# Patient Record
Sex: MALE | Race: WHITE | NOT HISPANIC OR LATINO | Employment: OTHER | URBAN - METROPOLITAN AREA
[De-identification: names, ages, dates, MRNs, and addresses within clinical notes are randomized per-mention and may not be internally consistent; named-entity substitution may affect disease eponyms.]

---

## 2017-05-01 DIAGNOSIS — Z12.11 ENCOUNTER FOR SCREENING FOR MALIGNANT NEOPLASM OF COLON: ICD-10-CM

## 2017-05-01 DIAGNOSIS — Z12.12 ENCOUNTER FOR SCREENING FOR MALIGNANT NEOPLASM OF RECTUM: ICD-10-CM

## 2017-05-23 ENCOUNTER — GENERIC CONVERSION - ENCOUNTER (OUTPATIENT)
Dept: OTHER | Facility: OTHER | Age: 72
End: 2017-05-23

## 2017-05-26 ENCOUNTER — GENERIC CONVERSION - ENCOUNTER (OUTPATIENT)
Dept: OTHER | Facility: OTHER | Age: 72
End: 2017-05-26

## 2017-05-30 ENCOUNTER — APPOINTMENT (OUTPATIENT)
Dept: LAB | Facility: CLINIC | Age: 72
End: 2017-05-30
Payer: MEDICARE

## 2017-05-30 DIAGNOSIS — Z00.00 ENCOUNTER FOR GENERAL ADULT MEDICAL EXAMINATION WITHOUT ABNORMAL FINDINGS: ICD-10-CM

## 2017-05-30 DIAGNOSIS — Z12.11 ENCOUNTER FOR SCREENING FOR MALIGNANT NEOPLASM OF COLON: ICD-10-CM

## 2017-05-30 DIAGNOSIS — Z12.12 ENCOUNTER FOR SCREENING FOR MALIGNANT NEOPLASM OF RECTUM: ICD-10-CM

## 2017-05-30 DIAGNOSIS — E11.9 TYPE 2 DIABETES MELLITUS WITHOUT COMPLICATIONS (HCC): ICD-10-CM

## 2017-05-30 LAB
EST. AVERAGE GLUCOSE BLD GHB EST-MCNC: 140 MG/DL
GLUCOSE P FAST SERPL-MCNC: 142 MG/DL (ref 65–99)
HBA1C MFR BLD: 6.5 % (ref 4.2–6.3)
HCV AB SER QL: NORMAL
HEMOCCULT STL QL IA: NEGATIVE

## 2017-05-30 PROCEDURE — 83036 HEMOGLOBIN GLYCOSYLATED A1C: CPT

## 2017-05-30 PROCEDURE — G0328 FECAL BLOOD SCRN IMMUNOASSAY: HCPCS

## 2017-05-30 PROCEDURE — 82947 ASSAY GLUCOSE BLOOD QUANT: CPT

## 2017-05-30 PROCEDURE — 36415 COLL VENOUS BLD VENIPUNCTURE: CPT

## 2017-05-30 PROCEDURE — 86803 HEPATITIS C AB TEST: CPT

## 2017-06-07 ENCOUNTER — ALLSCRIPTS OFFICE VISIT (OUTPATIENT)
Dept: OTHER | Facility: OTHER | Age: 72
End: 2017-06-07

## 2017-06-07 DIAGNOSIS — E11.9 TYPE 2 DIABETES MELLITUS WITHOUT COMPLICATIONS (HCC): ICD-10-CM

## 2017-09-26 ENCOUNTER — APPOINTMENT (OUTPATIENT)
Dept: LAB | Facility: CLINIC | Age: 72
End: 2017-09-26
Payer: MEDICARE

## 2017-09-26 DIAGNOSIS — R97.20 ELEVATED PROSTATE SPECIFIC ANTIGEN (PSA): Primary | ICD-10-CM

## 2017-09-26 LAB — PSA SERPL-MCNC: 7.1 NG/ML (ref 0–4)

## 2017-09-26 PROCEDURE — 84153 ASSAY OF PSA TOTAL: CPT

## 2017-10-09 ENCOUNTER — ALLSCRIPTS OFFICE VISIT (OUTPATIENT)
Dept: OTHER | Facility: OTHER | Age: 72
End: 2017-10-09

## 2017-10-11 NOTE — PROGRESS NOTES
Assessment  1  PSA elevation (790 93) (R97 20)    Plan  PSA elevation    · (1) PSA, DIAGNOSTIC (FOLLOW-UP); Status:Active; Requested for:01Oct2018; Perform:[facility]; VMJ:[id number];ESOVWCI; For:PSA elevation; Ordered By:Kelly Nava;   · Follow-up visit in 1 year Evaluation and Treatment  Follow-up  Status: Hold For -  Scheduling,Retrospective Authorization  Requested for: [id number]   Ordered; For: PSA elevation; Ordered By: Molly Romero Performed:  Due: 41BUK0812; Last Updated By: Molly Romero; 10/9/2017 1:16:58 PM    Discussion/Summary  Discussion Summary:   1  Elevated PSA s/p 2 negative prostate biopsiesremains stable at 7  1up in 1 year with PSA prior to visit and with ROGERIO for continued surveillance aware should PSAs remain stable, routine prostate cancer screening can be discontinued according to AUA guidelines  Adrenal adenomaon previous imaging  by Dr Dominique Gonzales  Chief Complaint  Chief Complaint Free Text Note Form: Patient presents for elevated PSA  PSA = 7 1       History of Present Illness  HPI: Lukas Armijo is a 74-year-old male patient of Dr Dominique Gonzales with a history of elevated PSA status post 2 negative prostate biopsies and adrenal adenoma presenting for 1 year follow-up  previously had 2 negative prostate biopsies while in [place] in 2011 for a PSA of 7 57 and 7 21  PSAs have remained stable over the past 2 years with his most recent PSA is 7 1 (9/26/2017), previously 7 4 last year   had a CT of his abdomen and pelvis with and without contrast in 2015  At this time revealed a slight thickening of the superior portion of the right adrenal gland  The appearance was unchanged from previous chest CT in May 2014  The area was too small to accurately measure density though the findings were almost certainly indicative of a benign processes or nodular hypertrophy of the gland or a small adenoma   She was also found to have prostatomegaly on the same CT scan in August 2015  He has not had any follow-up imaging since  is overall happy with his urination  He feels like he has a strong stream, feels empty after urination, and has nocturia 0-1 time nightly  Patient does admit to occasional hesitancy however denies any urgency, frequency, hematuria, incontinence, dysuria, suprapubic pressure, or flank pain  He is not on any medications for his prostate or bladder  He denies any issues with sexual dysfunction  Review of Systems  Complete-Male Urology:   Constitutional: No fever or chills, feels well, no tiredness, no recent weight gain or weight loss  Respiratory: No complaints of shortness of breath, no wheezing, no cough, no SOB on exertion, no orthopnea or PND  Cardiovascular: No complaints of slow heart rate, no fast heart rate, no chest pain, no palpitations, no leg claudication, no lower extremity  Gastrointestinal: No complaints of abdominal pain, no constipation, no nausea or vomiting, no diarrhea or bloody stools  Genitourinary: Empty sensation-and-stream quality good, but-no dysuria,-no hematuria,-no incontinence,-no nocturia-and-no feelings of urinary urgency-   The patient presents with complaints of urinary hesitancy (occasional)  Musculoskeletal: No complaints of arthralgia, no myalgias, no joint swelling or stiffness, no limb pain or swelling  Integumentary: No complaints of skin rash or skin lesions, no itching, no skin wound, no dry skin  Hematologic/Lymphatic: No complaints of swollen glands, no swollen glands in the neck, does not bleed easily, no easy bruising  Neurological: No compliants of headache, no confusion, no convulsions, no numbness or tingling, no dizziness or fainting, no limb weakness, no difficulty walking  ROS Reviewed:   ROS reviewed  Active Problems  1  Adrenal adenoma (227 0) (D35 00)   2  Benign essential hypertension (401 1) (I10)   3  Diabetic retinopathy (250 50,362 01) (E11 319)   4   Hypercholesteremia (272 0) (E78 00)   5  PSA elevation (790 93) (R97 20)   6  Type 2 diabetes mellitus (250 00) (E11 9)    Past Medical History  1  History of Chronic cough (786 2) (R05)   2  History of Colon cancer screening (V76 51) (Z12 11)   3  History of dysplastic nevus (V13 3) (Z86 018)   4  History of Need for pneumococcal vaccination (V03 82) (Z23)   5  History of Need for prophylactic vaccination and inoculation against influenza (V04 81)   (Z23)   6  Need for prophylactic vaccination and inoculation against influenza (V04 81) (Z23)   7  History of Pain of left shoulder region (719 41) (M25 512)  Active Problems And Past Medical History Reviewed: The active problems and past medical history were reviewed and updated today  Surgical History  Surgical History Reviewed: The surgical history was reviewed and updated today  Family History  Mother    1  Family history of CHF (congestive heart failure)  Father    2  Family history of CHF (congestive heart failure)   3  Family history of cardiac disorder (V17 49) (Z82 49)   4  Family history of MI (myocardial infarction)  Uncle    5  Family history of Diabetes  Family History Reviewed: The family history was reviewed and updated today  Social History   · Never a smoker   · History of Never smoker   · No drug use   · History of Social alcohol use (Z78 9)  Social History Reviewed: The social history was reviewed and is unchanged  Current Meds   1  AmLODIPine Besylate 10 MG Oral Tablet; TAKE 1 TABLET DAILY AS DIRECTED; Therapy: 69HBW1596 to (Evaluate:18Vgq7591)  Requested for: 93INJ8159; Last   Rx:07Jun2017 Ordered   2  CoQ10 CAPS; take 1 capsule daily; Therapy: (Recorded:07Jun2017) to Recorded   3  Fish Oil CAPS; Take 1 capsule twice daily; Therapy: (Recorded:07Jun2017) to Recorded   4  Glucosamine-Chondroitin TABS; Take 1 tablet twice daily; Therapy: (Recorded:07Jun2017) to Recorded   5   HydroCHLOROthiazide 25 MG Oral Tablet; TAKE 1 TABLET DAILY; Therapy: 01WYE7792 to (Evaluate:39Pzu8952)  Requested for: 01SUN1784; Last   Rx:07Jun2017 Ordered   6  MetFORMIN HCl - 1000 MG Oral Tablet; Take 1 tablet twice daily; Therapy: 14ZTR8785 to (Evaluate:04Dec2017)  Requested for: 81EEB5270; Last   Rx:07Jun2017 Ordered   7  Simvastatin 40 MG Oral Tablet; TAKE 1 TABLET DAILY; Therapy: 45PFC2232 to (Evaluate:04Dec2017)  Requested for: 01GIC5903; Last   Rx:07Jun2017 Ordered  Medication List Reviewed: The medication list was reviewed and updated today  Allergies  1  No Known Drug Allergies    Vitals  Vital Signs    Recorded: 87HQT6433 01:06PM   Heart Rate 64   Systolic 549   Diastolic 78   Height 5 ft 10 in   Weight 180 lb 4 oz   BMI Calculated 25 86   BSA Calculated 2     Physical Exam    Constitutional   General appearance: No acute distress, well appearing and well nourished  Pulmonary   Respiratory effort: No increased work of breathing or signs of respiratory distress  Cardiovascular   Examination of extremities for edema and/or varicosities: Normal     Musculoskeletal   Gait and station: Normal     Skin   Skin and subcutaneous tissue: Normal without rashes or lesions  Additional Exam:  no focal neurologic deficits  Mood and affect appropriate        Results/Data  AUA Symptom Score 09Oct2017 01:09PM User, Ahs     Test Name Result Flag Reference   AUA Symptom Score (for prostate disease) 1     Incomplete emptying: Not at all (0)  Frequency: Not at all (0)  Intermittency: Not at all (0)  Urgency: Not at all (0)  Weak-stream: Not at all (0)  Straining: Not at all (0)  Nocturia: Less than 1 time in 5 (1)   AUA Symptom Score (for prostate disease) - Quality of Life Due to Urinary Symptoms Delighted     AUA Symptom Score (for prostate disease) - Score Category Mild         Future Appointments    Date/Time Provider Specialty Site   10/16/2018 09:00 AM Angelina Nava Urology [facility]ack[facility]clean   12/15/2017 09:00 AM Isabell Werner MD Internal Medicine [facility]     Signatures   Electronically signed by : Yudy Walton, ; Oct  9 2017  1:33PM EST                       (Author)    Electronically signed by : JOEY Russell ; Oct 10 2017  7:01AM EST

## 2017-12-04 ENCOUNTER — APPOINTMENT (OUTPATIENT)
Dept: LAB | Facility: CLINIC | Age: 72
End: 2017-12-04
Payer: MEDICARE

## 2017-12-04 DIAGNOSIS — E13.8 DIABETES MELLITUS OF OTHER TYPE WITH COMPLICATION, UNSPECIFIED LONG TERM INSULIN USE STATUS: Primary | ICD-10-CM

## 2017-12-04 LAB
CHOLEST SERPL-MCNC: 160 MG/DL (ref 50–200)
CREAT UR-MCNC: 126 MG/DL
EST. AVERAGE GLUCOSE BLD GHB EST-MCNC: 143 MG/DL
GLUCOSE P FAST SERPL-MCNC: 146 MG/DL (ref 65–99)
HBA1C MFR BLD: 6.6 % (ref 4.2–6.3)
HDLC SERPL-MCNC: 41 MG/DL (ref 40–60)
LDLC SERPL CALC-MCNC: 101 MG/DL (ref 0–100)
MICROALBUMIN UR-MCNC: 12 MG/L (ref 0–20)
MICROALBUMIN/CREAT 24H UR: 10 MG/G CREATININE (ref 0–30)
TRIGL SERPL-MCNC: 89 MG/DL

## 2017-12-04 PROCEDURE — 36415 COLL VENOUS BLD VENIPUNCTURE: CPT

## 2017-12-04 PROCEDURE — 82043 UR ALBUMIN QUANTITATIVE: CPT

## 2017-12-04 PROCEDURE — 80061 LIPID PANEL: CPT

## 2017-12-04 PROCEDURE — 82570 ASSAY OF URINE CREATININE: CPT

## 2017-12-04 PROCEDURE — 83036 HEMOGLOBIN GLYCOSYLATED A1C: CPT

## 2017-12-04 PROCEDURE — 82947 ASSAY GLUCOSE BLOOD QUANT: CPT

## 2017-12-20 ENCOUNTER — ALLSCRIPTS OFFICE VISIT (OUTPATIENT)
Dept: OTHER | Facility: OTHER | Age: 72
End: 2017-12-20

## 2017-12-20 DIAGNOSIS — E11.9 TYPE 2 DIABETES MELLITUS WITHOUT COMPLICATIONS (HCC): ICD-10-CM

## 2018-01-13 VITALS
OXYGEN SATURATION: 98 % | WEIGHT: 175.13 LBS | HEART RATE: 71 BPM | TEMPERATURE: 98.3 F | DIASTOLIC BLOOD PRESSURE: 80 MMHG | SYSTOLIC BLOOD PRESSURE: 134 MMHG | BODY MASS INDEX: 25.13 KG/M2

## 2018-01-14 VITALS
HEIGHT: 70 IN | BODY MASS INDEX: 25.8 KG/M2 | HEART RATE: 64 BPM | DIASTOLIC BLOOD PRESSURE: 78 MMHG | SYSTOLIC BLOOD PRESSURE: 138 MMHG | WEIGHT: 180.25 LBS

## 2018-01-23 VITALS
BODY MASS INDEX: 26.65 KG/M2 | OXYGEN SATURATION: 96 % | TEMPERATURE: 97.5 F | HEIGHT: 70 IN | HEART RATE: 72 BPM | WEIGHT: 186.13 LBS | SYSTOLIC BLOOD PRESSURE: 126 MMHG | DIASTOLIC BLOOD PRESSURE: 76 MMHG

## 2018-02-22 DIAGNOSIS — I10 BENIGN HYPERTENSION: Primary | ICD-10-CM

## 2018-02-22 RX ORDER — AMLODIPINE BESYLATE 10 MG/1
1 TABLET ORAL DAILY
COMMUNITY
Start: 2014-05-08 | End: 2018-02-22 | Stop reason: SDUPTHER

## 2018-02-22 RX ORDER — HYDROCHLOROTHIAZIDE 25 MG/1
25 TABLET ORAL DAILY
Qty: 90 TABLET | Refills: 1 | Status: SHIPPED | OUTPATIENT
Start: 2018-02-22 | End: 2018-07-30 | Stop reason: SDUPTHER

## 2018-02-22 RX ORDER — AMLODIPINE BESYLATE 10 MG/1
10 TABLET ORAL DAILY
Qty: 90 TABLET | Refills: 1 | Status: SHIPPED | OUTPATIENT
Start: 2018-02-22 | End: 2018-07-30 | Stop reason: SDUPTHER

## 2018-02-22 RX ORDER — HYDROCHLOROTHIAZIDE 25 MG/1
1 TABLET ORAL DAILY
COMMUNITY
Start: 2014-05-08 | End: 2018-02-22 | Stop reason: SDUPTHER

## 2018-06-22 ENCOUNTER — HOSPITAL ENCOUNTER (EMERGENCY)
Facility: HOSPITAL | Age: 73
Discharge: HOME | End: 2018-06-22
Payer: MEDICARE

## 2018-06-22 ENCOUNTER — APPOINTMENT (EMERGENCY)
Dept: RADIOLOGY | Facility: HOSPITAL | Age: 73
End: 2018-06-22
Payer: MEDICARE

## 2018-06-22 VITALS
TEMPERATURE: 98.4 F | HEART RATE: 71 BPM | DIASTOLIC BLOOD PRESSURE: 67 MMHG | RESPIRATION RATE: 18 BRPM | OXYGEN SATURATION: 97 % | SYSTOLIC BLOOD PRESSURE: 127 MMHG | WEIGHT: 180 LBS | BODY MASS INDEX: 25.68 KG/M2

## 2018-06-22 DIAGNOSIS — S61.219A FINGER LACERATION: Primary | ICD-10-CM

## 2018-06-22 PROCEDURE — 73140 X-RAY EXAM OF FINGER(S): CPT

## 2018-06-22 PROCEDURE — 99283 EMERGENCY DEPT VISIT LOW MDM: CPT

## 2018-06-22 PROCEDURE — 90715 TDAP VACCINE 7 YRS/> IM: CPT | Performed by: PHYSICIAN ASSISTANT

## 2018-06-22 PROCEDURE — 90471 IMMUNIZATION ADMIN: CPT

## 2018-06-22 RX ORDER — AMOXICILLIN AND CLAVULANATE POTASSIUM 875; 125 MG/1; MG/1
1 TABLET, FILM COATED ORAL EVERY 12 HOURS SCHEDULED
Qty: 14 TABLET | Refills: 0 | Status: SHIPPED | OUTPATIENT
Start: 2018-06-22 | End: 2018-06-29

## 2018-06-22 RX ORDER — AMOXICILLIN AND CLAVULANATE POTASSIUM 875; 125 MG/1; MG/1
1 TABLET, FILM COATED ORAL ONCE
Status: COMPLETED | OUTPATIENT
Start: 2018-06-22 | End: 2018-06-22

## 2018-06-22 RX ADMIN — AMOXICILLIN AND CLAVULANATE POTASSIUM 1 TABLET: 875; 125 TABLET, FILM COATED ORAL at 15:32

## 2018-06-22 RX ADMIN — TETANUS TOXOID, REDUCED DIPHTHERIA TOXOID AND ACELLULAR PERTUSSIS VACCINE, ADSORBED 0.5 ML: 5; 2.5; 8; 8; 2.5 SUSPENSION INTRAMUSCULAR at 15:37

## 2018-06-22 NOTE — DISCHARGE INSTRUCTIONS
Acute Wounds   WHAT YOU NEED TO KNOW:   An acute wound is an injury that causes a break in the skin  As your wound begins to heal, it is normal to have some swelling, pain, and redness  Your body's immune system is working to keep your wound from getting infected  Your wound may develop a scab  The scab protects your wound as it heals  DISCHARGE INSTRUCTIONS:   Call 911 for the following:   · You suddenly have trouble breathing or have chest pain  Return to the emergency department if:   · Blood soaks through your bandage  · You have pus or a foul odor coming from the wound  · Your stitches come apart or your wound reopens  Contact your healthcare provider if:   · You continue to have pain even after you have taken pain medicine  · You have muscle, joint, or body aches, sweating, or a fever  · You have increased swelling, redness, or bleeding in your wound  · Your skin is itchy, swollen, or you have a rash  · You have questions or concerns about your condition or care  Medicines: You may need any of the following:  · Antibiotics  may be given to prevent or treat an infection  · Prescription pain medicine  may be given  Ask your how to take this medicine safely  · NSAIDs , such as ibuprofen, help decrease swelling, pain, and fever  NSAIDs can cause stomach bleeding or kidney problems in certain people  If you take blood thinner medicine, always ask if NSAIDs are safe for you  Always read the medicine label and follow directions  Do not give these medicines to children under 10months of age without direction from your child's healthcare provider  · Take your medicine as directed  Contact your healthcare provider if you think your medicine is not helping or if you have side effects  Tell him or her if you are allergic to any medicine  Keep a list of the medicines, vitamins, and herbs you take  Include the amounts, and when and why you take them   Bring the list or the pill bottles to follow-up visits  Carry your medicine list with you in case of an emergency  Care for your wound as directed:  Acute wounds can be in different locations and caused by different injuries  Follow your healthcare provider's instructions on caring for your type of wound  The following care items are for most wounds:  · Keep your wound covered with a clean and dry bandage  Change your bandage if it becomes wet or dirty  This will decrease the risk for infection in your wound  Follow your healthcare provider's instructions for changing your dressing  · Do not soak in a tub or swim  until your healthcare provider says it is okay  Your wound may open if you get it too wet  Dirt from the water can also get into your wound and cause an infection  · Keep pets away from your wound  Pets carry germs that can cause a wound infection  · Do not pick or scratch scabs  Let scabs fall off on their own  You may damage new skin that is forming under the scab  You may have a worse scar after the damage  · Eat healthy foods and drink liquids as directed  Healthy foods give your body the nutrients it needs to heal your wound  Liquids prevent dehydration that can decrease the blood supply to your wound  Healthy foods include fruits, vegetables, grains (breads and cereals), dairy, and protein foods  Protein foods include meat, fish, nuts, and soy products  Protein, calories, vitamin C, and zinc help wounds heal  Ask your healthcare provider for more information about the foods you should eat to improve healing  Follow up with your healthcare provider as directed:  Write down your questions so you remember to ask them during your visits  © 2017 [employer] [employer] Information is for End User's use only and may not be sold, redistributed or otherwise used for commercial purposes   All illustrations and images included in CareNotes® are the copyrighted property of A D A M , Inc  or [employer]  The above information is an  only  It is not intended as medical advice for individual conditions or treatments  Talk to your doctor, nurse or pharmacist before following any medical regimen to see if it is safe and effective for you

## 2018-06-22 NOTE — ED PROVIDER NOTES
History  Chief Complaint   Patient presents with    Finger Laceration     patient was saw cutting at noon and deep laceration, bone visible  patient used hydrogene peroxide on wound     This is a 30-year-old male patient who caught his left 5th digit on a circular saw causing a large gaping laceration on the volar surface of left 5th finger between the DI p m  PIP there is a large chunk of skin missing  There is some tendon exposed but no laceration of the tendon with full range of motion with and without resistance  X-ray interpreted by me small avulsion  Patient be seen evaluated by the plastic surgeon was bedside  Prior to Admission Medications   Prescriptions Last Dose Informant Patient Reported? Taking? amLODIPine (NORVASC) 10 mg tablet   No No   Sig: Take 1 tablet (10 mg total) by mouth daily   hydrochlorothiazide (HYDRODIURIL) 25 mg tablet   No No   Sig: Take 1 tablet (25 mg total) by mouth daily      Facility-Administered Medications: None       Past Medical History:   Diagnosis Date    Chronic cough     RESOLVED: [id number]    Diabetes mellitus ([facility] [address] )        No past surgical history on file  Family History   Problem Relation Age of Onset    Heart failure Mother     Heart failure Father     Heart disease Father     Heart attack Father     Diabetes Other      I have reviewed and agree with the history as documented  Social History   Substance Use Topics    Smoking status: Never Smoker    Smokeless tobacco: Never Used    Alcohol use Yes      Comment: SOCIAL         Review of Systems   All other systems reviewed and are negative  Physical Exam  Physical Exam   Constitutional: He appears well-developed and well-nourished  HENT:   Head: Normocephalic and atraumatic     Right Ear: External ear normal    Left Ear: External ear normal    Nose: Nose normal    Mouth/Throat: Oropharynx is clear and moist    Eyes: Conjunctivae are normal  Pupils are equal, round, and reactive to light    Neck: Normal range of motion  Neck supple  Cardiovascular: Normal rate and regular rhythm  Pulmonary/Chest: Effort normal and breath sounds normal    Abdominal: Soft  Bowel sounds are normal  There is no tenderness  Musculoskeletal: Normal range of motion  Hands:  Neurological: He is alert  Skin: Skin is warm  Psychiatric: He has a normal mood and affect  His behavior is normal    Nursing note and vitals reviewed  Vital Signs  ED Triage Vitals [06/22/18 1336]   Temperature Pulse Respirations Blood Pressure SpO2   98 4 °F (36 9 °C) 71 18 127/67 97 %      Temp Source Heart Rate Source Patient Position - Orthostatic VS BP Location FiO2 (%)   Oral Monitor Sitting Right arm --      Pain Score       2           Vitals:    06/22/18 1336   BP: 127/67   Pulse: 71   Patient Position - Orthostatic VS: Sitting       Visual Acuity      ED Medications  Medications   amoxicillin-clavulanate (AUGMENTIN) 875-125 mg per tablet 1 tablet (not administered)       Diagnostic Studies  Results Reviewed     None                 XR finger fifth digit - pinkie LEFT   ED Interpretation by Slava Rose PA-C (06/22 1434)   Small avulsion noted little finger                 Procedures  Procedures       Phone Contacts  ED Phone Contact    ED Course  ED Course as of Jun 22 1520 Fri Jun 22, 2018   1444 Spoke with dr Bailee Garcia will be here in 5 minutes  To see patient    80 Dr Bailee Garcia saw patient  Advised dress with xeraform and dress  Continue augmentin                                MDM  CritCare Time    Disposition  Final diagnoses:   Finger laceration     Time reflects when diagnosis was documented in both MDM as applicable and the Disposition within this note     Time User Action Codes Description Comment    6/22/2018  3:18 PM [facility] [address] [facility]       ED Disposition     ED Disposition Condition Comment    Discharge  Yuli Ron discharge to home/self care      Condition at discharge: Good        Follow-up Information     Follow up With Specialties Details Why Contact Info    Ramila Reyna MD Plastic Surgery, Hand Surgery Schedule an appointment as soon as possible for a visit in 2 days  [address]  [phone]            Patient's Medications   Discharge Prescriptions    AMOXICILLIN-CLAVULANATE (AUGMENTIN) 875-125 MG PER TABLET    Take 1 tablet by mouth every 12 (twelve) hours for 7 days       Start Date: 6/22/2018 End Date: 6/29/2018       Order Dose: 1 tablet       Quantity: 14 tablet    Refills: 0     No discharge procedures on file      ED Provider  Electronically Signed by           Renita Puente PA-C  06/22/18 8095

## 2018-07-23 ENCOUNTER — APPOINTMENT (OUTPATIENT)
Dept: LAB | Facility: CLINIC | Age: 73
End: 2018-07-23
Payer: MEDICARE

## 2018-07-23 DIAGNOSIS — E11.9 TYPE 2 DIABETES MELLITUS WITHOUT COMPLICATIONS (HCC): ICD-10-CM

## 2018-07-23 LAB
EST. AVERAGE GLUCOSE BLD GHB EST-MCNC: 148 MG/DL
GLUCOSE P FAST SERPL-MCNC: 144 MG/DL (ref 65–99)
HBA1C MFR BLD: 6.8 % (ref 4.2–6.3)

## 2018-07-23 PROCEDURE — 36415 COLL VENOUS BLD VENIPUNCTURE: CPT

## 2018-07-23 PROCEDURE — 82947 ASSAY GLUCOSE BLOOD QUANT: CPT

## 2018-07-23 PROCEDURE — 83036 HEMOGLOBIN GLYCOSYLATED A1C: CPT

## 2018-07-30 ENCOUNTER — OFFICE VISIT (OUTPATIENT)
Dept: INTERNAL MEDICINE CLINIC | Age: 73
End: 2018-07-30
Payer: MEDICARE

## 2018-07-30 VITALS
HEIGHT: 71 IN | OXYGEN SATURATION: 99 % | SYSTOLIC BLOOD PRESSURE: 132 MMHG | HEART RATE: 62 BPM | TEMPERATURE: 97.7 F | BODY MASS INDEX: 24.58 KG/M2 | WEIGHT: 175.6 LBS | DIASTOLIC BLOOD PRESSURE: 76 MMHG

## 2018-07-30 DIAGNOSIS — Z12.11 SCREENING FOR COLON CANCER: ICD-10-CM

## 2018-07-30 DIAGNOSIS — E78.00 HYPERCHOLESTEROLEMIA: ICD-10-CM

## 2018-07-30 DIAGNOSIS — E11.8 TYPE 2 DIABETES MELLITUS WITH COMPLICATION, WITHOUT LONG-TERM CURRENT USE OF INSULIN (HCC): ICD-10-CM

## 2018-07-30 DIAGNOSIS — E11.8 TYPE 2 DIABETES MELLITUS WITH COMPLICATION, WITHOUT LONG-TERM CURRENT USE OF INSULIN (HCC): Primary | ICD-10-CM

## 2018-07-30 DIAGNOSIS — E11.3292 MILD NONPROLIFERATIVE DIABETIC RETINOPATHY OF LEFT EYE WITHOUT MACULAR EDEMA ASSOCIATED WITH TYPE 2 DIABETES MELLITUS (HCC): ICD-10-CM

## 2018-07-30 DIAGNOSIS — I10 BENIGN HYPERTENSION: ICD-10-CM

## 2018-07-30 PROBLEM — E11.319 DIABETIC RETINOPATHY (HCC): Status: ACTIVE | Noted: 2017-05-26

## 2018-07-30 PROCEDURE — 99214 OFFICE O/P EST MOD 30 MIN: CPT | Performed by: INTERNAL MEDICINE

## 2018-07-30 RX ORDER — HYDROCHLOROTHIAZIDE 25 MG/1
25 TABLET ORAL DAILY
Qty: 90 TABLET | Refills: 1 | Status: SHIPPED | OUTPATIENT
Start: 2018-07-30 | End: 2019-01-31 | Stop reason: SDUPTHER

## 2018-07-30 RX ORDER — VITAMIN B COMPLEX
1 TABLET ORAL DAILY
COMMUNITY
End: 2019-12-17 | Stop reason: HOSPADM

## 2018-07-30 RX ORDER — MAGNESIUM CARB/ALUMINUM HYDROX 105-160MG
1 TABLET,CHEWABLE ORAL DAILY
COMMUNITY
End: 2019-12-17 | Stop reason: HOSPADM

## 2018-07-30 RX ORDER — AMLODIPINE BESYLATE 10 MG/1
10 TABLET ORAL DAILY
Qty: 90 TABLET | Refills: 1 | Status: SHIPPED | OUTPATIENT
Start: 2018-07-30 | End: 2019-01-31 | Stop reason: SDUPTHER

## 2018-07-30 RX ORDER — SIMVASTATIN 40 MG
TABLET ORAL
Qty: 90 TABLET | Refills: 1 | Status: SHIPPED | OUTPATIENT
Start: 2018-07-30 | End: 2019-01-31 | Stop reason: SDUPTHER

## 2018-07-30 RX ORDER — SIMVASTATIN 40 MG
1 TABLET ORAL DAILY
COMMUNITY
Start: 2014-05-08 | End: 2018-07-30 | Stop reason: SDUPTHER

## 2018-07-30 RX ORDER — SIMVASTATIN 40 MG
40 TABLET ORAL DAILY
Qty: 90 TABLET | Refills: 1 | Status: SHIPPED | OUTPATIENT
Start: 2018-07-30 | End: 2018-07-30 | Stop reason: SDUPTHER

## 2018-07-30 NOTE — PROGRESS NOTES
Assessment/Plan:    No problem-specific Assessment & Plan notes found for this encounter  Diagnoses and all orders for this visit:      Screening for colon cancer    Hypercholesterolemia  -     simvastatin (ZOCOR) 40 mg tablet; Take 1 tablet (40 mg total) by mouth daily  This time the lipid panel was not done but the last time and the lipid panel was done it was good he does not have any signs or symptoms of a hypercholesterolemia  Benign hypertension  -     hydrochlorothiazide (HYDRODIURIL) 25 mg tablet; Take 1 tablet (25 mg total) by mouth daily  -     amLODIPine (NORVASC) 10 mg tablet; Take 1 tablet (10 mg total) by mouth daily   hypertension is very well controlled with systolic of 755 the no signs or symptoms of hypertension  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 (HCC)   metFORMIN (GLUCOPHAGE) 1000 MG tablet; Take 1 tablet (1,000 mg total) by mouth 2 (two) times a day   diabetes control is good with the blood sugar 144 and hemoglobin A1c 6 8 he is followed up by the Ophthalmology for diabetic retinopathy and injection into the eye was given he follow them regularly  Comprehensive diabetic care is managed in our office he is on minimum medications only metformin 1000 mg 2 times a day    Other orders  -     Cancel: Ambulatory referral to Gastroenterology; Future  -     Coenzyme Q10 (COQ10) 100 MG CAPS; Take 1 capsule by mouth daily  -     Omega-3 Fatty Acids (FISH OIL) 645 MG CAPS; Take 1 capsule by mouth 2 (two) times a day  -     Glucosamine-Chondroitin 750-600 MG TABS; Take 1 tablet by mouth daily  -     metFORMIN (GLUCOPHAGE) 1000 MG tablet; Take 1 tablet by mouth 2 (two) times a day  -     simvastatin (ZOCOR) 40 mg tablet;  Take 1 tablet by mouth daily          Subjective:    patient is a complain of some cough in the morning but as the day progresses he does not have cough he does not have any shortness of breath or chest pain the   Patient ID: Mary Hussein is a 81 y o  male  HPI   he is here for the regular follow-up please see above for HPI of his medical problems which was discussed above  The following portions of the patient's history were reviewed and updated as appropriate: allergies, current medications, past family history, past medical history, past social history, past surgical history and problem list     Review of Systems   Constitutional: Negative for appetite change, fatigue and fever  HENT: Negative for congestion, ear pain, hearing loss, nosebleeds, sneezing, tinnitus and voice change  Eyes: Negative for pain, discharge and redness  Respiratory: Positive for cough  Negative for chest tightness and wheezing  Early a m  Cough then the cough gets better this is going on for a long period of time and the appropriate workup was done   Cardiovascular: Negative for chest pain, palpitations and leg swelling  Gastrointestinal: Negative for abdominal pain, blood in stool, constipation, diarrhea, nausea and vomiting  Genitourinary: Negative for difficulty urinating, dysuria, hematuria and urgency  Musculoskeletal: Negative for arthralgias, back pain, gait problem and joint swelling  Skin: Negative for rash and wound  Allergic/Immunologic: Negative for environmental allergies  Neurological: Negative for dizziness, tremors, seizures, weakness, light-headedness and numbness  Hematological: Negative for adenopathy  Does not bruise/bleed easily  Psychiatric/Behavioral: Negative for behavioral problems and confusion  The patient is not nervous/anxious            Past Medical History:   Diagnosis Date    Chronic cough     RESOLVED: [id number]    Diabetes mellitus (HCC)          Current Outpatient Prescriptions:     amLODIPine (NORVASC) 10 mg tablet, Take 1 tablet (10 mg total) by mouth daily, Disp: 90 tablet, Rfl: 1    Coenzyme Q10 (COQ10) 100 MG CAPS, Take 1 capsule by mouth daily, Disp: , Rfl:   Glucosamine-Chondroitin 750-600 MG TABS, Take 1 tablet by mouth daily, Disp: , Rfl:     hydrochlorothiazide (HYDRODIURIL) 25 mg tablet, Take 1 tablet (25 mg total) by mouth daily, Disp: 90 tablet, Rfl: 1    metFORMIN (GLUCOPHAGE) 1000 MG tablet, Take 1 tablet by mouth 2 (two) times a day, Disp: , Rfl:     Omega-3 Fatty Acids (FISH OIL) 645 MG CAPS, Take 1 capsule by mouth 2 (two) times a day, Disp: , Rfl:     simvastatin (ZOCOR) 40 mg tablet, Take 1 tablet by mouth daily, Disp: , Rfl:     Allergies   Allergen Reactions    Pollen Extract        Social History   Past Surgical History:   Procedure Laterality Date    HAND SURGERY       Family History   Problem Relation Age of Onset    Heart failure Mother     Heart failure Father     Heart disease Father     Heart attack Father     Diabetes Other        Objective:  /76 (BP Location: Left arm, Patient Position: Sitting, Cuff Size: Standard)   Pulse 62   Temp 97 7 °F (36 5 °C) (Tympanic)   Ht 5' 10 71" (1 796 m)   Wt 79 7 kg (175 lb 9 6 oz)   SpO2 99%   BMI 24 69 kg/m²        Physical Exam   Constitutional: He is oriented to person, place, and time  He appears well-developed and well-nourished  HENT:   Right Ear: External ear normal    Mouth/Throat: Oropharynx is clear and moist    Eyes: Conjunctivae and EOM are normal  Pupils are equal, round, and reactive to light  Neck: Normal range of motion  No JVD present  No thyromegaly present  Cardiovascular: Normal rate, regular rhythm, normal heart sounds and intact distal pulses  Pulmonary/Chest: Breath sounds normal    Abdominal: Soft  Bowel sounds are normal    Musculoskeletal: Normal range of motion  Lymphadenopathy:     He has no cervical adenopathy  Neurological: He is alert and oriented to person, place, and time  He has normal reflexes  Skin: Skin is dry  Psychiatric: He has a normal mood and affect   His behavior is normal  Judgment and thought content normal    Nursing note and vitals reviewed

## 2018-10-01 DIAGNOSIS — R97.20 ELEVATED PROSTATE SPECIFIC ANTIGEN (PSA): ICD-10-CM

## 2018-10-08 ENCOUNTER — APPOINTMENT (OUTPATIENT)
Dept: LAB | Facility: CLINIC | Age: 73
End: 2018-10-08
Payer: MEDICARE

## 2018-10-08 ENCOUNTER — TRANSCRIBE ORDERS (OUTPATIENT)
Dept: LAB | Facility: CLINIC | Age: 73
End: 2018-10-08

## 2018-10-08 DIAGNOSIS — R97.20 ELEVATED PROSTATE SPECIFIC ANTIGEN (PSA): ICD-10-CM

## 2018-10-08 LAB — PSA SERPL-MCNC: 10.6 NG/ML (ref 0–4)

## 2018-10-08 PROCEDURE — 36415 COLL VENOUS BLD VENIPUNCTURE: CPT

## 2018-10-08 PROCEDURE — 84153 ASSAY OF PSA TOTAL: CPT

## 2018-10-15 ENCOUNTER — OFFICE VISIT (OUTPATIENT)
Dept: INTERNAL MEDICINE CLINIC | Age: 73
End: 2018-10-15
Payer: MEDICARE

## 2018-10-15 VITALS
WEIGHT: 176.2 LBS | HEIGHT: 70 IN | BODY MASS INDEX: 25.22 KG/M2 | TEMPERATURE: 97.6 F | DIASTOLIC BLOOD PRESSURE: 70 MMHG | OXYGEN SATURATION: 98 % | HEART RATE: 71 BPM | SYSTOLIC BLOOD PRESSURE: 122 MMHG

## 2018-10-15 DIAGNOSIS — H93.13 RINGING IN EAR, BILATERAL: Primary | ICD-10-CM

## 2018-10-15 DIAGNOSIS — J30.2 SEASONAL ALLERGIES: ICD-10-CM

## 2018-10-15 PROCEDURE — 99213 OFFICE O/P EST LOW 20 MIN: CPT | Performed by: NURSE PRACTITIONER

## 2018-10-15 RX ORDER — FLUTICASONE PROPIONATE 50 MCG
1 SPRAY, SUSPENSION (ML) NASAL DAILY
Qty: 16 G | Refills: 0 | Status: SHIPPED | OUTPATIENT
Start: 2018-10-15 | End: 2019-11-21

## 2018-10-15 RX ORDER — BENZONATATE 200 MG/1
200 CAPSULE ORAL 3 TIMES DAILY PRN
Qty: 20 CAPSULE | Refills: 0 | Status: SHIPPED | OUTPATIENT
Start: 2018-10-15 | End: 2019-11-21

## 2018-10-15 NOTE — PROGRESS NOTES
Assessment/Plan:    Ringing in ear, bilateral  Feel that patient's drain the ears is multifactorial due to recent plane flight and seasonal allergies  Will treat patient for seasonal allergies, if symptoms do not improve will advise patient to follow up with her office for further testing  May need referral to ENT  Seasonal allergies    Will advise patient to start taking Zyrtec once daily until we get her 1st freeze  Also advised patient to use Flonase 2 sprays each nostril daily  Will give patient Tessalon Perles for cough  Diagnoses and all orders for this visit:    Ringing in ear, bilateral    Seasonal allergies  -     fluticasone (FLONASE) 50 mcg/act nasal spray; 1 spray into each nostril daily  -     benzonatate (TESSALON) 200 MG capsule; Take 1 capsule (200 mg total) by mouth 3 (three) times a day as needed for cough          Subjective:      Patient ID: Lynne Carbajal is a 81 y o  male  Patient presents today with complaints of sinus congestion for approximately 3 weeks, and ring in both ears  Patient states that prior to going to [facility] on September 14 he had some congestion and mild  Muffled/ringing in his ears  He states that when he flew the ring and got worse, and the congestion has not resolved  Patient does report history of seasonal allergies, patient does deny smoking  Sinus Problem   This is a new problem  The current episode started more than 1 month ago  There has been no fever  Associated symptoms include congestion and coughing (dry cough)  Pertinent negatives include no chills, diaphoresis, ear pain, headaches, neck pain, shortness of breath, sinus pressure, sneezing, sore throat or swollen glands  Past treatments include nothing  The treatment provided no relief         The following portions of the patient's history were reviewed and updated as appropriate: allergies, current medications, past family history, past medical history, past social history, past surgical history and problem list     Review of Systems   Constitutional: Negative for activity change, appetite change, chills, diaphoresis and fever  HENT: Positive for congestion and tinnitus  Negative for ear discharge, ear pain, postnasal drip, rhinorrhea, sinus pain, sinus pressure, sneezing and sore throat  Eyes: Negative for pain, discharge, itching and visual disturbance  Respiratory: Positive for cough (dry cough)  Negative for chest tightness, shortness of breath and wheezing  Cardiovascular: Negative for chest pain, palpitations and leg swelling  Gastrointestinal: Negative for abdominal pain, constipation, diarrhea, nausea and vomiting  Endocrine: Negative for polydipsia, polyphagia and polyuria  Genitourinary: Negative for difficulty urinating, dysuria and urgency  Musculoskeletal: Negative for arthralgias, back pain and neck pain  Skin: Negative for rash and wound  Neurological: Negative for dizziness, weakness, numbness and headaches           Past Medical History:   Diagnosis Date    Chronic cough     RESOLVED: [id number]    Diabetes mellitus (HCC)          Current Outpatient Prescriptions:     amLODIPine (NORVASC) 10 mg tablet, Take 1 tablet (10 mg total) by mouth daily, Disp: 90 tablet, Rfl: 1    Coenzyme Q10 (COQ10) 100 MG CAPS, Take 1 capsule by mouth daily, Disp: , Rfl:     Glucosamine-Chondroitin 750-600 MG TABS, Take 1 tablet by mouth daily, Disp: , Rfl:     hydrochlorothiazide (HYDRODIURIL) 25 mg tablet, Take 1 tablet (25 mg total) by mouth daily, Disp: 90 tablet, Rfl: 1    metFORMIN (GLUCOPHAGE) 1000 MG tablet, TAKE 1 TABLET TWICE DAILY, Disp: 180 tablet, Rfl: 1    Omega-3 Fatty Acids (FISH OIL) 645 MG CAPS, Take 1 capsule by mouth 2 (two) times a day, Disp: , Rfl:     simvastatin (ZOCOR) 40 mg tablet, TAKE 1 TABLET EVERY DAY, Disp: 90 tablet, Rfl: 1    benzonatate (TESSALON) 200 MG capsule, Take 1 capsule (200 mg total) by mouth 3 (three) times a day as needed for cough, Disp: 20 capsule, Rfl: 0    fluticasone (FLONASE) 50 mcg/act nasal spray, 1 spray into each nostril daily, Disp: 16 g, Rfl: 0    Allergies   Allergen Reactions    Pollen Extract        Social History   Past Surgical History:   Procedure Laterality Date    HAND SURGERY       Family History   Problem Relation Age of Onset    Heart failure Mother     Heart failure Father     Heart disease Father     Heart attack Father     Diabetes Other        Objective:  /70 (BP Location: Left arm, Patient Position: Sitting)   Pulse 71   Temp 97 6 °F (36 4 °C) (Tympanic)   Ht 5' 10 39" (1 788 m)   Wt 79 9 kg (176 lb 3 2 oz)   SpO2 98%   BMI 25 00 kg/m²     Recent Results (from the past 1344 hour(s))   PSA Total, Diagnostic    Collection Time: 10/08/18 10:42 AM   Result Value Ref Range    PSA, Diagnostic 10 6 (H) 0 0 - 4 0 ng/mL            Physical Exam   Constitutional: He is oriented to person, place, and time  He appears well-developed and well-nourished  No distress  HENT:   Head: Normocephalic and atraumatic  Right Ear: External ear normal  Tympanic membrane is bulging  Tympanic membrane is not erythematous  A middle ear effusion is present  Left Ear: External ear normal  Tympanic membrane is bulging  Tympanic membrane is not erythematous  A middle ear effusion is present  Nose: Nose normal    Mouth/Throat: Oropharynx is clear and moist  No oropharyngeal exudate  Eyes: Pupils are equal, round, and reactive to light  Conjunctivae and EOM are normal  Right eye exhibits no discharge  Left eye exhibits no discharge  Neck: Normal range of motion  Neck supple  No thyromegaly present  Cardiovascular: Normal rate, regular rhythm, normal heart sounds and intact distal pulses  Exam reveals no gallop and no friction rub  No murmur heard  Pulmonary/Chest: Effort normal and breath sounds normal  No stridor  No respiratory distress  He has no wheezes  He has no rales  Abdominal: Soft   Bowel sounds are normal  He exhibits no distension  There is no tenderness  Lymphadenopathy:     He has no cervical adenopathy  Neurological: He is alert and oriented to person, place, and time  Skin: Skin is warm and dry  No rash noted  He is not diaphoretic  No erythema  Psychiatric: He has a normal mood and affect   His behavior is normal  Judgment and thought content normal

## 2018-10-15 NOTE — ASSESSMENT & PLAN NOTE
Feel that patient's drain the ears is multifactorial due to recent plane flight and seasonal allergies  Will treat patient for seasonal allergies, if symptoms do not improve will advise patient to follow up with her office for further testing  May need referral to ENT

## 2018-10-15 NOTE — PROGRESS NOTES
1  PSA elevation  Basic metabolic panel    MRI prostate multiparametric wo w contrast    PSA, total and free         Assessment and plan:       1  Elevated and rising PSA s/p 2 negative prostate biopsies - managed by Dr Faye Canales  - PSA rising to 10 6, with previously baseline in the 7 range  - patient will obtain a PSA, BMP, and multiparametric prostate MRI in the near future for further characterization   - he will follow up in the office to review the results of prostate MRI and determine whether prostate biopsy is warranted  2  Adrenal adenoma  - stable on previous imaging        Shan Herndon PA-C      Chief Complaint     F/u elevated PSA    History of Present Illness     Rosetta Forrester is a 81 y o  male patient of Dr Faye Canales with a history of elevated PSA status post 2 negative prostate biopsies and adrenal adenoma presenting for  Follow up  Patient previously had 2 negative prostate biopsies while in [place] in 2011 for a PSA of 7 57 and 7 21  Patient's most recent PSA is 10 6 (10/8/18), previously  7 1 (9/26/2017), previously 7 4 last year  Patient previously had a CT of his abdomen and pelvis with and without contrast in 2015  At this time revealed a slight thickening of the superior portion of the right adrenal gland  The appearance was unchanged from previous chest CT in May 2014  The area was too small to accurately measure density though the findings were almost certainly indicative of a benign processes or nodular hypertrophy of the gland or a small adenoma  Patient is overall comfortable with his urination  He feels like he has a good stream, feels empty after urination  Has nocturia 1-2 times nightly  Denies any dysuria, gross hematuria, hesitancy, urinary incontinence, or urinary infections        Laboratory     Lab Results   Component Value Date    CREATININE 0 97 02/02/2016       Lab Results   Component Value Date    PSA 10 6 (H) 10/08/2018    PSA 7 1 (H) 09/26/2017 PSA 7 4 (H) 09/13/2016       Review of Systems     Review of Systems   Constitutional: Negative for activity change, appetite change, chills, diaphoresis, fatigue, fever and unexpected weight change  Respiratory: Negative for chest tightness and shortness of breath  Cardiovascular: Negative for chest pain, palpitations and leg swelling  Gastrointestinal: Negative for abdominal distention, abdominal pain, constipation, diarrhea, nausea and vomiting  Genitourinary: Negative for decreased urine volume, difficulty urinating, dysuria, enuresis, flank pain, frequency, genital sores, hematuria and urgency  Musculoskeletal: Negative for back pain, gait problem and myalgias  Skin: Negative for color change, pallor, rash and wound  Psychiatric/Behavioral: Negative for behavioral problems  The patient is not nervous/anxious  AUA SYMPTOM SCORE      Most Recent Value   AUA SYMPTOM SCORE   How often have you had a sensation of not emptying your bladder completely after you finished urinating? 1   How often have you had to urinate again less than two hours after you finished urinating? 1   How often have you found you stopped and started again several times when you urinate?  0   How often have you found it difficult to postpone urination? 0   How often have you had a weak urinary stream?  1   How often have you had to push or strain to begin urination? 0   How many times did you most typically get up to urinate from the time you went to bed at night until the time you got up in the morning? 2   Quality of Life: If you were to spend the rest of your life with your urinary condition just the way it is now, how would you feel about that?  1   AUA SYMPTOM SCORE  5            Allergies     Allergies   Allergen Reactions    Pollen Extract        Physical Exam     Physical Exam   Constitutional: He is oriented to person, place, and time  He appears well-developed and well-nourished  No distress     HENT:   Head: Normocephalic and atraumatic  Eyes: Conjunctivae are normal    Neck: Normal range of motion  No tracheal deviation present  Pulmonary/Chest: Effort normal    Genitourinary:   Genitourinary Comments: Good rectal tone  Prostate: 60g, right firmer than left, no discrete nodules appreciated   Musculoskeletal: Normal range of motion  He exhibits no edema or deformity  Neurological: He is alert and oriented to person, place, and time  Skin: Skin is warm and dry  No rash noted  He is not diaphoretic  No erythema  Psychiatric: He has a normal mood and affect   His behavior is normal          Vital Signs     Vitals:    10/16/18 0852   BP: 110/80   BP Location: Left arm   Patient Position: Sitting   Weight: 80 3 kg (177 lb)   Height: 5' 10 5" (1 791 m)         Current Medications       Current Outpatient Prescriptions:     amLODIPine (NORVASC) 10 mg tablet, Take 1 tablet (10 mg total) by mouth daily, Disp: 90 tablet, Rfl: 1    benzonatate (TESSALON) 200 MG capsule, Take 1 capsule (200 mg total) by mouth 3 (three) times a day as needed for cough, Disp: 20 capsule, Rfl: 0    Coenzyme Q10 (COQ10) 100 MG CAPS, Take 1 capsule by mouth daily, Disp: , Rfl:     fluticasone (FLONASE) 50 mcg/act nasal spray, 1 spray into each nostril daily, Disp: 16 g, Rfl: 0    Glucosamine-Chondroitin 750-600 MG TABS, Take 1 tablet by mouth daily, Disp: , Rfl:     hydrochlorothiazide (HYDRODIURIL) 25 mg tablet, Take 1 tablet (25 mg total) by mouth daily, Disp: 90 tablet, Rfl: 1    metFORMIN (GLUCOPHAGE) 1000 MG tablet, TAKE 1 TABLET TWICE DAILY, Disp: 180 tablet, Rfl: 1    Omega-3 Fatty Acids (FISH OIL) 645 MG CAPS, Take 1 capsule by mouth 2 (two) times a day, Disp: , Rfl:     simvastatin (ZOCOR) 40 mg tablet, TAKE 1 TABLET EVERY DAY, Disp: 90 tablet, Rfl: 1      Active Problems     Patient Active Problem List   Diagnosis    Benign essential hypertension    Type 2 diabetes mellitus (HCC)    PSA elevation    Hypercholesteremia    Diabetic retinopathy ([facility] )    Seasonal allergies    Ringing in ear, bilateral         Past Medical History     Past Medical History:   Diagnosis Date    Chronic cough     RESOLVED: [id number]    Diabetes mellitus ([facility] )          Surgical History     Past Surgical History:   Procedure Laterality Date    HAND SURGERY           Family History     Family History   Problem Relation Age of Onset    Heart failure Mother     Heart failure Father     Heart disease Father     Heart attack Father     Diabetes Other          Social History     Social History       Radiology

## 2018-10-15 NOTE — ASSESSMENT & PLAN NOTE
Will advise patient to start taking Zyrtec once daily until we get her 1st freeze  Also advised patient to use Flonase 2 sprays each nostril daily  Will give patient Tessalon Perles for cough

## 2018-10-16 ENCOUNTER — OFFICE VISIT (OUTPATIENT)
Dept: UROLOGY | Facility: CLINIC | Age: 73
End: 2018-10-16
Payer: MEDICARE

## 2018-10-16 VITALS
BODY MASS INDEX: 24.78 KG/M2 | HEIGHT: 71 IN | WEIGHT: 177 LBS | DIASTOLIC BLOOD PRESSURE: 80 MMHG | SYSTOLIC BLOOD PRESSURE: 110 MMHG

## 2018-10-16 DIAGNOSIS — R97.20 PSA ELEVATION: Primary | ICD-10-CM

## 2018-10-16 PROCEDURE — 99213 OFFICE O/P EST LOW 20 MIN: CPT | Performed by: PHYSICIAN ASSISTANT

## 2018-11-05 ENCOUNTER — APPOINTMENT (OUTPATIENT)
Dept: LAB | Facility: CLINIC | Age: 73
End: 2018-11-05
Payer: MEDICARE

## 2018-11-05 DIAGNOSIS — R97.20 PSA ELEVATION: ICD-10-CM

## 2018-11-05 LAB
ANION GAP SERPL CALCULATED.3IONS-SCNC: 6 MMOL/L (ref 4–13)
BUN SERPL-MCNC: 23 MG/DL (ref 5–25)
CALCIUM SERPL-MCNC: 9.6 MG/DL (ref 8.3–10.1)
CHLORIDE SERPL-SCNC: 101 MMOL/L (ref 100–108)
CO2 SERPL-SCNC: 30 MMOL/L (ref 21–32)
CREAT SERPL-MCNC: 0.99 MG/DL (ref 0.6–1.3)
GFR SERPL CREATININE-BSD FRML MDRD: 77 ML/MIN/1.73SQ M
GLUCOSE P FAST SERPL-MCNC: 129 MG/DL (ref 65–99)
POTASSIUM SERPL-SCNC: 3.7 MMOL/L (ref 3.5–5.3)
SODIUM SERPL-SCNC: 137 MMOL/L (ref 136–145)

## 2018-11-05 PROCEDURE — 84154 ASSAY OF PSA FREE: CPT

## 2018-11-05 PROCEDURE — 80048 BASIC METABOLIC PNL TOTAL CA: CPT

## 2018-11-05 PROCEDURE — 84153 ASSAY OF PSA TOTAL: CPT

## 2018-11-05 PROCEDURE — 36415 COLL VENOUS BLD VENIPUNCTURE: CPT

## 2018-11-06 LAB
PSA FREE MFR SERPL: 20.6 %
PSA FREE SERPL-MCNC: 2.23 NG/ML
PSA SERPL-MCNC: 10.8 NG/ML (ref 0–4)

## 2018-11-12 ENCOUNTER — CLINICAL SUPPORT (OUTPATIENT)
Dept: INTERNAL MEDICINE CLINIC | Age: 73
End: 2018-11-12
Payer: MEDICARE

## 2018-11-12 DIAGNOSIS — Z23 NEED FOR INFLUENZA VACCINATION: Primary | ICD-10-CM

## 2018-11-12 PROCEDURE — 90662 IIV NO PRSV INCREASED AG IM: CPT

## 2018-11-12 PROCEDURE — G0008 ADMIN INFLUENZA VIRUS VAC: HCPCS

## 2018-11-16 ENCOUNTER — HOSPITAL ENCOUNTER (OUTPATIENT)
Dept: MRI IMAGING | Facility: HOSPITAL | Age: 73
Discharge: HOME | End: 2018-11-16
Payer: MEDICARE

## 2018-11-16 DIAGNOSIS — R97.20 PSA ELEVATION: ICD-10-CM

## 2018-11-16 PROCEDURE — 76377 3D RENDER W/INTRP POSTPROCES: CPT

## 2018-11-16 PROCEDURE — 72197 MRI PELVIS W/O & W/DYE: CPT

## 2018-11-16 PROCEDURE — A9585 GADOBUTROL INJECTION: HCPCS | Performed by: PHYSICIAN ASSISTANT

## 2018-11-16 RX ADMIN — GADOBUTROL 8 ML: 604.72 INJECTION INTRAVENOUS at 21:13

## 2018-11-20 NOTE — PROGRESS NOTES
11/21/2018    Prince Lyon  1945  [id number]    Discussion and Plan    Prostate MRI shows marked hypertrophy with an estimated gland volume of nearly 100 cc  No areas of suspicion however detected  He therefore will be observed for now with repeat PSA in 6 months  BPH treatments briefly reviewed however he remains relatively asymptomatic  All questions answered at this time  1  PSA elevation  - PSA Total, Diagnostic; Future    Assessment      Patient Active Problem List   Diagnosis    Benign essential hypertension    Type 2 diabetes mellitus ([address] )    PSA elevation    Hypercholesteremia    Diabetic retinopathy ([address] )    Seasonal allergies    Ringing in ear, bilateral       History of Present Illness    Megan Bobby is a 81 y o  male seen today in regards to a history of history of elevated PSA status post 2 negative prostate biopsies and adrenal adenoma presenting for  Follow up      Patient previously had 2 negative prostate biopsies while in [place] in 2011 for a PSA of 7 57 and 7 21  Patient's most recent PSA is 10 6 (10/8/18), previously  7 1 (9/26/2017), previously 7 4 last year      Patient previously had a CT of his abdomen and pelvis with and without contrast in 2015  At this time revealed a slight thickening of the superior portion of the right adrenal gland  The appearance was unchanged from previous chest CT in May 2014  The area was too small to accurately measure density though the findings were almost certainly indicative of a benign processes or nodular hypertrophy of the gland or a small adenoma  Given progressive rise in PSA he underwent multiparametric MRI  This demonstrates PRads-2 findings with no areas of suspicion  Findings reviewed with patient      Urinary Symptom Assessment        Past Medical History  Past Medical History:   Diagnosis Date    Chronic cough     RESOLVED: [id number]    Diabetes mellitus ([address] )        Past Social History  Past Surgical History:   Procedure Laterality Date    HAND SURGERY         Past Family History  Family History   Problem Relation Age of Onset    Heart failure Mother     Heart failure Father     Heart disease Father     Heart attack Father     Diabetes Other        Past Social history  Social History     Social History    Marital status: /Civil Union     Spouse name: N/A    Number of children: N/A    Years of education: N/A     Occupational History    Not on file  Social History Main Topics    Smoking status: Never Smoker    Smokeless tobacco: Never Used    Alcohol use Yes      Comment: occasional    Drug use: No    Sexual activity: Not on file     Other Topics Concern    Not on file     Social History Narrative    No narrative on file       Current Medications  Current Outpatient Prescriptions   Medication Sig Dispense Refill    amLODIPine (NORVASC) 10 mg tablet Take 1 tablet (10 mg total) by mouth daily 90 tablet 1    benzonatate (TESSALON) 200 MG capsule Take 1 capsule (200 mg total) by mouth 3 (three) times a day as needed for cough 20 capsule 0    Coenzyme Q10 (COQ10) 100 MG CAPS Take 1 capsule by mouth daily      fluticasone (FLONASE) 50 mcg/act nasal spray 1 spray into each nostril daily 16 g 0    Glucosamine-Chondroitin 750-600 MG TABS Take 1 tablet by mouth daily      hydrochlorothiazide (HYDRODIURIL) 25 mg tablet Take 1 tablet (25 mg total) by mouth daily 90 tablet 1    metFORMIN (GLUCOPHAGE) 1000 MG tablet TAKE 1 TABLET TWICE DAILY 180 tablet 1    Omega-3 Fatty Acids (FISH OIL) 645 MG CAPS Take 1 capsule by mouth 2 (two) times a day      simvastatin (ZOCOR) 40 mg tablet TAKE 1 TABLET EVERY DAY 90 tablet 1     No current facility-administered medications for this visit  Allergies  Allergies   Allergen Reactions    Pollen Extract        Past Medical History, Social History, Family History, medications and allergies were reviewed  Review of Systems  Review of Systems   Constitutional: Negative  HENT: Negative  Eyes: Negative  Respiratory: Negative  Cardiovascular: Negative  Gastrointestinal: Negative  Endocrine: Negative  Genitourinary: Negative for decreased urine volume, difficulty urinating, hematuria and urgency  Musculoskeletal: Negative  Skin: Negative  Neurological: Negative  Hematological: Negative  Psychiatric/Behavioral: Negative  Vitals  Vitals:    11/21/18 1109   BP: 140/66   Pulse: 88   Weight: 84 1 kg (185 lb 6 4 oz)   Height: 5' 10" (1 778 m)         Physical Exam    Physical Exam   Constitutional: He is oriented to person, place, and time  He appears well-developed and well-nourished  HENT:   Head: Normocephalic and atraumatic  Eyes: Pupils are equal, round, and reactive to light  Neck: Normal range of motion  Cardiovascular: Normal rate, regular rhythm and normal heart sounds  Pulmonary/Chest: Effort normal and breath sounds normal  No accessory muscle usage  No respiratory distress  Abdominal: Soft  Normal appearance and bowel sounds are normal  There is no tenderness  Musculoskeletal: Normal range of motion  Neurological: He is alert and oriented to person, place, and time  Skin: Skin is warm, dry and intact  Psychiatric: He has a normal mood and affect  His speech is normal  Cognition and memory are normal    Nursing note and vitals reviewed        Results    Below listed labs, pathology results, and radiology images were personally reviewed:    Lab Results   Component Value Date/Time    PSA 10 8 (H) 11/05/2018 07:20 AM    PSA 10 6 (H) 10/08/2018 10:42 AM    PSA 6 2 (H) 05/14/2014 07:59 AM     Lab Results   Component Value Date    GLUCOSE 137 05/14/2014    CALCIUM 9 6 11/05/2018     05/14/2014    K 3 7 11/05/2018    CO2 30 11/05/2018     11/05/2018    BUN 23 11/05/2018    CREATININE 0 99 11/05/2018     Lab Results   Component Value Date    WBC 11 73 (H) 02/02/2016    HGB 15 6 02/02/2016    HCT 45 8 02/02/2016 MCV 88 8 02/02/2016     02/02/2016       No results found for this or any previous visit (from the past 1 hour(s)) ]    MULTIPARAMETRIC MRI OF THE PROSTATE WITH AND WITHOUT CONTRAST      INDICATION:   R97 20: Elevated prostate specific antigen (PSA)      COMPARISON: None      PSA LEVEL: 10 8 ng/ml  on 11/5/2018  PRIOR BIOPSY DATE: 10/8/2018 and 19 2011  BIOPSY RESULTS: Negative      TECHNIQUE: The following pulse sequences were obtained on a 1 5 T scanner:  T1w axial; T2w axial, sagittal and coronal; DWI axial and ADC map; T1w water, fat, in-phase and opposed-phase axials of entire pelvis and dynamic 3D T1w axial before and during   IV contrast injection      CONTRAST:  Gadobutrol (Gadavist) 8 mL of gadobutrol injection (MULTI-DOSE) ml      TECHNICAL LIMITATIONS: None         FINDINGS:     PROSTATE:     Size (AP x TRV x CC): 5 7 x 5 3 x 7 0 = 98 mL  Post-biopsy hemorrhage:  None  Central gland enlargement (BPH): Marked with an enlarged medial lobe extending into the bladder base      Focal lesions - No dominant lesion  Findings of BPH   PI-RADSv2 Category 2 - Low (clinically significant cancer unlikely)      Note: Clinically significant cancer is defined on pathology/histology as [place] score greater than or equal to 7, and/or volume of greater than or equal to 0 5 mL, and/or extraprostatic extension      Seminal vesicles: Unremarkable      URINARY BLADDER: Mildly thickened and trabeculated wall, likely a sequela of chronic bladder outlet obstruction      LYMPH NODES: No pelvic lymphadenopathy      BONES: No suspicious osseous lesion      Additional findings: Small fat-containing inguinal hernia bilaterally            IMPRESSION:     1  PI-RADSv2 Category 2 - Low (clinically significant cancer is unlikely to be present)      2   Marked BPH with calculated prostate volume of 98 mL      Workstation performed: [id number]      Imaging     MRI prostate multiparametric wo w contrast (Order #[id number]) on 11/16/2018 - Imaging Information

## 2018-11-21 ENCOUNTER — OFFICE VISIT (OUTPATIENT)
Dept: UROLOGY | Facility: CLINIC | Age: 73
End: 2018-11-21
Payer: MEDICARE

## 2018-11-21 VITALS
HEIGHT: 70 IN | DIASTOLIC BLOOD PRESSURE: 66 MMHG | BODY MASS INDEX: 26.54 KG/M2 | HEART RATE: 88 BPM | SYSTOLIC BLOOD PRESSURE: 140 MMHG | WEIGHT: 185.4 LBS

## 2018-11-21 DIAGNOSIS — R97.20 PSA ELEVATION: Primary | ICD-10-CM

## 2018-11-21 PROCEDURE — 99214 OFFICE O/P EST MOD 30 MIN: CPT | Performed by: UROLOGY

## 2018-11-27 LAB
LEFT EYE DIABETIC RETINOPATHY: NORMAL
RIGHT EYE DIABETIC RETINOPATHY: NORMAL
SEVERITY (EYE EXAM): NORMAL

## 2019-01-22 ENCOUNTER — APPOINTMENT (OUTPATIENT)
Dept: LAB | Facility: CLINIC | Age: 74
End: 2019-01-22
Payer: MEDICARE

## 2019-01-22 ENCOUNTER — TRANSCRIBE ORDERS (OUTPATIENT)
Dept: ADMINISTRATIVE | Facility: HOSPITAL | Age: 74
End: 2019-01-22

## 2019-01-22 DIAGNOSIS — E11.8 DIABETIC COMPLICATION (HCC): Primary | ICD-10-CM

## 2019-01-22 DIAGNOSIS — E78.00 HYPERCHOLESTEROLEMIA: ICD-10-CM

## 2019-01-22 DIAGNOSIS — E11.8 DIABETIC COMPLICATION (HCC): ICD-10-CM

## 2019-01-22 DIAGNOSIS — E11.8 TYPE 2 DIABETES MELLITUS WITH COMPLICATION, WITHOUT LONG-TERM CURRENT USE OF INSULIN (HCC): ICD-10-CM

## 2019-01-22 DIAGNOSIS — I10 BENIGN HYPERTENSION: ICD-10-CM

## 2019-01-22 LAB
ALBUMIN SERPL BCP-MCNC: 4 G/DL (ref 3.5–5)
ALP SERPL-CCNC: 59 U/L (ref 46–116)
ALT SERPL W P-5'-P-CCNC: 21 U/L (ref 12–78)
ANION GAP SERPL CALCULATED.3IONS-SCNC: 4 MMOL/L (ref 4–13)
AST SERPL W P-5'-P-CCNC: 10 U/L (ref 5–45)
BASOPHILS # BLD AUTO: 0.05 THOUSANDS/ΜL (ref 0–0.1)
BASOPHILS NFR BLD AUTO: 1 % (ref 0–1)
BILIRUB SERPL-MCNC: 0.34 MG/DL (ref 0.2–1)
BUN SERPL-MCNC: 21 MG/DL (ref 5–25)
CALCIUM SERPL-MCNC: 9.2 MG/DL (ref 8.3–10.1)
CHLORIDE SERPL-SCNC: 104 MMOL/L (ref 100–108)
CHOLEST SERPL-MCNC: 161 MG/DL (ref 50–200)
CO2 SERPL-SCNC: 33 MMOL/L (ref 21–32)
CREAT SERPL-MCNC: 1.04 MG/DL (ref 0.6–1.3)
CREAT UR-MCNC: 223 MG/DL
EOSINOPHIL # BLD AUTO: 0.32 THOUSAND/ΜL (ref 0–0.61)
EOSINOPHIL NFR BLD AUTO: 5 % (ref 0–6)
ERYTHROCYTE [DISTWIDTH] IN BLOOD BY AUTOMATED COUNT: 13.2 % (ref 11.6–15.1)
EST. AVERAGE GLUCOSE BLD GHB EST-MCNC: 157 MG/DL
GFR SERPL CREATININE-BSD FRML MDRD: 72 ML/MIN/1.73SQ M
GLUCOSE P FAST SERPL-MCNC: 145 MG/DL (ref 65–99)
HBA1C MFR BLD: 7.1 % (ref 4.2–6.3)
HCT VFR BLD AUTO: 44.3 % (ref 36.5–49.3)
HDLC SERPL-MCNC: 35 MG/DL (ref 40–60)
HGB BLD-MCNC: 14.8 G/DL (ref 12–17)
IMM GRANULOCYTES # BLD AUTO: 0.02 THOUSAND/UL (ref 0–0.2)
IMM GRANULOCYTES NFR BLD AUTO: 0 % (ref 0–2)
LDLC SERPL CALC-MCNC: 105 MG/DL (ref 0–100)
LYMPHOCYTES # BLD AUTO: 1.48 THOUSANDS/ΜL (ref 0.6–4.47)
LYMPHOCYTES NFR BLD AUTO: 21 % (ref 14–44)
MCH RBC QN AUTO: 30 PG (ref 26.8–34.3)
MCHC RBC AUTO-ENTMCNC: 33.4 G/DL (ref 31.4–37.4)
MCV RBC AUTO: 90 FL (ref 82–98)
MICROALBUMIN UR-MCNC: 19.7 MG/L (ref 0–20)
MICROALBUMIN/CREAT 24H UR: 9 MG/G CREATININE (ref 0–30)
MONOCYTES # BLD AUTO: 0.65 THOUSAND/ΜL (ref 0.17–1.22)
MONOCYTES NFR BLD AUTO: 9 % (ref 4–12)
NEUTROPHILS # BLD AUTO: 4.39 THOUSANDS/ΜL (ref 1.85–7.62)
NEUTS SEG NFR BLD AUTO: 64 % (ref 43–75)
NONHDLC SERPL-MCNC: 126 MG/DL
NRBC BLD AUTO-RTO: 0 /100 WBCS
PLATELET # BLD AUTO: 179 THOUSANDS/UL (ref 149–390)
PMV BLD AUTO: 10.3 FL (ref 8.9–12.7)
POTASSIUM SERPL-SCNC: 4.5 MMOL/L (ref 3.5–5.3)
PROT SERPL-MCNC: 7.4 G/DL (ref 6.4–8.2)
RBC # BLD AUTO: 4.93 MILLION/UL (ref 3.88–5.62)
SODIUM SERPL-SCNC: 141 MMOL/L (ref 136–145)
TRIGL SERPL-MCNC: 106 MG/DL
TSH SERPL DL<=0.05 MIU/L-ACNC: 2.48 UIU/ML (ref 0.36–3.74)
WBC # BLD AUTO: 6.91 THOUSAND/UL (ref 4.31–10.16)

## 2019-01-22 PROCEDURE — 36415 COLL VENOUS BLD VENIPUNCTURE: CPT

## 2019-01-22 PROCEDURE — 84443 ASSAY THYROID STIM HORMONE: CPT

## 2019-01-22 PROCEDURE — 80061 LIPID PANEL: CPT

## 2019-01-22 PROCEDURE — 82043 UR ALBUMIN QUANTITATIVE: CPT | Performed by: INTERNAL MEDICINE

## 2019-01-22 PROCEDURE — 82570 ASSAY OF URINE CREATININE: CPT | Performed by: INTERNAL MEDICINE

## 2019-01-22 PROCEDURE — 85025 COMPLETE CBC W/AUTO DIFF WBC: CPT

## 2019-01-22 PROCEDURE — 80053 COMPREHEN METABOLIC PANEL: CPT

## 2019-01-22 PROCEDURE — 83036 HEMOGLOBIN GLYCOSYLATED A1C: CPT

## 2019-01-31 ENCOUNTER — OFFICE VISIT (OUTPATIENT)
Dept: INTERNAL MEDICINE CLINIC | Age: 74
End: 2019-01-31
Payer: MEDICARE

## 2019-01-31 VITALS
BODY MASS INDEX: 25.56 KG/M2 | WEIGHT: 182.6 LBS | OXYGEN SATURATION: 97 % | DIASTOLIC BLOOD PRESSURE: 64 MMHG | HEART RATE: 74 BPM | HEIGHT: 71 IN | SYSTOLIC BLOOD PRESSURE: 116 MMHG | TEMPERATURE: 97.2 F

## 2019-01-31 DIAGNOSIS — I10 BENIGN ESSENTIAL HYPERTENSION: Primary | ICD-10-CM

## 2019-01-31 DIAGNOSIS — R05.3 CHRONIC COUGH: ICD-10-CM

## 2019-01-31 DIAGNOSIS — I10 BENIGN HYPERTENSION: ICD-10-CM

## 2019-01-31 DIAGNOSIS — E78.00 HYPERCHOLESTEREMIA: ICD-10-CM

## 2019-01-31 DIAGNOSIS — Z12.11 SCREENING FOR COLON CANCER: ICD-10-CM

## 2019-01-31 DIAGNOSIS — E78.00 HYPERCHOLESTEROLEMIA: ICD-10-CM

## 2019-01-31 DIAGNOSIS — E11.8 TYPE 2 DIABETES MELLITUS WITH COMPLICATION, WITHOUT LONG-TERM CURRENT USE OF INSULIN (HCC): ICD-10-CM

## 2019-01-31 PROCEDURE — 99214 OFFICE O/P EST MOD 30 MIN: CPT | Performed by: INTERNAL MEDICINE

## 2019-01-31 RX ORDER — SIMVASTATIN 40 MG
40 TABLET ORAL DAILY
Qty: 90 TABLET | Refills: 1 | Status: SHIPPED | OUTPATIENT
Start: 2019-01-31 | End: 2019-05-30 | Stop reason: SDUPTHER

## 2019-01-31 RX ORDER — AMLODIPINE BESYLATE 10 MG/1
10 TABLET ORAL DAILY
Qty: 90 TABLET | Refills: 1 | Status: SHIPPED | OUTPATIENT
Start: 2019-01-31 | End: 2019-05-30 | Stop reason: SDUPTHER

## 2019-01-31 RX ORDER — HYDROCHLOROTHIAZIDE 25 MG/1
25 TABLET ORAL DAILY
Qty: 90 TABLET | Refills: 1 | Status: SHIPPED | OUTPATIENT
Start: 2019-01-31 | End: 2019-05-30 | Stop reason: SDUPTHER

## 2019-01-31 NOTE — PROGRESS NOTES
Diabetic Foot Exam    Patient's shoes and socks removed  Right Foot/Ankle   Right Foot Inspection  Skin Exam: skin normal, skin intact and dry skin no warmth, no callus, no erythema, no maceration, no abnormal color, no pre-ulcer, no ulcer and no callus                            Sensory   Vibration: intact  Proprioception: intact   Monofilament testing: intact  Vascular  Capillary refills: < 3 seconds  The right DP pulse is 2+  The right PT pulse is 2+  Right Toe  - Comprehensive Exam  Ecchymosis: none    Left Foot/Ankle  Left Foot Inspection  Skin Exam: skin normal, skin intact and dry skinno warmth, no erythema, no maceration, normal color, no pre-ulcer, no ulcer and no callus                                         Sensory   Vibration: intact  Proprioception: intact  Monofilament: intact  Vascular  Capillary refills: < 3 seconds  The left DP pulse is 1+  The left PT pulse is 2+  Left Toe  - Comprehensive Exam  Ecchymosis: none  Assign Risk Category:  No deformity present; No loss of protective sensation;  No weak pulses       Risk: 0

## 2019-01-31 NOTE — PROGRESS NOTES
Assessment/Plan:    No problem-specific Assessment & Plan notes found for this encounter  Diagnoses and all orders for this visit:    Benign essential hypertension  Hypertension is very well controlled systolic blood pressure is 116 continue with the same medication no change  Screening for colon cancer  -     Ambulatory referral to Gastroenterology; Future    Benign hypertension  -     amLODIPine (NORVASC) 10 mg tablet; Take 1 tablet (10 mg total) by mouth daily  -     hydrochlorothiazide (HYDRODIURIL) 25 mg tablet; Take 1 tablet (25 mg total) by mouth daily    Type 2 diabetes mellitus with complication, without long-term current use of insulin (HCC)  -     metFORMIN (GLUCOPHAGE) 1000 MG tablet; Take 1 tablet (1,000 mg total) by mouth 2 (two) times a day  Diabetes control is reasonably good hemoglobin A1c 7 1 I will prefer it to less than 7 which was there before diet exercise will be helpful  Hypercholesterolemia  -     simvastatin (ZOCOR) 40 mg tablet; Take 1 tablet (40 mg total) by mouth daily  Lipid panel is a better than before only problem is that his good cholesterol is the on the lower side and the LDL cholesterol is higher than 100 which I would like to be less than 80 being he is diabetic        Subjective:   No new complaints right now no episodes of hypoglycemia or hyperglycemia he continued to complaints of this chronic cough her did a significant workup before and and no results came  I will do the CT scan of the chest without contrast for further evaluation he does not have any fever chills or any associated symptoms and no weight loss or hemoptysis   Patient ID: Andrzej Khan is a 72 y o  male      HPI    The following portions of the patient's history were reviewed and updated as appropriate: allergies, current medications, past family history, past medical history, past social history, past surgical history and problem list     Review of Systems   Constitutional: Negative for activity change, appetite change, chills, diaphoresis and fever  HENT: Positive for tinnitus  Negative for congestion, ear discharge, ear pain, postnasal drip, rhinorrhea, sinus pain, sinus pressure, sneezing and sore throat  Eyes: Negative for pain, discharge, itching and visual disturbance  Respiratory: Positive for cough (dry cough)  Negative for chest tightness, shortness of breath and wheezing  Cardiovascular: Negative for chest pain, palpitations and leg swelling  Gastrointestinal: Negative for abdominal pain, constipation, diarrhea, nausea and vomiting  Endocrine: Negative for polydipsia, polyphagia and polyuria  Genitourinary: Negative for difficulty urinating, dysuria and urgency  Musculoskeletal: Negative for arthralgias, back pain and neck pain  Skin: Negative for rash and wound  Neurological: Negative for dizziness, weakness, numbness and headaches           Past Medical History:   Diagnosis Date    Chronic cough     RESOLVED: [id number]    Diabetes mellitus (HCC)          Current Outpatient Prescriptions:     amLODIPine (NORVASC) 10 mg tablet, Take 1 tablet (10 mg total) by mouth daily, Disp: 90 tablet, Rfl: 1    Coenzyme Q10 (COQ10) 100 MG CAPS, Take 1 capsule by mouth daily, Disp: , Rfl:     Glucosamine-Chondroitin 750-600 MG TABS, Take 1 tablet by mouth daily, Disp: , Rfl:     hydrochlorothiazide (HYDRODIURIL) 25 mg tablet, Take 1 tablet (25 mg total) by mouth daily, Disp: 90 tablet, Rfl: 1    metFORMIN (GLUCOPHAGE) 1000 MG tablet, TAKE 1 TABLET TWICE DAILY, Disp: 180 tablet, Rfl: 1    Omega-3 Fatty Acids (FISH OIL) 645 MG CAPS, Take 1 capsule by mouth 2 (two) times a day, Disp: , Rfl:     simvastatin (ZOCOR) 40 mg tablet, TAKE 1 TABLET EVERY DAY, Disp: 90 tablet, Rfl: 1    benzonatate (TESSALON) 200 MG capsule, Take 1 capsule (200 mg total) by mouth 3 (three) times a day as needed for cough (Patient not taking: Reported on 1/31/2019 ), Disp: 20 capsule, Rfl: 0    fluticasone (FLONASE) 50 mcg/act nasal spray, 1 spray into each nostril daily (Patient not taking: Reported on 1/31/2019 ), Disp: 16 g, Rfl: 0    Allergies   Allergen Reactions    Pollen Extract        Social History   Past Surgical History:   Procedure Laterality Date    HAND SURGERY       Family History   Problem Relation Age of Onset    Heart failure Mother     Heart failure Father     Heart disease Father     Heart attack Father     Diabetes Other        Objective:  /64 (BP Location: Left arm, Patient Position: Sitting, Cuff Size: Standard)   Pulse 74   Temp (!) 97 2 °F (36 2 °C) (Tympanic)   Ht 5' 10 67" (1 795 m) Comment: shoes off  Wt 82 8 kg (182 lb 9 6 oz) Comment: shoes off  SpO2 97%   BMI 25 71 kg/m²        Physical Exam   Constitutional: He is oriented to person, place, and time  He appears well-developed and well-nourished  HENT:   Right Ear: External ear normal    Mouth/Throat: Oropharynx is clear and moist    Eyes: Pupils are equal, round, and reactive to light  Conjunctivae and EOM are normal    Neck: Normal range of motion  No JVD present  No thyromegaly present  Cardiovascular: Normal rate, regular rhythm, normal heart sounds and intact distal pulses  Pulmonary/Chest: Breath sounds normal    Abdominal: Soft  Bowel sounds are normal    Musculoskeletal: Normal range of motion  Lymphadenopathy:     He has no cervical adenopathy  Neurological: He is alert and oriented to person, place, and time  He has normal reflexes  Skin: Skin is dry  Psychiatric: He has a normal mood and affect  His behavior is normal  Judgment and thought content normal    Nursing note and vitals reviewed  Recent Results (from the past 672 hour(s))   Microalbumin / creatinine urine ratio    Collection Time: 01/22/19  8:37 AM   Result Value Ref Range    Creatinine, Ur 223 0 mg/dL    Microalbum  ,U,Random 19 7 0 0 - 20 0 mg/L    Microalb Creat Ratio 9 0 - 30 mg/g creatinine   Hemoglobin A1C    Collection Time: 01/22/19  8:37 AM   Result Value Ref Range    Hemoglobin A1C 7 1 (H) 4 2 - 6 3 %     mg/dl   TSH, 3rd generation with Free T4 reflex    Collection Time: 01/22/19  8:37 AM   Result Value Ref Range    TSH 3RD GENERATON 2 480 0 358 - 3 740 uIU/mL   Comprehensive metabolic panel    Collection Time: 01/22/19  8:37 AM   Result Value Ref Range    Sodium 141 136 - 145 mmol/L    Potassium 4 5 3 5 - 5 3 mmol/L    Chloride 104 100 - 108 mmol/L    CO2 33 (H) 21 - 32 mmol/L    ANION GAP 4 4 - 13 mmol/L    BUN 21 5 - 25 mg/dL    Creatinine 1 04 0 60 - 1 30 mg/dL    Glucose, Fasting 145 (H) 65 - 99 mg/dL    Calcium 9 2 8 3 - 10 1 mg/dL    AST 10 5 - 45 U/L    ALT 21 12 - 78 U/L    Alkaline Phosphatase 59 46 - 116 U/L    Total Protein 7 4 6 4 - 8 2 g/dL    Albumin 4 0 3 5 - 5 0 g/dL    Total Bilirubin 0 34 0 20 - 1 00 mg/dL    eGFR 72 ml/min/1 73sq m   Lipid panel    Collection Time: 01/22/19  8:37 AM   Result Value Ref Range    Cholesterol 161 50 - 200 mg/dL    Triglycerides 106 <=150 mg/dL    HDL, Direct 35 (L) 40 - 60 mg/dL    LDL Calculated 105 (H) 0 - 100 mg/dL    Non-HDL-Chol (CHOL-HDL) 126 mg/dl   CBC and differential    Collection Time: 01/22/19  8:37 AM   Result Value Ref Range    WBC 6 91 4 31 - 10 16 Thousand/uL    RBC 4 93 3 88 - 5 62 Million/uL    Hemoglobin 14 8 12 0 - 17 0 g/dL    Hematocrit 44 3 36 5 - 49 3 %    MCV 90 82 - 98 fL    MCH 30 0 26 8 - 34 3 pg    MCHC 33 4 31 4 - 37 4 g/dL    RDW 13 2 11 6 - 15 1 %    MPV 10 3 8 9 - 12 7 fL    Platelets 168 502 - 788 Thousands/uL    nRBC 0 /100 WBCs    Neutrophils Relative 64 43 - 75 %    Immat GRANS % 0 0 - 2 %    Lymphocytes Relative 21 14 - 44 %    Monocytes Relative 9 4 - 12 %    Eosinophils Relative 5 0 - 6 %    Basophils Relative 1 0 - 1 %    Neutrophils Absolute 4 39 1 85 - 7 62 Thousands/µL    Immature Grans Absolute 0 02 0 00 - 0 20 Thousand/uL    Lymphocytes Absolute 1 48 0 60 - 4 47 Thousands/µL    Monocytes Absolute 0 65 0 17 - 1 22 Thousand/µL Eosinophils Absolute 0 32 0 00 - 0 61 Thousand/µL    Basophils Absolute 0 05 0 00 - 0 10 Thousands/µL

## 2019-02-22 ENCOUNTER — HOSPITAL ENCOUNTER (OUTPATIENT)
Dept: CT IMAGING | Facility: HOSPITAL | Age: 74
Discharge: HOME | End: 2019-02-22
Payer: MEDICARE

## 2019-02-22 DIAGNOSIS — R05.3 CHRONIC COUGH: ICD-10-CM

## 2019-02-22 PROCEDURE — 71250 CT THORAX DX C-: CPT

## 2019-04-30 LAB
LEFT EYE DIABETIC RETINOPATHY: NORMAL
RIGHT EYE DIABETIC RETINOPATHY: NORMAL

## 2019-05-14 ENCOUNTER — TRANSCRIBE ORDERS (OUTPATIENT)
Dept: ADMINISTRATIVE | Facility: HOSPITAL | Age: 74
End: 2019-05-14

## 2019-05-14 ENCOUNTER — APPOINTMENT (OUTPATIENT)
Dept: LAB | Facility: CLINIC | Age: 74
End: 2019-05-14
Payer: MEDICARE

## 2019-05-14 DIAGNOSIS — E11.8 DIABETIC COMPLICATION (HCC): Primary | ICD-10-CM

## 2019-05-14 DIAGNOSIS — E11.8 DIABETIC COMPLICATION (HCC): ICD-10-CM

## 2019-05-14 DIAGNOSIS — R97.20 PSA ELEVATION: ICD-10-CM

## 2019-05-14 DIAGNOSIS — E78.00 HYPERCHOLESTEROLEMIA: ICD-10-CM

## 2019-05-14 DIAGNOSIS — E11.8 TYPE 2 DIABETES MELLITUS WITH COMPLICATION, WITHOUT LONG-TERM CURRENT USE OF INSULIN (HCC): ICD-10-CM

## 2019-05-14 LAB
CHOLEST SERPL-MCNC: 161 MG/DL (ref 50–200)
EST. AVERAGE GLUCOSE BLD GHB EST-MCNC: 137 MG/DL
GLUCOSE P FAST SERPL-MCNC: 136 MG/DL (ref 65–99)
HBA1C MFR BLD: 6.4 % (ref 4.2–6.3)
HDLC SERPL-MCNC: 37 MG/DL (ref 40–60)
LDLC SERPL CALC-MCNC: 100 MG/DL (ref 0–100)
NONHDLC SERPL-MCNC: 124 MG/DL
PSA SERPL-MCNC: 8.8 NG/ML (ref 0–4)
TRIGL SERPL-MCNC: 119 MG/DL

## 2019-05-14 PROCEDURE — 80061 LIPID PANEL: CPT

## 2019-05-14 PROCEDURE — 82947 ASSAY GLUCOSE BLOOD QUANT: CPT

## 2019-05-14 PROCEDURE — 83036 HEMOGLOBIN GLYCOSYLATED A1C: CPT

## 2019-05-14 PROCEDURE — 84153 ASSAY OF PSA TOTAL: CPT

## 2019-05-14 PROCEDURE — 36415 COLL VENOUS BLD VENIPUNCTURE: CPT

## 2019-05-23 ENCOUNTER — OFFICE VISIT (OUTPATIENT)
Dept: UROLOGY | Facility: CLINIC | Age: 74
End: 2019-05-23
Payer: MEDICARE

## 2019-05-23 VITALS
HEART RATE: 66 BPM | SYSTOLIC BLOOD PRESSURE: 110 MMHG | HEIGHT: 70 IN | BODY MASS INDEX: 25.05 KG/M2 | WEIGHT: 175 LBS | DIASTOLIC BLOOD PRESSURE: 82 MMHG

## 2019-05-23 DIAGNOSIS — N40.0 BENIGN PROSTATIC HYPERPLASIA, UNSPECIFIED WHETHER LOWER URINARY TRACT SYMPTOMS PRESENT: Primary | ICD-10-CM

## 2019-05-23 LAB — POST-VOID RESIDUAL VOLUME, ML POC: 30 ML

## 2019-05-23 PROCEDURE — 51798 US URINE CAPACITY MEASURE: CPT | Performed by: PHYSICIAN ASSISTANT

## 2019-05-23 PROCEDURE — 99213 OFFICE O/P EST LOW 20 MIN: CPT | Performed by: PHYSICIAN ASSISTANT

## 2019-05-30 ENCOUNTER — OFFICE VISIT (OUTPATIENT)
Dept: INTERNAL MEDICINE CLINIC | Age: 74
End: 2019-05-30
Payer: MEDICARE

## 2019-05-30 VITALS
OXYGEN SATURATION: 99 % | SYSTOLIC BLOOD PRESSURE: 140 MMHG | TEMPERATURE: 98 F | WEIGHT: 174.2 LBS | BODY MASS INDEX: 25 KG/M2 | DIASTOLIC BLOOD PRESSURE: 76 MMHG | HEART RATE: 70 BPM

## 2019-05-30 DIAGNOSIS — I10 BENIGN HYPERTENSION: ICD-10-CM

## 2019-05-30 DIAGNOSIS — E11.8 TYPE 2 DIABETES MELLITUS WITH COMPLICATION, WITHOUT LONG-TERM CURRENT USE OF INSULIN (HCC): ICD-10-CM

## 2019-05-30 DIAGNOSIS — E78.00 HYPERCHOLESTEROLEMIA: ICD-10-CM

## 2019-05-30 DIAGNOSIS — Z23 NEED FOR PNEUMOCOCCAL VACCINATION: ICD-10-CM

## 2019-05-30 DIAGNOSIS — E78.00 HYPERCHOLESTEREMIA: Primary | ICD-10-CM

## 2019-05-30 DIAGNOSIS — I10 BENIGN ESSENTIAL HYPERTENSION: ICD-10-CM

## 2019-05-30 PROCEDURE — G0009 ADMIN PNEUMOCOCCAL VACCINE: HCPCS

## 2019-05-30 PROCEDURE — 90670 PCV13 VACCINE IM: CPT

## 2019-05-30 PROCEDURE — G0439 PPPS, SUBSEQ VISIT: HCPCS | Performed by: INTERNAL MEDICINE

## 2019-05-30 PROCEDURE — 99213 OFFICE O/P EST LOW 20 MIN: CPT | Performed by: INTERNAL MEDICINE

## 2019-05-30 RX ORDER — SIMVASTATIN 40 MG
40 TABLET ORAL DAILY
Qty: 90 TABLET | Refills: 1 | Status: SHIPPED | OUTPATIENT
Start: 2019-05-30 | End: 2019-11-21 | Stop reason: SDUPTHER

## 2019-05-30 RX ORDER — HYDROCHLOROTHIAZIDE 25 MG/1
25 TABLET ORAL DAILY
Qty: 90 TABLET | Refills: 1 | Status: SHIPPED | OUTPATIENT
Start: 2019-05-30 | End: 2019-11-21 | Stop reason: SDUPTHER

## 2019-05-30 RX ORDER — AMLODIPINE BESYLATE 10 MG/1
10 TABLET ORAL DAILY
Qty: 90 TABLET | Refills: 1 | Status: SHIPPED | OUTPATIENT
Start: 2019-05-30 | End: 2019-11-21 | Stop reason: SDUPTHER

## 2019-09-03 LAB
LEFT EYE DIABETIC RETINOPATHY: NORMAL
RIGHT EYE DIABETIC RETINOPATHY: NORMAL

## 2019-09-30 ENCOUNTER — CLINICAL SUPPORT (OUTPATIENT)
Dept: INTERNAL MEDICINE CLINIC | Age: 74
End: 2019-09-30
Payer: MEDICARE

## 2019-09-30 DIAGNOSIS — Z23 NEED FOR INFLUENZA VACCINATION: Primary | ICD-10-CM

## 2019-09-30 PROCEDURE — 90662 IIV NO PRSV INCREASED AG IM: CPT

## 2019-09-30 PROCEDURE — G0008 ADMIN INFLUENZA VIRUS VAC: HCPCS

## 2019-11-15 ENCOUNTER — APPOINTMENT (OUTPATIENT)
Dept: LAB | Facility: CLINIC | Age: 74
End: 2019-11-15
Payer: MEDICARE

## 2019-11-15 DIAGNOSIS — E11.8 TYPE 2 DIABETES MELLITUS WITH COMPLICATION, WITHOUT LONG-TERM CURRENT USE OF INSULIN (HCC): ICD-10-CM

## 2019-11-15 LAB
EST. AVERAGE GLUCOSE BLD GHB EST-MCNC: 143 MG/DL
GLUCOSE P FAST SERPL-MCNC: 139 MG/DL (ref 65–99)
HBA1C MFR BLD: 6.6 % (ref 4.2–6.3)

## 2019-11-15 PROCEDURE — 36415 COLL VENOUS BLD VENIPUNCTURE: CPT

## 2019-11-15 PROCEDURE — 83036 HEMOGLOBIN GLYCOSYLATED A1C: CPT

## 2019-11-15 PROCEDURE — 82947 ASSAY GLUCOSE BLOOD QUANT: CPT

## 2019-11-21 ENCOUNTER — OFFICE VISIT (OUTPATIENT)
Dept: INTERNAL MEDICINE CLINIC | Age: 74
End: 2019-11-21
Payer: MEDICARE

## 2019-11-21 VITALS
BODY MASS INDEX: 25.62 KG/M2 | DIASTOLIC BLOOD PRESSURE: 68 MMHG | WEIGHT: 179 LBS | HEIGHT: 70 IN | OXYGEN SATURATION: 97 % | HEART RATE: 66 BPM | SYSTOLIC BLOOD PRESSURE: 126 MMHG | TEMPERATURE: 97.7 F

## 2019-11-21 DIAGNOSIS — I10 BENIGN ESSENTIAL HYPERTENSION: ICD-10-CM

## 2019-11-21 DIAGNOSIS — I10 BENIGN HYPERTENSION: ICD-10-CM

## 2019-11-21 DIAGNOSIS — R07.89 CHEST TIGHTNESS: ICD-10-CM

## 2019-11-21 DIAGNOSIS — E78.00 HYPERCHOLESTEROLEMIA: ICD-10-CM

## 2019-11-21 DIAGNOSIS — E11.8 TYPE 2 DIABETES MELLITUS WITH COMPLICATION, WITHOUT LONG-TERM CURRENT USE OF INSULIN (HCC): ICD-10-CM

## 2019-11-21 DIAGNOSIS — E11.9 TYPE 2 DIABETES MELLITUS WITHOUT COMPLICATION, WITHOUT LONG-TERM CURRENT USE OF INSULIN (HCC): Primary | ICD-10-CM

## 2019-11-21 LAB
CREAT UR-MCNC: 163 MG/DL
MICROALBUMIN UR-MCNC: 24.7 MG/L (ref 0–20)
MICROALBUMIN/CREAT 24H UR: 15 MG/G CREATININE (ref 0–30)

## 2019-11-21 PROCEDURE — 93000 ELECTROCARDIOGRAM COMPLETE: CPT | Performed by: INTERNAL MEDICINE

## 2019-11-21 PROCEDURE — 99214 OFFICE O/P EST MOD 30 MIN: CPT | Performed by: INTERNAL MEDICINE

## 2019-11-21 PROCEDURE — 82043 UR ALBUMIN QUANTITATIVE: CPT | Performed by: INTERNAL MEDICINE

## 2019-11-21 PROCEDURE — 82570 ASSAY OF URINE CREATININE: CPT | Performed by: INTERNAL MEDICINE

## 2019-11-21 RX ORDER — AMLODIPINE BESYLATE 10 MG/1
10 TABLET ORAL DAILY
Qty: 90 TABLET | Refills: 1 | Status: SHIPPED | OUTPATIENT
Start: 2019-11-21 | End: 2019-12-17 | Stop reason: HOSPADM

## 2019-11-21 RX ORDER — SIMVASTATIN 40 MG
40 TABLET ORAL DAILY
Qty: 90 TABLET | Refills: 1 | Status: SHIPPED | OUTPATIENT
Start: 2019-11-21 | End: 2019-12-09 | Stop reason: HOSPADM

## 2019-11-21 RX ORDER — HYDROCHLOROTHIAZIDE 25 MG/1
25 TABLET ORAL DAILY
Qty: 90 TABLET | Refills: 1 | Status: SHIPPED | OUTPATIENT
Start: 2019-11-21 | End: 2019-12-17 | Stop reason: HOSPADM

## 2019-11-21 NOTE — PROGRESS NOTES
Assessment/Plan:   No problem-specific Assessment & Plan notes found for this encounter  BMI Counseling: Body mass index is 25 68 kg/m²  The BMI is above normal  Nutrition recommendations include decreasing portion sizes, encouraging healthy choices of fruits and vegetables, decreasing fast food intake and increasing intake of lean protein  Exercise recommendations include exercising 3-5 times per week  Diagnoses and all orders for this visit:    Type 2 diabetes mellitus without complication, without long-term current use of insulin (HCC)    Benign hypertension  -     amLODIPine (NORVASC) 10 mg tablet; Take 1 tablet (10 mg total) by mouth daily  -     hydrochlorothiazide (HYDRODIURIL) 25 mg tablet; Take 1 tablet (25 mg total) by mouth daily  -     POCT ECG    Type 2 diabetes mellitus with complication, without long-term current use of insulin (HCC)  -     metFORMIN (GLUCOPHAGE) 1000 MG tablet; Take 1 tablet (1,000 mg total) by mouth 2 (two) times a day  -     Microalbumin / creatinine urine ratio  -     Hemoglobin A1C; Future  -     Glucose, fasting; Future    Hypercholesterolemia  -     simvastatin (ZOCOR) 40 mg tablet; Take 1 tablet (40 mg total) by mouth daily    Benign essential hypertension    Chest tightness  -     POCT ECG  -     NM myocardial perfusion spect (stress and/or rest); Future        Subjective:   Chief Complaint   Patient presents with    Follow-up     htn ,DM2   - labs 11/15/19-BMI FU NEEDED        Patient ID: Heavenly Lopez is a 72 y o  male      HPI  Patient is complaining of some pressure like symptoms with the exertion and which get better after taking some rest he denying any shortness of breath diaphoresis lightheadedness or dizziness or palpitation this is going on for some time but he thinks that it is getting slightly worse as compared to before he does not have any other complaints he did not recently get any stress test or EKG I will start with the EKG today and then schedule him for the stress test depending on the EKG finding  Hypertension is controlled diabetes is very well controlled with hemoglobin A1c of 6 6  He is an active gentleman doing all his ADLs  No history of acute MI or cerebrovascular accident his son who is in late 45s had acute MI but he was and smoker  His coronary risk factors are about 38%  The following portions of the patient's history were reviewed and updated as appropriate: allergies, current medications, past family history, past medical history, past social history, past surgical history and problem list     Review of Systems   Constitutional: Negative for appetite change, fatigue and fever  HENT: Negative for congestion, ear pain, hearing loss, nosebleeds, sneezing, tinnitus and voice change  Eyes: Negative for pain, discharge and redness  Respiratory: Positive for chest tightness  Negative for cough and wheezing  Cardiovascular: Positive for chest pain  Negative for palpitations and leg swelling  CHEST PAIN AND CHEST TIGHTNESS SPECIALLY WHEN HE GOES out in cold weather and try to go up the hill this symptoms are there for a long period of time but apparently he thinks that they are getting little bit worse as compared to before   Gastrointestinal: Negative for abdominal pain, blood in stool, constipation, diarrhea, nausea and vomiting  Genitourinary: Negative for difficulty urinating, dysuria, hematuria and urgency  Musculoskeletal: Negative for arthralgias, back pain, gait problem and joint swelling  Skin: Negative for rash and wound  Allergic/Immunologic: Negative for environmental allergies  Neurological: Negative for dizziness, tremors, seizures, weakness, light-headedness and numbness  Hematological: Negative for adenopathy  Does not bruise/bleed easily  Psychiatric/Behavioral: Negative for behavioral problems and confusion  The patient is not nervous/anxious            Past Medical History:   Diagnosis Date  Chronic cough     RESOLVED: [id number]    Diabetes mellitus ([facility] [address] )          Current Outpatient Medications:     amLODIPine (NORVASC) 10 mg tablet, Take 1 tablet (10 mg total) by mouth daily, Disp: 90 tablet, Rfl: 1    Coenzyme Q10 (COQ10) 100 MG CAPS, Take 1 capsule by mouth daily, Disp: , Rfl:     Glucosamine-Chondroitin 750-600 MG TABS, Take 1 tablet by mouth daily, Disp: , Rfl:     hydrochlorothiazide (HYDRODIURIL) 25 mg tablet, Take 1 tablet (25 mg total) by mouth daily, Disp: 90 tablet, Rfl: 1    metFORMIN (GLUCOPHAGE) 1000 MG tablet, Take 1 tablet (1,000 mg total) by mouth 2 (two) times a day, Disp: 180 tablet, Rfl: 1    Omega-3 Fatty Acids (FISH OIL) 645 MG CAPS, Take 1 capsule by mouth 2 (two) times a day, Disp: , Rfl:     simvastatin (ZOCOR) 40 mg tablet, Take 1 tablet (40 mg total) by mouth daily, Disp: 90 tablet, Rfl: 1    benzonatate (TESSALON) 200 MG capsule, Take 1 capsule (200 mg total) by mouth 3 (three) times a day as needed for cough (Patient not taking: Reported on 1/31/2019 ), Disp: 20 capsule, Rfl: 0    fluticasone (FLONASE) 50 mcg/act nasal spray, 1 spray into each nostril daily (Patient not taking: Reported on 1/31/2019 ), Disp: 16 g, Rfl: 0    Allergies   Allergen Reactions    Pollen Extract        Social History   Past Surgical History:   Procedure Laterality Date    HAND SURGERY       Family History   Problem Relation Age of Onset    Heart failure Mother     Heart failure Father     Heart disease Father     Heart attack Father     Diabetes Other        Objective:  /68 (BP Location: Left arm, Patient Position: Sitting, Cuff Size: Standard)   Pulse 66   Temp 97 7 °F (36 5 °C) (Tympanic)   Ht 5' 10" (1 778 m) Comment: shoes on  Wt 81 2 kg (179 lb) Comment: shoes on  SpO2 97%   BMI 25 68 kg/m²        Physical Exam   Constitutional: He is oriented to person, place, and time  He appears well-developed and well-nourished     HENT:   Right Ear: External ear normal  Mouth/Throat: Oropharynx is clear and moist    Eyes: Pupils are equal, round, and reactive to light  Conjunctivae and EOM are normal    Neck: Normal range of motion  No JVD present  No thyromegaly present  Cardiovascular: Normal rate, regular rhythm, normal heart sounds and intact distal pulses  Pulmonary/Chest: Breath sounds normal    Abdominal: Soft  Bowel sounds are normal    Musculoskeletal: Normal range of motion  Lymphadenopathy:     He has no cervical adenopathy  Neurological: He is alert and oriented to person, place, and time  He has normal reflexes  Skin: Skin is dry  Psychiatric: He has a normal mood and affect   His behavior is normal  Judgment and thought content normal          Recent Results (from the past 672 hour(s))   Glucose, fasting    Collection Time: 11/15/19  8:03 AM   Result Value Ref Range    Glucose, Fasting 139 (H) 65 - 99 mg/dL   Hemoglobin A1C    Collection Time: 11/15/19  8:03 AM   Result Value Ref Range    Hemoglobin A1C 6 6 (H) 4 2 - 6 3 %     mg/dl

## 2019-11-21 NOTE — PROGRESS NOTES
Diabetic Foot Exam    Right Foot/Ankle   Right Foot Inspection  Skin Exam: skin normal, skin intact and dry skin no warmth, no callus, no erythema, no maceration, no abnormal color, no pre-ulcer, no ulcer and no callus                          Toe Exam: ROM and strength within normal limits  Sensory   Vibration: intact  Proprioception: intact   Monofilament testing: intact  Vascular  Capillary refills: < 3 seconds  The right DP pulse is 1+  The right PT pulse is 1+  Left Foot/Ankle  Left Foot Inspection  Skin Exam: skin normal, skin intact and dry skinno warmth, no erythema, no maceration, normal color, no pre-ulcer, no ulcer and no callus                         Toe Exam: ROM and strength within normal limits                   Sensory   Vibration: intact  Proprioception: intact  Monofilament: intact  Vascular  Capillary refills: < 3 seconds  The left DP pulse is 1+  The left PT pulse is 1+  Assign Risk Category:  No deformity present; No loss of protective sensation;  No weak pulses       Risk: 1

## 2019-11-27 ENCOUNTER — HOSPITAL ENCOUNTER (OUTPATIENT)
Dept: NON INVASIVE DIAGNOSTICS | Facility: CLINIC | Age: 74
Discharge: HOME | End: 2019-11-27
Payer: MEDICARE

## 2019-11-27 DIAGNOSIS — R07.89 CHEST TIGHTNESS: ICD-10-CM

## 2019-11-27 LAB
CHEST PAIN STATEMENT: NORMAL
MAX DIASTOLIC BP: 64 MMHG
MAX HEART RATE: 130 BPM
MAX PREDICTED HEART RATE: 149 BPM
MAX. SYSTOLIC BP: 144 MMHG
PROTOCOL NAME: NORMAL
REASON FOR TERMINATION: NORMAL
TARGET HR FORMULA: NORMAL
TEST INDICATION: NORMAL
TIME IN EXERCISE PHASE: NORMAL

## 2019-11-27 PROCEDURE — 78452 HT MUSCLE IMAGE SPECT MULT: CPT

## 2019-11-27 PROCEDURE — A9502 TC99M TETROFOSMIN: HCPCS

## 2019-11-27 PROCEDURE — 93017 CV STRESS TEST TRACING ONLY: CPT

## 2019-11-29 ENCOUNTER — TELEPHONE (OUTPATIENT)
Dept: INTERNAL MEDICINE CLINIC | Age: 74
End: 2019-11-29

## 2019-11-29 NOTE — TELEPHONE ENCOUNTER
Patient called to say that he "failed the stress test miserably" and he is wondering what to do next  They told him that he has "multiple blockages"  He is interested in seeing Dr Noemi Pepper, cardiologist, as he is his wife's doctor  He is anxious about delaying further care and would like to speak with you

## 2019-12-02 ENCOUNTER — TELEPHONE (OUTPATIENT)
Dept: INTERNAL MEDICINE CLINIC | Age: 74
End: 2019-12-02

## 2019-12-02 NOTE — TELEPHONE ENCOUNTER
This patient called back again for Dr Alex Richardson  I did explain to the patient that Dr Alex Richardson would not be in the office until Tuesday December 3, 2019  The patient had a stress test and failed it  The patient would like to know what his next step would be

## 2019-12-03 ENCOUNTER — APPOINTMENT (OUTPATIENT)
Dept: LAB | Facility: CLINIC | Age: 74
End: 2019-12-03
Payer: MEDICARE

## 2019-12-03 ENCOUNTER — OFFICE VISIT (OUTPATIENT)
Dept: CARDIOLOGY CLINIC | Facility: CLINIC | Age: 74
End: 2019-12-03
Payer: MEDICARE

## 2019-12-03 ENCOUNTER — TRANSCRIBE ORDERS (OUTPATIENT)
Dept: LAB | Facility: CLINIC | Age: 74
End: 2019-12-03

## 2019-12-03 VITALS
HEIGHT: 70 IN | OXYGEN SATURATION: 98 % | WEIGHT: 180 LBS | HEART RATE: 96 BPM | SYSTOLIC BLOOD PRESSURE: 138 MMHG | BODY MASS INDEX: 25.77 KG/M2 | DIASTOLIC BLOOD PRESSURE: 82 MMHG

## 2019-12-03 DIAGNOSIS — R94.39 ABNORMAL NUCLEAR STRESS TEST: ICD-10-CM

## 2019-12-03 DIAGNOSIS — R07.89 CHEST TIGHTNESS: Primary | ICD-10-CM

## 2019-12-03 DIAGNOSIS — I20.8 STABLE ANGINA (HCC): ICD-10-CM

## 2019-12-03 DIAGNOSIS — E78.00 HYPERCHOLESTEREMIA: ICD-10-CM

## 2019-12-03 DIAGNOSIS — I10 BENIGN ESSENTIAL HYPERTENSION: ICD-10-CM

## 2019-12-03 DIAGNOSIS — R07.89 CHEST TIGHTNESS: ICD-10-CM

## 2019-12-03 PROBLEM — I20.89 STABLE ANGINA: Status: ACTIVE | Noted: 2019-12-03

## 2019-12-03 LAB
ANION GAP SERPL CALCULATED.3IONS-SCNC: 8 MMOL/L (ref 4–13)
BUN SERPL-MCNC: 20 MG/DL (ref 5–25)
CALCIUM SERPL-MCNC: 9.8 MG/DL (ref 8.3–10.1)
CHLORIDE SERPL-SCNC: 98 MMOL/L (ref 100–108)
CO2 SERPL-SCNC: 33 MMOL/L (ref 21–32)
CREAT SERPL-MCNC: 1.14 MG/DL (ref 0.6–1.3)
CREAT UR-MCNC: 125 MG/DL
ERYTHROCYTE [DISTWIDTH] IN BLOOD BY AUTOMATED COUNT: 13.1 % (ref 11.6–15.1)
GFR SERPL CREATININE-BSD FRML MDRD: 64 ML/MIN/1.73SQ M
GLUCOSE SERPL-MCNC: 209 MG/DL (ref 65–140)
HCT VFR BLD AUTO: 48.1 % (ref 36.5–49.3)
HGB BLD-MCNC: 16.2 G/DL (ref 12–17)
INR PPP: 0.98 (ref 0.84–1.19)
LEFT EYE DIABETIC RETINOPATHY: NORMAL
MCH RBC QN AUTO: 30 PG (ref 26.8–34.3)
MCHC RBC AUTO-ENTMCNC: 33.7 G/DL (ref 31.4–37.4)
MCV RBC AUTO: 89 FL (ref 82–98)
MICROALBUMIN UR-MCNC: 39 MG/L (ref 0–20)
MICROALBUMIN/CREAT 24H UR: 31 MG/G CREATININE (ref 0–30)
PLATELET # BLD AUTO: 217 THOUSANDS/UL (ref 149–390)
PMV BLD AUTO: 9.6 FL (ref 8.9–12.7)
POTASSIUM SERPL-SCNC: 3.6 MMOL/L (ref 3.5–5.3)
PROTHROMBIN TIME: 12.4 SECONDS (ref 11.6–14.5)
RBC # BLD AUTO: 5.4 MILLION/UL (ref 3.88–5.62)
RIGHT EYE DIABETIC RETINOPATHY: NORMAL
SODIUM SERPL-SCNC: 139 MMOL/L (ref 136–145)
WBC # BLD AUTO: 8.56 THOUSAND/UL (ref 4.31–10.16)

## 2019-12-03 PROCEDURE — 85610 PROTHROMBIN TIME: CPT

## 2019-12-03 PROCEDURE — 36415 COLL VENOUS BLD VENIPUNCTURE: CPT

## 2019-12-03 PROCEDURE — 99204 OFFICE O/P NEW MOD 45 MIN: CPT | Performed by: INTERNAL MEDICINE

## 2019-12-03 PROCEDURE — 82043 UR ALBUMIN QUANTITATIVE: CPT | Performed by: INTERNAL MEDICINE

## 2019-12-03 PROCEDURE — 85027 COMPLETE CBC AUTOMATED: CPT

## 2019-12-03 PROCEDURE — 80048 BASIC METABOLIC PNL TOTAL CA: CPT

## 2019-12-03 PROCEDURE — 82570 ASSAY OF URINE CREATININE: CPT | Performed by: INTERNAL MEDICINE

## 2019-12-03 NOTE — H&P (VIEW-ONLY)
[address] [facility] Cardiology  Office Consultation  Vladimir Howell 69 y o  male MRN: [id number]        Problems    1  Chest tightness  Basic metabolic panel    Protime-INR    CBC    Cardiac catheterization   2  Stable angina ([address] )     3  Hypercholesteremia     4  Benign essential hypertension         Impression:    Chinedu Goodwin came to see me for an urgent visit today, at the request of his PCP, I see Frantz's wife is a patient as well   Hypertension  o Well controlled, but not currently on beta-blocker therapy, will institute this depending on his workup   Hyperlipidemia  o On statin therapy at this time, but clearly will need discussion of optimization at follow-up   Angina  o Stable exertional angina, but of recent onset, not increasing in intensity, frequency, and continues to be relieved easily with rest, normal day-to-day activities do not cause the discomfort   o His stress test revealed normal rest imaging, but septal and inferior ischemia of a moderate degree, and after conversation with the patient and his wife in my office today, clearly meets indications for cardiac catheterization to assess coronary anatomy  o He is currently taking an aspirin  Plan     Proceed with cardiac catheterization, and pre catheterization lab work   Scheduling office was made aware   Patient is appropriate risk to be done at [facility]   Echocardiogram will be ordered in follow-up   Requested no significant exercise until his cardiac catheterization   Requested discontinuation of metformin day prior, and day of cardiac catheterization   Requested discontinuation of HCTZ on the day of his cardiac catheterization, and requested discontinuation of his fish oil 3 days prior to cardiac catheterization        Reason for Consult / Principal Problem:  Chest pain and abnormal stress test    HPI: Vladimir Howell is a 69y o  year old male who sees me urgently today having had an abnormal stress test last week, I was called by his PCP today   I see the patient's wife as the patient  Patient has been experiencing chest pain for a couple months, usually only with more exertional activities, especially when he takes his long walks including a large [place], often gets recurrent chest pain up the hill, resolves with rest and he is able to continue walking thereafter  He does not have any chest pain with routine activity, never has chest pain at rest   EKG done 11/21/2019 at his PCPs office was personally reviewed and normal   He has hypertension, it is well controlled  He has hyperlipidemia, is on simvastatin at its under reasonable control  Stress test was personally reviewed, shows septal and inferior moderate intensity reversible ischemic abnormality  Review of Systems   Constitutional: Negative  HENT: Negative  Eyes: Negative  Respiratory: Positive for chest tightness  Negative for shortness of breath  Cardiovascular: Positive for chest pain  Gastrointestinal: Negative  Genitourinary: Negative  Musculoskeletal: Negative  Skin: Negative  Neurological: Negative  Hematological: Negative  Psychiatric/Behavioral: Negative  Past Medical History:   Diagnosis Date    Chronic cough     RESOLVED: [id number]    Diabetes mellitus ([address] )      Past Surgical History:   Procedure Laterality Date    HAND SURGERY       Social History     Substance and Sexual Activity   Alcohol Use Yes    Comment: occasional     Social History     Substance and Sexual Activity   Drug Use No     Social History     Tobacco Use   Smoking Status Never Smoker   Smokeless Tobacco Never Used     Family History   Problem Relation Age of Onset    Heart failure Mother     Heart failure Father     Heart disease Father     Heart attack Father     Diabetes Other        Allergies:   Allergies   Allergen Reactions    Pollen Extract        Medications:     Current Outpatient Medications:     amLODIPine (NORVASC) 10 mg tablet, Take 1 tablet (10 mg total) by mouth daily, Disp: 90 tablet, Rfl: 1    Coenzyme Q10 (COQ10) 100 MG CAPS, Take 1 capsule by mouth daily, Disp: , Rfl:     Glucosamine-Chondroitin 750-600 MG TABS, Take 1 tablet by mouth daily, Disp: , Rfl:     hydrochlorothiazide (HYDRODIURIL) 25 mg tablet, Take 1 tablet (25 mg total) by mouth daily, Disp: 90 tablet, Rfl: 1    metFORMIN (GLUCOPHAGE) 1000 MG tablet, Take 1 tablet (1,000 mg total) by mouth 2 (two) times a day, Disp: 180 tablet, Rfl: 1    Omega-3 Fatty Acids (FISH OIL) 645 MG CAPS, Take 1 capsule by mouth 2 (two) times a day, Disp: , Rfl:     simvastatin (ZOCOR) 40 mg tablet, Take 1 tablet (40 mg total) by mouth daily, Disp: 90 tablet, Rfl: 1      Vitals:    12/03/19 1423   BP: 138/82   Pulse: 96   SpO2: 98%     Weight (last 2 days)     Date/Time   Weight    12/03/19 1423   81 6 (180)            Physical Exam   Constitutional: He is oriented to person, place, and time  He appears well-developed and well-nourished  No distress  HENT:   Head: Normocephalic and atraumatic  Mouth/Throat: Oropharynx is clear and moist    Eyes: Pupils are equal, round, and reactive to light  Conjunctivae and EOM are normal  No scleral icterus  Neck: Normal range of motion  Neck supple  No JVD present  No thyromegaly present  Cardiovascular: Normal rate, regular rhythm, normal heart sounds and intact distal pulses  Exam reveals no gallop and no friction rub  No murmur heard  Pulmonary/Chest: Effort normal and breath sounds normal  No respiratory distress  He has no wheezes  He has no rales  Abdominal: Soft  Bowel sounds are normal  He exhibits no distension  There is no tenderness  Musculoskeletal: Normal range of motion  He exhibits no edema or deformity  Neurological: He is alert and oriented to person, place, and time  No cranial nerve deficit  Skin: Skin is warm and dry  No rash noted  He is not diaphoretic  No erythema  Psychiatric: He has a normal mood and affect  Laboratory Studies:    Laboratory studies personally reviewed    Cardiac testing:     EKG reviewed personally:  2019-normal    No results found for this or any previous visit  No results found for this or any previous visit  No results found for this or any previous visit  Results for orders placed during the hospital encounter of 19   NM myocardial perfusion spect (stress and/or rest)    Lauren Nguyen [address]  [facility], [address] [facility]  [phone]    Stress Gated SPECT Myocardial Perfusion Imaging after Exercise    Patient: Barbara Kelly  MR number: [id number]  Account number: [de-identified]  : 1945  Age: 72 years  Gender: Male  Status: Outpatient  Location: [address] [place] [address] [facility] [place]  Height: 70 in  Weight: 179 lb  BP: 122/ 78 mmHg    Allergies: POLLEN EXTRACT    Diagnosis: R07 9 - Chest pain, unspecified    Interpreting Physician:  Junaid Zazueta MD  Referring Physician:  Xiao Loyd MD  Primary Physician:  Xiao Loyd MD  Technician:  Luh Patterson  RN:  Yovani Andrade RN  Group:  [facility]  Report Prepared by[de-identified]  Yovani Andrade RN    INDICATIONS: Detection for coronary artery disease  HISTORY: The patient is a 72year old  male  Chest pain status: recent onset chest pain  Coronary artery disease risk factors: dyslipidemia, hypertension, family history of premature coronary artery disease, and diabetes mellitus  Cardiovascular history: none significant  Medications: a calcium channel blocker, a diuretic, a lipid lowering agent, and diabetic medications  PHYSICAL EXAM: Baseline physical exam screening: normal     REST ECG: Normal sinus rhythm  Normal baseline ECG  PROCEDURE: The study was performed in the [facility] [address]  Treadmill exercise testing was performed, using the Frantz protocol  Gated SPECT myocardial perfusion imaging was performed during stress   Systolic blood  pressure was 122 mmHg, at the start of the study  Diastolic blood pressure was 78 mmHg, at the start of the study  The heart rate was 68 bpm, at the start of the study  IV double checked  BHAVIN PROTOCOL:  HR bpm SBP mmHg DBP mmHg Symptoms  Baseline 68 122 78 none  Stage 1 130 144 64 mild chest discomfort  Recovery 1 83 140 60 subsiding  Recovery 2 81 136 66 subsiding  Recovery 3 80 140 70 none  No medications or fluids given  STRESS SUMMARY: Duration of exercise was 3 min and 33 sec  The patient exercised to protocol stage 1  Maximal work rate was 4 6 METs  Maximal heart rate during stress was 130 bpm ( 87 % of maximal predicted heart rate)  The heart rate  response to stress was normal  There was normal resting blood pressure with an appropriate response to stress  The rate-pressure product for the peak heart rate and blood pressure was 38650  The patient experienced chest pain during  stress; pain resolved spontaneously  The stress test was terminated due to achievement of maximal (symptom limited) exercise  Pre oxygen saturation: 98 %  Peak oxygen saturation: 96 %  The stress ECG was consistent with myocardial  ischemia  Arrhythmia during stress: isolated atrial premature beats, isolated premature ventricular beats, pairs of premature ventricular beats, and ventricular premature beats, triplets  ISOTOPE ADMINISTRATION:  The radiopharmaceutical was injected one minute before the end of exercise  MYOCARDIAL PERFUSION IMAGING:  The image quality was good  PERFUSION DEFECTS:  -  There was a large, severe, reversible myocardial perfusion defect of the entire septal and inferior wall  -  There was a moderate-sized, moderately severe, reversible myocardial perfusion defect of the apical anterior and apical wall  GATED SPECT:  The calculated left ventricular ejection fraction was 57 %   There was mildly reduced myocardial thickening and motion of the septal wall and inferior wall of the left ventricle  SUMMARY:  -  Stress results: Duration of exercise was 3 min and 33 sec  Target heart rate was achieved  The patient experienced chest pain during stress; pain resolved spontaneously  -  ECG conclusions: The stress ECG was consistent with myocardial ischemia  -  Perfusion imaging: There was a large, severe, reversible myocardial perfusion defect of the entire septal and inferior wall  There was a moderate-sized, moderately severe, reversible myocardial perfusion defect of the apical anterior  and apical wall  -  Gated SPECT: The calculated left ventricular ejection fraction was 57 %  There was mildly reduced myocardial thickening and motion of the septal wall and inferior wall of the left ventricle  IMPRESSIONS: Markedly abnormal study after maximal exercise with reproduction of symptoms  There was a large amount of ischemia in the septal region extending to the inferior wall  There was a moderate amount of ischemia in the anterior  and apical regions  Prepared and signed by    Domo Rivera MD  Signed 11/27/2019 14:47:41         Dario Sales MD    Portions of the record may have been created with voice recognition software  Occasional wrong word or "sound a like" substitutions may have occurred due to the inherent limitations of voice recognition software  Read the chart carefully and recognize, using context, where substitutions have occurred

## 2019-12-03 NOTE — TELEPHONE ENCOUNTER
Dr Leonardo Espinoza spoke to the patient and placed a referral to cardiologist, Dr Jh Foss, [phone]

## 2019-12-03 NOTE — PROGRESS NOTES
[facility]  Office Consultation  Tianna Beverly 69 y o  male MRN: [id number]        Problems    1  Chest tightness  Basic metabolic panel    Protime-INR    CBC    Cardiac catheterization   2  Stable angina ([address] )     3  Hypercholesteremia     4  Benign essential hypertension         Impression:    Kaleb Christina came to see me for an urgent visit today, at the request of his PCP, I see Frantz's wife is a patient as well   Hypertension  o Well controlled, but not currently on beta-blocker therapy, will institute this depending on his workup   Hyperlipidemia  o On statin therapy at this time, but clearly will need discussion of optimization at follow-up   Angina  o Stable exertional angina, but of recent onset, not increasing in intensity, frequency, and continues to be relieved easily with rest, normal day-to-day activities do not cause the discomfort   o His stress test revealed normal rest imaging, but septal and inferior ischemia of a moderate degree, and after conversation with the patient and his wife in my office today, clearly meets indications for cardiac catheterization to assess coronary anatomy  o He is currently taking an aspirin  Plan     Proceed with cardiac catheterization, and pre catheterization lab work   Scheduling office was made aware   Patient is appropriate risk to be done at [address] [place] [address]   Echocardiogram will be ordered in follow-up   Requested no significant exercise until his cardiac catheterization   Requested discontinuation of metformin day prior, and day of cardiac catheterization   Requested discontinuation of HCTZ on the day of his cardiac catheterization, and requested discontinuation of his fish oil 3 days prior to cardiac catheterization        Reason for Consult / Principal Problem:  Chest pain and abnormal stress test    HPI: Tianna Beverly is a 69y o  year old male who sees me urgently today having had an abnormal stress test last week, I was called by his PCP today   I see the patient's wife as the patient  Patient has been experiencing chest pain for a couple months, usually only with more exertional activities, especially when he takes his long walks including a large [place], often gets recurrent chest pain up the hill, resolves with rest and he is able to continue walking thereafter  He does not have any chest pain with routine activity, never has chest pain at rest   EKG done 11/21/2019 at his PCPs office was personally reviewed and normal   He has hypertension, it is well controlled  He has hyperlipidemia, is on simvastatin at its under reasonable control  Stress test was personally reviewed, shows septal and inferior moderate intensity reversible ischemic abnormality  Review of Systems   Constitutional: Negative  HENT: Negative  Eyes: Negative  Respiratory: Positive for chest tightness  Negative for shortness of breath  Cardiovascular: Positive for chest pain  Gastrointestinal: Negative  Genitourinary: Negative  Musculoskeletal: Negative  Skin: Negative  Neurological: Negative  Hematological: Negative  Psychiatric/Behavioral: Negative  Past Medical History:   Diagnosis Date    Chronic cough     RESOLVED: [id number]    Diabetes mellitus ([address] )      Past Surgical History:   Procedure Laterality Date    HAND SURGERY       Social History     Substance and Sexual Activity   Alcohol Use Yes    Comment: occasional     Social History     Substance and Sexual Activity   Drug Use No     Social History     Tobacco Use   Smoking Status Never Smoker   Smokeless Tobacco Never Used     Family History   Problem Relation Age of Onset    Heart failure Mother     Heart failure Father     Heart disease Father     Heart attack Father     Diabetes Other        Allergies:   Allergies   Allergen Reactions    Pollen Extract        Medications:     Current Outpatient Medications:     amLODIPine (NORVASC) 10 mg tablet, Take 1 tablet (10 mg total) by mouth daily, Disp: 90 tablet, Rfl: 1    Coenzyme Q10 (COQ10) 100 MG CAPS, Take 1 capsule by mouth daily, Disp: , Rfl:     Glucosamine-Chondroitin 750-600 MG TABS, Take 1 tablet by mouth daily, Disp: , Rfl:     hydrochlorothiazide (HYDRODIURIL) 25 mg tablet, Take 1 tablet (25 mg total) by mouth daily, Disp: 90 tablet, Rfl: 1    metFORMIN (GLUCOPHAGE) 1000 MG tablet, Take 1 tablet (1,000 mg total) by mouth 2 (two) times a day, Disp: 180 tablet, Rfl: 1    Omega-3 Fatty Acids (FISH OIL) 645 MG CAPS, Take 1 capsule by mouth 2 (two) times a day, Disp: , Rfl:     simvastatin (ZOCOR) 40 mg tablet, Take 1 tablet (40 mg total) by mouth daily, Disp: 90 tablet, Rfl: 1      Vitals:    12/03/19 1423   BP: 138/82   Pulse: 96   SpO2: 98%     Weight (last 2 days)     Date/Time   Weight    12/03/19 1423   81 6 (180)            Physical Exam   Constitutional: He is oriented to person, place, and time  He appears well-developed and well-nourished  No distress  HENT:   Head: Normocephalic and atraumatic  Mouth/Throat: Oropharynx is clear and moist    Eyes: Pupils are equal, round, and reactive to light  Conjunctivae and EOM are normal  No scleral icterus  Neck: Normal range of motion  Neck supple  No JVD present  No thyromegaly present  Cardiovascular: Normal rate, regular rhythm, normal heart sounds and intact distal pulses  Exam reveals no gallop and no friction rub  No murmur heard  Pulmonary/Chest: Effort normal and breath sounds normal  No respiratory distress  He has no wheezes  He has no rales  Abdominal: Soft  Bowel sounds are normal  He exhibits no distension  There is no tenderness  Musculoskeletal: Normal range of motion  He exhibits no edema or deformity  Neurological: He is alert and oriented to person, place, and time  No cranial nerve deficit  Skin: Skin is warm and dry  No rash noted  He is not diaphoretic  No erythema  Psychiatric: He has a normal mood and affect  Laboratory Studies:    Laboratory studies personally reviewed    Cardiac testing:     EKG reviewed personally:  2019-normal    No results found for this or any previous visit  No results found for this or any previous visit  No results found for this or any previous visit  Results for orders placed during the hospital encounter of 19   NM myocardial perfusion spect (stress and/or rest)    Lauren Nguyen [address]  [facility], [address] [facility]  [phone]    Stress Gated SPECT Myocardial Perfusion Imaging after Exercise    Patient: Burt Dixon  MR number: [id number]  Account number: [de-identified]  : 1945  Age: 72 years  Gender: Male  Status: Outpatient  Location: [address] [place] [address]  Height: 70 in  Weight: 179 lb  BP: 122/ 78 mmHg    Allergies: POLLEN EXTRACT    Diagnosis: R07 9 - Chest pain, unspecified    Interpreting Physician:  Gianna Alvares MD  Referring Physician:  Elo Montemayor MD  Primary Physician:  Elo Montemayor MD  Technician:  Karen Ulloa  RN:  Gavin Lovell RN  Group:  [facility]  Report Prepared by[de-identified]  Gavin Lovell RN    INDICATIONS: Detection for coronary artery disease  HISTORY: The patient is a 72year old  male  Chest pain status: recent onset chest pain  Coronary artery disease risk factors: dyslipidemia, hypertension, family history of premature coronary artery disease, and diabetes mellitus  Cardiovascular history: none significant  Medications: a calcium channel blocker, a diuretic, a lipid lowering agent, and diabetic medications  PHYSICAL EXAM: Baseline physical exam screening: normal     REST ECG: Normal sinus rhythm  Normal baseline ECG  PROCEDURE: The study was performed in the [facility] [address]  Treadmill exercise testing was performed, using the Frantz protocol  Gated SPECT myocardial perfusion imaging was performed during stress   Systolic blood  pressure was 122 mmHg, at the start of the study  Diastolic blood pressure was 78 mmHg, at the start of the study  The heart rate was 68 bpm, at the start of the study  IV double checked  BHAVIN PROTOCOL:  HR bpm SBP mmHg DBP mmHg Symptoms  Baseline 68 122 78 none  Stage 1 130 144 64 mild chest discomfort  Recovery 1 83 140 60 subsiding  Recovery 2 81 136 66 subsiding  Recovery 3 80 140 70 none  No medications or fluids given  STRESS SUMMARY: Duration of exercise was 3 min and 33 sec  The patient exercised to protocol stage 1  Maximal work rate was 4 6 METs  Maximal heart rate during stress was 130 bpm ( 87 % of maximal predicted heart rate)  The heart rate  response to stress was normal  There was normal resting blood pressure with an appropriate response to stress  The rate-pressure product for the peak heart rate and blood pressure was 79316  The patient experienced chest pain during  stress; pain resolved spontaneously  The stress test was terminated due to achievement of maximal (symptom limited) exercise  Pre oxygen saturation: 98 %  Peak oxygen saturation: 96 %  The stress ECG was consistent with myocardial  ischemia  Arrhythmia during stress: isolated atrial premature beats, isolated premature ventricular beats, pairs of premature ventricular beats, and ventricular premature beats, triplets  ISOTOPE ADMINISTRATION:  The radiopharmaceutical was injected one minute before the end of exercise  MYOCARDIAL PERFUSION IMAGING:  The image quality was good  PERFUSION DEFECTS:  -  There was a large, severe, reversible myocardial perfusion defect of the entire septal and inferior wall  -  There was a moderate-sized, moderately severe, reversible myocardial perfusion defect of the apical anterior and apical wall  GATED SPECT:  The calculated left ventricular ejection fraction was 57 %   There was mildly reduced myocardial thickening and motion of the septal wall and inferior wall of the left ventricle  SUMMARY:  -  Stress results: Duration of exercise was 3 min and 33 sec  Target heart rate was achieved  The patient experienced chest pain during stress; pain resolved spontaneously  -  ECG conclusions: The stress ECG was consistent with myocardial ischemia  -  Perfusion imaging: There was a large, severe, reversible myocardial perfusion defect of the entire septal and inferior wall  There was a moderate-sized, moderately severe, reversible myocardial perfusion defect of the apical anterior  and apical wall  -  Gated SPECT: The calculated left ventricular ejection fraction was 57 %  There was mildly reduced myocardial thickening and motion of the septal wall and inferior wall of the left ventricle  IMPRESSIONS: Markedly abnormal study after maximal exercise with reproduction of symptoms  There was a large amount of ischemia in the septal region extending to the inferior wall  There was a moderate amount of ischemia in the anterior  and apical regions  Prepared and signed by    Lay López MD  Signed 11/27/2019 14:47:41         Silvia Allen MD    Portions of the record may have been created with voice recognition software  Occasional wrong word or "sound a like" substitutions may have occurred due to the inherent limitations of voice recognition software  Read the chart carefully and recognize, using context, where substitutions have occurred

## 2019-12-03 NOTE — PATIENT INSTRUCTIONS
Pre-Surgery Instructions:   Medication Instructions    amLODIPine (NORVASC) 10 mg tablet Take morning of surgery    Coenzyme Q10 (COQ10) 100 MG CAPS Take morning of surgery    Glucosamine-Chondroitin 750-600 MG TABS Take morning of surgery    hydrochlorothiazide (HYDRODIURIL) 25 mg tablet Stop taking 1 days prior to surgery    metFORMIN (GLUCOPHAGE) 1000 MG tablet Stop taking 2 days prior to surgery    Omega-3 Fatty Acids (FISH OIL) 645 MG CAPS Stop taking 2 days prior to surgery    simvastatin (ZOCOR) 40 mg tablet Take morning of surgery   Like to continue taking the baby aspirin even on the day of your catheterization  Brilinta is 1 of the medications you may be asked to take after a stent is placed

## 2019-12-04 ENCOUNTER — TELEPHONE (OUTPATIENT)
Dept: CARDIOLOGY CLINIC | Facility: CLINIC | Age: 74
End: 2019-12-04

## 2019-12-04 NOTE — TELEPHONE ENCOUNTER
Patient called us to scheduled a procedure request by Dr Courtney Contreras  Patient schedule for LHC at [facility] on 12/09/2019 will be perform by Dr Steven Willis, patient preferred to go to [facility]  Patient aware of general instructions and medications holds by Dr Courtney Boss can you pleas check if patient will need insurance approval for the service

## 2019-12-06 RX ORDER — ASPIRIN 81 MG/1
324 TABLET, CHEWABLE ORAL ONCE
Status: CANCELLED | OUTPATIENT
Start: 2019-12-06 | End: 2019-12-06

## 2019-12-06 RX ORDER — SODIUM CHLORIDE 9 MG/ML
100 INJECTION, SOLUTION INTRAVENOUS CONTINUOUS
Status: CANCELLED | OUTPATIENT
Start: 2019-12-09

## 2019-12-08 ENCOUNTER — TELEPHONE (OUTPATIENT)
Dept: INPATIENT UNIT | Facility: HOSPITAL | Age: 74
End: 2019-12-08

## 2019-12-09 ENCOUNTER — APPOINTMENT (OUTPATIENT)
Dept: NON INVASIVE DIAGNOSTICS | Facility: HOSPITAL | Age: 74
End: 2019-12-09
Payer: MEDICARE

## 2019-12-09 ENCOUNTER — HOSPITAL ENCOUNTER (OUTPATIENT)
Dept: NON INVASIVE DIAGNOSTICS | Facility: HOSPITAL | Age: 74
Discharge: HOME | End: 2019-12-09
Attending: INTERNAL MEDICINE | Admitting: INTERNAL MEDICINE
Payer: MEDICARE

## 2019-12-09 ENCOUNTER — APPOINTMENT (OUTPATIENT)
Dept: RADIOLOGY | Facility: HOSPITAL | Age: 74
End: 2019-12-09
Payer: MEDICARE

## 2019-12-09 VITALS
BODY MASS INDEX: 25.48 KG/M2 | HEART RATE: 71 BPM | HEIGHT: 70 IN | SYSTOLIC BLOOD PRESSURE: 116 MMHG | RESPIRATION RATE: 18 BRPM | WEIGHT: 178 LBS | TEMPERATURE: 97.4 F | DIASTOLIC BLOOD PRESSURE: 62 MMHG | OXYGEN SATURATION: 98 %

## 2019-12-09 DIAGNOSIS — I25.10 CORONARY ARTERY DISEASE: ICD-10-CM

## 2019-12-09 DIAGNOSIS — E11.9 TYPE 2 DIABETES MELLITUS (HCC): Primary | ICD-10-CM

## 2019-12-09 DIAGNOSIS — R07.89 CHEST TIGHTNESS: ICD-10-CM

## 2019-12-09 LAB
ANION GAP SERPL CALCULATED.3IONS-SCNC: 2 MMOL/L (ref 4–13)
APTT PPP: 31 SECONDS (ref 23–37)
ATRIAL RATE: 73 BPM
BILIRUB UR QL STRIP: NEGATIVE
BUN SERPL-MCNC: 18 MG/DL (ref 5–25)
CALCIUM SERPL-MCNC: 9.5 MG/DL (ref 8.3–10.1)
CHLORIDE SERPL-SCNC: 105 MMOL/L (ref 100–108)
CHOLEST SERPL-MCNC: 170 MG/DL (ref 50–200)
CLARITY UR: CLEAR
CO2 SERPL-SCNC: 32 MMOL/L (ref 21–32)
COLOR UR: YELLOW
CREAT SERPL-MCNC: 1 MG/DL (ref 0.6–1.3)
ERYTHROCYTE [DISTWIDTH] IN BLOOD BY AUTOMATED COUNT: 13 % (ref 11.6–15.1)
EST. AVERAGE GLUCOSE BLD GHB EST-MCNC: 151 MG/DL
GFR SERPL CREATININE-BSD FRML MDRD: 75 ML/MIN/1.73SQ M
GLUCOSE P FAST SERPL-MCNC: 164 MG/DL (ref 65–99)
GLUCOSE SERPL-MCNC: 164 MG/DL (ref 65–140)
GLUCOSE UR STRIP-MCNC: ABNORMAL MG/DL
HBA1C MFR BLD: 6.9 % (ref 4.2–6.3)
HCT VFR BLD AUTO: 44.9 % (ref 36.5–49.3)
HDLC SERPL-MCNC: 34 MG/DL
HGB BLD-MCNC: 15.3 G/DL (ref 12–17)
HGB UR QL STRIP.AUTO: NEGATIVE
INR PPP: 1.07 (ref 0.84–1.19)
KETONES UR STRIP-MCNC: NEGATIVE MG/DL
LDLC SERPL CALC-MCNC: 113 MG/DL (ref 0–100)
LEUKOCYTE ESTERASE UR QL STRIP: NEGATIVE
MCH RBC QN AUTO: 30.3 PG (ref 26.8–34.3)
MCHC RBC AUTO-ENTMCNC: 34.1 G/DL (ref 31.4–37.4)
MCV RBC AUTO: 89 FL (ref 82–98)
NITRITE UR QL STRIP: NEGATIVE
NONHDLC SERPL-MCNC: 136 MG/DL
P AXIS: 12 DEGREES
PH UR STRIP.AUTO: 6.5 [PH]
PLATELET # BLD AUTO: 159 THOUSANDS/UL (ref 149–390)
PMV BLD AUTO: 9.3 FL (ref 8.9–12.7)
POTASSIUM SERPL-SCNC: 3.8 MMOL/L (ref 3.5–5.3)
PR INTERVAL: 194 MS
PROT UR STRIP-MCNC: NEGATIVE MG/DL
PROTHROMBIN TIME: 13.5 SECONDS (ref 11.6–14.5)
QRS AXIS: -9 DEGREES
QRSD INTERVAL: 74 MS
QT INTERVAL: 368 MS
QTC INTERVAL: 405 MS
RBC # BLD AUTO: 5.05 MILLION/UL (ref 3.88–5.62)
SODIUM SERPL-SCNC: 139 MMOL/L (ref 136–145)
SP GR UR STRIP.AUTO: >1.045 (ref 1–1.03)
T WAVE AXIS: 28 DEGREES
TRIGL SERPL-MCNC: 114 MG/DL
UROBILINOGEN UR QL STRIP.AUTO: 1 E.U./DL
VENTRICULAR RATE: 73 BPM
WBC # BLD AUTO: 6.43 THOUSAND/UL (ref 4.31–10.16)

## 2019-12-09 PROCEDURE — C1894 INTRO/SHEATH, NON-LASER: HCPCS | Performed by: INTERNAL MEDICINE

## 2019-12-09 PROCEDURE — 86900 BLOOD TYPING SEROLOGIC ABO: CPT | Performed by: PHYSICIAN ASSISTANT

## 2019-12-09 PROCEDURE — 93880 EXTRACRANIAL BILAT STUDY: CPT

## 2019-12-09 PROCEDURE — 85610 PROTHROMBIN TIME: CPT | Performed by: NURSE PRACTITIONER

## 2019-12-09 PROCEDURE — 93880 EXTRACRANIAL BILAT STUDY: CPT | Performed by: SURGERY

## 2019-12-09 PROCEDURE — 93306 TTE W/DOPPLER COMPLETE: CPT | Performed by: INTERNAL MEDICINE

## 2019-12-09 PROCEDURE — 86901 BLOOD TYPING SEROLOGIC RH(D): CPT | Performed by: PHYSICIAN ASSISTANT

## 2019-12-09 PROCEDURE — 99152 MOD SED SAME PHYS/QHP 5/>YRS: CPT | Performed by: INTERNAL MEDICINE

## 2019-12-09 PROCEDURE — 80048 BASIC METABOLIC PNL TOTAL CA: CPT | Performed by: NURSE PRACTITIONER

## 2019-12-09 PROCEDURE — 93005 ELECTROCARDIOGRAM TRACING: CPT

## 2019-12-09 PROCEDURE — 93306 TTE W/DOPPLER COMPLETE: CPT

## 2019-12-09 PROCEDURE — 87081 CULTURE SCREEN ONLY: CPT | Performed by: PHYSICIAN ASSISTANT

## 2019-12-09 PROCEDURE — 93458 L HRT ARTERY/VENTRICLE ANGIO: CPT | Performed by: INTERNAL MEDICINE

## 2019-12-09 PROCEDURE — 99153 MOD SED SAME PHYS/QHP EA: CPT | Performed by: INTERNAL MEDICINE

## 2019-12-09 PROCEDURE — 81003 URINALYSIS AUTO W/O SCOPE: CPT | Performed by: PHYSICIAN ASSISTANT

## 2019-12-09 PROCEDURE — C1769 GUIDE WIRE: HCPCS | Performed by: INTERNAL MEDICINE

## 2019-12-09 PROCEDURE — 93010 ELECTROCARDIOGRAM REPORT: CPT | Performed by: INTERNAL MEDICINE

## 2019-12-09 PROCEDURE — 85027 COMPLETE CBC AUTOMATED: CPT | Performed by: NURSE PRACTITIONER

## 2019-12-09 PROCEDURE — 99205 OFFICE O/P NEW HI 60 MIN: CPT | Performed by: THORACIC SURGERY (CARDIOTHORACIC VASCULAR SURGERY)

## 2019-12-09 PROCEDURE — 85730 THROMBOPLASTIN TIME PARTIAL: CPT | Performed by: PHYSICIAN ASSISTANT

## 2019-12-09 PROCEDURE — 80061 LIPID PANEL: CPT | Performed by: PHYSICIAN ASSISTANT

## 2019-12-09 PROCEDURE — 93971 EXTREMITY STUDY: CPT | Performed by: SURGERY

## 2019-12-09 PROCEDURE — 93971 EXTREMITY STUDY: CPT

## 2019-12-09 PROCEDURE — 86850 RBC ANTIBODY SCREEN: CPT | Performed by: PHYSICIAN ASSISTANT

## 2019-12-09 PROCEDURE — 71046 X-RAY EXAM CHEST 2 VIEWS: CPT

## 2019-12-09 PROCEDURE — 83036 HEMOGLOBIN GLYCOSYLATED A1C: CPT | Performed by: PHYSICIAN ASSISTANT

## 2019-12-09 RX ORDER — ATORVASTATIN CALCIUM 40 MG/1
40 TABLET, FILM COATED ORAL DAILY
Qty: 30 TABLET | Refills: 3 | Status: SHIPPED | OUTPATIENT
Start: 2019-12-09 | End: 2019-12-17 | Stop reason: HOSPADM

## 2019-12-09 RX ORDER — ASPIRIN 81 MG/1
TABLET, CHEWABLE ORAL
Status: COMPLETED
Start: 2019-12-09 | End: 2019-12-09

## 2019-12-09 RX ORDER — ASPIRIN 81 MG/1
324 TABLET, CHEWABLE ORAL ONCE
Status: COMPLETED | OUTPATIENT
Start: 2019-12-09 | End: 2019-12-09

## 2019-12-09 RX ORDER — SODIUM CHLORIDE 9 MG/ML
100 INJECTION, SOLUTION INTRAVENOUS CONTINUOUS
Status: DISCONTINUED | OUTPATIENT
Start: 2019-12-09 | End: 2019-12-09 | Stop reason: HOSPADM

## 2019-12-09 RX ORDER — NITROGLYCERIN 0.4 MG/1
0.4 TABLET SUBLINGUAL
Qty: 30 TABLET | Refills: 1 | Status: CANCELLED | OUTPATIENT
Start: 2019-12-09

## 2019-12-09 RX ORDER — SODIUM CHLORIDE 9 MG/ML
100 INJECTION, SOLUTION INTRAVENOUS CONTINUOUS
Status: DISPENSED | OUTPATIENT
Start: 2019-12-09 | End: 2019-12-09

## 2019-12-09 RX ORDER — ASPIRIN 81 MG/1
81 TABLET, CHEWABLE ORAL DAILY
COMMUNITY
End: 2019-12-17 | Stop reason: HOSPADM

## 2019-12-09 RX ORDER — NITROGLYCERIN 20 MG/100ML
INJECTION INTRAVENOUS CODE/TRAUMA/SEDATION MEDICATION
Status: COMPLETED | OUTPATIENT
Start: 2019-12-09 | End: 2019-12-09

## 2019-12-09 RX ORDER — FENTANYL CITRATE 50 UG/ML
INJECTION, SOLUTION INTRAMUSCULAR; INTRAVENOUS CODE/TRAUMA/SEDATION MEDICATION
Status: COMPLETED | OUTPATIENT
Start: 2019-12-09 | End: 2019-12-09

## 2019-12-09 RX ORDER — HEPARIN SODIUM 1000 [USP'U]/ML
INJECTION, SOLUTION INTRAVENOUS; SUBCUTANEOUS CODE/TRAUMA/SEDATION MEDICATION
Status: COMPLETED | OUTPATIENT
Start: 2019-12-09 | End: 2019-12-09

## 2019-12-09 RX ORDER — LIDOCAINE HYDROCHLORIDE 10 MG/ML
INJECTION, SOLUTION EPIDURAL; INFILTRATION; INTRACAUDAL; PERINEURAL CODE/TRAUMA/SEDATION MEDICATION
Status: COMPLETED | OUTPATIENT
Start: 2019-12-09 | End: 2019-12-09

## 2019-12-09 RX ORDER — MIDAZOLAM HYDROCHLORIDE 2 MG/2ML
INJECTION, SOLUTION INTRAMUSCULAR; INTRAVENOUS CODE/TRAUMA/SEDATION MEDICATION
Status: COMPLETED | OUTPATIENT
Start: 2019-12-09 | End: 2019-12-09

## 2019-12-09 RX ORDER — VERAPAMIL HYDROCHLORIDE 2.5 MG/ML
INJECTION, SOLUTION INTRAVENOUS CODE/TRAUMA/SEDATION MEDICATION
Status: COMPLETED | OUTPATIENT
Start: 2019-12-09 | End: 2019-12-09

## 2019-12-09 RX ADMIN — ASPIRIN 81 MG 324 MG: 81 TABLET ORAL at 07:24

## 2019-12-09 RX ADMIN — ASPIRIN 324 MG: 81 TABLET, CHEWABLE ORAL at 07:24

## 2019-12-09 RX ADMIN — HEPARIN SODIUM 4000 UNITS: 1000 INJECTION INTRAVENOUS; SUBCUTANEOUS at 10:13

## 2019-12-09 RX ADMIN — NITROGLYCERIN 200 MCG: 20 INJECTION INTRAVENOUS at 10:14

## 2019-12-09 RX ADMIN — FENTANYL CITRATE 50 MCG: 50 INJECTION, SOLUTION INTRAMUSCULAR; INTRAVENOUS at 10:04

## 2019-12-09 RX ADMIN — MIDAZOLAM 2 MG: 1 INJECTION INTRAMUSCULAR; INTRAVENOUS at 10:04

## 2019-12-09 RX ADMIN — IOHEXOL 96 ML: 350 INJECTION, SOLUTION INTRAVENOUS at 10:34

## 2019-12-09 RX ADMIN — MIDAZOLAM 1 MG: 1 INJECTION INTRAMUSCULAR; INTRAVENOUS at 10:11

## 2019-12-09 RX ADMIN — MIDAZOLAM 1 MG: 1 INJECTION INTRAMUSCULAR; INTRAVENOUS at 10:25

## 2019-12-09 RX ADMIN — LIDOCAINE HYDROCHLORIDE 1 ML: 10 INJECTION, SOLUTION EPIDURAL; INFILTRATION; INTRACAUDAL; PERINEURAL at 10:10

## 2019-12-09 RX ADMIN — FENTANYL CITRATE 50 MCG: 50 INJECTION, SOLUTION INTRAMUSCULAR; INTRAVENOUS at 10:11

## 2019-12-09 RX ADMIN — VERAPAMIL HYDROCHLORIDE 2.5 MG: 2.5 INJECTION INTRAVENOUS at 10:14

## 2019-12-09 NOTE — INTERVAL H&P NOTE
H&P reviewed  After examining the patient, I find no changed to the H&P since it had been written  Patient re-evaluated   Accept as history and physical   /84 (BP Location: Right arm)   Pulse 86   Temp (!) 97 4 °F (36 3 °C) (Oral)   Resp 18   Ht 5' 10" (1 778 m)   Wt 80 7 kg (178 lb)   SpO2 98%   BMI 25 54 kg/m²     Tanya Gant, DO/December 9, 2019/9:43 AM

## 2019-12-09 NOTE — CONSULTS
Consultation - Cardiothoracic Surgery   Debbie No 69 y o  male MRN: [id number]  Unit/Bed#: [facility] [place] [facility] Encounter: [id number]    Physician Requesting Consult: Gris Vega MD    Reason for Consult / Principal Problem: MV CAD    Inpatient consult to Cardiothoracic Surgery  Consult performed by: Kumar Tierney PA-C  Consult ordered by: Harinder Flynn DO        History of Present Illness: Debbie No is a 69y o  year old male with a PMH of HTN, HLD, DM2, and diabetic retinopathy who presented to [facility] for elective cardiac cath for evaluation of stable exertional angina after a stress test demonstrated septal and inferior ischemia of a moderate degree  Patient noted exertional angina developing over the past several months  He notices this when he walks up an incline, especially in the cold whether  He describes it as a chest tightness that does not radiate and gets better with rest  He denies any associated symptoms such as diaphoreses, N/V, SOB, cough, LE edema, lightheadedness, or syncope  He has no cardiac history  MV CAD was found on cardiac cath  Cardiac surgery has been consulted to evaluate patient for possible CABG  He lives with his wife, drives, performs ADLs independently, is very physically active on a daily basis, and ambulates without assistance  He has a family history significant for heart disease, including both parents  He does not use tobacco products  He had a CT chest in the past which revealed an incidental finding of 43 mm ascending aortic aneurysm  There was no follow up   He sees Dr Carmen Blackman as his PCP and Dr Luther Xiong as his cardiologist     Past Medical History:  Past Medical History:   Diagnosis Date    Chronic cough     RESOLVED: [id number]    Diabetes mellitus ([facility] [address] )     [facility]abetic retinopathy ([facility] )     HLD (hyperlipidemia)     HTN (hypertension)          Past Surgical History:   Past Surgical History:   Procedure Laterality Date    CARDIAC CATHETERIZATION  12/09/2019    HAND SURGERY           Family History:  Family History   Problem Relation Age of Onset    Heart failure Mother     Heart failure Father     Heart disease Father     Heart attack Father     Diabetes Other          Social History:  Social History     Substance and Sexual Activity   Alcohol Use Yes    Comment: occasional     Social History     Substance and Sexual Activity   Drug Use No     Social History     Tobacco Use   Smoking Status Never Smoker   Smokeless Tobacco Never Used     Marital Status: /Civil Union      Home Medications:   Prior to Admission medications    Medication Sig Start Date End Date Taking?  Authorizing Provider   aspirin 81 mg chewable tablet Chew 81 mg daily   Yes Historical Provider, MD   amLODIPine (NORVASC) 10 mg tablet Take 1 tablet (10 mg total) by mouth daily 11/21/19   Raghav Brewster MD   atorvastatin (LIPITOR) 40 mg tablet Take 1 tablet (40 mg total) by mouth daily 12/9/19   Jillian Hardy DO   Coenzyme Q10 (COQ10) 100 MG CAPS Take 1 capsule by mouth daily    Historical Provider, MD   Glucosamine-Chondroitin 750-600 MG TABS Take 1 tablet by mouth daily    Historical Provider, MD   hydrochlorothiazide (HYDRODIURIL) 25 mg tablet Take 1 tablet (25 mg total) by mouth daily 11/21/19   Raghav Brewster MD   metFORMIN (GLUCOPHAGE) 1000 MG tablet Take 1 tablet (1,000 mg total) by mouth 2 (two) times a day 11/21/19   Raghav Brewster MD   Omega-3 Fatty Acids (FISH OIL) 645 MG CAPS Take 1 capsule by mouth 2 (two) times a day    Historical Provider, MD   simvastatin (ZOCOR) 40 mg tablet Take 1 tablet (40 mg total) by mouth daily 11/21/19   Raghav Brewster MD       Inpatient Medications:  Scheduled Meds:   Current Facility-Administered Medications:  sodium chloride 100 mL/hr Intravenous Continuous KONSTANTIN Contreras Last Rate: Stopped (12/09/19 1051)   sodium chloride 100 mL/hr Intravenous Continuous Jillian Hardy DO Last Rate: 100 mL/hr (12/09/19 1051)     Continuous Infusions: sodium chloride 100 mL/hr Last Rate: Stopped (12/09/19 1051)   sodium chloride 100 mL/hr Last Rate: 100 mL/hr (12/09/19 1051)     PRN Meds:       Allergies: Allergies   Allergen Reactions    Pollen Extract        Review of Systems:  Review of Systems   Constitutional: Negative for activity change, chills, fatigue and fever  HENT: Negative for facial swelling, nosebleeds and trouble swallowing  Eyes: Negative for pain and visual disturbance  Respiratory: Positive for chest tightness and shortness of breath  Negative for cough and wheezing  Cardiovascular: Positive for chest pain  Negative for palpitations and leg swelling  Gastrointestinal: Negative for abdominal pain, nausea and vomiting  Genitourinary: Negative for dysuria, flank pain and hematuria  Musculoskeletal: Negative for arthralgias, gait problem and joint swelling  Skin: Negative for color change and pallor  Neurological: Negative for seizures, syncope and numbness  Hematological: Negative for adenopathy  Does not bruise/bleed easily  Psychiatric/Behavioral: Negative for agitation, behavioral problems and confusion  Vital Signs:     Vitals:    12/09/19 0702 12/09/19 1100 12/09/19 1130   BP: 170/84 116/63 114/61   BP Location: Right arm     Pulse: 86 72 68   Resp: 18     Temp: (!) 97 4 °F (36 3 °C)     TempSrc: Oral     SpO2: 98%     Weight: 80 7 kg (178 lb)     Height: 5' 10" (1 778 m)       Invasive Devices     Peripheral Intravenous Line            Peripheral IV 12/09/19 Left Forearm less than 1 day                Physical Exam:  Physical Exam   Constitutional: He is oriented to person, place, and time  He appears well-developed and well-nourished  No distress  HENT:   Head: Normocephalic and atraumatic  Right Ear: External ear normal    Left Ear: External ear normal    Nose: Nose normal    Eyes: Right eye exhibits no discharge  Left eye exhibits no discharge  Neck: Neck supple  No JVD present     Cardiovascular: Normal rate and regular rhythm  Exam reveals no friction rub  No murmur heard  Pulmonary/Chest: Effort normal and breath sounds normal  He has no wheezes  He has no rales  Abdominal: Soft  Bowel sounds are normal  He exhibits no distension  There is no tenderness  Musculoskeletal: He exhibits no edema or deformity  Neurological: He is alert and oriented to person, place, and time  Skin: Skin is warm and dry  Capillary refill takes less than 2 seconds  No rash noted  He is not diaphoretic  No erythema  Psychiatric: He has a normal mood and affect  His behavior is normal        Lab Results:     Results from last 7 days   Lab Units 12/09/19  0743 12/03/19  1457   WBC Thousand/uL 6 43 8 56   HEMOGLOBIN g/dL 15 3 16 2   HEMATOCRIT % 44 9 48 1   PLATELETS Thousands/uL 159 217     Results from last 7 days   Lab Units 12/09/19  0743 12/03/19  1457   POTASSIUM mmol/L 3 8 3 6   CHLORIDE mmol/L 105 98*   CO2 mmol/L 32 33*   BUN mg/dL 18 20   CREATININE mg/dL 1 00 1 14   CALCIUM mg/dL 9 5 9 8     Results from last 7 days   Lab Units 12/09/19  0743 12/03/19  1457   INR  1 07 0 98     Lab Results   Component Value Date    HGBA1C 6 6 (H) 11/15/2019     No results found for: CKTOTAL, CKMB, CKMBINDEX, TROPONINI    Imaging Studies:     Cardiac Catheterization:   CORONARY CIRCULATION:  Proximal LAD: There was a tubular 90 % stenosis  Mid LAD: There was a discrete 99 % stenosis at the origin of D2   1st diagonal: There was a 90 % stenosis  Proximal RCA: There was a 100 % stenosis  This lesion is a chronic total occlusion  CT chest: 2/22/2019  IMPRESSION:  No active pulmonary disease  Stable benign right upper lobe nodules  Mild ascending aortic aneurysm, essentially unchanged since the prior study  I have personally reviewed pertinent films in PACS    Assessment:  Active Problems:    * No active hospital problems  *    Severe coronary artery disease;  Ongoing CABG workup    Plan:  Risks and benefits of coronary artery bypass grafting were discussed in detail today with the patient  They understand and wish to proceed with further workup and ultimately surgical intervention  We have ordered routine preoperative laboratory and vascular studies  Pending the results of these tests, they will be scheduled for surgery Friday, 12/13 with Dr Joselin De La Rosa, LISA Matthew was comfortable with our recommendations, and their questions were answered to their satisfaction  They will be discharged home today and following up in our office on Thursday, 12/12 at 10am, to receive pre-operative instructions  Thank you for allowing us to participate in the care of this patient       SIGNATURE: Ranjeet White PA-C  DATE: December 9, 2019  TIME: 12:16 PM

## 2019-12-09 NOTE — DISCHARGE INSTRUCTIONS
In preparation for your cardiac operation, please discontinue taking any supplemental medication including Fish Oil, CoQ10, and Glucosamine  Also, discontinue taking Metformin leading up to your operation  You will be instructed on when to resume these medications after your operation  1  Please see the post cardiac catheterization dishcarge instructions  No heavy lifting, greater than 10 lbs  or strenuous  activity for until after surgery    2  Remove band aid tomorrow  Shower and wash area- wrist gently with soap and water- beginning tomorrow  Rinse and pat dry  Apply new water seal band aid  Repeat this process for 5 days  No powders, creams lotions or antibiotic ointments  for 5 days  No tub baths, hot tubs or swimming for 5 days  3  Please call our office ([phone]) if you have any fever, redness, swelling, discharge from your wrist access site  4 No driving for 1 day    Left Heart Catheterization   WHAT YOU NEED TO KNOW:   A left heart catheterization is a procedure to look at your heart and its arteries  You may need this procedure if you have chest pain, heart disease, or your heart is not working as it should  WHILE YOU ARE HERE:   Before your procedure:   · Informed consent  is a legal document that explains the tests, treatments, or procedures that you may need  Informed consent means you understand what will be done and can make decisions about what you want  You give your permission when you sign the consent form  You can have someone sign this form for you if you are not able to sign it  You have the right to understand your medical care in words you know  Before you sign the consent form, understand the risks and benefits of what will be done  Make sure all your questions are answered  · An IV  is a small tube placed in your vein that is used to give you medicine or liquids       · Medicines:      ¨ Antihistamines  help prevent a reaction to the dye used during your heart catheterization  ¨ A sedative  is given to help you stay calm and relaxed  ¨ Steroids  decrease inflammation and help prevent a reaction to the contrast dye  · Local anesthesia  is a shot of medicine put into your arm or leg  It is used to numb the area and dull the pain  You may still feel pressure or pushing during the procedure  During your procedure:   · Your healthcare provider will insert a catheter into an artery in your arm or leg  An x-ray will be used to carefully guide the catheter to your heart  He will inject a dye so he can see the blood vessels, muscle, or valves of your heart more clearly  You may get a warm feeling or slight nausea right after the dye is injected  This is normal, and will pass quickly  Your healthcare provider may remove a small sample of heart tissue and send it to a lab for testing  He may also open a narrow or blocked heart valve or artery  A stent (small tube) may be left inside your artery to hold it open  · The catheter may be left in place to monitor pressure in your heart  When the catheter is removed, a healthcare provider will apply pressure to the site for at least 30 minutes to help decrease the risk of bleeding  A collagen plug or other closure devices may be used to close the site  Healthcare providers will cover the site with a pressure bandage to decrease further bleeding  After your procedure: You will be taken to a room to rest until you are fully awake  Healthcare providers will monitor you closely for any problems  Do not get out of bed until your healthcare provider says it is okay  When your healthcare provider sees that you are okay, you will be taken to your hospital room  You may need to lie flat and keep your arm or leg straight for several hours  Arm or leg movement too soon can cause serious bleeding  Healthcare providers may ask you to drink more liquids to help flush the dye out of your body   If the catheter was in your groin and you need to cough, apply pressure over the area with your hands as directed  RISKS:   During the procedure, the catheter may tear an artery and cause bleeding  An air bubble may enter your lung, or your lung may collapse  You may have a heart attack  After the procedure, you may have bleeding or an infection  You may have damage to a heart valve, or a fistula (abnormal opening) may form between an artery and vein  You may have irregular heartbeats, which may cause dizziness or fainting  You may get a blood clot in your leg or arm  Without this procedure, your condition may get worse  These problems may become life-threatening  CARE AGREEMENT:   You have the right to help plan your care  Learn about your health condition and how it may be treated  Discuss treatment options with your caregivers to decide what care you want to receive  You always have the right to refuse treatment  © 2017 [address] Information is for End User's use only and may not be sold, redistributed or otherwise used for commercial purposes  All illustrations and images included in CareNotes® are the copyrighted property of [employer]  or Michael [employer]  The above information is an  only  It is not intended as medical advice for individual conditions or treatments  Talk to your doctor, nurse or pharmacist before following any medical regimen to see if it is safe and effective for you

## 2019-12-10 ENCOUNTER — APPOINTMENT (OUTPATIENT)
Dept: LAB | Facility: CLINIC | Age: 74
End: 2019-12-10
Payer: MEDICARE

## 2019-12-10 DIAGNOSIS — E11.9 TYPE 2 DIABETES MELLITUS (HCC): ICD-10-CM

## 2019-12-10 LAB
ANION GAP SERPL CALCULATED.3IONS-SCNC: 7 MMOL/L (ref 4–13)
BUN SERPL-MCNC: 12 MG/DL (ref 5–25)
CALCIUM SERPL-MCNC: 9.5 MG/DL (ref 8.3–10.1)
CHLORIDE SERPL-SCNC: 106 MMOL/L (ref 100–108)
CO2 SERPL-SCNC: 30 MMOL/L (ref 21–32)
CREAT SERPL-MCNC: 0.99 MG/DL (ref 0.6–1.3)
GFR SERPL CREATININE-BSD FRML MDRD: 76 ML/MIN/1.73SQ M
GLUCOSE P FAST SERPL-MCNC: 165 MG/DL (ref 65–99)
MRSA NOSE QL CULT: NORMAL
POTASSIUM SERPL-SCNC: 4.3 MMOL/L (ref 3.5–5.3)
SODIUM SERPL-SCNC: 143 MMOL/L (ref 136–145)

## 2019-12-10 PROCEDURE — 36415 COLL VENOUS BLD VENIPUNCTURE: CPT

## 2019-12-10 PROCEDURE — 80048 BASIC METABOLIC PNL TOTAL CA: CPT

## 2019-12-11 PROBLEM — I25.119 ATHEROSCLEROSIS OF NATIVE CORONARY ARTERY WITH ANGINA PECTORIS (HCC): Status: ACTIVE | Noted: 2019-12-11

## 2019-12-11 PROCEDURE — 1123F ACP DISCUSS/DSCN MKR DOCD: CPT | Performed by: INTERNAL MEDICINE

## 2019-12-12 LAB
ABO GROUP BLD: NORMAL
BLD GP AB SCN SERPL QL: NEGATIVE
RH BLD: POSITIVE
SPECIMEN EXPIRATION DATE: NORMAL

## 2019-12-12 RX ORDER — HEPARIN SODIUM 1000 [USP'U]/ML
400 INJECTION, SOLUTION INTRAVENOUS; SUBCUTANEOUS ONCE
Status: CANCELLED | OUTPATIENT
Start: 2019-12-13

## 2019-12-12 RX ORDER — HEPARIN SODIUM 1000 [USP'U]/ML
10000 INJECTION, SOLUTION INTRAVENOUS; SUBCUTANEOUS ONCE
Status: CANCELLED | OUTPATIENT
Start: 2019-12-13

## 2019-12-13 ENCOUNTER — ANESTHESIA (INPATIENT)
Dept: PERIOP | Facility: HOSPITAL | Age: 74
DRG: 235 | End: 2019-12-13
Payer: MEDICARE

## 2019-12-13 ENCOUNTER — APPOINTMENT (INPATIENT)
Dept: RADIOLOGY | Facility: HOSPITAL | Age: 74
DRG: 235 | End: 2019-12-13
Payer: MEDICARE

## 2019-12-13 ENCOUNTER — APPOINTMENT (OUTPATIENT)
Dept: NON INVASIVE DIAGNOSTICS | Facility: HOSPITAL | Age: 74
DRG: 235 | End: 2019-12-13
Payer: MEDICARE

## 2019-12-13 ENCOUNTER — ANESTHESIA EVENT (INPATIENT)
Dept: PERIOP | Facility: HOSPITAL | Age: 74
DRG: 235 | End: 2019-12-13
Payer: MEDICARE

## 2019-12-13 ENCOUNTER — HOSPITAL ENCOUNTER (INPATIENT)
Facility: HOSPITAL | Age: 74
LOS: 4 days | Discharge: HOME HEALTH | DRG: 235 | End: 2019-12-17
Attending: THORACIC SURGERY (CARDIOTHORACIC VASCULAR SURGERY) | Admitting: THORACIC SURGERY (CARDIOTHORACIC VASCULAR SURGERY)
Payer: MEDICARE

## 2019-12-13 DIAGNOSIS — E11.9 TYPE 2 DIABETES MELLITUS (HCC): Chronic | ICD-10-CM

## 2019-12-13 DIAGNOSIS — Z95.1 S/P CABG (CORONARY ARTERY BYPASS GRAFT): Primary | ICD-10-CM

## 2019-12-13 DIAGNOSIS — Z95.1 S/P CABG X 4: ICD-10-CM

## 2019-12-13 DIAGNOSIS — I25.10 CAD IN NATIVE ARTERY: ICD-10-CM

## 2019-12-13 PROBLEM — E11.319 DIABETIC RETINOPATHY (HCC): Chronic | Status: ACTIVE | Noted: 2017-05-26

## 2019-12-13 LAB
ABO GROUP BLD: NORMAL
ANION GAP SERPL CALCULATED.3IONS-SCNC: 6 MMOL/L (ref 4–13)
APTT PPP: 36 SECONDS (ref 23–37)
BASE EXCESS BLDA CALC-SCNC: 0 MMOL/L (ref -2–3)
BASE EXCESS BLDA CALC-SCNC: 1 MMOL/L (ref -2–3)
BASE EXCESS BLDA CALC-SCNC: 2 MMOL/L (ref -2–3)
BASE EXCESS BLDA CALC-SCNC: 4 MMOL/L (ref -2–3)
BASE EXCESS BLDA CALC-SCNC: 5 MMOL/L (ref -2–3)
BLD GP AB SCN SERPL QL: NEGATIVE
BUN SERPL-MCNC: 12 MG/DL (ref 5–25)
CA-I BLD-SCNC: 1.03 MMOL/L (ref 1.12–1.32)
CA-I BLD-SCNC: 1.06 MMOL/L (ref 1.12–1.32)
CA-I BLD-SCNC: 1.12 MMOL/L (ref 1.12–1.32)
CA-I BLD-SCNC: 1.16 MMOL/L (ref 1.12–1.32)
CA-I BLD-SCNC: 1.2 MMOL/L (ref 1.12–1.32)
CA-I BLD-SCNC: 1.21 MMOL/L (ref 1.12–1.32)
CA-I BLD-SCNC: 1.23 MMOL/L (ref 1.12–1.32)
CALCIUM SERPL-MCNC: 7.6 MG/DL (ref 8.3–10.1)
CHLORIDE SERPL-SCNC: 115 MMOL/L (ref 100–108)
CO2 SERPL-SCNC: 25 MMOL/L (ref 21–32)
CREAT SERPL-MCNC: 0.79 MG/DL (ref 0.6–1.3)
FIBRINOGEN PPP-MCNC: 166 MG/DL (ref 227–495)
FIO2 GAS DIL.REBREATH: 70 L
GFR SERPL CREATININE-BSD FRML MDRD: 90 ML/MIN/1.73SQ M
GLUCOSE SERPL-MCNC: 100 MG/DL (ref 65–140)
GLUCOSE SERPL-MCNC: 103 MG/DL (ref 65–140)
GLUCOSE SERPL-MCNC: 114 MG/DL (ref 65–140)
GLUCOSE SERPL-MCNC: 134 MG/DL (ref 65–140)
GLUCOSE SERPL-MCNC: 147 MG/DL (ref 65–140)
GLUCOSE SERPL-MCNC: 156 MG/DL (ref 65–140)
GLUCOSE SERPL-MCNC: 157 MG/DL (ref 65–140)
GLUCOSE SERPL-MCNC: 158 MG/DL (ref 65–140)
GLUCOSE SERPL-MCNC: 176 MG/DL (ref 65–140)
GLUCOSE SERPL-MCNC: 177 MG/DL (ref 65–140)
GLUCOSE SERPL-MCNC: 200 MG/DL (ref 65–140)
GLUCOSE SERPL-MCNC: 84 MG/DL (ref 65–140)
HCO3 BLDA-SCNC: 25 MMOL/L (ref 22–28)
HCO3 BLDA-SCNC: 25.3 MMOL/L (ref 22–28)
HCO3 BLDA-SCNC: 28.4 MMOL/L (ref 24–30)
HCO3 BLDA-SCNC: 28.8 MMOL/L (ref 22–28)
HCO3 BLDA-SCNC: 28.9 MMOL/L (ref 22–28)
HCO3 BLDA-SCNC: 30.6 MMOL/L (ref 22–28)
HCO3 BLDA-SCNC: 31.2 MMOL/L (ref 22–28)
HCT VFR BLD AUTO: 33.5 % (ref 36.5–49.3)
HCT VFR BLD CALC: 26 % (ref 36.5–49.3)
HCT VFR BLD CALC: 27 % (ref 36.5–49.3)
HCT VFR BLD CALC: 30 % (ref 36.5–49.3)
HCT VFR BLD CALC: 30 % (ref 36.5–49.3)
HCT VFR BLD CALC: 31 % (ref 36.5–49.3)
HCT VFR BLD CALC: 34 % (ref 36.5–49.3)
HCT VFR BLD CALC: 37 % (ref 36.5–49.3)
HGB BLD-MCNC: 11.7 G/DL (ref 12–17)
HGB BLDA-MCNC: 10.2 G/DL (ref 12–17)
HGB BLDA-MCNC: 10.2 G/DL (ref 12–17)
HGB BLDA-MCNC: 10.5 G/DL (ref 12–17)
HGB BLDA-MCNC: 11.6 G/DL (ref 12–17)
HGB BLDA-MCNC: 12.6 G/DL (ref 12–17)
HGB BLDA-MCNC: 8.8 G/DL (ref 12–17)
HGB BLDA-MCNC: 9.2 G/DL (ref 12–17)
INR PPP: 1.6 (ref 0.84–1.19)
KCT BLD-ACNC: 121 SEC (ref 89–137)
KCT BLD-ACNC: 121 SEC (ref 89–137)
KCT BLD-ACNC: 127 SEC (ref 89–137)
KCT BLD-ACNC: 424 SEC (ref 89–137)
KCT BLD-ACNC: 467 SEC (ref 89–137)
KCT BLD-ACNC: 479 SEC (ref 89–137)
KCT BLD-ACNC: 497 SEC (ref 89–137)
KCT BLD-ACNC: 543 SEC (ref 89–137)
PCO2 BLD: 26 MMOL/L (ref 21–32)
PCO2 BLD: 26 MMOL/L (ref 21–32)
PCO2 BLD: 30 MMOL/L (ref 21–32)
PCO2 BLD: 32 MMOL/L (ref 21–32)
PCO2 BLD: 33 MMOL/L (ref 21–32)
PCO2 BLD: 38.5 MM HG (ref 36–44)
PCO2 BLD: 39 MM HG (ref 36–44)
PCO2 BLD: 41.6 MM HG (ref 36–44)
PCO2 BLD: 42.3 MM HG (ref 36–44)
PCO2 BLD: 52.6 MM HG (ref 36–44)
PCO2 BLD: 55.9 MM HG (ref 42–50)
PCO2 BLD: 59.8 MM HG (ref 36–44)
PH BLD: 7.31 [PH] (ref 7.3–7.4)
PH BLD: 7.33 [PH] (ref 7.35–7.45)
PH BLD: 7.37 [PH] (ref 7.35–7.45)
PH BLD: 7.41 [PH] (ref 7.35–7.45)
PH BLD: 7.42 [PH] (ref 7.35–7.45)
PH BLD: 7.44 [PH] (ref 7.35–7.45)
PH BLD: 7.45 [PH] (ref 7.35–7.45)
PLATELET # BLD AUTO: 101 THOUSANDS/UL (ref 149–390)
PLATELET # BLD AUTO: 106 THOUSANDS/UL (ref 149–390)
PLATELET # BLD AUTO: 172 THOUSANDS/UL (ref 149–390)
PMV BLD AUTO: 9.3 FL (ref 8.9–12.7)
PMV BLD AUTO: 9.3 FL (ref 8.9–12.7)
PMV BLD AUTO: 9.6 FL (ref 8.9–12.7)
PO2 BLD: 264 MM HG (ref 75–129)
PO2 BLD: 287 MM HG (ref 75–129)
PO2 BLD: 366 MM HG (ref 75–129)
PO2 BLD: 50 MM HG (ref 35–45)
PO2 BLD: >400 MM HG (ref 75–129)
POTASSIUM BLD-SCNC: 3.1 MMOL/L (ref 3.5–5.3)
POTASSIUM BLD-SCNC: 3.3 MMOL/L (ref 3.5–5.3)
POTASSIUM BLD-SCNC: 3.5 MMOL/L (ref 3.5–5.3)
POTASSIUM BLD-SCNC: 3.5 MMOL/L (ref 3.5–5.3)
POTASSIUM BLD-SCNC: 3.7 MMOL/L (ref 3.5–5.3)
POTASSIUM BLD-SCNC: 3.9 MMOL/L (ref 3.5–5.3)
POTASSIUM BLD-SCNC: 4.4 MMOL/L (ref 3.5–5.3)
POTASSIUM SERPL-SCNC: 3.2 MMOL/L (ref 3.5–5.3)
POTASSIUM SERPL-SCNC: 3.7 MMOL/L (ref 3.5–5.3)
PROTHROMBIN TIME: 18.6 SECONDS (ref 11.6–14.5)
RH BLD: POSITIVE
SAO2 % BLD FROM PO2: 100 % (ref 60–85)
SAO2 % BLD FROM PO2: 81 % (ref 60–85)
SODIUM BLD-SCNC: 139 MMOL/L (ref 136–145)
SODIUM BLD-SCNC: 140 MMOL/L (ref 136–145)
SODIUM BLD-SCNC: 142 MMOL/L (ref 136–145)
SODIUM BLD-SCNC: 143 MMOL/L (ref 136–145)
SODIUM BLD-SCNC: 143 MMOL/L (ref 136–145)
SODIUM SERPL-SCNC: 146 MMOL/L (ref 136–145)
SPECIMEN SOURCE: ABNORMAL
SPECIMEN SOURCE: NORMAL

## 2019-12-13 PROCEDURE — 94760 N-INVAS EAR/PLS OXIMETRY 1: CPT

## 2019-12-13 PROCEDURE — P9012 CRYOPRECIPITATE EACH UNIT: HCPCS

## 2019-12-13 PROCEDURE — 86850 RBC ANTIBODY SCREEN: CPT | Performed by: THORACIC SURGERY (CARDIOTHORACIC VASCULAR SURGERY)

## 2019-12-13 PROCEDURE — 84132 ASSAY OF SERUM POTASSIUM: CPT

## 2019-12-13 PROCEDURE — 30233M1 TRANSFUSION OF NONAUTOLOGOUS PLASMA CRYOPRECIPITATE INTO PERIPHERAL VEIN, PERCUTANEOUS APPROACH: ICD-10-PCS | Performed by: THORACIC SURGERY (CARDIOTHORACIC VASCULAR SURGERY)

## 2019-12-13 PROCEDURE — P9037 PLATE PHERES LEUKOREDU IRRAD: HCPCS

## 2019-12-13 PROCEDURE — 99233 SBSQ HOSP IP/OBS HIGH 50: CPT | Performed by: PHYSICIAN ASSISTANT

## 2019-12-13 PROCEDURE — 93005 ELECTROCARDIOGRAM TRACING: CPT

## 2019-12-13 PROCEDURE — 82330 ASSAY OF CALCIUM: CPT

## 2019-12-13 PROCEDURE — 85014 HEMATOCRIT: CPT

## 2019-12-13 PROCEDURE — 82947 ASSAY GLUCOSE BLOOD QUANT: CPT

## 2019-12-13 PROCEDURE — 93312 ECHO TRANSESOPHAGEAL: CPT

## 2019-12-13 PROCEDURE — 84132 ASSAY OF SERUM POTASSIUM: CPT | Performed by: PHYSICIAN ASSISTANT

## 2019-12-13 PROCEDURE — 30233N0 TRANSFUSION OF AUTOLOGOUS RED BLOOD CELLS INTO PERIPHERAL VEIN, PERCUTANEOUS APPROACH: ICD-10-PCS | Performed by: THORACIC SURGERY (CARDIOTHORACIC VASCULAR SURGERY)

## 2019-12-13 PROCEDURE — 85018 HEMOGLOBIN: CPT | Performed by: PHYSICIAN ASSISTANT

## 2019-12-13 PROCEDURE — 71045 X-RAY EXAM CHEST 1 VIEW: CPT

## 2019-12-13 PROCEDURE — 33519 CABG ARTERY-VEIN THREE: CPT | Performed by: THORACIC SURGERY (CARDIOTHORACIC VASCULAR SURGERY)

## 2019-12-13 PROCEDURE — 85049 AUTOMATED PLATELET COUNT: CPT | Performed by: THORACIC SURGERY (CARDIOTHORACIC VASCULAR SURGERY)

## 2019-12-13 PROCEDURE — 82948 REAGENT STRIP/BLOOD GLUCOSE: CPT

## 2019-12-13 PROCEDURE — 86923 COMPATIBILITY TEST ELECTRIC: CPT

## 2019-12-13 PROCEDURE — 5A1223Z PERFORMANCE OF CARDIAC PACING, CONTINUOUS: ICD-10-PCS | Performed by: THORACIC SURGERY (CARDIOTHORACIC VASCULAR SURGERY)

## 2019-12-13 PROCEDURE — 80048 BASIC METABOLIC PNL TOTAL CA: CPT | Performed by: PHYSICIAN ASSISTANT

## 2019-12-13 PROCEDURE — 86900 BLOOD TYPING SEROLOGIC ABO: CPT | Performed by: THORACIC SURGERY (CARDIOTHORACIC VASCULAR SURGERY)

## 2019-12-13 PROCEDURE — 85610 PROTHROMBIN TIME: CPT | Performed by: THORACIC SURGERY (CARDIOTHORACIC VASCULAR SURGERY)

## 2019-12-13 PROCEDURE — 02100Z9 BYPASS CORONARY ARTERY, ONE ARTERY FROM LEFT INTERNAL MAMMARY, OPEN APPROACH: ICD-10-PCS | Performed by: THORACIC SURGERY (CARDIOTHORACIC VASCULAR SURGERY)

## 2019-12-13 PROCEDURE — 84295 ASSAY OF SERUM SODIUM: CPT

## 2019-12-13 PROCEDURE — 85049 AUTOMATED PLATELET COUNT: CPT | Performed by: PHYSICIAN ASSISTANT

## 2019-12-13 PROCEDURE — P9035 PLATELET PHERES LEUKOREDUCED: HCPCS

## 2019-12-13 PROCEDURE — 85384 FIBRINOGEN ACTIVITY: CPT | Performed by: THORACIC SURGERY (CARDIOTHORACIC VASCULAR SURGERY)

## 2019-12-13 PROCEDURE — 021209W BYPASS CORONARY ARTERY, THREE ARTERIES FROM AORTA WITH AUTOLOGOUS VENOUS TISSUE, OPEN APPROACH: ICD-10-PCS | Performed by: THORACIC SURGERY (CARDIOTHORACIC VASCULAR SURGERY)

## 2019-12-13 PROCEDURE — 85730 THROMBOPLASTIN TIME PARTIAL: CPT | Performed by: THORACIC SURGERY (CARDIOTHORACIC VASCULAR SURGERY)

## 2019-12-13 PROCEDURE — 33533 CABG ARTERIAL SINGLE: CPT | Performed by: THORACIC SURGERY (CARDIOTHORACIC VASCULAR SURGERY)

## 2019-12-13 PROCEDURE — 30233R1 TRANSFUSION OF NONAUTOLOGOUS PLATELETS INTO PERIPHERAL VEIN, PERCUTANEOUS APPROACH: ICD-10-PCS | Performed by: THORACIC SURGERY (CARDIOTHORACIC VASCULAR SURGERY)

## 2019-12-13 PROCEDURE — 33508 ENDOSCOPIC VEIN HARVEST: CPT | Performed by: THORACIC SURGERY (CARDIOTHORACIC VASCULAR SURGERY)

## 2019-12-13 PROCEDURE — 06BQ4ZZ EXCISION OF LEFT SAPHENOUS VEIN, PERCUTANEOUS ENDOSCOPIC APPROACH: ICD-10-PCS | Performed by: THORACIC SURGERY (CARDIOTHORACIC VASCULAR SURGERY)

## 2019-12-13 PROCEDURE — 85014 HEMATOCRIT: CPT | Performed by: PHYSICIAN ASSISTANT

## 2019-12-13 PROCEDURE — 86901 BLOOD TYPING SEROLOGIC RH(D): CPT | Performed by: THORACIC SURGERY (CARDIOTHORACIC VASCULAR SURGERY)

## 2019-12-13 PROCEDURE — 94002 VENT MGMT INPAT INIT DAY: CPT

## 2019-12-13 PROCEDURE — 82803 BLOOD GASES ANY COMBINATION: CPT

## 2019-12-13 PROCEDURE — 5A1221Z PERFORMANCE OF CARDIAC OUTPUT, CONTINUOUS: ICD-10-PCS | Performed by: THORACIC SURGERY (CARDIOTHORACIC VASCULAR SURGERY)

## 2019-12-13 PROCEDURE — 85347 COAGULATION TIME ACTIVATED: CPT

## 2019-12-13 DEVICE — MARKER CORONARY BYPASS VOSS GRAFT: Type: IMPLANTABLE DEVICE | Site: AORTA | Status: FUNCTIONAL

## 2019-12-13 RX ORDER — BISACODYL 10 MG
10 SUPPOSITORY, RECTAL RECTAL DAILY PRN
Status: DISCONTINUED | OUTPATIENT
Start: 2019-12-13 | End: 2019-12-17 | Stop reason: HOSPADM

## 2019-12-13 RX ORDER — ATORVASTATIN CALCIUM 80 MG/1
80 TABLET, FILM COATED ORAL
Status: DISCONTINUED | OUTPATIENT
Start: 2019-12-13 | End: 2019-12-17 | Stop reason: HOSPADM

## 2019-12-13 RX ORDER — CEFAZOLIN SODIUM 2 G/50ML
SOLUTION INTRAVENOUS AS NEEDED
Status: DISCONTINUED | OUTPATIENT
Start: 2019-12-13 | End: 2019-12-13 | Stop reason: SURG

## 2019-12-13 RX ORDER — MAGNESIUM HYDROXIDE 1200 MG/15ML
LIQUID ORAL AS NEEDED
Status: DISCONTINUED | OUTPATIENT
Start: 2019-12-13 | End: 2019-12-13 | Stop reason: HOSPADM

## 2019-12-13 RX ORDER — POTASSIUM CHLORIDE 14.9 MG/ML
INJECTION INTRAVENOUS CONTINUOUS PRN
Status: DISCONTINUED | OUTPATIENT
Start: 2019-12-13 | End: 2019-12-13 | Stop reason: SURG

## 2019-12-13 RX ORDER — ALBUMIN, HUMAN INJ 5% 5 %
SOLUTION INTRAVENOUS CONTINUOUS PRN
Status: DISCONTINUED | OUTPATIENT
Start: 2019-12-13 | End: 2019-12-13 | Stop reason: SURG

## 2019-12-13 RX ORDER — NEOSTIGMINE METHYLSULFATE 1 MG/ML
INJECTION INTRAVENOUS AS NEEDED
Status: DISCONTINUED | OUTPATIENT
Start: 2019-12-13 | End: 2019-12-13 | Stop reason: SURG

## 2019-12-13 RX ORDER — OXYCODONE HYDROCHLORIDE AND ACETAMINOPHEN 5; 325 MG/1; MG/1
2 TABLET ORAL EVERY 6 HOURS PRN
Status: DISCONTINUED | OUTPATIENT
Start: 2019-12-13 | End: 2019-12-17 | Stop reason: HOSPADM

## 2019-12-13 RX ORDER — VANCOMYCIN HYDROCHLORIDE 1 G/20ML
INJECTION, POWDER, LYOPHILIZED, FOR SOLUTION INTRAVENOUS AS NEEDED
Status: DISCONTINUED | OUTPATIENT
Start: 2019-12-13 | End: 2019-12-13 | Stop reason: HOSPADM

## 2019-12-13 RX ORDER — CHLORHEXIDINE GLUCONATE 0.12 MG/ML
15 RINSE ORAL ONCE
Status: COMPLETED | OUTPATIENT
Start: 2019-12-13 | End: 2019-12-13

## 2019-12-13 RX ORDER — ONDANSETRON 2 MG/ML
4 INJECTION INTRAMUSCULAR; INTRAVENOUS EVERY 6 HOURS PRN
Status: DISCONTINUED | OUTPATIENT
Start: 2019-12-13 | End: 2019-12-17 | Stop reason: HOSPADM

## 2019-12-13 RX ORDER — GLYCOPYRROLATE 0.2 MG/ML
INJECTION INTRAMUSCULAR; INTRAVENOUS AS NEEDED
Status: DISCONTINUED | OUTPATIENT
Start: 2019-12-13 | End: 2019-12-13 | Stop reason: SURG

## 2019-12-13 RX ORDER — CALCIUM CHLORIDE 100 MG/ML
1 INJECTION INTRAVENOUS; INTRAVENTRICULAR ONCE
Status: DISCONTINUED | OUTPATIENT
Start: 2019-12-13 | End: 2019-12-14

## 2019-12-13 RX ORDER — HYDROMORPHONE HCL/PF 1 MG/ML
1 SYRINGE (ML) INJECTION
Status: DISCONTINUED | OUTPATIENT
Start: 2019-12-13 | End: 2019-12-14

## 2019-12-13 RX ORDER — CEFAZOLIN SODIUM 2 G/50ML
2000 SOLUTION INTRAVENOUS EVERY 8 HOURS
Status: COMPLETED | OUTPATIENT
Start: 2019-12-14 | End: 2019-12-14

## 2019-12-13 RX ORDER — OXYCODONE HYDROCHLORIDE AND ACETAMINOPHEN 5; 325 MG/1; MG/1
1 TABLET ORAL EVERY 4 HOURS PRN
Status: DISCONTINUED | OUTPATIENT
Start: 2019-12-13 | End: 2019-12-17 | Stop reason: HOSPADM

## 2019-12-13 RX ORDER — HEPARIN SODIUM 1000 [USP'U]/ML
INJECTION, SOLUTION INTRAVENOUS; SUBCUTANEOUS AS NEEDED
Status: DISCONTINUED | OUTPATIENT
Start: 2019-12-13 | End: 2019-12-13 | Stop reason: SURG

## 2019-12-13 RX ORDER — MAGNESIUM SULFATE HEPTAHYDRATE 40 MG/ML
2 INJECTION, SOLUTION INTRAVENOUS ONCE
Status: COMPLETED | OUTPATIENT
Start: 2019-12-13 | End: 2019-12-13

## 2019-12-13 RX ORDER — PANTOPRAZOLE SODIUM 40 MG/1
40 TABLET, DELAYED RELEASE ORAL
Status: DISCONTINUED | OUTPATIENT
Start: 2019-12-14 | End: 2019-12-17 | Stop reason: HOSPADM

## 2019-12-13 RX ORDER — CHLORHEXIDINE GLUCONATE 0.12 MG/ML
15 RINSE ORAL 2 TIMES DAILY
Status: DISCONTINUED | OUTPATIENT
Start: 2019-12-13 | End: 2019-12-14

## 2019-12-13 RX ORDER — PROTAMINE SULFATE 10 MG/ML
INJECTION, SOLUTION INTRAVENOUS AS NEEDED
Status: DISCONTINUED | OUTPATIENT
Start: 2019-12-13 | End: 2019-12-13 | Stop reason: SURG

## 2019-12-13 RX ORDER — POTASSIUM CHLORIDE 14.9 MG/ML
20 INJECTION INTRAVENOUS
Status: DISCONTINUED | OUTPATIENT
Start: 2019-12-13 | End: 2019-12-14

## 2019-12-13 RX ORDER — PROPOFOL 10 MG/ML
INJECTION, EMULSION INTRAVENOUS AS NEEDED
Status: DISCONTINUED | OUTPATIENT
Start: 2019-12-13 | End: 2019-12-13 | Stop reason: SURG

## 2019-12-13 RX ORDER — CEFAZOLIN SODIUM 2 G/50ML
2000 SOLUTION INTRAVENOUS ONCE
Status: DISCONTINUED | OUTPATIENT
Start: 2019-12-13 | End: 2019-12-13

## 2019-12-13 RX ORDER — MIDAZOLAM HYDROCHLORIDE 2 MG/2ML
INJECTION, SOLUTION INTRAMUSCULAR; INTRAVENOUS AS NEEDED
Status: DISCONTINUED | OUTPATIENT
Start: 2019-12-13 | End: 2019-12-13 | Stop reason: SURG

## 2019-12-13 RX ORDER — LIDOCAINE HYDROCHLORIDE 10 MG/ML
INJECTION, SOLUTION EPIDURAL; INFILTRATION; INTRACAUDAL; PERINEURAL
Status: COMPLETED | OUTPATIENT
Start: 2019-12-13 | End: 2019-12-13

## 2019-12-13 RX ORDER — FENTANYL CITRATE 50 UG/ML
50 INJECTION, SOLUTION INTRAMUSCULAR; INTRAVENOUS ONCE
Status: DISCONTINUED | OUTPATIENT
Start: 2019-12-13 | End: 2019-12-14

## 2019-12-13 RX ORDER — ROCURONIUM BROMIDE 10 MG/ML
INJECTION, SOLUTION INTRAVENOUS AS NEEDED
Status: DISCONTINUED | OUTPATIENT
Start: 2019-12-13 | End: 2019-12-13 | Stop reason: SURG

## 2019-12-13 RX ORDER — NITROGLYCERIN 20 MG/100ML
INJECTION INTRAVENOUS CONTINUOUS PRN
Status: DISCONTINUED | OUTPATIENT
Start: 2019-12-13 | End: 2019-12-13 | Stop reason: SURG

## 2019-12-13 RX ORDER — ACETAMINOPHEN 325 MG/1
650 TABLET ORAL EVERY 4 HOURS PRN
Status: DISCONTINUED | OUTPATIENT
Start: 2019-12-13 | End: 2019-12-17 | Stop reason: HOSPADM

## 2019-12-13 RX ORDER — POLYETHYLENE GLYCOL 3350 17 G/17G
17 POWDER, FOR SOLUTION ORAL DAILY
Status: DISCONTINUED | OUTPATIENT
Start: 2019-12-14 | End: 2019-12-17 | Stop reason: HOSPADM

## 2019-12-13 RX ORDER — ASPIRIN 325 MG
325 TABLET ORAL DAILY
Status: DISCONTINUED | OUTPATIENT
Start: 2019-12-13 | End: 2019-12-17 | Stop reason: HOSPADM

## 2019-12-13 RX ORDER — AMIODARONE HYDROCHLORIDE 200 MG/1
200 TABLET ORAL EVERY 8 HOURS SCHEDULED
Status: DISCONTINUED | OUTPATIENT
Start: 2019-12-13 | End: 2019-12-17 | Stop reason: HOSPADM

## 2019-12-13 RX ORDER — FUROSEMIDE 10 MG/ML
40 INJECTION INTRAMUSCULAR; INTRAVENOUS EVERY 6 HOURS PRN
Status: DISCONTINUED | OUTPATIENT
Start: 2019-12-13 | End: 2019-12-14

## 2019-12-13 RX ORDER — POTASSIUM CHLORIDE 14.9 MG/ML
20 INJECTION INTRAVENOUS
Status: COMPLETED | OUTPATIENT
Start: 2019-12-13 | End: 2019-12-14

## 2019-12-13 RX ORDER — SODIUM CHLORIDE 450 MG/100ML
20 INJECTION, SOLUTION INTRAVENOUS CONTINUOUS
Status: DISCONTINUED | OUTPATIENT
Start: 2019-12-13 | End: 2019-12-16

## 2019-12-13 RX ORDER — POTASSIUM CHLORIDE 14.9 MG/ML
20 INJECTION INTRAVENOUS ONCE AS NEEDED
Status: DISCONTINUED | OUTPATIENT
Start: 2019-12-13 | End: 2019-12-14

## 2019-12-13 RX ORDER — FENTANYL CITRATE 50 UG/ML
50 INJECTION, SOLUTION INTRAMUSCULAR; INTRAVENOUS
Status: DISCONTINUED | OUTPATIENT
Start: 2019-12-13 | End: 2019-12-14

## 2019-12-13 RX ORDER — FENTANYL CITRATE 50 UG/ML
INJECTION, SOLUTION INTRAMUSCULAR; INTRAVENOUS AS NEEDED
Status: DISCONTINUED | OUTPATIENT
Start: 2019-12-13 | End: 2019-12-13 | Stop reason: SURG

## 2019-12-13 RX ORDER — FONDAPARINUX SODIUM 2.5 MG/.5ML
2.5 INJECTION SUBCUTANEOUS DAILY
Status: DISCONTINUED | OUTPATIENT
Start: 2019-12-14 | End: 2019-12-17 | Stop reason: HOSPADM

## 2019-12-13 RX ORDER — HYDROMORPHONE HCL/PF 1 MG/ML
0.5 SYRINGE (ML) INJECTION
Status: DISCONTINUED | OUTPATIENT
Start: 2019-12-13 | End: 2019-12-14

## 2019-12-13 RX ADMIN — ROCURONIUM BROMIDE 70 MG: 50 INJECTION, SOLUTION INTRAVENOUS at 13:23

## 2019-12-13 RX ADMIN — FENTANYL CITRATE 250 MCG: 50 INJECTION, SOLUTION INTRAMUSCULAR; INTRAVENOUS at 14:04

## 2019-12-13 RX ADMIN — AMINOCAPROIC ACID 5 G: 250 INJECTION, SOLUTION INTRAVENOUS at 14:37

## 2019-12-13 RX ADMIN — CHLORHEXIDINE GLUCONATE 0.12% ORAL RINSE 15 ML: 1.2 LIQUID ORAL at 21:42

## 2019-12-13 RX ADMIN — MUPIROCIN 1 APPLICATION: 20 OINTMENT TOPICAL at 09:12

## 2019-12-13 RX ADMIN — POTASSIUM CHLORIDE 20 MEQ: 14.9 INJECTION, SOLUTION INTRAVENOUS at 19:00

## 2019-12-13 RX ADMIN — AMIODARONE HYDROCHLORIDE 200 MG: 200 TABLET ORAL at 21:43

## 2019-12-13 RX ADMIN — ALBUMIN (HUMAN): 12.5 SOLUTION INTRAVENOUS at 17:10

## 2019-12-13 RX ADMIN — LIDOCAINE HYDROCHLORIDE 2 ML: 10 INJECTION, SOLUTION EPIDURAL; INFILTRATION; INTRACAUDAL; PERINEURAL at 14:20

## 2019-12-13 RX ADMIN — CEFAZOLIN SODIUM 2000 MG: 2 SOLUTION INTRAVENOUS at 16:21

## 2019-12-13 RX ADMIN — FENTANYL CITRATE 250 MCG: 50 INJECTION, SOLUTION INTRAMUSCULAR; INTRAVENOUS at 15:01

## 2019-12-13 RX ADMIN — POTASSIUM CHLORIDE 20 MEQ: 14.9 INJECTION, SOLUTION INTRAVENOUS at 23:02

## 2019-12-13 RX ADMIN — PROPOFOL 200 MG: 10 INJECTION, EMULSION INTRAVENOUS at 13:23

## 2019-12-13 RX ADMIN — HEPARIN SODIUM 5000 UNITS: 1000 INJECTION INTRAVENOUS; SUBCUTANEOUS at 14:16

## 2019-12-13 RX ADMIN — HEPARIN SODIUM 27300 UNITS: 1000 INJECTION INTRAVENOUS; SUBCUTANEOUS at 14:36

## 2019-12-13 RX ADMIN — PROTAMINE SULFATE 250 MG: 10 INJECTION, SOLUTION INTRAVENOUS at 16:23

## 2019-12-13 RX ADMIN — MAGNESIUM SULFATE HEPTAHYDRATE 2 G: 40 INJECTION, SOLUTION INTRAVENOUS at 18:58

## 2019-12-13 RX ADMIN — NEOSTIGMINE METHYLSULFATE 4 MG: 1 INJECTION INTRAVENOUS at 18:02

## 2019-12-13 RX ADMIN — PROTAMINE SULFATE 50 MG: 10 INJECTION, SOLUTION INTRAVENOUS at 17:08

## 2019-12-13 RX ADMIN — MIDAZOLAM 3 MG: 1 INJECTION INTRAMUSCULAR; INTRAVENOUS at 14:57

## 2019-12-13 RX ADMIN — OXYCODONE HYDROCHLORIDE AND ACETAMINOPHEN 1 TABLET: 5; 325 TABLET ORAL at 22:10

## 2019-12-13 RX ADMIN — GLYCOPYRROLATE 0.4 MG: 0.2 INJECTION, SOLUTION INTRAMUSCULAR; INTRAVENOUS at 18:03

## 2019-12-13 RX ADMIN — METOPROLOL TARTRATE 12.5 MG: 25 TABLET ORAL at 09:12

## 2019-12-13 RX ADMIN — CEFAZOLIN SODIUM 2000 MG: 2 SOLUTION INTRAVENOUS at 23:02

## 2019-12-13 RX ADMIN — ROCURONIUM BROMIDE 50 MG: 50 INJECTION, SOLUTION INTRAVENOUS at 14:59

## 2019-12-13 RX ADMIN — CHLORHEXIDINE GLUCONATE 0.12% ORAL RINSE 15 ML: 1.2 LIQUID ORAL at 09:12

## 2019-12-13 RX ADMIN — SODIUM CHLORIDE 20 ML/HR: 0.45 INJECTION, SOLUTION INTRAVENOUS at 18:59

## 2019-12-13 RX ADMIN — ASPIRIN 325 MG ORAL TABLET 325 MG: 325 PILL ORAL at 21:42

## 2019-12-13 RX ADMIN — NITROGLYCERIN 50 MCG/MIN: 20 INJECTION INTRAVENOUS at 16:11

## 2019-12-13 RX ADMIN — FENTANYL CITRATE 100 MCG: 50 INJECTION, SOLUTION INTRAMUSCULAR; INTRAVENOUS at 16:09

## 2019-12-13 RX ADMIN — AMINOCAPROIC ACID 1 G/HR: 250 INJECTION, SOLUTION INTRAVENOUS at 18:30

## 2019-12-13 RX ADMIN — SODIUM CHLORIDE 4 UNITS/HR: 9 INJECTION, SOLUTION INTRAVENOUS at 14:52

## 2019-12-13 RX ADMIN — MUPIROCIN 1 APPLICATION: 20 OINTMENT TOPICAL at 21:43

## 2019-12-13 RX ADMIN — POTASSIUM CHLORIDE: 200 INJECTION, SOLUTION INTRAVENOUS at 16:50

## 2019-12-13 RX ADMIN — ATORVASTATIN CALCIUM 80 MG: 80 TABLET, FILM COATED ORAL at 21:43

## 2019-12-13 RX ADMIN — AMINOCAPROIC ACID 5 G: 250 INJECTION, SOLUTION INTRAVENOUS at 16:10

## 2019-12-13 NOTE — CONSULTS
Consult - [facility] LISA Vasquez 69 y o  male MRN: [id number]  Unit/Bed#: [facility] 777-57 Encounter: [id number]      Physician Requesting Consult: Chu Brock DO    Reason for Consult / Principal Problem: 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 [facility])    HPI: Charli Dawson is a 79-year-old male who has been experiencing exertional angina over the past several months  He was evaluated with a stress test which demonstrated ischemia  He ultimately went for cardiac catheterization revealing multivessel CAD  Patient was seen in consultation by Dr Mary Mosquera who recommended proceeding with surgical revascularization  The patient presents to the ICU postoperatively today  PMH:  Hypertension, hyperlipidemia, diabetes type 2, diabetic retinopathy  History obtained from chart review due to patient being intubated and sedated  ---------------------------------------------------------------------------------------------------------------------------------------------------------------------  Impressions:  1  S/P CABG x 4  2  DM2  3  HTN  4  HLD  5  Acute post-op blood loss anemia    Plan:    Neuro: D/C continuous sedation  Percocet PRN for pain  Trend neuro exam   Delirium precautions  CV: MAP goal >65  SBP goal <130  CI>2 2  Post-op medications: None  Volume resuscitation as needed  Monitor rhythm on telemetry  Epicardial pacing wires in place  Intra-op SHEBA LVEF 60%  Lung: Check STAT post-op ABG and CXR  Wean vent with spontaneous breathing trial with goal to extubate today  GI: GI prophylaxis with PPI  Bowel regimen  Zofran PRN for nausea  FEN: NPO  Replete K >4 0, mag >2 0 and calcium >7 0  : Check STAT post-op BMP  [place] in place  Monitor UOP with goal >0 5cc/kg/hour  Lasix versus volume resuscitate as needed depending on hemodynamics and volume status  ID: Prophylactic post-op abx  Maintain normothermia  Trend temps       Heme: Check STAT post-op H/H and platelets  Monitor incision site, invasive lines, and chest tube outputs for bleeding  Send coag panel if needed  Endo: Insulin gtt for blood sugar control  Results from last 6 Months   Lab Units 12/09/19  1328 11/15/19  0803   HEMOGLOBIN A1C % 6 9* 6 6*     Disposition: ICU Care   ---------------------------------------------------------------------------------------------------------------------------------------------------------------------  Historical Information   Past Medical History:   Diagnosis Date    Chronic cough     RESOLVED: [id number]    Coronary artery disease     [facility] mellitus [facility] [address]     Diabetic retinopathy [facility] [address] [facility]     HLD (hyperlipidemia)     HTN (hypertension)      Past Surgical History:   Procedure Laterality Date    CARDIAC CATHETERIZATION  12/09/2019    HAND SURGERY       Social History   Social History     Substance and Sexual Activity   Alcohol Use Yes    Comment: occasional     Social History     Substance and Sexual Activity   Drug Use No     Social History     Tobacco Use   Smoking Status Never Smoker   Smokeless Tobacco Never Used     Family History   Problem Relation Age of Onset    Heart failure Mother     Heart failure Father     Heart disease Father     Heart attack Father     Diabetes Other      I have reviewed this patient's family history and commented on sigificant items within the HPI    ROS: ROS unable to be obtained due to patient being intubated and sedated  Allergies: Allergies   Allergen Reactions    Pollen Extract Allergic Rhinitis       Home Medications:   Prior to Admission medications    Medication Sig Start Date End Date Taking?  Authorizing Provider   amLODIPine (NORVASC) 10 mg tablet Take 1 tablet (10 mg total) by mouth daily 11/21/19   Sage Clark MD   aspirin 81 mg chewable tablet Chew 81 mg daily    Historical Provider, MD   atorvastatin (LIPITOR) 40 mg tablet Take 1 tablet (40 mg total) by mouth daily 12/9/19   Niko Saha DO Coenzyme Q10 (COQ10) 100 MG CAPS Take 1 capsule by mouth daily    Historical Provider, MD   Glucosamine-Chondroitin 750-600 MG TABS Take 1 tablet by mouth daily    Historical Provider, MD   hydrochlorothiazide (HYDRODIURIL) 25 mg tablet Take 1 tablet (25 mg total) by mouth daily 11/21/19   Cesar Nuñez MD   metFORMIN (GLUCOPHAGE) 1000 MG tablet Take 1 tablet (1,000 mg total) by mouth 2 (two) times a day 11/21/19   Cesar Nuñez MD   mupirocin (BACTROBAN) 2 % nasal ointment into each nostril 2 (two) times a day Apply to each nares twice daily starting 5 days before operation   12/9/19   Rudolph Estrada PA-C   Omega-3 Fatty Acids (FISH OIL) 645 MG CAPS Take 1 capsule by mouth 2 (two) times a day    Historical Provider, MD     Inpatient Medications:  Scheduled Meds:    Current Facility-Administered Medications:  acetaminophen 650 mg Rectal Q4H PRN Yoli Morning, PA-C    acetaminophen 650 mg Oral Q4H PRN Yoli Morning, PA-C    aminocaproic acid (AMICAR) IV 1 g/hr Intravenous Continuous Hulon Walworth, PA-WENDY Last Rate: 1 g/hr (12/13/19 1830)   amiodarone 200 mg Oral Q8H [address] Yoli Morning, PA-C    aspirin 325 mg Oral Daily Yoli Morning, PA-C    atorvastatin 80 mg Oral Daily With [employer]ry, PA-C    bisacodyl 10 mg Rectal Daily PRN Yoli Morning, PA-C    calcium chloride 1 g Intravenous Once Yoli Morning, PA-C    [START ON 12/14/2019] cefazolin 2,000 mg Intravenous Q8H Yoli Morning, PA-C    chlorhexidine 15 mL Swish & Spit BID Yoli Morning, PA-C    fentanyl citrate (PF) 50 mcg Intravenous Once Yoli Morning, PA-C    fentanyl citrate (PF) 50 mcg Intravenous Q1H PRN Yoli Morning, PA-C    [START ON 12/14/2019] fondaparinux 2 5 mg Subcutaneous Daily Yoli Morning, PA-C    furosemide 40 mg Intravenous Q6H PRN Yoli Morning, PA-C    HYDROmorphone 0 5 mg Intravenous Q1H PRN Yoli Morning, PA-C    HYDROmorphone 1 mg Intravenous Q1H PRN Yoli Morning, PA-C    insulin regular (HumuLIN R,NovoLIN R) infusion 0 3-21 Units/hr Intravenous Titrated Randolm Sor, PA-C    lactated ringers 500 mL Intravenous Q30 Min PRN Randolm Sor, PA-C    lidocaine (cardiac) 100 mg Intravenous Q30 Min PRN Randolm Sor, PA-C    magnesium sulfate 2 g Intravenous Once Randolm Sor, PA-C    mupirocin 1 application Nasal [id number] [address] Randolm Sor, PA-C    niCARdipine 2 5-15 mg/hr Intravenous Titrated Randolm Sor, PA-C    ondansetron 4 mg Intravenous Q6H PRN Randolm Sor, PA-C    oxyCODONE-acetaminophen 1 tablet Oral Q4H PRN Randolm Sor, PA-C    oxyCODONE-acetaminophen 2 tablet Oral Q6H PRN Randolm Sor, PA-C    [START ON 12/14/2019] pantoprazole 40 mg Oral Early Morning Randolm Sor, PA-C    [START ON 12/14/2019] polyethylene glycol 17 g Oral Daily Randolm Sor, PA-C    potassium chloride 20 mEq Intravenous Once PRN Randolm Sor, PA-C    potassium chloride 20 mEq Intravenous Q1H PRN Randolm Sor, PA-C    potassium chloride 20 mEq Intravenous Q30 Min PRN Randolm Sor, PA-C    sodium chloride 20 mL/hr Intravenous Continuous Randolm Sor, PA-C      Continuous Infusions:    aminocaproic acid (AMICAR) IV 1 g/hr Last Rate: 1 g/hr (12/13/19 1830)   insulin regular (HumuLIN R,NovoLIN R) infusion 0 3-21 Units/hr    niCARdipine 2 5-15 mg/hr    sodium chloride 20 mL/hr      PRN Meds:    acetaminophen 650 mg Q4H PRN   acetaminophen 650 mg Q4H PRN   bisacodyl 10 mg Daily PRN   fentanyl citrate (PF) 50 mcg Q1H PRN   furosemide 40 mg Q6H PRN   HYDROmorphone 0 5 mg Q1H PRN   HYDROmorphone 1 mg Q1H PRN   lactated ringers 500 mL Q30 Min PRN   lidocaine (cardiac) 100 mg Q30 Min PRN   ondansetron 4 mg Q6H PRN   oxyCODONE-acetaminophen 1 tablet Q4H PRN   oxyCODONE-acetaminophen 2 tablet Q6H PRN   potassium chloride 20 mEq Once PRN   potassium chloride 20 mEq Q1H PRN   potassium chloride 20 mEq Q30 Min PRN ---------------------------------------------------------------------------------------------------------------------------------------------------------------------  Vitals:   Vitals:    19 18219 18219 18219 1830   BP: 116/53 113/53 112/52 124/55   Pulse: 62 66 66 66   Resp: 18 18 (!) 25 (!) 25   Temp: (!) 96 1 °F (35 6 °C) (!) 96 1 °F (35 6 °C) (!) 96 3 °F (35 7 °C) (!) 96 3 °F (35 7 °C)   TempSrc:       SpO2:       Weight:       Height:         Arterial Line:          Temperature: Temp (24hrs), Av 6 °F (35 9 °C), Min:96 1 °F (35 6 °C), Max:98 3 °F (36 8 °C)  Current: Temperature: (!) 96 3 °F (35 7 °C)    Weights: IBW: 73 kg  Body mass index is 24 39 kg/m²  Hemodynamic Monitoring:  PAP:  , CVP:  , CO:  , CI:  , SVR:      Ventilator Settings:  Respiratory    Lab Data (Last 4 hours)    None         O2/Vent Data (Last 4 hours)               Vent Mode AC/VC       Resp Rate (BPM) (BPM) 12       Vt (mL) (mL) 500       FIO2 (%) (%) 70 50      PEEP (cmH2O) (cmH2O) 5       Patient safety screen outcome: Failed       MV 5 6                 Labs:   Results from last 7 days   Lab Units 19  17519  17119  1658  19  1530  19  0743   WBC Thousand/uL  --   --   --   --   --   --   --  6 43   HEMOGLOBIN g/dL  --  11 7*  --   --   --   --   --  15 3   I STAT HEMOGLOBIN g/dl 10 2*  --  10 5*  --    < >  --    < >  --    HEMATOCRIT %  --  33 5*  --   --   --   --   --  44 9   HEMATOCRIT, ISTAT % 30*  --  31*  --    < >  --    < >  --    PLATELETS Thousands/uL  --  101*  --  106*  --  172  --  159    < > = values in this interval not displayed       Results from last 7 days   Lab Units 19  1752 19  1750 19  1718 19  1645  12/10/19  0918 19  0743   SODIUM mmol/L  --  146*  --   --   --  143 139   POTASSIUM mmol/L  --  3 2*  --   --   --  4 3 3 8   CHLORIDE mmol/L  --  115*  --   --   --  106 105   CO2 mmol/L  --  25  --   --   --  30 32   CO2, I-STAT mmol/L 26  --  26 30   < >  --   --    BUN mg/dL  --  12  --   --   --  12 18   CREATININE mg/dL  --  0 79  --   --   --  0 99 1 00   CALCIUM mg/dL  --  7 6*  --   --   --  9 5 9 5   GLUCOSE, ISTAT mg/dl 100  --  114 134   < >  --   --     < > = values in this interval not displayed  Post-op AB 42/38/264/+1/100%  Post-op CXR: Lines in good position  No large pTX  I have personally reviewed pertinent films in PACS  Post-op EKG: NSR  Baseline artifact  This was personally reviewed by myself  Physical Exam   Constitutional: He appears well-developed and well-nourished  Non-toxic appearance  He is sedated and intubated  Cardiovascular: Normal rate and regular rhythm  Exam reveals no gallop and no friction rub  No murmur heard  Pulmonary/Chest: Effort normal and breath sounds normal  He is intubated  He has no wheezes  He has no rhonchi  He has no rales  Abdominal: Soft  Normal appearance  Neurological:   Sedated  Skin: Skin is warm and dry  No rash noted  He is not diaphoretic  Psychiatric:   Cannot assess  Invasive lines and devices: Invasive Devices     Central Venous Catheter Line            CVC Central Lines 19 Triple less than 1 day    Introducer 19 less than 1 day          Peripheral Intravenous Line            Peripheral IV 19 Left Arm less than 1 day          Arterial Line            Arterial Line 19 Right Radial less than 1 day          Line            Pacer Wires less than 1 day    Pacer Wires less than 1 day          Drain            Chest Tube 1 Left Pleural 32 Fr  less than 1 day    Chest Tube 2 Posterior Mediastinal 32 Fr  less than 1 day    Chest Tube 3 Anterior Mediastinal 32 Fr  less than 1 day    Urethral Catheter Non-latex; Temperature probe 16 Fr  less than 1 day          Airway            ETT  Hi-Lo 8 mm less than 1 day ---------------------------------------------------------------------------------------------------------------------------------------------------------------------  Counseling / Coordination of Care  Total Critical Care time spent 0 minutes excluding procedures, teaching and family updates        SIGNATURE: Danuta Graham PA-C  DATE: December 13, 2019  TIME: 6:50 PM

## 2019-12-13 NOTE — OP NOTE
OPERATIVE REPORT  PATIENT NAME: Abrahan Chau    :  1945  MRN: [id number]  Pt Location: BE OR ROOM 16    SURGERY DATE: 2019    SURGEON: Taisha Kaba DO    ASSISTANT: Brian Luna PA-C    ADDITIONAL ASSISTANT: Ibrahima Rivas PA-C    PREOPERATIVE DIAGNOSIS:  Multivessel coronary artery disease    POSTOPERATIVE DIAGNOSIS:  Multivessel coronary artery disease    NYHA: 3  CCS: 3    PROCEDURE:   Coronary artery bypass grafting x 4 with left internal mammary artery to left anterior descending artery, saphenous vein graft to diagonal 1, saphenous vein graft to obtuse marginal 1 and saphenous vein graft to right posterior descending artery    CARDIOPULMONARY BYPASS TIME: 72 minutes  CROSSCLAMP TIME: 67 minutes  ANESTHESIA: General endotracheal anesthesia with transesophageal echocardiogram  guidance, Dr Jud Yates    INDICATIONS:  The patient is a 69y o  year-old male diagnosed with Multivessel coronary artery disease  FINDINGS:  1  Intraoperative transesophageal echocardiogram revealed Normal BiV function  2  Epiaortic ultrasound showed less than 5 mm non-mobile plaque  3  Coronary anatomy as described in coronary angiography  4  Final transesophageal echocardiogram demonstrated Normal BiV function  OPERATIVE TECHNIQUE:    The patient was taken to the operating room, properly identified and placed supine on the operating table  Following the satisfactory induction of general anesthesia and placement of monitoring lines, the patient was prepped and draped in the usual sterile fashion  A time-out procedure was performed  The patient underwent median sternotomy, pericardiotomy, left internal mammary artery harvest with the standard technique and endoscopic left greater saphenous vein harvest, systemic heparinization and conduit preparation  Epiaortic ultrasound showed less than 5 mm non-mobile plaque   Cannulation for cardiopulmonary bypass was performed with an arterial dispersion cannula, double stage venous cannula and a vented antegrade cardioplegia cannula  Once the ACT was greater than 480 seconds we placed the patient on cardiopulmonary bypass, the ascending aorta was crossclamped and cardiac arrest was obtained without complications with [place] cardioplegia  The following grafts were completed, left internal mamamry artery to left anterior descending artery with excellent flow, saphenous vein graft to diagonal 1 with flow of 150 ml/min, saphenous vein graft to obtuse marginal 1 with flow of 165 ml/min and saphenous vein graft to right posterior descending artery with flow of 180 ml/min  All the distal anastomoses were performed in end-to-side fashion with 7-0 Prolene  A total of 3 proximal anastomoses were completed on the ascending aorta in end-to-side fashion using running 6-0 Prolene suture  The patient was placed in the Trendelenburg position, the heart was de-aired, a hotshot was given and the crossclamp was removed  Atrial and ventricular pacing wires were placed  Following a period of reperfusion, the patient was weaned from cardiopulmonary bypass and decannulated  Protamine was administered with normalization of the ACT  Hemostasis was confirmed in all fields  Thoracostomy tubes were placed  The sternum was closed with stainless steel wires  The fascia, subdermis and skin were closed with multiple layers of running absorbable suture  COMPLICATIONS: None  PACKS/TUBES/DRAINS: Chest tubes x 3  EBL: 350mL  TRANSFUSION: None  SPECIMENS: None  As the attending surgeon, I was present and scrubbed for all critical portions of this procedure  There was no qualified surgical resident available  Sponge, needle and instrument counts were reported as correct by the nursing staff   The assistance of a PA was required to complete this case, specifically for assistance with endoscopic saphenous vein harvesting, cannulation, decannulation, and construction of the bypass grafts             SIGNATURE: Glo Carmona DO  DATE: December 13, 2019  TIME: 5:00 PM

## 2019-12-13 NOTE — ANESTHESIA POSTPROCEDURE EVALUATION
Post-Op Assessment Note    CV Status:  Stable  Pain Score: 0    Pain management: adequate     Mental Status:  Unresponsive   Hydration Status:  Stable   PONV Controlled:  None   Airway Patency:  Patent  Airway: intubated   Post Op Vitals Reviewed: Yes      Staff: Anesthesiologist           BP      Temp      Pulse     Resp      SpO2 100 % (12/13/19 0020)

## 2019-12-13 NOTE — ANESTHESIA PROCEDURE NOTES
Introducer/[place]-Angi  Performed by: Melo Rodríguez MD  Authorized by: Melo Rodríguez MD   Date/Time: 12/13/2019 1:52 PM  Consent: Verbal consent obtained  Written consent obtained  Risks and benefits: risks, benefits and alternatives were discussed  Consent given by: patient  Patient understanding: patient states understanding of the procedure being performed  Patient consent: the patient's understanding of the procedure matches consent given  Procedure consent: procedure consent matches procedure scheduled  Relevant documents: relevant documents present and verified  Test results: test results available and properly labeled  Site marked: the operative site was marked  Radiology Images: Radiology Images displayed and confirmed  If images not available, report reviewed  Required items: required blood products, implants, devices, and special equipment available  Patient identity confirmed: verbally with patient, provided demographic data and hospital-assigned identification number  Time out: Immediately prior to procedure a "time out" was called to verify the correct patient, procedure, equipment, support staff and site/side marked as required    Indications: central pressure monitoring  Location details: right internal jugular  Catheter size: 9 Fr  Patient position: Trendelenburg  Assessment: blood return through all ports and free fluid flow  Preparation: Chloraprep  Skin prep agent dried: skin prep agent completely dried prior to procedure  Sterile barriers: all five maximum sterile barriers used - cap, mask, sterile gown, sterile gloves, and large sterile sheet  Hand hygiene: hand hygiene performed prior to central venous catheter insertion  Ultrasound guidance: yes  ultrasound permanent image saved  Pre-procedure: landmarks identified  Number of attempts: 1  Successful placement: yes  Post-procedure: line sutured and dressing applied  Patient tolerance: Patient tolerated the procedure well with no immediate complications

## 2019-12-13 NOTE — ANESTHESIA PROCEDURE NOTES
Arterial Line Insertion  Performed by: Kellie Nelson MD  Authorized by: Kellie Nelson MD   Consent: Verbal consent obtained  Written consent obtained  Risks and benefits: risks, benefits and alternatives were discussed  Consent given by: patient  Patient understanding: patient states understanding of the procedure being performed  Patient consent: the patient's understanding of the procedure matches consent given  Procedure consent: procedure consent matches procedure scheduled  Relevant documents: relevant documents present and verified  Test results: test results available and properly labeled  Site marked: the operative site was marked  Radiology Images: Radiology Images displayed and confirmed  If images not available, report reviewed  Required items: required blood products, implants, devices, and special equipment available  Patient identity confirmed: verbally with patient, arm band and hospital-assigned identification number  Time out: Immediately prior to procedure a "time out" was called to verify the correct patient, procedure, equipment, support staff and site/side marked as required  Preparation: Patient was prepped and draped in the usual sterile fashion    Indications: hemodynamic monitoring  Orientation:  Right  Location: radial arterylidocaine (PF) (XYLOCAINE-MPF) 1 % infiltration, 2 mL  Procedure Details:  Freddy's test normal: yes  Needle gauge: 20  Seldinger technique: Seldinger technique used  Number of attempts: 1    Post-procedure:  Post-procedure: chlorhexidine patch applied and dressing applied  Waveform: good waveform  Post-procedure CNS: normal  Patient tolerance: Patient tolerated the procedure well with no immediate complications

## 2019-12-13 NOTE — ANESTHESIA PREPROCEDURE EVALUATION
Review of Systems/Medical History  Patient summary reviewed  Chart reviewed  No history of anesthetic complications     Cardiovascular  EKG reviewed, Exercise tolerance (METS): >4,  Hyperlipidemia, Hypertension controlled, CAD , CAD status: 3VD, Angina with exertion, No CHF , Aortic disease,    Pulmonary  Negative pulmonary ROS        GI/Hepatic    No GI bleeding , No PUD, No GERD ,             Endo/Other  Diabetes well controlled type 2 ,      GYN       Hematology   Musculoskeletal       Neurology   Psychology           Physical Exam    Airway    Mallampati score: I  TM Distance: >3 FB  Neck ROM: full     Dental   No notable dental hx     Cardiovascular  Cardiovascular exam normal    Pulmonary  Pulmonary exam normal     Other Findings        Anesthesia Plan  ASA Score- 4     Anesthesia Type- general with ASA Monitors  Additional Monitors: arterial line, central venous line and pulmonary artery catheter  Airway Plan: ETT  Plan Factors-    Induction- intravenous  Postoperative Plan- Plan for postoperative opioid use  Informed Consent- Anesthetic plan and risks discussed with patient and spouse

## 2019-12-13 NOTE — ANESTHESIA PROCEDURE NOTES
Procedure Performed: SHEBA Anesthesia  Start Time:  12/13/2019 1:52 PM        Preanesthesia Checklist    Patient identified, IV assessed, risks and benefits discussed and procedure being performed at surgeon's request       Procedure    Diagnostic Indications for SHEBA:  assessment of surgical repair  Type of SHEBA: interventional SHEBA with real time guidance of intracardiac procedure  Images Saved: ultrasound permanent image saved  Physician Requesting Echo: Tanisha Novoa DO  Location performed: OR  Intubated  Bite block not placed  Heart visualized  Insertion of SHEBA Probe:  Atraumatic  Probe Type:  Biplane  Modalities:  2D only, color flow mapping, continuous wave Doppler and pulse wave Doppler  Echocardiographic and Doppler Measurements    PREPROCEDURE    LEFT VENTRICLE:  Systolic Function: normal  Ejection Fraction: 55%  Cavity size: normal  Regional Wall Motion Abnormalities: none  RIGHT VENTRICLE:  Systolic Function: normal   Cavity size normal  No hypertrophy              AORTIC VALVE:  Leaflets: normal and trileaflet  Leaflet motions normal and normal  Stenosis: none  Regurgitation: none  MITRAL VALVE:  Leaflets: normal  Leaflet Motions: normal  Regurgitation: trace  Stenosis: none  TRICUSPID VALVE:  Leaflets: normal  Leaflet Motions: normal  Stenosis: none  Regurgitation: trace  PULMONIC VALVE:  Leaflets: normal  Regurgitation: trace  Stenosis: none  ASCENDING AORTA:  Size:  normal  Diameter: 3 8 cm  Dissection not present  AORTIC ARCH:  Size:  normal  dissection not present  Grade 1: normal to mild intimal thickening  DESCENDING AORTA:  Size: normal  Dissection not present  Grade 2: severe intimal thickening without protruding atheroma          RIGHT ATRIUM:  Size:  normal  No spontaneous echo contrast     LEFT ATRIUM:  Size: normal  No spontaneous echo contrast     LEFT ATRIAL APPENDAGE:  Size: normal  No spontaneous echo contrast Emptying Velocity: 4 cm/sec  ATRIAL SEPTUM:  Intra-atrial septal morphology: normal          VENTRICULAR SEPTUM:  Intra-ventricular septum morphology: normal          EPIAORTIC:  Plaque Thickness: 0-5 mm  OTHER FINDINGS:  Pericardium:  normal  Pleural Effusion:  none          POSTPROCEDURE

## 2019-12-13 NOTE — ANESTHESIA PROCEDURE NOTES
Central Line Insertion  Performed by: Rajiv Leon MD  Authorized by: Rajiv Leon MD   Date/Time: 12/13/2019 1:52 PM  Catheter Type:  triple lumen  Consent: Verbal consent obtained  Written consent obtained  Risks and benefits: risks, benefits and alternatives were discussed  Consent given by: patient  Patient understanding: patient states understanding of the procedure being performed  Patient consent: the patient's understanding of the procedure matches consent given  Procedure consent: procedure consent matches procedure scheduled  Relevant documents: relevant documents present and verified  Test results: test results available and properly labeled  Site marked: the operative site was marked  Radiology Images: Radiology Images displayed and confirmed  If images not available, report reviewed  Required items: required blood products, implants, devices, and special equipment available  Patient identity confirmed: verbally with patient, hospital-assigned identification number and arm band  Time out: Immediately prior to procedure a "time out" was called to verify the correct patient, procedure, equipment, support staff and site/side marked as required    Indications: vascular access  Location details: right internal jugular  Catheter size: 7 Fr  Patient position: Trendelenburg  Assessment: blood return through all ports and free fluid flow  Preparation: Chloraprep  Skin prep agent dried: skin prep agent completely dried prior to procedure  Sterile barriers: all five maximum sterile barriers used - cap, mask, sterile gown, sterile gloves, and large sterile sheet  Hand hygiene: hand hygiene performed prior to central venous catheter insertion  Ultrasound guidance: yes  sterile gel and probe cover used in ultrasound-guided central venous catheter insertionultrasound permanent image saved  Pre-procedure: landmarks identified  Number of attempts: 1  Successful placement: yes  Post-procedure: chlorhexidine patch applied,  dressing applied and line sutured  Patient tolerance: Patient tolerated the procedure well with no immediate complications

## 2019-12-13 NOTE — INTERVAL H&P NOTE
H&P reviewed  After examining the patient I find no changes in the patients condition since the H&P had been written  Vitals:    12/13/19 0905   BP: 141/83   Pulse: 88   Resp: 16   Temp: 98 3 °F (36 8 °C)   SpO2: 97%     Anticipated Length of Stay:  Patient will be admitted on an Inpatient basis with an anticipated length of stay of  greater than 2 midnights  Justification for Hospital Stay:  Post surgical recovery following open heart surgery  NEUROPSYCH DATA  18          GENERAL SCREEN Raw StS1 [%ile]   MMSE 22 50 <1   Clock Drawing (  /10)  6           RBANS Form: A Raw StS1 [%ile]    Immediate Memory Index  69 2    List Learning 16 4 2    Story Memory 10 5 5   Visuospatial/Constructional Index  89 23    Figure Copy 17 9 37    Line Orientation 15  26-50    Language Index  90 25    Picture Naming 9  26-50    Semantic Fluency 14 6 9    Attention Index  56 <1    Digit Span 7 5 5    Coding 16 2 <1    Delayed Memory Index  60 <1    List Recall 0  ?2    List Recognition 18  17-25    Story Recall 1 2 <1    Figure Recall 3 3 1    Total Score  66 1         EXEC FUNCTION Raw StS1 [%ile]   COWA Form: CFL 30 89 22   Category Fluency 11 76 5   Trail Making Test Part-A 60” 82 12   Trail Making Test Part-B d/c           MOOD Raw Rating     GDS ( 15) 3 Min     GAI ( 20) 2 Min    [1] In order to provide comparability across tests, standard scores (mean = 100, standard deviation = 15) and age-corrected scaled scores (mean = 10, s.d. = 3) are provided where possible.   Psychometrist time with patient for neuropsychological testing and scorin hour

## 2019-12-14 ENCOUNTER — APPOINTMENT (INPATIENT)
Dept: RADIOLOGY | Facility: HOSPITAL | Age: 74
DRG: 235 | End: 2019-12-14
Payer: MEDICARE

## 2019-12-14 LAB
ABO GROUP BLD BPU: NORMAL
ANION GAP SERPL CALCULATED.3IONS-SCNC: 7 MMOL/L (ref 4–13)
BPU ID: NORMAL
BUN SERPL-MCNC: 14 MG/DL (ref 5–25)
CALCIUM SERPL-MCNC: 7.8 MG/DL (ref 8.3–10.1)
CHLORIDE SERPL-SCNC: 110 MMOL/L (ref 100–108)
CO2 SERPL-SCNC: 26 MMOL/L (ref 21–32)
CREAT SERPL-MCNC: 0.91 MG/DL (ref 0.6–1.3)
CROSSMATCH: NORMAL
ERYTHROCYTE [DISTWIDTH] IN BLOOD BY AUTOMATED COUNT: 13.3 % (ref 11.6–15.1)
GFR SERPL CREATININE-BSD FRML MDRD: 84 ML/MIN/1.73SQ M
GLUCOSE SERPL-MCNC: 119 MG/DL (ref 65–140)
GLUCOSE SERPL-MCNC: 123 MG/DL (ref 65–140)
GLUCOSE SERPL-MCNC: 125 MG/DL (ref 65–140)
GLUCOSE SERPL-MCNC: 127 MG/DL (ref 65–140)
GLUCOSE SERPL-MCNC: 131 MG/DL (ref 65–140)
GLUCOSE SERPL-MCNC: 137 MG/DL (ref 65–140)
GLUCOSE SERPL-MCNC: 150 MG/DL (ref 65–140)
GLUCOSE SERPL-MCNC: 170 MG/DL (ref 65–140)
GLUCOSE SERPL-MCNC: 182 MG/DL (ref 65–140)
GLUCOSE SERPL-MCNC: 187 MG/DL (ref 65–140)
GLUCOSE SERPL-MCNC: 219 MG/DL (ref 65–140)
GLUCOSE SERPL-MCNC: 92 MG/DL (ref 65–140)
GLUCOSE SERPL-MCNC: 93 MG/DL (ref 65–140)
HCT VFR BLD AUTO: 30.1 % (ref 36.5–49.3)
HGB BLD-MCNC: 10.1 G/DL (ref 12–17)
MAGNESIUM SERPL-MCNC: 2 MG/DL (ref 1.6–2.6)
MCH RBC QN AUTO: 30.7 PG (ref 26.8–34.3)
MCHC RBC AUTO-ENTMCNC: 33.6 G/DL (ref 31.4–37.4)
MCV RBC AUTO: 92 FL (ref 82–98)
PLATELET # BLD AUTO: 242 THOUSANDS/UL (ref 149–390)
PMV BLD AUTO: 10.1 FL (ref 8.9–12.7)
POTASSIUM SERPL-SCNC: 4 MMOL/L (ref 3.5–5.3)
RBC # BLD AUTO: 3.29 MILLION/UL (ref 3.88–5.62)
SODIUM SERPL-SCNC: 143 MMOL/L (ref 136–145)
UNIT DISPENSE STATUS: NORMAL
UNIT PRODUCT CODE: NORMAL
UNIT RH: NORMAL
WBC # BLD AUTO: 16.59 THOUSAND/UL (ref 4.31–10.16)

## 2019-12-14 PROCEDURE — 80048 BASIC METABOLIC PNL TOTAL CA: CPT | Performed by: PHYSICIAN ASSISTANT

## 2019-12-14 PROCEDURE — 97167 OT EVAL HIGH COMPLEX 60 MIN: CPT

## 2019-12-14 PROCEDURE — G8978 MOBILITY CURRENT STATUS: HCPCS

## 2019-12-14 PROCEDURE — 83735 ASSAY OF MAGNESIUM: CPT | Performed by: PHYSICIAN ASSISTANT

## 2019-12-14 PROCEDURE — 85027 COMPLETE CBC AUTOMATED: CPT | Performed by: PHYSICIAN ASSISTANT

## 2019-12-14 PROCEDURE — 99024 POSTOP FOLLOW-UP VISIT: CPT | Performed by: THORACIC SURGERY (CARDIOTHORACIC VASCULAR SURGERY)

## 2019-12-14 PROCEDURE — 82948 REAGENT STRIP/BLOOD GLUCOSE: CPT

## 2019-12-14 PROCEDURE — G8988 SELF CARE GOAL STATUS: HCPCS

## 2019-12-14 PROCEDURE — 93005 ELECTROCARDIOGRAM TRACING: CPT

## 2019-12-14 PROCEDURE — G8979 MOBILITY GOAL STATUS: HCPCS

## 2019-12-14 PROCEDURE — 97163 PT EVAL HIGH COMPLEX 45 MIN: CPT

## 2019-12-14 PROCEDURE — G8987 SELF CARE CURRENT STATUS: HCPCS

## 2019-12-14 PROCEDURE — 99233 SBSQ HOSP IP/OBS HIGH 50: CPT | Performed by: PHYSICIAN ASSISTANT

## 2019-12-14 PROCEDURE — 71045 X-RAY EXAM CHEST 1 VIEW: CPT

## 2019-12-14 PROCEDURE — 97535 SELF CARE MNGMENT TRAINING: CPT

## 2019-12-14 RX ORDER — TEMAZEPAM 15 MG/1
15 CAPSULE ORAL
Status: DISCONTINUED | OUTPATIENT
Start: 2019-12-14 | End: 2019-12-17 | Stop reason: HOSPADM

## 2019-12-14 RX ORDER — FUROSEMIDE 10 MG/ML
40 INJECTION INTRAMUSCULAR; INTRAVENOUS DAILY
Status: DISCONTINUED | OUTPATIENT
Start: 2019-12-14 | End: 2019-12-15

## 2019-12-14 RX ORDER — DOCUSATE SODIUM 100 MG/1
100 CAPSULE, LIQUID FILLED ORAL 2 TIMES DAILY
Status: DISCONTINUED | OUTPATIENT
Start: 2019-12-14 | End: 2019-12-17 | Stop reason: HOSPADM

## 2019-12-14 RX ORDER — POTASSIUM CHLORIDE 20 MEQ/1
20 TABLET, EXTENDED RELEASE ORAL DAILY
Status: DISCONTINUED | OUTPATIENT
Start: 2019-12-14 | End: 2019-12-15

## 2019-12-14 RX ORDER — ALBUMIN, HUMAN INJ 5% 5 %
12.5 SOLUTION INTRAVENOUS ONCE
Status: COMPLETED | OUTPATIENT
Start: 2019-12-14 | End: 2019-12-14

## 2019-12-14 RX ORDER — FUROSEMIDE 10 MG/ML
40 INJECTION INTRAMUSCULAR; INTRAVENOUS ONCE
Status: COMPLETED | OUTPATIENT
Start: 2019-12-14 | End: 2019-12-14

## 2019-12-14 RX ADMIN — AMIODARONE HYDROCHLORIDE 200 MG: 200 TABLET ORAL at 13:24

## 2019-12-14 RX ADMIN — OXYCODONE HYDROCHLORIDE AND ACETAMINOPHEN 1 TABLET: 5; 325 TABLET ORAL at 15:48

## 2019-12-14 RX ADMIN — FUROSEMIDE 40 MG: 10 INJECTION, SOLUTION INTRAMUSCULAR; INTRAVENOUS at 13:24

## 2019-12-14 RX ADMIN — OXYCODONE HYDROCHLORIDE AND ACETAMINOPHEN 2 TABLET: 5; 325 TABLET ORAL at 21:26

## 2019-12-14 RX ADMIN — POTASSIUM CHLORIDE 20 MEQ: 1500 TABLET, EXTENDED RELEASE ORAL at 13:24

## 2019-12-14 RX ADMIN — ONDANSETRON 4 MG: 2 INJECTION INTRAMUSCULAR; INTRAVENOUS at 00:15

## 2019-12-14 RX ADMIN — FONDAPARINUX SODIUM 2.5 MG: 2.5 INJECTION, SOLUTION SUBCUTANEOUS at 08:45

## 2019-12-14 RX ADMIN — FUROSEMIDE 40 MG: 10 INJECTION, SOLUTION INTRAMUSCULAR; INTRAVENOUS at 06:17

## 2019-12-14 RX ADMIN — OXYCODONE HYDROCHLORIDE AND ACETAMINOPHEN 2 TABLET: 5; 325 TABLET ORAL at 06:16

## 2019-12-14 RX ADMIN — PANTOPRAZOLE SODIUM 40 MG: 40 TABLET, DELAYED RELEASE ORAL at 06:16

## 2019-12-14 RX ADMIN — CEFAZOLIN SODIUM 2000 MG: 2 SOLUTION INTRAVENOUS at 15:50

## 2019-12-14 RX ADMIN — DOCUSATE SODIUM 100 MG: 100 CAPSULE, LIQUID FILLED ORAL at 13:24

## 2019-12-14 RX ADMIN — DOCUSATE SODIUM 100 MG: 100 CAPSULE, LIQUID FILLED ORAL at 18:06

## 2019-12-14 RX ADMIN — CEFAZOLIN SODIUM 2000 MG: 2 SOLUTION INTRAVENOUS at 08:44

## 2019-12-14 RX ADMIN — AMIODARONE HYDROCHLORIDE 200 MG: 200 TABLET ORAL at 21:26

## 2019-12-14 RX ADMIN — MUPIROCIN 1 APPLICATION: 20 OINTMENT TOPICAL at 08:45

## 2019-12-14 RX ADMIN — ATORVASTATIN CALCIUM 80 MG: 80 TABLET, FILM COATED ORAL at 15:50

## 2019-12-14 RX ADMIN — ALBUMIN (HUMAN) 12.5 G: 12.5 SOLUTION INTRAVENOUS at 02:21

## 2019-12-14 RX ADMIN — AMIODARONE HYDROCHLORIDE 200 MG: 200 TABLET ORAL at 06:16

## 2019-12-14 RX ADMIN — ASPIRIN 325 MG ORAL TABLET 325 MG: 325 PILL ORAL at 08:45

## 2019-12-14 RX ADMIN — MUPIROCIN 1 APPLICATION: 20 OINTMENT TOPICAL at 21:26

## 2019-12-14 RX ADMIN — POLYETHYLENE GLYCOL 3350 17 G: 17 POWDER, FOR SOLUTION ORAL at 08:44

## 2019-12-14 NOTE — PLAN OF CARE
Problem: OCCUPATIONAL THERAPY ADULT  Goal: Performs self-care activities at highest level of function for planned discharge setting  See evaluation for individualized goals  Description  Treatment Interventions: ADL retraining, Functional transfer training, Endurance training, Patient/family training, Equipment evaluation/education, Compensatory technique education, Activityengagement, Energy conservation, Cardiac education          See flowsheet documentation for full assessment, interventions and recommendations  Outcome: Progressing  Note:   Limitation: Decreased ADL status, Decreased Safe judgement during ADL, Decreased endurance, Decreased high-level ADLs  Prognosis: Good  Assessment: Pt is a 70 yo male seen for OT eval s/p adm to [facility] dx'd w/ atherosclerosis of native coronary artery w/ angina pectoris  Pt s/p CABGx4 12/13/19  Comorbidities include a h/o HTN, DM 2, hypercholesterolemia, diabetic retinopathy, B/L ringing in ear, stable angina  Pt with active OT orders and ambulate  orders  Pt is retired and resides w/ spouse in [facility] w/ [facility] thru garage and elevator access t/o the home  Pt reports spouse will be home and is able to provide A as needed  Pt was I w/  ADLS and IADLS, drove, & required no use of DME PTA  Pt is currently demonstrating the following occupational deficits: S UB bathing/dressing, Min A LB bathing/dressing, Min A toileting, Min A STS and S long distance mobility in hallway  These deficits that are impacting pt's baseline areas of occupation are a result of the following impairments: pain, endurance, activity tolerance, forward functional reach, balance, functional standing tolerance and decreased I w/ ADLS/IADLS  The following Occupational Performance Areas to address include: grooming, bathing/shower, toilet hygiene, dressing, health maintenance, functional mobility and clothing management  Pt scored overall 60/100 on the Barthel Index   Based on the aforementioned OT evaluation, functional performance deficits, and assessments, pt has been identified as a high complexity evaluation  Recommend home with family support upon D/C   Pt to continue to benefit from acute immediate OT services to address the following goals 3-5x/week to  w/in 7-10 days:      OT Discharge Recommendation: Home with family support  OT - OK to Discharge: Yes(when medically cleared)

## 2019-12-14 NOTE — PLAN OF CARE
Problem: Prexisting or High Potential for Compromised Skin Integrity  Goal: Skin integrity is maintained or improved  Description  INTERVENTIONS:  - Identify patients at risk for skin breakdown  - Assess and monitor skin integrity  - Assess and monitor nutrition and hydration status  - Monitor labs   - Assess for incontinence   - Turn and reposition patient  - Assist with mobility/ambulation  - Relieve pressure over bony prominences  - Avoid friction and shearing  - Provide appropriate hygiene as needed including keeping skin clean and dry  - Evaluate need for skin moisturizer/barrier cream  - Collaborate with interdisciplinary team   - Patient/family teaching  - Consider wound care consult   Outcome: Progressing     Problem: PAIN - ADULT  Goal: Verbalizes/displays adequate comfort level or baseline comfort level  Description  Interventions:  - Encourage patient to monitor pain and request assistance  - Assess pain using appropriate pain scale  - Administer analgesics based on type and severity of pain and evaluate response  - Implement non-pharmacological measures as appropriate and evaluate response  - Consider cultural and social influences on pain and pain management  - Notify physician/advanced practitioner if interventions unsuccessful or patient reports new pain  Outcome: Progressing     Problem: INFECTION - ADULT  Goal: Absence or prevention of progression during hospitalization  Description  INTERVENTIONS:  - Assess and monitor for signs and symptoms of infection  - Monitor lab/diagnostic results  - Monitor all insertion sites, i e  indwelling lines, tubes, and drains  - Monitor endotracheal if appropriate and nasal secretions for changes in amount and color  - [place] appropriate cooling/warming therapies per order  - Administer medications as ordered  - Instruct and encourage patient and family to use good hand hygiene technique  - Identify and instruct in appropriate isolation precautions for identified infection/condition  Outcome: Progressing  Goal: Absence of fever/infection during neutropenic period  Description  INTERVENTIONS:  - Monitor WBC    Outcome: Progressing     Problem: SAFETY ADULT  Goal: Patient will remain free of falls  Description  INTERVENTIONS:  - Assess patient frequently for physical needs  -  Identify cognitive and physical deficits and behaviors that affect risk of falls    -  [place] fall precautions as indicated by assessment   - Educate patient/family on patient safety including physical limitations  - Instruct patient to call for assistance with activity based on assessment  - Modify environment to reduce risk of injury  - Consider OT/PT consult to assist with strengthening/mobility  Outcome: Progressing  Goal: Maintain or return to baseline ADL function  Description  INTERVENTIONS:  -  Assess patient's ability to carry out ADLs; assess patient's baseline for ADL function and identify physical deficits which impact ability to perform ADLs (bathing, care of mouth/teeth, toileting, grooming, dressing, etc )  - Assess/evaluate cause of self-care deficits   - Assess range of motion  - Assess patient's mobility; develop plan if impaired  - Assess patient's need for assistive devices and provide as appropriate  - Encourage maximum independence but intervene and supervise when necessary  - Involve family in performance of ADLs  - Assess for home care needs following discharge   - Consider OT consult to assist with ADL evaluation and planning for discharge  - Provide patient education as appropriate  Outcome: Progressing  Goal: Maintain or return mobility status to optimal level  Description  INTERVENTIONS:  - Assess patient's baseline mobility status (ambulation, transfers, stairs, etc )    - Identify cognitive and physical deficits and behaviors that affect mobility  - Identify mobility aids required to assist with transfers and/or ambulation (gait belt, sit-to-stand, lift, walker, cane, etc )  - [place] fall precautions as indicated by assessment  - Record patient progress and toleration of activity level on Mobility SBAR; progress patient to next Phase/Stage  - Instruct patient to call for assistance with activity based on assessment  - Consider rehabilitation consult to assist with strengthening/weightbearing, etc   Outcome: Progressing     Problem: DISCHARGE PLANNING  Goal: Discharge to home or other facility with appropriate resources  Description  INTERVENTIONS:  - Identify barriers to discharge w/patient and caregiver  - Arrange for needed discharge resources and transportation as appropriate  - Identify discharge learning needs (meds, wound care, etc )  - Arrange for interpretive services to assist at discharge as needed  - Refer to Case Management Department for coordinating discharge planning if the patient needs post-hospital services based on physician/advanced practitioner order or complex needs related to functional status, cognitive ability, or social support system  Outcome: Progressing     Problem: Knowledge Deficit  Goal: Patient/family/caregiver demonstrates understanding of disease process, treatment plan, medications, and discharge instructions  Description  Complete learning assessment and assess knowledge base    Interventions:  - Provide teaching at level of understanding  - Provide teaching via preferred learning methods  Outcome: Progressing

## 2019-12-14 NOTE — PROGRESS NOTES
Progress Note - Cardiothoracic Surgery   lVadimir Blow 69 y o  male MRN: [id number]  Unit/Bed#: [facility] 417-01 Encounter: [id number]      POD # 1 s/p CABG x4    Pt seen/examined  Interval history and data reviewed with critical care team   Pt doing well  No specific complaints    On a bit of crystal still  UOP good  Pain well controlled        Medications:   Scheduled Meds:  Current Facility-Administered Medications:  acetaminophen 650 mg Oral Q4H PRN Simmie Aas, PA-C    amiodarone 200 mg Oral [facility] Simmie Aas, PA-C    aspirin 325 mg Oral Daily Simmie Aas, PA-C    atorvastatin 80 mg Oral Daily With [employer], PA-WENDY    bisacodyl 10 mg Rectal Daily PRN Simmie Aas, PA-C    cefazolin 2,000 mg Intravenous Q8H Simmie Aas, PA-C Last Rate: 2,000 mg (12/14/19 0844)   fondaparinux 2 5 mg Subcutaneous Daily Simmie Aas, PA-C    insulin regular (HumuLIN R,NovoLIN R) infusion 0 3-21 Units/hr Intravenous Titrated Simmie Aas, PA-C Last Rate: 0 5 Units/hr (12/14/19 0409)   lidocaine (cardiac) 100 mg Intravenous Q30 Min PRN Simmie Aas, PA-C    mupirocin 1 application Nasal [id number] [facility] Simmie Aas, PA-C    ondansetron 4 mg Intravenous Q6H PRN Simmie Aas, PA-C    oxyCODONE-acetaminophen 1 tablet Oral Q4H PRN Simmie Aas, PA-C    oxyCODONE-acetaminophen 2 tablet Oral Q6H PRN Simmie Aas, PA-C    pantoprazole 40 mg Oral Early Morning Simmie Aas, PA-C    phenylephine  mcg/min Intravenous Titrated Frank [place] PA-C Last Rate: 20 mcg/min (12/14/19 0955)   polyethylene glycol 17 g Oral Daily Simmie Aas, PA-C    potassium chloride 20 mEq Intravenous Once PRN Simmie Aas, PA-C    potassium chloride 20 mEq Intravenous Q30 Min PRN Simmie Aas, PA-C    sodium chloride 20 mL/hr Intravenous Continuous Simmie Aas, PA-C Last Rate: 20 mL/hr (12/13/19 1785)     Continuous Infusions:  insulin regular (HumuLIN R,NovoLIN R) infusion 0 3-21 Units/hr Last Rate: 0 5 Units/hr (12/14/19 1207)   phenylephine  mcg/min Last Rate: 20 mcg/min (12/14/19 2032)   sodium chloride 20 mL/hr Last Rate: 20 mL/hr (12/13/19 9193)     PRN Meds:   acetaminophen    bisacodyl    lidocaine (cardiac)    ondansetron    oxyCODONE-acetaminophen    oxyCODONE-acetaminophen    potassium chloride    potassium chloride    Vitals: Blood pressure 111/59, pulse 68, temperature 99 1 °F (37 3 °C), temperature source Oral, resp  rate (!) 37, height 5' 10" (1 778 m), weight 83 3 kg (183 lb 10 3 oz), SpO2 92 %  ,Body mass index is 26 35 kg/m²  I/O last 24 hours: In: 3654 6 [I V :804 6; Blood:800; IV Piggyback:1550]  Out: 7893 [Urine:2350; Chest Tube:460]  Invasive Devices     Central Venous Catheter Line            CVC Central Lines 12/13/19 Triple less than 1 day          Peripheral Intravenous Line            Peripheral IV 12/13/19 Left Arm less than 1 day          Line            Pacer Wires less than 1 day    Pacer Wires less than 1 day          Drain            Chest Tube 1 Left Pleural 32 Fr  less than 1 day    Chest Tube 2 Posterior Mediastinal 32 Fr  less than 1 day    Chest Tube 3 Anterior Mediastinal 32 Fr  less than 1 day    Urethral Catheter Non-latex; Temperature probe 16 Fr  less than 1 day                  Lab, Imaging and other studies:   Results from last 7 days   Lab Units 12/14/19 0406 12/13/19 1752 12/13/19 1750 12/13/19  1658  12/09/19  0743   WBC Thousand/uL 16 59*  --   --   --   --   --  6 43   HEMOGLOBIN g/dL 10 1*  --  11 7*  --   --   --  15 3   I STAT HEMOGLOBIN g/dl  --  10 2*  --    < >  --    < >  --    HEMATOCRIT % 30 1*  --  33 5*  --   --   --  44 9   HEMATOCRIT, ISTAT %  --  30*  --    < >  --    < >  --    PLATELETS Thousands/uL 242  --  101*  --  106*   < > 159    < > = values in this interval not displayed       Results from last 7 days   Lab Units 12/14/19  0406 12/13/19  2143 12/13/19 1752 12/13/19 1750 12/10/19  0918   POTASSIUM mmol/L 4 0 3 7  --  3 2*  --  4 3   CHLORIDE mmol/L 110*  --   --  115*  --  106   CO2 mmol/L 26  --   --  25  --  30   CO2, I-STAT mmol/L  --   --  26  --    < >  --    BUN mg/dL 14  --   --  12  --  12   CREATININE mg/dL 0 91  --   --  0 79  --  0 99   GLUCOSE, ISTAT mg/dl  --   --  100  --    < >  --    CALCIUM mg/dL 7 8*  --   --  7 6*  --  9 5    < > = values in this interval not displayed  Results from last 7 days   Lab Units 12/13/19  1658 12/09/19  1328 12/09/19  0743   INR  1 60*  --  1 07   PTT seconds 36 31  --      No results for input(s): PHART, [id number], PO2ART, XEK4HIU, [id number], BEART in the last 72 hours  Plan:    DC [place]  Continue chest tubes, pacing wires  Transfer to floor  Ambulate  Incentive spirometry  Diuresis    PO ASA/Statin wean crystal off and start beta blocker this afternoon if BP is stable        SIGNATURE: Lena Treadwell DO  DATE: December 14, 2019  TIME: 11:10 AM

## 2019-12-14 NOTE — OCCUPATIONAL THERAPY NOTE
Occupational Therapy Evaluation      Abrahan Amezquitamayra    12/14/2019    Principal Problem: 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 [facility])  Active Problems:    Benign essential hypertension    Type 2 diabetes mellitus (HCC)    Hypercholesteremia    Diabetic retinopathy (HCC)    S/P CABG x 4      Past Medical History:   Diagnosis Date    Chronic cough     RESOLVED: [id number]    Coronary artery disease     [facility] [address] [facility]     Diabetic retinopathy [facility] [address] [facility]     HLD (hyperlipidemia)     HTN (hypertension)        Past Surgical History:   Procedure Laterality Date    CARDIAC CATHETERIZATION  12/09/2019    HAND SURGERY          12/14/19 0944   Note Type   Note type Eval/Treat   Restrictions/Precautions   Weight Bearing Precautions Per Order No   Other Precautions Cardiac/sternal;Multiple lines;Telemetry; Fall Risk;Pain  (Chest tubes)   Pain Assessment   Pain Assessment No/denies pain   Pain Score No Pain   Home Living   Type of [address] [place] [address] Multi-level;Bed/bath upstairs;Stairs to enter with rails;Elevator  (3SH; 0STE thru garage; elevator to all floors)   Bathroom Shower/Tub Walk-in shower   Bathroom Toilet Raised   Bathroom Equipment Grab bars in shower;Built-in shower seat   [address] Other (Comment)  (denies DME)   Prior Function   Level of [place] Independent with ADLs and functional mobility   Lives With Spouse   Receives Help From Family   ADL Assistance Independent   IADLs Independent   Falls in the last 6 months 0   Vocational Retired   Lifestyle   Autonomy Pt reports being I w/ all ADLS and IADLS; (+) drives PTA   Reciprocal Relationships Pt lives w/ spouse who is home and in good health to provide A as needed      Service to Others Pt is retired   Intrinsic Gratification Pt reports enjoying staying active and [place] ([address]) WDL   ADL   Where Nathan Renee 647 5 Supervision/Setup   Grooming Assistance 5  Supervision/Setup   UB Bathing Assistance 5  Supervision/Setup   LB Bathing Assistance 4  Minimal Assistance   UB Dressing Assistance 5  Supervision/Setup   LB Dressing Assistance 4  Minimal [address] [place] [address]  4  Minimal Assistance   Bed Mobility   Supine to Sit Unable to assess   Sit to Supine Unable to assess   Additional Comments Pt seated OOB in chair upon OT arrival  Pt seated in chair w/ all needs in reach s/p OT session  Transfers   Sit to Stand 4  Minimal assistance   Additional items Assist x 1; Increased time required;Verbal cues;Armrests   Stand to Sit 4  Minimal assistance   Additional items Assist x 1; Increased time required;Verbal cues;Armrests   Additional Comments Transfers w/ RW  VC and TC required for safe hand placement  Functional Mobility   Functional Mobility 5  Supervision   Additional Comments Pt demonstrated long distance household mobility in hallway w/ RW at S level w/ SBA of 2 others for line management and chair follow  Pt denies SOB, fatigue, or weakness  Additional items Rolling walker   Balance   Static Sitting Good   Dynamic Sitting Fair +   Static Standing Fair   Dynamic Standing Fair -   Ambulatory Fair -   Activity Tolerance   Activity Tolerance Patient tolerated treatment well;Patient limited by fatigue   Medical Staff Made Aware PT Lonnie Red CM for safe dispo planning   Nurse Made Aware AMIRA Valenzuela Moment cleared Pt for OT eval   RUE Assessment   RUE Assessment WFL  (AROM WFL; Gross strength NT 2' sternal precautions)   LUE Assessment   LUE Assessment WFL  (AROM WFL; Gross strength NT 2' sternal precautions)   Hand Function   Gross Motor Coordination Functional   Fine Motor Coordination Functional   Sensation   Light Touch No apparent deficits   Cognition   Overall Cognitive Status WFL   Arousal/Participation Alert; Cooperative   Attention Attends with cues to redirect   Orientation Level Oriented X4   Memory Decreased recall of precautions   Following Commands Follows one step commands with increased time or repetition   Comments Pt is pleasant and cooperative to participate in therapy this day  Pt required VC to maintain sternal precautions t/o session - Pt demonstrated good carryover  Assessment   Limitation Decreased ADL status; Decreased Safe judgement during ADL;Decreased endurance;Decreased high-level ADLs   Prognosis Good   Assessment Pt is a 68 yo male seen for OT eval s/p adm to [facility] dx'd w/ atherosclerosis of native coronary artery w/ angina pectoris  Pt s/p CABGx4 19  Comorbidities include a h/o HTN, DM 2, hypercholesterolemia, diabetic retinopathy, B/L ringing in ear, stable angina  Pt with active OT orders and ambulate  orders  Pt is retired and resides w/ spouse in [facility] w/ [facility] thru garage and elevator access t/o the home  Pt reports spouse will be home and is able to provide A as needed  Pt was I w/  ADLS and IADLS, drove, & required no use of DME PTA  Pt is currently demonstrating the following occupational deficits: S UB bathing/dressing, Min A LB bathing/dressing, Min A toileting, Min A STS and S long distance mobility in hallway  These deficits that are impacting pt's baseline areas of occupation are a result of the following impairments: pain, endurance, activity tolerance, forward functional reach, balance, functional standing tolerance and decreased I w/ ADLS/IADLS  The following Occupational Performance Areas to address include: grooming, bathing/shower, toilet hygiene, dressing, health maintenance, functional mobility and clothing management  Pt scored overall 60/100 on the Barthel Index  Based on the aforementioned OT evaluation, functional performance deficits, and assessments, pt has been identified as a high complexity evaluation  Recommend home with family support upon D/C   Pt to continue to benefit from acute immediate OT services to address the following goals 3-5x/week to  w/in 7-10 days: Goals   Patient Goals To return home   LTG Time Frame 7-10   Long Term Goal #1 Refer to goals below   Plan   Treatment Interventions ADL retraining;Functional transfer training; Endurance training;Patient/family training;Equipment evaluation/education; Compensatory technique education; Activityengagement; Energy conservation;Cardiac education   Goal Expiration Date 12/24/19   OT Treatment Day 1   OT Frequency 3-5x/wk   Additional Treatment Session   Start Time 0910   End Time 0925   Treatment Assessment Pt participated in additional OT session focusing on cardiac education  OT provided Pt w/ Recovering After Cardiac Surgery Packet and educated Pt regarding: sternal precautions, cardiac precautions, lifting restrictions, safe activity engagement, energy conservation, lifestyle modifications, stress management and cardiac rehabilitation programs  Pt's questions were addressed after discussion of the packet  Provided Pt w/ contact information regarding OT department if questions arise  Pt continues to benefit from inpatient skilled acute care OT services  Will continue to follow and address goals stated below      Recommendation   OT Discharge Recommendation Home with family support   OT - OK to Discharge Yes  (when medically cleared)   Barthel Index   Feeding 10   Bathing 0   Grooming Score 0   Dressing Score 5   Bladder Score 10   Bowels Score 10   Toilet Use Score 5   Transfers (Bed/Chair) Score 10   Mobility (Level Surface) Score 10   Stairs Score 0   Barthel Index Score 60   Modified [place] Scale   Modified Ирина Scale 4     GOALS    1) Pt will improve activity tolerance to G for min 30 min txment sessions to increase participation in ADLs  2) Pt will complete ADLs/self care w/ mod I using adaptive device and DME as needed  3) Pt will complete toileting w/ mod I w/ G hygiene/thoroughness using DME as needed  4) Pt will improve functional transfers on/off all surfaces using DME as needed w/ G balance/safety including w/ mod I  5) Pt will improve functional mobility during ADL/IADL/leisure tasks using DME as needed w/ G balance/safety w/ mod I  6) Pt will engage in ongoing cognitive assessment w/ G participation w/ mod I to assist w/ safe d/c planning/recommendations  7) Pt will demonstrate G carryover of pt/caregiver education and training as appropriate w/ mod I w/o cues w/ good tolerance  8) Pt will demonstrate 100% carryover of energy conservation techniques w/ mod I t/o functional I/ADL/leisure tasks w/o cues s/p skilled education  9) Pt will engage in cardiac education using the Recovering After Cardiac Surgery packet w/ G participation and G carryover    10) Pt will demonstrate 100% carryover of precautions s/p review w/o cues w/ mod I w/ G tolerance/participation t/o functional ADL/IADL/leisure tasks      Bebo Harris MS, OTR/L

## 2019-12-14 NOTE — PROGRESS NOTES
Progress Note - Critical Care   Marquis Helton 69 y o  male MRN: [id number]  Unit/Bed#: [facility] 417-01 Encounter: [id number]      Attending Physician: Juan Black DO    24 Hour Events/HPI: POD # 1 s/p CABG x4  Given 2 plts and 2 cryo post-op along with amicar for CT outputs  Improved  Extubated without difficulty  Given 1 L LR and 250 albumin for hypotension and low CVP/PAD  Maintained on crystal overnight  Given lasix 40 this am for low UOP      ROS: Review of Systems   Respiratory: Negative for chest tightness and shortness of breath  Cardiovascular: Negative for chest pain  All other systems reviewed and are negative     ---------------------------------------------------------------------------------------------------------------------------------------------------------------------  Impressions:  1  S/P CABG x 4  2  DM2  3  HTN  4  HLD  5   Acute post-op blood loss anemia    Plan:    Neuro:   · Pain controlled with: percocet PRN  · Regulate sleep/wake cycle  · Delirium precautions  · CAM-ICU daily  · Trend neuro exam    CV:   · Cardiac infusions: Neosynephrine, 40-60 mcg/min  · Wean crystal to off as able  · MAP goal > 65 and CI >2 2  · D/C Carson Angi catheter  · Keep Arterial line until off crystal  · Rhythm: NSR  · Follow rhythm on telemetry  · Hold BB with hypotension  · Maintain epicardial pacing wires for pacing PRN    Lung:   · Good Room air oxygen saturation; Continue incentive spirometry/Coughing/Deep breathing exercises  · Chest tube output remains persistently high; Continue chest tubes to suction today    GI:   · Continue PPI for stress ulcer prophylaxis  · Continue bowel regimen  · Trend abdominal exam and bowel function    FEN:   · Diuretic plan: Lasix 40 mg IV q QD  · K-dur 20 mEQ PO q QD  · Nutrition/diet plan: PO diet  · Replete electrolytes with goals: K >4 0, Mag >2 0, and Phos >3 0    :   · Indwelling [place] present: yes   · D/C [place]  · Trend UOP and BUN/creat  · Strict I and O    ID:   · Trend temps and WBC count  · Maintain normothermia    Heme:   · Trend hgb and plts    Endo:   · Glycemic control plan: History of diabetes: Continue insulin infusion today  Consult Endocrinology for management    MSK/Skin:  · Mobility goal: OOB ambulating  · PT consult: yes  · OT consult: yes  · Frequent turning and pressure off-loading  · Local wound care as needed    Disposition:  Transfer to telemetry  ---------------------------------------------------------------------------------------------------------------------------------------------------------------------  Allergies:    Allergies   Allergen Reactions    Pollen Extract Allergic Rhinitis     Medications:   Scheduled Meds:    Current Facility-Administered Medications:  acetaminophen 650 mg Rectal Q4H PRN Charles Renee PA-C    acetaminophen 650 mg Oral Q4H PRN Charles Renee PA-C    amiodarone 200 mg Oral [facility] Charles Renee PA-C    aspirin 325 mg Oral Daily Charles Renee PA-C    atorvastatin 80 mg Oral Daily [employer]    bisacodyl 10 mg Rectal Daily PRN Charles Renee PA-C    calcium chloride 1 g Intravenous Once Charles Renee PA-C    cefazolin 2,000 mg Intravenous Q8H Charles Renee PA-C Last Rate: Stopped (12/14/19 0000)   chlorhexidine 15 mL Swish & Spit BID Charles Renee PA-C    fentanyl citrate (PF) 50 mcg Intravenous Once Charles Renee PA-C    fentanyl citrate (PF) 50 mcg Intravenous Q1H PRN Charles Renee PA-C    fondaparinux 2 5 mg Subcutaneous Daily Charles Renee PA-C    furosemide 40 mg Intravenous Q6H PRN Charles Renee PA-C    HYDROmorphone 0 5 mg Intravenous Q1H PRN Charles Renee PA-C    HYDROmorphone 1 mg Intravenous Q1H PRN Charles Renee PA-C    insulin regular (HumuLIN R,NovoLIN R) infusion 0 3-21 Units/hr Intravenous Titrated Charles Renee PA-C Last Rate: 0 5 Units/hr (12/14/19 0409)   lactated ringers 500 mL Intravenous Q30 Min PRN Charles Renee PA-C    lidocaine (cardiac) 100 mg Intravenous Q30 Min PRN Vinh , PA-C    mupirocin 1 application Nasal B88Q [address] Vinh , PA-C    niCARdipine 2 5-15 mg/hr Intravenous Titrated Vinh , PA-C Last Rate: Stopped (12/13/19 1859)   ondansetron 4 mg Intravenous Q6H PRN Vinh , PA-C    oxyCODONE-acetaminophen 1 tablet Oral Q4H PRN Vinh , PA-C    oxyCODONE-acetaminophen 2 tablet Oral Q6H PRN Vinh , PA-C    pantoprazole 40 mg Oral Early Morning Vinh , PA-C    phenylephine  mcg/min Intravenous Titrated [place] Dimes, PA-C Last Rate: 60 mcg/min (12/14/19 0340)   polyethylene glycol 17 g Oral Daily Vinh , PA-C    potassium chloride 20 mEq Intravenous Once PRN Vinh , PA-C    potassium chloride 20 mEq Intravenous Q30 Min PRN Vinh , PA-C    sodium chloride 20 mL/hr Intravenous Continuous Vinh , PA-C Last Rate: 20 mL/hr (12/13/19 1859)     VTE Pharmacologic Prophylaxis: Fondaparinux (Arixtra)  VTE Mechanical Prophylaxis: sequential compression device    Continuous Infusions:    insulin regular (HumuLIN R,NovoLIN R) infusion 0 3-21 Units/hr Last Rate: 0 5 Units/hr (12/14/19 0409)   niCARdipine 2 5-15 mg/hr Last Rate: Stopped (12/13/19 1859)   phenylephine  mcg/min Last Rate: 60 mcg/min (12/14/19 0340)   sodium chloride 20 mL/hr Last Rate: 20 mL/hr (12/13/19 1859)     PRN Meds:    acetaminophen 650 mg Q4H PRN   acetaminophen 650 mg Q4H PRN   bisacodyl 10 mg Daily PRN   fentanyl citrate (PF) 50 mcg Q1H PRN   furosemide 40 mg Q6H PRN   HYDROmorphone 0 5 mg Q1H PRN   HYDROmorphone 1 mg Q1H PRN   lactated ringers 500 mL Q30 Min PRN   lidocaine (cardiac) 100 mg Q30 Min PRN   ondansetron 4 mg Q6H PRN   oxyCODONE-acetaminophen 1 tablet Q4H PRN   oxyCODONE-acetaminophen 2 tablet Q6H PRN   potassium chloride 20 mEq Once PRN   potassium chloride 20 mEq Q30 Min PRN     Home Medications:   Prior to Admission medications    Medication Sig Start Date End Date Taking?  Authorizing Provider amLODIPine (NORVASC) 10 mg tablet Take 1 tablet (10 mg total) by mouth daily 19   Jovani Rocha MD   aspirin 81 mg chewable tablet Chew 81 mg daily    Historical Provider, MD   atorvastatin (LIPITOR) 40 mg tablet Take 1 tablet (40 mg total) by mouth daily 19   Gustabo Pulido DO   Coenzyme Q10 (COQ10) 100 MG CAPS Take 1 capsule by mouth daily    Historical Provider, MD   Glucosamine-Chondroitin 750-600 MG TABS Take 1 tablet by mouth daily    Historical Provider, MD   hydrochlorothiazide (HYDRODIURIL) 25 mg tablet Take 1 tablet (25 mg total) by mouth daily 19   Jovani Rocha MD   metFORMIN (GLUCOPHAGE) 1000 MG tablet Take 1 tablet (1,000 mg total) by mouth 2 (two) times a day 19   Jovani Rocha MD   mupirocin (BACTROBAN) 2 % nasal ointment into each nostril 2 (two) times a day Apply to each nares twice daily starting 5 days before operation  19   Rudolph Estrada PA-C   Omega-3 Fatty Acids (FISH OIL) 645 MG CAPS Take 1 capsule by mouth 2 (two) times a day    Nory Flores MD     ---------------------------------------------------------------------------------------------------------------------------------------------------------------------  Vitals:   Vitals:    19 0200 19 0300 19 0400 19 0500   BP: 108/59 (!) 87/43 111/55 115/57   Pulse: 60 62 64 60   Resp: (!) 28 17 (!) 28 18   Temp: 99 5 °F (37 5 °C) 99 3 °F (37 4 °C) 99 3 °F (37 4 °C)    TempSrc: Probe Probe Probe    SpO2: 98% 92% 94% 92%   Weight:       Height:         Tele Rhythm: NSR This was personally reviewed by myself      Respiratory:  SpO2: SpO2: 92 %, SpO2 Activity: SpO2 Activity: At Rest, SpO2 Device: O2 Device: None (Room air)    Ventilator:  Respiratory    Lab Data (Last 4 hours)    None         O2/Vent Data (Last 4 hours)    None              Temperature: Temp (24hrs), Av 3 °F (36 8 °C), Min:96 1 °F (35 6 °C), Max:99 7 °F (37 6 °C)  Current: Temperature: 99 3 °F (37 4 °C)    Weights:   Weight (last 2 days)     Date/Time   Weight    12/13/19 0936   77 1 (170)            Body mass index is 24 39 kg/m²  Hemodynamic Monitoring:  PAP: PAP: 38/16, CVP: CVP (mean): 12 mmHg, CO: CO (L/min): 6 2 L/min, CI: CI (L/min/m2): 3 1 L/min/m2, SVR: SVR (dyne*sec)/cm5: 686 (dyne*sec)/cm5  PAP: (18-38)/(5-19) 38/16  CO:  [3 9 L/min-6 4 L/min] 6 2 L/min  CI:  [2 L/min/m2-3 2 L/min/m2] 3 1 L/min/m2    Intake and Outputs:    Intake/Output Summary (Last 24 hours) at 12/14/2019 0513  Last data filed at 12/14/2019 0500  Gross per 24 hour   Intake 3481 97 ml   Output 1765 ml   Net 1716 97 ml     I/O last 24 hours: In: 2431 [I V :632; Blood:800; IV Piggyback:1550]  Out: 8342 [Urine:1415; Chest Tube:350]    UOP: 45/hour   BM: 0 in the last 24 hours    Chest tube Output:  Mediastinal tubes: 140 mL/8 hours  350 mL/24 hours   Pleural tubes: 0 mL/8 hours  0 mL/24 hours     Labs:  Results from last 7 days   Lab Units 12/14/19  0406 12/13/19  1752 12/13/19  1750  12/13/19  1658  12/09/19  0743   WBC Thousand/uL 16 59*  --   --   --   --   --  6 43   HEMOGLOBIN g/dL 10 1*  --  11 7*  --   --   --  15 3   I STAT HEMOGLOBIN g/dl  --  10 2*  --    < >  --    < >  --    HEMATOCRIT % 30 1*  --  33 5*  --   --   --  44 9   HEMATOCRIT, ISTAT %  --  30*  --    < >  --    < >  --    PLATELETS Thousands/uL 242  --  101*  --  106*   < > 159    < > = values in this interval not displayed       Results from last 7 days   Lab Units 12/14/19  0406 12/13/19  2143 12/13/19  1752 12/13/19  1750 12/13/19  1718 12/13/19  1645  12/10/19  0918   SODIUM mmol/L 143  --   --  146*  --   --   --  143   POTASSIUM mmol/L 4 0 3 7  --  3 2*  --   --   --  4 3   CHLORIDE mmol/L 110*  --   --  115*  --   --   --  106   CO2 mmol/L 26  --   --  25  --   --   --  30   CO2, I-STAT mmol/L  --   --  26  --  26 30   < >  --    BUN mg/dL 14  --   --  12  --   --   --  12   CREATININE mg/dL 0 91  --   --  0 79  --   --   --  0 99   CALCIUM mg/dL 7 8* --   --  7 6*  --   --   --  9 5   GLUCOSE, ISTAT mg/dl  --   --  100  --  114 134   < >  --     < > = values in this interval not displayed  Baseline Creat: 0 9    Results from last 7 days   Lab Units 12/14/19  0406   MAGNESIUM mg/dL 2 0          Results from last 7 days   Lab Units 12/13/19  1658 12/09/19  1328 12/09/19  0743   INR  1 60*  --  1 07   PTT seconds 36 31  --      Micro:   Blood Culture: No results found for: Bonnie Sutton  Urine Culture: No results found for: URINECX  Sputum Culture: No components found for: SPUTUMCX  Wound Culure: No results found for: WOUNDCULT    Diagnositic Studies:  12/14/19 CXR: pending this AM  12/14/19 EKG: NSR  This was personally reviewed by myself  Nutrition:        Diet Orders   (From admission, onward)             Start     Ordered    12/14/19 0000  Diet Cardiovascular; Cardiac; Fluid Restriction 1800 ML  Diet effective 0500     Question Answer Comment   Diet Type Cardiovascular    Cardiac Cardiac    Other Restriction(s): Fluid Restriction 1800 ML    RD to adjust diet per protocol? Yes        12/13/19 1740              Physical Exam   Constitutional: He is oriented to person, place, and time  He appears well-developed and well-nourished  Non-toxic appearance  No distress  Cardiovascular: Normal rate, regular rhythm, S1 normal and S2 normal  Exam reveals no gallop and no friction rub  No murmur heard  Pulmonary/Chest: Effort normal and breath sounds normal  He has no wheezes  He has no rhonchi  He has no rales  Abdominal: Soft  Normal appearance  Neurological: He is alert and oriented to person, place, and time  He has normal strength  Skin: Skin is warm and dry  No rash noted  He is not diaphoretic  Psychiatric: He has a normal mood and affect  His speech is normal and behavior is normal        Invasive lines and devices:   Invasive Devices     Central Venous Catheter Line            CVC Central Lines 12/13/19 Triple less than 1 day          Peripheral Intravenous Line            Peripheral IV 12/13/19 Left Arm less than 1 day          Line            Pacer Wires less than 1 day    Pacer Wires less than 1 day          Drain            Chest Tube 1 Left Pleural 32 Fr  less than 1 day    Chest Tube 2 Posterior Mediastinal 32 Fr  less than 1 day    Chest Tube 3 Anterior Mediastinal 32 Fr  less than 1 day    Urethral Catheter Non-latex; Temperature probe 16 Fr  less than 1 day              ---------------------------------------------------------------------------------------------------------------------------------------------------------------------  Code Status: Level 1 - Full Code    Counseling / Coordination of Care  Total Critical Care time spent 0 minutes excluding procedures, teaching and family updates  Collaborative bedside rounds performed with cardiac surgery attending and bedside RN      SIGNATURE: Fariha Villasenor PA-C  DATE: December 14, 2019  TIME: 5:13 AM

## 2019-12-14 NOTE — PLAN OF CARE
Problem: PHYSICAL THERAPY ADULT  Goal: Performs mobility at highest level of function for planned discharge setting  See evaluation for individualized goals  Description  Treatment/Interventions: Functional transfer training, LE strengthening/ROM, Elevations, Therapeutic exercise, Endurance training, Patient/family training, Equipment eval/education, Bed mobility, Gait training, Spoke to nursing          See flowsheet documentation for full assessment, interventions and recommendations  Note:   Prognosis: Good  Problem List: Decreased strength, Decreased endurance, Impaired balance, Decreased mobility, Pain  Assessment: Pt is 69 y o  male seen for PT evaluation s/p admit to [facility] on 12/13/2019 w/ 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 ([facility] [address] ) s/p CABG x4 12/12/19  PT consulted to assess pt's functional mobility and d/c needs  Order placed for PT eval and tx, w/ up as tolerated order  Comorbidities affecting pt's physical performance at time of assessment include: HTN, Type 2 DM, diabetic retinopathy  PTA, pt was independent w/ all functional mobility w/ no AD and lives w/ wife in three level house w/ 0STE through garage and elevator access to all floors w/ bed/bath upstairs  Personal factors affecting pt at time of IE include: lives in three story house, inability to navigate community distances and inability to perform IADLs  Please find objective findings from PT assessment regarding body systems outlined above with impairments and limitations including weakness, impaired balance, decreased endurance, impaired coordination, gait deviations, decreased activity tolerance, decreased functional mobility tolerance and fall risk  Pt performed STS at [address] w/ VCs for safe hand placement and ambulated 360ft w/ RW at supervision X1 with 2 additional people for line management and chair follow   The following objective measures performed on IE also reveal limitations: Barthel Index: 55/100  Pt's clinical presentation is currently unstable/unpredictable seen in pt's presentation of recent admission for atherosclerosis requiring medical attention, recent decline in function as compared to baseline, multiple lines, continuous telemetry, fall risk, pain, reliance on RW as compared to baseline  Pt to benefit from continued PT tx to address deficits as defined above and maximize level of functional independent mobility and consistency  From PT/mobility standpoint, recommendation at time of d/c would be anticipate home w/ family support pending progress in order to facilitate return to PLOF  Recommendation: Home with family support          See flowsheet documentation for full assessment

## 2019-12-14 NOTE — RESPIRATORY THERAPY NOTE
pt extubated to 6L nc, prior to extubation, + cuff leak   post extubation, + speech, + cough, no stridor noted at this time, BS equal bilateral, SpO2 100 on 6L nc

## 2019-12-15 LAB
ANION GAP SERPL CALCULATED.3IONS-SCNC: 1 MMOL/L (ref 4–13)
BUN SERPL-MCNC: 19 MG/DL (ref 5–25)
CALCIUM SERPL-MCNC: 8.5 MG/DL (ref 8.3–10.1)
CHLORIDE SERPL-SCNC: 105 MMOL/L (ref 100–108)
CO2 SERPL-SCNC: 32 MMOL/L (ref 21–32)
CREAT SERPL-MCNC: 1.01 MG/DL (ref 0.6–1.3)
ERYTHROCYTE [DISTWIDTH] IN BLOOD BY AUTOMATED COUNT: 13.8 % (ref 11.6–15.1)
GFR SERPL CREATININE-BSD FRML MDRD: 74 ML/MIN/1.73SQ M
GLUCOSE SERPL-MCNC: 107 MG/DL (ref 65–140)
GLUCOSE SERPL-MCNC: 144 MG/DL (ref 65–140)
GLUCOSE SERPL-MCNC: 144 MG/DL (ref 65–140)
GLUCOSE SERPL-MCNC: 145 MG/DL (ref 65–140)
GLUCOSE SERPL-MCNC: 151 MG/DL (ref 65–140)
GLUCOSE SERPL-MCNC: 166 MG/DL (ref 65–140)
GLUCOSE SERPL-MCNC: 180 MG/DL (ref 65–140)
GLUCOSE SERPL-MCNC: 191 MG/DL (ref 65–140)
GLUCOSE SERPL-MCNC: 201 MG/DL (ref 65–140)
GLUCOSE SERPL-MCNC: 211 MG/DL (ref 65–140)
GLUCOSE SERPL-MCNC: 214 MG/DL (ref 65–140)
GLUCOSE SERPL-MCNC: 215 MG/DL (ref 65–140)
GLUCOSE SERPL-MCNC: 43 MG/DL (ref 65–140)
HCT VFR BLD AUTO: 29.7 % (ref 36.5–49.3)
HGB BLD-MCNC: 9.7 G/DL (ref 12–17)
MAGNESIUM SERPL-MCNC: 1.9 MG/DL (ref 1.6–2.6)
MCH RBC QN AUTO: 29.9 PG (ref 26.8–34.3)
MCHC RBC AUTO-ENTMCNC: 32.7 G/DL (ref 31.4–37.4)
MCV RBC AUTO: 92 FL (ref 82–98)
PLATELET # BLD AUTO: 152 THOUSANDS/UL (ref 149–390)
PMV BLD AUTO: 10.1 FL (ref 8.9–12.7)
POTASSIUM SERPL-SCNC: 3.7 MMOL/L (ref 3.5–5.3)
RBC # BLD AUTO: 3.24 MILLION/UL (ref 3.88–5.62)
SODIUM SERPL-SCNC: 138 MMOL/L (ref 136–145)
WBC # BLD AUTO: 13.33 THOUSAND/UL (ref 4.31–10.16)

## 2019-12-15 PROCEDURE — 83735 ASSAY OF MAGNESIUM: CPT | Performed by: THORACIC SURGERY (CARDIOTHORACIC VASCULAR SURGERY)

## 2019-12-15 PROCEDURE — 82948 REAGENT STRIP/BLOOD GLUCOSE: CPT

## 2019-12-15 PROCEDURE — 99233 SBSQ HOSP IP/OBS HIGH 50: CPT | Performed by: INTERNAL MEDICINE

## 2019-12-15 PROCEDURE — 85027 COMPLETE CBC AUTOMATED: CPT | Performed by: THORACIC SURGERY (CARDIOTHORACIC VASCULAR SURGERY)

## 2019-12-15 PROCEDURE — 99233 SBSQ HOSP IP/OBS HIGH 50: CPT | Performed by: PHYSICIAN ASSISTANT

## 2019-12-15 PROCEDURE — 80048 BASIC METABOLIC PNL TOTAL CA: CPT | Performed by: THORACIC SURGERY (CARDIOTHORACIC VASCULAR SURGERY)

## 2019-12-15 PROCEDURE — NC001 PR NO CHARGE: Performed by: INTERNAL MEDICINE

## 2019-12-15 PROCEDURE — 99222 1ST HOSP IP/OBS MODERATE 55: CPT | Performed by: INTERNAL MEDICINE

## 2019-12-15 RX ORDER — POTASSIUM CHLORIDE 20 MEQ/1
20 TABLET, EXTENDED RELEASE ORAL 2 TIMES DAILY
Status: DISCONTINUED | OUTPATIENT
Start: 2019-12-15 | End: 2019-12-17 | Stop reason: HOSPADM

## 2019-12-15 RX ORDER — FUROSEMIDE 10 MG/ML
40 INJECTION INTRAMUSCULAR; INTRAVENOUS
Status: DISCONTINUED | OUTPATIENT
Start: 2019-12-15 | End: 2019-12-17 | Stop reason: HOSPADM

## 2019-12-15 RX ORDER — INSULIN GLARGINE 100 [IU]/ML
25 INJECTION, SOLUTION SUBCUTANEOUS
Status: DISCONTINUED | OUTPATIENT
Start: 2019-12-15 | End: 2019-12-17 | Stop reason: HOSPADM

## 2019-12-15 RX ADMIN — AMIODARONE HYDROCHLORIDE 0.5 MG/MIN: 50 INJECTION, SOLUTION INTRAVENOUS at 16:48

## 2019-12-15 RX ADMIN — DOCUSATE SODIUM 100 MG: 100 CAPSULE, LIQUID FILLED ORAL at 18:36

## 2019-12-15 RX ADMIN — OXYCODONE HYDROCHLORIDE AND ACETAMINOPHEN 1 TABLET: 5; 325 TABLET ORAL at 09:28

## 2019-12-15 RX ADMIN — FONDAPARINUX SODIUM 2.5 MG: 2.5 INJECTION, SOLUTION SUBCUTANEOUS at 09:28

## 2019-12-15 RX ADMIN — INSULIN GLARGINE 25 UNITS: 100 INJECTION, SOLUTION SUBCUTANEOUS at 22:33

## 2019-12-15 RX ADMIN — FUROSEMIDE 40 MG: 10 INJECTION, SOLUTION INTRAMUSCULAR; INTRAVENOUS at 16:51

## 2019-12-15 RX ADMIN — AMIODARONE HYDROCHLORIDE 1 MG/MIN: 50 INJECTION, SOLUTION INTRAVENOUS at 12:07

## 2019-12-15 RX ADMIN — PANTOPRAZOLE SODIUM 40 MG: 40 TABLET, DELAYED RELEASE ORAL at 06:34

## 2019-12-15 RX ADMIN — POLYETHYLENE GLYCOL 3350 17 G: 17 POWDER, FOR SOLUTION ORAL at 09:27

## 2019-12-15 RX ADMIN — MUPIROCIN 1 APPLICATION: 20 OINTMENT TOPICAL at 21:19

## 2019-12-15 RX ADMIN — AMIODARONE HYDROCHLORIDE 200 MG: 200 TABLET ORAL at 06:34

## 2019-12-15 RX ADMIN — POTASSIUM CHLORIDE 20 MEQ: 1500 TABLET, EXTENDED RELEASE ORAL at 18:36

## 2019-12-15 RX ADMIN — FUROSEMIDE 40 MG: 10 INJECTION, SOLUTION INTRAMUSCULAR; INTRAVENOUS at 04:56

## 2019-12-15 RX ADMIN — POTASSIUM CHLORIDE 20 MEQ: 1500 TABLET, EXTENDED RELEASE ORAL at 09:28

## 2019-12-15 RX ADMIN — SODIUM CHLORIDE 5 UNITS/HR: 9 INJECTION, SOLUTION INTRAVENOUS at 14:12

## 2019-12-15 RX ADMIN — ATORVASTATIN CALCIUM 80 MG: 80 TABLET, FILM COATED ORAL at 16:51

## 2019-12-15 RX ADMIN — ASPIRIN 325 MG ORAL TABLET 325 MG: 325 PILL ORAL at 09:28

## 2019-12-15 RX ADMIN — METOPROLOL TARTRATE 12.5 MG: 25 TABLET ORAL at 14:13

## 2019-12-15 RX ADMIN — AMIODARONE HYDROCHLORIDE 200 MG: 200 TABLET ORAL at 21:19

## 2019-12-15 RX ADMIN — DEXTROSE 150 MG: 50 INJECTION, SOLUTION INTRAVENOUS at 11:54

## 2019-12-15 RX ADMIN — MUPIROCIN 1 APPLICATION: 20 OINTMENT TOPICAL at 09:28

## 2019-12-15 RX ADMIN — AMIODARONE HYDROCHLORIDE 200 MG: 200 TABLET ORAL at 14:14

## 2019-12-15 RX ADMIN — DOCUSATE SODIUM 100 MG: 100 CAPSULE, LIQUID FILLED ORAL at 09:28

## 2019-12-15 NOTE — SOCIAL WORK
Cm met with pt to review the role of Cm  Pt presented as alert during conversation with friend Sly Field [phone] at bedside  Pt lives with wife Maynor Weber [phone] in 3 story home, no step leading to the house, elevator to bedroom and bathroom from basement  Pt reported being independent with ADLS PTA  Pt denies any PT/OT, MH, substance abuse or [facility]  Pt PCP is affiliated with [facility] and preferred pharmacy is [facility] in [place]  Pt retired and drives independently  Pt POA is wife aMynor Weber [phone] and has LW  Pt has DME RW  Cm discussed [place] of Choice with patient  Pt in [facility]  Referral sent  A post acute care recommendation was made by your care team for Doni Castillo  Discussed [place] of Choice with patient  List of facilities given to patient via in person  patient aware the list is custom filtered for them by preference  and that [facility] post acute providers are designated  Cm will follow  CM reviewed d/c planning process including the following: identifying help at home, patient preference for d/c planning needs, Discharge Lounge, [facility] Meds to Bed program, availability of treatment team to discuss questions or concerns patient and/or family may have regarding understanding medications and recognizing signs and symptoms once discharged  CM also encouraged patient to follow up with all recommended appointments after discharge  Patient advised of importance for patient and family to participate in managing patients medical well being

## 2019-12-15 NOTE — PROGRESS NOTES
Progress Note - Critical Care   Manual Peers 69 y o  male MRN: [id number]  Unit/Bed#: [facility] 529-61 Encounter: [id number]      Attending Physician: Cecille Black DO    24 Hour Events/HPI: POD # 2 s/p CABG x4  Maintained on crystal for hypotension overnight  ROS: Review of Systems   Respiratory: Negative for chest tightness and shortness of breath  Cardiovascular: Negative for chest pain  All other systems reviewed and are negative     ---------------------------------------------------------------------------------------------------------------------------------------------------------------------  Impressions:  1  S/P CABG x 4  2  DM2  3  HTN  4  HLD  5  Acute post-op blood loss anemia    Plan:    Neuro:   · Pain controlled with: percocet PRN  · Regulate sleep/wake cycle  · Delirium precautions  · CAM-ICU daily  · Trend neuro exam    CV:   · Cardiac infusions: Neosynephrine, 20-40 mcg/min  · Wean to off as able  · MAP goal > 65  · No Arterial line  · Rhythm: NSR  · Follow rhythm on telemetry  · Holding BB with hypotension  · Epicardial pacing wires no longer required  Remove today    Lung:   · Acute post-op pulmonary insufficiency; Requiring 2 Liters via nasal cannula, secondary to poor inspiratory effort secondary to pain  Continue incentive spirometry/coughing/deep breathing exercises    Wean supplemental oxygen as tolerated for saturation > 90%  · Chest tube output remains persistently high; Continue chest tubes to suction today    GI:   · Continue PPI for stress ulcer prophylaxis  · Continue bowel regimen  · Trend abdominal exam and bowel function    FEN:   · Diuretic plan: Lasix 40 mg IV q QD  · K-dur 20 mEQ PO q QD  · Nutrition/diet plan: PO diet  · Replete electrolytes with goals: K >4 0, Mag >2 0, and Phos >3 0    :   · Indwelling [place] present: no   · Trend UOP and BUN/creat  · Strict I and O    ID:   · Trend temps and WBC count  · Maintain normothermia    Heme:   · Trend hgb and plts    Endo: · Glycemic control plan: History of diabetes: Continue insulin infusion today  Consult Endocrinology for management    MSK/Skin:  · Mobility goal: OOB and ambualating  · PT consult: yes  · OT consult: yes  · Frequent turning and pressure off-loading  · Local wound care as needed    Disposition:  Transfer to SD level 1  ---------------------------------------------------------------------------------------------------------------------------------------------------------------------  Allergies:    Allergies   Allergen Reactions    Pollen Extract Allergic Rhinitis     Medications:   Scheduled Meds:    Current Facility-Administered Medications:  acetaminophen 650 mg Oral Q4H PRN Marquise Tai PA-C    amiodarone 200 mg Oral [facility] Marquise Tai PA-C    aspirin 325 mg Oral Daily Marquise Tai PA-C    atorvastatin 80 mg Oral Daily With [employer] SINA Teran    bisacodyl 10 mg Rectal Daily PRN Marquisepaulino Tai PA-C    docusate sodium 100 mg Oral BID KONSTANTIN Arriaga    fondaparinux 2 5 mg Subcutaneous Daily Marquise Tai PA-C    furosemide 40 mg Intravenous Daily KONSTANTIN Arriaga    insulin regular (HumuLIN R,NovoLIN R) infusion 0 3-21 Units/hr Intravenous Titrated Marquise Tai PA-C Last Rate: 1 Units/hr (12/14/19 2011)   mupirocin 1 application Nasal [address] Marquisepaulino Tai PA-C    ondansetron 4 mg Intravenous Q6H PRN Marquise SINA Tai    oxyCODONE-acetaminophen 1 tablet Oral Q4H PRN Marquise Tai PA-C    oxyCODONE-acetaminophen 2 tablet Oral Q6H PRN Marquise Zaire PA-C    pantoprazole 40 mg Oral Early Morning Marquise Zaire, PA-C    phenylephine  mcg/min Intravenous Titrated Parker Bland PA-C Last Rate: Stopped (12/15/19 0442)   polyethylene glycol 17 g Oral Daily Marquise Zaire PA-C    potassium chloride 20 mEq Oral Daily KONSTANTIN Arriaga    sodium chloride 20 mL/hr Intravenous Continuous Marquise MICHAEL Tai-WENDY Last Rate: 20 mL/hr (12/13/19 1859)   temazepam 15 mg Oral HS PRN KONSTANTIN Gamble      VTE Pharmacologic Prophylaxis: Fondaparinux (Arixtra)  VTE Mechanical Prophylaxis: sequential compression device    Continuous Infusions:    insulin regular (HumuLIN R,NovoLIN R) infusion 0 3-21 Units/hr Last Rate: 1 Units/hr (12/14/19 2011)   phenylephine  mcg/min Last Rate: Stopped (12/15/19 0442)   sodium chloride 20 mL/hr Last Rate: 20 mL/hr (12/13/19 1859)     PRN Meds:    acetaminophen 650 mg Q4H PRN   bisacodyl 10 mg Daily PRN   ondansetron 4 mg Q6H PRN   oxyCODONE-acetaminophen 1 tablet Q4H PRN   oxyCODONE-acetaminophen 2 tablet Q6H PRN   temazepam 15 mg HS PRN     Home Medications:   Prior to Admission medications    Medication Sig Start Date End Date Taking? Authorizing Provider   amLODIPine (NORVASC) 10 mg tablet Take 1 tablet (10 mg total) by mouth daily 11/21/19   Josh Liang MD   aspirin 81 mg chewable tablet Chew 81 mg daily    Historical Provider, MD   atorvastatin (LIPITOR) 40 mg tablet Take 1 tablet (40 mg total) by mouth daily 12/9/19   Real Martins DO   Coenzyme Q10 (COQ10) 100 MG CAPS Take 1 capsule by mouth daily    Historical Provider, MD   Glucosamine-Chondroitin 750-600 MG TABS Take 1 tablet by mouth daily    Historical Provider, MD   hydrochlorothiazide (HYDRODIURIL) 25 mg tablet Take 1 tablet (25 mg total) by mouth daily 11/21/19   Jsoh Liang MD   metFORMIN (GLUCOPHAGE) 1000 MG tablet Take 1 tablet (1,000 mg total) by mouth 2 (two) times a day 11/21/19   Josh Liang MD   mupirocin (BACTROBAN) 2 % nasal ointment into each nostril 2 (two) times a day Apply to each nares twice daily starting 5 days before operation   12/9/19   Rudolph Estrada PA-C   Omega-3 Fatty Acids (FISH OIL) 645 MG CAPS Take 1 capsule by mouth 2 (two) times a day    Historical Provider, MD     ---------------------------------------------------------------------------------------------------------------------------------------------------------------------  Vitals: Vitals:    12/15/19 0100 12/15/19 0200 12/15/19 0300 12/15/19 0400   BP: 132/68 137/66 133/62 127/67   BP Location:    Right arm   Pulse: 66 64 66 66   Resp: 15 15 16 14   Temp:    98 8 °F (37 1 °C)   TempSrc:    Oral   SpO2: 97% 98% 97% 97%   Weight:       Height:         Tele Rhythm: NSR This was personally reviewed by myself  Respiratory:  SpO2: SpO2: 97 %, SpO2 Activity: SpO2 Activity: At Rest, SpO2 Device: O2 Device: Nasal cannula  O2 Flow Rate (L/min): 2 L/min    Temperature: Temp (24hrs), Av °F (37 2 °C), Min:98 8 °F (37 1 °C), Max:99 3 °F (37 4 °C)  Current: Temperature: 98 8 °F (37 1 °C)    Weights:   Weight (last 2 days)     Date/Time   Weight    19 0600   83 3 (183 64)    19 0936   77 1 (170)            Body mass index is 26 35 kg/m²  Intake and Outputs:    Intake/Output Summary (Last 24 hours) at 12/15/2019 0519  Last data filed at 12/15/2019 0501  Gross per 24 hour   Intake 1139 07 ml   Output 2165 ml   Net -1025 93 ml     I/O last 24 hours: In: 2218 2 [P O :440; I V :1278 2; IV Piggyback:500]  Out: 2447 [Urine:2715; Chest Tube:730]    Chest tube Output:  Mediastinal tubes: 160 mL/8 hours  370 mL/24 hours   Pleural tubes: 0 mL/8 hours  80 mL/24 hours     Labs:  Results from last 7 days   Lab Units 19  0406 19  1752 19  1750  19  1658  19  0743   WBC Thousand/uL 16 59*  --   --   --   --   --  6 43   HEMOGLOBIN g/dL 10 1*  --  11 7*  --   --   --  15 3   I STAT HEMOGLOBIN g/dl  --  10 2*  --    < >  --    < >  --    HEMATOCRIT % 30 1*  --  33 5*  --   --   --  44 9   HEMATOCRIT, ISTAT %  --  30*  --    < >  --    < >  --    PLATELETS Thousands/uL 242  --  101*  --  106*   < > 159    < > = values in this interval not displayed       Results from last 7 days   Lab Units 12/15/19  0410 19  0406 19  2143 19  1752 19  1750 19  1718 19  1645   SODIUM mmol/L 138 143  --   --  146*  --   --    POTASSIUM mmol/L 3 7 4 0 3 7 --  3 2*  --   --    CHLORIDE mmol/L 105 110*  --   --  115*  --   --    CO2 mmol/L 32 26  --   --  25  --   --    CO2, I-STAT mmol/L  --   --   --  26  --  26 30   BUN mg/dL 19 14  --   --  12  --   --    CREATININE mg/dL 1 01 0 91  --   --  0 79  --   --    CALCIUM mg/dL 8 5 7 8*  --   --  7 6*  --   --    GLUCOSE, ISTAT mg/dl  --   --   --  100  --  114 134     Baseline Creat: 0 9    Results from last 7 days   Lab Units 12/15/19  0410 12/14/19  0406   MAGNESIUM mg/dL 1 9 2 0          Results from last 7 days   Lab Units 12/13/19  1658 12/09/19  1328 12/09/19  0743   INR  1 60*  --  1 07   PTT seconds 36 31  --      Micro:   Blood Culture: No results found for: BLOODCX  Urine Culture: No results found for: URINECX  Sputum Culture: No components found for: SPUTUMCX  Wound Culure: No results found for: WOUNDCULT    Nutrition:        Diet Orders   (From admission, onward)             Start     Ordered    12/14/19 1309  Diet Cardiovascular; Cardiac; Fluid Restriction 1800 ML, Consistent Carbohydrate Diet Level 2 (5 carb servings/75 grams CHO/meal)  Diet effective 0500     Question Answer Comment   Diet Type Cardiovascular    Cardiac Cardiac    Other Restriction(s): Fluid Restriction 1800 ML    Other Restriction(s): Consistent Carbohydrate Diet Level 2 (5 carb servings/75 grams CHO/meal)    RD to adjust diet per protocol? Yes        12/14/19 1311              Physical Exam   Constitutional: He is oriented to person, place, and time  He appears well-developed and well-nourished  Non-toxic appearance  Cardiovascular: Normal rate, regular rhythm, S1 normal and S2 normal  Exam reveals no gallop and no friction rub  No murmur heard  Midline incision    Pulmonary/Chest: Effort normal and breath sounds normal  He has no wheezes  He has no rhonchi  He has no rales  Abdominal: Soft  Normal appearance  Neurological: He is alert and oriented to person, place, and time  He has normal strength     Skin: Skin is warm and dry  No rash noted  He is not diaphoretic  Psychiatric: He has a normal mood and affect  His speech is normal and behavior is normal        Invasive lines and devices: Invasive Devices     Central Venous Catheter Line            CVC Central Lines 12/13/19 Triple 1 day          Peripheral Intravenous Line            Peripheral IV 12/13/19 Left Arm 1 day          Line            Pacer Wires 1 day    Pacer Wires 1 day          Drain            Chest Tube 1 Left Pleural 32 Fr  1 day    Chest Tube 2 Posterior Mediastinal 32 Fr  1 day    Chest Tube 3 Anterior Mediastinal 32 Fr  1 day              ---------------------------------------------------------------------------------------------------------------------------------------------------------------------  Code Status: Level 1 - Full Code    Counseling / Coordination of Care  Total Critical Care time spent 0 minutes excluding procedures, teaching and family updates  Collaborative bedside rounds performed with cardiac surgery attending and bedside RN      SIGNATURE: Thomasina Saint, PA-C  DATE: December 15, 2019  TIME: 5:19 AM

## 2019-12-15 NOTE — CONSULTS
Consultation - Vladimir Howell 69 y o  male MRN: [id number]    Unit/Bed#: [facility] 426-01 Encounter: [id number]      Assessment/Plan     Assessment: This is a 69y o -year-old male with diabetes with hyperglycemia, coronary artery disease status post CABG postop day 2  Plan:  1  Type 2 diabetes with hyperglycemia- patient is currently on insulin infusion at 3 units/hour  He is tolerating food, will transition him to subcutaneous insulin regimen tonight  Start Lantus 25 units at bedtime, start Humalog 8 units with meals  -start Humalog scale with algorithm 4 with meals  -start Humalog scale with algorithm 2 at bedtime  -based on insulin requirement will decide whether he will need basal bolus insulin regimen for discharge  -discussed with patient about checking blood sugar more regularly at home 3-4 times daily    Lab Results   Component Value Date    CREATININE 1 01 12/15/2019     2  CAD status post CABG-followed by cardiothoracic surgery    3  Hypertension-   BP Readings from Last 3 Encounters:   12/15/19 114/56   12/09/19 116/62   12/03/19 138/82   Blood pressure well controlled  Continue current management    4  Hyperlipidemia-continue statins      Thank you for consulting Endocrinology, please do not hesitate to call if you have any questions    CC: Diabetes Consult    History of Present Illness     HPI: Vladimir Howell is a 69y o  year old male with type 2 diabetes, hypertension, hypercholesteremia and diabetic retinopathy presented for elective cardiac catheterization for exertional angina, which showed severe coronary artery disease, underwent CABG x4  Patient admits complication of diabetes such as coronary artery disease retinopathy  For type 2 diabetes he takes metformin only    He follows with his primary care physician for diabetes management  He was started on insulin infusion, currently getting insulin infusion at 4 units/hour      Lab Results   Component Value Date    HGBA1C 6 9 (H) 12/09/2019     Lab Results   Component Value Date    CREATININE 1 01 12/15/2019           Consults    Review of Systems   Constitutional: Positive for activity change and fatigue  Negative for diaphoresis, fever and unexpected weight change  HENT: Negative  Eyes: Negative for visual disturbance  Respiratory: Negative for cough, chest tightness and shortness of breath  Cardiovascular: Negative for chest pain, palpitations and leg swelling  Gastrointestinal: Negative for abdominal pain, constipation, diarrhea, nausea and vomiting  Endocrine: Negative for cold intolerance, heat intolerance, polydipsia, polyphagia and polyuria  Genitourinary: Negative for dysuria, enuresis, frequency and urgency  Musculoskeletal: Negative for arthralgias and myalgias  Skin: Negative for pallor, rash and wound  Allergic/Immunologic: Negative  Neurological: Negative for dizziness, tremors, weakness and numbness  Hematological: Negative  Psychiatric/Behavioral: Negative          Historical Information   Past Medical History:   Diagnosis Date    Chronic cough     RESOLVED: [id number]    Coronary artery disease     Diabetes mellitus (HCC)     Diabetic retinopathy ([facility] [address] )     HLD (hyperlipidemia)     HTN (hypertension)      Past Surgical History:   Procedure Laterality Date    CARDIAC CATHETERIZATION  12/09/2019    HAND SURGERY       Social History   Social History     Substance and Sexual Activity   Alcohol Use Yes    Comment: occasional     Social History     Substance and Sexual Activity   Drug Use No     Social History     Tobacco Use   Smoking Status Never Smoker   Smokeless Tobacco Never Used     Family History:   Family History   Problem Relation Age of Onset    Heart failure Mother     Heart failure Father     Heart disease Father     Heart attack Father     Diabetes Other        Meds/Allergies   Current Facility-Administered Medications   Medication Dose Route Frequency Provider Last Rate Last Dose    acetaminophen (TYLENOL) tablet 650 mg  650 mg Oral Q4H PRN KONSTANTIN York        amiodarone tablet 200 mg  200 mg Oral [facility] BuenrostroKONSTANTIN kelly   200 mg at 12/15/19 7318    aspirin tablet 325 mg  325 mg Oral Daily KONSTANTIN oYrk   325 mg at 12/15/19 4336    atorvastatin (LIPITOR) tablet 80 mg  80 mg Oral Daily With KONSTANTIN Phelan   80 mg at 12/14/19 1550    bisacodyl (DULCOLAX) rectal suppository 10 mg  10 mg Rectal Daily PRN KONSTANTIN York        docusate sodium (COLACE) capsule 100 mg  100 mg Oral BID KONSTANTIN York   100 mg at 12/15/19 0816    fondaparinux (ARIXTRA) subcutaneous injection 2 5 mg  2 5 mg Subcutaneous Daily KONSTANTIN York   2 5 mg at 12/15/19 3021    furosemide (LASIX) injection 40 mg  40 mg Intravenous BID (diuretic) Estrellita Jarrett PA-C        insulin regular (HumuLIN R,NovoLIN R) 1 Units/mL in sodium chloride 0 9 % 100 mL infusion  0 3-21 Units/hr Intravenous Titrated KONSTANTIN York 3 mL/hr at 12/15/19 0927 3 Units/hr at 12/15/19 0927    metoprolol tartrate (LOPRESSOR) partial tablet 12 5 mg  12 5 mg Oral Q12H [address] Estrellita Jarrett PA-C        mupirocin Thermon Ruts) 2 % nasal ointment 1 application  1 application Nasal B81Q AliKONSTANTIN lucero   1 application at 36/80/54 9193    ondansetron (ZOFRAN) injection 4 mg  4 mg Intravenous Q6H PRN KONSTANTIN York   4 mg at 12/14/19 0015    oxyCODONE-acetaminophen (PERCOCET) 5-325 mg per tablet 1 tablet  1 tablet Oral Q4H PRN KONSTANTIN York   1 tablet at 12/15/19 0975    oxyCODONE-acetaminophen (PERCOCET) 5-325 mg per tablet 2 tablet  2 tablet Oral Q6H PRN KONSTANTIN York   2 tablet at 12/14/19 2126    pantoprazole (PROTONIX) EC tablet 40 mg  40 mg Oral Early Morning KONSTANTIN York   40 mg at 12/15/19 6832    polyethylene glycol (MIRALAX) packet 17 g  17 g Oral Daily KONSTANTIN York   17 g at 12/15/19 0522    potassium chloride (K-DUR,KLOR-CON) CR tablet 20 mEq  20 mEq Oral BID Pa Santana SINA Teran   20 mEq at 12/15/19 8683    sodium chloride infusion 0 45 %  20 mL/hr Intravenous Continuous KONSTANTIN Eric 20 mL/hr at 12/13/19 1859 20 mL/hr at 12/13/19 1859    temazepam (RESTORIL) capsule 15 mg  15 mg Oral HS PRN KONSTANTIN Eric         Allergies   Allergen Reactions    Pollen Extract Allergic Rhinitis       Objective   Vitals: Blood pressure 114/56, pulse 71, temperature 99 6 °F (37 6 °C), temperature source Oral, resp  rate 21, height 5' 10" (1 778 m), weight 69 4 kg (153 lb), SpO2 92 %  Intake/Output Summary (Last 24 hours) at 12/15/2019 1105  Last data filed at 12/15/2019 2036  Gross per 24 hour   Intake 1128 47 ml   Output 1630 ml   Net -501 53 ml     Invasive Devices     Central Venous Catheter Line            CVC Central Lines 12/13/19 Triple 1 day          Peripheral Intravenous Line            Peripheral IV 12/13/19 Left Arm 1 day          Line            Pacer Wires 1 day    Pacer Wires 1 day          Drain            Chest Tube 1 Left Pleural 32 Fr  1 day    Chest Tube 2 Posterior Mediastinal 32 Fr  1 day    Chest Tube 3 Anterior Mediastinal 32 Fr  1 day                Physical Exam   Constitutional: He is oriented to person, place, and time  He appears well-developed and well-nourished  No distress  HENT:   Head: Normocephalic and atraumatic  Eyes: Pupils are equal, round, and reactive to light  EOM are normal    Neck: Normal range of motion  Neck supple  No thyromegaly present  Cardiovascular: Normal rate and regular rhythm  Pulmonary/Chest: Effort normal and breath sounds normal  No respiratory distress  Musculoskeletal: Normal range of motion  He exhibits no edema or deformity  Neurological: He is alert and oriented to person, place, and time  He displays normal reflexes  Skin: Skin is warm and dry  No erythema  Psychiatric: He has a normal mood and affect  Vitals reviewed  The history was obtained from the review of the chart, patient      Lab Results:   Results from last 7 days   Lab Units 12/09/19  1328   HEMOGLOBIN A1C % 6 9*     Lab Results   Component Value Date    WBC 13 33 (H) 12/15/2019    HGB 9 7 (L) 12/15/2019    HCT 29 7 (L) 12/15/2019    MCV 92 12/15/2019     12/15/2019     Lab Results   Component Value Date/Time    BUN 19 12/15/2019 04:10 AM    BUN 23 05/14/2014 07:59 AM     05/14/2014 07:59 AM    K 3 7 12/15/2019 04:10 AM    K 4 3 05/14/2014 07:59 AM     12/15/2019 04:10 AM     05/14/2014 07:59 AM    CO2 32 12/15/2019 04:10 AM    CO2 26 12/13/2019 05:52 PM    CREATININE 1 01 12/15/2019 04:10 AM    CREATININE 0 85 05/14/2014 07:59 AM    AST 10 01/22/2019 08:37 AM    AST 15 05/14/2014 07:59 AM    ALT 21 01/22/2019 08:37 AM    ALT 22 05/14/2014 07:59 AM    ALB 4 0 01/22/2019 08:37 AM    ALB 3 9 05/14/2014 07:59 AM     No results for input(s): CHOL, HDL, LDL, TRIG, VLDL in the last 72 hours  No results found for: [facility]  POC Glucose (mg/dl)   Date Value   12/15/2019 201 (H)   12/15/2019 215 (H)   12/15/2019 145 (H)   12/15/2019 151 (H)   12/15/2019 144 (H)   12/15/2019 166 (H)   12/14/2019 170 (H)   12/14/2019 150 (H)   12/14/2019 125   12/14/2019 92       Imaging Studies: I have personally reviewed pertinent reports  Portions of the record may have been created with voice recognition software

## 2019-12-15 NOTE — PROGRESS NOTES
Progress Note - Electrophysiology-Cardiology (EP)   Ernestina Duverney 69 y o  male MRN: [id number]  Unit/Bed#: [facility] 426-01 Encounter: [id number]      Assessment:  1  CAD status post CABG x 4 (LIMA to LAD, SVG to D1, SVG to OM1, and SVG to RPDA) 12/13/2019  2  Preserved LV systolic function with EF 60% per echo 12/9/2019  3  Atrial fibrillation with periods of rapid ventricular response   A ) beginning this morning at 9:38   B ) patient asymptomatic  4  Hypertension  5  Hyperlipidemia  6  Diabetes mellitus type 2  7  Acute blood loss anemia, currently being monitored    Plan:  1  He went into atrial fibrillation earlier this morning, however he remains asymptomatic  He had been started on oral amiodarone in the post op setting, and now has been started on amio gtt per CT surgery  He has an elevated CHADS2 Vasc score, and will likely need anticoagulation  Will defer to the primary team to begin  2  He is otherwise doing well in the post op setting, continue current regimen including aspirin, atorvastatin, beta blocker, and lasix  3  Will continue to follow along with CT surgery  Subjective/Objective   Chief Complaint:  Patient feeling better overall  His chest pain is improving every day  He denies dyspnea, palpitations, dizziness, or lightheadedness  He was transferred out of the unit earlier today, ambulated well yesterday however has not yet ambulated today      Subjective:     Objective:     Vitals: /56 (BP Location: Left arm) Comment: Map 81  Pulse 71   Temp 99 6 °F (37 6 °C) (Oral)   Resp 21   Ht 5' 10" (1 778 m)   Wt 69 4 kg (153 lb)   SpO2 92%   BMI 21 95 kg/m²   Vitals:    12/14/19 0600 12/15/19 0600   Weight: 83 3 kg (183 lb 10 3 oz) 69 4 kg (153 lb)     Orthostatic Blood Pressures      Most Recent Value   Blood Pressure  114/56 [Map 81] filed at 12/15/2019 0754   Patient Position - Orthostatic VS  Sitting filed at 12/15/2019 0754            Intake/Output Summary (Last 24 hours) at 12/15/2019 1009  Last data filed at 12/15/2019 7048  Gross per 24 hour   Intake 1128 47 ml   Output 1630 ml   Net -501 53 ml       Invasive Devices     Central Venous Catheter Line            CVC Central Lines 12/13/19 Triple 1 day          Peripheral Intravenous Line            Peripheral IV 12/13/19 Left Arm 1 day          Line            Pacer Wires 1 day    Pacer Wires 1 day          Drain            Chest Tube 1 Left Pleural 32 Fr  1 day    Chest Tube 2 Posterior Mediastinal 32 Fr  1 day    Chest Tube 3 Anterior Mediastinal 32 Fr  1 day                            Scheduled Meds:  Current Facility-Administered Medications:  acetaminophen 650 mg Oral Q4H PRN Winn KONSTANTIN Pratt    amiodarone 200 mg Oral Q8H [address] Vi Buenrostro, CRCHELSEA    aspirin 325 mg Oral Daily Winn KONSTANTIN Pratt    atorvastatin 80 mg Oral Daily With Shahbaz Hendrickson, KONSTANTIN    bisacodyl 10 mg Rectal Daily PRN Winn KONSTANTIN Pratt    docusate sodium 100 mg Oral BID Vi Buenrostro, KONSTANTIN    fondaparinux 2 5 mg Subcutaneous Daily Vi KONSTANTIN Buenrostro    furosemide 40 mg Intravenous BID (diuretic) Allan Bravo PA-C    insulin regular (HumuLIN R,NovoLIN R) infusion 0 3-21 Units/hr Intravenous Titrated Winn KONSTANTIN Pratt Last Rate: 3 Units/hr (12/15/19 1063)   metoprolol tartrate 12 5 mg Oral Q12H [address] Allan Bravo PA-C    mupirocin 1 application Nasal S91L [place], CRNP    ondansetron 4 mg Intravenous Q6H PRN Winn KONSTANTIN Pratt    oxyCODONE-acetaminophen 1 tablet Oral Q4H PRN Winn KONSTANTIN Pratt    oxyCODONE-acetaminophen 2 tablet Oral Q6H PRN Winn KONSTANTIN Pratt    pantoprazole 40 mg Oral Early Morning Vi Buenrostro, CRCHELSEA    polyethylene glycol 17 g Oral Daily Vi BuenrostroKONSTANTIN    potassium chloride 20 mEq Oral BID Allan Bravo PA-C    sodium chloride 20 mL/hr Intravenous Continuous KONSTANTIN Lee Last Rate: 20 mL/hr (12/13/19 0539)   temazepam 15 mg Oral HS PRN Winn KONSTANTIN rPatt      Continuous Infusions:  insulin regular (HumuLIN R,NovoLIN R) infusion 0 3-21 Units/hr Last Rate: 3 Units/hr (12/15/19 0927)   sodium chloride 20 mL/hr Last Rate: 20 mL/hr (12/13/19 1859)     PRN Meds:   acetaminophen    bisacodyl    ondansetron    oxyCODONE-acetaminophen    oxyCODONE-acetaminophen    temazepam    Review of Systems: Cardiovascular ROS: positive for - sternal incision pain  negative for - dyspnea on exertion or palpitations    Physical Exam:   GEN: NAD, alert and oriented, well appearing  SKIN: dry without significant lesions or rashes  HEENT: NCAT, PERRL, EOMs intact  NECK: No JVD or carotid bruits appreciated  CARDIOVASCULAR: RRR, normal S1, S2 without murmurs, rubs, or gallops appreciated  LUNGS: Clear to auscultation bilaterally without wheezes, rhonchi, or rales  ABDOMEN: Soft, nontender, nondistended  EXTREMITIES/VASCULAR: perfused without clubbing, cyanosis, or edema b/l  PSYCH: Normal mood and affect  NEURO: CN ll-Xll grossly intact                Lab Results: I have personally reviewed pertinent lab results  Results from last 7 days   Lab Units 12/15/19  0410 12/14/19  0406 12/13/19 1752 12/13/19 1750 12/09/19  0743   WBC Thousand/uL 13 33* 16 59*  --   --   --  6 43   HEMOGLOBIN g/dL 9 7* 10 1*  --  11 7*  --  15 3   I STAT HEMOGLOBIN g/dl  --   --  10 2*  --    < >  --    HEMATOCRIT % 29 7* 30 1*  --  33 5*  --  44 9   HEMATOCRIT, ISTAT %  --   --  30*  --    < >  --    PLATELETS Thousands/uL 152 242  --  101*   < > 159    < > = values in this interval not displayed       Results from last 7 days   Lab Units 12/15/19  0410 12/14/19  0406 12/13/19  2143 12/13/19 1752 12/13/19  1750   POTASSIUM mmol/L 3 7 4 0 3 7  --  3 2*   CHLORIDE mmol/L 105 110*  --   --  115*   CO2 mmol/L 32 26  --   --  25   CO2, I-STAT mmol/L  --   --   --  26  --    BUN mg/dL 19 14  --   --  12   CREATININE mg/dL 1 01 0 91  --   --  0 79   GLUCOSE, ISTAT mg/dl  --   --   --  100  --    CALCIUM mg/dL 8 5 7 8*  --   --  7 6*     Results from last 7 days Lab Units 19  1658 19  1328 19  0743   INR  1 60*  --  1 07   PTT seconds 36 31  --      Results from last 7 days   Lab Units 12/15/19  0410 19  0406   MAGNESIUM mg/dL 1 9 2 0         Imaging: I have personally reviewed pertinent reports  Results for orders placed during the hospital encounter of 19   Echo complete with contrast if indicated    Narrative [address]  [facility] [place], [address] [facility]  [phone]    Transthoracic Echocardiogram  2D, M-mode, Doppler, and Color Doppler    Study date:  09-Dec-2019    Patient: Filipe Segura  MR number: [id number]  Account number: [de-identified]  : 1945  Age: 69 years  Gender: Male  Status: Outpatient  Location: Bedside  Height: 70 in  Weight: 178 lb  BP: 116/ 62 mmHg    Indications: Coronary artery disease  Diagnoses: I25 118 - Atherosclerotic heart disease of native coronary artery with other forms of angina pectoris    Sonographer:  Niki Whitmore RDCS  Primary Physician:  Adi Lake MD  Referring Physician:  Rahul Sheikh PA-C  Group:  [id number]  Cardiology Fellow: Rangel Finley MD  Interpreting Physician:  Mj Moreno MD    SUMMARY    LEFT VENTRICLE:  Systolic function was normal  Ejection fraction was estimated to be 60 %  There were no regional wall motion abnormalities  There was no evidence of concentric hypertrophy  MITRAL VALVE:  There was trace regurgitation  AORTIC VALVE:  There was trace regurgitation  TRICUSPID VALVE:  There was trace regurgitation  HISTORY: PRIOR HISTORY: Hypertension, diabetes mellitus type 2, hyperlipidemia, stable angina pectoris, cardiac catheterization 19  Family history of heart failure, coronary artery disease, myocardial infarction, and diabetes  mellitus  PROCEDURE: The procedure was performed at the bedside  This was a stat study  The transthoracic approach was used   The study included complete 2D imaging, M-mode, complete spectral Doppler, and color Doppler  The heart rate was 72 bpm, at  the start of the study  Images were obtained from the parasternal, apical, subcostal, and suprasternal notch acoustic windows  Echocardiographic views were limited due to poor acoustic window availability  Image quality was adequate  LEFT VENTRICLE: Size was normal  Systolic function was normal  Ejection fraction was estimated to be 60 %  There were no regional wall motion abnormalities  Wall thickness was normal  There was no evidence of concentric hypertrophy  DOPPLER: Left ventricular diastolic function parameters were normal     RIGHT VENTRICLE: The size was normal  Systolic function was normal  Wall thickness was normal     LEFT ATRIUM: Size was normal     RIGHT ATRIUM: Size was normal     MITRAL VALVE: Valve structure was normal  There was normal leaflet separation  DOPPLER: The transmitral velocity was within the normal range  There was no evidence for stenosis  There was trace regurgitation  AORTIC VALVE: The valve was trileaflet  Leaflets exhibited normal thickness and normal cuspal separation  DOPPLER: Transaortic velocity was within the normal range  There was no evidence for stenosis  There was trace regurgitation  TRICUSPID VALVE: The valve structure was normal  There was normal leaflet separation  DOPPLER: The transtricuspid velocity was within the normal range  There was no evidence for stenosis  There was trace regurgitation  Pulmonary artery  systolic pressure was within the normal range  Estimated peak PA pressure was 25 mmHg  PULMONIC VALVE: Leaflets exhibited normal thickness, no calcification, and normal cuspal separation  DOPPLER: The transpulmonic velocity was within the normal range  There was trace regurgitation  PERICARDIUM: There was no pericardial effusion  The pericardium was normal in appearance  AORTA: The root exhibited normal size   The ascending aorta was normal in size  SYSTEMIC VEINS: IVC: The inferior vena cava was normal in size  SYSTEM MEASUREMENT TABLES    2D mode  AV Diam: 3 2 cm  AoR Diam; Mean (2D): 3 2 cm  LA Diam (2D): 3 8 cm  LA Dimension; Mean (2D): 3 8 cm  LA/Ao (2D): 1 19  EDV (2D-Cubed): 104 cm3  EF (2D-Cubed): 68 5 %  ESV (2D-Cubed): 32 8 cm3  FS (2D-Cubed): 31 9 %  FS (2D-Teich): 31 9 %  IVS/LVPW (2D): 1  IVSd (2D): 0 8 cm  IVSd; Mean chosen (2D): 0 8 cm  LVIDd (2D): 4 7 cm  LVIDd; Mean (2D): 4 7 cm  LVIDs (2D): 3 2 cm  LVIDs; Mean (2D): 3 2 cm  LVPWd (2D): 0 8 cm  LVPWd; Mean (2D): 0 8 cm  Left Ventricular Ejection Fraction; Teichholz; 2D mode;: 59 8 %  Left Ventricular End Diastolic Volume; Teichholz; 2D mode;: 102 cm3  Left Ventricular End Systolic Volume; Teichholz; 2D mode;: 41 cm3  SV (2D-Cubed): 71 2 cm3  Stroke Volume; Teichholz; 2D mode;: 61 cm3  RVIDd (2D): 2 7 cm  RVIDd; Mean (2D): 2 7 cm  Right and Left Ventricular End Diastolic Diameter Ratio; 2D mode;: 0 57    Apical four chamber  Left Atrium MOD Diam; Most recent value chosen; End Systole; Apical four chamber;: 1 83 cm  Left Atrium MOD Diam; Most recent value chosen; End Systole; Apical four chamber;: 1 24 cm  Left Atrium MOD Diam; Most recent value chosen; End Systole; Apical four chamber;: 1 83 cm  Left Atrium MOD Diam; Most recent value chosen; End Systole; Apical four chamber;: 3 24 cm  Left Atrium MOD Diam; Most recent value chosen; End Systole; Apical four chamber;: 3 48 cm  Left Atrium MOD Diam; Most recent value chosen; End Systole; Apical four chamber;: 3 67 cm  Left Atrium MOD Diam; Most recent value chosen; End Systole; Apical four chamber;: 3 86 cm  Left Atrium MOD Diam; Most recent value chosen; End Systole; Apical four chamber;: 3 94 cm  Left Atrium MOD Diam; Most recent value chosen; End Systole; Apical four chamber;: 3 94 cm  Left Atrium MOD Diam; Most recent value chosen; End Systole;  Apical four chamber;: 3 96 cm  Left Atrium MOD Diam; Most recent value chosen; End Systole; Apical four chamber;: 3 96 cm  Left Atrium MOD Diam; Most recent value chosen; End Systole; Apical four chamber;: 3 94 cm  Left Atrium MOD Diam; Most recent value chosen; End Systole; Apical four chamber;: 3 83 cm  Left Atrium MOD Diam; Most recent value chosen; End Systole; Apical four chamber;: 3 75 cm  Left Atrium MOD Diam; Most recent value chosen; End Systole; Apical four chamber;: 3 61 cm  Left Atrium MOD Diam; Most recent value chosen; End Systole; Apical four chamber;: 3 48 cm  Left Atrium MOD Diam; Most recent value chosen; End Systole; Apical four chamber;: 3 29 cm  Left Atrium MOD Diam; Most recent value chosen; End Systole; Apical four chamber;: 2 99 cm  Left Atrium MOD Diam; Most recent value chosen; End Systole; Apical four chamber;: 2 7 cm  Left Atrium MOD Diam; Most recent value chosen; End Systole; Apical four chamber;: 2 4 cm  Left Atrium Systolic Area; Most recent value chosen; Method of Disks, Single Plane; 2D mode; Apical four chamber;: 1320 mm2  Left Atrium Systolic Volume; Most recent value chosen; Method of Disks, Single Plane; 2D mode; Apical four chamber;: 35 6 cm3  Left Atrium systolic major axis; Most recent value chosen; Method of Disks, Single Plane; 2D mode; Apical four chamber;: 4 05 cm  LV MOD Diam; Recent value; End Diastole (A4C): 2 61 cm  LV MOD Diam; Recent value; End Diastole (A4C): 1 82 cm  LV MOD Diam; Recent value; End Diastole (A4C): 3 2 cm  LV MOD Diam; Recent value; End Diastole (A4C): 3 6 cm  LV MOD Diam; Recent value; End Diastole (A4C): 3 89 cm  LV MOD Diam; Recent value; End Diastole (A4C): 4 17 cm  LV MOD Diam; Recent value; End Diastole (A4C): 4 39 cm  LV MOD Diam; Recent value; End Diastole (A4C): 4 65 cm  LV MOD Diam; Recent value; End Diastole (A4C): 4 81 cm  LV MOD Diam; Recent value; End Diastole (A4C): 4 83 cm  LV MOD Diam; Recent value; End Diastole (A4C): 4 58 cm  LV MOD Diam; Recent value;  End Diastole (A4C): 4 21 cm  LV MOD Diam; Recent value; End Diastole (A4C): 4 25 cm  LV MOD Diam; Recent value; End Diastole (A4C): 4 56 cm  LV MOD Diam; Recent value; End Diastole (A4C): 4 92 cm  LV MOD Diam; Recent value; End Diastole (A4C): 5 13 cm  LV MOD Diam; Recent value; End Diastole (A4C): 5 15 cm  LV MOD Diam; Recent value; End Diastole (A4C): 5 06 cm  LV MOD Diam; Recent value; End Diastole (A4C): 4 75 cm  LV MOD Diam; Recent value; End Diastole (A4C): 2 47 cm  LV MOD Diam; Recent value; End Systole (A4C): 3 93 cm  LV MOD Diam; Recent value; End Systole (A4C): 3 62 cm  LV MOD Diam; Recent value; End Systole (A4C): 3 79 cm  LV MOD Diam; Recent value; End Systole (A4C): 3 85 cm  LV MOD Diam; Recent value; End Systole (A4C): 3 93 cm  LV MOD Diam; Recent value; End Systole (A4C): 3 95 cm  LV MOD Diam; Recent value; End Systole (A4C): 1 04 cm  LV MOD Diam; Recent value; End Systole (A4C): 1 54 cm  LV MOD Diam; Recent value; End Systole (A4C): 1 91 cm  LV MOD Diam; Recent value; End Systole (A4C): 2 29 cm  LV MOD Diam; Recent value; End Systole (A4C): 2 54 cm  LV MOD Diam; Recent value; End Systole (A4C): 2 79 cm  LV MOD Diam; Recent value; End Systole (A4C): 2 97 cm  LV MOD Diam; Recent value; End Systole (A4C): 3 08 cm  LV MOD Diam; Recent value; End Systole (A4C): 3 2 cm  LV MOD Diam; Recent value; End Systole (A4C): 3 25 cm  LV MOD Diam; Recent value; End Systole (A4C): 3 31 cm  LV MOD Diam; Recent value; End Systole (A4C): 3 37 cm  LV MOD Diam; Recent value; End Systole (A4C): 3 47 cm  LV MOD Diam; Recent value; End Systole (A4C): 3 89 cm  LVEF MOD A4C: 55 2 %  Left Ventricle diastolic major axis; Most recent value chosen; Method of Disks, Single Plane; 2D mode; Apical four chamber;: 8 75 cm  Left Ventricle systolic major axis; Most recent value chosen; Method of Disks, Single Plane; 2D mode; Apical four chamber;: 6 93 cm  Left Ventricular Diastolic Area; Most recent value chosen; Method of Disks, Single Plane; 2D mode;  Apical four chamber;: 5762 mm2  Left Ventricular End Diastolic Volume; Most recent value chosen; Method of Disks, Single Plane; 2D mode; Apical four chamber;: 124 cm3  Left Ventricular End Systolic Volume; Most recent value chosen; Method of Disks, Single Plane; 2D mode; Apical four chamber;: 55 5 cm3  Left Ventricular Systolic Area; Most recent value chosen; Method of Disks, Single Plane; 2D mode; Apical four chamber;: 2080 mm2  SV MOD A4C: 68 5 cm3  Right Atrium Systolic Area; End Systole; 2D mode; Apical four chamber;: 1000 mm2  Right Atrium Systolic Area; Mean; Mean value chosen; End Systole; 2D mode; Apical four chamber;: 1000 mm2    Tissue Doppler Imaging  LV Peak Early Ortez Tissue Daljit; Medial MA (TDI): 49 mm/s  Left Ventricular Peak Early Diastolic Tissue Velocity; Mean; Mean value chosen; Medial Mitral Annulus; Tissue Doppler Imaging;: 49 mm/s    Unspecified Scan Mode  Dec Dubois; Mean; Regurgitant Flow: 1320 mm/s2  Dec Dubois; Regurgitant Flow: 996 mm/s2  Dec Dubois; Regurgitant Flow: 1630 mm/s2  Dec Dubois; Regurgitant Flow: 1320 mm/s2  PHT; Mean; Regurgitant Flow: 460 ms  PHT; Regurgitant Flow: 355 ms  PHT; Regurgitant Flow: 371 ms  PHT; Regurgitant Flow: 655 ms  Peak Grad; Mean; Regurgitant Flow: 15 mm[Hg]  Vmax; Mean; Regurgitant Flow: 1960 mm/s  Vmax; Regurgitant Flow: 1980 mm/s  Vmax; Regurgitant Flow: 1670 mm/s  Vmax; Regurgitant Flow: 2230 mm/s  MV Peak Daljit/LV Peak Tissue Daljit E-Wave; Medial MA: 13  DT; Antegrade Flow: 301 ms  DT; Mean; Antegrade Flow: 301 ms  Dec Dubois; Antegrade Flow: 2110 mm/s2  Dec Dubois; Mean; Antegrade Flow: 2110 mm/s2  MV A Daljit: 844 mm/s  MV E Daljit: 635 mm/s  MV E/A Ratio: 0 8  MV Peak A Daljit: 844 mm/s  MV Peak E Daljit; Mean; Antegrade Flow: 635 mm/s  MVA (PHT): 250 mm2  PHT: 88 ms  PHT;  Mean: 88 ms  End Ortez Daljit; Mean; Regurgitant Flow: 950 mm/s  End Ortez Daljit; Regurgitant Flow: 950 mm/s  Peak Gradient; Mean; Mean value chosen; Regurgitant Flow; End Diastole;: 4 mm[Hg]  Peak Grad; Mean; Regurgitant Flow: 16 mm[Hg]  Vmax; Mean; Regurgitant Flow: 2010 mm/s  Vmax; Regurgitant Flow: 1820 mm/s  Vmax; Regurgitant Flow: 2190 mm/s    [facility]    Prepared and electronically signed by    Tenisha Suazo MD  Signed 09-Dec-2019 17:26:57         EKG/telemetry: currently in atrial fibrillation with elevated heart rate, prior to that normal sinus rhythm with rates in the 70s    VTE Pharmacologic Prophylaxis: Fondaparinux (Arixtra)

## 2019-12-16 LAB
ANION GAP SERPL CALCULATED.3IONS-SCNC: 4 MMOL/L (ref 4–13)
BUN SERPL-MCNC: 19 MG/DL (ref 5–25)
CALCIUM SERPL-MCNC: 8.9 MG/DL (ref 8.3–10.1)
CHLORIDE SERPL-SCNC: 102 MMOL/L (ref 100–108)
CO2 SERPL-SCNC: 28 MMOL/L (ref 21–32)
CREAT SERPL-MCNC: 1 MG/DL (ref 0.6–1.3)
GFR SERPL CREATININE-BSD FRML MDRD: 75 ML/MIN/1.73SQ M
GLUCOSE SERPL-MCNC: 173 MG/DL (ref 65–140)
GLUCOSE SERPL-MCNC: 201 MG/DL (ref 65–140)
GLUCOSE SERPL-MCNC: 224 MG/DL (ref 65–140)
GLUCOSE SERPL-MCNC: 97 MG/DL (ref 65–140)
GLUCOSE SERPL-MCNC: 99 MG/DL (ref 65–140)
INR PPP: 1.28 (ref 0.84–1.19)
POTASSIUM SERPL-SCNC: 3.8 MMOL/L (ref 3.5–5.3)
PROTHROMBIN TIME: 15.6 SECONDS (ref 11.6–14.5)
SODIUM SERPL-SCNC: 134 MMOL/L (ref 136–145)

## 2019-12-16 PROCEDURE — 97530 THERAPEUTIC ACTIVITIES: CPT

## 2019-12-16 PROCEDURE — 99024 POSTOP FOLLOW-UP VISIT: CPT | Performed by: PHYSICIAN ASSISTANT

## 2019-12-16 PROCEDURE — 99232 SBSQ HOSP IP/OBS MODERATE 35: CPT | Performed by: INTERNAL MEDICINE

## 2019-12-16 PROCEDURE — 80048 BASIC METABOLIC PNL TOTAL CA: CPT | Performed by: PHYSICIAN ASSISTANT

## 2019-12-16 PROCEDURE — 85610 PROTHROMBIN TIME: CPT | Performed by: PHYSICIAN ASSISTANT

## 2019-12-16 PROCEDURE — 97535 SELF CARE MNGMENT TRAINING: CPT

## 2019-12-16 PROCEDURE — 99024 POSTOP FOLLOW-UP VISIT: CPT | Performed by: THORACIC SURGERY (CARDIOTHORACIC VASCULAR SURGERY)

## 2019-12-16 PROCEDURE — 82948 REAGENT STRIP/BLOOD GLUCOSE: CPT

## 2019-12-16 RX ORDER — POTASSIUM CHLORIDE 14.9 MG/ML
20 INJECTION INTRAVENOUS ONCE
Status: COMPLETED | OUTPATIENT
Start: 2019-12-16 | End: 2019-12-16

## 2019-12-16 RX ADMIN — INSULIN LISPRO 4 UNITS: 100 INJECTION, SOLUTION INTRAVENOUS; SUBCUTANEOUS at 11:57

## 2019-12-16 RX ADMIN — FUROSEMIDE 40 MG: 10 INJECTION, SOLUTION INTRAMUSCULAR; INTRAVENOUS at 17:40

## 2019-12-16 RX ADMIN — AMIODARONE HYDROCHLORIDE 200 MG: 200 TABLET ORAL at 05:12

## 2019-12-16 RX ADMIN — POTASSIUM CHLORIDE 20 MEQ: 1500 TABLET, EXTENDED RELEASE ORAL at 09:00

## 2019-12-16 RX ADMIN — PANTOPRAZOLE SODIUM 40 MG: 40 TABLET, DELAYED RELEASE ORAL at 05:13

## 2019-12-16 RX ADMIN — INSULIN LISPRO 8 UNITS: 100 INJECTION, SOLUTION INTRAVENOUS; SUBCUTANEOUS at 09:01

## 2019-12-16 RX ADMIN — POTASSIUM CHLORIDE 20 MEQ: 14.9 INJECTION, SOLUTION INTRAVENOUS at 13:33

## 2019-12-16 RX ADMIN — METOPROLOL TARTRATE 12.5 MG: 25 TABLET ORAL at 13:26

## 2019-12-16 RX ADMIN — MUPIROCIN 1 APPLICATION: 20 OINTMENT TOPICAL at 21:46

## 2019-12-16 RX ADMIN — FONDAPARINUX SODIUM 2.5 MG: 2.5 INJECTION, SOLUTION SUBCUTANEOUS at 09:01

## 2019-12-16 RX ADMIN — FUROSEMIDE 40 MG: 10 INJECTION, SOLUTION INTRAMUSCULAR; INTRAVENOUS at 09:01

## 2019-12-16 RX ADMIN — INSULIN LISPRO 8 UNITS: 100 INJECTION, SOLUTION INTRAVENOUS; SUBCUTANEOUS at 11:57

## 2019-12-16 RX ADMIN — AMIODARONE HYDROCHLORIDE 200 MG: 200 TABLET ORAL at 13:26

## 2019-12-16 RX ADMIN — MUPIROCIN 1 APPLICATION: 20 OINTMENT TOPICAL at 09:01

## 2019-12-16 RX ADMIN — DOCUSATE SODIUM 100 MG: 100 CAPSULE, LIQUID FILLED ORAL at 17:41

## 2019-12-16 RX ADMIN — POLYETHYLENE GLYCOL 3350 17 G: 17 POWDER, FOR SOLUTION ORAL at 09:01

## 2019-12-16 RX ADMIN — INSULIN GLARGINE 25 UNITS: 100 INJECTION, SOLUTION SUBCUTANEOUS at 21:46

## 2019-12-16 RX ADMIN — DOCUSATE SODIUM 100 MG: 100 CAPSULE, LIQUID FILLED ORAL at 09:01

## 2019-12-16 RX ADMIN — INSULIN LISPRO 4 UNITS: 100 INJECTION, SOLUTION INTRAVENOUS; SUBCUTANEOUS at 06:23

## 2019-12-16 RX ADMIN — AMIODARONE HYDROCHLORIDE 200 MG: 200 TABLET ORAL at 21:46

## 2019-12-16 RX ADMIN — METOPROLOL TARTRATE 12.5 MG: 25 TABLET ORAL at 09:00

## 2019-12-16 RX ADMIN — METOPROLOL TARTRATE 25 MG: 25 TABLET, FILM COATED ORAL at 21:46

## 2019-12-16 RX ADMIN — ASPIRIN 325 MG ORAL TABLET 325 MG: 325 PILL ORAL at 09:00

## 2019-12-16 RX ADMIN — INSULIN LISPRO 8 UNITS: 100 INJECTION, SOLUTION INTRAVENOUS; SUBCUTANEOUS at 17:42

## 2019-12-16 RX ADMIN — POTASSIUM CHLORIDE 20 MEQ: 1500 TABLET, EXTENDED RELEASE ORAL at 17:41

## 2019-12-16 RX ADMIN — ATORVASTATIN CALCIUM 80 MG: 80 TABLET, FILM COATED ORAL at 17:40

## 2019-12-16 NOTE — RESTORATIVE TECHNICIAN NOTE
Restorative Specialist Mobility Note       Activity: Ambulate in avila, Chair     Assistive Device: Front wheel walker

## 2019-12-16 NOTE — PLAN OF CARE
Problem: OCCUPATIONAL THERAPY ADULT  Goal: Performs self-care activities at highest level of function for planned discharge setting  See evaluation for individualized goals  Description  Treatment Interventions: ADL retraining, Functional transfer training, Endurance training, Patient/family training, Equipment evaluation/education, Compensatory technique education, Activityengagement, Energy conservation, Cardiac education          See flowsheet documentation for full assessment, interventions and recommendations  Outcome: Completed  Note:   Limitation: Decreased ADL status, Decreased Safe judgement during ADL, Decreased endurance, Decreased high-level ADLs  Prognosis: Good  Assessment: Patient participated in Skilled OT session this date with interventions consisting of ADL re training with the use of correct body mechnaics, safety awareness and fall prevention techniques, maintaining sternal precautions and increase cardiovascular endurance   Patient agreeable to OT treatment session, upon arrival patient was found seated OOB to Chair  In comparison to previous session, patient with improvements in transfers, activity tolerance, ADL status*   Patient requiring ocassional safety reminders  Patient has attained all treatment goals and is now demonstrating ability to complete UB/LB ADLs at supervision, with the use of Rolling Walker  Patient was educated on sternal/cardiac precautions , patient verbalized understanding and demonstrated recommended plan correctly  Patient appears to be functioning at baseline  No further acute OT needs identified at this time to warrant continuation of services  D/C OT services  From OT standpoint, recommendation at time of d/c would be Home with family support  OT Discharge Recommendation: Home with family support  OT - OK to Discharge:  Yes

## 2019-12-16 NOTE — OCCUPATIONAL THERAPY NOTE
OccupationalTherapy Progress Note     Patient Name: Terrence Stevenson  PMISD'F Date: 12/16/2019  Problem List  Principal Problem: 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 [facility])  Active Problems:    Benign essential hypertension    Type 2 diabetes mellitus (HCC)    Hypercholesteremia    Diabetic retinopathy (HCC)    S/P CABG x 4          12/16/19 1046   Restrictions/Precautions   Weight Bearing Precautions Per Order No   Other Precautions Cardiac/sternal;Multiple lines;Telemetry   General   Response to Previous Treatment Patient with no complaints from previous session   Lifestyle   Autonomy Pt reports being I w/ all ADLS and IADLS; (+) drives PTA   Reciprocal Relationships Pt lives w/ spouse who is home and in good health to provide A as needed  Service to Others Pt is retired   Intrinsic Gratification Pt reports enjoying staying active and remodling projects   Pain Assessment   Pain Assessment No/denies pain   Pain Score No Pain   ADL   Where Assessed Chair   Grooming Assistance 5  Supervision/Setup   Grooming Deficit Wash/dry hands; Wash/dry face; Teeth care;Denture care   UB Dressing Assistance 5  Supervision/Setup   UB Dressing Deficit Thread RUE; Thread LUE;Pull around back;Pull over head;Pull down in back   Transfers   Sit to Stand 5  Supervision   Stand to Sit 5  Supervision   Functional Mobility   Functional Mobility 5  Supervision   Additional items Rolling walker   Cognition   Overall Cognitive Status WFL   Arousal/Participation Cooperative   Attention Within functional limits   Orientation Level Oriented X4   Memory Within functional limits   Following Commands Follows all commands and directions without difficulty   Comments pt pleasant and cooperative   Additional Activities   Additional Activities Comments reinforced cardiac/sternal precautions   Activity Tolerance   Activity Tolerance Patient tolerated treatment well   Assessment   Assessment Patient participated in Skilled OT session this date with interventions consisting of ADL re training with the use of correct body mechnaics, safety awareness and fall prevention techniques, maintaining sternal precautions and increase cardiovascular endurance   Patient agreeable to OT treatment session, upon arrival patient was found seated OOB to Chair  In comparison to previous session, patient with improvements in transfers, activity tolerance, ADL status*   Patient requiring ocassional safety reminders  Patient has attained all treatment goals and is now demonstrating ability to complete UB/LB ADLs at supervision, with the use of Rolling Walker  Patient was educated on sternal/cardiac precautions , patient verbalized understanding and demonstrated recommended plan correctly  Patient appears to be functioning at baseline  No further acute OT needs identified at this time to warrant continuation of services  D/C OT services  From OT standpoint, recommendation at time of d/c would be Home with family support       Plan   Treatment Interventions ADL retraining;Functional transfer training;Patient/family training;Equipment evaluation/education   Goal Expiration Date 12/24/19   OT Treatment Day 2   OT Frequency 3-5x/wk   Recommendation   OT Discharge Recommendation Home with family support   OT - OK to Discharge Yes   Barthel Index   Feeding 10   Bathing 0   Grooming Score 5   Dressing Score 5   Bladder Score 10   Bowels Score 10   Toilet Use Score 5   Transfers (Bed/Chair) Score 10   Mobility (Level Surface) Score 10   Stairs Score 0   Barthel Index Score 65   Modified Ирина Scale   Modified [place] Scale 3     Candice Richter MS, OTR/L

## 2019-12-16 NOTE — PLAN OF CARE
Problem: PHYSICAL THERAPY ADULT  Goal: Performs mobility at highest level of function for planned discharge setting  See evaluation for individualized goals  Description  Treatment/Interventions: Functional transfer training, LE strengthening/ROM, Elevations, Therapeutic exercise, Endurance training, Patient/family training, Equipment eval/education, Bed mobility, Gait training, Spoke to nursing          See flowsheet documentation for full assessment, interventions and recommendations  Outcome: Progressing  Note:   Prognosis: Good  Problem List: Decreased strength, Decreased endurance, Decreased mobility  Assessment: Pt demonstrated overall improvement in functional mobility skills ambulating further distances w/ rw and (S) required; occasional instructions were needed during the session for proper posture, pacing, and mobility technique; pt remained in NAD and appeared to be comfortable at the end of session; will cont to follow pt in PT for graded mobilization as clinical course allows to max functional (I), endurance, and safety; recommend home PT follow up upon returning home w/ family support pending progress (to progress to less restrictive assistive device); pt denies the need to climb steps at home (no MILAN and elevators to other floors); will follow  Barriers to Discharge: Other (Comment)(med status)     Recommendation: Home PT, Home with family support          See flowsheet documentation for full assessment

## 2019-12-16 NOTE — PROGRESS NOTES
Progress Note - Jackson Lanier 69 y o  male MRN: [id number]    [facility] Encounter: [id number]      CC: diabetes f/u    Subjective:   Jackson Lanier is a 69y o  year old male with type 2 diabetes  Feels well  No complaints  No hypoglycemia  He is eating well  Objective:     Vitals: Blood pressure 103/54, pulse 67, temperature 98 4 °F (36 9 °C), temperature source Oral, resp  rate 18, height 5' 10" (1 778 m), weight 71 kg (156 lb 9 6 oz), SpO2 95 %  ,Body mass index is 22 47 kg/m²  Intake/Output Summary (Last 24 hours) at 12/16/2019 1607  Last data filed at 12/16/2019 1439  Gross per 24 hour   Intake 948 07 ml   Output 1375 ml   Net -426 93 ml       Physical Exam:  General Appearance: awake, appears stated age and cooperative  Head: Normocephalic, without obvious abnormality, atraumatic  Extremities: moves all extremities  Skin: Skin color and temperature normal    Pulm: no labored breathing  Right IJ is in place  Lab, Imaging and other studies: I have personally reviewed pertinent reports  POC Glucose (mg/dl)   Date Value   12/16/2019 97   12/16/2019 224 (H)   12/16/2019 201 (H)   12/15/2019 107   12/15/2019 43 (L)   12/15/2019 191 (H)   12/15/2019 180 (H)   12/15/2019 211 (H)   12/15/2019 214 (H)   12/15/2019 201 (H)       Assessment:  diabetes with hyperglycemia and coronary artery disease s/p CABG    Plan:  1  Type 2 diabetes with hyperglycemia-blood sugar has improved today  Continue to monitor for now  Upon discharge, if Lantus is not the preferred basal insulin for the patient's insurance company, we can use Tresiba, Basaglar, Levemir, Toujeo at the same dose instead  Upon discharge, if Humalog is not the preferred mealtime insulin for the patient's insurance, we can use NovoLog, Fiasp, Admelog, or Apidra at the same dose instead      At discharge, the patient should be given a prescription for jardiance 25 mg per day in order to decrease the cardiovascular mortality by 40%     2   Coronary disease status post CABG-he is recovering well  Portions of the record may have been created with voice recognition software

## 2019-12-16 NOTE — NURSING NOTE
bs taken with reading of 43  Pt totally asymptomatic and requested to have it retaken  bs came back at 107  Pt in no distress, sitting comfortably watching tv  insulin gtt turned down according to algorithm based on 107 bs  Will continue to monitor closely

## 2019-12-16 NOTE — PHYSICAL THERAPY NOTE
PHYSICAL THERAPY NOTE          Patient Name: Robert Rahman  CJUVM'V Date: 12/16/2019 12/16/19 5345   Pain Assessment   Pain Assessment No/denies pain   Restrictions/Precautions   Other Precautions Cardiac/sternal;Multiple lines;Telemetry; Fall Risk  (external pacer; Amio drip; CT)   General   Chart Reviewed Yes   Additional Pertinent History cleared for Tx session by annalise   Response to Previous Treatment Patient with no complaints from previous session  Cognition   Overall Cognitive Status WFL   Arousal/Participation Alert; Cooperative   Attention Attends with cues to redirect   Orientation Level Oriented to person;Oriented to place;Oriented to situation   Memory Decreased recall of precautions   Following Commands Follows one step commands without difficulty   Subjective   Subjective Pt is reclined in the chair; responds to questions appropriately; pleasant and cooperative; agreeable to mobilize   Transfers   Sit to Stand 5  Supervision   Additional items Assist x 1;Verbal cues  (reviewed the technique)   Stand to Sit 4  Minimal assistance   Additional items Assist x 1;Verbal cues  (reviewed the technique)   Ambulation/Elevation   Gait pattern Excessively slow; Short stride; Foward flexed   Gait Assistance 5  Supervision   Additional items Assist x 1;Verbal cues; Tactile cues  (stand by (A) of 2nd for lines)   Assistive Device Rolling walker   Distance 2 x 220 ft w/ standing rest period in between; upon return back to room, pt voided; RN notified;   Balance   Static Sitting Fair +   Static Standing Fair  (supported)   Ambulatory Fair -  (w/ rw)   Activity Tolerance   Activity Tolerance Patient tolerated treatment well   Nurse Made Aware spoke to [employer]   Comments (R) SCD re-activated; call bell w/in reach   Assessment   Prognosis Good   Problem List Decreased strength;Decreased endurance;Decreased mobility Assessment Pt demonstrated overall improvement in functional mobility skills ambulating further distances w/ rw and (S) required; occasional instructions were needed during the session for proper posture, pacing, and mobility technique; pt remained in NAD and appeared to be comfortable at the end of session; will cont to follow pt in PT for graded mobilization as clinical course allows to max functional (I), endurance, and safety; recommend home PT follow up upon returning home w/ family support pending progress (to progress to less restrictive assistive device); pt denies the need to climb steps at home (no MILAN and elevators to other floors); will follow  Barriers to Discharge Other (Comment)  (med status)   Goals   Patient Goals to go home   STG Expiration Date 12/28/19   PT Treatment Day 1   Plan   Treatment/Interventions Functional transfer training;LE strengthening/ROM; Therapeutic exercise; Endurance training;Bed mobility;Gait training;Spoke to nursing;Spoke to case management   Progress Progressing toward goals   PT Frequency Other (Comment)  (4-6x/wk)   Recommendation   Recommendation Home PT; Home with family support   Equipment Recommended [facility]

## 2019-12-16 NOTE — PROCEDURES
12/16/19    Procedure: Chest tube removal    Chest tubes removed in routine fashion without incident  Insertion site dressed with Acticoat  Brianda Finley tolerated the procedure well  Nurse notified  Amaris Cordero PA-C  DATE: December 16, 2019  TIME: 12:09 PM  12/16/19    Procedure: Epicardial Pacing Wire removal    Brianda Finley was returned to bed and informed of mandatory one hour post-procedure bed rest   The assigned nurse was notified  Epicardial pacing wires removed in routine fashion, without incident  The patient tolerated the procedure well  Vital signs ordered  q 15 minutes for one hour, as per protocol      Amaris Cordero PA-C  DATE: December 16, 2019  TIME: 12:09 PM

## 2019-12-16 NOTE — PROGRESS NOTES
Progress Note - Cardiothoracic Surgery   Elroy Alejandro 69 y o  male MRN: [id number]  Unit/Bed#: [facility] 426-01 Encounter: [id number]    Coronary artery disease   S/P Coronary artery bypass grafting x 4 with left internal mammary artery to left anterior descending artery, saphenous vein graft to diagonal 1, saphenous vein graft to obtuse marginal 1 and saphenous vein graft to right posterior descending artery; POD # 3      24 Hour Events:     Medications:   Scheduled Meds:  Current Facility-Administered Medications:  acetaminophen 650 mg Oral Q4H PRN Nancy Wood, CRNP    amiodarone 0 5 mg/min Intravenous Continuous Edwin Murray PA-C Last Rate: 0 5 mg/min (12/15/19 1648)   amiodarone 200 mg Oral Q8H [facility] Vi Buenrostro, CRNP    aspirin 325 mg Oral Daily Vi Buenrostro, CRNP    atorvastatin 80 mg Oral Daily With Shahbaz Hendrickson, CRNP    bisacodyl 10 mg Rectal Daily PRN Nancy Wood, CRNP    docusate sodium 100 mg Oral BID Nancy Wood, CRNP    fondaparinux 2 5 mg Subcutaneous Daily Vi Buenrostro, CRNP    furosemide 40 mg Intravenous BID (diuretic) MICHAEL Alvarado-WENDY    insulin glargine 25 Units Subcutaneous HS Tami De La Rosa MD    insulin lispro 1-5 Units Subcutaneous HS Tami De La Rosa MD    insulin lispro 2-12 Units Subcutaneous TID AC Tami De La Rosa MD    insulin lispro 8 Units Subcutaneous TID With Meals Tami De La Rosa MD    metoprolol tartrate 12 5 mg Oral Q12H [facility] MICHAEL Alvarado-WENDY    mupirocin 1 application Nasal U94G [facility] Vi Buenrostro, BHAKTINP    ondansetron 4 mg Intravenous Q6H PRN Nancy Wood, CRNP    oxyCODONE-acetaminophen 1 tablet Oral Q4H PRN Nancy Wood, CRNP    oxyCODONE-acetaminophen 2 tablet Oral Q6H PRN Nancy , CRNP    pantoprazole 40 mg Oral Early Morning Vi Buenrostro, CRNP    polyethylene glycol 17 g Oral Daily Vi Buenrostro, CRNP    potassium chloride 20 mEq Oral BID Marvetta Fruits, PA-C    sodium chloride 20 mL/hr Intravenous Continuous Nancy KONSTANTIN dyson Last Rate: Stopped (12/15/19 2200)   temazepam 15 mg Oral HS PRN KONSTANTIN Gong      Continuous Infusions:  amiodarone 0 5 mg/min Last Rate: 0 5 mg/min (12/15/19 1648)   sodium chloride 20 mL/hr Last Rate: Stopped (12/15/19 2200)     PRN Meds:   acetaminophen    bisacodyl    ondansetron    oxyCODONE-acetaminophen    oxyCODONE-acetaminophen    temazepam    Vitals:   Vitals:    12/15/19 2300 12/16/19 0300 12/16/19 0600 12/16/19 0700   BP: 118/57 122/61  131/62   BP Location: Right arm Right arm     Pulse: 74 75  75   Resp: 18 17  16   Temp: 98 1 °F (36 7 °C) 98 7 °F (37 1 °C)  97 7 °F (36 5 °C)   TempSrc: Oral Oral  Oral   SpO2: 93% 96%  96%   Weight:   71 kg (156 lb 9 6 oz)    Height:           Telemetry: NSR; Heart Rate: 78    Respiratory:   SpO2: SpO2: 96 %;  Room Air    Intake/Output:     Intake/Output Summary (Last 24 hours) at 12/16/2019 1021  Last data filed at 12/16/2019 0502  Gross per 24 hour   Intake 629 7 ml   Output 1010 ml   Net -380 3 ml   UOP 1 2L/24hrs    Chest tube Output:    Mediastinal tubes: 50 mL/8 hours  110 mL/24 hours   Pleural tubes: 0 mL/8 hours  0 mL/24 hours     Weights:   Weight (last 2 days)     Date/Time   Weight    12/16/19 0600   71 (156 6)    12/15/19 0600   69 4 (153)    12/14/19 0600   83 3 (183 64)            Admit weight: 77 1 kg    Results:   Results from last 7 days   Lab Units 12/15/19  0410 12/14/19  0406 12/13/19  1752 12/13/19  1750   WBC Thousand/uL 13 33* 16 59*  --   --    HEMOGLOBIN g/dL 9 7* 10 1*  --  11 7*   I STAT HEMOGLOBIN g/dl  --   --  10 2*  --    HEMATOCRIT % 29 7* 30 1*  --  33 5*   HEMATOCRIT, ISTAT %  --   --  30*  --    PLATELETS Thousands/uL 152 242  --  101*     Results from last 7 days   Lab Units 12/16/19  0453 12/15/19  0410 12/14/19  0406  12/13/19  1752   SODIUM mmol/L 134* 138 143  --   --    POTASSIUM mmol/L 3 8 3 7 4 0   < >  --    CHLORIDE mmol/L 102 105 110*  --   --    CO2 mmol/L 28 32 26  --   --    CO2, I-STAT mmol/L  --   --   --   --  26 BUN mg/dL 19 19 14  --   --    CREATININE mg/dL 1 00 1 01 0 91  --   --    GLUCOSE, ISTAT mg/dl  --   --   --   --  100   CALCIUM mg/dL 8 9 8 5 7 8*  --   --     < > = values in this interval not displayed  Results from last 7 days   Lab Units 12/16/19  0453 12/13/19  1658 12/09/19  1328   INR  1 28* 1 60*  --    PTT seconds  --  36 31     Point of care glucose: 107-201    Studies:  No new studies        Invasive Lines/Tubes:  Invasive Devices     Central Venous Catheter Line            CVC Central Lines 12/13/19 Triple 2 days          Peripheral Intravenous Line            Peripheral IV 12/13/19 Left Arm 2 days          Line            Pacer Wires 2 days    Pacer Wires 2 days          Drain            Chest Tube 1 Left Pleural 32 Fr  2 days    Chest Tube 2 Posterior Mediastinal 32 Fr  2 days    Chest Tube 3 Anterior Mediastinal 32 Fr  2 days                Physical Exam:    HEENT/NECK:  PERRLA  No jugular venous distention  Cardiac: Regular rate and rhythm and No murmurs/rubs/gallops  Pulmonary:  Breath sounds diminished in the bases bilaterally  and No rales/rhonchi/wheezes  Abdomen:  Non-tender, Non-distended and Normal bowel sounds  Incisions: Sternum is stable  Incision dressed with Acticoat  No erythema or drainage and Groin puncture sites dressed with sterile dressing  No hematoma, erythema, or drainage  Extremities: Extremities warm/dry and Trace edema B/L  Neuro: Alert and oriented X 3, Sensation is grossly intact and No focal deficits  Skin: Warm/Dry, without rashes or lesions  Assessment:  Principal Problem: 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 [facility])  Active Problems:    Benign essential hypertension    Type 2 diabetes mellitus (HCC)    Hypercholesteremia    Diabetic retinopathy (HCC)    S/P CABG x 4       Coronary artery disease   S/P Coronary artery bypass grafting x 4 with left internal mammary artery to left anterior descending artery, saphenous vein graft to diagonal 1, saphenous vein graft to obtuse marginal 1 and saphenous vein graft to right posterior descending artery; POD # 3    Plan:    1  Cardiac:   NSR; HR/BP well-controlled  Episode of afib yesterday  Resolved with amio bolus and drip  Lopressor, 12 5mg PO BID  Continue ASA and Statin therapy  Epicardial pacing wires out  Maintain central IV access today for medication access  Continue DVT prophylaxis    2  Pulmonary:   Good Room air oxygen saturation; Continue incentive spirometry/Coughing/Deep breathing exercises  Chest tube drainage diminished; D/C today    3  Renal:   Intake/Output net: -380 mL/24 hours  Continue diuresis   Lasix 40 mg IV BID  Potassium Chloride 20 mEq PO BID  Post op Creatinine stable; Follow up labs prn    4  Neuro:  Neurologically intact; No active issues  Incisional pain well-controlled; Continue prn Percocet    5  GI:  Tolerating TLC 2 3 gm sodium diet  Maintain 1800 mL daily fluid restriction   Continue stool softeners and prn suppository  Continue GI prophylaxis    6  Endo:   History of diabetes; Continue SQ insulin therapy as directed by endocrinology physician  Insulin administration teaching for home therapy ordered    7    Hematology:    Post-operative acute blood loss anemia; Hemoglobin 9 7; trend prn    Leukocytosis improving, WBC 13 (17)    8  Disposition:      Ambulating independently, Anticipate discharge to home when medically ready     VTE Pharmacologic Prophylaxis: Fondaparinux (Arixtra)  VTE Mechanical Prophylaxis: sequential compression device    Collaborative rounds completed with LISA Castaneda    Plan of care discussed with bedside nurse    SIGNATURE: Cindy Torres PA-C  DATE: December 16, 2019  TIME: 10:13 AM

## 2019-12-17 ENCOUNTER — TELEPHONE (OUTPATIENT)
Dept: INTERNAL MEDICINE CLINIC | Age: 74
End: 2019-12-17

## 2019-12-17 VITALS
WEIGHT: 181 LBS | RESPIRATION RATE: 19 BRPM | HEART RATE: 67 BPM | DIASTOLIC BLOOD PRESSURE: 63 MMHG | OXYGEN SATURATION: 96 % | SYSTOLIC BLOOD PRESSURE: 118 MMHG | BODY MASS INDEX: 25.91 KG/M2 | HEIGHT: 70 IN | TEMPERATURE: 99 F

## 2019-12-17 LAB
ANION GAP SERPL CALCULATED.3IONS-SCNC: 5 MMOL/L (ref 4–13)
ATRIAL RATE: 63 BPM
ATRIAL RATE: 68 BPM
BUN SERPL-MCNC: 23 MG/DL (ref 5–25)
CALCIUM SERPL-MCNC: 8.9 MG/DL (ref 8.3–10.1)
CHLORIDE SERPL-SCNC: 101 MMOL/L (ref 100–108)
CO2 SERPL-SCNC: 29 MMOL/L (ref 21–32)
CREAT SERPL-MCNC: 1.18 MG/DL (ref 0.6–1.3)
ERYTHROCYTE [DISTWIDTH] IN BLOOD BY AUTOMATED COUNT: 13.5 % (ref 11.6–15.1)
GFR SERPL CREATININE-BSD FRML MDRD: 61 ML/MIN/1.73SQ M
GLUCOSE SERPL-MCNC: 226 MG/DL (ref 65–140)
GLUCOSE SERPL-MCNC: 290 MG/DL (ref 65–140)
GLUCOSE SERPL-MCNC: 98 MG/DL (ref 65–140)
HCT VFR BLD AUTO: 30.9 % (ref 36.5–49.3)
HGB BLD-MCNC: 10.2 G/DL (ref 12–17)
MCH RBC QN AUTO: 30.1 PG (ref 26.8–34.3)
MCHC RBC AUTO-ENTMCNC: 33 G/DL (ref 31.4–37.4)
MCV RBC AUTO: 91 FL (ref 82–98)
P AXIS: 12 DEGREES
P AXIS: 59 DEGREES
PLATELET # BLD AUTO: 188 THOUSANDS/UL (ref 149–390)
PMV BLD AUTO: 10.1 FL (ref 8.9–12.7)
POTASSIUM SERPL-SCNC: 3.7 MMOL/L (ref 3.5–5.3)
PR INTERVAL: 204 MS
PR INTERVAL: 213 MS
QRS AXIS: 46 DEGREES
QRS AXIS: 64 DEGREES
QRSD INTERVAL: 83 MS
QRSD INTERVAL: 83 MS
QT INTERVAL: 400 MS
QT INTERVAL: 417 MS
QTC INTERVAL: 410 MS
QTC INTERVAL: 444 MS
RBC # BLD AUTO: 3.39 MILLION/UL (ref 3.88–5.62)
SODIUM SERPL-SCNC: 135 MMOL/L (ref 136–145)
T WAVE AXIS: 29 DEGREES
T WAVE AXIS: 66 DEGREES
VENTRICULAR RATE: 63 BPM
VENTRICULAR RATE: 68 BPM
WBC # BLD AUTO: 9.31 THOUSAND/UL (ref 4.31–10.16)

## 2019-12-17 PROCEDURE — 99232 SBSQ HOSP IP/OBS MODERATE 35: CPT | Performed by: INTERNAL MEDICINE

## 2019-12-17 PROCEDURE — 99024 POSTOP FOLLOW-UP VISIT: CPT | Performed by: PHYSICIAN ASSISTANT

## 2019-12-17 PROCEDURE — 85027 COMPLETE CBC AUTOMATED: CPT | Performed by: PHYSICIAN ASSISTANT

## 2019-12-17 PROCEDURE — 82948 REAGENT STRIP/BLOOD GLUCOSE: CPT

## 2019-12-17 PROCEDURE — 93010 ELECTROCARDIOGRAM REPORT: CPT | Performed by: INTERNAL MEDICINE

## 2019-12-17 PROCEDURE — 80048 BASIC METABOLIC PNL TOTAL CA: CPT | Performed by: PHYSICIAN ASSISTANT

## 2019-12-17 RX ORDER — ATORVASTATIN CALCIUM 80 MG/1
80 TABLET, FILM COATED ORAL
Qty: 30 TABLET | Refills: 2 | Status: SHIPPED | OUTPATIENT
Start: 2019-12-17 | End: 2020-03-19 | Stop reason: SDUPTHER

## 2019-12-17 RX ORDER — DOCUSATE SODIUM 100 MG/1
100 CAPSULE, LIQUID FILLED ORAL 2 TIMES DAILY
Qty: 10 CAPSULE | Refills: 0 | Status: SHIPPED | OUTPATIENT
Start: 2019-12-17 | End: 2020-01-15

## 2019-12-17 RX ORDER — ACETAMINOPHEN 325 MG/1
650 TABLET ORAL EVERY 4 HOURS PRN
Qty: 30 TABLET | Refills: 0 | Status: SHIPPED | OUTPATIENT
Start: 2019-12-17 | End: 2020-01-15

## 2019-12-17 RX ORDER — OXYCODONE HYDROCHLORIDE AND ACETAMINOPHEN 5; 325 MG/1; MG/1
TABLET ORAL
Qty: 30 TABLET | Refills: 0 | Status: SHIPPED | OUTPATIENT
Start: 2019-12-17 | End: 2020-01-15

## 2019-12-17 RX ORDER — AMIODARONE HYDROCHLORIDE 200 MG/1
TABLET ORAL
Qty: 42 TABLET | Refills: 0 | Status: SHIPPED | OUTPATIENT
Start: 2019-12-17 | End: 2020-01-15 | Stop reason: ALTCHOICE

## 2019-12-17 RX ORDER — ASPIRIN 325 MG
325 TABLET ORAL DAILY
Qty: 30 TABLET | Refills: 2 | Status: SHIPPED | OUTPATIENT
Start: 2019-12-18 | End: 2020-01-08 | Stop reason: SDUPTHER

## 2019-12-17 RX ORDER — GLUCOSAMINE HCL/CHONDROITIN SU 500-400 MG
CAPSULE ORAL
Qty: 100 EACH | Refills: 1 | Status: SHIPPED | OUTPATIENT
Start: 2019-12-17 | End: 2020-01-15

## 2019-12-17 RX ORDER — PANTOPRAZOLE SODIUM 40 MG/1
40 TABLET, DELAYED RELEASE ORAL
Qty: 30 TABLET | Refills: 0 | Status: SHIPPED | OUTPATIENT
Start: 2019-12-18 | End: 2020-02-10 | Stop reason: ALTCHOICE

## 2019-12-17 RX ORDER — POTASSIUM CHLORIDE 20 MEQ/1
20 TABLET, EXTENDED RELEASE ORAL DAILY
Qty: 7 TABLET | Refills: 1 | Status: SHIPPED | OUTPATIENT
Start: 2019-12-17 | End: 2020-01-08 | Stop reason: ALTCHOICE

## 2019-12-17 RX ORDER — LANCETS 28 GAUGE
EACH MISCELLANEOUS
Qty: 100 EACH | Refills: 1 | Status: SHIPPED | OUTPATIENT
Start: 2019-12-17 | End: 2020-02-10

## 2019-12-17 RX ORDER — POTASSIUM CHLORIDE 20 MEQ/1
40 TABLET, EXTENDED RELEASE ORAL ONCE
Status: COMPLETED | OUTPATIENT
Start: 2019-12-17 | End: 2019-12-17

## 2019-12-17 RX ORDER — TORSEMIDE 20 MG/1
20 TABLET ORAL DAILY
Qty: 7 TABLET | Refills: 1 | Status: SHIPPED | OUTPATIENT
Start: 2019-12-17 | End: 2020-01-08 | Stop reason: ALTCHOICE

## 2019-12-17 RX ORDER — POLYETHYLENE GLYCOL 3350 17 G/17G
17 POWDER, FOR SOLUTION ORAL DAILY
Qty: 14 EACH | Refills: 0 | Status: SHIPPED | OUTPATIENT
Start: 2019-12-18 | End: 2020-01-15

## 2019-12-17 RX ORDER — BLOOD-GLUCOSE METER
1 KIT MISCELLANEOUS
Qty: 1 EACH | Refills: 0 | Status: SHIPPED | OUTPATIENT
Start: 2019-12-17 | End: 2021-09-29

## 2019-12-17 RX ADMIN — FONDAPARINUX SODIUM 2.5 MG: 2.5 INJECTION, SOLUTION SUBCUTANEOUS at 09:06

## 2019-12-17 RX ADMIN — ASPIRIN 325 MG ORAL TABLET 325 MG: 325 PILL ORAL at 09:05

## 2019-12-17 RX ADMIN — DOCUSATE SODIUM 100 MG: 100 CAPSULE, LIQUID FILLED ORAL at 09:05

## 2019-12-17 RX ADMIN — POTASSIUM CHLORIDE 40 MEQ: 1500 TABLET, EXTENDED RELEASE ORAL at 11:30

## 2019-12-17 RX ADMIN — INSULIN LISPRO 8 UNITS: 100 INJECTION, SOLUTION INTRAVENOUS; SUBCUTANEOUS at 09:16

## 2019-12-17 RX ADMIN — POLYETHYLENE GLYCOL 3350 17 G: 17 POWDER, FOR SOLUTION ORAL at 09:06

## 2019-12-17 RX ADMIN — MUPIROCIN 1 APPLICATION: 20 OINTMENT TOPICAL at 09:06

## 2019-12-17 RX ADMIN — AMIODARONE HYDROCHLORIDE 200 MG: 200 TABLET ORAL at 05:24

## 2019-12-17 RX ADMIN — METOPROLOL TARTRATE 25 MG: 25 TABLET, FILM COATED ORAL at 09:05

## 2019-12-17 RX ADMIN — POTASSIUM CHLORIDE 20 MEQ: 1500 TABLET, EXTENDED RELEASE ORAL at 09:05

## 2019-12-17 RX ADMIN — FUROSEMIDE 40 MG: 10 INJECTION, SOLUTION INTRAMUSCULAR; INTRAVENOUS at 09:05

## 2019-12-17 RX ADMIN — PANTOPRAZOLE SODIUM 40 MG: 40 TABLET, DELAYED RELEASE ORAL at 05:24

## 2019-12-17 RX ADMIN — INSULIN LISPRO 4 UNITS: 100 INJECTION, SOLUTION INTRAVENOUS; SUBCUTANEOUS at 11:31

## 2019-12-17 NOTE — DISCHARGE SUMMARY
Discharge Summary - Cardiothoracic Surgery   Khanh Marquez 69 y o  male MRN: [id number]  Unit/Bed#: [facility] 426-01 Encounter: [id number]    Admission Date: 12/13/2019     Discharge Date: 12/17/19    Admitting Diagnosis: 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, unspecified whether native or transplanted heart [facility]) [I25 [id number]]    Primary Discharge Diagnosis:   Coronary artery disease  S/P coronary artery bypass grafting;    Secondary Discharge Diagnosis:   HLD, DM, HTN, diabetic retinopathy    Attending: LISA Davis  Consulting Physician(s):   Cardiology  Medical/Critical Care  Endocrinology    Procedures Performed:   Procedure(s):  CORONARY ARTERY BYPASS GRAFT (CABG) 4 VESSELS STEVEN to LAD ; SVG--> PDA , DIAGONAL, and OM  HARVEST VEIN ENDOSCOPIC (EVH)  TRANSESOPHAGEAL ECHOCARDIOGRAM (SHEBA)     Hospital Course: The patient was seen in consultation prior to this admission for evaluation of Coronary artery disease  Risks and benefits of coronary artery bypass grafting were discussed in detail, and patient was agreeable  Routine preoperative evaluation was completed and informed consent was obtained prior to admission  12/13: Elective admission for CABG x 4   Transferred to ICU hemodynamically stable on no gtts  Amicar, 2 Plt and 2 Cryo for postoperative coagulopathy with resolution  Extubated  Phenylephrine 60mcg started for hypotension  12/14: Given 2 plts and 2 cryo post-op along with amicar for CT outputs  Improved  Extubated without difficulty  Given 1 L LR and 250 albumin for hypotension and low CVP/PAD  Maintained on crystal overnight  Given lasix 40 this am for low UOP  Add Lasix  40 mg IV QD  Wean crystal off  Hold Lopressor 2/2 hypotension  D/C swan, arterial line, and foely catheter  D/C IV insulin and add SSI  Transfer from ICU to telemetry  12/15: Maintained on crystal for hypotension overnight; Weaned off  Lopressor 12 5 BID started  Pacing wires removed   Chest tubes maintained for high output  Pacing wires maintained  Endocrine consulted for DM  Lasix increased to 40 IV BID  Transferred from ICU to telemetry  Rapid AF in late morning; Loaded with IV amiodarone  Converted back to NSR    12/16: Remains on amio drip  No more afib  DC CT/EPW today  On room air  NSR  K 3 8, Replete potassium  PM: remains in NSR    12/17: Amio infusion completed yesterday  Remains NSR  Insulin teaching complete  I/O -907cc/24hrs, UOP 2 3L/24hrs  Ready for discharge home  Condition at Discharge:   good     Discharge Physical Exam:    HEENT/NECK:  PERRLA  No jugular venous distention  Cardiac: Regular rate and rhythm and No murmurs/rubs/gallops  Pulmonary:  Breath sounds clear bilaterally and No rales/rhonchi/wheezes  Abdomen:  Non-tender, Non-distended and Normal bowel sounds  Incisions: Sternum is stable  Incision is clean, dry, and intact  and Groin puncture sites clean, dry, and intact without hematoma  Extremities: Extremities warm/dry and Trace edema B/L  Neuro: Alert and oriented X 3, Sensation is grossly intact and No focal deficits  Skin: Warm/Dry, without rashes or lesions  Discharge Data:  Results from last 7 days   Lab Units 12/17/19  0924 12/15/19  0410 12/14/19  0406   WBC Thousand/uL 9 31 13 33* 16 59*   HEMOGLOBIN g/dL 10 2* 9 7* 10 1*   HEMATOCRIT % 30 9* 29 7* 30 1*   PLATELETS Thousands/uL 188 152 242     Results from last 7 days   Lab Units 12/17/19  0924 12/16/19  0453 12/15/19  0410  12/13/19  1752   POTASSIUM mmol/L 3 7 3 8 3 7   < >  --    CHLORIDE mmol/L 101 102 105   < >  --    CO2 mmol/L 29 28 32   < >  --    CO2, I-STAT mmol/L  --   --   --   --  26   BUN mg/dL 23 19 19   < >  --    CREATININE mg/dL 1 18 1 00 1 01   < >  --    GLUCOSE, ISTAT mg/dl  --   --   --   --  100   CALCIUM mg/dL 8 9 8 9 8 5   < >  --     < > = values in this interval not displayed       Results from last 7 days   Lab Units 12/16/19  0453 12/13/19  1658   INR  1 28* 1 60*   PTT seconds --  36       Discharge instructions/Information to patient and family:   See after visit summary for information provided to patient and family  Abrahan Chau was educated on restrictions regarding driving and lifting, and techniques of proper incisional care  They were specifically counselled on signs and symptoms of an incisional infection, and advised to contact our service immediately should they develop fevers, sweats, chill, redness or drainage at the site of any incisions  Provisions for Follow-Up Care:  See after visit summary for information related to follow-up care and any pertinent home health orders  Disposition:  Home    Planned Readmission:   No    Discharge Medications:  See after visit summary for reconciled discharge medications provided to patient and family  Abrahan Chau was provided contact information and scheduled a follow up appointment with LISA Pimentel  Additionally, follow up appointments have been scheduled for their primary care physician and primary cardiologist   Contact information was provided  Abrahan Chau was counseled on the importance of avoiding tobacco products  As with all patients whom have undergone open heart surgery, tobacco cessation medication was contraindicated at the time of discharge  ACE/ARB was Contraindicated secondary to renal dysfunction      The patient was discharged on ongoing diuretic therapy with Torsemide 20 mg, PO QD and Potassium Chloride 20 mEq, PO QD  They were advised to continue these medications for 7 days, unless otherwise directed  Narcotic pain medication was prescribed in the form of Percocet  Prior to prescribing, their prescription profile was reviewed on the [facility] of health prescription drug monitoring program     Abrahan Chau was prescribed injectable insulin for management of their pre-existing diabetes    Insulin was prescribed as Lantus qHS and Novalog TID with meals, delivered via injectable pen system  As injectable insulin is a new therapy for this patient, they were educated extensively on dosing and self-administration  The patient was able to demonstrate competency with the pen needle system and felt comfortable doing so  The patient was informed that following their postoperative surgical evaluation, they will be referred to outpatient cardiac rehabilitation  They were counseled that this program is run by specialists who will help them safely strengthen their heart and prevent more heart disease  Cardiac rehabilitation will include exercise, relaxation, stress management, and heart-healthy nutrition  Caregivers will also check to make sure their medication regimen is working  I spent 30 minutes discharging the patient  This time was spent on the day of discharge  I had direct contact with the patient on the day of discharge  Additional documentation is required if more than 30 minutes were spent on discharge       SIGNATURE: Doe Ford PA-C  DATE: December 17, 2019  TIME: 11:13 AM

## 2019-12-17 NOTE — PROGRESS NOTES
Cardiology Progress Note - Tonya Matthew 69 y o  male MRN: [id number]    Unit/Bed#: [facility] 426-01 Encounter: [id number]        Subjective:    No significant events overnight  No chest pain or dyspnea  Review of Systems   Constitution: Negative for malaise/fatigue  Cardiovascular: Negative for chest pain  Respiratory: Negative for shortness of breath  Objective:   Vitals: Blood pressure 125/58, pulse 72, temperature 98 7 °F (37 1 °C), temperature source Oral, resp  rate 18, height 5' 10" (1 778 m), weight 82 1 kg (181 lb), SpO2 95 %  , Body mass index is 25 97 kg/m² ,   Orthostatic Blood Pressures      Most Recent Value   Blood Pressure  125/58 filed at 12/17/2019 0751   Patient Position - Orthostatic VS  Lying filed at 12/17/2019 9802         Systolic (95VTK), MSM:701 , Min:103 , UPC:280     Diastolic (79FXO), UFV:42, Min:54, Max:69      Intake/Output Summary (Last 24 hours) at 12/17/2019 1034  Last data filed at 12/17/2019 0800  Gross per 24 hour   Intake 1677 74 ml   Output 2345 ml   Net -667 26 ml     Weight (last 2 days)     Date/Time   Weight    12/17/19 0517   82 1 (181)    12/16/19 0600   71 (156 6)    12/15/19 0600   69 4 (153)                Telemetry Review: No significant arrhythmias seen on telemetry review  nsr      Physical Exam   Constitutional: He is oriented to person, place, and time  No distress  HENT:   Mouth/Throat: No oropharyngeal exudate  Eyes: No scleral icterus  Neck: No JVD present  Cardiovascular: Normal rate and regular rhythm  Exam reveals no friction rub  No murmur heard  Pulmonary/Chest: Effort normal and breath sounds normal  No stridor  No respiratory distress  Abdominal: Soft  Bowel sounds are normal  He exhibits no distension  There is no tenderness  Musculoskeletal: He exhibits no edema  Neurological: He is alert and oriented to person, place, and time  Skin: Skin is warm and dry  He is not diaphoretic     Psychiatric: He has a normal mood and affect  His behavior is normal          Laboratory Results:        CBC with diff:   Results from last 7 days   Lab Units 12/17/19  0924 12/15/19  0410 12/14/19 0406 12/13/19 1752 12/13/19  1750 12/13/19  1718 12/13/19  1658 12/13/19  1645  12/13/19  1530   WBC Thousand/uL 9 31 13 33* 16 59*  --   --   --   --   --   --   --    HEMOGLOBIN g/dL 10 2* 9 7* 10 1*  --  11 7*  --   --   --   --   --    I STAT HEMOGLOBIN g/dl  --   --   --  10 2*  --  10 5*  --  10 2*   < >  --    HEMATOCRIT % 30 9* 29 7* 30 1*  --  33 5*  --   --   --   --   --    HEMATOCRIT, ISTAT %  --   --   --  30*  --  31*  --  30*   < >  --    MCV fL 91 92 92  --   --   --   --   --   --   --    PLATELETS Thousands/uL 188 152 242  --  101*  --  106*  --   --  172   MCH pg 30 1 29 9 30 7  --   --   --   --   --   --   --    MCHC g/dL 33 0 32 7 33 6  --   --   --   --   --   --   --    RDW % 13 5 13 8 13 3  --   --   --   --   --   --   --    MPV fL 10 1 10 1 10 1  --  9 3  --  9 3  --   --  9 6    < > = values in this interval not displayed           CMP:  Results from last 7 days   Lab Units 12/17/19  0924 12/16/19  0453 12/15/19  0410 12/14/19  0406 12/13/19  2143 12/13/19  1752 12/13/19  1750 12/13/19  1718 12/13/19  1645 12/13/19  1543 12/13/19  1509 12/13/19  1444 12/13/19  1404   POTASSIUM mmol/L 3 7 3 8 3 7 4 0 3 7  --  3 2*  --   --   --   --   --   --    CHLORIDE mmol/L 101 102 105 110*  --   --  115*  --   --   --   --   --   --    CO2 mmol/L 29 28 32 26  --   --  25  --   --   --   --   --   --    CO2, I-STAT mmol/L  --   --   --   --   --  26  --  26 30 32 30 33* 30   BUN mg/dL 23 19 19 14  --   --  12  --   --   --   --   --   --    CREATININE mg/dL 1 18 1 00 1 01 0 91  --   --  0 79  --   --   --   --   --   --    GLUCOSE, ISTAT mg/dl  --   --   --   --   --  100  --  114 134 158* 157* 176* 177*   CALCIUM mg/dL 8 9 8 9 8 5 7 8*  --   --  7 6*  --   --   --   --   --   --    EGFR ml/min/1 73sq m 61 75 74 84  --   --  90  --   --   -- --   --   --          BMP:  Results from last 7 days   Lab Units 19  0924 19  0453 12/15/19  0410 19  0406 19  2143 19  1752 19  1750 19  1718   POTASSIUM mmol/L 3 7 3 8 3 7 4 0 3 7  --  3 2*  --    CHLORIDE mmol/L 101 102 105 110*  --   --  115*  --    CO2 mmol/L 29 28 32 26  --   --  25  --    CO2, I-STAT mmol/L  --   --   --   --   --  26  --  26   BUN mg/dL 23 19 19 14  --   --  12  --    CREATININE mg/dL 1 18 1 00 1 01 0 91  --   --  0 79  --    GLUCOSE, ISTAT mg/dl  --   --   --   --   --  100  --  114   CALCIUM mg/dL 8 9 8 9 8 5 7 8*  --   --  7 6*  --        BNP: No results for input(s): BNP in the last 72 hours  Magnesium:   Results from last 7 days   Lab Units 12/15/19  0410 19  0406   MAGNESIUM mg/dL 1 9 2 0       Coags:   Results from last 7 days   Lab Units 193 19  1658   PTT seconds  --  36   INR  1 28* 1 60*       TSH: No results found for: TSH    Hemoglobin A1C       Lipid Profile:       Cardiac testing:   Results for orders placed during the hospital encounter of 19   Echo complete with contrast if indicated    Narrative [address]  [address] [place] [address]  [phone]    Transthoracic Echocardiogram  2D, M-mode, Doppler, and Color Doppler    Study date:  09-Dec-2019    Patient: Frandy Ahn  MR number: [id number]  Account number: [de-identified]  : 1945  Age: 69 years  Gender: Male  Status: Outpatient  Location: Bedside  Height: 70 in  Weight: 178 lb  BP: 116/ 62 mmHg    Indications: Coronary artery disease  Diagnoses: I25 118 - Atherosclerotic heart disease of native coronary artery with other forms of angina pectoris    Sonographer:  Tania Uriostegui RDCS  Primary Physician:  Maria Elena Yanez MD  Referring Physician:  Masoud Marcos PA-C  Group:  [id number]  Cardiology Fellow: Lena Danielson MD  Interpreting Physician:  Max Gomez, MD    SUMMARY    LEFT VENTRICLE:  Systolic function was normal  Ejection fraction was estimated to be 60 %  There were no regional wall motion abnormalities  There was no evidence of concentric hypertrophy  MITRAL VALVE:  There was trace regurgitation  AORTIC VALVE:  There was trace regurgitation  TRICUSPID VALVE:  There was trace regurgitation  HISTORY: PRIOR HISTORY: Hypertension, diabetes mellitus type 2, hyperlipidemia, stable angina pectoris, cardiac catheterization 12/09/19  Family history of heart failure, coronary artery disease, myocardial infarction, and diabetes  mellitus  PROCEDURE: The procedure was performed at the bedside  This was a stat study  The transthoracic approach was used  The study included complete 2D imaging, M-mode, complete spectral Doppler, and color Doppler  The heart rate was 72 bpm, at  the start of the study  Images were obtained from the parasternal, apical, subcostal, and suprasternal notch acoustic windows  Echocardiographic views were limited due to poor acoustic window availability  Image quality was adequate  LEFT VENTRICLE: Size was normal  Systolic function was normal  Ejection fraction was estimated to be 60 %  There were no regional wall motion abnormalities  Wall thickness was normal  There was no evidence of concentric hypertrophy  DOPPLER: Left ventricular diastolic function parameters were normal     RIGHT VENTRICLE: The size was normal  Systolic function was normal  Wall thickness was normal     LEFT ATRIUM: Size was normal     RIGHT ATRIUM: Size was normal     MITRAL VALVE: Valve structure was normal  There was normal leaflet separation  DOPPLER: The transmitral velocity was within the normal range  There was no evidence for stenosis  There was trace regurgitation  AORTIC VALVE: The valve was trileaflet  Leaflets exhibited normal thickness and normal cuspal separation  DOPPLER: Transaortic velocity was within the normal range   There was no evidence for stenosis  There was trace regurgitation  TRICUSPID VALVE: The valve structure was normal  There was normal leaflet separation  DOPPLER: The transtricuspid velocity was within the normal range  There was no evidence for stenosis  There was trace regurgitation  Pulmonary artery  systolic pressure was within the normal range  Estimated peak PA pressure was 25 mmHg  PULMONIC VALVE: Leaflets exhibited normal thickness, no calcification, and normal cuspal separation  DOPPLER: The transpulmonic velocity was within the normal range  There was trace regurgitation  PERICARDIUM: There was no pericardial effusion  The pericardium was normal in appearance  AORTA: The root exhibited normal size  The ascending aorta was normal in size  SYSTEMIC VEINS: IVC: The inferior vena cava was normal in size  SYSTEM MEASUREMENT TABLES    2D mode  AV Diam: 3 2 cm  AoR Diam; Mean (2D): 3 2 cm  LA Diam (2D): 3 8 cm  LA Dimension; Mean (2D): 3 8 cm  LA/Ao (2D): 1 19  EDV (2D-Cubed): 104 cm3  EF (2D-Cubed): 68 5 %  ESV (2D-Cubed): 32 8 cm3  FS (2D-Cubed): 31 9 %  FS (2D-Teich): 31 9 %  IVS/LVPW (2D): 1  IVSd (2D): 0 8 cm  IVSd; Mean chosen (2D): 0 8 cm  LVIDd (2D): 4 7 cm  LVIDd; Mean (2D): 4 7 cm  LVIDs (2D): 3 2 cm  LVIDs; Mean (2D): 3 2 cm  LVPWd (2D): 0 8 cm  LVPWd; Mean (2D): 0 8 cm  Left Ventricular Ejection Fraction; Teichholz; 2D mode;: 59 8 %  Left Ventricular End Diastolic Volume; Teichholz; 2D mode;: 102 cm3  Left Ventricular End Systolic Volume; Teichholz; 2D mode;: 41 cm3  SV (2D-Cubed): 71 2 cm3  Stroke Volume; Teichholz; 2D mode;: 61 cm3  RVIDd (2D): 2 7 cm  RVIDd; Mean (2D): 2 7 cm  Right and Left Ventricular End Diastolic Diameter Ratio; 2D mode;: 0 57    Apical four chamber  Left Atrium MOD Diam; Most recent value chosen; End Systole; Apical four chamber;: 1 83 cm  Left Atrium MOD Diam; Most recent value chosen; End Systole;  Apical four chamber;: 1 24 cm  Left Atrium MOD Diam; Most recent value chosen; End Systole; Apical four chamber;: 1 83 cm  Left Atrium MOD Diam; Most recent value chosen; End Systole; Apical four chamber;: 3 24 cm  Left Atrium MOD Diam; Most recent value chosen; End Systole; Apical four chamber;: 3 48 cm  Left Atrium MOD Diam; Most recent value chosen; End Systole; Apical four chamber;: 3 67 cm  Left Atrium MOD Diam; Most recent value chosen; End Systole; Apical four chamber;: 3 86 cm  Left Atrium MOD Diam; Most recent value chosen; End Systole; Apical four chamber;: 3 94 cm  Left Atrium MOD Diam; Most recent value chosen; End Systole; Apical four chamber;: 3 94 cm  Left Atrium MOD Diam; Most recent value chosen; End Systole; Apical four chamber;: 3 96 cm  Left Atrium MOD Diam; Most recent value chosen; End Systole; Apical four chamber;: 3 96 cm  Left Atrium MOD Diam; Most recent value chosen; End Systole; Apical four chamber;: 3 94 cm  Left Atrium MOD Diam; Most recent value chosen; End Systole; Apical four chamber;: 3 83 cm  Left Atrium MOD Diam; Most recent value chosen; End Systole; Apical four chamber;: 3 75 cm  Left Atrium MOD Diam; Most recent value chosen; End Systole; Apical four chamber;: 3 61 cm  Left Atrium MOD Diam; Most recent value chosen; End Systole; Apical four chamber;: 3 48 cm  Left Atrium MOD Diam; Most recent value chosen; End Systole; Apical four chamber;: 3 29 cm  Left Atrium MOD Diam; Most recent value chosen; End Systole; Apical four chamber;: 2 99 cm  Left Atrium MOD Diam; Most recent value chosen; End Systole; Apical four chamber;: 2 7 cm  Left Atrium MOD Diam; Most recent value chosen; End Systole; Apical four chamber;: 2 4 cm  Left Atrium Systolic Area; Most recent value chosen; Method of Disks, Single Plane; 2D mode; Apical four chamber;: 1320 mm2  Left Atrium Systolic Volume; Most recent value chosen; Method of Disks, Single Plane; 2D mode; Apical four chamber;: 35 6 cm3  Left Atrium systolic major axis;  Most recent value chosen; Method of Disks, Single Plane; 2D mode; Apical four chamber;: 4 05 cm  LV MOD Diam; Recent value; End Diastole (A4C): 2 61 cm  LV MOD Diam; Recent value; End Diastole (A4C): 1 82 cm  LV MOD Diam; Recent value; End Diastole (A4C): 3 2 cm  LV MOD Diam; Recent value; End Diastole (A4C): 3 6 cm  LV MOD Diam; Recent value; End Diastole (A4C): 3 89 cm  LV MOD Diam; Recent value; End Diastole (A4C): 4 17 cm  LV MOD Diam; Recent value; End Diastole (A4C): 4 39 cm  LV MOD Diam; Recent value; End Diastole (A4C): 4 65 cm  LV MOD Diam; Recent value; End Diastole (A4C): 4 81 cm  LV MOD Diam; Recent value; End Diastole (A4C): 4 83 cm  LV MOD Diam; Recent value; End Diastole (A4C): 4 58 cm  LV MOD Diam; Recent value; End Diastole (A4C): 4 21 cm  LV MOD Diam; Recent value; End Diastole (A4C): 4 25 cm  LV MOD Diam; Recent value; End Diastole (A4C): 4 56 cm  LV MOD Diam; Recent value; End Diastole (A4C): 4 92 cm  LV MOD Diam; Recent value; End Diastole (A4C): 5 13 cm  LV MOD Diam; Recent value; End Diastole (A4C): 5 15 cm  LV MOD Diam; Recent value; End Diastole (A4C): 5 06 cm  LV MOD Diam; Recent value; End Diastole (A4C): 4 75 cm  LV MOD Diam; Recent value; End Diastole (A4C): 2 47 cm  LV MOD Diam; Recent value; End Systole (A4C): 3 93 cm  LV MOD Diam; Recent value; End Systole (A4C): 3 62 cm  LV MOD Diam; Recent value; End Systole (A4C): 3 79 cm  LV MOD Diam; Recent value; End Systole (A4C): 3 85 cm  LV MOD Diam; Recent value; End Systole (A4C): 3 93 cm  LV MOD Diam; Recent value; End Systole (A4C): 3 95 cm  LV MOD Diam; Recent value; End Systole (A4C): 1 04 cm  LV MOD Diam; Recent value; End Systole (A4C): 1 54 cm  LV MOD Diam; Recent value; End Systole (A4C): 1 91 cm  LV MOD Diam; Recent value; End Systole (A4C): 2 29 cm  LV MOD Diam; Recent value; End Systole (A4C): 2 54 cm  LV MOD Diam; Recent value; End Systole (A4C): 2 79 cm  LV MOD Diam; Recent value; End Systole (A4C): 2 97 cm  LV MOD Diam; Recent value;  End Systole (A4C): 3 08 cm  LV MOD Diam; Recent value; End Systole (A4C): 3 2 cm  LV MOD Diam; Recent value; End Systole (A4C): 3 25 cm  LV MOD Diam; Recent value; End Systole (A4C): 3 31 cm  LV MOD Diam; Recent value; End Systole (A4C): 3 37 cm  LV MOD Diam; Recent value; End Systole (A4C): 3 47 cm  LV MOD Diam; Recent value; End Systole (A4C): 3 89 cm  LVEF MOD A4C: 55 2 %  Left Ventricle diastolic major axis; Most recent value chosen; Method of Disks, Single Plane; 2D mode; Apical four chamber;: 8 75 cm  Left Ventricle systolic major axis; Most recent value chosen; Method of Disks, Single Plane; 2D mode; Apical four chamber;: 6 93 cm  Left Ventricular Diastolic Area; Most recent value chosen; Method of Disks, Single Plane; 2D mode; Apical four chamber;: 3640 mm2  Left Ventricular End Diastolic Volume; Most recent value chosen; Method of Disks, Single Plane; 2D mode; Apical four chamber;: 124 cm3  Left Ventricular End Systolic Volume; Most recent value chosen; Method of Disks, Single Plane; 2D mode; Apical four chamber;: 55 5 cm3  Left Ventricular Systolic Area; Most recent value chosen; Method of Disks, Single Plane; 2D mode; Apical four chamber;: 2080 mm2  SV MOD A4C: 68 5 cm3  Right Atrium Systolic Area; End Systole; 2D mode; Apical four chamber;: 1000 mm2  Right Atrium Systolic Area; Mean; Mean value chosen; End Systole; 2D mode; Apical four chamber;: 1000 mm2    Tissue Doppler Imaging  LV Peak Early Ortez Tissue Daljit; Medial MA (TDI): 49 mm/s  Left Ventricular Peak Early Diastolic Tissue Velocity; Mean; Mean value chosen; Medial Mitral Annulus; Tissue Doppler Imaging;: 49 mm/s    Unspecified Scan Mode  Dec Luce; Mean; Regurgitant Flow: 1320 mm/s2  Dec Luce; Regurgitant Flow: 996 mm/s2  Dec Luce; Regurgitant Flow: 1630 mm/s2  Dec Luce; Regurgitant Flow: 1320 mm/s2  PHT; Mean; Regurgitant Flow: 460 ms  PHT; Regurgitant Flow: 355 ms  PHT; Regurgitant Flow: 371 ms  PHT;  Regurgitant Flow: 655 ms  Peak Grad; Mean; Regurgitant Flow: 15 mm[Hg]  Vmax; Mean; Regurgitant Flow: 1960 mm/s  Vmax; Regurgitant Flow: 1980 mm/s  Vmax; Regurgitant Flow: 1670 mm/s  Vmax; Regurgitant Flow: 2230 mm/s  MV Peak Daljit/LV Peak Tissue Daljit E-Wave; Medial MA: 13  DT; Antegrade Flow: 301 ms  DT; Mean; Antegrade Flow: 301 ms  Dec Teller; Antegrade Flow: 2110 mm/s2  Dec Teller; Mean; Antegrade Flow: 2110 mm/s2  MV A Daljit: 844 mm/s  MV E Daljit: 635 mm/s  MV E/A Ratio: 0 8  MV Peak A Daljit: 844 mm/s  MV Peak E Daljit; Mean; Antegrade Flow: 635 mm/s  MVA (PHT): 250 mm2  PHT: 88 ms  PHT; Mean: 88 ms  End Ortez Daljit; Mean; Regurgitant Flow: 950 mm/s  End Ortez Daljit; Regurgitant Flow: 950 mm/s  Peak Gradient; Mean; Mean value chosen; Regurgitant Flow; End Diastole;: 4 mm[Hg]  Peak Grad; Mean; Regurgitant Flow: 16 mm[Hg]  Vmax; Mean; Regurgitant Flow: 2010 mm/s  Vmax; Regurgitant Flow: 1820 mm/s  Vmax; Regurgitant Flow: 2190 mm/s    [facility]    Prepared and electronically signed by    Phyllis Castillo MD  Signed 09-Dec-2019 17:26:57       No results found for this or any previous visit  No results found for this or any previous visit    Results for orders placed during the hospital encounter of 19   NM myocardial perfusion spect (stress and/or rest)    [facility]ative [facility]  [address]  [phone]    Stress Gated SPECT Myocardial Perfusion Imaging after Exercise    Patient: Rand Nogueira  MR number: [id number]  Account number: [de-identified]  : 1945  Age: 72 years  Gender: Male  Status: Outpatient  Location: [address] [place] [address] [facility]  Height: 70 in  Weight: 179 lb  BP: 122/ 78 mmHg    Allergies: POLLEN EXTRACT    Diagnosis: R07 9 - Chest pain, unspecified    Interpreting Physician:  Rhianna Rawls MD  Referring Physician:  Tatyana Adler MD  Primary Physician:  Tatyana Adler MD  Technician:  Gemma Valles  RN:  Edmar Joseph RN  Group:  [facility]  Report Prepared by[de-identified]  Gavin Lovell RN    INDICATIONS: Detection for coronary artery disease  HISTORY: The patient is a 72year old  male  Chest pain status: recent onset chest pain  Coronary artery disease risk factors: dyslipidemia, hypertension, family history of premature coronary artery disease, and diabetes mellitus  Cardiovascular history: none significant  Medications: a calcium channel blocker, a diuretic, a lipid lowering agent, and diabetic medications  PHYSICAL EXAM: Baseline physical exam screening: normal     REST ECG: Normal sinus rhythm  Normal baseline ECG  PROCEDURE: The study was performed in the the [address] [facility]  Treadmill exercise testing was performed, using the Bhavin protocol  Gated SPECT myocardial perfusion imaging was performed during stress  Systolic blood  pressure was 122 mmHg, at the start of the study  Diastolic blood pressure was 78 mmHg, at the start of the study  The heart rate was 68 bpm, at the start of the study  IV double checked  BHAVIN PROTOCOL:  HR bpm SBP mmHg DBP mmHg Symptoms  Baseline 68 122 78 none  Stage 1 130 144 64 mild chest discomfort  Recovery 1 83 140 60 subsiding  Recovery 2 81 136 66 subsiding  Recovery 3 80 140 70 none  No medications or fluids given  STRESS SUMMARY: Duration of exercise was 3 min and 33 sec  The patient exercised to protocol stage 1  Maximal work rate was 4 6 METs  Maximal heart rate during stress was 130 bpm ( 87 % of maximal predicted heart rate)  The heart rate  response to stress was normal  There was normal resting blood pressure with an appropriate response to stress  The rate-pressure product for the peak heart rate and blood pressure was [id number]  The patient experienced chest pain during  stress; pain resolved spontaneously  The stress test was terminated due to achievement of maximal (symptom limited) exercise  Pre oxygen saturation: 98 %  Peak oxygen saturation: 96 %   The stress ECG was consistent with myocardial  ischemia  Arrhythmia during stress: isolated atrial premature beats, isolated premature ventricular beats, pairs of premature ventricular beats, and ventricular premature beats, triplets  ISOTOPE ADMINISTRATION:  The radiopharmaceutical was injected one minute before the end of exercise  MYOCARDIAL PERFUSION IMAGING:  The image quality was good  PERFUSION DEFECTS:  -  There was a large, severe, reversible myocardial perfusion defect of the entire septal and inferior wall  -  There was a moderate-sized, moderately severe, reversible myocardial perfusion defect of the apical anterior and apical wall  GATED SPECT:  The calculated left ventricular ejection fraction was 57 %  There was mildly reduced myocardial thickening and motion of the septal wall and inferior wall of the left ventricle  SUMMARY:  -  Stress results: Duration of exercise was 3 min and 33 sec  Target heart rate was achieved  The patient experienced chest pain during stress; pain resolved spontaneously  -  ECG conclusions: The stress ECG was consistent with myocardial ischemia  -  Perfusion imaging: There was a large, severe, reversible myocardial perfusion defect of the entire septal and inferior wall  There was a moderate-sized, moderately severe, reversible myocardial perfusion defect of the apical anterior  and apical wall  -  Gated SPECT: The calculated left ventricular ejection fraction was 57 %  There was mildly reduced myocardial thickening and motion of the septal wall and inferior wall of the left ventricle  IMPRESSIONS: Markedly abnormal study after maximal exercise with reproduction of symptoms  There was a large amount of ischemia in the septal region extending to the inferior wall  There was a moderate amount of ischemia in the anterior  and apical regions      Prepared and signed by    Aravind Donis MD  Signed 11/27/2019 14:47:41         Meds/Allergies   current meds:   Current Facility-Administered Medications   Medication Dose Route Frequency    acetaminophen (TYLENOL) tablet 650 mg  650 mg Oral Q4H PRN    amiodarone tablet 200 mg  200 mg Oral Q8H [facility]    aspirin tablet 325 mg  325 mg Oral Daily    atorvastatin (LIPITOR) tablet 80 mg  80 mg Oral Daily With Dinner    bisacodyl (DULCOLAX) rectal suppository 10 mg  10 mg Rectal Daily PRN    docusate sodium (COLACE) capsule 100 mg  100 mg Oral BID    fondaparinux (ARIXTRA) subcutaneous injection 2 5 mg  2 5 mg Subcutaneous Daily    furosemide (LASIX) injection 40 mg  40 mg Intravenous BID (diuretic)    insulin glargine (LANTUS) subcutaneous injection 25 Units 0 25 mL  25 Units Subcutaneous HS    insulin lispro (HumaLOG) 100 units/mL subcutaneous injection 1-5 Units  1-5 Units Subcutaneous HS    insulin lispro (HumaLOG) 100 units/mL subcutaneous injection 2-12 Units  2-12 Units Subcutaneous TID AC    insulin lispro (HumaLOG) 100 units/mL subcutaneous injection 8 Units  8 Units Subcutaneous TID With Meals    metoprolol tartrate (LOPRESSOR) tablet 25 mg  25 mg Oral Q12H MARISSA    mupirocin (BACTROBAN) 2 % nasal ointment 1 application  1 application Nasal M66Q [facility]    ondansetron (ZOFRAN) injection 4 mg  4 mg Intravenous Q6H PRN    oxyCODONE-acetaminophen (PERCOCET) 5-325 mg per tablet 1 tablet  1 tablet Oral Q4H PRN    oxyCODONE-acetaminophen (PERCOCET) 5-325 mg per tablet 2 tablet  2 tablet Oral Q6H PRN    pantoprazole (PROTONIX) EC tablet 40 mg  40 mg Oral Early Morning    polyethylene glycol (MIRALAX) packet 17 g  17 g Oral Daily    potassium chloride (K-DUR,KLOR-CON) CR tablet 20 mEq  20 mEq Oral BID    temazepam (RESTORIL) capsule 15 mg  15 mg Oral HS PRN     Medications Prior to Admission   Medication    amLODIPine (NORVASC) 10 mg tablet    aspirin 81 mg chewable tablet    atorvastatin (LIPITOR) 40 mg tablet    Coenzyme Q10 (COQ10) 100 MG CAPS    Glucosamine-Chondroitin 750-600 MG TABS    hydrochlorothiazide (HYDRODIURIL) 25 mg tablet    metFORMIN (GLUCOPHAGE) 1000 MG tablet    mupirocin (BACTROBAN) 2 % nasal ointment    Omega-3 Fatty Acids (FISH OIL) 645 MG CAPS          Assessment:  Principal Problem: 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 [facility])  Active Problems:    Benign essential hypertension    Type 2 diabetes mellitus (HCC)    Hypercholesteremia    Diabetic retinopathy (HCC)    S/P CABG x 4    Plan:    CAD s/p CABG X 4    Doing well  Continue with current medical management  In NSR  Counseling / Coordination of Care  Total floor / unit time spent today 25 minutes  Greater than 50% of total time was spent with the patient and / or family counseling and / or coordination of care  A description of the counseling / coordination of care

## 2019-12-17 NOTE — SOCIAL WORK
Pt is cleared for d/c by Cardiothoracic Surgery [facility]  RN Susie Troncoso was notified of d/c order  Pt is accepted for services by [facility] for his aftercare plan  The pt and his wife Ned Meyers were both informed of d/c  Wife will transport pt home later this day, pickup time TBD  IMM signed by pt on 12/16/19  No chart copy required  CM to follow

## 2019-12-17 NOTE — TELEPHONE ENCOUNTER
Patient had open heart surgery on Friday 12/13/19, discharged from [facility] 12/17/19  TCM with Dr Hero Reece for Friday 12/20/19 at 2:30  Dr Shilpa Ahuja not available

## 2019-12-17 NOTE — PROGRESS NOTES
Progress Note - Cardiothoracic Surgery   Robert Rahman 69 y o  male MRN: [id number]  Unit/Bed#: [facility] 426-01 Encounter: [id number]    Coronary artery disease  S/P CABG x 4 with left internal mammary artery to left anterior descending artery, saphenous vein graft to diagonal 1, saphenous vein graft to obtuse marginal 1 and saphenous vein graft to right posterior descending artery; POD # 4      24 Hour Events: Has remained in NSR >24 hrs  Amio infusion completed yesterday  Continues on PO amio      Medications:   Scheduled Meds:    Current Facility-Administered Medications:  acetaminophen 650 mg Oral Q4H PRN Maisha Boast, CRNP   amiodarone 200 mg Oral Q8H [address] Vi Buenrostro, CRNP   aspirin 325 mg Oral Daily Maisha Boast, CRNP   atorvastatin 80 mg Oral Daily With Shahbaz Hendrickson, KONSTANTIN   bisacodyl 10 mg Rectal Daily PRN Maisha Boast, CRNP   docusate sodium 100 mg Oral BID Maisha Boast, CRCHELSEA   fondaparinux 2 5 mg Subcutaneous Daily Vi Buenrostro, CRNP   furosemide 40 mg Intravenous BID (diuretic) Karen Dudley PA-C   insulin glargine 25 Units Subcutaneous HS Bonnie Alexis MD   insulin lispro 1-5 Units Subcutaneous HS Bonnie Alexis MD   insulin lispro 2-12 Units Subcutaneous TID AC Bonnie Alexis MD   insulin lispro 8 Units Subcutaneous TID With Meals Bonnie Alexis MD   metoprolol tartrate 25 mg Oral Q12H [address] Rudolph Estrada PA-C   mupirocin 1 application Nasal M80H [address] Vi Buenrostro, KONSTANTIN   ondansetron 4 mg Intravenous Q6H PRN Maisha Boast, CRNP   oxyCODONE-acetaminophen 1 tablet Oral Q4H PRN Maisha Boast, CRNP   oxyCODONE-acetaminophen 2 tablet Oral Q6H PRN Maisha Boast, CRNP   pantoprazole 40 mg Oral Early Morning Vi Buenrostro, CRNP   polyethylene glycol 17 g Oral Daily Vi Buenrostro, CRNP   potassium chloride 20 mEq Oral BID Karen Dudley PA-C   temazepam 15 mg Oral HS PRN Maisha Boast, CRNP     Continuous Infusions:   PRN Meds:   acetaminophen    bisacodyl    ondansetron   oxyCODONE-acetaminophen    oxyCODONE-acetaminophen    temazepam    Vitals:   Vitals:    12/16/19 2146 12/17/19 0037 12/17/19 0343 12/17/19 0517   BP: 111/63 118/58 125/69    BP Location:  Right arm Right arm    Pulse: 74 67 67    Resp:  18 18    Temp:  99 3 °F (37 4 °C) 99 °F (37 2 °C)    TempSrc:  Oral Oral    SpO2:  92% 94%    Weight:    82 1 kg (181 lb)   Height:           Telemetry: NSR; Heart Rate: 69    Respiratory:   SpO2: SpO2: 94 %; Room Air    Intake/Output:     Intake/Output Summary (Last 24 hours) at 12/17/2019 0732  Last data filed at 12/17/2019 7619  Gross per 24 hour   Intake 1437 74 ml   Output 2345 ml   Net -907 26 ml   UOP 2 3 L/24hrs        Weights:   Weight (last 2 days)     Date/Time   Weight    12/17/19 0517   82 1 (181)    12/16/19 0600   71 (156 6)    12/15/19 0600   69 4 (153)            Admit weight: 77 1 kg    Results:   Results from last 7 days   Lab Units 12/15/19  0410 12/14/19  0406 12/13/19  1752 12/13/19  1750   WBC Thousand/uL 13 33* 16 59*  --   --    HEMOGLOBIN g/dL 9 7* 10 1*  --  11 7*   I STAT HEMOGLOBIN g/dl  --   --  10 2*  --    HEMATOCRIT % 29 7* 30 1*  --  33 5*   HEMATOCRIT, ISTAT %  --   --  30*  --    PLATELETS Thousands/uL 152 242  --  101*     Results from last 7 days   Lab Units 12/16/19  0453 12/15/19  0410 12/14/19  0406  12/13/19  1752   SODIUM mmol/L 134* 138 143  --   --    POTASSIUM mmol/L 3 8 3 7 4 0   < >  --    CHLORIDE mmol/L 102 105 110*  --   --    CO2 mmol/L 28 32 26  --   --    CO2, I-STAT mmol/L  --   --   --   --  26   BUN mg/dL 19 19 14  --   --    CREATININE mg/dL 1 00 1 01 0 91  --   --    GLUCOSE, ISTAT mg/dl  --   --   --   --  100   CALCIUM mg/dL 8 9 8 5 7 8*  --   --     < > = values in this interval not displayed       Results from last 7 days   Lab Units 12/16/19  0453 12/13/19  1658   INR  1 28* 1 60*   PTT seconds  --  36     Point of care glucose:     Studies:  No new studies        Invasive Lines/Tubes:  Invasive Devices Central Venous Catheter Line            CVC Central Lines 12/13/19 Triple 3 days                Physical Exam:    HEENT/NECK:  PERRLA  No jugular venous distention  Cardiac: Regular rate and rhythm and No murmurs/rubs/gallops  Pulmonary:  Breath sounds clear bilaterally and No rales/rhonchi/wheezes  Abdomen:  Non-tender, Non-distended and Normal bowel sounds  Incisions: Sternum is stable  Incision is clean, dry, and intact  Extremities: Extremities warm/dry and Trace edema B/L  Neuro: Alert and oriented X 3, Sensation is grossly intact and No focal deficits  Skin: Warm/Dry, without rashes or lesions  Assessment:  Principal Problem: 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 [facility])  Active Problems:    Benign essential hypertension    Type 2 diabetes mellitus (HCC)    Hypercholesteremia    Diabetic retinopathy (HCC)    S/P CABG x 4       Coronary artery disease  S/P Coronary artery bypass grafting; POD # 4    Plan:    1  Cardiac:   NSR; HR/BP well-controlled  Episode of afib 12/15  Resolved with amio bolus and drip  Amio infusion discontinued yesterday  Continue PO Amio 200 mg TID  Lopressor, 25mg PO BID  Continue ASA and Statin therapy  Epicardial pacing wires out  Discontinue central access today  Continue DVT prophylaxis    2  Pulmonary:    Acute pulmonary insufficiency now resolved, CXR w/ atelectasis  Evidenced by Tachypnea w/ RR 37 and Sp02 84% requiring supplemental oxygen, and increased monitoring, incentive spirometry  Good Room air oxygen saturation; Continue incentive spirometry/Coughing/Deep breathing exercises  Chest tubes have been discontinued    3  Renal:   Intake/Output net: -907 mL/24 hours  Continue diuresis   Lasix 40 mg IV BID  Potassium Chloride 20 mEq PO BID  Post op Creatinine stable; Follow up labs prn    4  Neuro:  Neurologically intact; No active issues  Incisional pain well-controlled; Continue prn Percocet    5   GI:  Tolerating TLC 2 3 gm sodium diet  Maintain 1800 mL daily fluid restriction   Continue stool softeners and prn suppository  Continue GI prophylaxis    6  Endo:   History of diabetes; Continue SQ insulin therapy as directed by endocrinology physician  Insulin administration teaching for home therapy ordered    7    Hematology: Thrombocytopenia now resolved evidenced by high chest tube out put and Platelets 959->581->129->486, required Amicar immediately post-op, platelet/ cryoprecipitate transfusion, serial labs monitoring and continued CT output monitoring   Post-operative acute blood loss anemia; Hemoglobin 9 7; trend prn    Leukocytosis improving    8  Disposition:      Ambulating independently, Anticipate discharge to home today     VTE Pharmacologic Prophylaxis: Fondaparinux (Arixtra)  VTE Mechanical Prophylaxis: sequential compression device    Collaborative rounds completed with LISA Salcedo    Plan of care discussed with bedside nurse    SIGNATURE: Elizabeth Nash PA-C  DATE: December 17, 2019  TIME: 7:32 AM

## 2019-12-17 NOTE — PLAN OF CARE
Problem: Prexisting or High Potential for Compromised Skin Integrity  Goal: Skin integrity is maintained or improved  Description  INTERVENTIONS:  - Identify patients at risk for skin breakdown  - Assess and monitor skin integrity  - Assess and monitor nutrition and hydration status  - Monitor labs   - Assess for incontinence   - Turn and reposition patient  - Assist with mobility/ambulation  - Relieve pressure over bony prominences  - Avoid friction and shearing  - Provide appropriate hygiene as needed including keeping skin clean and dry  - Evaluate need for skin moisturizer/barrier cream  - Collaborate with interdisciplinary team   - Patient/family teaching  - Consider wound care consult   Outcome: Progressing     Problem: PAIN - ADULT  Goal: Verbalizes/displays adequate comfort level or baseline comfort level  Description  Interventions:  - Encourage patient to monitor pain and request assistance  - Assess pain using appropriate pain scale  - Administer analgesics based on type and severity of pain and evaluate response  - Implement non-pharmacological measures as appropriate and evaluate response  - Consider cultural and social influences on pain and pain management  - Notify physician/advanced practitioner if interventions unsuccessful or patient reports new pain  Outcome: Progressing     Problem: INFECTION - ADULT  Goal: Absence or prevention of progression during hospitalization  Description  INTERVENTIONS:  - Assess and monitor for signs and symptoms of infection  - Monitor lab/diagnostic results  - Monitor all insertion sites, i e  indwelling lines, tubes, and drains  - Monitor endotracheal if appropriate and nasal secretions for changes in amount and color  - Erin appropriate cooling/warming therapies per order  - Administer medications as ordered  - Instruct and encourage patient and family to use good hand hygiene technique  - Identify and instruct in appropriate isolation precautions for identified infection/condition  Outcome: Progressing  Goal: Absence of fever/infection during neutropenic period  Description  INTERVENTIONS:  - Monitor WBC    Outcome: Progressing     Problem: SAFETY ADULT  Goal: Patient will remain free of falls  Description  INTERVENTIONS:  - Assess patient frequently for physical needs  -  Identify cognitive and physical deficits and behaviors that affect risk of falls    -  [place] fall precautions as indicated by assessment   - Educate patient/family on patient safety including physical limitations  - Instruct patient to call for assistance with activity based on assessment  - Modify environment to reduce risk of injury  - Consider OT/PT consult to assist with strengthening/mobility  Outcome: Progressing  Goal: Maintain or return to baseline ADL function  Description  INTERVENTIONS:  -  Assess patient's ability to carry out ADLs; assess patient's baseline for ADL function and identify physical deficits which impact ability to perform ADLs (bathing, care of mouth/teeth, toileting, grooming, dressing, etc )  - Assess/evaluate cause of self-care deficits   - Assess range of motion  - Assess patient's mobility; develop plan if impaired  - Assess patient's need for assistive devices and provide as appropriate  - Encourage maximum independence but intervene and supervise when necessary  - Involve family in performance of ADLs  - Assess for home care needs following discharge   - Consider OT consult to assist with ADL evaluation and planning for discharge  - Provide patient education as appropriate  Outcome: Progressing  Goal: Maintain or return mobility status to optimal level  Description  INTERVENTIONS:  - Assess patient's baseline mobility status (ambulation, transfers, stairs, etc )    - Identify cognitive and physical deficits and behaviors that affect mobility  - Identify mobility aids required to assist with transfers and/or ambulation (gait belt, sit-to-stand, lift, walker, cane, etc )  - [place] fall precautions as indicated by assessment  - Record patient progress and toleration of activity level on Mobility SBAR; progress patient to next Phase/Stage  - Instruct patient to call for assistance with activity based on assessment  - Consider rehabilitation consult to assist with strengthening/weightbearing, etc   Outcome: Progressing     Problem: DISCHARGE PLANNING  Goal: Discharge to home or other facility with appropriate resources  Description  INTERVENTIONS:  - Identify barriers to discharge w/patient and caregiver  - Arrange for needed discharge resources and transportation as appropriate  - Identify discharge learning needs (meds, wound care, etc )  - Arrange for interpretive services to assist at discharge as needed  - Refer to Case Management Department for coordinating discharge planning if the patient needs post-hospital services based on physician/advanced practitioner order or complex needs related to functional status, cognitive ability, or social support system  Outcome: Progressing     Problem: Knowledge Deficit  Goal: Patient/family/caregiver demonstrates understanding of disease process, treatment plan, medications, and discharge instructions  Description  Complete learning assessment and assess knowledge base  Interventions:  - Provide teaching at level of understanding  - Provide teaching via preferred learning methods  Outcome: Progressing     Problem: Potential for Falls  Goal: Patient will remain free of falls  Description  INTERVENTIONS:  - Assess patient frequently for physical needs  -  Identify cognitive and physical deficits and behaviors that affect risk of falls    -  [place] fall precautions as indicated by assessment   - Educate patient/family on patient safety including physical limitations  - Instruct patient to call for assistance with activity based on assessment  - Modify environment to reduce risk of injury  - Consider OT/PT consult to assist with strengthening/mobility  Outcome: Progressing

## 2019-12-17 NOTE — DISCHARGE INSTRUCTIONS
Please test blood sugars three times daily before meals & once daily before bedtime  Record in a log & bring to follow-up appointment with Primary Care Provider  Sternal Precautions   WHAT YOU NEED TO KNOW:   What are sternal precautions? Sternal precautions are used to help protect your sternum (breastbone) after open chest surgery  Wires are placed during surgery to hold the sternum together as it heals  Sternal precautions help prevent the wires from cutting through the sternum  The precautions also help prevent the sternum from coming apart from an injury, and prevent pain and bleeding  You may need to use the precautions for up to 12 weeks after surgery  Your surgeon will give you specific instructions based on the type of surgery you had  It is important to follow the instructions carefully  An injury to the healing sternum can be life-threatening  What are some general sternal precautions? Start slowly and do more as you get stronger  Pain medicine might make it harder for you to know when to slow down or be careful  Stop immediately if you hear a crunch or pop in your sternum  · Protect your sternum  Hug a pillow to your chest or cross your arms over your chest when you laugh, sneeze, or cough  · Be careful when you get into or out of a chair or bed  Hug a pillow or cross your arms when you stand or sit  Do not twist as you move  Use only your legs to sit and stand  You may need to use a raised toilet seat if you have trouble standing up without using your arms  Your healthcare provider may teach you to use your elbow for support as you move from lying to sitting  · Ask when you may take a bath or shower  You may need to use a bath chair if you have trouble getting into or out of the tub  Do not use a grab bar  · Do not lift or carry anything heavier than 10 pounds  For example, a gallon of milk weighs 8 pounds  · Keep your arms down as much as possible    Do not put your arms out to the side, behind you, or over your head  Do not let anyone pull your arms to help you move or dress  Do not reach for items  · Do not push or pull anything  Examples include a car door or a vacuum   · Do not drive while you are healing  Your surgeon will tell you when it is safe for you to start driving again  How do I care for my surgery wound? Always wash your hands before you care for your wound  Wash your wound as directed  Do not rub the wound as you wash or dry the area  Pat the area dry with a clean towel  Change the bandages as directed and when they get wet or dirty  Do not smoke:  Nicotine can damage blood vessels and make it more difficult to heal  Do not use e-cigarettes or smokeless tobacco in place of cigarettes or to help you quit  They still contain nicotine  Ask your healthcare provider for information if you currently smoke and need help quitting  Call 911 for any of the following:   · You have sudden pain in your sternum and hear a crunch or pop  · You have bleeding that does not stop even after you apply pressure for 5 minutes  When should I seek immediate care? · You hear crunching or grinding in your sternum  · You have signs of an infection, such as a fever, red or warm skin, or pus in the surgery wound  When should I contact my healthcare provider? · You continue to feel pain, even after you take your pain medicine  · You have new or worsening pain, or any pain with movement  · You have questions or concerns about your condition or care  CARE AGREEMENT:   You have the right to help plan your care  Learn about your health condition and how it may be treated  Discuss treatment options with your caregivers to decide what care you want to receive  You always have the right to refuse treatment  The above information is an  only  It is not intended as medical advice for individual conditions or treatments   Talk to your doctor, nurse or pharmacist before following any medical regimen to see if it is safe and effective for you  © 2017 [address] [employer] Information is for End User's use only and may not be sold, redistributed or otherwise used for commercial purposes  All illustrations and images included in CareNotes® are the copyrighted property of [employer]  or Michael Frances  Coronary Artery Bypass Graft   WHAT YOU NEED TO KNOW:   A coronary artery bypass graft (CABG) is open heart surgery to clear blocked arteries in your heart  CABG surgery improves blood flow to your heart by bypassing (sending blood around) the blocked part of an artery  This restores blood flow to your heart and helps prevent a heart attack  DISCHARGE INSTRUCTIONS:   Call 911 for any of the following:   · You feel lightheaded, short of breath, and have chest pain  · You cough up blood  · You have a fast heartbeat that flutters  · You feel like you are going to faint  Seek care immediately if:   · Your arm or leg feels warm, tender, and painful  It may look swollen and red  · You have numbness or tingling in your arms or legs  · You have a severe headache  Contact your healthcare provider if:   · You have a fever higher than 101°F (38 4°C)  · You have gained 2 pounds in 24 hours  · Your wound is red, swollen, or draining pus  · Your signs and symptoms return  · You feel depressed  · You have questions or concerns about your condition or care  Medicines: You may need any of the following:  · Prescription pain medicine  may be given  Ask how to take this medicine safely  · Antiplatelets , such as aspirin, help prevent blood clots  Take your antiplatelet medicine exactly as directed  These medicines make it more likely for you to bleed or bruise  If you are told to take aspirin, do not take acetaminophen or ibuprofen instead       · Cholesterol medicine  helps lower cholesterol and lipid levels in your blood     · Antibiotics  help prevent a bacterial infection  · Heart medicine  helps strengthen and regulate your heartbeat  · Blood pressure medicine  helps lower or control your blood pressure  · Take your medicine as directed  Contact your healthcare provider if you think your medicine is not helping or if you have side effects  Tell him or her if you are allergic to any medicine  Keep a list of the medicines, vitamins, and herbs you take  Include the amounts, and when and why you take them  Bring the list or the pill bottles to follow-up visits  Carry your medicine list with you in case of an emergency  Go to cardiac rehabilitation:  Cardiac rehabilitation (rehab) is a program run by specialists who will help you safely strengthen your heart and prevent more heart disease  This plan includes exercise, relaxation, stress management, and heart-healthy nutrition  Healthcare providers will also check to make sure any medicines you take are working  The plan may also include instructions for when you can drive, return to work, and do other normal daily activities  Care for your wound as directed:  Carefully wash the wound with soap and water  If you do not have a bandage, gently pat the incision dry with a clean towel  If you have a bandage, dry the area and put on a new, clean bandage  Change your bandage if it gets wet or dirty  Prevent another blocked artery:   · Eat heart healthy foods  You may need to eat foods that are low in salt, fat, or cholesterol  Healthy foods include fruits, vegetables, whole-grain breads, low-fat dairy products, beans, lean meats, and fish  Ask your healthcare provider for more information about a heart healthy diet  · Do not smoke  Nicotine and other chemicals in cigarettes and cigars can cause heart and lung damage  Ask your healthcare provider for information if you currently smoke and need help to quit  E-cigarettes or smokeless tobacco still contain nicotine  Talk to your healthcare provider before you use these products  · Maintain a healthy weight  Ask your healthcare provider how much you should weigh  Extra weight can increase the stress on your heart  Ask him to help you create a weight loss plan if you are overweight  Get a flu shot: The flu can be dangerous for a person who has heart disease  To prevent influenza (flu), all adults should get the influenza vaccine every year as soon as it becomes available  Follow up with your healthcare provider as directed:  Write down your questions so you remember to ask them during your visits  © 2017 [address] [facility] Information is for End User's use only and may not be sold, redistributed or otherwise used for commercial purposes  All illustrations and images included in CareNotes® are the copyrighted property of [employer]  or Michael Frances  The above information is an  only  It is not intended as medical advice for individual conditions or treatments  Talk to your doctor, nurse or pharmacist before following any medical regimen to see if it is safe and effective for you  Meal Planning with Diabetes Exchanges   WHAT YOU NEED TO KNOW:   What do I need to know about diabetes exchanges? Exchanges are servings of food that contain similar amounts of carbohydrate, fat, protein, and calories within a food group  The exchanges can be used to develop a healthy meal plan that helps to keep your blood sugar within the recommended levels  A meal plan with the right amount of carbohydrates is especially important  Your blood sugar naturally rises after you eat carbohydrates  Too many carbohydrates in 1 meal or snack can raise your blood sugar level  Carbohydrates are found in starches, fruit, milk, yogurt, and sweets  How do I create a meal plan with exchanges? A dietitian will work with you to develop a healthy meal plan that is right for you   This meal plan will include the amount of exchanges you can have from each food group throughout the day  Follow your meal plan by keeping track of the amount of exchanges you eat for each meal and snack  Your meal plan will be based on your age, weight, blood sugar levels, medicine, and activity level  What are the starch food group exchanges? Each exchange below contains about 15 grams of carbohydrate , 3 grams of protein, 1 gram of fat, and 80 calories  · 1 ounce of white, whole wheat or rye bread (1 slice)    · 1 ounce of bagel (about ¼ of a bagel)    · 1 6-inch flour or corn tortilla or 1 4-inch pancake (about ¼ inch thick)    · ? cup of cooked pasta or rice    · ¾ cup of dry, ready-to-eat cereal with no sugar added     · ½ cup of cooked cereal, such as oatmeal    · 3 rajesh cracker squares or 8 animal crackers    · 6 saltine-type crackers or     · 3 cups of popcorn or ¾ ounce of pretzels     · Starchy vegetables and cooked legumes:      ¨ ½ cup of corn, green peas, sweet potatoes, or mashed potatoes     ¨ ¼ of a large baked potato     ¨ 1 cup of acorn, butternut squash, or pumpkin     ¨ ½ cup of beans, lentils, or peas (such as toledo, kidney, or black-eyed)    ¨ ? cup of lima beans  What are the fruit group exchanges? Each exchange contains about 15 grams of carbohydrate  and 60 calories  · 1 small (4 ounce) apple, banana orange, or nectarine    · ½ cup of canned or fresh fruit    · ½ cup (4 ounces) of unsweetened fruit juice    · 2 tablespoons of dried fruit  What are the milk group exchanges? Each exchange contains about 12 grams of carbohydrate  and 8 grams of protein  The amount of fat and calories in each serving depends on the type of milk (such as whole, low-fat, or fat-free)  · 1 cup fat-free or low-fat milk    · ¾ cup of plain, nonfat yogurt    · 1 cup fat-free, flavored yogurt with artificial (no calorie) sweetener  What are the non-starchy vegetable group exchanges?   Each exchange contains about 5 grams of carbohydrate , 2 grams of protein, and 25 calories  Examples include beets, broccoli, cabbage, carrots, cauliflower, cucumber, mushrooms, tomatoes, and zucchini  · ½ cup of cooked vegetables or 1 cup of raw vegetables     · ½ cup of vegetable juice  What are the meat and meat substitute group exchanges? Each exchange of a lean meat  listed below contains about 7 grams of protein, 0 to 3 grams of fat, and 45 calories  The meat and meat substitutes food group does not contain any carbohydrates  Medium and high-fat meats have more calories  · 1 ounce of chicken or turkey without skin, or 1 ounce of fish (not breaded or fried)     · 1 ounce of lean beef, pork, or lamb     · 1-inch cube or 1 ounce of low-fat cheese     · 2 egg whites or ¼ cup of egg substitute     · ½ cup of tofu  What are the sweets, desserts, and other carbohydrate group exchanges? · Sweets and other desserts:  Each exchange has about 15 grams of carbohydrate   ¨ 1 ounce of stefani food cake or 2-inch square cake (unfrosted)    ¨ 2 small cookies     ¨ ½ cup of sugar-free, fat-free ice cream    ¨ 1 tablespoon of syrup, jam, jelly, table sugar, or honey    · Combination foods:     ¨ 1 cup of an entrée, such as lasagna, spaghetti with meatballs, macaroni and cheese, and chili with beans (each serving counts as 2 carbohydrate exchanges )     ¨ 1 cup of tomato or vegetable beef soup (each serving counts as 1 carbohydrate exchange )  What are the fat group exchanges? Each exchange contains 5 grams of fat and 45 calories    · 1 teaspoon of oil (such as canola, olive, or corn oil)     · 6 almonds or cashews, 10 peanuts, or 4 pecan halves     · 2 tablespoons of avocado     · ½ tablespoon of peanut butter     · 1 teaspoon of regular margarine or 2 teaspoons of low-fat margarine     · 1 teaspoon of regular butter or 1 tablespoon of low-fat butter     · 1 teaspoon of regular mayonnaise or 1 tablespoon of low-fat mayonnaise     · 1 tablespoon of regular salad dressing or 2 tablespoons of low-fat salad dressing  What are free foods? The foods on this list are called free foods because they have very few calories  Free foods usually do not increase your blood sugar if you limit them  · 1 tablespoon of catsup or taco sauce     · ¼ cup of salsa     · 2 tablespoons of sugar-free syrup or 2 teaspoons of light jam or jelly     · 1 tablespoon of fat-free salad dressing     · 4 tablespoons of fat-free margarine or fat-free mayonnaise     · Sugar-free drinks: diet soda, sugar-free drink mixes, or mineral water     · Low-sodium bouillon or fat-free broth     · Mustard     · Seasonings such as spices, herbs, and garlic     · Sugar-free gelatin without added fruit  What other healthy nutrition guidelines should I follow? · Eat more fiber  Choose foods that are good sources of fiber, such as fruits, vegetables, and whole grains  Cereals that contain 5 or more grams of fiber per serving are good sources of fiber  Legumes such as garbanzo, toledo beans, kidney beans, and lentils are also good sources  · Limit fat  Ask your dietitian or healthcare provider how much fat you should eat each day  Choose foods low in fat, saturated fat, trans fat, and cholesterol  Examples include turkey or chicken without the skin, fish, lean cuts of meat, and beans  Low-fat dairy foods, such as low-fat or fat-free milk and low-fat yogurt are also good choices  Omega-3 fatty acids are healthy fats that are found in canola oil, soybean oil and fatty fish  [place], albacore tuna, and sardines are good sources of omega 3 fatty acids  Eat 2 servings of these types of fish each week  Do not eat fried fish  · Limit sugar  Sugar and sweets must be counted toward the carbohydrate exchanges that you can have within your meal plan  Limit sugar and sweets because they are usually also high in calories and fat   Eat smaller portions of sweets by sharing a dessert or asking for a child-size portion at a restaurant  · Limit sodium  (salt) to about 2,300 mg per day  You may need to eat even less sodium if you have certain medical conditions  Foods high in sodium include soy sauce, potato chips, and soup  · Limit alcohol  Ask your healthcare provider if it is safe for you to drink alcohol  If alcohol is safe for you to have, eat a meal when you drink alcohol  If you drink alcohol on an empty stomach, your blood sugar may drop to a low level  Women should limit alcohol to 1 drink per day  Men should limit alcohol to 2 drinks per day  A drink of alcohol is 5 ounces of wine, 12 ounces of beer, or 1½ ounces of liquor  What else can I do to manage my diabetes? · Control your blood sugar level  Test your blood sugar level regularly and keep a record of the results  Ask your healthcare provider when and how often to test your blood sugar  You may need to check your blood sugar level at least 3 times each day  · Talk to your healthcare provider about your weight  Ask if you need to lose weight, and how much you need to lose  If you are overweight, you may need to make other changes to lose weight  Ask your healthcare provider to help you create a weight loss program      · Exercise  can help to control your blood sugar levels and decrease your risk of heart disease  It can also help you lose or maintain your weight  Get at least 30 minutes of exercise, 5 times each week  Do resistance training (using weights) 2 times each week  Do not sit for longer than 90 minutes  Work with your healthcare provider to plan the best exercise program for you  When should I contact my healthcare provider? · You have high blood sugar levels during a certain time of day, or almost all of the time  · You often have low blood sugar levels  · You have questions or concerns about your condition or care  CARE AGREEMENT:   You have the right to help plan your care   Discuss treatment options with your caregivers to decide what care you want to receive  You always have the right to refuse treatment  The above information is an  only  It is not intended as medical advice for individual conditions or treatments  Talk to your doctor, nurse or pharmacist before following any medical regimen to see if it is safe and effective for you  © 2017 [address] [employer] Information is for End User's use only and may not be sold, redistributed or otherwise used for commercial purposes  All illustrations and images included in CareNotes® are the copyrighted property of [employer]  or Michael Frances  Narcotic Pain Management   WHAT YOU NEED TO KNOW:   What do I need to know about narcotics? A narcotic is a type of medicine used to treat pain  Examples of narcotics are codeine, oxycodone, and fentanyl  Why is it important to manage my pain? Pain can cause changes in your physical and emotional health, such as depression and sleep problems  Pain control and management may help you rest, heal, and return to your daily activities  What are the side effects of narcotic medicines? The most common side effect is constipation  Drink more liquids and eat high-fiber foods to help prevent constipation  Ask your healthcare provider what liquids are right for you and how much you should drink  Also ask for a list of foods that contain fiber  Other side effects include nausea, sleepiness, and itchiness  You may need to take your narcotic medicine with food to decrease nausea  Ask your healthcare provider other ways to manage side effects  Why it is important that I take narcotic medicines as directed? · Health problems such as  trouble breathing, liver or kidney damage, or stomach bleeding may occur  Any of these problems can become life-threatening  · Acetaminophen or ibuprofen  may be included in some narcotic medicines  Too much of these medicines can cause liver or kidney damage, or stomach bleeding   These problems can become life-threatening  · Dependence  means your body needs the medicine to keep it from going through withdrawal      · Tolerance  means the medicine does not control pain as well as it used to  You need higher doses of the medicine to get pain relief  · Addiction  means you are not able to control the use of the medicine  You use it when you do not have pain and you have cravings for the medicine  What do I need to know about narcotic safety? · Take your medicine as directed  Ask if you need more information on how to take your medicine correctly  Follow up with your healthcare provider regularly  You may need to have your dose adjusted  Do not use narcotic medicine if you are pregnant or breastfeeding  Narcotic medicines can be transferred to your baby through your blood and breast milk  · Give your healthcare provider a list of all your medicines  Include any over-the-counter medicines, vitamins, and herbs  It can be dangerous to take narcotics with certain other medicines, such as antihistamines  · Keep your medicine in a safe place  Store your narcotic medicine in a locked cabinet to keep it away from children and others  · Do not drink alcohol while you use narcotics  Alcohol use with a narcotic medicine can make you sleepy and slow your breathing rate  You may stop breathing completely  · Do not drive or operate heavy machinery after you take narcotic medicine  Narcotic medicine can make you drowsy and make it hard to concentrate  You may injure yourself or others if you drive or operate heavy machinery while taking your medicine  Call 911 or have someone call 911 for any of the following:   · You are breathing slower than normal, or you have trouble breathing  · You cannot be woken  · You have a seizure  When should I seek immediate care? · Your heart is beating slower than usual     · Your heart feels like it is jumping or fluttering      · You have trouble staying awake  · You have severe muscle pain or weakness  · You see or hear things that are not real   When should I contact my healthcare provider? · You are too dizzy to stand up  · Your pain gets worse or you have new pain  · Your pain does not get better after you use your narcotic medicine  · You cannot do your usual activities because of side effects from the narcotic  · You are constipated or have abdominal pain  · You have questions or concerns about your condition or care  CARE AGREEMENT:   You have the right to help plan your care  Learn about your health condition and how it may be treated  Discuss treatment options with your caregivers to decide what care you want to receive  You always have the right to refuse treatment  The above information is an  only  It is not intended as medical advice for individual conditions or treatments  Talk to your doctor, nurse or pharmacist before following any medical regimen to see if it is safe and effective for you  © 2017 [address] [employer] Information is for End User's use only and may not be sold, redistributed or otherwise used for commercial purposes  All illustrations and images included in CareNotes® are the copyrighted property of [employer]  or Michael Frances

## 2019-12-18 ENCOUNTER — TRANSITIONAL CARE MANAGEMENT (OUTPATIENT)
Dept: INTERNAL MEDICINE CLINIC | Facility: CLINIC | Age: 74
End: 2019-12-18

## 2019-12-19 LAB
ATRIAL RATE: 77 BPM
P AXIS: 59 DEGREES
PR INTERVAL: 208 MS
QRS AXIS: 64 DEGREES
QRSD INTERVAL: 75 MS
QT INTERVAL: 429 MS
QTC INTERVAL: 486 MS
T WAVE AXIS: 72 DEGREES
VENTRICULAR RATE: 77 BPM

## 2019-12-19 PROCEDURE — 93010 ELECTROCARDIOGRAM REPORT: CPT | Performed by: INTERNAL MEDICINE

## 2019-12-20 ENCOUNTER — TELEPHONE (OUTPATIENT)
Dept: CARDIOLOGY CLINIC | Facility: CLINIC | Age: 74
End: 2019-12-20

## 2019-12-20 ENCOUNTER — OFFICE VISIT (OUTPATIENT)
Dept: INTERNAL MEDICINE CLINIC | Age: 74
End: 2019-12-20
Payer: MEDICARE

## 2019-12-20 VITALS
HEART RATE: 66 BPM | HEIGHT: 70 IN | BODY MASS INDEX: 25.65 KG/M2 | DIASTOLIC BLOOD PRESSURE: 78 MMHG | SYSTOLIC BLOOD PRESSURE: 122 MMHG | WEIGHT: 179.2 LBS | TEMPERATURE: 98.3 F

## 2019-12-20 DIAGNOSIS — Z95.1 S/P CABG X 4: ICD-10-CM

## 2019-12-20 DIAGNOSIS — I10 BENIGN ESSENTIAL HYPERTENSION: Chronic | ICD-10-CM

## 2019-12-20 DIAGNOSIS — E66.3 OVERWEIGHT (BMI 25.0-29.9): ICD-10-CM

## 2019-12-20 DIAGNOSIS — I25.119 ATHEROSCLEROSIS OF NATIVE CORONARY ARTERY OF NATIVE HEART WITH ANGINA PECTORIS (HCC): Primary | ICD-10-CM

## 2019-12-20 DIAGNOSIS — E11.9 TYPE 2 DIABETES MELLITUS WITHOUT COMPLICATION, WITHOUT LONG-TERM CURRENT USE OF INSULIN (HCC): Chronic | ICD-10-CM

## 2019-12-20 PROCEDURE — 99496 TRANSJ CARE MGMT HIGH F2F 7D: CPT | Performed by: INTERNAL MEDICINE

## 2019-12-20 NOTE — PROGRESS NOTES
Assessment/Plan:    Transitional care management visit  - done 4 days post discharge  -patient is healing nicely and has no complaints today    CAD  - status post CABG x4  -continue with aspirin, atorvastatin, metoprolol, and torsemide  - follow-up with Cardiology  - continue with heart healthy diet and exercise  - patient was counseled to try and take deeper breaths    DM2  - Last hemoglobin A 1 C done on 12/09/19 was well controlled at 6 9  - blood glucose recorded at home was reviewed and his diabetes appears well controlled   -patient and his wife were a bit concerned about his being on insulin which he was started on in the hospital   He states that he used to be on metformin and would like to go back to metformin   -his diabetes appears well controlled , he has no acute kidney injury and I see no contraindication for metformin  -I have counseled patient to talk with his cardiologist before restarting his metformin  - patient was counseled to keep his skin well moisturized especially on his feet to avoid skin breakdown and possible ulcers from dry skin  HTN  - well controlled  -continue with metoprolol    Anemia  - last CBC done on December 17th, 2019 showed a hemoglobin of 10 2, up from 9 7 on December 15th, 2019 and down from 11 7 on December 13th, 2019 and patient has a baseline of 14-16  - will recheck his CBC at his next visit  - continue with a healthy diet    Overweight  - diet and exercise counseling given    BMI Counseling: Body mass index is 25 71 kg/m²  The BMI is above normal  Nutrition recommendations include encouraging healthy choices of fruits and vegetables, consuming healthier snacks, limiting drinks that contain sugar, reducing intake of saturated and trans fat and reducing intake of cholesterol  Exercise recommendations include moderate physical activity 150 minutes/week  No pharmacotherapy was ordered  Patient referred to PCP due to patient being overweight              Diagnoses and all orders for this visit:    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 with angina pectoris ([address] )    Type 2 diabetes mellitus without complication, without long-term current use of insulin (HCC)    Benign essential hypertension    S/P CABG x 4    Overweight (BMI 25 0-29  9)          Subjective:      Patient ID: Ibrahima Lucero is a 69 y o  male  HPI  Patient presents with his wife for transitional care management visit  He was admitted at [facility] from December 30th, 2019 to December 17th, 2019 for CABG for coronary artery disease with 100 percent blockage in his proximal RCA, 90 percent stenosis in D 1, 99 percent stenosis in mid LAD as well as 90 percent stenosis in proximal LAD  He admits to occasional cough but states that he feels well and denies any fever, chills, night sweats, chest pain, shortness of breath, palpitations, nausea, vomiting, dizziness, abdominal pain, diarrhea, constipation, myalgias, arthralgias  TCM Call (since 11/19/2019)     Date and time call was made  12/18/2019  9:17 AM    Hospital care reviewed  Records reviewed    [facility]    Date of Admission  12/13/19    Date of discharge  12/17/19    Diagnosis  CABG x 4     Disposition  Home    Were the patients medications reviewed and updated  Yes    Current Symptoms  None      TCM Call (since 11/19/2019)     Post hospital issues  None    Should patient be enrolled in anticoag monitoring? No    Scheduled for follow up?   Yes    Patients specialists  Other (comment); Cardiologist    Cardiologist name  Parth Friedman 1/8/20  &  Dutch Hood 2/10/20     Cardiologist contact #  [phone]    Other specialists names  Julio Short Do [facility]    Other specialists contcat #  [phone]    Did you obtain your prescribed medications  Yes    Do you need help managing your prescriptions or medications  No    Is transportation to your appointment needed  Yes    Specify why  unable to drive until seen by surgeon    I have advised the patient to call PCP with any new or worsening symptoms  Deisy Carlos MA            The following portions of the patient's history were reviewed and updated as appropriate:   He  has a past medical history of Chronic cough, Coronary artery disease, Diabetes mellitus ([facility] ), Diabetic retinopathy ([place] [address] ), HLD (hyperlipidemia), and HTN (hypertension)  He   Patient Active Problem List    Diagnosis Date Noted    Overweight (BMI 25 0-29 9) 12/20/2019    S/P CABG x 4 12/13/2019    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 ([facility] ) 12/11/2019    Stable angina ([place] [address] ) 12/03/2019    Chest tightness 11/21/2019    Seasonal allergies 10/15/2018    Ringing in ear, bilateral 10/15/2018    Diabetic retinopathy ([place] [address] ) 05/26/2017    Benign essential hypertension 05/08/2014    Type 2 diabetes mellitus ([place] [address] ) 05/08/2014    PSA elevation 05/08/2014    Hypercholesteremia 05/08/2014     He  has a past surgical history that includes Cardiac catheterization (12/09/2019); Hand surgery; pr cabg, artery-vein, three (N/A, 12/13/2019); pr endoscopy w/video-asst vein harvest,cabg (Left, 12/13/2019); and pr echo transesophag r-t 2d w/prb img acquisj i&r (N/A, 12/13/2019)  His family history includes Diabetes in his other; Heart attack in his father; Heart disease in his father; Heart failure in his father and mother  He  reports that he has never smoked  He has never used smokeless tobacco  He reports that he drinks about 5 0 standard drinks of alcohol per week  He reports that he does not use drugs  Current Outpatient Medications   Medication Sig Dispense Refill    Alcohol Swabs 70 % PADS Clean skin and let dry prior to checking blood sugar or administering insulin   Dx: DM E11 9 100 each 1    amiodarone 200 mg tablet Take 1 tablet q8hrs for 7 days  Then, take 1 tablet q12hrs for 7 days  Then, take 1 tablet q24 hrs for 7 days  Then, discontinue  42 tablet 0    aspirin 325 mg tablet Take 1 tablet (325 mg total) by mouth daily 30 tablet 2    atorvastatin (LIPITOR) 80 mg tablet Take 1 tablet (80 mg total) by mouth daily with dinner 30 tablet 2    glucose monitoring kit (FREESTYLE) monitoring kit 1 each by Does not apply route 4 (four) times a day (before meals and at bedtime) Check blood sugars before meals & at bedtime  Dx DM E11 9 1 each 0    insulin aspart (NOVOLOG FLEXPEN) 100 Units/mL injection pen Inject 8 Units under the skin 3 (three) times a day with meals 3 pen 1    insulin glargine (LANTUS SOLOSTAR) 100 units/mL injection pen Inject 25 Units under the skin daily at bedtime 3 pen 1    Insulin Pen Needle 32G X 4 MM MISC Inject Lantus qHS and Novolog TID with meals, as directed  Dx: DM E11 9 100 each 0    Lancets (FREESTYLE) lancets Check your blood sugar before meals & before bedtime, as directed  Dx DM E11 9 May substitute alternative device for insurance requirements   100 each 1    metoprolol tartrate (LOPRESSOR) 25 mg tablet Take 1 tablet (25 mg total) by mouth every 12 (twelve) hours 60 tablet 2    pantoprazole (PROTONIX) 40 mg tablet Take 1 tablet (40 mg total) by mouth daily in the early morning 30 tablet 0    potassium chloride (K-DUR,KLOR-CON) 20 mEq tablet Take 1 tablet (20 mEq total) by mouth daily for 7 days 7 tablet 1    torsemide (DEMADEX) 20 mg tablet Take 1 tablet (20 mg total) by mouth daily for 7 days 7 tablet 1    acetaminophen (TYLENOL) 325 mg tablet Take 2 tablets (650 mg total) by mouth every 4 (four) hours as needed (temperature greater than 101 F  or mild pain) (Patient not taking: Reported on 12/18/2019) 30 tablet 0    docusate sodium (COLACE) 100 mg capsule Take 1 capsule (100 mg total) by mouth 2 (two) times a day (Patient not taking: Reported on 12/18/2019) 10 capsule 0    oxyCODONE-acetaminophen (PERCOCET) 5-325 mg per tablet Take 1 tablet every 6 hours as needed for moderate pain  Take 2 tablets every 6 hours as needed for severe pain  On going therapy  (Patient not taking: Reported on 12/18/2019) 30 tablet 0    polyethylene glycol (MIRALAX) 17 g packet Take 17 g by mouth daily (Patient not taking: Reported on 12/18/2019) 14 each 0     No current facility-administered medications for this visit  Current Outpatient Medications on File Prior to Visit   Medication Sig    Alcohol Swabs 70 % PADS Clean skin and let dry prior to checking blood sugar or administering insulin  Dx: DM E11 9    amiodarone 200 mg tablet Take 1 tablet q8hrs for 7 days  Then, take 1 tablet q12hrs for 7 days  Then, take 1 tablet q24 hrs for 7 days  Then, discontinue   aspirin 325 mg tablet Take 1 tablet (325 mg total) by mouth daily    atorvastatin (LIPITOR) 80 mg tablet Take 1 tablet (80 mg total) by mouth daily with dinner    glucose monitoring kit (FREESTYLE) monitoring kit 1 each by Does not apply route 4 (four) times a day (before meals and at bedtime) Check blood sugars before meals & at bedtime  Dx DM E11 9    insulin aspart (NOVOLOG FLEXPEN) 100 Units/mL injection pen Inject 8 Units under the skin 3 (three) times a day with meals    insulin glargine (LANTUS SOLOSTAR) 100 units/mL injection pen Inject 25 Units under the skin daily at bedtime    Insulin Pen Needle 32G X 4 MM MISC Inject Lantus qHS and Novolog TID with meals, as directed  Dx: DM E11 9    Lancets (FREESTYLE) lancets Check your blood sugar before meals & before bedtime, as directed  Dx DM E11 9 May substitute alternative device for insurance requirements      metoprolol tartrate (LOPRESSOR) 25 mg tablet Take 1 tablet (25 mg total) by mouth every 12 (twelve) hours    pantoprazole (PROTONIX) 40 mg tablet Take 1 tablet (40 mg total) by mouth daily in the early morning    potassium chloride (K-DUR,KLOR-CON) 20 mEq tablet Take 1 tablet (20 mEq total) by mouth daily for 7 days    torsemide (DEMADEX) 20 mg tablet Take 1 tablet (20 mg total) by mouth daily for 7 days    acetaminophen (TYLENOL) 325 mg tablet Take 2 tablets (650 mg total) by mouth every 4 (four) hours as needed (temperature greater than 101 F  or mild pain) (Patient not taking: Reported on 12/18/2019)    docusate sodium (COLACE) 100 mg capsule Take 1 capsule (100 mg total) by mouth 2 (two) times a day (Patient not taking: Reported on 12/18/2019)    oxyCODONE-acetaminophen (PERCOCET) 5-325 mg per tablet Take 1 tablet every 6 hours as needed for moderate pain  Take 2 tablets every 6 hours as needed for severe pain  On going therapy  (Patient not taking: Reported on 12/18/2019)    polyethylene glycol (MIRALAX) 17 g packet Take 17 g by mouth daily (Patient not taking: Reported on 12/18/2019)     No current facility-administered medications on file prior to visit  He is allergic to pollen extract       Review of Systems   Constitutional: Negative for activity change, chills, fatigue, fever and unexpected weight change  HENT: Negative for ear pain, postnasal drip, rhinorrhea, sinus pressure and sore throat  Eyes: Negative for pain  Respiratory: Negative for cough, choking, chest tightness, shortness of breath and wheezing  Cardiovascular: Negative for chest pain, palpitations and leg swelling  Gastrointestinal: Negative for abdominal pain, constipation, diarrhea, nausea and vomiting  Genitourinary: Negative for dysuria and hematuria  Musculoskeletal: Negative for arthralgias, back pain, gait problem, joint swelling, myalgias and neck stiffness  Skin: Negative for pallor and rash  Neurological: Negative for dizziness, tremors, seizures, syncope, light-headedness and headaches  Hematological: Negative for adenopathy  Psychiatric/Behavioral: Negative for behavioral problems           Objective:      /78 (BP Location: Left arm, Patient Position: Sitting, Cuff Size: Large)   Pulse 66   Temp 98 3 °F (36 8 °C) (Tympanic)   Ht 5' 10" (1 778 m)   Wt 81 3 kg (179 lb 3 2 oz)   BMI 25 71 kg/m²          Physical Exam   Constitutional: He is oriented to person, place, and time  He appears well-developed and well-nourished  No distress  HENT:   Head: Normocephalic and atraumatic  Right Ear: External ear normal    Left Ear: External ear normal    Nose: Nose normal    Mouth/Throat: Oropharynx is clear and moist  No oropharyngeal exudate  Eyes: Pupils are equal, round, and reactive to light  Conjunctivae and EOM are normal  Right eye exhibits no discharge  Left eye exhibits no discharge  No scleral icterus  Neck: Normal range of motion  Neck supple  No JVD present  No tracheal deviation present  No thyromegaly present  Cardiovascular: Normal rate, regular rhythm, normal heart sounds and intact distal pulses  Exam reveals no gallop and no friction rub  No murmur heard  Pulmonary/Chest: Effort normal  No respiratory distress  He has decreased breath sounds ( in the lower lung zones, patient was taking shallow breaths but denied chest pain even at the incision site)  He has no wheezes  He has no rales  He exhibits no tenderness  Abdominal: Soft  Bowel sounds are normal  He exhibits no distension and no mass  There is no tenderness  There is no rebound and no guarding  Musculoskeletal: Normal range of motion  He exhibits edema (1+ pitting pedal edema in bilateral lower extremity, left more than right)  He exhibits no tenderness or deformity  Lymphadenopathy:     He has no cervical adenopathy  Neurological: He is alert and oriented to person, place, and time  He has normal reflexes  No cranial nerve deficit  He exhibits normal muscle tone  Coordination normal    Skin: Skin is warm and dry  No rash noted  He is not diaphoretic  There is erythema ( mild erythema of the CABG incision with good healing and no tenderness, no discharge or wound dehiscence)  No pallor          Dry skin on the bilateral lower extremity     Psychiatric: He has a normal mood and affect  His behavior is normal          No results displayed because visit has over 200 results  Appointment on 12/10/2019   Component Date Value Ref Range Status    Sodium 12/10/2019 143  136 - 145 mmol/L Final    Potassium 12/10/2019 4 3  3 5 - 5 3 mmol/L Final    Chloride 12/10/2019 106  100 - 108 mmol/L Final    CO2 12/10/2019 30  21 - 32 mmol/L Final    ANION GAP 12/10/2019 7  4 - 13 mmol/L Final    BUN 12/10/2019 12  5 - 25 mg/dL Final    Creatinine 12/10/2019 0 99  0 60 - 1 30 mg/dL Final    Standardized to IDMS reference method    Glucose, Fasting 12/10/2019 165* 65 - 99 mg/dL Final      Specimen collection should occur prior to Sulfasalazine administration due to the potential for falsely depressed results  Specimen collection should occur prior to Sulfapyridine administration due to the potential for falsely elevated results   Calcium 12/10/2019 9 5  8 3 - 10 1 mg/dL Final    eGFR 12/10/2019 76  ml/min/1 73sq m Final   Admission on 12/09/2019, Discharged on 12/09/2019   Component Date Value Ref Range Status    Sodium 12/09/2019 139  136 - 145 mmol/L Final    Potassium 12/09/2019 3 8  3 5 - 5 3 mmol/L Final    Chloride 12/09/2019 105  100 - 108 mmol/L Final    CO2 12/09/2019 32  21 - 32 mmol/L Final    ANION GAP 12/09/2019 2* 4 - 13 mmol/L Final    BUN 12/09/2019 18  5 - 25 mg/dL Final    Creatinine 12/09/2019 1 00  0 60 - 1 30 mg/dL Final    Standardized to IDMS reference method    Glucose 12/09/2019 164* 65 - 140 mg/dL Final      If the patient is fasting, the ADA then defines impaired fasting glucose as > 100 mg/dL and diabetes as > or equal to 123 mg/dL  Specimen collection should occur prior to Sulfasalazine administration due to the potential for falsely depressed results  Specimen collection should occur prior to Sulfapyridine administration due to the potential for falsely elevated results      Glucose, Fasting 12/09/2019 164* 65 - 99 mg/dL Final      Specimen collection should occur prior to Sulfasalazine administration due to the potential for falsely depressed results  Specimen collection should occur prior to Sulfapyridine administration due to the potential for falsely elevated results   Calcium 12/09/2019 9 5  8 3 - 10 1 mg/dL Final    eGFR 12/09/2019 75  ml/min/1 73sq m Final    WBC 12/09/2019 6 43  4 31 - 10 16 Thousand/uL Final    RBC 12/09/2019 5 05  3 88 - 5 62 Million/uL Final    Hemoglobin 12/09/2019 15 3  12 0 - 17 0 g/dL Final    Hematocrit 12/09/2019 44 9  36 5 - 49 3 % Final    MCV 12/09/2019 89  82 - 98 fL Final    MCH 12/09/2019 30 3  26 8 - 34 3 pg Final    MCHC 12/09/2019 34 1  31 4 - 37 4 g/dL Final    RDW 12/09/2019 13 0  11 6 - 15 1 % Final    Platelets 64/14/6293 159  149 - 390 Thousands/uL Final    MPV 12/09/2019 9 3  8 9 - 12 7 fL Final    Protime 12/09/2019 13 5  11 6 - 14 5 seconds Final    INR 12/09/2019 1 07  0 84 - 1 19 Final    Hemoglobin A1C 12/09/2019 6 9* 4 2 - 6 3 % Final    EAG 12/09/2019 151  mg/dl Final    ABO Grouping 12/09/2019 A   Final    Rh Factor 12/09/2019 Positive   Final    Antibody Screen 12/09/2019 Negative   Final    Specimen Expiration Date 12/09/2019 [id number]   Corrected    This is a corrected result  Previous result was [id number] on 12/9/2019 at 1506 EST    Cholesterol 12/09/2019 170  50 - 200 mg/dL Final      Cholesterol:       Desirable         <200 mg/dl       Borderline         200-239 mg/dl       High              >239           Triglycerides 12/09/2019 114  <=150 mg/dL Final      Triglyceride:     Normal          <150 mg/dl     Borderline High 150-199 mg/dl     High            200-499 mg/dl        Very High       >499 mg/dl    Specimen collection should occur prior to N-Acetylcysteine or Metamizole administration due to the potential for falsely depressed results      HDL, Direct 12/09/2019 34* >=40 mg/dL Final      HDL Cholesterol:     Low     <41 mg/dL  Specimen collection should occur prior to Metamizole administration due to the potential for falsley depressed results   LDL Calculated 12/09/2019 113* 0 - 100 mg/dL Final      LDL Cholesterol:     Optimal           <100 mg/dl     Near Optimal      100-129 mg/dl     Above Optimal       Borderline High 130-159 mg/dl       High            160-189 mg/dl       Very High       >189 mg/dl         This screening LDL is a calculated result  It does not have the accuracy of the Direct Measured LDL in the monitoring of patients with hyperlipidemia and/or statin therapy  Direct Measure LDL (QLO422) must be ordered separately in these patients      Non-HDL-Chol (CHOL-HDL) 12/09/2019 136  mg/dl Final    PTT 12/09/2019 31  23 - 37 seconds Final    Therapeutic Heparin Range =  60-90 seconds    MRSA Culture Only 12/09/2019 No Methicillin Resistant Stapylococcus aureus (MRSA) after 24 hours   Final    Color, UA 12/09/2019 Yellow   Final    Clarity, UA 12/09/2019 Clear   Final    Specific [place], UA 12/09/2019 >1 045* 1 003 - 1 030 Final    pH, UA 12/09/2019 6 5  4 5, 5 0, 5 5, 6 0, 6 5, 7 0, 7 5, 8 0 Final    Leukocytes, UA 12/09/2019 Negative  Negative Final    Nitrite, UA 12/09/2019 Negative  Negative Final    Protein, UA 12/09/2019 Negative  Negative mg/dl Final    Glucose, UA 12/09/2019 250 (1/4%)* Negative mg/dl Final    Ketones, UA 12/09/2019 Negative  Negative mg/dl Final    Urobilinogen, UA 12/09/2019 1 0  0 2, 1 0 E U /dl E U /dl Final    Bilirubin, UA 12/09/2019 Negative  Negative Final    Blood, UA 12/09/2019 Negative  Negative Final    Ventricular Rate 12/09/2019 73  BPM Final    Atrial Rate 12/09/2019 73  BPM Final    OK Interval 12/09/2019 194  ms Final    QRSD Interval 12/09/2019 74  ms Final    QT Interval 12/09/2019 368  ms Final    QTC Interval 12/09/2019 405  ms Final    P Axis 12/09/2019 12  degrees Final    QRS Axis 12/09/2019 -9  degrees Final    T Wave [place] 12/09/2019 28  degrees Final   Appointment on 12/03/2019   Component Date Value Ref Range Status    Sodium 12/03/2019 139  136 - 145 mmol/L Final    Potassium 12/03/2019 3 6  3 5 - 5 3 mmol/L Final    Chloride 12/03/2019 98* 100 - 108 mmol/L Final    CO2 12/03/2019 33* 21 - 32 mmol/L Final    ANION GAP 12/03/2019 8  4 - 13 mmol/L Final    BUN 12/03/2019 20  5 - 25 mg/dL Final    Creatinine 12/03/2019 1 14  0 60 - 1 30 mg/dL Final    Standardized to IDMS reference method    Glucose 12/03/2019 209* 65 - 140 mg/dL Final      If the patient is fasting, the ADA then defines impaired fasting glucose as > 100 mg/dL and diabetes as > or equal to 123 mg/dL  Specimen collection should occur prior to Sulfasalazine administration due to the potential for falsely depressed results  Specimen collection should occur prior to Sulfapyridine administration due to the potential for falsely elevated results      Calcium 12/03/2019 9 8  8 3 - 10 1 mg/dL Final    eGFR 12/03/2019 64  ml/min/1 73sq m Final    Protime 12/03/2019 12 4  11 6 - 14 5 seconds Final    INR 12/03/2019 0 98  0 84 - 1 19 Final    WBC 12/03/2019 8 56  4 31 - 10 16 Thousand/uL Final    RBC 12/03/2019 5 40  3 88 - 5 62 Million/uL Final    Hemoglobin 12/03/2019 16 2  12 0 - 17 0 g/dL Final    Hematocrit 12/03/2019 48 1  36 5 - 49 3 % Final    MCV 12/03/2019 89  82 - 98 fL Final    MCH 12/03/2019 30 0  26 8 - 34 3 pg Final    MCHC 12/03/2019 33 7  31 4 - 37 4 g/dL Final    RDW 12/03/2019 13 1  11 6 - 15 1 % Final    Platelets 85/90/4992 217  149 - 390 Thousands/uL Final    MPV 12/03/2019 9 6  8 9 - 12 7 fL Final   Orders Only on 12/03/2019   Component Date Value Ref Range Status    Right Eye Diabetic Retinopathy 12/03/2019 Mild   Final    Left Eye Diabetic Retinopathy 12/03/2019 Mild   Final   Hospital Outpatient Visit on 11/27/2019   Component Date Value Ref Range Status    Protocol Name 11/27/2019 [facility]   Final    Time In Exercise Phase 11/27/2019 00:03:33   Final    MAX  SYSTOLIC BP 49/85/1402 302  mmHg Final    Max Diastolic Bp 86/93/6063 64  mmHg Final    Max Heart Rate 11/27/2019 130  BPM Final    Max Predicted Heart Rate 11/27/2019 149  BPM Final    Reason for Termination 11/27/2019 chest tightness, ekg changes   Final    Test Indication 11/27/2019 Chest Discomfort   Final    Target Hr Formular 11/27/2019 (220 - Age)*100%   Final    Chest Pain Statement 11/27/2019 non-limiting   Final   Office Visit on 11/21/2019   Component Date Value Ref Range Status    Creatinine, Ur 11/21/2019 163 0  mg/dL Final    Microalbum  ,U,Random 11/21/2019 24 7* 0 0 - 20 0 mg/L Final    Microalb Creat Ratio 11/21/2019 15  0 - 30 mg/g creatinine Final   Appointment on 11/15/2019   Component Date Value Ref Range Status    Glucose, Fasting 11/15/2019 139* 65 - 99 mg/dL Final      Specimen collection should occur prior to Sulfasalazine administration due to the potential for falsely depressed results  Specimen collection should occur prior to Sulfapyridine administration due to the potential for falsely elevated results   Hemoglobin A1C 11/15/2019 6 6* 4 2 - 6 3 % Final    EAG 11/15/2019 143  mg/dl Final   Orders Only on 09/03/2019   Component Date Value Ref Range Status    Right Eye Diabetic Retinopathy 09/03/2019 Mild   Final    Left Eye Diabetic Retinopathy 09/03/2019 Mild   Final   Office Visit on 05/30/2019   Component Date Value Ref Range Status    Creatinine, Ur 12/03/2019 125 0  mg/dL Final    Microalbum  ,U,Random 12/03/2019 39 0* 0 0 - 20 0 mg/L Final    Microalb Creat Ratio 12/03/2019 31* 0 - 30 mg/g creatinine Final   There may be more visits with results that are not included

## 2019-12-20 NOTE — TELEPHONE ENCOUNTER
Sue Whaley was instructed to hold his Metformin prior to his cardiac cath  A few days later, he had his heart surgery  He has remained off the metformin and was d/c'd from the hospital with a script for insulin  He had a follow up visit with his PCP today  PCP thought he should be able to resume the metformin and stop the insulin, but is asking for ok from cardiology  Scheduled for f/u with you on 2/10, and sees Rosio Yousif first on 1/8  Please advise

## 2019-12-20 NOTE — PATIENT INSTRUCTIONS

## 2019-12-21 ENCOUNTER — APPOINTMENT (OUTPATIENT)
Dept: LAB | Facility: CLINIC | Age: 74
End: 2019-12-21
Payer: MEDICARE

## 2019-12-21 DIAGNOSIS — E11.9 TYPE 2 DIABETES MELLITUS (HCC): Chronic | ICD-10-CM

## 2019-12-21 DIAGNOSIS — Z95.1 S/P CABG (CORONARY ARTERY BYPASS GRAFT): ICD-10-CM

## 2019-12-21 LAB
ANION GAP SERPL CALCULATED.3IONS-SCNC: 4 MMOL/L (ref 4–13)
BUN SERPL-MCNC: 22 MG/DL (ref 5–25)
CALCIUM SERPL-MCNC: 9.3 MG/DL (ref 8.3–10.1)
CHLORIDE SERPL-SCNC: 105 MMOL/L (ref 100–108)
CO2 SERPL-SCNC: 32 MMOL/L (ref 21–32)
CREAT SERPL-MCNC: 1.28 MG/DL (ref 0.6–1.3)
GFR SERPL CREATININE-BSD FRML MDRD: 56 ML/MIN/1.73SQ M
GLUCOSE P FAST SERPL-MCNC: 86 MG/DL (ref 65–99)
POTASSIUM SERPL-SCNC: 5 MMOL/L (ref 3.5–5.3)
SODIUM SERPL-SCNC: 141 MMOL/L (ref 136–145)

## 2019-12-21 PROCEDURE — 80048 BASIC METABOLIC PNL TOTAL CA: CPT

## 2019-12-21 PROCEDURE — 36415 COLL VENOUS BLD VENIPUNCTURE: CPT

## 2019-12-23 ENCOUNTER — TELEPHONE (OUTPATIENT)
Dept: CARDIAC SURGERY | Facility: CLINIC | Age: 74
End: 2019-12-23

## 2019-12-23 NOTE — TELEPHONE ENCOUNTER
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  Surgeon: Portia Carlisle wt: 170 lb(12/13)  D/C wt: 181 lb(12/17)  Current wt: 174 lb(12/23)    Fever/chills:  No   Edema: No    Lightheadedness/dizziness:   No Incision: Clean/dry/intact, no s/s of infection     Angina: No    GI: No    SOB:   No    Activity: Walking with no complaints    Pain: No    Follow-up APPTs: PCP: 12/20/19  CARDIO: 1/8/20  CT SURGERY: 1/15/20    Comments: BMP: 12/21/19 reviewed by Jen Nyhan, PA-C: K 5 0 otherwise normal labs, stop diuretic/K supplementation, no f/u lab work needed: patient made aware  Patient also discussed with his PCP and Cardiologist that he stopped insulin and went back to Metformin due to very low BS readings  Advised to continue checking BS readings and to f/u with PCP for further instructions if needed  Otherwise, patient states he is doing great at this time, has no other questions/concerns  Education:  1  Daily AM weight, call for weight gain of 2 lbs or more in 24 hours  2  Take frequent short walks, increase activity as tolerated  3  Maintain sternal precautions: No strenuous activity; no lifting more than 10 lbs;  no driving  4  Continue to use Incentive Spirometer frequently throughout the day  5  Shower daily; Cleanse incisions with antibacterial soap and water  6  No ointments, powder or lotions on surgical incisions  7   Call [phone] [facility] office with questions or concerns

## 2019-12-23 NOTE — TELEPHONE ENCOUNTER
Frantz called back to the nurse line today  Wants to let you know that he has decided to go back on the metformin  He said his blood sugars have been running low on the insulin  Next ov with Tabitha on 1/8

## 2020-01-07 NOTE — PROGRESS NOTES
Hospital  Follow Up Office Visit Note -     Abrahan Chau   69 y o    male   MRN: [id number]  [address]  [place] [address] [facility] [address]  [phone]    PCP: Marsha Oppenheim, MD  Cardiologist : Dr Hali Lee        Impression/plan  CAD, S/P CABG x 4, STEVEN to LAD, SVG--> PDA , Di, and OM  Adm 12/13-12/17/19  --On ASA, a high intensity statin, BB  Post op a fib-on amiodarone 200 mg TID x 7 days, then 200 mg BID x 7 days, then 200 mg daily, then to discontinue  --EKG today:  Normal sinus rhythm 63 beats per minute  Hypertension, essential  /76, On metoprolol tartrate 25 BID  Hyperlipidemia  On atorvastatin 80 mg/d, prior to adm was on simvastatin 40 mg d  Fasting lipid profile 12/19:  , non   A heart healthy diet recommended  T2DM  HgA1c 6 9-prior 6 6  back on metformin ,off insulin, after surgery  From a CV standpoint, he may benefit from jardiance  Cardiac testing  --TTE 12/9/19  Ef 60%  No RWMA  RV normal size and fxn  --NM Myocardial SPECT 11/19 Markedly abnormal study after maximal exercise with reproduction of symptoms  There was a large amount of ischemia in the septal region extending to the inferior wall  There was a moderate amount of ischemia in the anteriorand apical regions  --cardiac catheterization 12/9/19   Proximal LAD: There was a tubular 90 % stenosis  Mid LAD: There was a discrete 99 % stenosis at the origin of D2   1st diagonal: There was a 90 % stenosis  Proximal RCA: There was a 100 % stenosis  This lesion is a chronic total occlusion        Summary of Recommendations  The patient was NOT referred to gastroenterology for consideration of routine Colon CA screening of all patients aged 47-78, given that he is recuperating from heart surgery  A heart healthy diet is recommended  Written education provided    CBC, BMP   Fasting lipid profile 2 months  In the future, he may benefit from a low-dose ACE-inhibitor given his diabetes, for renal protection  Follow up will be scheduled with Dr Derik Martinez 2/10/2020                HPI:   Katie Lloyd is a 70 yo male with a hx of HTn and HL  He saw Dr Derik Martinez as a new patient at the beginning of December with angina and an abnormal stress test  He was referred for cardiac catheterization  This demonstrated severe MV disease and he was referred to CTs  An echocardiogrma showed normal LV fxn, and no RWMA  After appropriate testing, he was electively admitted and underwent CABG x 4 by Dr Jame Wood Dec 13  Post p he developed A fib and was treated with IV amiodarone, subsequently Po amiodarone  He did convert to NSR prior to DC He was [facility] 12/17 1/8/2020  He presents for general cardiology follow-up, accompanied by his wife  Reports feeling well  He has no chest pain, shortness of breath, lightheadedness or dizziness  His incisions are healing  His blood pressure is satisfactory  He is compliant with his medications  He is scheduled for cardiac rehab in the near future  He questions whether he can start taking his supplements again:  Co Q10, fish oil and glucosamine chondroitin  If he so desires, he may take Co Q10  He is not certain why he was taking it  He has had no myalgias  He was taking fish oil for "good measure  "I told him there is no contraindication  His triglycerides were around 115  His statin has been changed to high-intensity statin, high-dose  He may be taking fish oil for other, noncardiac reasons  Agreeable to some updated blood work  He will see his cardiologist in a short interval   He needs no refills        Assessment  Diagnoses and all orders for this visit:    Hospital discharge follow-up    Coronary artery disease involving native heart without angina pectoris, unspecified vessel or lesion type  -     Lipid Panel with Direct LDL reflex; Future    S/P CABG x 4  -     Basic metabolic panel;  Future    Benign essential hypertension    Hypercholesteremia  - Lipid Panel with Direct LDL reflex; Future    Atrial fibrillation, unspecified type (HCC)    Anemia, unspecified type  -     CBC and differential; Future        Past Medical History:   Diagnosis Date    Chronic cough     RESOLVED: [id number]    Coronary artery disease     [facility]     Diabetic retinopathy ([facility] )     HLD (hyperlipidemia)     HTN (hypertension)        Review of Systems   Constitution: Negative for chills  Cardiovascular: Negative for chest pain, claudication, cyanosis, dyspnea on exertion, irregular heartbeat, leg swelling, near-syncope, orthopnea, palpitations, paroxysmal nocturnal dyspnea and syncope  Respiratory: Negative for cough and shortness of breath  Gastrointestinal: Negative for heartburn and nausea  Neurological: Negative for dizziness, focal weakness, headaches, light-headedness and weakness  All other systems reviewed and are negative  Allergies   Allergen Reactions    Pollen Extract Allergic Rhinitis       Current Outpatient Medications:     acetaminophen (TYLENOL) 325 mg tablet, Take 2 tablets (650 mg total) by mouth every 4 (four) hours as needed (temperature greater than 101 F  or mild pain) (Patient not taking: Reported on 12/18/2019), Disp: 30 tablet, Rfl: 0    Alcohol Swabs 70 % PADS, Clean skin and let dry prior to checking blood sugar or administering insulin  Dx: DM E11 9, Disp: 100 each, Rfl: 1    amiodarone 200 mg tablet, Take 1 tablet q8hrs for 7 days  Then, take 1 tablet q12hrs for 7 days  Then, take 1 tablet q24 hrs for 7 days   Then, discontinue , Disp: 42 tablet, Rfl: 0    aspirin 325 mg tablet, Take 1 tablet (325 mg total) by mouth daily, Disp: 30 tablet, Rfl: 2    atorvastatin (LIPITOR) 80 mg tablet, Take 1 tablet (80 mg total) by mouth daily with dinner, Disp: 30 tablet, Rfl: 2    docusate sodium (COLACE) 100 mg capsule, Take 1 capsule (100 mg total) by mouth 2 (two) times a day (Patient not taking: Reported on 12/18/2019), Disp: 10 capsule, Rfl: 0    glucose monitoring kit (FREESTYLE) monitoring kit, 1 each by Does not apply route 4 (four) times a day (before meals and at bedtime) Check blood sugars before meals & at bedtime  Dx DM E11 9, Disp: 1 each, Rfl: 0    insulin aspart (NOVOLOG FLEXPEN) 100 Units/mL injection pen, Inject 8 Units under the skin 3 (three) times a day with meals, Disp: 3 pen, Rfl: 1    insulin glargine (LANTUS SOLOSTAR) 100 units/mL injection pen, Inject 25 Units under the skin daily at bedtime, Disp: 3 pen, Rfl: 1    Insulin Pen Needle 32G X 4 MM MISC, Inject Lantus qHS and Novolog TID with meals, as directed  Dx: DM E11 9, Disp: 100 each, Rfl: 0    Lancets (FREESTYLE) lancets, Check your blood sugar before meals & before bedtime, as directed  Dx DM E11 9 May substitute alternative device for insurance requirements  , Disp: 100 each, Rfl: 1    metoprolol tartrate (LOPRESSOR) 25 mg tablet, Take 1 tablet (25 mg total) by mouth every 12 (twelve) hours, Disp: 60 tablet, Rfl: 2    oxyCODONE-acetaminophen (PERCOCET) 5-325 mg per tablet, Take 1 tablet every 6 hours as needed for moderate pain  Take 2 tablets every 6 hours as needed for severe pain  On going therapy   (Patient not taking: Reported on 12/18/2019), Disp: 30 tablet, Rfl: 0    pantoprazole (PROTONIX) 40 mg tablet, Take 1 tablet (40 mg total) by mouth daily in the early morning, Disp: 30 tablet, Rfl: 0    polyethylene glycol (MIRALAX) 17 g packet, Take 17 g by mouth daily (Patient not taking: Reported on 12/18/2019), Disp: 14 each, Rfl: 0    potassium chloride (K-DUR,KLOR-CON) 20 mEq tablet, Take 1 tablet (20 mEq total) by mouth daily for 7 days, Disp: 7 tablet, Rfl: 1    torsemide (DEMADEX) 20 mg tablet, Take 1 tablet (20 mg total) by mouth daily for 7 days, Disp: 7 tablet, Rfl: 1    Social History     Socioeconomic History    Marital status: /Civil Union     Spouse name: Not on file    Number of children: Not on file    Years of education: Not on file    Highest education level: Not on file   Occupational History    Not on file   Social Needs    Financial resource strain: Not on file    Food insecurity:     Worry: Not on file     Inability: Not on file    Transportation needs:     Medical: Not on file     Non-medical: Not on file   Tobacco Use    Smoking status: Never Smoker    Smokeless tobacco: Never Used   Substance and Sexual Activity    Alcohol use: Yes     Alcohol/week: 5 0 standard drinks     Types: 3 Glasses of wine, 1 Cans of beer, 1 Standard drinks or equivalent per week     Comment: occasional    Drug use: No    Sexual activity: Not Currently     Partners: Female   Lifestyle    Physical activity:     Days per week: Not on file     Minutes per session: Not on file    Stress: Not on file   Relationships    Social connections:     Talks on phone: Not on file     Gets together: Not on file     Attends Taoist service: Not on file     Active member of club or organization: Not on file     Attends meetings of clubs or organizations: Not on file     Relationship status: Not on file    Intimate partner violence:     Fear of current or ex partner: Not on file     Emotionally abused: Not on file     Physically abused: Not on file     Forced sexual activity: Not on file   Other Topics Concern    Not on file   Social History Narrative    Not on file       Family History   Problem Relation Age of Onset    Heart failure Mother     Heart failure Father     Heart disease Father     Heart attack Father     Diabetes Other        Physical Exam   Constitutional: He is oriented to person, place, and time  No distress  HENT:   Head: Normocephalic and atraumatic  Eyes: Conjunctivae and EOM are normal    Neck: Normal range of motion  Neck supple  Cardiovascular: Normal rate, regular rhythm, normal heart sounds and intact distal pulses  Pulmonary/Chest: Effort normal and breath sounds normal    Abdominal: Soft   Bowel sounds are normal    Musculoskeletal: Normal range of motion  Neurological: He is alert and oriented to person, place, and time  Skin: Skin is warm and dry  Skin incisions healing nicely  No erythema induration or drainage   Psychiatric: He has a normal mood and affect  Nursing note and vitals reviewed  Vitals: There were no vitals taken for this visit  Wt Readings from Last 3 Encounters:   19 81 3 kg (179 lb 3 2 oz)   19 82 1 kg (181 lb)   19 80 7 kg (178 lb)         Labs & Results:  Lab Results   Component Value Date    WBC 9 31 2019    HGB 10 2 (L) 2019    HCT 30 9 (L) 2019    MCV 91 2019     2019     No results found for: BNP  No components found for: CHEM    Results for orders placed during the hospital encounter of 19   Echo complete with contrast if indicated    Narrative [address]  [address]  [phone]    Transthoracic Echocardiogram  2D, M-mode, Doppler, and Color Doppler    Study date:  09-Dec-2019    Patient: Michael Garcia  MR number: [id number]  Account number: [de-identified]  : 1945  Age: 69 years  Gender: Male  Status: Outpatient  Location: Bedside  Height: 70 in  Weight: 178 lb  BP: 116/ 62 mmHg    Indications: Coronary artery disease  Diagnoses: I25 118 - Atherosclerotic heart disease of native coronary artery with other forms of angina pectoris    Sonographer:  Patito Love RDCS  Primary Physician:  Sherif Tobar MD  Referring Physician:  Fred Smith PA-C  Group:  [facility]  Cardiology Fellow: Randall Lima MD  Interpreting Physician:  Mirtha Jo MD    SUMMARY    LEFT VENTRICLE:  Systolic function was normal  Ejection fraction was estimated to be 60 %  There were no regional wall motion abnormalities  There was no evidence of concentric hypertrophy  MITRAL VALVE:  There was trace regurgitation      AORTIC VALVE:  There was trace regurgitation  TRICUSPID VALVE:  There was trace regurgitation  HISTORY: PRIOR HISTORY: Hypertension, diabetes mellitus type 2, hyperlipidemia, stable angina pectoris, cardiac catheterization 12/09/19  Family history of heart failure, coronary artery disease, myocardial infarction, and diabetes  mellitus  PROCEDURE: The procedure was performed at the bedside  This was a stat study  The transthoracic approach was used  The study included complete 2D imaging, M-mode, complete spectral Doppler, and color Doppler  The heart rate was 72 bpm, at  the start of the study  Images were obtained from the parasternal, apical, subcostal, and suprasternal notch acoustic windows  Echocardiographic views were limited due to poor acoustic window availability  Image quality was adequate  LEFT VENTRICLE: Size was normal  Systolic function was normal  Ejection fraction was estimated to be 60 %  There were no regional wall motion abnormalities  Wall thickness was normal  There was no evidence of concentric hypertrophy  DOPPLER: Left ventricular diastolic function parameters were normal     RIGHT VENTRICLE: The size was normal  Systolic function was normal  Wall thickness was normal     LEFT ATRIUM: Size was normal     RIGHT ATRIUM: Size was normal     MITRAL VALVE: Valve structure was normal  There was normal leaflet separation  DOPPLER: The transmitral velocity was within the normal range  There was no evidence for stenosis  There was trace regurgitation  AORTIC VALVE: The valve was trileaflet  Leaflets exhibited normal thickness and normal cuspal separation  DOPPLER: Transaortic velocity was within the normal range  There was no evidence for stenosis  There was trace regurgitation  TRICUSPID VALVE: The valve structure was normal  There was normal leaflet separation  DOPPLER: The transtricuspid velocity was within the normal range  There was no evidence for stenosis   There was trace regurgitation  Pulmonary artery  systolic pressure was within the normal range  Estimated peak PA pressure was 25 mmHg  PULMONIC VALVE: Leaflets exhibited normal thickness, no calcification, and normal cuspal separation  DOPPLER: The transpulmonic velocity was within the normal range  There was trace regurgitation  PERICARDIUM: There was no pericardial effusion  The pericardium was normal in appearance  AORTA: The root exhibited normal size  The ascending aorta was normal in size  SYSTEMIC VEINS: IVC: The inferior vena cava was normal in size  SYSTEM MEASUREMENT TABLES    2D mode  AV Diam: 3 2 cm  AoR Diam; Mean (2D): 3 2 cm  LA Diam (2D): 3 8 cm  LA Dimension; Mean (2D): 3 8 cm  LA/Ao (2D): 1 19  EDV (2D-Cubed): 104 cm3  EF (2D-Cubed): 68 5 %  ESV (2D-Cubed): 32 8 cm3  FS (2D-Cubed): 31 9 %  FS (2D-Teich): 31 9 %  IVS/LVPW (2D): 1  IVSd (2D): 0 8 cm  IVSd; Mean chosen (2D): 0 8 cm  LVIDd (2D): 4 7 cm  LVIDd; Mean (2D): 4 7 cm  LVIDs (2D): 3 2 cm  LVIDs; Mean (2D): 3 2 cm  LVPWd (2D): 0 8 cm  LVPWd; Mean (2D): 0 8 cm  Left Ventricular Ejection Fraction; Teichholz; 2D mode;: 59 8 %  Left Ventricular End Diastolic Volume; Teichholz; 2D mode;: 102 cm3  Left Ventricular End Systolic Volume; Teichholz; 2D mode;: 41 cm3  SV (2D-Cubed): 71 2 cm3  Stroke Volume; Teichholz; 2D mode;: 61 cm3  RVIDd (2D): 2 7 cm  RVIDd; Mean (2D): 2 7 cm  Right and Left Ventricular End Diastolic Diameter Ratio; 2D mode;: 0 57    Apical four chamber  Left Atrium MOD Diam; Most recent value chosen; End Systole; Apical four chamber;: 1 83 cm  Left Atrium MOD Diam; Most recent value chosen; End Systole; Apical four chamber;: 1 24 cm  Left Atrium MOD Diam; Most recent value chosen; End Systole; Apical four chamber;: 1 83 cm  Left Atrium MOD Diam; Most recent value chosen; End Systole; Apical four chamber;: 3 24 cm  Left Atrium MOD Diam; Most recent value chosen; End Systole;  Apical four chamber;: 3 48 cm  Left Atrium MOD Diam; Most recent value chosen; End Systole; Apical four chamber;: 3 67 cm  Left Atrium MOD Diam; Most recent value chosen; End Systole; Apical four chamber;: 3 86 cm  Left Atrium MOD Diam; Most recent value chosen; End Systole; Apical four chamber;: 3 94 cm  Left Atrium MOD Diam; Most recent value chosen; End Systole; Apical four chamber;: 3 94 cm  Left Atrium MOD Diam; Most recent value chosen; End Systole; Apical four chamber;: 3 96 cm  Left Atrium MOD Diam; Most recent value chosen; End Systole; Apical four chamber;: 3 96 cm  Left Atrium MOD Diam; Most recent value chosen; End Systole; Apical four chamber;: 3 94 cm  Left Atrium MOD Diam; Most recent value chosen; End Systole; Apical four chamber;: 3 83 cm  Left Atrium MOD Diam; Most recent value chosen; End Systole; Apical four chamber;: 3 75 cm  Left Atrium MOD Diam; Most recent value chosen; End Systole; Apical four chamber;: 3 61 cm  Left Atrium MOD Diam; Most recent value chosen; End Systole; Apical four chamber;: 3 48 cm  Left Atrium MOD Diam; Most recent value chosen; End Systole; Apical four chamber;: 3 29 cm  Left Atrium MOD Diam; Most recent value chosen; End Systole; Apical four chamber;: 2 99 cm  Left Atrium MOD Diam; Most recent value chosen; End Systole; Apical four chamber;: 2 7 cm  Left Atrium MOD Diam; Most recent value chosen; End Systole; Apical four chamber;: 2 4 cm  Left Atrium Systolic Area; Most recent value chosen; Method of Disks, Single Plane; 2D mode; Apical four chamber;: 1320 mm2  Left Atrium Systolic Volume; Most recent value chosen; Method of Disks, Single Plane; 2D mode; Apical four chamber;: 35 6 cm3  Left Atrium systolic major axis; Most recent value chosen; Method of Disks, Single Plane; 2D mode; Apical four chamber;: 4 05 cm  LV MOD Diam; Recent value; End Diastole (A4C): 2 61 cm  LV MOD Diam; Recent value; End Diastole (A4C): 1 82 cm  LV MOD Diam; Recent value; End Diastole (A4C): 3 2 cm  LV MOD Diam; Recent value;  End Diastole (A4C): 3 6 cm  LV MOD Diam; Recent value; End Diastole (A4C): 3 89 cm  LV MOD Diam; Recent value; End Diastole (A4C): 4 17 cm  LV MOD Diam; Recent value; End Diastole (A4C): 4 39 cm  LV MOD Diam; Recent value; End Diastole (A4C): 4 65 cm  LV MOD Diam; Recent value; End Diastole (A4C): 4 81 cm  LV MOD Diam; Recent value; End Diastole (A4C): 4 83 cm  LV MOD Diam; Recent value; End Diastole (A4C): 4 58 cm  LV MOD Diam; Recent value; End Diastole (A4C): 4 21 cm  LV MOD Diam; Recent value; End Diastole (A4C): 4 25 cm  LV MOD Diam; Recent value; End Diastole (A4C): 4 56 cm  LV MOD Diam; Recent value; End Diastole (A4C): 4 92 cm  LV MOD Diam; Recent value; End Diastole (A4C): 5 13 cm  LV MOD Diam; Recent value; End Diastole (A4C): 5 15 cm  LV MOD Diam; Recent value; End Diastole (A4C): 5 06 cm  LV MOD Diam; Recent value; End Diastole (A4C): 4 75 cm  LV MOD Diam; Recent value; End Diastole (A4C): 2 47 cm  LV MOD Diam; Recent value; End Systole (A4C): 3 93 cm  LV MOD Diam; Recent value; End Systole (A4C): 3 62 cm  LV MOD Diam; Recent value; End Systole (A4C): 3 79 cm  LV MOD Diam; Recent value; End Systole (A4C): 3 85 cm  LV MOD Diam; Recent value; End Systole (A4C): 3 93 cm  LV MOD Diam; Recent value; End Systole (A4C): 3 95 cm  LV MOD Diam; Recent value; End Systole (A4C): 1 04 cm  LV MOD Diam; Recent value; End Systole (A4C): 1 54 cm  LV MOD Diam; Recent value; End Systole (A4C): 1 91 cm  LV MOD Diam; Recent value; End Systole (A4C): 2 29 cm  LV MOD Diam; Recent value; End Systole (A4C): 2 54 cm  LV MOD Diam; Recent value; End Systole (A4C): 2 79 cm  LV MOD Diam; Recent value; End Systole (A4C): 2 97 cm  LV MOD Diam; Recent value; End Systole (A4C): 3 08 cm  LV MOD Diam; Recent value; End Systole (A4C): 3 2 cm  LV MOD Diam; Recent value; End Systole (A4C): 3 25 cm  LV MOD Diam; Recent value; End Systole (A4C): 3 31 cm  LV MOD Diam; Recent value; End Systole (A4C): 3 37 cm  LV MOD Diam; Recent value;  End Systole (A4C): 3 47 cm  LV MOD Diam; Recent value; End Systole (A4C): 3 89 cm  LVEF MOD A4C: 55 2 %  Left Ventricle diastolic major axis; Most recent value chosen; Method of Disks, Single Plane; 2D mode; Apical four chamber;: 8 75 cm  Left Ventricle systolic major axis; Most recent value chosen; Method of Disks, Single Plane; 2D mode; Apical four chamber;: 6 93 cm  Left Ventricular Diastolic Area; Most recent value chosen; Method of Disks, Single Plane; 2D mode; Apical four chamber;: 3640 mm2  Left Ventricular End Diastolic Volume; Most recent value chosen; Method of Disks, Single Plane; 2D mode; Apical four chamber;: 124 cm3  Left Ventricular End Systolic Volume; Most recent value chosen; Method of Disks, Single Plane; 2D mode; Apical four chamber;: 55 5 cm3  Left Ventricular Systolic Area; Most recent value chosen; Method of Disks, Single Plane; 2D mode; Apical four chamber;: 2080 mm2  SV MOD A4C: 68 5 cm3  Right Atrium Systolic Area; End Systole; 2D mode; Apical four chamber;: 1000 mm2  Right Atrium Systolic Area; Mean; Mean value chosen; End Systole; 2D mode; Apical four chamber;: 1000 mm2    Tissue Doppler Imaging  LV Peak Early Ortez Tissue Daljit; Medial MA (TDI): 49 mm/s  Left Ventricular Peak Early Diastolic Tissue Velocity; Mean; Mean value chosen; Medial Mitral Annulus; Tissue Doppler Imaging;: 49 mm/s    Unspecified Scan Mode  Dec [place]; Mean; Regurgitant Flow: 1320 mm/s2  Dec [place]; Regurgitant Flow: 996 mm/s2  Dec [place]; Regurgitant Flow: 1630 mm/s2  Dec [place]; Regurgitant Flow: 1320 mm/s2  PHT; Mean; Regurgitant Flow: 460 ms  PHT; Regurgitant Flow: 355 ms  PHT; Regurgitant Flow: 371 ms  PHT; Regurgitant Flow: 655 ms  Peak Grad; Mean; Regurgitant Flow: 15 mm[Hg]  Vmax; Mean; Regurgitant Flow: 1960 mm/s  Vmax; Regurgitant Flow: 1980 mm/s  Vmax; Regurgitant Flow: 1670 mm/s  Vmax; Regurgitant Flow: 2230 mm/s  MV Peak Daljit/LV Peak Tissue Daljit E-Wave; Medial MA: 13  DT; Antegrade Flow: 301 ms  DT; Mean;  Antegrade Flow: 301 ms  Dec [place]; Antegrade Flow: 2110 mm/s2  Dec [place]; Mean; Antegrade Flow: 2110 mm/s2  MV A Daljit: 844 mm/s  MV E Daljit: 635 mm/s  MV E/A Ratio: 0 8  MV Peak A Daljit: 844 mm/s  MV Peak E Daljit; Mean; Antegrade Flow: 635 mm/s  MVA (PHT): 250 mm2  PHT: 88 ms  PHT; Mean: 88 ms  End Ortez Daljit; Mean; Regurgitant Flow: 950 mm/s  End Ortez Daljit; Regurgitant Flow: 950 mm/s  Peak Gradient; Mean; Mean value chosen; Regurgitant Flow; End Diastole;: 4 mm[Hg]  Peak Grad; Mean; Regurgitant Flow: 16 mm[Hg]  Vmax; Mean; Regurgitant Flow: 2010 mm/s  Vmax; Regurgitant Flow: 1820 mm/s  Vmax; Regurgitant Flow: 2190 mm/s    [facility] Accredited Echocardiography Laboratory    Prepared and electronically signed by    Lizette Mac MD  Signed 09-Dec-2019 17:26:57       No results found for this or any previous visit  This note was completed in part utilizing [employer] fluency direct voice recognition software  Grammatical errors, random word insertion, spelling mistakes, and incomplete sentences may be an occasional consequence of the system secondary to software limitations, ambient noise and hardware issues  At the time of dictation, efforts were made to edit, clarify and /or correct errors  Please read the chart carefully and recognize, using context, where substitutions have occurred    If you have any questions or concerns about the context, text or information contained within the body of this dictation, please contact myself, the provider, for further clarification

## 2020-01-08 ENCOUNTER — OFFICE VISIT (OUTPATIENT)
Dept: CARDIOLOGY CLINIC | Facility: CLINIC | Age: 75
End: 2020-01-08
Payer: MEDICARE

## 2020-01-08 VITALS
HEIGHT: 70 IN | DIASTOLIC BLOOD PRESSURE: 76 MMHG | HEART RATE: 68 BPM | BODY MASS INDEX: 25.05 KG/M2 | OXYGEN SATURATION: 98 % | SYSTOLIC BLOOD PRESSURE: 124 MMHG | WEIGHT: 175 LBS

## 2020-01-08 DIAGNOSIS — Z95.1 S/P CABG X 4: ICD-10-CM

## 2020-01-08 DIAGNOSIS — E11.9 TYPE 2 DIABETES MELLITUS (HCC): Chronic | ICD-10-CM

## 2020-01-08 DIAGNOSIS — E78.00 HYPERCHOLESTEREMIA: ICD-10-CM

## 2020-01-08 DIAGNOSIS — Z09 HOSPITAL DISCHARGE FOLLOW-UP: Primary | ICD-10-CM

## 2020-01-08 DIAGNOSIS — I25.10 CORONARY ARTERY DISEASE INVOLVING NATIVE HEART WITHOUT ANGINA PECTORIS, UNSPECIFIED VESSEL OR LESION TYPE: ICD-10-CM

## 2020-01-08 DIAGNOSIS — Z95.1 S/P CABG (CORONARY ARTERY BYPASS GRAFT): ICD-10-CM

## 2020-01-08 DIAGNOSIS — I10 BENIGN ESSENTIAL HYPERTENSION: ICD-10-CM

## 2020-01-08 DIAGNOSIS — I48.91 ATRIAL FIBRILLATION, UNSPECIFIED TYPE (HCC): ICD-10-CM

## 2020-01-08 DIAGNOSIS — D64.9 ANEMIA, UNSPECIFIED TYPE: ICD-10-CM

## 2020-01-08 PROCEDURE — 99214 OFFICE O/P EST MOD 30 MIN: CPT | Performed by: NURSE PRACTITIONER

## 2020-01-08 PROCEDURE — 93000 ELECTROCARDIOGRAM COMPLETE: CPT | Performed by: NURSE PRACTITIONER

## 2020-01-08 NOTE — LETTER
discrete 99 % stenosis at the origin of D2   1st diagonal: There was a 90 % stenosis  Proximal RCA: There was a 100 % stenosis  This lesion is a chronic total occlusion        Summary of Recommendations  The patient was NOT referred to gastroenterology for consideration of routine Colon CA screening of all patients aged 47-78, given that he is recuperating from heart surgery  A heart healthy diet is recommended  Written education provided  CBC, BMP   Fasting lipid profile 2 months  In the future, he may benefit from a low-dose ACE-inhibitor given his diabetes, for renal protection  Follow up will be scheduled with Dr Jerald Hussein 2/10/2020                HPI:   Juliet Gibson is a 70 yo male with a hx of HTn and HL  He saw Dr Jerald Hussein as a new patient at the beginning of December with angina and an abnormal stress test  He was referred for cardiac catheterization  This demonstrated severe MV disease and he was referred to CTs  An echocardiogrma showed normal LV fxn, and no RWMA  After appropriate testing, he was electively admitted and underwent CABG x 4 by Dr Patrica Duverney Dec 13  Post p he developed A fib and was treated with IV amiodarone, subsequently Po amiodarone  He did convert to NSR prior to DC He was [facility] 12/17 1/8/2020  He presents for general cardiology follow-up, accompanied by his wife  Reports feeling well  He has no chest pain, shortness of breath, lightheadedness or dizziness  His incisions are healing  His blood pressure is satisfactory  He is compliant with his medications  He is scheduled for cardiac rehab in the near future  He questions whether he can start taking his supplements again:  Co Q10, fish oil and glucosamine chondroitin  If he so desires, he may take Co Q10  He is not certain why he was taking it  He has had no myalgias  He was taking fish oil for "good measure  "I told him there is no contraindication  His triglycerides were around 115   His statin has been changed to high-intensity statin, high-dose  He may be taking fish oil for other, noncardiac reasons  Agreeable to some updated blood work  He will see his cardiologist in a short interval   He needs no refills        Assessment  Diagnoses and all orders for this visit:    Hospital discharge follow-up    Coronary artery disease involving native heart without angina pectoris, unspecified vessel or lesion type  -     Lipid Panel with Direct LDL reflex; Future    S/P CABG x 4  -     Basic metabolic panel; Future    Benign essential hypertension    Hypercholesteremia  -     Lipid Panel with Direct LDL reflex; Future    Atrial fibrillation, unspecified type (HCC)    Anemia, unspecified type  -     CBC and differential; Future        Past Medical History:   Diagnosis Date    Chronic cough     RESOLVED: [id number]    Coronary artery disease     [facility]     Diabetic retinopathy ([facility] )     HLD (hyperlipidemia)     HTN (hypertension)        Review of Systems   Constitution: Negative for chills  Cardiovascular: Negative for chest pain, claudication, cyanosis, dyspnea on exertion, irregular heartbeat, leg swelling, near-syncope, orthopnea, palpitations, paroxysmal nocturnal dyspnea and syncope  Respiratory: Negative for cough and shortness of breath  Gastrointestinal: Negative for heartburn and nausea  Neurological: Negative for dizziness, focal weakness, headaches, light-headedness and weakness  All other systems reviewed and are negative  Allergies   Allergen Reactions    Pollen Extract Allergic Rhinitis       Current Outpatient Medications:     acetaminophen (TYLENOL) 325 mg tablet, Take 2 tablets (650 mg total) by mouth every 4 (four) hours as needed (temperature greater than 101 F  or mild pain) (Patient not taking: Reported on 12/18/2019), Disp: 30 tablet, Rfl: 0    Alcohol Swabs 70 % PADS, Clean skin and let dry prior to checking blood sugar or administering insulin   Dx: DM E11 9, Disp: 100 each, Rfl: 1    amiodarone 200 mg tablet, Take 1 tablet q8hrs for 7 days  Then, take 1 tablet q12hrs for 7 days  Then, take 1 tablet q24 hrs for 7 days  Then, discontinue , Disp: 42 tablet, Rfl: 0    aspirin 325 mg tablet, Take 1 tablet (325 mg total) by mouth daily, Disp: 30 tablet, Rfl: 2    atorvastatin (LIPITOR) 80 mg tablet, Take 1 tablet (80 mg total) by mouth daily with dinner, Disp: 30 tablet, Rfl: 2    docusate sodium (COLACE) 100 mg capsule, Take 1 capsule (100 mg total) by mouth 2 (two) times a day (Patient not taking: Reported on 12/18/2019), Disp: 10 capsule, Rfl: 0    glucose monitoring kit (FREESTYLE) monitoring kit, 1 each by Does not apply route 4 (four) times a day (before meals and at bedtime) Check blood sugars before meals & at bedtime  Dx DM E11 9, Disp: 1 each, Rfl: 0    insulin aspart (NOVOLOG FLEXPEN) 100 Units/mL injection pen, Inject 8 Units under the skin 3 (three) times a day with meals, Disp: 3 pen, Rfl: 1    insulin glargine (LANTUS SOLOSTAR) 100 units/mL injection pen, Inject 25 Units under the skin daily at bedtime, Disp: 3 pen, Rfl: 1    Insulin Pen Needle 32G X 4 MM MISC, Inject Lantus qHS and Novolog TID with meals, as directed  Dx: DM E11 9, Disp: 100 each, Rfl: 0    Lancets (FREESTYLE) lancets, Check your blood sugar before meals & before bedtime, as directed  Dx DM E11 9 May substitute alternative device for insurance requirements  , Disp: 100 each, Rfl: 1    metoprolol tartrate (LOPRESSOR) 25 mg tablet, Take 1 tablet (25 mg total) by mouth every 12 (twelve) hours, Disp: 60 tablet, Rfl: 2    oxyCODONE-acetaminophen (PERCOCET) 5-325 mg per tablet, Take 1 tablet every 6 hours as needed for moderate pain  Take 2 tablets every 6 hours as needed for severe pain  On going therapy   (Patient not taking: Reported on 12/18/2019), Disp: 30 tablet, Rfl: 0    pantoprazole (PROTONIX) 40 mg tablet, Take 1 tablet (40 mg total) by mouth daily in the early morning, Disp: 30 tablet, Rfl: 0    polyethylene glycol (MIRALAX) 17 g packet, Take 17 g by mouth daily (Patient not taking: Reported on 12/18/2019), Disp: 14 each, Rfl: 0    potassium chloride (K-DUR,KLOR-CON) 20 mEq tablet, Take 1 tablet (20 mEq total) by mouth daily for 7 days, Disp: 7 tablet, Rfl: 1    torsemide (DEMADEX) 20 mg tablet, Take 1 tablet (20 mg total) by mouth daily for 7 days, Disp: 7 tablet, Rfl: 1    Social History     Socioeconomic History    Marital status: /Civil Union     Spouse name: Not on file    Number of children: Not on file    Years of education: Not on file    Highest education level: Not on file   Occupational History    Not on file   Social Needs    Financial resource strain: Not on file    Food insecurity:     Worry: Not on file     Inability: Not on file    Transportation needs:     Medical: Not on file     Non-medical: Not on file   Tobacco Use    Smoking status: Never Smoker    Smokeless tobacco: Never Used   Substance and Sexual Activity    Alcohol use:  Yes     Alcohol/week: 5 0 standard drinks     Types: 3 Glasses of wine, 1 Cans of beer, 1 Standard drinks or equivalent per week     Comment: occasional    Drug use: No    Sexual activity: Not Currently     Partners: Female   Lifestyle    Physical activity:     Days per week: Not on file     Minutes per session: Not on file    Stress: Not on file   Relationships    Social connections:     Talks on phone: Not on file     Gets together: Not on file     Attends Yazidism service: Not on file     Active member of club or organization: Not on file     Attends meetings of clubs or organizations: Not on file     Relationship status: Not on file    Intimate partner violence:     Fear of current or ex partner: Not on file     Emotionally abused: Not on file     Physically abused: Not on file     Forced sexual activity: Not on file   Other Topics Concern    Not on file   Social History Narrative    Not on file       Family History   Problem Relation Age of Onset    Heart failure Mother     Heart failure Father     Heart disease Father     Heart attack Father     Diabetes Other        Physical Exam   Constitutional: He is oriented to person, place, and time  No distress  HENT:   Head: Normocephalic and atraumatic  Eyes: Conjunctivae and EOM are normal    Neck: Normal range of motion  Neck supple  Cardiovascular: Normal rate, regular rhythm, normal heart sounds and intact distal pulses  Pulmonary/Chest: Effort normal and breath sounds normal    Abdominal: Soft  Bowel sounds are normal    Musculoskeletal: Normal range of motion  Neurological: He is alert and oriented to person, place, and time  Skin: Skin is warm and dry  Skin incisions healing nicely  No erythema induration or drainage   Psychiatric: He has a normal mood and affect  Nursing note and vitals reviewed  Vitals: There were no vitals taken for this visit  Wt Readings from Last 3 Encounters:   19 81 3 kg (179 lb 3 2 oz)   19 82 1 kg (181 lb)   19 80 7 kg (178 lb)         Labs & Results:  Lab Results   Component Value Date    WBC 9 31 2019    HGB 10 2 (L) 2019    HCT 30 9 (L) 2019    MCV 91 2019     2019     No results found for: BNP  No components found for: CHEM    Results for orders placed during the hospital encounter of 19   Echo complete with contrast if indicated    Narrative [address]  [address] [place] [address]  [phone]    Transthoracic Echocardiogram  2D, M-mode, Doppler, and Color Doppler    Study date:  09-Dec-2019    Patient: Xin Blum  MR number: [id number]  Account number: [de-identified]  : 1945  Age: 69 years  Gender: Male  Status: Outpatient  Location: Bedside  Height: 70 in  Weight: 178 lb  BP: 116/ 62 mmHg    Indications: Coronary artery disease      Diagnoses: I25 118 - Atherosclerotic heart disease of native coronary artery with other forms of angina pectoris    Sonographer:  Cheryle Dash RDCS  Primary Physician:  Cesar Nuñez MD  Referring Physician:  Olya Bledsoe PA-C  Group:  [facility]  Cardiology Fellow: Janki Ellis MD  Interpreting Physician:  Cale Giang MD    SUMMARY    LEFT VENTRICLE:  Systolic function was normal  Ejection fraction was estimated to be 60 %  There were no regional wall motion abnormalities  There was no evidence of concentric hypertrophy  MITRAL VALVE:  There was trace regurgitation  AORTIC VALVE:  There was trace regurgitation  TRICUSPID VALVE:  There was trace regurgitation  HISTORY: PRIOR HISTORY: Hypertension, diabetes mellitus type 2, hyperlipidemia, stable angina pectoris, cardiac catheterization 12/09/19  Family history of heart failure, coronary artery disease, myocardial infarction, and diabetes  mellitus  PROCEDURE: The procedure was performed at the bedside  This was a stat study  The transthoracic approach was used  The study included complete 2D imaging, M-mode, complete spectral Doppler, and color Doppler  The heart rate was 72 bpm, at  the start of the study  Images were obtained from the parasternal, apical, subcostal, and suprasternal notch acoustic windows  Echocardiographic views were limited due to poor acoustic window availability  Image quality was adequate  LEFT VENTRICLE: Size was normal  Systolic function was normal  Ejection fraction was estimated to be 60 %  There were no regional wall motion abnormalities  Wall thickness was normal  There was no evidence of concentric hypertrophy  DOPPLER: Left ventricular diastolic function parameters were normal     RIGHT VENTRICLE: The size was normal  Systolic function was normal  Wall thickness was normal     LEFT ATRIUM: Size was normal     RIGHT ATRIUM: Size was normal     MITRAL VALVE: Valve structure was normal  There was normal leaflet separation   DOPPLER: The transmitral velocity was within the normal range  There was no evidence for stenosis  There was trace regurgitation  AORTIC VALVE: The valve was trileaflet  Leaflets exhibited normal thickness and normal cuspal separation  DOPPLER: Transaortic velocity was within the normal range  There was no evidence for stenosis  There was trace regurgitation  TRICUSPID VALVE: The valve structure was normal  There was normal leaflet separation  DOPPLER: The transtricuspid velocity was within the normal range  There was no evidence for stenosis  There was trace regurgitation  Pulmonary artery  systolic pressure was within the normal range  Estimated peak PA pressure was 25 mmHg  PULMONIC VALVE: Leaflets exhibited normal thickness, no calcification, and normal cuspal separation  DOPPLER: The transpulmonic velocity was within the normal range  There was trace regurgitation  PERICARDIUM: There was no pericardial effusion  The pericardium was normal in appearance  AORTA: The root exhibited normal size  The ascending aorta was normal in size  SYSTEMIC VEINS: IVC: The inferior vena cava was normal in size  SYSTEM MEASUREMENT TABLES    2D mode  AV Diam: 3 2 cm  AoR Diam; Mean (2D): 3 2 cm  LA Diam (2D): 3 8 cm  LA Dimension; Mean (2D): 3 8 cm  LA/Ao (2D): 1 19  EDV (2D-Cubed): 104 cm3  EF (2D-Cubed): 68 5 %  ESV (2D-Cubed): 32 8 cm3  FS (2D-Cubed): 31 9 %  FS (2D-Teich): 31 9 %  IVS/LVPW (2D): 1  IVSd (2D): 0 8 cm  IVSd; Mean chosen (2D): 0 8 cm  LVIDd (2D): 4 7 cm  LVIDd; Mean (2D): 4 7 cm  LVIDs (2D): 3 2 cm  LVIDs; Mean (2D): 3 2 cm  LVPWd (2D): 0 8 cm  LVPWd; Mean (2D): 0 8 cm  Left Ventricular Ejection Fraction; Teichholz; 2D mode;: 59 8 %  Left Ventricular End Diastolic Volume; Teichholz; 2D mode;: 102 cm3  Left Ventricular End Systolic Volume; Teichholz; 2D mode;: 41 cm3  SV (2D-Cubed): 71 2 cm3  Stroke Volume;  Teichholz; 2D mode;: 61 cm3  RVIDd (2D): 2 7 cm  RVIDd; Mean (2D): 2 7 cm  Right and Left Ventricular End Diastolic Diameter Ratio; 2D mode;: 0 57    Apical four chamber  Left Atrium MOD Diam; Most recent value chosen; End Systole; Apical four chamber;: 1 83 cm  Left Atrium MOD Diam; Most recent value chosen; End Systole; Apical four chamber;: 1 24 cm  Left Atrium MOD Diam; Most recent value chosen; End Systole; Apical four chamber;: 1 83 cm  Left Atrium MOD Diam; Most recent value chosen; End Systole; Apical four chamber;: 3 24 cm  Left Atrium MOD Diam; Most recent value chosen; End Systole; Apical four chamber;: 3 48 cm  Left Atrium MOD Diam; Most recent value chosen; End Systole; Apical four chamber;: 3 67 cm  Left Atrium MOD Diam; Most recent value chosen; End Systole; Apical four chamber;: 3 86 cm  Left Atrium MOD Diam; Most recent value chosen; End Systole; Apical four chamber;: 3 94 cm  Left Atrium MOD Diam; Most recent value chosen; End Systole; Apical four chamber;: 3 94 cm  Left Atrium MOD Diam; Most recent value chosen; End Systole; Apical four chamber;: 3 96 cm  Left Atrium MOD Diam; Most recent value chosen; End Systole; Apical four chamber;: 3 96 cm  Left Atrium MOD Diam; Most recent value chosen; End Systole; Apical four chamber;: 3 94 cm  Left Atrium MOD Diam; Most recent value chosen; End Systole; Apical four chamber;: 3 83 cm  Left Atrium MOD Diam; Most recent value chosen; End Systole; Apical four chamber;: 3 75 cm  Left Atrium MOD Diam; Most recent value chosen; End Systole; Apical four chamber;: 3 61 cm  Left Atrium MOD Diam; Most recent value chosen; End Systole; Apical four chamber;: 3 48 cm  Left Atrium MOD Diam; Most recent value chosen; End Systole; Apical four chamber;: 3 29 cm  Left Atrium MOD Diam; Most recent value chosen; End Systole; Apical four chamber;: 2 99 cm  Left Atrium MOD Diam; Most recent value chosen; End Systole; Apical four chamber;: 2 7 cm  Left Atrium MOD Diam; Most recent value chosen; End Systole;  Apical four chamber;: 2 4 cm  Left Atrium Systolic Area; Most recent value chosen; Method of Disks, Single Plane; 2D mode; Apical four chamber;: 1320 mm2  Left Atrium Systolic Volume; Most recent value chosen; Method of Disks, Single Plane; 2D mode; Apical four chamber;: 35 6 cm3  Left Atrium systolic major axis; Most recent value chosen; Method of Disks, Single Plane; 2D mode; Apical four chamber;: 4 05 cm  LV MOD Diam; Recent value; End Diastole (A4C): 2 61 cm  LV MOD Diam; Recent value; End Diastole (A4C): 1 82 cm  LV MOD Diam; Recent value; End Diastole (A4C): 3 2 cm  LV MOD Diam; Recent value; End Diastole (A4C): 3 6 cm  LV MOD Diam; Recent value; End Diastole (A4C): 3 89 cm  LV MOD Diam; Recent value; End Diastole (A4C): 4 17 cm  LV MOD Diam; Recent value; End Diastole (A4C): 4 39 cm  LV MOD Diam; Recent value; End Diastole (A4C): 4 65 cm  LV MOD Diam; Recent value; End Diastole (A4C): 4 81 cm  LV MOD Diam; Recent value; End Diastole (A4C): 4 83 cm  LV MOD Diam; Recent value; End Diastole (A4C): 4 58 cm  LV MOD Diam; Recent value; End Diastole (A4C): 4 21 cm  LV MOD Diam; Recent value; End Diastole (A4C): 4 25 cm  LV MOD Diam; Recent value; End Diastole (A4C): 4 56 cm  LV MOD Diam; Recent value; End Diastole (A4C): 4 92 cm  LV MOD Diam; Recent value; End Diastole (A4C): 5 13 cm  LV MOD Diam; Recent value; End Diastole (A4C): 5 15 cm  LV MOD Diam; Recent value; End Diastole (A4C): 5 06 cm  LV MOD Diam; Recent value; End Diastole (A4C): 4 75 cm  LV MOD Diam; Recent value; End Diastole (A4C): 2 47 cm  LV MOD Diam; Recent value; End Systole (A4C): 3 93 cm  LV MOD Diam; Recent value; End Systole (A4C): 3 62 cm  LV MOD Diam; Recent value; End Systole (A4C): 3 79 cm  LV MOD Diam; Recent value; End Systole (A4C): 3 85 cm  LV MOD Diam; Recent value; End Systole (A4C): 3 93 cm  LV MOD Diam; Recent value; End Systole (A4C): 3 95 cm  LV MOD Diam; Recent value; End Systole (A4C): 1 04 cm  LV MOD Diam; Recent value; End Systole (A4C): 1 54 cm  LV MOD Diam; Recent value;  End Systole (A4C): 1 91 cm  LV MOD Diam; Recent value; End Systole (A4C): 2 29 cm  LV MOD Diam; Recent value; End Systole (A4C): 2 54 cm  LV MOD Diam; Recent value; End Systole (A4C): 2 79 cm  LV MOD Diam; Recent value; End Systole (A4C): 2 97 cm  LV MOD Diam; Recent value; End Systole (A4C): 3 08 cm  LV MOD Diam; Recent value; End Systole (A4C): 3 2 cm  LV MOD Diam; Recent value; End Systole (A4C): 3 25 cm  LV MOD Diam; Recent value; End Systole (A4C): 3 31 cm  LV MOD Diam; Recent value; End Systole (A4C): 3 37 cm  LV MOD Diam; Recent value; End Systole (A4C): 3 47 cm  LV MOD Diam; Recent value; End Systole (A4C): 3 89 cm  LVEF MOD A4C: 55 2 %  Left Ventricle diastolic major axis; Most recent value chosen; Method of Disks, Single Plane; 2D mode; Apical four chamber;: 8 75 cm  Left Ventricle systolic major axis; Most recent value chosen; Method of Disks, Single Plane; 2D mode; Apical four chamber;: 6 93 cm  Left Ventricular Diastolic Area; Most recent value chosen; Method of Disks, Single Plane; 2D mode; Apical four chamber;: 3640 mm2  Left Ventricular End Diastolic Volume; Most recent value chosen; Method of Disks, Single Plane; 2D mode; Apical four chamber;: 124 cm3  Left Ventricular End Systolic Volume; Most recent value chosen; Method of Disks, Single Plane; 2D mode; Apical four chamber;: 55 5 cm3  Left Ventricular Systolic Area; Most recent value chosen; Method of Disks, Single Plane; 2D mode; Apical four chamber;: 2080 mm2  SV MOD A4C: 68 5 cm3  Right Atrium Systolic Area; End Systole; 2D mode; Apical four chamber;: 1000 mm2  Right Atrium Systolic Area; Mean; Mean value chosen; End Systole; 2D mode; Apical four chamber;: 1000 mm2    Tissue Doppler Imaging  LV Peak Early Ortez Tissue Daljit; Medial MA (TDI): 49 mm/s  Left Ventricular Peak Early Diastolic Tissue Velocity; Mean; Mean value chosen; Medial Mitral Annulus;  Tissue Doppler Imaging;: 49 mm/s    Unspecified Scan Mode  Dec [place]; Mean; Regurgitant Flow: 1320 mm/s2  Dec [place]; Regurgitant Flow: 996 mm/s2  Dec [place]; Regurgitant Flow: 1630 mm/s2  Dec [place]; Regurgitant Flow: 1320 mm/s2  PHT; Mean; Regurgitant Flow: 460 ms  PHT; Regurgitant Flow: 355 ms  PHT; Regurgitant Flow: 371 ms  PHT; Regurgitant Flow: 655 ms  Peak Grad; Mean; Regurgitant Flow: 15 mm[Hg]  Vmax; Mean; Regurgitant Flow: 1960 mm/s  Vmax; Regurgitant Flow: 1980 mm/s  Vmax; Regurgitant Flow: 1670 mm/s  Vmax; Regurgitant Flow: 2230 mm/s  MV Peak Daljit/LV Peak Tissue Daljit E-Wave; Medial MA: 13  DT; Antegrade Flow: 301 ms  DT; Mean; Antegrade Flow: 301 ms  Dec [place]; Antegrade Flow: 2110 mm/s2  Dec [place]; Mean; Antegrade Flow: 2110 mm/s2  MV A Daljit: 844 mm/s  MV E Daljit: 635 mm/s  MV E/A Ratio: 0 8  MV Peak A Daljit: 844 mm/s  MV Peak E Daljit; Mean; Antegrade Flow: 635 mm/s  MVA (PHT): 250 mm2  PHT: 88 ms  PHT; Mean: 88 ms  End Ortez Daljit; Mean; Regurgitant Flow: 950 mm/s  End Ortez Daljit; Regurgitant Flow: 950 mm/s  Peak Gradient; Mean; Mean value chosen; Regurgitant Flow; End Diastole;: 4 mm[Hg]  Peak Grad; Mean; Regurgitant Flow: 16 mm[Hg]  Vmax; Mean; Regurgitant Flow: 2010 mm/s  Vmax; Regurgitant Flow: 1820 mm/s  Vmax; Regurgitant Flow: 2190 mm/s    [employer] Accredited Echocardiography Laboratory    Prepared and electronically signed by    Lizeth Peralta MD  Signed 09-Dec-2019 17:26:57       No results found for this or any previous visit  This note was completed in part utilizing m-modal fluency direct voice recognition software  Grammatical errors, random word insertion, spelling mistakes, and incomplete sentences may be an occasional consequence of the system secondary to software limitations, ambient noise and hardware issues  At the time of dictation, efforts were made to edit, clarify and /or correct errors  Please read the chart carefully and recognize, using context, where substitutions have occurred    If you have any questions or concerns about the context, text or information contained within the body of this dictation, please contact myself, the provider, for further clarification

## 2020-01-09 ENCOUNTER — TELEPHONE (OUTPATIENT)
Dept: CARDIOLOGY CLINIC | Facility: CLINIC | Age: 75
End: 2020-01-09

## 2020-01-09 DIAGNOSIS — E11.9 TYPE 2 DIABETES MELLITUS WITHOUT COMPLICATION, WITHOUT LONG-TERM CURRENT USE OF INSULIN (HCC): Primary | Chronic | ICD-10-CM

## 2020-01-09 DIAGNOSIS — Z95.1 S/P CABG X 4: Primary | ICD-10-CM

## 2020-01-09 RX ORDER — ASPIRIN 325 MG
325 TABLET ORAL DAILY
Start: 2020-01-09 | End: 2020-02-10 | Stop reason: SDUPTHER

## 2020-01-09 NOTE — TELEPHONE ENCOUNTER
Patient called wanting to know if he should discontinue taking aspirin  His summary report from his visit yesterday said he should discontinue

## 2020-01-10 ENCOUNTER — TELEPHONE (OUTPATIENT)
Dept: CARDIOLOGY CLINIC | Facility: CLINIC | Age: 75
End: 2020-01-10

## 2020-01-10 NOTE — TELEPHONE ENCOUNTER
Spoke with patient to address his concern and per Cari it was advised for patient to continue taking his aspirin 81 mg

## 2020-01-15 ENCOUNTER — OFFICE VISIT (OUTPATIENT)
Dept: CARDIAC SURGERY | Facility: CLINIC | Age: 75
End: 2020-01-15

## 2020-01-15 VITALS
HEIGHT: 70 IN | RESPIRATION RATE: 14 BRPM | DIASTOLIC BLOOD PRESSURE: 76 MMHG | WEIGHT: 175 LBS | TEMPERATURE: 98.2 F | HEART RATE: 68 BPM | OXYGEN SATURATION: 99 % | BODY MASS INDEX: 25.05 KG/M2 | SYSTOLIC BLOOD PRESSURE: 140 MMHG

## 2020-01-15 DIAGNOSIS — I25.119 ATHEROSCLEROSIS OF NATIVE CORONARY ARTERY OF NATIVE HEART WITH ANGINA PECTORIS (HCC): Primary | ICD-10-CM

## 2020-01-15 DIAGNOSIS — Z95.1 S/P CABG X 4: ICD-10-CM

## 2020-01-15 PROCEDURE — 99024 POSTOP FOLLOW-UP VISIT: CPT | Performed by: PHYSICIAN ASSISTANT

## 2020-01-15 NOTE — PROGRESS NOTES
Post OP Follow-up Appointment    Procedure: S/P coronary artery bypass grafting, performed on 12/13/19    History: Tonya Matthew is a 69y o  year old male who presents to our office today for routine follow up care from CABG  His post op course was notable for perioperative atrial fibrillation  This was resolved with Amiodarone bolus and load  He was in NSR at the time of discharge and was continued on amio x 1 month  He did not require anticoagulation  Since being discharged to home, he is relatively free of complaints  he does have some routine incisional discomfort  he denies any presyncope, syncope, fever, chills, drainage or irritation of incisions, SOB, dyspnea, PND, edema or significant weight gain, constitutional symptoms or GI distress  Patient has been seen by PCP and Cardiologist  he is scheduled to start cardiac rehab in the near future  Vital Signs:   Vitals:    01/15/20 1500 01/15/20 1507   BP: 134/72 140/76   BP Location: Left arm Right arm   Cuff Size: Adult Adult   Pulse: 68    Resp: 14    Temp: 98 2 °F (36 8 °C)    TempSrc: Oral    SpO2: 99%    Weight: 79 4 kg (175 lb)    Height: 5' 10" (1 778 m)        Home Medications:   Prior to Admission medications    Medication Sig Start Date End Date Taking? Authorizing Provider   aspirin 325 mg tablet Take 1 tablet (325 mg total) by mouth daily 1/9/20  Yes KONSTANTIN Hamilton   atorvastatin (LIPITOR) 80 mg tablet Take 1 tablet (80 mg total) by mouth daily with dinner 12/17/19  Yes Rudolph Estrada PA-C   glucose blood (ACCU-CHEK GUIDE) test strip Test blood sugar before meals and at bedtime 1/9/20  Yes Shreya Hope PA-C   glucose monitoring kit (FREESTYLE) monitoring kit 1 each by Does not apply route 4 (four) times a day (before meals and at bedtime) Check blood sugars before meals & at bedtime   Dx DM E11 9 12/17/19  Yes Rudolph Estrada PA-C   Lancets (FREESTYLE) lancets Check your blood sugar before meals & before bedtime, as directed  Dx DM E11 9 May substitute alternative device for insurance requirements  12/17/19  Yes Rudolph Estrada PA-C   metFORMIN (GLUCOPHAGE) 1000 MG tablet Take 1,000 mg by mouth 2 (two) times a day with meals   Yes Historical Provider, MD   metoprolol tartrate (LOPRESSOR) 25 mg tablet Take 1 tablet (25 mg total) by mouth every 12 (twelve) hours 12/17/19  Yes Rudolph Estrada PA-C   pantoprazole (PROTONIX) 40 mg tablet Take 1 tablet (40 mg total) by mouth daily in the early morning 12/18/19 1/17/20 Yes Rudolph Estrada PA-C   acetaminophen (TYLENOL) 325 mg tablet Take 2 tablets (650 mg total) by mouth every 4 (four) hours as needed (temperature greater than 101 F  or mild pain) 12/17/19 1/15/20  Rudolph Estrada PA-C   Alcohol Swabs 70 % PADS Clean skin and let dry prior to checking blood sugar or administering insulin  Dx: DM E11 9  Patient not taking: Reported on 1/8/2020 12/17/19 1/15/20  Dwain Estrada PA-C   amiodarone 200 mg tablet Take 1 tablet q8hrs for 7 days  Then, take 1 tablet q12hrs for 7 days  Then, take 1 tablet q24 hrs for 7 days  Then, discontinue  Patient not taking: Reported on 1/15/2020 12/17/19 1/15/20  Rudolph Estrada PA-C   docusate sodium (COLACE) 100 mg capsule Take 1 capsule (100 mg total) by mouth 2 (two) times a day  Patient not taking: Reported on 12/18/2019 12/17/19 1/15/20  Rudolph Estrada PA-C   insulin aspart (NOVOLOG FLEXPEN) 100 Units/mL injection pen Inject 8 Units under the skin 3 (three) times a day with meals  Patient not taking: Reported on 1/8/2020 12/17/19 1/15/20  Rudolph Estrada PA-C   insulin glargine (LANTUS SOLOSTAR) 100 units/mL injection pen Inject 25 Units under the skin daily at bedtime  Patient not taking: Reported on 1/8/2020 12/17/19 1/15/20  Rudolph Estrada PA-C   Insulin Pen Needle 32G X 4 MM MISC Inject Lantus qHS and Novolog TID with meals, as directed   Dx: DM E11 9  Patient not taking: Reported on 1/8/2020 12/17/19 1/15/20 Rudolph Estrada PA-C   oxyCODONE-acetaminophen (PERCOCET) 5-325 mg per tablet Take 1 tablet every 6 hours as needed for moderate pain  Take 2 tablets every 6 hours as needed for severe pain  On going therapy  Patient not taking: Reported on 12/18/2019 12/17/19 1/15/20  Rudolph Estrada PA-C   polyethylene glycol (MIRALAX) 17 g packet Take 17 g by mouth daily  Patient not taking: Reported on 12/18/2019 12/18/19 1/15/20  Gianna Mckenna PA-C       Physical Exam:    HEENT/NECK:  PERRLA  No jugular venous distention  Cardiac:Regular rate and rhythm  Pulmonary:Breath sounds diminished at L  base  Abdomen:  Non-tender, Non-distended  Positive bowel sounds  Lower extremities: Extremities warm/dry  Radial/PT/DP pules 2+ bilaterally  No edema B/L  Incisions: Sternum is stable  Incision is clean, dry, and intact  and Saphenectomy incison is clean, dry, and intact  Neuro: Alert and oriented X 3  Sensation is grossly intact  No focal deficits  Skin: Warm/Dry, without rashes or lesions  Lab Results:               Invalid input(s): LABGLOM      Lab Results   Component Value Date    HGBA1C 6 9 (H) 12/09/2019     No results found for: CKTOTAL, CKMB, CKMBINDEX, TROPONINI    Imaging Studies:     No new studies    I have personally reviewed pertinent reports  Assessment: Coronary artery disease  S/P coronary artery bypass grafting;    Plan:     Andres Corona continues to recover well following coronary artery bypass grafting  To date they have made progressive improvements physical rehabilitation  At this point I have cleared them to begin outpatient cardiac rehabilitation and have encouraged them to to do so  Andres Corona has also been cleared to resume driving at this point  I asked that them do so in progressive increments  I did remind them of their ongoing lifting restrictions of 25 pounds for an additional 2 months      Andres Corona has already been evaluated by their primary care physician cardiologist for ongoing medical care  At this point I have not scheduled them for routine followup care with our office  I have advised them to call with any new concerns that may arise  Debbie No was comfortable with our recommendations and their questions were answered to their satisfaction  I have ordered a PA/Lat CXR to evaluate his diminished L breath sounds  He will perform this tomorrow and contact our office after  If it appears that there is significant effusion, will arrange for thoracentesis       Dayana Lopez PA-C  01/15/20

## 2020-01-15 NOTE — LETTER
January 15, 2020     Marlin Fleischer, MD  [address]     Patient: Chetan Hinse   YOB: 1945   Date of Visit: 1/15/2020       Dear Dr Jacobs Scale:    Thank you for referring Perri Contreras to me for evaluation  Below are my notes for this consultation  If you have questions, please do not hesitate to call me  I look forward to following your patient along with you  Sincerely,        Ya Dockery, DO        CC: MD Toni Foster PA-C  1/15/2020  3:37 PM  Attested  Post OP Follow-up Appointment    Procedure: S/P coronary artery bypass grafting, performed on 12/13/19    History: Chetan Hines is a 69y o  year old male who presents to our office today for routine follow up care from CABG  His post op course was notable for perioperative atrial fibrillation  This was resolved with Amiodarone bolus and load  He was in NSR at the time of discharge and was continued on amio x 1 month  He did not require anticoagulation  Since being discharged to home, he is relatively free of complaints  he does have some routine incisional discomfort  he denies any presyncope, syncope, fever, chills, drainage or irritation of incisions, SOB, dyspnea, PND, edema or significant weight gain, constitutional symptoms or GI distress  Patient has been seen by PCP and Cardiologist  he is scheduled to start cardiac rehab in the near future  Vital Signs:   Vitals:    01/15/20 1500 01/15/20 1507   BP: 134/72 140/76   BP Location: Left arm Right arm   Cuff Size: Adult Adult   Pulse: 68    Resp: 14    Temp: 98 2 °F (36 8 °C)    TempSrc: Oral    SpO2: 99%    Weight: 79 4 kg (175 lb)    Height: 5' 10" (1 778 m)        Home Medications:   Prior to Admission medications    Medication Sig Start Date End Date Taking?  Authorizing Provider   aspirin 325 mg tablet Take 1 tablet (325 mg total) by mouth daily 1/9/20  Yes KONSTANTIN Steinberg   atorvastatin (LIPITOR) 80 mg tablet Take 1 tablet (80 mg total) by mouth daily with dinner 12/17/19  Yes Rudolph Estrada PA-C   glucose blood (ACCU-CHEK GUIDE) test strip Test blood sugar before meals and at bedtime 1/9/20  Yes Shreya Hope PA-C   glucose monitoring kit (FREESTYLE) monitoring kit 1 each by Does not apply route 4 (four) times a day (before meals and at bedtime) Check blood sugars before meals & at bedtime  Dx DM E11 9 12/17/19  Yes Rudolph Estrada PA-C   Lancets (FREESTYLE) lancets Check your blood sugar before meals & before bedtime, as directed  Dx DM E11 9 May substitute alternative device for insurance requirements  12/17/19  Yes Rudolph Estrada PA-C   metFORMIN (GLUCOPHAGE) 1000 MG tablet Take 1,000 mg by mouth 2 (two) times a day with meals   Yes Historical Provider, MD   metoprolol tartrate (LOPRESSOR) 25 mg tablet Take 1 tablet (25 mg total) by mouth every 12 (twelve) hours 12/17/19  Yes Rudolph Estrada PA-C   pantoprazole (PROTONIX) 40 mg tablet Take 1 tablet (40 mg total) by mouth daily in the early morning 12/18/19 1/17/20 Yes Rudolph Estrada PA-C   acetaminophen (TYLENOL) 325 mg tablet Take 2 tablets (650 mg total) by mouth every 4 (four) hours as needed (temperature greater than 101 F  or mild pain) 12/17/19 1/15/20  Rudolph Estrada PA-C   Alcohol Swabs 70 % PADS Clean skin and let dry prior to checking blood sugar or administering insulin  Dx: DM E11 9  Patient not taking: Reported on 1/8/2020 12/17/19 1/15/20  Love Estrada PA-C   amiodarone 200 mg tablet Take 1 tablet q8hrs for 7 days  Then, take 1 tablet q12hrs for 7 days  Then, take 1 tablet q24 hrs for 7 days  Then, discontinue    Patient not taking: Reported on 1/15/2020 12/17/19 1/15/20  Rudolph Estrada PA-C   docusate sodium (COLACE) 100 mg capsule Take 1 capsule (100 mg total) by mouth 2 (two) times a day  Patient not taking: Reported on 12/18/2019 12/17/19 1/15/20  Rudolph Estrada PA-C   insulin aspart ([place]ing Should[place]) 100 Units/mL injection pen Inject 8 Units under the skin 3 (three) times a day with meals  Patient not taking: Reported on 1/8/2020 12/17/19 1/15/20  Rudolph Estrada PA-C   insulin glargine (LANTUS SOLOSTAR) 100 units/mL injection pen Inject 25 Units under the skin daily at bedtime  Patient not taking: Reported on 1/8/2020 12/17/19 1/15/20  Rudolph Estrada PA-C   Insulin Pen Needle 32G X 4 MM MISC Inject Lantus qHS and Novolog TID with meals, as directed  Dx: DM E11 9  Patient not taking: Reported on 1/8/2020 12/17/19 1/15/20  Sydni Estrada PA-C   oxyCODONE-acetaminophen (PERCOCET) 5-325 mg per tablet Take 1 tablet every 6 hours as needed for moderate pain  Take 2 tablets every 6 hours as needed for severe pain  On going therapy  Patient not taking: Reported on 12/18/2019 12/17/19 1/15/20  Rudolph Estrada PA-C   polyethylene glycol (MIRALAX) 17 g packet Take 17 g by mouth daily  Patient not taking: Reported on 12/18/2019 12/18/19 1/15/20  Jolly Mabry PA-C       Physical Exam:    HEENT/NECK:  PERRLA  No jugular venous distention  Cardiac:Regular rate and rhythm  Pulmonary:Breath sounds diminished at L  base  Abdomen:  Non-tender, Non-distended  Positive bowel sounds  Lower extremities: Extremities warm/dry  Radial/PT/DP pules 2+ bilaterally  No edema B/L  Incisions: Sternum is stable  Incision is clean, dry, and intact  and Saphenectomy incison is clean, dry, and intact  Neuro: Alert and oriented X 3  Sensation is grossly intact  No focal deficits  Skin: Warm/Dry, without rashes or lesions  Lab Results:               Invalid input(s): LABGLOM      Lab Results   Component Value Date    HGBA1C 6 9 (H) 12/09/2019     No results found for: CKTOTAL, CKMB, CKMBINDEX, TROPONINI    Imaging Studies:     No new studies    I have personally reviewed pertinent reports  Assessment: Coronary artery disease   S/P coronary artery bypass grafting;    Plan:     Cailin Guptathorne continues to recover well following coronary artery bypass grafting  To date they have made progressive improvements physical rehabilitation  At this point I have cleared them to begin outpatient cardiac rehabilitation and have encouraged them to to do so  Ned Martinez has also been cleared to resume driving at this point  I asked that them do so in progressive increments  I did remind them of their ongoing lifting restrictions of 25 pounds for an additional 2 months  Ned Martinez has already been evaluated by their primary care physician cardiologist for ongoing medical care  At this point I have not scheduled them for routine followup care with our office  I have advised them to call with any new concerns that may arise  Ned Martinez was comfortable with our recommendations and their questions were answered to their satisfaction  I have ordered a PA/Lat CXR to evaluate his diminished L breath sounds  He will perform this tomorrow and contact our office after  If it appears that there is significant effusion, will arrange for thoracentesis  Francois Casanova PA-C  01/15/20  Attestation signed by Maximiliano Black DO at 1/15/2020  3:41 PM:  The patient was seen and examined, and I agree with the midlevel's history, physical exam, assessment and plan with the following additions:    Mr Jamil Burnett is recovering well from his CABG x4  He is back to his regular routine  Does have decreased breath sounds in the left base, and a chest x-ray will be ordered  If required a thoracentesis will be ordered  He is ready to enter into outpatient cardiac rehab    He will continue to follow up as an outpatient with his primary care physician and cardiologist

## 2020-01-16 ENCOUNTER — HOSPITAL ENCOUNTER (OUTPATIENT)
Dept: RADIOLOGY | Facility: HOSPITAL | Age: 75
Discharge: HOME | End: 2020-01-16
Payer: MEDICARE

## 2020-01-16 ENCOUNTER — TRANSCRIBE ORDERS (OUTPATIENT)
Dept: RADIOLOGY | Facility: HOSPITAL | Age: 75
End: 2020-01-16

## 2020-01-16 DIAGNOSIS — Z95.1 S/P CABG X 4: ICD-10-CM

## 2020-01-16 PROCEDURE — 71046 X-RAY EXAM CHEST 2 VIEWS: CPT

## 2020-01-20 ENCOUNTER — PATIENT OUTREACH (OUTPATIENT)
Dept: CASE MANAGEMENT | Facility: OTHER | Age: 75
End: 2020-01-20

## 2020-01-22 ENCOUNTER — CLINICAL SUPPORT (OUTPATIENT)
Dept: CARDIAC REHAB | Facility: CLINIC | Age: 75
End: 2020-01-22
Payer: MEDICARE

## 2020-01-22 DIAGNOSIS — Z95.1 S/P CABG (CORONARY ARTERY BYPASS GRAFT): ICD-10-CM

## 2020-01-22 DIAGNOSIS — E11.9 TYPE 2 DIABETES MELLITUS (HCC): Chronic | ICD-10-CM

## 2020-01-22 PROCEDURE — 93797 PHYS/QHP OP CAR RHAB WO ECG: CPT

## 2020-01-22 NOTE — PROGRESS NOTES
CARDIAC REHAB ASSESSMENT    Today's date: 2020  Patient name: Juliet Gibson     : 1945       MRN: [id number]  PCP: Sage Clark MD  Referring Physician: Roger Mayo DO  Cardiologist: Marcin Miranda MD   Surgeon: Mario Burnette DO   Dx:   Encounter Diagnoses   Name Primary?     S/P CABG (coronary artery bypass graft)     Type 2 diabetes mellitus ([facility] )        Date of onset: 2019  Cultural needs: None     Height:    Wt Readings from Last 1 Encounters:   01/15/20 79 4 kg (175 lb)      Weight:   Ht Readings from Last 1 Encounters:   01/15/20 5' 10" (1 778 m)     Medical History:   Past Medical History:   Diagnosis Date    Chronic cough     RESOLVED: [id number]    Coronary artery disease     Diabetes mellitus ([facility] )     Diabetic retinopathy ([place] [address] )     HLD (hyperlipidemia)     HTN (hypertension)          Physical Limitations: None     Risk Factors   Cholesterol: Yes  Smoking: Never used  HTN: Yes  DM: Type 2   average am -110  Obesity: No   Inactivity: No  Stress:  perceived  stress: 0/10   Stressors: None    Goals for Stress Management: None     Family History:  Family History   Problem Relation Age of Onset    Heart failure Mother     Heart failure Father     Heart disease Father     Heart attack Father     Diabetes Other        Allergies: Pollen extract  ETOH:   Social History     Substance and Sexual Activity   Alcohol Use Yes    Alcohol/week: 5 0 standard drinks    Types: 3 Glasses of wine, 1 Cans of beer, 1 Standard drinks or equivalent per week    Comment: occasional         Current Medications:   Current Outpatient Medications   Medication Sig Dispense Refill    aspirin 325 mg tablet Take 1 tablet (325 mg total) by mouth daily      atorvastatin (LIPITOR) 80 mg tablet Take 1 tablet (80 mg total) by mouth daily with dinner 30 tablet 2    glucose blood (ACCU-CHEK GUIDE) test strip Test blood sugar before meals and at bedtime 300 each 1    glucose monitoring kit (FREESTYLE) monitoring kit 1 each by Does not apply route 4 (four) times a day (before meals and at bedtime) Check blood sugars before meals & at bedtime  Dx DM E11 9 1 each 0    Lancets (FREESTYLE) lancets Check your blood sugar before meals & before bedtime, as directed  Dx DM E11 9 May substitute alternative device for insurance requirements  100 each 1    metFORMIN (GLUCOPHAGE) 1000 MG tablet Take 1,000 mg by mouth 2 (two) times a day with meals      metoprolol tartrate (LOPRESSOR) 25 mg tablet Take 1 tablet (25 mg total) by mouth every 12 (twelve) hours 60 tablet 2    pantoprazole (PROTONIX) 40 mg tablet Take 1 tablet (40 mg total) by mouth daily in the early morning 30 tablet 0     No current facility-administered medications for this visit              Functional Status Prior to Diagnosis for Treatment   Occupation: retired  Recreation: remodeling houses, yard work, cars   ADLs: Capable of performing light to moderate ADLs  [place]: able to perform self-care resumed driving  Exercise: None   Other: None     Current Functional Status  Occupation: retired  Recreation: walking, light work within sternum precaution   ADLs:Capable of performing light ADLs only resumed all ADLs within sternal precautions  [place]: able to perform self-care resumed driving  Exercise: 1mile walking when weather is good   Other: None     Patient Specific Goals:  Build himself back up, increase confidence     Short Term Program Goals: dietary modifications increased strength improved energy/stamina with ADLs exercise 120-150 mins/wk improved BG control    Long Term Goals: increased intial training workload  Improved [facility] Activity Status score  Improved functional capacity  Improved Quality of Life - [facility] score reduced  Improved lipid profile  Reduced body fat%  Reduced waist circumference  Improved A1c  Improved fasting glucose  improved Rate Your Plate Score    Ability to reach goals/rehabilitation potential: Excellent    Projected return to function: 12 weeks  Objective tests: sub-max TM ETT      Nutritional   Reviewed details of Rate your Plate  Discussed key elements of heart healthy eating  Reviewed patient goals for dietary modifications and their clinical implications  Reviewed most recent lipid profile  Reading labels more since surgery, lowered sodium, less processed meats, small portion sizes  Wife cooks with mostly olive oil and veggie oil  Air frying  Has lots of pasta but tries to cut back portion sizes  Goals for dietary modification: choose lean cuts of meat  poultry without the skin  low fat ground meat and poultry  eliminate processed meats  reduce portions of meat to 3 oz  increase fish intake  more meatless meals  low fat dairy   reduced fat cheese  increase whole grains  increase fruits and vegetables  eliminate butter  low sodium  improved snack choices  more nuts/seeds  reduce sweets/frozen desserts  heathier choices while dining out      Emotional/Social  Patient reports he/she is coping well with good social support and denies depression or anxiety    SOCIAL SUPPORT NETWORK    Marital status:     Rate 1-5:    Marriage: 5   Family: 5   Financial: 5   Relationships: 5   Spirituality: 5   Intellectual: 5      Domestic Violence Screening: No    Comments: CABG x 4 12/13/2019; Had prior cath on 12/09/2019; Had some discomfort in chest while walking up grade around neighborhood prior to stress test 11/27/2019

## 2020-01-22 NOTE — PROGRESS NOTES
Cardiac Rehabilitation Plan of Care   Care Plan       Today's date: 2020   Visits: 1  Patient name: Abrahan Chau      : 1945  Age: 69 y o  MRN: [id number]  Referring Physician: Adelia Chung DO  Cardiologist: Ramon Mcpherson MD   Provider: [facility]  Clinician: Maureen Bonilla MS, [facility], [facility], [place], operated by [facility]    Dx:   Encounter Diagnoses   Name Primary?  S/P CABG (coronary artery bypass graft)     [facility]      Date of onset: 2019      SUMMARY OF PROGRESS:  This is Frantz's initial Eval for cardiac rehab  He is attended rehab due to recent CABG x 4 on 2019  He states he got a stress test done due to chest pain during exertion  From there a cath followed showing blockages leading to his surgery  He has excellent healing at his incision site and has no complaints of chest discomfort or pain  Frantz completed a submax ETT today at which he had max METs reaching 4 3 after 7 min on the TM  He had no cardiac complications during testing and did have normal hemodynamic response to exercise during testing  During recovery Frantz's BP increased significantly to 168/62 at which it then came down after a few more min of recovery  This BP will be closely monitored when he comes in for exercise  Rama Berg has started following a heart healthy diet since his surgery  He recently started reading more food labels, is decreasing his processed meats and has decreased sodium a lot  He also is paying closer attention to his food choices when going out to eat as well as decreasing his portion sizes  Rama Berg is type 2 diabetic and reports his FBG to remain between 100-110  Frantz reports excellent social support from family and friends and reports no added stress, depression, or anxiety  He hopes to increase his confidence and get back to being physically active outside with CR  Rama Berg will start CR on 2020         Medication compliance: Yes   Comments: Type 2 diabetes - med controlled   Fall Risk: Low   Comments: None     EKG changes: None       EXERCISE ASSESSMENT and PLAN    Current Exercise Program in Rehab:       Frequency: 3-4days/week        Minutes: 30-35         METS: 3-4            HR:    RPE: 4-6         Modalities: Treadmill, Airdyne bike, UBE and Lifecycle      Exercise Progression 30 Day Goals :    Frequency: 4-5 days/week   Minutes: 40-45   METS: 4-5   HR:     RPE: 4-6   Modalities: Treadmill, Airdyne bike, UBE, Lifecycle and Elliptical    Strength training: Will be added following at least 8 weeks post surgery and 8-10 monitored sessions   Modalities: Leg Press, Chest Press, Pull Downs, Arm Curl and Seated Row    Progressing: In Progress    Home Exercise: Type: Walking , Frequency: 4-5 days/week, Duration: 20 mins    Goals: 10% improvement in functional capacity, improved DASI score by 10%, Increase in peak CR METs by 40%, Resume ADLs with increased strength and Exercise 5 days/wk, >150mins/wk  Education: Benefit of exercise for CAD risk factors, home exercise guidelines, signs and sxs and RPE scale   Plan:Education class: Risk Factors for Heart Disease  Readiness to change: Preparation:  (Getting ready to change)       NUTRITION ASSESSMENT AND PLAN    Weight control:    Starting weight: 171 4 lbs    Current weight:     Waist circumference:    Startin in    Current:    Diabetes: Patient reported fasting -110 ; A1C = 6 9, FBG = 165  Lipid management: Discussed diet and lipid management and Last lipid profile 2019  Chol 170    HDL 34    Goals:reduced BMI to < 25, LDL <100, HDL >40, CHOL <200, decreased body fat% <25%   , fasting BG , improved A1c  < 6 5%, Improved Rate Your Plate score  >94 and 2 5-5%  wt loss  Education: heart healthy eating  low sodium diet  hydration  diet and lipid management  diabetes management and exercise  wt  loss  healthy choices while dining out  portion control  Progressing: In Progress  Plan: Education class: Reading Food Labels, Education Class: Heart Healthy Eating, Increase PUFA and MUFA, Decrease SFA, Increase whole grains, increase fruits/vegs, eliminate processed meats, reduce red meat 1x/wk, swtich to low fat dairy and Reduce added sugars <25g/day  Readiness to change: Preparation:  (Getting ready to change)       PSYCHOSOCIAL ASSESSMENT AND PLAN    Emotional:  Depression assessment:  PHQ-9 = 0 =No Depression            Anxiety measure:  WILMA-7 = 0-4  = Not anxious  Patient reports he/she is coping well with good social support and denies depression or anxiety  Self-reported stress level: 1   Social support: Excellent  Goals:  Physical Fitness in [facility] Score < 3, Increased interest in doing things, improved concentration, improved positive thoughts of well being and increased energy  Education: signs/sxs of depression, benefits of positive support system, coping mechanisms and depression and CAD  Progressing: In Progress  Plan: Class: Stress and Your Health, Class: Relaxation and Practice relaxation techniques  Readiness to change: Action:  (Changing behavior)      OTHER CORE COMPONENTS     Tobacco:   Social History     Tobacco Use   Smoking Status Never Smoker   Smokeless Tobacco Never Used       Tobacco Use Intervention: Referral to tobacco expert:   Brief counseling by cardiac rehab professional  Date: 2020    Blood pressure:    Restin/78   Exercise: 132/60    Goals: consistent BP < 130/80, reduced dietary sodium <2300mg, consistent exercise >150 mins/wk and Abstain from smoking  Education:  understanding HTN and CAD and low sodium diet and HTN  Progressing: In Progress  Plan: Class: Understanding Heart Disease and Class: Common Heart Medications  Readiness to change: Preparation:  (Getting ready to change)

## 2020-01-28 ENCOUNTER — CLINICAL SUPPORT (OUTPATIENT)
Dept: CARDIAC REHAB | Facility: CLINIC | Age: 75
End: 2020-01-28
Payer: MEDICARE

## 2020-01-28 DIAGNOSIS — Z95.1 S/P CABG X 4: ICD-10-CM

## 2020-01-28 PROCEDURE — 93798 PHYS/QHP OP CAR RHAB W/ECG: CPT

## 2020-01-30 ENCOUNTER — CLINICAL SUPPORT (OUTPATIENT)
Dept: CARDIAC REHAB | Facility: CLINIC | Age: 75
End: 2020-01-30
Payer: MEDICARE

## 2020-01-30 DIAGNOSIS — Z95.1 S/P CABG X 4: ICD-10-CM

## 2020-01-30 PROCEDURE — 93798 PHYS/QHP OP CAR RHAB W/ECG: CPT

## 2020-01-31 ENCOUNTER — CLINICAL SUPPORT (OUTPATIENT)
Dept: CARDIAC REHAB | Facility: CLINIC | Age: 75
End: 2020-01-31
Payer: MEDICARE

## 2020-01-31 DIAGNOSIS — Z95.1 S/P CABG X 4: ICD-10-CM

## 2020-01-31 PROCEDURE — 93798 PHYS/QHP OP CAR RHAB W/ECG: CPT

## 2020-02-03 ENCOUNTER — APPOINTMENT (OUTPATIENT)
Dept: LAB | Facility: CLINIC | Age: 75
End: 2020-02-03
Payer: MEDICARE

## 2020-02-03 DIAGNOSIS — Z95.1 S/P CABG X 4: ICD-10-CM

## 2020-02-03 DIAGNOSIS — D64.9 ANEMIA, UNSPECIFIED TYPE: ICD-10-CM

## 2020-02-03 DIAGNOSIS — E78.00 HYPERCHOLESTEREMIA: ICD-10-CM

## 2020-02-03 DIAGNOSIS — I25.10 CORONARY ARTERY DISEASE INVOLVING NATIVE HEART WITHOUT ANGINA PECTORIS, UNSPECIFIED VESSEL OR LESION TYPE: ICD-10-CM

## 2020-02-03 LAB
ANION GAP SERPL CALCULATED.3IONS-SCNC: 3 MMOL/L (ref 4–13)
BASOPHILS # BLD AUTO: 0.06 THOUSANDS/ΜL (ref 0–0.1)
BASOPHILS NFR BLD AUTO: 1 % (ref 0–1)
BUN SERPL-MCNC: 14 MG/DL (ref 5–25)
CALCIUM SERPL-MCNC: 9 MG/DL (ref 8.3–10.1)
CHLORIDE SERPL-SCNC: 107 MMOL/L (ref 100–108)
CHOLEST SERPL-MCNC: 111 MG/DL (ref 50–200)
CO2 SERPL-SCNC: 29 MMOL/L (ref 21–32)
CREAT SERPL-MCNC: 0.96 MG/DL (ref 0.6–1.3)
EOSINOPHIL # BLD AUTO: 0.31 THOUSAND/ΜL (ref 0–0.61)
EOSINOPHIL NFR BLD AUTO: 4 % (ref 0–6)
ERYTHROCYTE [DISTWIDTH] IN BLOOD BY AUTOMATED COUNT: 13.7 % (ref 11.6–15.1)
GFR SERPL CREATININE-BSD FRML MDRD: 79 ML/MIN/1.73SQ M
GLUCOSE P FAST SERPL-MCNC: 100 MG/DL (ref 65–99)
HCT VFR BLD AUTO: 40.9 % (ref 36.5–49.3)
HDLC SERPL-MCNC: 35 MG/DL
HGB BLD-MCNC: 13 G/DL (ref 12–17)
IMM GRANULOCYTES # BLD AUTO: 0.07 THOUSAND/UL (ref 0–0.2)
IMM GRANULOCYTES NFR BLD AUTO: 1 % (ref 0–2)
LDLC SERPL CALC-MCNC: 64 MG/DL (ref 0–100)
LYMPHOCYTES # BLD AUTO: 1.27 THOUSANDS/ΜL (ref 0.6–4.47)
LYMPHOCYTES NFR BLD AUTO: 17 % (ref 14–44)
MCH RBC QN AUTO: 28.5 PG (ref 26.8–34.3)
MCHC RBC AUTO-ENTMCNC: 31.8 G/DL (ref 31.4–37.4)
MCV RBC AUTO: 90 FL (ref 82–98)
MONOCYTES # BLD AUTO: 0.76 THOUSAND/ΜL (ref 0.17–1.22)
MONOCYTES NFR BLD AUTO: 10 % (ref 4–12)
NEUTROPHILS # BLD AUTO: 4.92 THOUSANDS/ΜL (ref 1.85–7.62)
NEUTS SEG NFR BLD AUTO: 67 % (ref 43–75)
NRBC BLD AUTO-RTO: 0 /100 WBCS
PLATELET # BLD AUTO: 198 THOUSANDS/UL (ref 149–390)
PMV BLD AUTO: 10.6 FL (ref 8.9–12.7)
POTASSIUM SERPL-SCNC: 4.1 MMOL/L (ref 3.5–5.3)
RBC # BLD AUTO: 4.56 MILLION/UL (ref 3.88–5.62)
SODIUM SERPL-SCNC: 139 MMOL/L (ref 136–145)
TRIGL SERPL-MCNC: 61 MG/DL
WBC # BLD AUTO: 7.39 THOUSAND/UL (ref 4.31–10.16)

## 2020-02-03 PROCEDURE — 80061 LIPID PANEL: CPT

## 2020-02-03 PROCEDURE — 80048 BASIC METABOLIC PNL TOTAL CA: CPT

## 2020-02-03 PROCEDURE — 85025 COMPLETE CBC W/AUTO DIFF WBC: CPT

## 2020-02-03 PROCEDURE — 36415 COLL VENOUS BLD VENIPUNCTURE: CPT

## 2020-02-04 ENCOUNTER — CLINICAL SUPPORT (OUTPATIENT)
Dept: CARDIAC REHAB | Facility: CLINIC | Age: 75
End: 2020-02-04
Payer: MEDICARE

## 2020-02-04 DIAGNOSIS — Z95.1 S/P CABG X 4: ICD-10-CM

## 2020-02-04 PROCEDURE — 93798 PHYS/QHP OP CAR RHAB W/ECG: CPT

## 2020-02-06 ENCOUNTER — CLINICAL SUPPORT (OUTPATIENT)
Dept: CARDIAC REHAB | Facility: CLINIC | Age: 75
End: 2020-02-06
Payer: MEDICARE

## 2020-02-06 DIAGNOSIS — Z95.1 S/P CABG X 4: ICD-10-CM

## 2020-02-06 PROCEDURE — 93798 PHYS/QHP OP CAR RHAB W/ECG: CPT

## 2020-02-07 ENCOUNTER — CLINICAL SUPPORT (OUTPATIENT)
Dept: CARDIAC REHAB | Facility: CLINIC | Age: 75
End: 2020-02-07
Payer: MEDICARE

## 2020-02-07 DIAGNOSIS — Z95.1 S/P CABG X 4: ICD-10-CM

## 2020-02-07 PROCEDURE — 93798 PHYS/QHP OP CAR RHAB W/ECG: CPT

## 2020-02-10 ENCOUNTER — OFFICE VISIT (OUTPATIENT)
Dept: CARDIOLOGY CLINIC | Facility: MEDICAL CENTER | Age: 75
End: 2020-02-10
Payer: MEDICARE

## 2020-02-10 VITALS
SYSTOLIC BLOOD PRESSURE: 140 MMHG | HEIGHT: 70 IN | WEIGHT: 171.6 LBS | HEART RATE: 70 BPM | BODY MASS INDEX: 24.57 KG/M2 | DIASTOLIC BLOOD PRESSURE: 70 MMHG | OXYGEN SATURATION: 98 %

## 2020-02-10 DIAGNOSIS — E78.00 HYPERCHOLESTEREMIA: Primary | Chronic | ICD-10-CM

## 2020-02-10 DIAGNOSIS — I25.119 ATHEROSCLEROSIS OF NATIVE CORONARY ARTERY OF NATIVE HEART WITH ANGINA PECTORIS (HCC): ICD-10-CM

## 2020-02-10 DIAGNOSIS — I10 BENIGN ESSENTIAL HYPERTENSION: Chronic | ICD-10-CM

## 2020-02-10 DIAGNOSIS — Z95.1 S/P CABG X 4: ICD-10-CM

## 2020-02-10 DIAGNOSIS — R07.89 CHEST TIGHTNESS: ICD-10-CM

## 2020-02-10 DIAGNOSIS — I20.8 STABLE ANGINA (HCC): ICD-10-CM

## 2020-02-10 PROCEDURE — 4040F PNEUMOC VAC/ADMIN/RCVD: CPT | Performed by: INTERNAL MEDICINE

## 2020-02-10 PROCEDURE — 3077F SYST BP >= 140 MM HG: CPT | Performed by: INTERNAL MEDICINE

## 2020-02-10 PROCEDURE — 99215 OFFICE O/P EST HI 40 MIN: CPT | Performed by: INTERNAL MEDICINE

## 2020-02-10 PROCEDURE — 1111F DSCHRG MED/CURRENT MED MERGE: CPT | Performed by: INTERNAL MEDICINE

## 2020-02-10 PROCEDURE — 3008F BODY MASS INDEX DOCD: CPT | Performed by: INTERNAL MEDICINE

## 2020-02-10 PROCEDURE — 3078F DIAST BP <80 MM HG: CPT | Performed by: INTERNAL MEDICINE

## 2020-02-10 PROCEDURE — 1036F TOBACCO NON-USER: CPT | Performed by: INTERNAL MEDICINE

## 2020-02-10 PROCEDURE — 1160F RVW MEDS BY RX/DR IN RCRD: CPT | Performed by: INTERNAL MEDICINE

## 2020-02-10 NOTE — PROGRESS NOTES
[facility]  Office Progress Note  Elroy Alejandro 69 y o  male MRN: [id number]        Problems    1  Hypercholesteremia     2  Stable angina (HCC)     3  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 with angina pectoris ([address] )     4  Benign essential hypertension     5  S/P CABG x 4     6  Chest tightness         Impression:    Frantz came to see me for an urgent visit today, at the request of his PCP, I see Frantz's wife is a patient as well   Hypertension  o Was on HCTZ and amlodipine prior, with a chronic cough which is now resolved, although the medications he was on should not have caused a cough  o Currently on metoprolol 25 mg b i d  And blood pressure reasonable per every cardiac rehab form I evaluated   o It is possible with new dietary restrictions, alterations, and lower sodium as he is now aware of, his blood pressure may not need the prior antihypertensive regimen he was on      Hyperlipidemia  o Simvastatin was switched to high-intensity atorvastatin    o Eventual lipid analysis necessary  o I will defer further blood work testing to his PCP   Type 2 diabetes  o While A1c under reasonable control on metformin, was 6 4 before his heart disease was discovered, clearly he developed heart disease despite being on metformin with reasonable sugar control   o Discussed with him today the options of either replacing metformin or adding 2 metformin, drugs that have known cardiovascular protective effects, such as Jardiance and requested he discuss this with his PCP   Coronary artery disease  o Recent workup for stable exertional angina with discovery of multivessel CAD  o CABG x4 LIMA to LAD, SVG D1, SVG om, SVG PDA, feels well, attending cardiac rehab, no significant concerning CAD symptoms at this time and on appropriate medical therapy    o We discussed dietary modification, and especially important addition to his heart attack/coronary artery disease reducing plan   o Less animal fat, less dairy fat such as cheese, and recommend an increase in things like healthy beans, vegetables, lower sugar fruits  Plan     This was a 40 minutes visit, with more than 50% spent educating on risk reducing behaviors, risk reducing dietary modification, discussing medication compliance, low-sodium compliance, and discussing significant lifestyle modification   Continue metoprolol as primary blood pressure agent at this time, hopefully with low-salt diet this will keep his blood pressure control  If not may need to resume amlodipine or HCTZ   Eventual lipid analysis per his PCP   1 year follow-up with me  HPI: Allison Crawford is a 69y o  year old male who came to see me for crescendo stable angina, with an abnormal stress test, referred for cardiac catheterization with discovery of multivessel CAD, with preserved LV function by echo  He has hypertension, longstanding hyperlipidemia, and diabetes under pretty good control with an A1c is 6 4  His been on metformin chronically, was switched to insulin post hospitalization after CABG, developed significant hypoglycemia and switch back to metformin  Blood pressure currently very well controlled, and he is watching his sodium which she is doing much more than he was ever previously, no longer on his home regimen of HCTZ or amlodipine  He is only taking metoprolol  He did well with surgery, CABG x4, no significant postoperative complications, attending cardiac rehab, feels well, and was switched to atorvastatin high dose 80 mg, which she is currently tolerating  Review of Systems   Constitutional: Negative  HENT: Negative  Eyes: Negative  Respiratory: Negative  Cardiovascular: Negative  Gastrointestinal: Negative  Endocrine: Negative  Genitourinary: Negative  Musculoskeletal: Negative  Skin: Negative  Neurological: Negative  Hematological: Negative  Psychiatric/Behavioral: Negative  Past Medical History:   Diagnosis Date    Chronic cough     RESOLVED: [id number]    Coronary artery disease     Diabetes mellitus (HCC)     Diabetic retinopathy [facility]     HLD (hyperlipidemia)     HTN (hypertension)      Past Surgical History:   Procedure Laterality Date    CARDIAC CATHETERIZATION  12/09/2019    HAND SURGERY      AL CABG, ARTERY-VEIN, THREE N/A 12/13/2019    Procedure: CORONARY ARTERY BYPASS GRAFT (CABG) 4 VESSELS STEVEN to LAD ; SVG--> PDA , DIAGONAL, and OM;  Surgeon: Kadi Squires DO;  [facility];  Service: Cardiac Surgery    AL ECHO TRANSESOPHAG R-T 2D W/PRB IMG ACQUISJ I&R N/A 12/13/2019    Procedure: TRANSESOPHAGEAL ECHOCARDIOGRAM (SHEBA); Surgeon: Kadi Squires DO;  [facility];  Service: Cardiac Surgery    AL ENDOSCOPY W/VIDEO-ASST VEIN HARVEST,CABG Left 12/13/2019    Procedure: HARVEST VEIN ENDOSCOPIC ([address]); Surgeon: Kadi Squires DO;  Location: [facility];  Service: Cardiac Surgery     Social History     Substance and Sexual Activity   Alcohol Use Yes    Alcohol/week: 5 0 standard drinks    Types: 3 Glasses of wine, 1 Cans of beer, 1 Standard drinks or equivalent per week    Comment: occasional     Social History     Substance and Sexual Activity   Drug Use No     Social History     Tobacco Use   Smoking Status Never Smoker   Smokeless Tobacco Never Used     Family History   Problem Relation Age of Onset    Heart failure Mother     Heart failure Father     Heart disease Father     Heart attack Father     Diabetes Other        Allergies:   Allergies   Allergen Reactions    Pollen Extract Allergic Rhinitis       Medications:     Current Outpatient Medications:     aspirin 325 mg tablet, Take 1 tablet (325 mg total) by mouth daily, Disp: , Rfl:     atorvastatin (LIPITOR) 80 mg tablet, Take 1 tablet (80 mg total) by mouth daily with dinner, Disp: 30 tablet, Rfl: 2    glucose blood (ACCU-CHEK GUIDE) test strip, Test blood sugar before meals and at bedtime, Disp: 300 each, Rfl: 1    glucose monitoring kit (FREESTYLE) monitoring kit, 1 each by Does not apply route 4 (four) times a day (before meals and at bedtime) Check blood sugars before meals & at bedtime  Dx DM E11 9, Disp: 1 each, Rfl: 0    Lancets (FREESTYLE) lancets, Check your blood sugar before meals & before bedtime, as directed  Dx DM E11 9 May substitute alternative device for insurance requirements  , Disp: 100 each, Rfl: 1    metFORMIN (GLUCOPHAGE) 1000 MG tablet, Take 1,000 mg by mouth 2 (two) times a day with meals, Disp: , Rfl:     metoprolol tartrate (LOPRESSOR) 25 mg tablet, Take 1 tablet (25 mg total) by mouth every 12 (twelve) hours, Disp: 60 tablet, Rfl: 2      Vitals:    02/10/20 1354   BP: 140/70   Pulse: 70   SpO2: 98%     Weight (last 2 days)     Date/Time   Weight    02/10/20 1354   77 8 (171 6)            Physical Exam   Constitutional: He is oriented to person, place, and time  He appears well-developed and well-nourished  No distress  HENT:   Head: Normocephalic and atraumatic  Mouth/Throat: Oropharynx is clear and moist    Eyes: Pupils are equal, round, and reactive to light  Conjunctivae and EOM are normal  No scleral icterus  Neck: Normal range of motion  Neck supple  No JVD present  No thyromegaly present  Cardiovascular: Normal rate, regular rhythm, normal heart sounds and intact distal pulses  Exam reveals no gallop and no friction rub  No murmur heard  Pulmonary/Chest: Effort normal and breath sounds normal  No respiratory distress  He has no wheezes  He has no rales  Abdominal: Soft  Bowel sounds are normal  He exhibits no distension  There is no tenderness  Musculoskeletal: Normal range of motion  He exhibits no edema or deformity  Neurological: He is alert and oriented to person, place, and time  No cranial nerve deficit  Skin: Skin is warm and dry  No rash noted  He is not diaphoretic  No erythema     Psychiatric: He has a normal mood and affect  Laboratory Studies:    Laboratory studies personally reviewed    Cardiac testing:     EKG reviewed personally:    11/21/2019-normal    Cardiac catheterization  12/19-Occluded RCA, 99% om, significant distal left main disease/LAD disease as well    Echocardiogram  12/19-EF 60%, trace valve disease    Gris Vega MD    Portions of the record may have been created with voice recognition software  Occasional wrong word or "sound a like" substitutions may have occurred due to the inherent limitations of voice recognition software  Read the chart carefully and recognize, using context, where substitutions have occurred

## 2020-02-10 NOTE — PATIENT INSTRUCTIONS
Lifestyle Diet to follow:    Daily whole grain/high fiber complex carbohydrates (potatoes, sweet potatoes, parsnips, squash)  6oz meat per week  6oz fish per week  No cheese  No processed meat/deli meat  No sugary beverages  Daily legumes (beans)  Daily nuts and seeds  Fruit twice a day (berries are preferred, but citrus fruits, apples, bananas, mangoes, kiwi etc also excellent)  Omega 3 containing foods (avocado, flaxseed, andre seeds, olive oil)    I am hoping that with much less salt in your diet, you will not need her prior antihypertensive medication  Your blood pressure is reasonable at this time on just the metoprolol  Continue exercise, even beyond cardiac rehab  Discuss other diabetic meds either with or to replace metformin, such as Jardiance, with her PCP

## 2020-02-11 ENCOUNTER — CLINICAL SUPPORT (OUTPATIENT)
Dept: CARDIAC REHAB | Facility: CLINIC | Age: 75
End: 2020-02-11
Payer: MEDICARE

## 2020-02-11 DIAGNOSIS — Z95.1 S/P CABG X 4: ICD-10-CM

## 2020-02-11 PROCEDURE — 93798 PHYS/QHP OP CAR RHAB W/ECG: CPT

## 2020-02-13 ENCOUNTER — CLINICAL SUPPORT (OUTPATIENT)
Dept: CARDIAC REHAB | Facility: CLINIC | Age: 75
End: 2020-02-13
Payer: MEDICARE

## 2020-02-13 DIAGNOSIS — Z95.1 S/P CABG X 4: ICD-10-CM

## 2020-02-13 PROCEDURE — 93798 PHYS/QHP OP CAR RHAB W/ECG: CPT

## 2020-02-14 ENCOUNTER — CLINICAL SUPPORT (OUTPATIENT)
Dept: CARDIAC REHAB | Facility: CLINIC | Age: 75
End: 2020-02-14
Payer: MEDICARE

## 2020-02-14 DIAGNOSIS — Z95.1 S/P CABG X 4: ICD-10-CM

## 2020-02-14 PROCEDURE — 93798 PHYS/QHP OP CAR RHAB W/ECG: CPT

## 2020-02-18 ENCOUNTER — CLINICAL SUPPORT (OUTPATIENT)
Dept: CARDIAC REHAB | Facility: CLINIC | Age: 75
End: 2020-02-18
Payer: MEDICARE

## 2020-02-18 DIAGNOSIS — Z95.1 S/P CABG X 4: ICD-10-CM

## 2020-02-18 PROCEDURE — 93798 PHYS/QHP OP CAR RHAB W/ECG: CPT

## 2020-02-20 ENCOUNTER — CLINICAL SUPPORT (OUTPATIENT)
Dept: CARDIAC REHAB | Facility: CLINIC | Age: 75
End: 2020-02-20
Payer: MEDICARE

## 2020-02-20 DIAGNOSIS — Z95.1 S/P CABG X 4: ICD-10-CM

## 2020-02-20 PROCEDURE — 93798 PHYS/QHP OP CAR RHAB W/ECG: CPT

## 2020-02-20 NOTE — PROGRESS NOTES
Cardiac Rehabilitation Plan of Care   30 day       Today's date: 2020   Visits: 12  Patient name: Chetan Hines      : 1945  Age: 69 y o  MRN: [id number]  Referring Physician: Daryl Portillo PA-C  Cardiologist: Zaira Reardon MD   Provider: [address] [place] [address]  Clinician: Nicolás Gray MS, [facility], [facility]    Dx:   Encounter Diagnosis   Name Primary?  S/P CABG x 4      Date of onset: 2019       SUMMARY OF PROGRESS:  This is Frantz's 30 day note for Cardiac rehab, he has attended 12 sessions including his initial eval  Han Gottlieb is attending rehab due to recent CABG x 4 on 2019  He states he got a stress test done due to chest pain during exertion  From there a cath followed showing blockages leading to his surgery  Han Durhamdeisy states he is still healing well at his incision site and expressed no complications with added exercising  He has progressed to 40 min of cardio at 3 6-4 METs plus light weight lifting  He has normal hemodynamic response to exercise with no further increases to his BP as seen during his first initial ETT  His BP on occasion does drop during exercise that may be due to taking medication prior to CR  On telemetry he is NSR during rest and with exercise  Han Gottlieb continues to follow a heart healthy diet stating his only change is eating smaller portion sizes  He continues to read food labels, decrease processed meats, and decrease his sodium  Han Gottlieb is a type 2 diabetic and reports his BG to range from 100-150 with occasional readings upwards of 180  Frantz reports to have no added stress, depression or anxiety and has excellent social support from friends and family  He has increased his confidence since starting CR and has returned to all ADLs  Han Gottlieb states he is walking 4-5x/wk on top of CR for 20min when the weather is good  He will continue to progress as tolerable         Medication compliance: Yes   Comments: Type 2 diabetes - med controlled   Fall Risk: Low   Comments: None     EKG changes: None       EXERCISE ASSESSMENT and PLAN    Current Exercise Program in Rehab:       Frequency: 3-4days/week        Minutes: 40-45        METS: 3 6-4 0            HR:    RPE: 4-6         Modalities: Treadmill, Airdyne bike, UBE and Lifecycle      Exercise Progression 30 Day Goals :    Frequency: 4-5 days/week   Minutes: 40-45   METS: 4-5   HR:     RPE: 4-6   Modalities: Treadmill, Airdyne bike, UBE, Lifecycle and Elliptical    Strength trainin-3 days / week  12-15 repetitions  1-2 sets per modality    Modalities: Leg Press, Chest Press, Pull Downs, Arm Curl and Seated Row    Progressing:  Yes - has increased duration and intensity, added resistance training       Home Exercise: Type: Walking , Frequency: 4-5 days/week, Duration: 20 mins    Goals: 10% improvement in functional capacity, improved DASI score by 10%, Increase in peak CR METs by 40%, Resume ADLs with increased strength and Exercise 5 days/wk, >150mins/wk  Education: Benefit of exercise for CAD risk factors, home exercise guidelines, signs and sxs, RPE scale and class: Risk Factors for Heart Disease   Plan:education on home exercise guidelines and home exercise 30+ mins 2 days opposite CR  Readiness to change: Action:  (Changing behavior)      NUTRITION ASSESSMENT AND PLAN    Weight control:    Starting weight: 171 4 lbs    Current weight:   169 lbs   Waist circumference:    Startin in    Current:    Diabetes: Patient reported fasting -110 ; A1C = 6 9, FBG = 165  Lipid management: Discussed diet and lipid management and Last lipid profile 2019  Chol 170    HDL 34    Goals:reduced BMI to < 25, LDL <100, HDL >40, CHOL <200, decreased body fat% <25%   , fasting BG , improved A1c  < 6 5%, Improved Rate Your Plate score  >11 and 2 5-5%  wt loss  Education: heart healthy eating  low sodium diet  hydration  diet and lipid management  diabetes management and exercise  wt  loss  healthy choices while dining out  portion control  Progressing:  Yes - has started smaller portion sizes   Plan: Education class: Reading Food Labels, Education Class: Heart Healthy Eating, Increase PUFA and MUFA, Decrease SFA, Increase whole grains, increase fruits/vegs, eliminate processed meats, reduce red meat 1x/wk, swtich to low fat dairy and Reduce added sugars <25g/day  Readiness to change: Action:  (Changing behavior)      PSYCHOSOCIAL ASSESSMENT AND PLAN    Emotional:  Depression assessment:  PHQ-9 = 0 =No Depression            Anxiety measure:  WILMA-7 = 0-4  = Not anxious  Patient reports he/she is coping well with good social support and denies depression or anxiety  Self-reported stress level: 1   Social support: Excellent  Goals:  Physical Fitness in [facility] Score < 3, Increased interest in doing things, improved concentration, improved positive thoughts of well being and increased energy  Education: signs/sxs of depression, benefits of positive support system, coping mechanisms and depression and CAD  Progressing: Yes - no added stress, depression and anxiety   Plan: Class: Stress and Your Health, Class: Relaxation and Practice relaxation techniques  Readiness to change: Maintenance: (Maintaining the behavior change)      OTHER CORE COMPONENTS     Tobacco:   Social History     Tobacco Use   Smoking Status Never Smoker   Smokeless Tobacco Never Used       Tobacco Use Intervention: Referral to tobacco expert:   Brief counseling by cardiac rehab professional  Date: 2020    Blood pressure:    Restin/60 - 150/80   Exercise: 124/72 - 150/62    Goals: consistent BP < 130/80, reduced dietary sodium <2300mg, consistent exercise >150 mins/wk and Abstain from smoking  Education:  understanding HTN and CAD and low sodium diet and HTN  Progressing: Yes - BP under control, does drop on occ during CR   Plan: Class: Understanding Heart Disease and Class: Common Heart Medications  Readiness to change: Action:  (Changing behavior)

## 2020-02-21 ENCOUNTER — CLINICAL SUPPORT (OUTPATIENT)
Dept: CARDIAC REHAB | Facility: CLINIC | Age: 75
End: 2020-02-21
Payer: MEDICARE

## 2020-02-21 DIAGNOSIS — Z95.1 S/P CABG X 4: ICD-10-CM

## 2020-02-21 PROCEDURE — 93798 PHYS/QHP OP CAR RHAB W/ECG: CPT

## 2020-02-25 ENCOUNTER — CLINICAL SUPPORT (OUTPATIENT)
Dept: CARDIAC REHAB | Facility: CLINIC | Age: 75
End: 2020-02-25
Payer: MEDICARE

## 2020-02-25 DIAGNOSIS — Z95.1 S/P CABG X 4: ICD-10-CM

## 2020-02-25 PROCEDURE — 93798 PHYS/QHP OP CAR RHAB W/ECG: CPT

## 2020-02-27 ENCOUNTER — CLINICAL SUPPORT (OUTPATIENT)
Dept: CARDIAC REHAB | Facility: CLINIC | Age: 75
End: 2020-02-27
Payer: MEDICARE

## 2020-02-27 DIAGNOSIS — Z95.1 S/P CABG X 4: ICD-10-CM

## 2020-02-27 PROCEDURE — 93798 PHYS/QHP OP CAR RHAB W/ECG: CPT

## 2020-02-28 ENCOUNTER — CLINICAL SUPPORT (OUTPATIENT)
Dept: CARDIAC REHAB | Facility: CLINIC | Age: 75
End: 2020-02-28
Payer: MEDICARE

## 2020-02-28 DIAGNOSIS — Z95.1 S/P CABG X 4: ICD-10-CM

## 2020-02-28 PROCEDURE — 93798 PHYS/QHP OP CAR RHAB W/ECG: CPT

## 2020-03-03 ENCOUNTER — CLINICAL SUPPORT (OUTPATIENT)
Dept: CARDIAC REHAB | Facility: CLINIC | Age: 75
End: 2020-03-03
Payer: MEDICARE

## 2020-03-03 DIAGNOSIS — Z95.1 S/P CABG X 4: ICD-10-CM

## 2020-03-03 PROCEDURE — 93798 PHYS/QHP OP CAR RHAB W/ECG: CPT

## 2020-03-05 ENCOUNTER — CLINICAL SUPPORT (OUTPATIENT)
Dept: CARDIAC REHAB | Facility: CLINIC | Age: 75
End: 2020-03-05
Payer: MEDICARE

## 2020-03-05 DIAGNOSIS — Z95.1 S/P CABG X 4: ICD-10-CM

## 2020-03-05 PROCEDURE — 93798 PHYS/QHP OP CAR RHAB W/ECG: CPT

## 2020-03-06 ENCOUNTER — CLINICAL SUPPORT (OUTPATIENT)
Dept: CARDIAC REHAB | Facility: CLINIC | Age: 75
End: 2020-03-06
Payer: MEDICARE

## 2020-03-06 DIAGNOSIS — Z95.1 S/P CABG X 4: ICD-10-CM

## 2020-03-06 DIAGNOSIS — E11.9 TYPE 2 DIABETES MELLITUS (HCC): Chronic | ICD-10-CM

## 2020-03-06 DIAGNOSIS — Z95.1 S/P CABG (CORONARY ARTERY BYPASS GRAFT): ICD-10-CM

## 2020-03-06 PROCEDURE — 93798 PHYS/QHP OP CAR RHAB W/ECG: CPT

## 2020-03-10 ENCOUNTER — CLINICAL SUPPORT (OUTPATIENT)
Dept: CARDIAC REHAB | Facility: CLINIC | Age: 75
End: 2020-03-10
Payer: MEDICARE

## 2020-03-10 DIAGNOSIS — Z95.1 S/P CABG X 4: ICD-10-CM

## 2020-03-10 DIAGNOSIS — E11.9 TYPE 2 DIABETES MELLITUS (HCC): Chronic | ICD-10-CM

## 2020-03-10 DIAGNOSIS — Z95.1 S/P CABG (CORONARY ARTERY BYPASS GRAFT): ICD-10-CM

## 2020-03-10 PROCEDURE — 93798 PHYS/QHP OP CAR RHAB W/ECG: CPT

## 2020-03-10 NOTE — PHYSICAL THERAPY NOTE
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, unspecified whether native or transplanted heart [facility]) [I25 119]    Problem List  Patient Active Problem List   Diagnosis    Benign essential hypertension    Type 2 diabetes mellitus ([facility] )    PSA elevation    Hypercholesteremia    Di[facility] retinopathy ([facility] )    Seasonal allergies    Ringing in ear, bilateral    Chest tightness    Stable angina ([facility] )    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 ([facility] )    S/P CABG x 4       Past Medical History  Past Medical History:   Diagnosis Date    Chronic cough     RESOLVED: [id number]    Coronary artery disease     Diabetes mellitus ([facility] )     Diabetic retinopathy ([facility] )     HLD (hyperlipidemia)     HTN (hypertension)        Past Surgical History  Past Surgical History:   Procedure Laterality Date    CARDIAC CATHETERIZATION  12/09/2019    HAND SURGERY          12/14/19 0943   Note Type   Note type Eval only   Pain Assessment   Pain Assessment No/denies pain   Pain Score No Pain   Home Living   Type of [address] Layout Multi-level;Bed/bath upstairs; Elevator  (3SH; 0STE through garage; elevator to all floors)   Bathroom Shower/Tub Walk-in shower   Bathroom Toilet Raised   Bathroom Equipment Built-in shower seat;Grab bars in [facility]   (none per pt)   Prior Function   Level of [place] Independent with ADLs and functional mobility   Lives With [place] Help From Family   ADL Assistance Independent   IADLs Independent   Falls in the last 6 months 0   Vocational Retired   Restrictions/Precautions   [facility] Bearing Precautions Per Order No   Other Precautions Cardiac/sternal;Multiple lines;Telemetry;O2;Fall Risk  (chest tubes)   General   Family/Caregiver Present No   Cognition   Overall Cognitive Status WFL   Arousal/Participation Alert   Attention Within functional limits   Orientation Level Oriented X4   Memory Decreased recall of precautions   Following Commands Follows one step commands without difficulty   Comments pt pleasant and agreeable to work w/ therapy   RLE Assessment   RLE Assessment WFL   LLE Assessment   LLE Assessment WFL   Bed Mobility   Supine to Sit Unable to assess   Sit to Supine Unable to assess   Additional Comments pt seated OOB upon arrival and returned sitting OOB at end of session w/ all needs within reach   Transfers   Sit to Stand 4  Minimal assistance   Additional items Assist x 1; Increased time required;Verbal cues   Stand to Sit 4  Minimal assistance   Additional items Assist x 1; Increased time required;Verbal cues   Additional Comments RW; VCs for safe hand placement   Ambulation/Elevation   Gait pattern Excessively slow; Short stride; Improper Weight shift;Decreased foot clearance; Inconsistent tracey   Gait Assistance 5  Supervision   Additional items Assist x 1  (2 additional people for line management and chair follow)   Assistive Device Rolling walker   Distance 360ft   Stair Management Assistance Not tested   Balance   Static Sitting Good   Static Standing Fair   Ambulatory Fair -   Endurance Deficit   Endurance Deficit No   Activity Tolerance   Activity Tolerance Patient tolerated treatment well;Patient limited by fatigue   Medical Staff Made Aware OT   Nurse Made Aware RN cleared pt for therapy   Assessment   Prognosis Good   Problem List Decreased strength;Decreased endurance; Impaired balance;Decreased mobility;Pain   Assessment Pt is 69 y o  male seen for PT evaluation s/p admit to [facility] on 12/13/2019 w/ 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 ([facility] ) s/p CABG x4 12/12/19  PT consulted to assess pt's functional mobility and d/c needs  Order placed for PT eval and tx, w/ up as tolerated order   Comorbidities affecting pt's physical performance at time of assessment include: HTN, Type 2 DM, Adri Ch[place]: [phone] diabetic retinopathy  PTA, pt was independent w/ all functional mobility w/ no AD and lives w/ wife in three level house w/ 0STE through garage and elevator access to all floors w/ bed/bath upstairs  Personal factors affecting pt at time of IE include: lives in three story house, inability to navigate community distances and inability to perform IADLs  Please find objective findings from PT assessment regarding body systems outlined above with impairments and limitations including weakness, impaired balance, decreased endurance, impaired coordination, gait deviations, decreased activity tolerance, decreased functional mobility tolerance and fall risk  Pt performed STS at [address] w/ VCs for safe hand placement and ambulated 360ft w/ RW at supervision X1 with 2 additional people for line management and chair follow  The following objective measures performed on IE also reveal limitations: Barthel Index: 55/100  Pt's clinical presentation is currently unstable/unpredictable seen in pt's presentation of recent admission for atherosclerosis requiring medical attention, recent decline in function as compared to baseline, multiple lines, continuous telemetry, fall risk, pain, reliance on RW as compared to baseline  Pt to benefit from continued PT tx to address deficits as defined above and maximize level of functional independent mobility and consistency  From PT/mobility standpoint, recommendation at time of d/c would be anticipate home w/ family support pending progress in order to facilitate return to PLOF  Goals   Patient Goals to go home   STG Expiration Date 12/28/19   Short Term Goal #1 1  Pt will demonstrate the ability to perform all aspects of bed mobility at Mod I in onder to maximize functional independence and decrease burden on caregivers  2 Pt will demonstrate the ability to perform all functional transfers at Mod I in order to maximize functional independence and decrease burden on caregivers   3 Pt will demonstrate the ability to ambulate at least 300ft with least restrictive device at Mod I in order to maximize functional independence  4 Pt will demonstrate the ability to negotiate at least 3 steps with HR and supervision in order to return to household/community mobility  5 Pt will demonstrate improved balance by one grade in order to decrease risk of falls  6 Pt will increase b/l LE strength by 1 grade in order to increase ease of functional mobility and transfers   PT Treatment Day 0   Plan   Treatment/Interventions Functional transfer training;LE strengthening/ROM; Elevations; Therapeutic exercise; Endurance training;Patient/family training;Equipment eval/education; Bed mobility;Gait training;Spoke to nursing   PT Frequency   (4-6x/week)   Recommendation   Recommendation Home with family support   Modified Ирина Scale   Modified [place] Scale 4   Barthel Index   Feeding 10   Bathing 0   Grooming Score 5   Dressing Score 5   Bladder Score 0   Bowels Score 10   Toilet Use Score 5   Transfers (Bed/Chair) Score 10   Mobility (Level Surface) Score 10   Stairs Score 0   Barthel Index Score 55     Ray Nichole, PT, DPT

## 2020-03-12 ENCOUNTER — CLINICAL SUPPORT (OUTPATIENT)
Dept: CARDIAC REHAB | Facility: CLINIC | Age: 75
End: 2020-03-12
Payer: MEDICARE

## 2020-03-12 DIAGNOSIS — Z95.1 S/P CABG X 4: ICD-10-CM

## 2020-03-12 PROCEDURE — 93798 PHYS/QHP OP CAR RHAB W/ECG: CPT

## 2020-03-13 ENCOUNTER — CLINICAL SUPPORT (OUTPATIENT)
Dept: CARDIAC REHAB | Facility: CLINIC | Age: 75
End: 2020-03-13
Payer: MEDICARE

## 2020-03-13 DIAGNOSIS — Z95.1 S/P CABG X 4: ICD-10-CM

## 2020-03-13 PROCEDURE — 93798 PHYS/QHP OP CAR RHAB W/ECG: CPT

## 2020-03-16 ENCOUNTER — TRANSCRIBE ORDERS (OUTPATIENT)
Dept: ADMINISTRATIVE | Facility: HOSPITAL | Age: 75
End: 2020-03-16

## 2020-03-16 ENCOUNTER — APPOINTMENT (OUTPATIENT)
Dept: LAB | Facility: CLINIC | Age: 75
End: 2020-03-16
Payer: MEDICARE

## 2020-03-16 DIAGNOSIS — E11.8 TYPE II DIABETES MELLITUS WITH COMPLICATION (HCC): Primary | ICD-10-CM

## 2020-03-16 DIAGNOSIS — E11.8 TYPE II DIABETES MELLITUS WITH COMPLICATION (HCC): ICD-10-CM

## 2020-03-16 DIAGNOSIS — E11.8 TYPE 2 DIABETES MELLITUS WITH COMPLICATION, WITHOUT LONG-TERM CURRENT USE OF INSULIN (HCC): ICD-10-CM

## 2020-03-16 LAB
EST. AVERAGE GLUCOSE BLD GHB EST-MCNC: 123 MG/DL
GLUCOSE P FAST SERPL-MCNC: 102 MG/DL (ref 65–99)
HBA1C MFR BLD: 5.9 %

## 2020-03-16 PROCEDURE — 36415 COLL VENOUS BLD VENIPUNCTURE: CPT

## 2020-03-16 PROCEDURE — 83036 HEMOGLOBIN GLYCOSYLATED A1C: CPT

## 2020-03-16 PROCEDURE — 82947 ASSAY GLUCOSE BLOOD QUANT: CPT

## 2020-03-17 ENCOUNTER — APPOINTMENT (OUTPATIENT)
Dept: CARDIAC REHAB | Facility: CLINIC | Age: 75
End: 2020-03-17
Payer: MEDICARE

## 2020-03-19 ENCOUNTER — APPOINTMENT (OUTPATIENT)
Dept: CARDIAC REHAB | Facility: CLINIC | Age: 75
End: 2020-03-19
Payer: MEDICARE

## 2020-03-19 DIAGNOSIS — E11.9 TYPE 2 DIABETES MELLITUS (HCC): Chronic | ICD-10-CM

## 2020-03-19 DIAGNOSIS — Z95.1 S/P CABG (CORONARY ARTERY BYPASS GRAFT): ICD-10-CM

## 2020-03-19 RX ORDER — ATORVASTATIN CALCIUM 80 MG/1
80 TABLET, FILM COATED ORAL
Qty: 90 TABLET | Refills: 3 | Status: SHIPPED | OUTPATIENT
Start: 2020-03-19 | End: 2020-09-14 | Stop reason: SDUPTHER

## 2020-03-19 NOTE — PROGRESS NOTES
Cardiac Rehabilitation Plan of Care   60 day      Today's date: 3/19/2020   Visits: 22  Patient name: Jackqueline Schilder      : 1945  Age: 69 y o  MRN: [id number]  Referring Physician: Reyna Aviles PA-C  Cardiologist: Luz Sharma MD   Provider: [facility]   Clinician: Delores Gomez MS, CEP     Dx:   Encounter Diagnosis   Name Primary?  S/P CABG x 4      Date of onset: 2019       SUMMARY OF PROGRESS:  This is Frantz's 60 day note for Cardiac rehab, he has attended 22 sessions including his initial eval  Hair Ames is going to be placed on a medical hold due to staying away from COVID-  He has progressed to 50-55 min of cardio at 4 3-4 8 METs plus light weight lifting  Hair Ames reports to not have any cardiac complications during exercise  Hair Ames also reports that his incision site is feeling a lot better and does not bother him anymore  Hair Ames has elevated BP at rest which tends to drop during exercise, hydration has been encouraged to him  Hair Ames denies any symptoms when his BP drops  On telemetry he is NSR during rest and with exercise  Hair Ames continues to follow a heart healthy diet stating his only change is eating smaller portion sizes  He continues to read food labels, decrease processed meats, and decrease his sodium  Hair Ames is a type 2 diabetic and reports his BG to range from 100-150 with occasional readings upwards of 180  Frantz just got his A1c level checked which is now under 6 0 which he is really happy about  Frantz reports to have no added stress, depression or anxiety and has excellent social support from friends and family  He has increased his confidence since starting CR and has returned to all ADLs  Hair Ames states he is walking 4-5x/wk on top of CR for 20min when the weather is good  He will continue to progress as tolerable         Medication compliance: Yes   Comments: Type 2 diabetes - med controlled   Fall Risk: Low   Comments: None     EKG changes: None       EXERCISE ASSESSMENT and PLAN    Current Exercise Program in Rehab:       Frequency: 4 days/week        Minutes: 50-55        METS: 4 3-4 8            HR:    RPE: 4-6         Modalities: Treadmill, Airdyne bike, Lifecycle and Elliptical      Exercise Progression 30 Day Goals :    Frequency: 5 days/week   Minutes: 50-55   METS: 5-5 5   HR:     RPE: 4-6   Modalities: Treadmill, Airdyne bike, Lifecycle and Elliptical    Strength trainin-3 days / week  12-15 repetitions  1-2 sets per modality    Modalities: Leg Press, Chest Press, Pull Downs, Arm Curl and Seated Row    Progressing:  Yes - has increased duration and intensity, added resistance training       Home Exercise: Type: Walking , Frequency: 4-5 days/week, Duration: 20 mins    Goals: 10% improvement in functional capacity, improved DASI score by 10%, Increase in peak CR METs by 40%, Resume ADLs with increased strength and Exercise 5 days/wk, >150mins/wk  Education: Benefit of exercise for CAD risk factors, home exercise guidelines, signs and sxs, RPE scale and class: Risk Factors for Heart Disease   Plan:education on home exercise guidelines and home exercise 30+ mins 2 days opposite CR  Readiness to change: Action:  (Changing behavior)      NUTRITION ASSESSMENT AND PLAN    Weight control:    Starting weight: 171 4 lbs    Current weight:   166 lbs   Waist circumference:    Startin in    Current:    Diabetes: Patient reported fasting -110 ; A1C = 5 9, FBG = 123  Lipid management: Discussed diet and lipid management and Last lipid profile 2/3/2020  Chol 111  TRG 61  HDL 35  LDL 64  Goals:reduced BMI to < 25, LDL <100, HDL >40, CHOL <200, decreased body fat% <25%   , fasting BG , improved A1c  < 6 5%, Improved Rate Your Plate score  >59 and 2 5-5%  wt loss  Education: heart healthy eating  low sodium diet  hydration  diet and lipid management  diabetes management and exercise  wt  loss  healthy choices while dining out  portion control  Progressing:  Yes - has started smaller portion sizes   Plan: Education class: Reading Food Labels, Education Class: Heart Healthy Eating, Increase PUFA and MUFA, Decrease SFA, Increase whole grains, increase fruits/vegs, eliminate processed meats, reduce red meat 1x/wk, swtich to low fat dairy and Reduce added sugars <25g/day  Readiness to change: Action:  (Changing behavior)      PSYCHOSOCIAL ASSESSMENT AND PLAN    Emotional:  Depression assessment:  PHQ-9 = 0 =No Depression            Anxiety measure:  WILMA-7 = 0-4  = Not anxious  Patient reports he/she is coping well with good social support and denies depression or anxiety  Self-reported stress level: 1   Social support: Excellent  Goals:  Physical Fitness in [facility] Score < 3, Increased interest in doing things, improved concentration, improved positive thoughts of well being and increased energy  Education: signs/sxs of depression, benefits of positive support system, coping mechanisms and depression and CAD  Progressing: Yes - no added stress, depression and anxiety   Plan: Class: Stress and Your Health, Class: Relaxation and Practice relaxation techniques  Readiness to change: Maintenance: (Maintaining the behavior change)      OTHER CORE COMPONENTS     Tobacco:   Social History     Tobacco Use   Smoking Status Never Smoker   Smokeless Tobacco Never Used       Tobacco Use Intervention: Referral to tobacco expert:   Brief counseling by cardiac rehab professional  Date: 2020    Blood pressure:    Restin/60 - 154/62   Exercise: 102/60 - 150/62    Goals: consistent BP < 130/80, reduced dietary sodium <2300mg, consistent exercise >150 mins/wk and Abstain from smoking  Education:  understanding HTN and CAD and low sodium diet and HTN  Progressing: No his BP is elevated at rest and his BP drops during exercise     Plan: Class: Understanding Heart Disease and Class: Common Heart Medications  Readiness to change: Action:  (Changing behavior)

## 2020-03-20 ENCOUNTER — APPOINTMENT (OUTPATIENT)
Dept: CARDIAC REHAB | Facility: CLINIC | Age: 75
End: 2020-03-20
Payer: MEDICARE

## 2020-03-23 DIAGNOSIS — E11.9 TYPE 2 DIABETES MELLITUS (HCC): Chronic | ICD-10-CM

## 2020-03-23 DIAGNOSIS — Z95.1 S/P CABG (CORONARY ARTERY BYPASS GRAFT): ICD-10-CM

## 2020-03-23 NOTE — TELEPHONE ENCOUNTER
Pt is requesting refills on Metformin, and Metoprolol  90 day supply requested     Last OV:12/20/2019  Future OV:05/14/2020

## 2020-03-24 ENCOUNTER — APPOINTMENT (OUTPATIENT)
Dept: CARDIAC REHAB | Facility: CLINIC | Age: 75
End: 2020-03-24
Payer: MEDICARE

## 2020-03-26 ENCOUNTER — APPOINTMENT (OUTPATIENT)
Dept: CARDIAC REHAB | Facility: CLINIC | Age: 75
End: 2020-03-26
Payer: MEDICARE

## 2020-03-27 ENCOUNTER — APPOINTMENT (OUTPATIENT)
Dept: CARDIAC REHAB | Facility: CLINIC | Age: 75
End: 2020-03-27
Payer: MEDICARE

## 2020-03-31 ENCOUNTER — APPOINTMENT (OUTPATIENT)
Dept: CARDIAC REHAB | Facility: CLINIC | Age: 75
End: 2020-03-31
Payer: MEDICARE

## 2020-04-02 ENCOUNTER — APPOINTMENT (OUTPATIENT)
Dept: CARDIAC REHAB | Facility: CLINIC | Age: 75
End: 2020-04-02
Payer: MEDICARE

## 2020-04-02 ENCOUNTER — TELEMEDICINE (OUTPATIENT)
Dept: CARDIAC REHAB | Facility: CLINIC | Age: 75
End: 2020-04-02
Payer: MEDICARE

## 2020-04-02 DIAGNOSIS — Z95.1 S/P CABG X 4: ICD-10-CM

## 2020-04-02 PROCEDURE — G2062 QUAL NONMD EST PT 11-20M: HCPCS

## 2020-04-03 ENCOUNTER — APPOINTMENT (OUTPATIENT)
Dept: CARDIAC REHAB | Facility: CLINIC | Age: 75
End: 2020-04-03
Payer: MEDICARE

## 2020-04-07 ENCOUNTER — APPOINTMENT (OUTPATIENT)
Dept: CARDIAC REHAB | Facility: CLINIC | Age: 75
End: 2020-04-07
Payer: MEDICARE

## 2020-04-09 ENCOUNTER — APPOINTMENT (OUTPATIENT)
Dept: CARDIAC REHAB | Facility: CLINIC | Age: 75
End: 2020-04-09
Payer: MEDICARE

## 2020-04-10 ENCOUNTER — APPOINTMENT (OUTPATIENT)
Dept: CARDIAC REHAB | Facility: CLINIC | Age: 75
End: 2020-04-10
Payer: MEDICARE

## 2020-04-10 ENCOUNTER — TELEMEDICINE (OUTPATIENT)
Dept: CARDIAC REHAB | Facility: CLINIC | Age: 75
End: 2020-04-10
Payer: MEDICARE

## 2020-04-10 DIAGNOSIS — Z95.1 S/P CABG X 4: ICD-10-CM

## 2020-04-10 PROCEDURE — G2061 QUAL NONMD EST PT 5-10M: HCPCS

## 2020-04-14 ENCOUNTER — APPOINTMENT (OUTPATIENT)
Dept: CARDIAC REHAB | Facility: CLINIC | Age: 75
End: 2020-04-14
Payer: MEDICARE

## 2020-04-16 ENCOUNTER — APPOINTMENT (OUTPATIENT)
Dept: CARDIAC REHAB | Facility: CLINIC | Age: 75
End: 2020-04-16
Payer: MEDICARE

## 2020-04-17 ENCOUNTER — APPOINTMENT (OUTPATIENT)
Dept: CARDIAC REHAB | Facility: CLINIC | Age: 75
End: 2020-04-17
Payer: MEDICARE

## 2020-04-17 ENCOUNTER — TELEMEDICINE (OUTPATIENT)
Dept: CARDIAC REHAB | Facility: CLINIC | Age: 75
End: 2020-04-17
Payer: MEDICARE

## 2020-04-17 DIAGNOSIS — Z95.1 S/P CABG X 4: ICD-10-CM

## 2020-04-17 PROCEDURE — G2061 QUAL NONMD EST PT 5-10M: HCPCS

## 2020-04-21 ENCOUNTER — APPOINTMENT (OUTPATIENT)
Dept: CARDIAC REHAB | Facility: CLINIC | Age: 75
End: 2020-04-21
Payer: MEDICARE

## 2020-04-23 ENCOUNTER — APPOINTMENT (OUTPATIENT)
Dept: CARDIAC REHAB | Facility: CLINIC | Age: 75
End: 2020-04-23
Payer: MEDICARE

## 2020-04-24 ENCOUNTER — APPOINTMENT (OUTPATIENT)
Dept: CARDIAC REHAB | Facility: CLINIC | Age: 75
End: 2020-04-24
Payer: MEDICARE

## 2020-04-24 ENCOUNTER — TELEMEDICINE (OUTPATIENT)
Dept: CARDIAC REHAB | Facility: CLINIC | Age: 75
End: 2020-04-24
Payer: MEDICARE

## 2020-04-24 DIAGNOSIS — Z95.1 S/P CABG X 4: ICD-10-CM

## 2020-04-24 PROCEDURE — G2061 QUAL NONMD EST PT 5-10M: HCPCS

## 2020-04-28 ENCOUNTER — APPOINTMENT (OUTPATIENT)
Dept: CARDIAC REHAB | Facility: CLINIC | Age: 75
End: 2020-04-28
Payer: MEDICARE

## 2020-05-14 ENCOUNTER — OFFICE VISIT (OUTPATIENT)
Dept: INTERNAL MEDICINE CLINIC | Age: 75
End: 2020-05-14
Payer: MEDICARE

## 2020-05-14 VITALS
HEIGHT: 70 IN | BODY MASS INDEX: 23.79 KG/M2 | HEART RATE: 64 BPM | DIASTOLIC BLOOD PRESSURE: 84 MMHG | SYSTOLIC BLOOD PRESSURE: 148 MMHG | WEIGHT: 166.2 LBS | OXYGEN SATURATION: 98 % | TEMPERATURE: 99 F

## 2020-05-14 DIAGNOSIS — E78.00 HYPERCHOLESTEREMIA: Chronic | ICD-10-CM

## 2020-05-14 DIAGNOSIS — I20.8 STABLE ANGINA (HCC): ICD-10-CM

## 2020-05-14 DIAGNOSIS — E11.9 TYPE 2 DIABETES MELLITUS WITHOUT COMPLICATION, WITHOUT LONG-TERM CURRENT USE OF INSULIN (HCC): Chronic | ICD-10-CM

## 2020-05-14 DIAGNOSIS — Z95.1 S/P CABG X 4: ICD-10-CM

## 2020-05-14 DIAGNOSIS — I10 BENIGN ESSENTIAL HYPERTENSION: Primary | Chronic | ICD-10-CM

## 2020-05-14 PROCEDURE — 1036F TOBACCO NON-USER: CPT | Performed by: INTERNAL MEDICINE

## 2020-05-14 PROCEDURE — 99214 OFFICE O/P EST MOD 30 MIN: CPT | Performed by: INTERNAL MEDICINE

## 2020-05-14 PROCEDURE — 4040F PNEUMOC VAC/ADMIN/RCVD: CPT | Performed by: INTERNAL MEDICINE

## 2020-05-14 PROCEDURE — 3077F SYST BP >= 140 MM HG: CPT | Performed by: INTERNAL MEDICINE

## 2020-05-14 PROCEDURE — 3079F DIAST BP 80-89 MM HG: CPT | Performed by: INTERNAL MEDICINE

## 2020-05-14 PROCEDURE — 1160F RVW MEDS BY RX/DR IN RCRD: CPT | Performed by: INTERNAL MEDICINE

## 2020-05-14 PROCEDURE — 3044F HG A1C LEVEL LT 7.0%: CPT | Performed by: INTERNAL MEDICINE

## 2020-05-14 RX ORDER — AMOXICILLIN 500 MG
1 CAPSULE ORAL DAILY
COMMUNITY

## 2020-07-01 ENCOUNTER — TELEPHONE (OUTPATIENT)
Dept: CARDIAC REHAB | Facility: CLINIC | Age: 75
End: 2020-07-01

## 2020-07-01 NOTE — PROGRESS NOTES
Kamryn Garland      Dear Dr Maurice Morales,    Thank you for referring your patient to our cardiac rehabilitation program  The patient has completed 22  visits  However, rehab is being discontinued at this time due to:    Voluntary Dropout:  The patient has chosen to quit due to :  Being able to walk and do exercise at home  Rukhsana Thurston was unable to complete his final testing due to not returning back to cardiac rehab  Attached is his final individual outcome report and last exercise session detail  Please see progress not below has nothing has changed since last time  Please contact us at [phone] if you have any questions about this patents case or if/when it is appropriate to re-start the patients rehab program at a later date  Thank you for your continued support of cardiac rehabilitation  Sincerely,      Daniel Swan MS, CEP  Exercise Physiologist

## 2020-08-11 ENCOUNTER — APPOINTMENT (OUTPATIENT)
Dept: LAB | Facility: CLINIC | Age: 75
End: 2020-08-11
Payer: MEDICARE

## 2020-08-11 DIAGNOSIS — N40.0 BENIGN PROSTATIC HYPERPLASIA, UNSPECIFIED WHETHER LOWER URINARY TRACT SYMPTOMS PRESENT: ICD-10-CM

## 2020-08-11 LAB — PSA SERPL-MCNC: 5.3 NG/ML (ref 0–4)

## 2020-08-11 PROCEDURE — G0103 PSA SCREENING: HCPCS

## 2020-08-11 PROCEDURE — 36415 COLL VENOUS BLD VENIPUNCTURE: CPT

## 2020-08-18 ENCOUNTER — OFFICE VISIT (OUTPATIENT)
Dept: UROLOGY | Facility: CLINIC | Age: 75
End: 2020-08-18
Payer: MEDICARE

## 2020-08-18 VITALS
BODY MASS INDEX: 22.39 KG/M2 | WEIGHT: 156.4 LBS | HEART RATE: 72 BPM | DIASTOLIC BLOOD PRESSURE: 86 MMHG | TEMPERATURE: 97.5 F | HEIGHT: 70 IN | SYSTOLIC BLOOD PRESSURE: 132 MMHG

## 2020-08-18 DIAGNOSIS — N40.0 BENIGN PROSTATIC HYPERPLASIA, UNSPECIFIED WHETHER LOWER URINARY TRACT SYMPTOMS PRESENT: Primary | ICD-10-CM

## 2020-08-18 DIAGNOSIS — R97.20 PSA ELEVATION: ICD-10-CM

## 2020-08-18 PROCEDURE — 3079F DIAST BP 80-89 MM HG: CPT | Performed by: UROLOGY

## 2020-08-18 PROCEDURE — 99214 OFFICE O/P EST MOD 30 MIN: CPT | Performed by: UROLOGY

## 2020-08-18 PROCEDURE — 1036F TOBACCO NON-USER: CPT | Performed by: UROLOGY

## 2020-08-18 PROCEDURE — 4040F PNEUMOC VAC/ADMIN/RCVD: CPT | Performed by: UROLOGY

## 2020-08-18 PROCEDURE — 3044F HG A1C LEVEL LT 7.0%: CPT | Performed by: UROLOGY

## 2020-08-18 PROCEDURE — 3075F SYST BP GE 130 - 139MM HG: CPT | Performed by: UROLOGY

## 2020-08-18 PROCEDURE — 1160F RVW MEDS BY RX/DR IN RCRD: CPT | Performed by: UROLOGY

## 2020-08-18 PROCEDURE — 3008F BODY MASS INDEX DOCD: CPT | Performed by: UROLOGY

## 2020-08-18 RX ORDER — SACCHAROMYCES BOULARDII 250 MG
250 CAPSULE ORAL 2 TIMES DAILY
COMMUNITY

## 2020-08-18 NOTE — PROGRESS NOTES
There are no diagnoses linked to this encounter  Assessment and plan:       1  Elevated PSA   - Patient is status post 2-prostate biopsies  - Multiparametric MRI was performed in November of 2018 (PIRADS 2)  - Digital rectal exam remains benign  - PSA continues to decrease    2  BPH  - 100g  - asymptomatic    Patient is doing very well  Happy to review is negative PSA  He will follow up in 1 year with a PSA prior to the visit  Discussed medical and surgical management options for his enlarged prostate should become indicated down the line    Tamara Arthur MD      Chief Complaint     Chief Complaint   Patient presents with    Elevated PSA         History of Present Illness     Ale Bowman is a 69 y o  male patient returns in follow-up  He has an asymptomatic 100 g gland and a recurrent history of elevated PSA  He has undergone 2 prior prior state biopsies which were negative  He has also had recent multiparametric MRI which was negative and showed 100 g gland  He recently had a CABG  No changes in his urinary symptoms he is asymptomatic from a genitourinary standpoint    Laboratory     Lab Results   Component Value Date    CREATININE 0 96 02/03/2020       Lab Results   Component Value Date    PSA 5 3 (H) 08/11/2020    PSA 8 8 (H) 05/14/2019    PSA 10 8 (H) 11/05/2018       No results found for this or any previous visit (from the past 1 hour(s))  Review of Systems     Review of Systems   Constitutional: Negative for chills, fatigue and fever  HENT: Negative  Eyes: Negative  Respiratory: Negative for shortness of breath  Cardiovascular: Negative for chest pain  Gastrointestinal: Negative for constipation, diarrhea, nausea and vomiting  Genitourinary: Negative for difficulty urinating, dysuria, enuresis, flank pain, frequency, hematuria and urgency  Musculoskeletal: Negative  Skin: Negative  Psychiatric/Behavioral: Negative            AUA SYMPTOM SCORE      Most Recent Value   AUA SYMPTOM SCORE   How often have you had a sensation of not emptying your bladder completely after you finished urinating? 1   How often have you had to urinate again less than two hours after you finished urinating? 1   How often have you found you stopped and started again several times when you urinate? 1   How often have you found it difficult to postpone urination? 1   How often have you had a weak urinary stream?  1   How often have you had to push or strain to begin urination? 1   How many times did you most typically get up to urinate from the time you went to bed at night until the time you got up in the morning? 2   Quality of Life: If you were to spend the rest of your life with your urinary condition just the way it is now, how would you feel about that?  0   AUA SYMPTOM SCORE  8                Allergies     Allergies   Allergen Reactions    Pollen Extract Allergic Rhinitis       Physical Exam     Physical Exam  Vitals signs and nursing note reviewed  Constitutional:       General: He is not in acute distress  Appearance: He is well-developed  He is not diaphoretic  HENT:      Head: Normocephalic and atraumatic  Right Ear: External ear normal       Left Ear: External ear normal       Nose: Nose normal    Eyes:      General: No scleral icterus  Right eye: No discharge  Left eye: No discharge  Neck:      Musculoskeletal: Normal range of motion  Pulmonary:      Effort: Pulmonary effort is normal    Genitourinary:     Comments: Prostate is very large, I would estimate over 80g  I am not able to appreciate any nodules  It is non-tender  Musculoskeletal: Normal range of motion  Skin:     General: Skin is warm and dry  Neurological:      Mental Status: He is alert and oriented to person, place, and time  Psychiatric:         Behavior: Behavior normal          Thought Content:  Thought content normal          Judgment: Judgment normal  Vital Signs     Vitals:    08/18/20 1521   BP: 132/86   BP Location: Left arm   Patient Position: Sitting   Cuff Size: Standard   Pulse: 72   Temp: 97 5 °F (36 4 °C)   Weight: 70 9 kg (156 lb 6 4 oz)   Height: 5' 10" (1 778 m)         Current Medications       Current Outpatient Medications:     aspirin 81 MG tablet, Take 1 tablet (81 mg total) by mouth daily, Disp: 90 tablet, Rfl: 3    atorvastatin (LIPITOR) 80 mg tablet, Take 1 tablet (80 mg total) by mouth daily with dinner, Disp: 90 tablet, Rfl: 3    Glucosamine-Chondroitin (GLUCOSAMINE CHONDR COMPLEX PO), Take 1 capsule by mouth 2 (two) times a day, Disp: , Rfl:     glucose blood (ACCU-CHEK GUIDE) test strip, Test blood sugar before meals and at bedtime, Disp: 300 each, Rfl: 1    glucose monitoring kit (FREESTYLE) monitoring kit, 1 each by Does not apply route 4 (four) times a day (before meals and at bedtime) Check blood sugars before meals & at bedtime  Dx DM E11 9, Disp: 1 each, Rfl: 0    metFORMIN (GLUCOPHAGE) 1000 MG tablet, Take 1 tablet (1,000 mg total) by mouth 2 (two) times a day, Disp: 180 tablet, Rfl: 1    metoprolol tartrate (LOPRESSOR) 25 mg tablet, Take 1 tablet (25 mg total) by mouth every 12 (twelve) hours, Disp: 180 tablet, Rfl: 1    Omega-3 Fatty Acids (FISH OIL) 1200 MG CAPS, Take 1 capsule by mouth daily, Disp: , Rfl:     saccharomyces boulardii (FLORASTOR) 250 mg capsule, Take 250 mg by mouth 2 (two) times a day, Disp: , Rfl:       Active Problems     Patient Active Problem List   Diagnosis    Benign essential hypertension    Type 2 diabetes mellitus ([address] )    PSA elevation    Hypercholesteremia    Diabetic retinopathy (HCC)    Seasonal allergies    Ringing in ear, bilateral    Chest tightness    Stable angina (HCC)    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 (HCC)    S/P CABG x 4    Overweight (BMI 25 0-29  9)         Past Medical History     Past Medical History:   Diagnosis Date    Chronic cough     RESOLVED: [id number]    Coronary artery disease     Diabetes mellitus ([facility]     Diabetic retinopathy ([facility]     HLD (hyperlipidemia)     HTN (hypertension)          Surgical History     Past Surgical History:   Procedure Laterality Date    CARDIAC CATHETERIZATION  12/09/2019    HAND SURGERY      HI CABG, ARTERY-VEIN, THREE N/A 12/13/2019    Procedure: CORONARY ARTERY BYPASS GRAFT (CABG) 4 VESSELS STEVEN to LAD ; SVG--> PDA , DIAGONAL, and OM;  Surgeon: Karen Knutson DO;  [facility];  Service: Cardiac Surgery    HI ECHO TRANSESOPHAG R-T 2D W/PRB IMG ACQUISJ I&R N/A 12/13/2019    Procedure: TRANSESOPHAGEAL ECHOCARDIOGRAM (SHEBA); Surgeon: Karen Knutson DO;  [facility];  Service: Cardiac Surgery    HI ENDOSCOPY W/VIDEO-ASST VEIN HARVEST,CABG Left 12/13/2019    Procedure: HARVEST VEIN ENDOSCOPIC ([address]);   Surgeon: Karen Knutson DO;  Location: [facility];  Service: Cardiac Surgery         Family History     Family History   Problem Relation Age of Onset    Heart failure Mother     Heart failure Father     Heart disease Father     Heart attack Father     Diabetes Other          Social History     Social History       Radiology

## 2020-09-09 ENCOUNTER — APPOINTMENT (OUTPATIENT)
Dept: LAB | Facility: CLINIC | Age: 75
End: 2020-09-09
Payer: MEDICARE

## 2020-09-09 DIAGNOSIS — E11.9 TYPE 2 DIABETES MELLITUS WITHOUT COMPLICATION, WITHOUT LONG-TERM CURRENT USE OF INSULIN (HCC): Chronic | ICD-10-CM

## 2020-09-09 LAB
ANION GAP SERPL CALCULATED.3IONS-SCNC: 5 MMOL/L (ref 4–13)
BUN SERPL-MCNC: 19 MG/DL (ref 5–25)
CALCIUM SERPL-MCNC: 8.9 MG/DL (ref 8.3–10.1)
CHLORIDE SERPL-SCNC: 109 MMOL/L (ref 100–108)
CO2 SERPL-SCNC: 29 MMOL/L (ref 21–32)
CREAT SERPL-MCNC: 0.84 MG/DL (ref 0.6–1.3)
EST. AVERAGE GLUCOSE BLD GHB EST-MCNC: 120 MG/DL
GFR SERPL CREATININE-BSD FRML MDRD: 87 ML/MIN/1.73SQ M
GLUCOSE P FAST SERPL-MCNC: 106 MG/DL (ref 65–99)
HBA1C MFR BLD: 5.8 %
POTASSIUM SERPL-SCNC: 4 MMOL/L (ref 3.5–5.3)
SODIUM SERPL-SCNC: 143 MMOL/L (ref 136–145)

## 2020-09-09 PROCEDURE — 83036 HEMOGLOBIN GLYCOSYLATED A1C: CPT

## 2020-09-09 PROCEDURE — 36415 COLL VENOUS BLD VENIPUNCTURE: CPT

## 2020-09-09 PROCEDURE — 80048 BASIC METABOLIC PNL TOTAL CA: CPT

## 2020-09-14 ENCOUNTER — OFFICE VISIT (OUTPATIENT)
Dept: INTERNAL MEDICINE CLINIC | Age: 75
End: 2020-09-14
Payer: MEDICARE

## 2020-09-14 VITALS
SYSTOLIC BLOOD PRESSURE: 142 MMHG | OXYGEN SATURATION: 98 % | HEART RATE: 64 BPM | DIASTOLIC BLOOD PRESSURE: 80 MMHG | BODY MASS INDEX: 22.62 KG/M2 | HEIGHT: 70 IN | TEMPERATURE: 97.7 F | WEIGHT: 158 LBS

## 2020-09-14 DIAGNOSIS — E11.9 TYPE 2 DIABETES MELLITUS (HCC): Chronic | ICD-10-CM

## 2020-09-14 DIAGNOSIS — Z00.00 MEDICARE ANNUAL WELLNESS VISIT, SUBSEQUENT: ICD-10-CM

## 2020-09-14 DIAGNOSIS — R97.20 PSA ELEVATION: ICD-10-CM

## 2020-09-14 DIAGNOSIS — Z95.1 S/P CABG (CORONARY ARTERY BYPASS GRAFT): ICD-10-CM

## 2020-09-14 DIAGNOSIS — I10 BENIGN ESSENTIAL HYPERTENSION: Primary | Chronic | ICD-10-CM

## 2020-09-14 DIAGNOSIS — E11.3292 MILD NONPROLIFERATIVE DIABETIC RETINOPATHY OF LEFT EYE WITHOUT MACULAR EDEMA ASSOCIATED WITH TYPE 2 DIABETES MELLITUS (HCC): Chronic | ICD-10-CM

## 2020-09-14 PROBLEM — H93.13 RINGING IN EAR, BILATERAL: Status: RESOLVED | Noted: 2018-10-15 | Resolved: 2020-09-14

## 2020-09-14 PROBLEM — R07.89 CHEST TIGHTNESS: Status: RESOLVED | Noted: 2019-11-21 | Resolved: 2020-09-14

## 2020-09-14 PROCEDURE — G0439 PPPS, SUBSEQ VISIT: HCPCS | Performed by: INTERNAL MEDICINE

## 2020-09-14 PROCEDURE — 99214 OFFICE O/P EST MOD 30 MIN: CPT | Performed by: INTERNAL MEDICINE

## 2020-09-14 RX ORDER — ATORVASTATIN CALCIUM 80 MG/1
80 TABLET, FILM COATED ORAL
Qty: 90 TABLET | Refills: 3 | Status: SHIPPED | OUTPATIENT
Start: 2020-09-14 | End: 2021-09-09 | Stop reason: SDUPTHER

## 2020-09-14 RX ORDER — ATORVASTATIN CALCIUM 80 MG/1
80 TABLET, FILM COATED ORAL
Qty: 90 TABLET | Refills: 3 | Status: SHIPPED | OUTPATIENT
Start: 2020-09-14 | End: 2020-09-14 | Stop reason: SDUPTHER

## 2020-09-14 NOTE — TELEPHONE ENCOUNTER
Patient just saw Dr Dirk Hager today and all his scripts went to [facility] instead of [facility] mail order

## 2020-09-14 NOTE — PROGRESS NOTES
Diabetic Foot Exam    Patient's shoes and socks removed  Right Foot/Ankle   Right Foot Inspection  Skin Exam: dry skin                          Toe Exam: ROM and strength within normal limits  Sensory   Vibration: intact  Proprioception: intact   Monofilament testing: intact  Vascular  Capillary refills: < 3 seconds  The right DP pulse is 1+  The right PT pulse is 1+  Left Foot/Ankle  Left Foot Inspection  Skin Exam: dry skin                         Toe Exam: ROM and strength within normal limits                   Sensory   Vibration: intact  Proprioception: intact  Monofilament: intact  Vascular    The left DP pulse is 1+  The left PT pulse is 1+  Assign Risk Category:  No deformity present; No loss of protective sensation;  No weak pulses       Risk: 1

## 2020-09-14 NOTE — PROGRESS NOTES
Assessment and Plan:     Problem List Items Addressed This Visit     None           Preventive health issues were discussed with patient, and age appropriate screening tests were ordered as noted in patient's After Visit Summary  Personalized health advice and appropriate referrals for health education or preventive services given if needed, as noted in patient's After Visit Summary  History of Present Illness:     Patient presents for Medicare Annual Wellness visit    Patient Care Team:  Beronica Downing MD as PCP - General  Michael Mejia MD     Problem List:     Patient Active Problem List   Diagnosis    Benign essential hypertension    Type 2 diabetes mellitus ([facility]    PSA elevation    Hypercholesteremia    Diabetic retinopathy ([facility]    Seasonal allergies    Ringing in ear, bilateral    Chest tightness    Stable angina ([facility]    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 ([facility] )    S/P CABG x 4    Overweight (BMI 25 0-29  9)      Past Medical and Surgical History:     Past Medical History:   Diagnosis Date    Chronic cough     RESOLVED: [id number]    Coronary artery disease     Diabetes mellitus (HCC)     Diabetic retinopathy ([facility]     HLD (hyperlipidemia)     HTN (hypertension)      Past Surgical History:   Procedure Laterality Date    CARDIAC CATHETERIZATION  12/09/2019    HAND SURGERY      ME CABG, ARTERY-VEIN, THREE N/A 12/13/2019    Procedure: CORONARY ARTERY BYPASS GRAFT (CABG) 4 VESSELS STEVEN to LAD ; SVG--> PDA , DIAGONAL, and OM;  Surgeon: Tegan Mcmahon DO;  [facility];  Service: Cardiac Surgery    ME ECHO TRANSESOPHAG R-T 2D W/PRB IMG ACQUISJ I&R N/A 12/13/2019    Procedure: TRANSESOPHAGEAL ECHOCARDIOGRAM (SHEBA); Surgeon: Tegan Mcmahon DO;  [facility];  Service: Cardiac Surgery    ME ENDOSCOPY W/VIDEO-ASST VEIN HARVEST,CABG Left 12/13/2019    Procedure: HARVEST VEIN ENDOSCOPIC ([address]);   Surgeon: Tegan Mcmahon DO;  [facility];  Service: Cardiac Surgery      Family History:     Family History   Problem Relation Age of Onset    Heart failure Mother     Heart failure Father     Heart disease Father     Heart attack Father     Diabetes Other       Social History:        Social History     Socioeconomic History    Marital status: /Civil Union     Spouse name: Not on file    Number of children: Not on file    Years of education: Not on file    Highest education level: Not on file   Occupational History    Not on file   Social Needs    Financial resource strain: Not on file    Food insecurity     Worry: Not on file     Inability: Not on file   [employer] needs     Medical: Not on file     Non-medical: Not on file   Tobacco Use    Smoking status: Never Smoker    Smokeless tobacco: Never Used   Substance and Sexual Activity    Alcohol use:  Yes     Alcohol/week: 5 0 standard drinks     Types: 3 Glasses of wine, 1 Cans of beer, 1 Standard drinks or equivalent per week     Comment: occasional    Drug use: No    Sexual activity: Not Currently     Partners: Female   Lifestyle    Physical activity     Days per week: Not on file     Minutes per session: Not on file    Stress: Not on file   Relationships    Social connections     Talks on phone: Not on file     Gets together: Not on file     Attends Rastafari service: Not on file     Active member of club or organization: Not on file     Attends meetings of clubs or organizations: Not on file     Relationship status: Not on file    Intimate partner violence     Fear of current or ex partner: Not on file     Emotionally abused: Not on file     Physically abused: Not on file     Forced sexual activity: Not on file   Other Topics Concern    Not on file   Social History Narrative    Not on file      Medications and Allergies:     Current Outpatient Medications   Medication Sig Dispense Refill    aspirin 81 MG tablet Take 1 tablet (81 mg total) by mouth daily 90 tablet 3    atorvastatin (LIPITOR) 80 mg tablet Take 1 tablet (80 mg total) by mouth daily with dinner 90 tablet 3    Glucosamine-Chondroitin (GLUCOSAMINE CHONDR COMPLEX PO) Take 1 capsule by mouth 2 (two) times a day      glucose blood (ACCU-CHEK GUIDE) test strip Test blood sugar before meals and at bedtime 300 each 1    glucose monitoring kit (FREESTYLE) monitoring kit 1 each by Does not apply route 4 (four) times a day (before meals and at bedtime) Check blood sugars before meals & at bedtime  Dx DM E11 9 1 each 0    metFORMIN (GLUCOPHAGE) 1000 MG tablet Take 1 tablet (1,000 mg total) by mouth 2 (two) times a day 180 tablet 1    metoprolol tartrate (LOPRESSOR) 25 mg tablet Take 1 tablet (25 mg total) by mouth every 12 (twelve) hours 180 tablet 1    Omega-3 Fatty Acids (FISH OIL) 1200 MG CAPS Take 1 capsule by mouth daily      saccharomyces boulardii (FLORASTOR) 250 mg capsule Take 250 mg by mouth 2 (two) times a day       No current facility-administered medications for this visit  Allergies   Allergen Reactions    Pollen Extract Allergic Rhinitis      Immunizations:     Immunization History   Administered Date(s) Administered    INFLUENZA 10/19/2015, 12/02/2016, 12/20/2017, 11/12/2018    Influenza Split High Dose Preservative Free IM 09/24/2014, 10/19/2015, 12/02/2016, 12/20/2017    Influenza, high dose seasonal 0 7 mL 11/12/2018, 09/30/2019    Pneumococcal Conjugate 13-Valent 05/30/2019    Pneumococcal Polysaccharide PPV23 05/08/2014    Tdap 06/22/2018    Zoster 02/12/2014      Health Maintenance:         Topic Date Due    Hepatitis C Screening  Completed         Topic Date Due    Influenza Vaccine  07/01/2020      Medicare Health Risk Assessment:     There were no vitals taken for this visit  Jagdeep Acuna is here for his Subsequent Wellness visit  Last Medicare Wellness visit information reviewed, patient interviewed and updates made to the record today        Health Risk Assessment:   Patient rates overall health as very good  Patient feels that their physical health rating is same  Eyesight was rated as same  Hearing was rated as same  Patient feels that their emotional and mental health rating is same  Pain experienced in the last 7 days has been none  Patient states that he has experienced no weight loss or gain in last 6 months  Depression Screening:   PHQ-2 Score: 0      Fall Risk Screening: In the past year, patient has experienced: no history of falling in past year      Home Safety:  Patient does not have trouble with stairs inside or outside of their home  Patient has working smoke alarms and has working carbon monoxide detector  Home safety hazards include: none  Nutrition:   Current diet is Regular  Medications:   Patient is not currently taking any over-the-counter supplements  Patient is able to manage medications  Activities of Daily Living (ADLs)/Instrumental Activities of Daily Living (IADLs):   Walk and transfer into and out of bed and chair?: Yes  Dress and groom yourself?: Yes    Bathe or shower yourself?: Yes    Feed yourself? Yes  Do your laundry/housekeeping?: Yes  Manage your money, pay your bills and track your expenses?: Yes  Make your own meals?: Yes    Do your own shopping?: Yes    Previous Hospitalizations:   Any hospitalizations or ED visits within the last 12 months?: Yes    How many hospitalizations have you had in the last year?: 1-2    Advance Care Planning:   Living will: No    Durable POA for healthcare:  Yes    Advanced directive: No    Advanced directive counseling given: Yes      Cognitive Screening:   Provider or family/friend/caregiver concerned regarding cognition?: No    PREVENTIVE SCREENINGS      Cardiovascular Screening:    General: Screening Not Indicated and History Lipid Disorder      Diabetes Screening:     General: Screening Not Indicated and History Diabetes      Colorectal Cancer Screening:     General: Screening Current      Prostate Cancer Screening:    General: Screening Current      Osteoporosis Screening:    General: Screening Not Indicated      Abdominal Aortic Aneurysm (AAA) Screening:    Risk factors include: age between 73-69 yo        General: Screening Not Indicated      Lung Cancer Screening:     General: Screening Not Indicated      Hepatitis C Screening:    General: Screening Current    Other Counseling Topics:   Calcium and vitamin D intake and regular weightbearing exercise         Mariaelena Patel MD

## 2020-09-14 NOTE — TELEPHONE ENCOUNTER
Per patient's insurance, his [employer] benefit are through [facility] [place]    Will resend scripts to [facility] mail order per pts request

## 2020-09-14 NOTE — PATIENT INSTRUCTIONS
Medicare Preventive Visit Patient Instructions  Thank you for completing your Welcome to Medicare Visit or Medicare Annual Wellness Visit today  Your next wellness visit will be due in one year (9/14/2021)  The screening/preventive services that you may require over the next 5-10 years are detailed below  Some tests may not apply to you based off risk factors and/or age  Screening tests ordered at today's visit but not completed yet may show as past due  Also, please note that scanned in results may not display below  Preventive Screenings:  Service Recommendations Previous Testing/Comments   Colorectal Cancer Screening  · Colonoscopy    · Fecal Occult Blood Test (FOBT)/Fecal Immunochemical Test (FIT)  · Fecal DNA/Cologuard Test  · Flexible Sigmoidoscopy Age: 54-65 years old   Colonoscopy: every 10 years (May be performed more frequently if at higher risk)  OR  FOBT/FIT: every 1 year  OR  Cologuard: every 3 years  OR  Sigmoidoscopy: every 5 years  Screening may be recommended earlier than age 48 if at higher risk for colorectal cancer  Also, an individualized decision between you and your healthcare provider will decide whether screening between the ages of 74-80 would be appropriate   Colonoscopy: Not on file  FOBT/FIT: Not on file  Cologuard: 06/17/2019  Sigmoidoscopy: Not on file    Screening Current     Prostate Cancer Screening Individualized decision between patient and health care provider in men between ages of 53-78   Medicare will cover every 12 months beginning on the day after your 50th birthday PSA: 5 3 ng/mL     Screening Current     Hepatitis C Screening Once for adults born between 1945 and 1965  More frequently in patients at high risk for Hepatitis C Hep C Antibody: 05/30/2017    Screening Current   Diabetes Screening 1-2 times per year if you're at risk for diabetes or have pre-diabetes Fasting glucose: 106 mg/dL   A1C: 5 8 %    Screening Not Indicated  History Diabetes   Cholesterol Screening Once every 5 years if you don't have a lipid disorder  May order more often based on risk factors  Lipid panel: 02/03/2020    Screening Not Indicated  History Lipid Disorder      Other Preventive Screenings Covered by Medicare:  1  Abdominal Aortic Aneurysm (AAA) Screening: covered once if your at risk  You're considered to be at risk if you have a family history of AAA or a male between the age of 73-68 who smoking at least 100 cigarettes in your lifetime  2  Lung Cancer Screening: covers low dose CT scan once per year if you meet all of the following conditions: (1) Age 50-69; (2) No signs or symptoms of lung cancer; (3) Current smoker or have quit smoking within the last 15 years; (4) You have a tobacco smoking history of at least 30 pack years (packs per day x number of years you smoked); (5) You get a written order from a healthcare provider  3  Glaucoma Screening: covered annually if you're considered high risk: (1) You have diabetes OR (2) Family history of glaucoma OR (3)  aged 48 and older OR (3)  American aged 72 and older  3  Osteoporosis Screening: covered every 2 years if you meet one of the following conditions: (1) Have a vertebral abnormality; (2) On glucocorticoid therapy for more than 3 months; (3) Have primary hyperparathyroidism; (4) On osteoporosis medications and need to assess response to drug therapy  5  HIV Screening: covered annually if you're between the age of 12-76  Also covered annually if you are younger than 13 and older than 72 with risk factors for HIV infection  For pregnant patients, it is covered up to 3 times per pregnancy      Immunizations:  Immunization Recommendations   Influenza Vaccine Annual influenza vaccination during flu season is recommended for all persons aged >= 6 months who do not have contraindications   Pneumococcal Vaccine (Prevnar and Pneumovax)  * Prevnar = PCV13  * Pneumovax = PPSV23 Adults 25-60 years old: 1-3 doses may be recommended based on certain risk factors  Adults 72 years old: Prevnar (PCV13) vaccine recommended followed by Pneumovax (PPSV23) vaccine  If already received PPSV23 since turning 65, then PCV13 recommended at least one year after PPSV23 dose  Hepatitis B Vaccine 3 dose series if at intermediate or high risk (ex: diabetes, end stage renal disease, liver disease)   Tetanus (Td) Vaccine - COST NOT COVERED BY MEDICARE PART B Following completion of primary series, a booster dose should be given every 10 years to maintain immunity against tetanus  Td may also be given as tetanus wound prophylaxis  Tdap Vaccine - COST NOT COVERED BY MEDICARE PART B Recommended at least once for all adults  For pregnant patients, recommended with each pregnancy  Shingles Vaccine (Shingrix) - COST NOT COVERED BY MEDICARE PART B  2 shot series recommended in those aged 48 and above     Health Maintenance Due:      Topic Date Due    Hepatitis C Screening  Completed     Immunizations Due:      Topic Date Due    Influenza Vaccine  07/01/2020     Advance Directives   What are advance directives? Advance directives are legal documents that state your wishes and plans for medical care  These plans are made ahead of time in case you lose your ability to make decisions for yourself  Advance directives can apply to any medical decision, such as the treatments you want, and if you want to donate organs  What are the types of advance directives? There are many types of advance directives, and each state has rules about how to use them  You may choose a combination of any of the following:  · Living will: This is a written record of the treatment you want  You can also choose which treatments you do not want, which to limit, and which to stop at a certain time  This includes surgery, medicine, IV fluid, and tube feedings  · Durable power of  for healthcare [facility]):   This is a written record that states who you want to make healthcare choices for you when you are unable to make them for yourself  This person, called a proxy, is usually a family member or a friend  You may choose more than 1 proxy  · Do not resuscitate (DNR) order:  A DNR order is used in case your heart stops beating or you stop breathing  It is a request not to have certain forms of treatment, such as CPR  A DNR order may be included in other types of advance directives  · Medical directive: This covers the care that you want if you are in a coma, near death, or unable to make decisions for yourself  You can list the treatments you want for each condition  Treatment may include pain medicine, surgery, blood transfusions, dialysis, IV or tube feedings, and a ventilator (breathing machine)  · Values history: This document has questions about your views, beliefs, and how you feel and think about life  This information can help others choose the care that you would choose  Why are advance directives important? An advance directive helps you control your care  Although spoken wishes may be used, it is better to have your wishes written down  Spoken wishes can be misunderstood, or not followed  Treatments may be given even if you do not want them  An advance directive may make it easier for your family to make difficult choices about your care  © Copyright [employer] 2018 Information is for End User's use only and may not be sold, redistributed or otherwise used for commercial purposes   All illustrations and images included in CareNotes® are the copyrighted property of [employer]  or [facility] [employer]

## 2020-09-14 NOTE — PROGRESS NOTES
Assessment/Plan:     Diagnoses and all orders for this visit:    Benign essential hypertension    Type 2 diabetes mellitus (HCC)  -     metoprolol tartrate (LOPRESSOR) 25 mg tablet; Take 1 tablet (25 mg total) by mouth every 12 (twelve) hours  -     atorvastatin (LIPITOR) 80 mg tablet; Take 1 tablet (80 mg total) by mouth daily with dinner  -     metFORMIN (GLUCOPHAGE) 1000 MG tablet; Take 1 tablet (1,000 mg total) by mouth 2 (two) times a day  -     Microalbumin / creatinine urine ratio  -     Glucose, fasting; Future  -     Hemoglobin A1C; Future    S/P CABG (coronary artery bypass graft)  -     metoprolol tartrate (LOPRESSOR) 25 mg tablet; Take 1 tablet (25 mg total) by mouth every 12 (twelve) hours  -     atorvastatin (LIPITOR) 80 mg tablet; Take 1 tablet (80 mg total) by mouth daily with dinner    PSA elevation    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 (HCC)             Subjective:   Chief Complaint   Patient presents with    Follow-up     DM II  Review labs  started FOOT EXAM    Medicare Wellness Visit     SUB AWV        Patient ID: Malgorzata Kirkland is a 69 y o  male  Is a very pleasant 69 years young gentleman who is here today for the regular follow-up and also for the Medicare wellness subsequent visit  He is doing well almost review of system is unremarkable    Hypertension is very well controlled  Diabetes mellitus is very well controlled with hemoglobin A1c less than 6 no episodes of hypoglycemia  PSA slightly elevated much better than before and he is followed up by the urologist  Stable gastroesophageal reflux problem      The following portions of the patient's history were reviewed and updated as appropriate: allergies, current medications, past family history, past medical history, past social history, past surgical history and problem list     Review of Systems   Constitutional: Negative for appetite change, fatigue and fever  HENT: Negative for congestion, ear pain, hearing loss, nosebleeds, sneezing, tinnitus and voice change  Eyes: Negative for pain, discharge and redness  Respiratory: Negative for cough, chest tightness and wheezing  Cardiovascular: Negative for chest pain, palpitations and leg swelling  Gastrointestinal: Negative for abdominal pain, blood in stool, constipation, diarrhea, nausea and vomiting  Genitourinary: Negative for difficulty urinating, dysuria, hematuria and urgency  Musculoskeletal: Negative for arthralgias, back pain, gait problem and joint swelling  Skin: Negative for rash and wound  Allergic/Immunologic: Negative for environmental allergies  Neurological: Negative for dizziness, tremors, seizures, weakness, light-headedness and numbness  Hematological: Negative for adenopathy  Does not bruise/bleed easily  Psychiatric/Behavioral: Negative for behavioral problems and confusion  The patient is not nervous/anxious  Past Medical History:   Diagnosis Date    Chronic cough     RESOLVED: [id number]    Coronary artery disease     Diabetes mellitus (HCC)     Diabetic retinopathy (HCC)     HLD (hyperlipidemia)     HTN (hypertension)          Current Outpatient Medications:     aspirin 81 MG tablet, Take 1 tablet (81 mg total) by mouth daily, Disp: 90 tablet, Rfl: 3    atorvastatin (LIPITOR) 80 mg tablet, Take 1 tablet (80 mg total) by mouth daily with dinner, Disp: 90 tablet, Rfl: 3    Glucosamine-Chondroitin (GLUCOSAMINE CHONDR COMPLEX PO), Take 1 capsule by mouth 2 (two) times a day, Disp: , Rfl:     glucose blood (ACCU-CHEK GUIDE) test strip, Test blood sugar before meals and at bedtime, Disp: 300 each, Rfl: 1    glucose monitoring kit (FREESTYLE) monitoring kit, 1 each by Does not apply route 4 (four) times a day (before meals and at bedtime) Check blood sugars before meals & at bedtime   Dx DM E11 9, Disp: 1 each, Rfl: 0    metFORMIN (GLUCOPHAGE) 1000 MG tablet, Take 1 tablet (1,000 mg total) by mouth 2 (two) times a day, Disp: 180 tablet, Rfl: 1    metoprolol tartrate (LOPRESSOR) 25 mg tablet, Take 1 tablet (25 mg total) by mouth every 12 (twelve) hours, Disp: 180 tablet, Rfl: 1    Omega-3 Fatty Acids (FISH OIL) 1200 MG CAPS, Take 1 capsule by mouth daily, Disp: , Rfl:     saccharomyces boulardii (FLORASTOR) 250 mg capsule, Take 250 mg by mouth 2 (two) times a day, Disp: , Rfl:     Allergies   Allergen Reactions    Pollen Extract Allergic Rhinitis       Social History   Past Surgical History:   Procedure Laterality Date    CARDIAC CATHETERIZATION  12/09/2019    HAND SURGERY      WI CABG, ARTERY-VEIN, THREE N/A 12/13/2019    Procedure: CORONARY ARTERY BYPASS GRAFT (CABG) 4 VESSELS STEVEN to LAD ; SVG--> PDA , DIAGONAL, and OM;  Surgeon: Francies Homans, DO;  [facility];  Service: Cardiac Surgery    WI ECHO TRANSESOPHAG R-T 2D W/PRB IMG ACQUISJ I&R N/A 12/13/2019    Procedure: TRANSESOPHAGEAL ECHOCARDIOGRAM (SHEBA); Surgeon: Francies Homans, DO;  [facility];  Service: Cardiac Surgery    WI ENDOSCOPY W/VIDEO-ASST VEIN HARVEST,CABG Left 12/13/2019    Procedure: HARVEST VEIN ENDOSCOPIC ([address]); Surgeon: Francies Homans, DO;  [facility];  Service: Cardiac Surgery     Family History   Problem Relation Age of Onset    Heart failure Mother     Heart failure Father     Heart disease Father     Heart attack Father     Diabetes Other        Objective:  /80 (BP Location: Left arm, Patient Position: Sitting, Cuff Size: Standard)   Pulse 64   Temp 97 7 °F (36 5 °C) (Temporal)   Ht 5' 10" (1 778 m)   Wt 71 7 kg (158 lb)   SpO2 98%   BMI 22 67 kg/m²        Physical Exam  Constitutional:       Appearance: He is well-developed  HENT:      Right Ear: External ear normal    Eyes:      Conjunctiva/sclera: Conjunctivae normal       Pupils: Pupils are equal, round, and reactive to light     Neck:      Musculoskeletal: Normal range of motion  Thyroid: No thyromegaly  Vascular: No JVD  Cardiovascular:      Rate and Rhythm: Normal rate and regular rhythm  Heart sounds: Normal heart sounds  Pulmonary:      Breath sounds: Normal breath sounds  Abdominal:      General: Bowel sounds are normal       Palpations: Abdomen is soft  Musculoskeletal: Normal range of motion  Lymphadenopathy:      Cervical: No cervical adenopathy  Skin:     General: Skin is dry  Neurological:      Mental Status: He is alert and oriented to person, place, and time  Deep Tendon Reflexes: Reflexes are normal and symmetric  Psychiatric:         Behavior: Behavior normal          Thought Content:  Thought content normal          Judgment: Judgment normal

## 2020-10-12 ENCOUNTER — IMMUNIZATIONS (OUTPATIENT)
Dept: INTERNAL MEDICINE CLINIC | Age: 75
End: 2020-10-12
Payer: MEDICARE

## 2020-10-12 DIAGNOSIS — Z23 NEED FOR INFLUENZA VACCINATION: Primary | ICD-10-CM

## 2020-10-12 PROCEDURE — G0008 ADMIN INFLUENZA VIRUS VAC: HCPCS

## 2020-10-12 PROCEDURE — 90662 IIV NO PRSV INCREASED AG IM: CPT

## 2020-10-14 NOTE — PROGRESS NOTES
Exercise Session Detail allow for swallow between intakes/small sips/bites/oral hygiene/SMALL SIP NECTAR THICK LIQUIDS

## 2020-10-21 LAB
LEFT EYE DIABETIC RETINOPATHY: NORMAL
RIGHT EYE DIABETIC RETINOPATHY: NORMAL

## 2020-12-01 LAB
LEFT EYE DIABETIC RETINOPATHY: NORMAL
RIGHT EYE DIABETIC RETINOPATHY: NORMAL

## 2021-02-08 ENCOUNTER — OFFICE VISIT (OUTPATIENT)
Dept: CARDIOLOGY CLINIC | Facility: MEDICAL CENTER | Age: 76
End: 2021-02-08
Payer: MEDICARE

## 2021-02-08 VITALS
DIASTOLIC BLOOD PRESSURE: 92 MMHG | BODY MASS INDEX: 23.62 KG/M2 | HEIGHT: 70 IN | SYSTOLIC BLOOD PRESSURE: 158 MMHG | WEIGHT: 165 LBS | HEART RATE: 60 BPM

## 2021-02-08 DIAGNOSIS — E11.9 TYPE 2 DIABETES MELLITUS WITHOUT COMPLICATION, WITHOUT LONG-TERM CURRENT USE OF INSULIN (HCC): Chronic | ICD-10-CM

## 2021-02-08 DIAGNOSIS — I25.119 ATHEROSCLEROSIS OF NATIVE CORONARY ARTERY OF NATIVE HEART WITH ANGINA PECTORIS (HCC): ICD-10-CM

## 2021-02-08 DIAGNOSIS — I20.8 STABLE ANGINA (HCC): ICD-10-CM

## 2021-02-08 DIAGNOSIS — Z95.1 S/P CABG (CORONARY ARTERY BYPASS GRAFT): Primary | ICD-10-CM

## 2021-02-08 DIAGNOSIS — I10 BENIGN ESSENTIAL HYPERTENSION: Chronic | ICD-10-CM

## 2021-02-08 DIAGNOSIS — E78.00 HYPERCHOLESTEREMIA: Chronic | ICD-10-CM

## 2021-02-08 PROCEDURE — 93000 ELECTROCARDIOGRAM COMPLETE: CPT | Performed by: INTERNAL MEDICINE

## 2021-02-08 PROCEDURE — 99214 OFFICE O/P EST MOD 30 MIN: CPT | Performed by: INTERNAL MEDICINE

## 2021-02-08 RX ORDER — AMLODIPINE BESYLATE 5 MG/1
5 TABLET ORAL DAILY
Qty: 90 TABLET | Refills: 3 | Status: SHIPPED | OUTPATIENT
Start: 2021-02-08 | End: 2022-01-03 | Stop reason: SDUPTHER

## 2021-02-08 RX ORDER — METOPROLOL SUCCINATE 50 MG/1
50 TABLET, EXTENDED RELEASE ORAL DAILY
Qty: 90 TABLET | Refills: 3 | Status: SHIPPED | OUTPATIENT
Start: 2021-02-08 | End: 2022-03-28

## 2021-02-08 NOTE — PATIENT INSTRUCTIONS
Start amlodipine 5 mg daily, a blood pressure medication you were previously on, and I anticipated would eventually need although we have been able to keep you off of this as well as HCTZ since your bypass    We will also restructuring her metoprolol, and changed from tartrate twice a day to succinate once a day  You can start by taking the amlodipine and the metoprolol succinate in the morning, if any lightheadedness, consider removing the metoprolol succinate to bedtime

## 2021-02-08 NOTE — PROGRESS NOTES
[facility]  Office Progress Note  Danette Guzmán 70 y o  male MRN: [id number]        Problems    1  S/P CABG (coronary artery bypass graft)  POCT ECG   2  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 with angina pectoris ([facility] )     3  Hypercholesteremia     4  Stable angina ([facility] )     5  Benign essential hypertension     6  Type 2 diabetes mellitus without complication, without long-term current use of insulin ([facility])         Impression:    Frantz came to see me for an urgent visit today, at the request of his PCP, I see Frantz's wife is a patient as well       Hypertension  o Previously on HCTZ and amlodipine  o Metoprolol replaced these following his CABG  o Blood pressure had been controlled, but high today     Hyperlipidemia  o Well controlled as of 2/20, in need for follow-up labs     Type 2 diabetes  o A1c remains under excellent control with the A1c of 5 8     Right bundle branch block  o A new finding on EKG today in the absence of any new symptoms or any exam changes  o Will not pursue any new testing for this finding     Coronary artery disease  o Workup in 2019 for stable angina resulted in discovery of multivessel CAD, status post CABG (lima to LAD, SVG to D1, SVG to OM, SVG to PDA  o He continues to walk pretty extensively, sometimes as much as 3 miles, he has no exertional symptoms, and his EKG is without ischemic changes      Plan     Restart amlodipine 5 mg, and antihypertensive he was previously on in conjunction with HCTZ, will change his metoprolol tartrate 25 mg twice a day to metoprolol succinate 50 mg once a day   He has an appointment with his PCP due in the near future, and anticipate a full blood work panel including lipids for that appointment, so will not order anything additional today        HPI: Danette Guzmán is a 70y o  year old male with CAD discovered in the setting of stable exertional angina, with abnormal stress test, and status post CABG x4 with LIMA to LAD, SVG to D1, SVG to OM, SVG to PDA in 2019, hypertension, diabetes, hyperlipidemia presents for follow-up visit  He continues to walk pretty extensively, up to 3 miles, with no anginal symptoms  His EKG today shows no ischemic changes, but he does have new right bundle branch block  He denies edema, orthopnea, weight gain, dyspnea, chest pain, his blood pressure is uncontrolled, and he previously was on HCTZ and amlodipine prior to his CABG, has only required metoprolol since then, not surprised that metoprolol is no longer an adequate solitary blood pressure agent  Cholesterol was last assessed 2/20, and anticipates getting this reassessed this year through his family doctor, cholesterol has been under good control on atorvastatin  Review of Systems   Constitutional: Negative for appetite change, diaphoresis, fatigue and fever  Respiratory: Negative for chest tightness, shortness of breath and wheezing  Cardiovascular: Negative for chest pain, palpitations and leg swelling  Gastrointestinal: Negative for abdominal pain and blood in stool  Musculoskeletal: Negative for arthralgias and joint swelling  Skin: Negative for rash  Neurological: Negative for dizziness, syncope and light-headedness  Past Medical History:   Diagnosis Date    Chronic cough     RESOLVED: [id number]    Coronary artery disease     Diabetes mellitus (HCC)     Diabetic retinopathy ([facility] )     HLD (hyperlipidemia)     HTN (hypertension)      Past Surgical History:   Procedure Laterality Date    CARDIAC CATHETERIZATION  12/09/2019    HAND SURGERY      GA CABG, ARTERY-VEIN, THREE N/A 12/13/2019    Procedure: CORONARY ARTERY BYPASS GRAFT (CABG) 4 VESSELS STEVEN to LAD ; SVG--> PDA , DIAGONAL, and OM;  Surgeon: Stana Kussmaul, DO;  [facility];  Service: Cardiac Surgery    GA ECHO TRANSESOPHAG R-T 2D W/PRB IMG ACQUISJ I&R N/A 12/13/2019    Procedure: TRANSESOPHAGEAL ECHOCARDIOGRAM (SHEBA);   Surgeon: Carl Wu DO;  [facility];  Service: Cardiac Surgery    CA ENDOSCOPY W/VIDEO-ASST VEIN HARVEST,CABG Left 12/13/2019    Procedure: HARVEST VEIN ENDOSCOPIC ([address]); Surgeon: Carl Wu DO;  [facility];  Service: Cardiac Surgery     Social History     Substance and Sexual Activity   Alcohol Use Yes    Alcohol/week: 5 0 standard drinks    Types: 3 Glasses of wine, 1 Cans of beer, 1 Standard drinks or equivalent per week    Comment: occasional     Social History     Substance and Sexual Activity   Drug Use No     Social History     Tobacco Use   Smoking Status Never Smoker   Smokeless Tobacco Never Used     Family History   Problem Relation Age of Onset    Heart failure Mother     Heart failure Father     Heart disease Father     Heart attack Father     Diabetes Other        Allergies: Allergies   Allergen Reactions    Pollen Extract Allergic Rhinitis       Medications:     Current Outpatient Medications:     aspirin 81 MG tablet, Take 1 tablet (81 mg total) by mouth daily, Disp: 90 tablet, Rfl: 3    atorvastatin (LIPITOR) 80 mg tablet, Take 1 tablet (80 mg total) by mouth daily with dinner, Disp: 90 tablet, Rfl: 3    Glucosamine-Chondroitin (GLUCOSAMINE CHONDR COMPLEX PO), Take 1 capsule by mouth 2 (two) times a day, Disp: , Rfl:     glucose blood (ACCU-CHEK GUIDE) test strip, Test blood sugar before meals and at bedtime, Disp: 300 each, Rfl: 1    glucose monitoring kit (FREESTYLE) monitoring kit, 1 each by Does not apply route 4 (four) times a day (before meals and at bedtime) Check blood sugars before meals & at bedtime   Dx DM E11 9, Disp: 1 each, Rfl: 0    metFORMIN (GLUCOPHAGE) 1000 MG tablet, Take 1 tablet (1,000 mg total) by mouth 2 (two) times a day, Disp: 180 tablet, Rfl: 1    metoprolol tartrate (LOPRESSOR) 25 mg tablet, Take 1 tablet (25 mg total) by mouth every 12 (twelve) hours, Disp: 180 tablet, Rfl: 1    Omega-3 Fatty Acids (FISH OIL) 1200 MG CAPS, Take 1 capsule by mouth daily, Disp: , Rfl:     saccharomyces boulardii (FLORASTOR) 250 mg capsule, Take 250 mg by mouth 2 (two) times a day, Disp: , Rfl:       Vitals:    02/08/21 0854   BP: 158/92   Pulse: 60     Weight (last 2 days)     Date/Time   Weight    02/08/21 0854   74 8 (165)            Physical Exam  Constitutional:       General: He is not in acute distress  Appearance: He is not diaphoretic  HENT:      Head: Normocephalic and atraumatic  Eyes:      General: No scleral icterus  Conjunctiva/sclera: Conjunctivae normal    Neck:      Musculoskeletal: Normal range of motion  Vascular: No JVD  Cardiovascular:      Rate and Rhythm: Normal rate and regular rhythm  Heart sounds: Normal heart sounds  No murmur  Pulmonary:      Effort: Pulmonary effort is normal  No respiratory distress  Breath sounds: Normal breath sounds  No decreased breath sounds, wheezing, rhonchi or rales  Musculoskeletal:      Right lower leg: Normal  No edema  Left lower leg: Normal  No edema  Skin:     General: Skin is warm and dry  Neurological:      Mental Status: He is alert and oriented to person, place, and time  Laboratory Studies:    Laboratory testing personally reviewed    Cardiac testing:     EKG reviewed personally:    11/21/2019-normal  2/21-normal sinus rhythm, right bundle branch block, cannot rule out inferior infarct    Cardiac catheterization  12/19-Occluded RCA, 99% om, significant distal left main disease/LAD disease as well    Echocardiogram  12/19-EF 60%, trace valve disease    Jh Foss MD    Portions of the record may have been created with voice recognition software  Occasional wrong word or "sound a like" substitutions may have occurred due to the inherent limitations of voice recognition software  Read the chart carefully and recognize, using context, where substitutions have occurred

## 2021-02-13 DIAGNOSIS — Z23 ENCOUNTER FOR IMMUNIZATION: ICD-10-CM

## 2021-03-09 LAB
LEFT EYE DIABETIC RETINOPATHY: NORMAL
RIGHT EYE DIABETIC RETINOPATHY: NORMAL

## 2021-03-22 ENCOUNTER — APPOINTMENT (OUTPATIENT)
Dept: LAB | Facility: CLINIC | Age: 76
End: 2021-03-22
Payer: MEDICARE

## 2021-03-22 DIAGNOSIS — E11.9 TYPE 2 DIABETES MELLITUS (HCC): Chronic | ICD-10-CM

## 2021-03-22 LAB
EST. AVERAGE GLUCOSE BLD GHB EST-MCNC: 126 MG/DL
GLUCOSE P FAST SERPL-MCNC: 111 MG/DL (ref 65–99)
HBA1C MFR BLD: 6 %

## 2021-03-22 PROCEDURE — 83036 HEMOGLOBIN GLYCOSYLATED A1C: CPT

## 2021-03-22 PROCEDURE — 36415 COLL VENOUS BLD VENIPUNCTURE: CPT

## 2021-03-22 PROCEDURE — 82947 ASSAY GLUCOSE BLOOD QUANT: CPT

## 2021-03-25 DIAGNOSIS — E11.9 TYPE 2 DIABETES MELLITUS (HCC): Chronic | ICD-10-CM

## 2021-03-29 ENCOUNTER — OFFICE VISIT (OUTPATIENT)
Dept: INTERNAL MEDICINE CLINIC | Age: 76
End: 2021-03-29
Payer: MEDICARE

## 2021-03-29 VITALS
SYSTOLIC BLOOD PRESSURE: 112 MMHG | DIASTOLIC BLOOD PRESSURE: 64 MMHG | TEMPERATURE: 97.7 F | BODY MASS INDEX: 22.54 KG/M2 | OXYGEN SATURATION: 98 % | HEIGHT: 71 IN | WEIGHT: 161 LBS | HEART RATE: 77 BPM

## 2021-03-29 DIAGNOSIS — E11.3292 MILD NONPROLIFERATIVE DIABETIC RETINOPATHY OF LEFT EYE WITHOUT MACULAR EDEMA ASSOCIATED WITH TYPE 2 DIABETES MELLITUS (HCC): ICD-10-CM

## 2021-03-29 DIAGNOSIS — E78.00 HYPERCHOLESTEREMIA: Chronic | ICD-10-CM

## 2021-03-29 DIAGNOSIS — E11.9 TYPE 2 DIABETES MELLITUS (HCC): Chronic | ICD-10-CM

## 2021-03-29 DIAGNOSIS — I25.119 ATHEROSCLEROSIS OF NATIVE CORONARY ARTERY OF NATIVE HEART WITH ANGINA PECTORIS (HCC): ICD-10-CM

## 2021-03-29 DIAGNOSIS — I10 BENIGN ESSENTIAL HYPERTENSION: Primary | Chronic | ICD-10-CM

## 2021-03-29 LAB
CREAT UR-MCNC: 116 MG/DL
MICROALBUMIN UR-MCNC: 17.8 MG/L (ref 0–20)
MICROALBUMIN/CREAT 24H UR: 15 MG/G CREATININE (ref 0–30)

## 2021-03-29 PROCEDURE — 99214 OFFICE O/P EST MOD 30 MIN: CPT | Performed by: INTERNAL MEDICINE

## 2021-03-29 PROCEDURE — 82570 ASSAY OF URINE CREATININE: CPT | Performed by: INTERNAL MEDICINE

## 2021-03-29 PROCEDURE — 82043 UR ALBUMIN QUANTITATIVE: CPT | Performed by: INTERNAL MEDICINE

## 2021-03-29 RX ORDER — CHOLECALCIFEROL (VITAMIN D3) 125 MCG
CAPSULE ORAL DAILY
COMMUNITY

## 2021-03-29 NOTE — PROGRESS NOTES
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 ([facility] )    Other orders  -     Cholecalciferol (Vitamin D) 125 MCG (5000 UT) CAPS; Take by mouth daily  -     Calcium Carbonate (CALCIUM 500 PO); Take by mouth daily gummies             Subjective:   Chief Complaint   Patient presents with    Follow-up     6 month fu -labs 3/22/21 -DM2        Patient ID: Tonya Matthew is a 70 y o  male  HPI  Very pleasant 70 years young gentleman who is here today for the regular follow-up  Coronary artery disease stable no chest pain no shortness of breath followed up by the Cardiology will check the lipid panel  Hypertension is much better controlled after seen by the cardiologist a new blood pressure medication was given no side effects of the medication  Type 2 diabetes mellitus is very well controlled with hemoglobin A1c 6 0 history of or diabetic retinopathy followed up by the Ophthalmology he has problems with his both eyes and he is getting injection into both eyes  Will check for urine for microalbumin and next time he will be getting his the fasting blood sugar and hemoglobin A1c with lipid panel  Overall the patient doing well doing some exercises regularly    Weight is 161 with the BMI of 22 the BMI is better than before and so as his weight is decreased by 4 lb    The following portions of the patient's history were reviewed and updated as appropriate: allergies, current medications, past family history, past medical history, past social history, past surgical history and problem list     Review of Systems   Constitutional: Negative for appetite change, fatigue and fever  HENT: Negative for congestion, ear pain, hearing loss, nosebleeds, sneezing, tinnitus and voice change  Eyes: Negative for pain, discharge and redness  Respiratory: Negative for cough, chest tightness and wheezing  Cardiovascular: Negative for chest pain, palpitations and leg swelling  Gastrointestinal: Negative for abdominal pain, blood in stool, constipation, diarrhea, nausea and vomiting  Genitourinary: Negative for difficulty urinating, dysuria, hematuria and urgency  Musculoskeletal: Negative for arthralgias, back pain, gait problem and joint swelling  Skin: Negative for rash and wound  Allergic/Immunologic: Negative for environmental allergies  Neurological: Negative for dizziness, tremors, seizures, weakness, light-headedness and numbness  Hematological: Negative for adenopathy  Does not bruise/bleed easily  Psychiatric/Behavioral: Negative for behavioral problems and confusion  The patient is not nervous/anxious            Past Medical History:   Diagnosis Date    Chronic cough     RESOLVED: [id number]    Coronary artery disease     Diabetes mellitus (HCC)     Diabetic retinopathy (HCC)     HLD (hyperlipidemia)     HTN (hypertension)          Current Outpatient Medications:     amLODIPine (NORVASC) 5 mg tablet, Take 1 tablet (5 mg total) by mouth daily, Disp: 90 tablet, Rfl: 3    aspirin 81 MG tablet, Take 1 tablet (81 mg total) by mouth daily, Disp: 90 tablet, Rfl: 3    atorvastatin (LIPITOR) 80 mg tablet, Take 1 tablet (80 mg total) by mouth daily with dinner, Disp: 90 tablet, Rfl: 3    Calcium Carbonate (CALCIUM 500 PO), Take by mouth daily gummies, Disp: , Rfl:     Cholecalciferol (Vitamin D) 125 MCG (5000 UT) CAPS, Take by mouth daily, Disp: , Rfl:    Glucosamine-Chondroitin (GLUCOSAMINE CHONDR COMPLEX PO), Take 1 capsule by mouth 2 (two) times a day, Disp: , Rfl:     glucose blood (ACCU-CHEK GUIDE) test strip, Test blood sugar before meals and at bedtime, Disp: 300 each, Rfl: 1    glucose monitoring kit (FREESTYLE) monitoring kit, 1 each by Does not apply route 4 (four) times a day (before meals and at bedtime) Check blood sugars before meals & at bedtime  Dx DM E11 9, Disp: 1 each, Rfl: 0    metFORMIN (GLUCOPHAGE) 1000 MG tablet, Take 1 tablet (1,000 mg total) by mouth 2 (two) times a day, Disp: 180 tablet, Rfl: 1    metoprolol succinate (TOPROL-XL) 50 mg 24 hr tablet, Take 1 tablet (50 mg total) by mouth daily, Disp: 90 tablet, Rfl: 3    Omega-3 Fatty Acids (FISH OIL) 1200 MG CAPS, Take 1 capsule by mouth daily, Disp: , Rfl:     saccharomyces boulardii (FLORASTOR) 250 mg capsule, Take 250 mg by mouth 2 (two) times a day, Disp: , Rfl:     Allergies   Allergen Reactions    Pollen Extract Allergic Rhinitis       Social History   Past Surgical History:   Procedure Laterality Date    CARDIAC CATHETERIZATION  12/09/2019    HAND SURGERY      VA CABG, ARTERY-VEIN, THREE N/A 12/13/2019    Procedure: CORONARY ARTERY BYPASS GRAFT (CABG) 4 VESSELS STEVEN to LAD ; SVG--> PDA , DIAGONAL, and OM;  Surgeon: Glo Carmona DO;  [facility];  Service: Cardiac Surgery    VA ECHO TRANSESOPHAG R-T 2D W/PRB IMG ACQUISJ I&R N/A 12/13/2019    Procedure: TRANSESOPHAGEAL ECHOCARDIOGRAM (SHEBA); Surgeon: Glo Carmona DO;  [facility];  Service: Cardiac Surgery    VA ENDOSCOPY W/VIDEO-ASST VEIN HARVEST,CABG Left 12/13/2019    Procedure: HARVEST VEIN ENDOSCOPIC ([address]);   Surgeon: Glo Carmona DO;  [facility];  Service: Cardiac Surgery     Family History   Problem Relation Age of Onset    Heart failure Mother     Heart failure Father     Heart disease Father     Heart attack Father     Diabetes Other        Objective:  /64 (BP Location: Left arm, Patient Position: Sitting, Cuff Size: Standard)   Pulse 77   Temp 97 7 °F (36 5 °C) (Temporal)   Ht 5' 11 25" (1 81 m) Comment: shoes on  Wt 73 kg (161 lb) Comment: shoes on  SpO2 98%   BMI 22 30 kg/m²        Physical Exam  Constitutional:       Appearance: He is well-developed  HENT:      Right Ear: External ear normal    Eyes:      Conjunctiva/sclera: Conjunctivae normal       Pupils: Pupils are equal, round, and reactive to light  Neck:      Musculoskeletal: Normal range of motion  Thyroid: No thyromegaly  Vascular: No JVD  Cardiovascular:      Rate and Rhythm: Normal rate and regular rhythm  Heart sounds: Normal heart sounds  Pulmonary:      Breath sounds: Normal breath sounds  Abdominal:      General: Bowel sounds are normal       Palpations: Abdomen is soft  Musculoskeletal: Normal range of motion  Lymphadenopathy:      Cervical: No cervical adenopathy  Skin:     General: Skin is dry  Neurological:      Mental Status: He is alert and oriented to person, place, and time  Deep Tendon Reflexes: Reflexes are normal and symmetric     Psychiatric:         Behavior: Behavior normal

## 2021-08-10 ENCOUNTER — APPOINTMENT (OUTPATIENT)
Dept: LAB | Facility: CLINIC | Age: 76
End: 2021-08-10
Payer: MEDICARE

## 2021-08-10 DIAGNOSIS — R97.20 PSA ELEVATION: ICD-10-CM

## 2021-08-10 DIAGNOSIS — N40.0 BENIGN PROSTATIC HYPERPLASIA, UNSPECIFIED WHETHER LOWER URINARY TRACT SYMPTOMS PRESENT: ICD-10-CM

## 2021-08-10 LAB — PSA SERPL-MCNC: 6.7 NG/ML (ref 0–4)

## 2021-08-10 PROCEDURE — G0103 PSA SCREENING: HCPCS

## 2021-08-10 PROCEDURE — 36415 COLL VENOUS BLD VENIPUNCTURE: CPT

## 2021-08-19 ENCOUNTER — OFFICE VISIT (OUTPATIENT)
Dept: UROLOGY | Facility: CLINIC | Age: 76
End: 2021-08-19
Payer: MEDICARE

## 2021-08-19 VITALS
HEIGHT: 71 IN | WEIGHT: 159.2 LBS | SYSTOLIC BLOOD PRESSURE: 128 MMHG | DIASTOLIC BLOOD PRESSURE: 72 MMHG | BODY MASS INDEX: 22.29 KG/M2

## 2021-08-19 DIAGNOSIS — R97.20 ELEVATED PSA: Primary | ICD-10-CM

## 2021-08-19 PROCEDURE — 99213 OFFICE O/P EST LOW 20 MIN: CPT | Performed by: PHYSICIAN ASSISTANT

## 2021-08-19 NOTE — PROGRESS NOTES
UROLOGY PROGRESS NOTE   Patient Identifiers: Fawn Muhammad (MRN [id number])  Date of Service: 8/19/2021    Subjective:    82-year-old man history of elevated PSA  He has had 2 previous prostate biopsies benign  Multiparametric MRI in 2018 showing PI-RADS category 2  Prostate size is 100 g  Current PSA is 6 7  High PSA is greater than 10  He has mild outlet complaints with 2-3 times per night nocturia  Reason for visit:  Elevated PSA follow-up    Objective:     VITALS:    Vitals:    08/19/21 1325   BP: 128/72           LABS:  Lab Results   Component Value Date    HGB 13 0 02/03/2020    HCT 40 9 02/03/2020    WBC 7 39 02/03/2020     02/03/2020   ]    Lab Results   Component Value Date     05/14/2014    K 4 0 09/09/2020     (H) 09/09/2020    CO2 29 09/09/2020    BUN 19 09/09/2020    CREATININE 0 84 09/09/2020    CALCIUM 8 9 09/09/2020    GLUCOSE 100 12/13/2019   ]        INPATIENT MEDS:    Current Outpatient Medications:     amLODIPine (NORVASC) 5 mg tablet, Take 1 tablet (5 mg total) by mouth daily, Disp: 90 tablet, Rfl: 3    aspirin 81 MG tablet, Take 1 tablet (81 mg total) by mouth daily, Disp: 90 tablet, Rfl: 3    atorvastatin (LIPITOR) 80 mg tablet, Take 1 tablet (80 mg total) by mouth daily with dinner, Disp: 90 tablet, Rfl: 3    Calcium Carbonate (CALCIUM 500 PO), Take by mouth daily gummies, Disp: , Rfl:     Cholecalciferol (Vitamin D) 125 MCG (5000 UT) CAPS, Take by mouth daily, Disp: , Rfl:     Glucosamine-Chondroitin (GLUCOSAMINE CHONDR COMPLEX PO), Take 1 capsule by mouth 2 (two) times a day, Disp: , Rfl:     glucose blood (ACCU-CHEK GUIDE) test strip, Test blood sugar before meals and at bedtime, Disp: 300 each, Rfl: 1    glucose monitoring kit (FREESTYLE) monitoring kit, 1 each by Does not apply route 4 (four) times a day (before meals and at bedtime) Check blood sugars before meals & at bedtime   Dx DM E11 9, Disp: 1 each, Rfl: 0    metFORMIN (GLUCOPHAGE) 1000 MG tablet, Take 1 tablet (1,000 mg total) by mouth 2 (two) times a day, Disp: 180 tablet, Rfl: 1    metoprolol succinate (TOPROL-XL) 50 mg 24 hr tablet, Take 1 tablet (50 mg total) by mouth daily, Disp: 90 tablet, Rfl: 3    Omega-3 Fatty Acids (FISH OIL) 1200 MG CAPS, Take 1 capsule by mouth daily, Disp: , Rfl:     saccharomyces boulardii (FLORASTOR) 250 mg capsule, Take 250 mg by mouth 2 (two) times a day, Disp: , Rfl:       Physical Exam:   /72 (BP Location: Left arm, Patient Position: Sitting, Cuff Size: Adult)   Ht 5' 11" (1 803 m)   Wt 72 2 kg (159 lb 3 2 oz)   BMI 22 20 kg/m²   GEN: no acute distress    RESP: breathing comfortably with no accessory muscle use    ABD: soft, non-tender, non-distended   INCISION:    EXT: no significant peripheral edema   (Male): Penis circumcised, phallus normal, meatus patent  Testicles descended into scrotum bilaterally without masses nor tenderness  No inguinal hernias bilaterally  ROGERIO: Prostate is enlarged at 80+ grams  The prostate is not boggy  The prostate is not tender  No nodules noted      RADIOLOGY:    none     Assessment:    1   Elevated PSA     Plan:   - his PSA is been stable within his range  - follow-up in 1 year with PSA prior to visit  - we discussed Flomax if he desires in the future for his outlet symptoms  -

## 2021-09-09 DIAGNOSIS — Z95.1 S/P CABG (CORONARY ARTERY BYPASS GRAFT): ICD-10-CM

## 2021-09-09 DIAGNOSIS — E11.9 TYPE 2 DIABETES MELLITUS (HCC): Chronic | ICD-10-CM

## 2021-09-09 RX ORDER — ATORVASTATIN CALCIUM 80 MG/1
80 TABLET, FILM COATED ORAL
Qty: 90 TABLET | Refills: 1 | Status: SHIPPED | OUTPATIENT
Start: 2021-09-09 | End: 2022-03-23 | Stop reason: SDUPTHER

## 2021-09-20 ENCOUNTER — APPOINTMENT (OUTPATIENT)
Dept: LAB | Facility: CLINIC | Age: 76
End: 2021-09-20
Payer: MEDICARE

## 2021-09-20 DIAGNOSIS — E11.9 TYPE 2 DIABETES MELLITUS (HCC): Chronic | ICD-10-CM

## 2021-09-20 DIAGNOSIS — E78.00 HYPERCHOLESTEREMIA: Chronic | ICD-10-CM

## 2021-09-20 DIAGNOSIS — I25.119 ATHEROSCLEROSIS OF NATIVE CORONARY ARTERY OF NATIVE HEART WITH ANGINA PECTORIS (HCC): ICD-10-CM

## 2021-09-20 LAB
CHOLEST SERPL-MCNC: 122 MG/DL (ref 50–200)
EST. AVERAGE GLUCOSE BLD GHB EST-MCNC: 128 MG/DL
GLUCOSE P FAST SERPL-MCNC: 107 MG/DL (ref 65–99)
HBA1C MFR BLD: 6.1 %
HDLC SERPL-MCNC: 40 MG/DL
LDLC SERPL CALC-MCNC: 72 MG/DL (ref 0–100)
NONHDLC SERPL-MCNC: 82 MG/DL
TRIGL SERPL-MCNC: 50 MG/DL

## 2021-09-20 PROCEDURE — 36415 COLL VENOUS BLD VENIPUNCTURE: CPT

## 2021-09-20 PROCEDURE — 82947 ASSAY GLUCOSE BLOOD QUANT: CPT

## 2021-09-20 PROCEDURE — 80061 LIPID PANEL: CPT

## 2021-09-20 PROCEDURE — 83036 HEMOGLOBIN GLYCOSYLATED A1C: CPT

## 2021-09-29 ENCOUNTER — OFFICE VISIT (OUTPATIENT)
Dept: INTERNAL MEDICINE CLINIC | Age: 76
End: 2021-09-29
Payer: MEDICARE

## 2021-09-29 VITALS
TEMPERATURE: 98.3 F | OXYGEN SATURATION: 97 % | WEIGHT: 162.3 LBS | DIASTOLIC BLOOD PRESSURE: 80 MMHG | HEART RATE: 64 BPM | BODY MASS INDEX: 22.72 KG/M2 | HEIGHT: 71 IN | SYSTOLIC BLOOD PRESSURE: 134 MMHG

## 2021-09-29 DIAGNOSIS — Z00.00 MEDICARE ANNUAL WELLNESS VISIT, SUBSEQUENT: ICD-10-CM

## 2021-09-29 DIAGNOSIS — E11.9 TYPE 2 DIABETES MELLITUS (HCC): Chronic | ICD-10-CM

## 2021-09-29 DIAGNOSIS — E78.00 HYPERCHOLESTEREMIA: Chronic | ICD-10-CM

## 2021-09-29 DIAGNOSIS — Z23 NEED FOR IMMUNIZATION AGAINST INFLUENZA: ICD-10-CM

## 2021-09-29 DIAGNOSIS — I10 BENIGN ESSENTIAL HYPERTENSION: Primary | Chronic | ICD-10-CM

## 2021-09-29 DIAGNOSIS — R97.20 PSA ELEVATION: ICD-10-CM

## 2021-09-29 PROCEDURE — 90662 IIV NO PRSV INCREASED AG IM: CPT

## 2021-09-29 PROCEDURE — G0439 PPPS, SUBSEQ VISIT: HCPCS | Performed by: STUDENT IN AN ORGANIZED HEALTH CARE EDUCATION/TRAINING PROGRAM

## 2021-09-29 PROCEDURE — G0008 ADMIN INFLUENZA VIRUS VAC: HCPCS

## 2021-09-29 PROCEDURE — 1123F ACP DISCUSS/DSCN MKR DOCD: CPT

## 2021-09-29 PROCEDURE — 99214 OFFICE O/P EST MOD 30 MIN: CPT | Performed by: STUDENT IN AN ORGANIZED HEALTH CARE EDUCATION/TRAINING PROGRAM

## 2021-09-29 NOTE — PROGRESS NOTES
Clinic Visit Note  Katie Kitchen 70 y o  male   MRN: [id number]    Assessment and Plan      Diagnoses and all orders for this visit:    Benign essential hypertension  Stable, recently saw heart doctor, continue amlodipine, aspirin, beta-blocker therapy    Type 2 diabetes mellitus (HCC)  Controlled, hemoglobin A1c 6 1%, refill metformin, diabetic foot exam unremarkable today  -     metFORMIN (GLUCOPHAGE) 1000 MG tablet; Take 1 tablet (1,000 mg total) by mouth 2 (two) times a day    Hypercholesteremia  Continue atorvastatin 80 mg q day, controlled    PSA elevation  No urinary symptoms, no red flag symptoms, follow-up PSA pending    My impressions and treatment recommendations were discussed in detail with the patient who verbalized understanding and had no further questions  Discharge instructions were provided  Subjective     Chief Complaint:  Follow-up lab work    History of Present Illness:    Patient is a 24-year-old gentleman presenting for annual wellness check as well as review of medications  No medication changes since/visit  He did get his hemoglobin A1c which is 6 1%, we did discuss that he does not need to routinely check glucose, we are comfortable with hemoglobin A1c less than 7% at this time  No other symptoms on exam     The following portions of the patient's history were reviewed and updated as appropriate: allergies, current medications, past family history, past medical history, past social history, past surgical history and problem list     REVIEW OF SYSTEMS:  A complete 12-point review of systems is negative other than that noted in the HPI  Review of Systems   Constitutional: Negative for chills, fatigue and fever  Respiratory: Negative for shortness of breath and wheezing  Cardiovascular: Negative for chest pain, palpitations and leg swelling  Gastrointestinal: Negative for constipation, diarrhea, nausea and vomiting     Genitourinary: Negative for difficulty urinating, frequency and hematuria  Musculoskeletal: Negative for back pain and neck pain  Neurological: Negative for dizziness and headaches  Psychiatric/Behavioral: Negative for agitation, behavioral problems and confusion  Current Outpatient Medications:     amLODIPine (NORVASC) 5 mg tablet, Take 1 tablet (5 mg total) by mouth daily, Disp: 90 tablet, Rfl: 3    aspirin 81 MG tablet, Take 1 tablet (81 mg total) by mouth daily, Disp: 90 tablet, Rfl: 3    atorvastatin (LIPITOR) 80 mg tablet, Take 1 tablet (80 mg total) by mouth daily with dinner, Disp: 90 tablet, Rfl: 1    Calcium Carbonate (CALCIUM 500 PO), Take by mouth daily gummies, Disp: , Rfl:     Cholecalciferol (Vitamin D) 125 MCG (5000 UT) CAPS, Take by mouth daily, Disp: , Rfl:     Glucosamine-Chondroitin (GLUCOSAMINE CHONDR COMPLEX PO), Take 1 capsule by mouth 2 (two) times a day, Disp: , Rfl:     metFORMIN (GLUCOPHAGE) 1000 MG tablet, Take 1 tablet (1,000 mg total) by mouth 2 (two) times a day, Disp: 180 tablet, Rfl: 1    metoprolol succinate (TOPROL-XL) 50 mg 24 hr tablet, Take 1 tablet (50 mg total) by mouth daily, Disp: 90 tablet, Rfl: 3    Omega-3 Fatty Acids (FISH OIL) 1200 MG CAPS, Take 1 capsule by mouth daily, Disp: , Rfl:     saccharomyces boulardii (FLORASTOR) 250 mg capsule, Take 250 mg by mouth 2 (two) times a day, Disp: , Rfl:     glucose blood (ACCU-CHEK GUIDE) test strip, Test blood sugar before meals and at bedtime (Patient not taking: Reported on 9/29/2021), Disp: 300 each, Rfl: 1    glucose monitoring kit (FREESTYLE) monitoring kit, 1 each by Does not apply route 4 (four) times a day (before meals and at bedtime) Check blood sugars before meals & at bedtime   Dx DM E11 9 (Patient not taking: Reported on 9/29/2021), Disp: 1 each, Rfl: 0  Past Medical History:   Diagnosis Date    Chronic cough     RESOLVED: [id number]    Coronary artery disease     [facility]     [facility]     HLD (hyperlipidemia)     HTN (hypertension)      Past Surgical History:   Procedure Laterality Date    CARDIAC CATHETERIZATION  12/09/2019    HAND SURGERY      SD CABG, ARTERY-VEIN, THREE N/A 12/13/2019    Procedure: CORONARY ARTERY BYPASS GRAFT (CABG) 4 VESSELS STEVEN to LAD ; SVG--> PDA , DIAGONAL, and OM;  Surgeon: Marisa Orozco DO;  [facility];  Service: Cardiac Surgery    SD ECHO TRANSESOPHAG R-T 2D W/PRB IMG ACQUISJ I&R N/A 12/13/2019    Procedure: TRANSESOPHAGEAL ECHOCARDIOGRAM (SHEBA); Surgeon: Marisa Orozco DO;  [facility];  Service: Cardiac Surgery    SD ENDOSCOPY W/VIDEO-ASST VEIN HARVEST,CABG Left 12/13/2019    Procedure: HARVEST VEIN ENDOSCOPIC ([address]); Surgeon: Marisa Orozco DO;  [facility];  Service: Cardiac Surgery     Social History     Socioeconomic History    Marital status: /Civil Union     Spouse name: Not on file    Number of children: Not on file    Years of education: Not on file    Highest education level: Not on file   Occupational History    Not on file   Tobacco Use    Smoking status: Never Smoker    Smokeless tobacco: Never Used   Vaping Use    Vaping Use: Never used   Substance and Sexual Activity    Alcohol use: Yes     Alcohol/week: 5 0 standard drinks     Types: 3 Glasses of wine, 1 Cans of beer, 1 Standard drinks or equivalent per week     Comment: occasional    Drug use: No    Sexual activity: Not Currently     Partners: Female   Other Topics Concern    Not on file   Social History Narrative    Not on file     Social Determinants of Health     Financial Resource Strain:     Difficulty of Paying Living Expenses:    Food Insecurity:     Worried About Running Out of Food in the Last Year:     Ran Out of Food in the Last Year:    Transportation Needs:     Lack of Transportation (Medical):      Lack of Transportation (Non-Medical):    Physical Activity:     Days of Exercise per Week:     Minutes of Exercise per Session: Stress:     Feeling of Stress :    Social Connections:     Frequency of Communication with Friends and Family:     Frequency of Social Gatherings with Friends and Family:     Attends Evangelical Services:     Active Member of Clubs or Organizations:     Attends Club or Organization Meetings:     Marital Status:    Intimate Partner Violence:     Fear of Current or Ex-Partner:     Emotionally Abused:     Physically Abused:     Sexually Abused:      Family History   Problem Relation Age of Onset    Heart failure Mother     Heart failure Father     Heart disease Father     Heart attack Father     Diabetes Other      Allergies   Allergen Reactions    Pollen Extract Allergic Rhinitis       Objective     Vitals:    09/29/21 0859   BP: 134/80   BP Location: Left arm   Patient Position: Sitting   Cuff Size: Standard   Pulse: 64   Temp: 98 3 °F (36 8 °C)   TempSrc: Temporal   SpO2: 97%   Weight: 73 6 kg (162 lb 4 8 oz)   Height: 5' 11" (1 803 m)       Physical exam:     GENERAL: NAD, pleasant   HEENT:  NC/AT, PERRL, EOMI, no scleral icterus  CARDIAC:  RRR, +S1/S2, no S3/S4 heard, no m/g/r  PULMONARY:  CTA B/L, no wheezing/rales/rhonci, non-labored breathing  ABDOMEN:  Soft, NT/ND, no rebound/guarding/rigidity  Extremities:  No edema, cyanosis, or clubbing  NEUROLOGIC: Grossly intact, No focal deficits  SKIN:  No rashes or erythema noted on exposed skin  Raised mole on right temporal area, continue to monitor  Psych: Normal affect    ==  PLEASE NOTE:  This encounter was completed utilizing the [employer] Direct Speech Voice Recognition Software  Grammatical errors, random word insertions, pronoun errors and incomplete sentences are occasional consequences of the system due to software limitations, ambient noise and hardware issues  These may be missed by proof reading prior to affixing electronic signature   Any questions or concerns about the content, text or information contained within the body of this dictation should be directly addressed to the physician for clarification  Please do not hesitate to call me directly if you have any any questions or concerns      DO Lila Perez Internal Medicine PGY-3

## 2021-09-29 NOTE — PROGRESS NOTES
Assessment and Plan:     Problem List Items Addressed This Visit        Endocrine    Type 2 diabetes mellitus (HCC) (Chronic)    Relevant Medications    metFORMIN (GLUCOPHAGE) 1000 MG tablet       Cardiovascular and Mediastinum    Benign essential hypertension - Primary (Chronic)       Other    Hypercholesteremia (Chronic)    PSA elevation           Preventive health issues were discussed with patient, and age appropriate screening tests were ordered as noted in patient's After Visit Summary  Personalized health advice and appropriate referrals for health education or preventive services given if needed, as noted in patient's After Visit Summary  History of Present Illness:     Patient presents for Medicare Annual Wellness visit    Patient Care Team:  Olivier Eastman MD as PCP - General  Lucila Espinoza MD     Problem List:     Patient Active Problem List   Diagnosis    Benign essential hypertension    Type [facility] diabetes mellitus [facility]    PSA elevation    Hypercholesteremia    [facility]    Seasonal allergies    Stable angina [facility]    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 ([facility]    Overweight (BMI 25 0-29  9)      Past Medical and Surgical History:     Past Medical History:   Diagnosis Date    Chronic cough     RESOLVED: [id number]    Coronary artery disease     Diabetes mellitus (HCC)     [facility]     HLD (hyperlipidemia)     HTN (hypertension)      Past Surgical History:   Procedure Laterality Date    CARDIAC CATHETERIZATION  12/09/2019    HAND SURGERY      MI CABG, ARTERY-VEIN, THREE N/A 12/13/2019    Procedure: CORONARY ARTERY BYPASS GRAFT (CABG) 4 VESSELS STEVEN to LAD ; SVG--> PDA , DIAGONAL, and OM;  Surgeon: Seda Lang DO;  [facility];  Service: Cardiac Surgery    MI ECHO TRANSESOPHAG R-T 2D W/PRB IMG ACQUISJ I&R N/A 12/13/2019    Procedure: TRANSESOPHAGEAL ECHOCARDIOGRAM (SHEBA);   Surgeon: Seda Lang DO; [facility];  Service: Cardiac Surgery    [facility] ENDOSCOPY W/VIDEO-ASST VEIN HARVEST,CABG Left 12/13/2019    Procedure: HARVEST VEIN ENDOSCOPIC ([facility]); Surgeon: Emmett Soares DO;  [facility];  Service: Cardiac Surgery      Family History:     Family History   Problem Relation Age of Onset    Heart failure Mother     Heart failure Father     Heart disease Father     Heart attack Father     Diabetes Other       Social History:     Social History     Socioeconomic History    Marital status: /Civil Union     Spouse name: None    Number of children: None    Years of education: None    Highest education level: None   Occupational History    None   Tobacco Use    Smoking status: Never Smoker    Smokeless tobacco: Never Used   Vaping Use    Vaping Use: Never used   Substance and Sexual Activity    Alcohol use: Yes     Alcohol/week: 5 0 standard drinks     Types: 3 Glasses of wine, 1 Cans of beer, 1 Standard drinks or equivalent per week     Comment: occasional    Drug use: No    Sexual activity: Not Currently     Partners: Female   Other Topics Concern    None   Social History Narrative    None     Social Determinants of Health     Financial Resource Strain:     Difficulty of Paying Living Expenses:    Food Insecurity:     Worried About Running Out of Food in the Last Year:     Ran Out of Food in the Last Year:    Transportation Needs:     Lack of Transportation (Medical):      Lack of Transportation (Non-Medical):    Physical Activity:     Days of Exercise per Week:     Minutes of Exercise per Session:    Stress:     Feeling of Stress :    Social Connections:     Frequency of Communication with Friends and Family:     Frequency of Social Gatherings with Friends and Family:     Attends Restorationism Services:     Active Member of Clubs or Organizations:     Attends Club or Organization Meetings:     Marital Status:    Intimate Partner Violence:     Fear of Current or Ex-Partner:     Emotionally Abused:     Physically Abused:     Sexually Abused:       Medications and Allergies:     Current Outpatient Medications   Medication Sig Dispense Refill    amLODIPine (NORVASC) 5 mg tablet Take 1 tablet (5 mg total) by mouth daily 90 tablet 3    aspirin 81 MG tablet Take 1 tablet (81 mg total) by mouth daily 90 tablet 3    atorvastatin (LIPITOR) 80 mg tablet Take 1 tablet (80 mg total) by mouth daily with dinner 90 tablet 1    Calcium Carbonate (CALCIUM 500 PO) Take by mouth daily gummies      Cholecalciferol (Vitamin D) 125 MCG (5000 UT) CAPS Take by mouth daily      Glucosamine-Chondroitin (GLUCOSAMINE CHONDR COMPLEX PO) Take 1 capsule by mouth 2 (two) times a day      metFORMIN (GLUCOPHAGE) 1000 MG tablet Take 1 tablet (1,000 mg total) by mouth 2 (two) times a day 180 tablet 1    metoprolol succinate (TOPROL-XL) 50 mg 24 hr tablet Take 1 tablet (50 mg total) by mouth daily 90 tablet 3    Omega-3 Fatty Acids (FISH OIL) 1200 MG CAPS Take 1 capsule by mouth daily      saccharomyces boulardii (FLORASTOR) 250 mg capsule Take 250 mg by mouth 2 (two) times a day      glucose blood (ACCU-CHEK GUIDE) test strip Test blood sugar before meals and at bedtime (Patient not taking: Reported on 9/29/2021) 300 each 1     No current facility-administered medications for this visit       Allergies   Allergen Reactions    Pollen Extract Allergic Rhinitis      Immunizations:     Immunization History   Administered Date(s) Administered    INFLUENZA 10/19/2015, 12/02/2016, 12/20/2017, 11/12/2018    Influenza Split High Dose Preservative Free IM 09/24/2014, 10/19/2015, 12/02/2016, 12/20/2017    Influenza, high dose seasonal 0 7 mL 11/12/2018, 09/30/2019, 10/12/2020    Pneumococcal Conjugate 13-Valent 05/30/2019    Pneumococcal Polysaccharide PPV23 05/08/2014    SARS-CoV-2 / COVID-19 mRNA IM (Moderna) 01/29/2021, 02/26/2021    Tdap 06/22/2018    Zoster 02/12/2014    Zoster Vaccine Recombinant 06/22/2021, 08/26/2021      Health Maintenance:         Topic Date Due    Colorectal Cancer Screening  06/17/2022    Hepatitis C Screening  Completed         Topic Date Due    Influenza Vaccine (1) 09/01/2021      Medicare Health Risk Assessment:     /80 (BP Location: Left arm, Patient Position: Sitting, Cuff Size: Standard)   Pulse 64   Temp 98 3 °F (36 8 °C) (Temporal)   Ht 5' 11" (1 803 m)   Wt 73 6 kg (162 lb 4 8 oz)   SpO2 97%   BMI 22 64 kg/m²      Frantz is here for his Subsequent Wellness visit  Last Medicare Wellness visit information reviewed, patient interviewed and updates made to the record today  Health Risk Assessment:   Patient rates overall health as very good  Patient feels that their physical health rating is same  Patient is very satisfied with their life  Eyesight was rated as same  Hearing was rated as same  Patient feels that their emotional and mental health rating is same  Patients states they are never, rarely angry  Patient states they are never, rarely unusually tired/fatigued  Pain experienced in the last 7 days has been none  Patient states that he has experienced no weight loss or gain in last 6 months  Depression Screening:   PHQ-2 Score: 0      Fall Risk Screening: In the past year, patient has experienced: no history of falling in past year      Home Safety:  Patient does not have trouble with stairs inside or outside of their home  Patient has working smoke alarms and has working carbon monoxide detector  Home safety hazards include: none  Nutrition:   Current diet is Regular  Medications:   Patient is currently taking over-the-counter supplements  OTC medications include: see medication list  Patient is able to manage medications       Activities of Daily Living (ADLs)/Instrumental Activities of Daily Living (IADLs):   Walk and transfer into and out of bed and chair?: Yes  Dress and groom yourself?: Yes    Bathe or shower yourself?: Yes Feed yourself? Yes  Do your laundry/housekeeping?: Yes  Manage your money, pay your bills and track your expenses?: Yes  Make your own meals?: Yes    Do your own shopping?: Yes    Previous Hospitalizations:   Any hospitalizations or ED visits within the last 12 months?: No      Advance Care Planning:   Living will: No    Durable POA for healthcare: Yes    Advanced directive counseling given: Yes      PREVENTIVE SCREENINGS      Cardiovascular Screening:    General: Screening Not Indicated and History Lipid Disorder      Diabetes Screening:     General: Screening Not Indicated and History Diabetes      Colorectal Cancer Screening:     General: Screening Current      Prostate Cancer Screening:    General: Screening Current      Osteoporosis Screening:    General: Screening Not Indicated      Abdominal Aortic Aneurysm (AAA) Screening:    Risk factors include: age between 73-67 yo        General: Screening Current      Lung Cancer Screening:     General: Screening Not Indicated      Hepatitis C Screening:    General: Screening Current    Screening, Brief Intervention, and Referral to Treatment (SBIRT)    Screening  Typical number of drinks in a day: 1  Typical number of drinks in a week: 3  Interpretation: Low risk drinking behavior  Single Item Drug Screening:  How often have you used an illegal drug (including marijuana) or a prescription medication for non-medical reasons in the past year? never    Single Item Drug Screen Score: 0  Interpretation: Negative screen for possible drug use disorder    Other Counseling Topics:   Car/seat belt/driving safety, skin self-exam, sunscreen and regular weightbearing exercise         Oswald Diehl, DO

## 2021-09-29 NOTE — PATIENT INSTRUCTIONS
Medicare Preventive Visit Patient Instructions  Thank you for completing your Welcome to Medicare Visit or Medicare Annual Wellness Visit today  Your next wellness visit will be due in one year (9/30/2022)  The screening/preventive services that you may require over the next 5-10 years are detailed below  Some tests may not apply to you based off risk factors and/or age  Screening tests ordered at today's visit but not completed yet may show as past due  Also, please note that scanned in results may not display below  Preventive Screenings:  Service Recommendations Previous Testing/Comments   Colorectal Cancer Screening  · Colonoscopy    · Fecal Occult Blood Test (FOBT)/Fecal Immunochemical Test (FIT)  · Fecal DNA/Cologuard Test  · Flexible Sigmoidoscopy Age: 54-65 years old   Colonoscopy: every 10 years (May be performed more frequently if at higher risk)  OR  FOBT/FIT: every 1 year  OR  Cologuard: every 3 years  OR  Sigmoidoscopy: every 5 years  Screening may be recommended earlier than age 48 if at higher risk for colorectal cancer  Also, an individualized decision between you and your healthcare provider will decide whether screening between the ages of 74-80 would be appropriate   Colonoscopy: Not on file  FOBT/FIT: Not on file  Cologuard: 06/17/2019  Sigmoidoscopy: Not on file    Screening Current     Prostate Cancer Screening Individualized decision between patient and health care provider in men between ages of 53-78   Medicare will cover every 12 months beginning on the day after your 50th birthday PSA: 6 7 ng/mL     Screening Current     Hepatitis C Screening Once for adults born between 1945 and 1965  More frequently in patients at high risk for Hepatitis C Hep C Antibody: 05/30/2017    Screening Current   Diabetes Screening 1-2 times per year if you're at risk for diabetes or have pre-diabetes Fasting glucose: 107 mg/dL   A1C: 6 1 %    Screening Not Indicated  History Diabetes   Cholesterol Screening Once every 5 years if you don't have a lipid disorder  May order more often based on risk factors  Lipid panel: 09/20/2021    Screening Not Indicated  History Lipid Disorder      Other Preventive Screenings Covered by Medicare:  1  Abdominal Aortic Aneurysm (AAA) Screening: covered once if your at risk  You're considered to be at risk if you have a family history of AAA or a male between the age of 73-68 who smoking at least 100 cigarettes in your lifetime  2  Lung Cancer Screening: covers low dose CT scan once per year if you meet all of the following conditions: (1) Age 50-69; (2) No signs or symptoms of lung cancer; (3) Current smoker or have quit smoking within the last 15 years; (4) You have a tobacco smoking history of at least 30 pack years (packs per day x number of years you smoked); (5) You get a written order from a healthcare provider  3  Glaucoma Screening: covered annually if you're considered high risk: (1) You have diabetes OR (2) Family history of glaucoma OR (3)  aged 48 and older OR (3)  American aged 72 and older  3  Osteoporosis Screening: covered every 2 years if you meet one of the following conditions: (1) Have a vertebral abnormality; (2) On glucocorticoid therapy for more than 3 months; (3) Have primary hyperparathyroidism; (4) On osteoporosis medications and need to assess response to drug therapy  5  HIV Screening: covered annually if you're between the age of 12-76  Also covered annually if you are younger than 13 and older than 72 with risk factors for HIV infection  For pregnant patients, it is covered up to 3 times per pregnancy      Immunizations:  Immunization Recommendations   Influenza Vaccine Annual influenza vaccination during flu season is recommended for all persons aged >= 6 months who do not have contraindications   Pneumococcal Vaccine (Prevnar and Pneumovax)  * Prevnar = PCV13  * Pneumovax = PPSV23 Adults 25-60 years old: 1-3 doses may be recommended based on certain risk factors  Adults 72 years old: Prevnar (PCV13) vaccine recommended followed by Pneumovax (PPSV23) vaccine  If already received PPSV23 since turning 65, then PCV13 recommended at least one year after PPSV23 dose  Hepatitis B Vaccine 3 dose series if at intermediate or high risk (ex: diabetes, end stage renal disease, liver disease)   Tetanus (Td) Vaccine - COST NOT COVERED BY MEDICARE PART B Following completion of primary series, a booster dose should be given every 10 years to maintain immunity against tetanus  Td may also be given as tetanus wound prophylaxis  Tdap Vaccine - COST NOT COVERED BY MEDICARE PART B Recommended at least once for all adults  For pregnant patients, recommended with each pregnancy  Shingles Vaccine (Shingrix) - COST NOT COVERED BY MEDICARE PART B  2 shot series recommended in those aged 48 and above     Health Maintenance Due:      Topic Date Due    Colorectal Cancer Screening  06/17/2022    Hepatitis C Screening  Completed     Immunizations Due:      Topic Date Due    Influenza Vaccine (1) 09/01/2021     Advance Directives   What are advance directives? Advance directives are legal documents that state your wishes and plans for medical care  These plans are made ahead of time in case you lose your ability to make decisions for yourself  Advance directives can apply to any medical decision, such as the treatments you want, and if you want to donate organs  What are the types of advance directives? There are many types of advance directives, and each state has rules about how to use them  You may choose a combination of any of the following:  · Living will: This is a written record of the treatment you want  You can also choose which treatments you do not want, which to limit, and which to stop at a certain time  This includes surgery, medicine, IV fluid, and tube feedings  · Durable power of  for healthcare [facility]):   This is a written record that states who you want to make healthcare choices for you when you are unable to make them for yourself  This person, called a proxy, is usually a family member or a friend  You may choose more than 1 proxy  · Do not resuscitate (DNR) order:  A DNR order is used in case your heart stops beating or you stop breathing  It is a request not to have certain forms of treatment, such as CPR  A DNR order may be included in other types of advance directives  · Medical directive: This covers the care that you want if you are in a coma, near death, or unable to make decisions for yourself  You can list the treatments you want for each condition  Treatment may include pain medicine, surgery, blood transfusions, dialysis, IV or tube feedings, and a ventilator (breathing machine)  · Values history: This document has questions about your views, beliefs, and how you feel and think about life  This information can help others choose the care that you would choose  Why are advance directives important? An advance directive helps you control your care  Although spoken wishes may be used, it is better to have your wishes written down  Spoken wishes can be misunderstood, or not followed  Treatments may be given even if you do not want them  An advance directive may make it easier for your family to make difficult choices about your care  © Copyright [employer] 2018 Information is for End User's use only and may not be sold, redistributed or otherwise used for commercial purposes   All illustrations and images included in CareNotes® are the copyrighted property of [employer]  or [address] [place] [address] [employer]

## 2021-09-29 NOTE — PROGRESS NOTES
Diabetic Foot Exam    Patient's shoes and socks removed  Right Foot/Ankle   Right Foot Inspection  Skin Exam: skin normal, skin intact and dry skin no warmth, no callus, no erythema, no maceration, no abnormal color, no pre-ulcer, no ulcer and no callus                          Toe Exam: ROM and strength within normal limits  Sensory   Vibration: intact  Proprioception: intact   Monofilament testing: intact  Vascular  Capillary refills: < 3 seconds  The right DP pulse is 2+  The right PT pulse is 2+  Left Foot/Ankle  Left Foot Inspection  Skin Exam: skin normal, skin intact and dry skinno warmth, no erythema, no maceration, normal color, no pre-ulcer, no ulcer and no callus                         Toe Exam: ROM and strength within normal limits                   Sensory   Vibration: intact  Proprioception: intact  Monofilament: intact  Vascular  Capillary refills: < 3 seconds  The left DP pulse is 2+  The left PT pulse is 2+

## 2021-09-29 NOTE — PROGRESS NOTES
Diabetic Foot Exam    Patient's shoes and socks removed  Right Foot/Ankle   Right Foot Inspection  Skin Exam: skin normal and skin intact no dry skin, no warmth, no callus, no erythema, no maceration, no abnormal color, no pre-ulcer, no ulcer and no callus                          Toe Exam: ROM and strength within normal limits  Sensory       Monofilament testing: intact  Vascular  Capillary refills: < 3 seconds  The right DP pulse is 2+  The right PT pulse is 2+  Left Foot/Ankle  Left Foot Inspection  Skin Exam: skin normal and skin intactno dry skin, no warmth, no erythema, no maceration, normal color, no pre-ulcer, no ulcer and no callus                         Toe Exam: ROM and strength within normal limits                   Sensory       Monofilament: intact  Vascular    The left DP pulse is 2+  The left PT pulse is 2+  Assign Risk Category:  No deformity present; No loss of protective sensation;  No weak pulses       Risk: 0

## 2021-12-31 DIAGNOSIS — I10 BENIGN ESSENTIAL HYPERTENSION: Chronic | ICD-10-CM

## 2022-01-03 DIAGNOSIS — I10 BENIGN ESSENTIAL HYPERTENSION: Chronic | ICD-10-CM

## 2022-01-03 RX ORDER — AMLODIPINE BESYLATE 5 MG/1
5 TABLET ORAL DAILY
Qty: 90 TABLET | Refills: 3 | Status: SHIPPED | OUTPATIENT
Start: 2022-01-03 | End: 2022-02-24 | Stop reason: SDUPTHER

## 2022-01-05 RX ORDER — AMLODIPINE BESYLATE 5 MG/1
TABLET ORAL
Qty: 90 TABLET | Refills: 3 | Status: SHIPPED | OUTPATIENT
Start: 2022-01-05 | End: 2022-05-12 | Stop reason: SDUPTHER

## 2022-02-24 ENCOUNTER — OFFICE VISIT (OUTPATIENT)
Dept: CARDIOLOGY CLINIC | Facility: MEDICAL CENTER | Age: 77
End: 2022-02-24
Payer: MEDICARE

## 2022-02-24 VITALS
WEIGHT: 170 LBS | DIASTOLIC BLOOD PRESSURE: 88 MMHG | OXYGEN SATURATION: 96 % | BODY MASS INDEX: 23.8 KG/M2 | HEART RATE: 84 BPM | HEIGHT: 71 IN | SYSTOLIC BLOOD PRESSURE: 168 MMHG

## 2022-02-24 DIAGNOSIS — E78.00 HYPERCHOLESTEREMIA: Chronic | ICD-10-CM

## 2022-02-24 DIAGNOSIS — I25.10 ATHEROSCLEROSIS OF NATIVE CORONARY ARTERY OF NATIVE HEART WITHOUT ANGINA PECTORIS: ICD-10-CM

## 2022-02-24 DIAGNOSIS — I10 BENIGN ESSENTIAL HYPERTENSION: Primary | Chronic | ICD-10-CM

## 2022-02-24 DIAGNOSIS — E11.9 TYPE 2 DIABETES MELLITUS WITHOUT COMPLICATION, WITHOUT LONG-TERM CURRENT USE OF INSULIN (HCC): Chronic | ICD-10-CM

## 2022-02-24 PROBLEM — I20.89 STABLE ANGINA: Status: RESOLVED | Noted: 2019-12-03 | Resolved: 2022-02-24

## 2022-02-24 PROBLEM — I20.8 STABLE ANGINA (HCC): Status: RESOLVED | Noted: 2019-12-03 | Resolved: 2022-02-24

## 2022-02-24 PROCEDURE — 99214 OFFICE O/P EST MOD 30 MIN: CPT | Performed by: INTERNAL MEDICINE

## 2022-02-24 PROCEDURE — 93000 ELECTROCARDIOGRAM COMPLETE: CPT | Performed by: INTERNAL MEDICINE

## 2022-02-24 NOTE — PROGRESS NOTES
[facility]  Office Progress Note  Pamela Johnson 78 y o  male MRN: [id number]        Problems    1  Benign essential hypertension     2  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 without angina pectoris  POCT ECG   3  Hypercholesteremia     4  Type 2 diabetes mellitus without complication, without long-term current use of insulin ([facility])         Impression:    Laura Alexis returns for a usual follow-up     Hypertension  o Hypertensive and uncontrolled today in the setting of emotional stress at home over the issue of his stepson and drug use  o Previously was on HCTZ prior to CABG, this is not been reinstituted, he has otherwise been normotensive     Hyperlipidemia  o Lipids are excellent with LDL 72     Type 2 diabetes  o A1c is well controlled at 6 1     Right bundle branch block  o Stable, unchanged from prior     Coronary artery disease  o Workup in 2019 for stable angina resulted in discovery of multivessel CAD, status post CABG (lima to LAD, SVG to D1, SVG to OM, SVG to PDA  o He continues to walk extensively, he has no exertional symptoms, he has good body weight      Plan     Check home blood pressures every other week, if greater than 140/85 and not improving as his home situation with a stepson improves, should consider resuming HCTZ or considering angiotensin receptor blocker   Recommended he discussed Jardiance with his PCP, he has cardiovascular risk reduction data behind it, something metformin does not   Annual follow-up recommended        HPI: Pamela Johnson is a 78y o  year old male with CAD discovered in the setting of stable exertional angina, with abnormal stress test, and status post CABG x4 with LIMA to LAD, SVG to D1, SVG to OM, SVG to PDA in 2019, hypertension, diabetes, hyperlipidemia presents for follow-up visit  He walks extensively, he has no anginal symptoms    His blood pressure is elevated today on account of being very upset about his stepson's involvement with drugs  Resuming amlodipine at my last visit a year ago, and switching metoprolol tartrate to metoprolol succinate was followed up with normotension at his PCP visit 09/21  His lipids are excellent as of 9/21, LDL 72  While his diabetes is well controlled and has been for a long time, despite being on metformin for most a decade, he developed heart disease none the less, an argument to use 1 of the novel medications such as Jardiance and we discussed this in detail today  Review of Systems   Constitutional: Negative for appetite change, diaphoresis, fatigue and fever  Respiratory: Negative for chest tightness, shortness of breath and wheezing  Cardiovascular: Negative for chest pain, palpitations and leg swelling  Gastrointestinal: Negative for abdominal pain and blood in stool  Musculoskeletal: Negative for arthralgias and joint swelling  Skin: Negative for rash  Neurological: Negative for dizziness, syncope and light-headedness  Past Medical History:   Diagnosis Date    Chronic cough     RESOLVED: [id number]    Coronary artery disease     Diabetes mellitus (HCC)     Diabetic retinopathy ([facility] [address] )     HLD (hyperlipidemia)     HTN (hypertension)      Past Surgical History:   Procedure Laterality Date    CARDIAC CATHETERIZATION  12/09/2019    HAND SURGERY      KS CABG, ARTERY-VEIN, THREE N/A 12/13/2019    Procedure: CORONARY ARTERY BYPASS GRAFT (CABG) 4 VESSELS STEVEN to LAD ; SVG--> PDA , DIAGONAL, and OM;  Surgeon: Dannie Reynolds DO;  [facility];  Service: Cardiac Surgery    KS ECHO TRANSESOPHAG R-T 2D W/PRB IMG ACQUISJ I&R N/A 12/13/2019    Procedure: TRANSESOPHAGEAL ECHOCARDIOGRAM (SHEBA); Surgeon: Dannie Reynolds DO;  [facility];  Service: Cardiac Surgery    KS ENDOSCOPY W/VIDEO-ASST VEIN HARVEST,CABG Left 12/13/2019    Procedure: HARVEST VEIN ENDOSCOPIC ([address]);   Surgeon: Dannie Reynolds DO;  Location: [facility];  Service: Cardiac Surgery     Social History     Substance and Sexual Activity   Alcohol Use Yes    Alcohol/week: 5 0 standard drinks    Types: 3 Glasses of wine, 1 Cans of beer, 1 Standard drinks or equivalent per week    Comment: occasional     Social History     Substance and Sexual Activity   Drug Use No     Social History     Tobacco Use   Smoking Status Never Smoker   Smokeless Tobacco Never Used     Family History   Problem Relation Age of Onset    Heart failure Mother     Heart failure Father     Heart disease Father     Heart attack Father     Diabetes Other        Allergies:   Allergies   Allergen Reactions    Pollen Extract Allergic Rhinitis       Medications:     Current Outpatient Medications:     amLODIPine (NORVASC) 5 mg tablet, TAKE 1 TABLET BY MOUTH EVERY DAY, Disp: 90 tablet, Rfl: 3    aspirin 81 MG tablet, Take 1 tablet (81 mg total) by mouth daily, Disp: 90 tablet, Rfl: 3    atorvastatin (LIPITOR) 80 mg tablet, Take 1 tablet (80 mg total) by mouth daily with dinner, Disp: 90 tablet, Rfl: 1    Calcium Carbonate (CALCIUM 500 PO), Take by mouth daily gummies, Disp: , Rfl:     Cholecalciferol (Vitamin D) 125 MCG (5000 UT) CAPS, Take by mouth daily, Disp: , Rfl:     Glucosamine-Chondroitin (GLUCOSAMINE CHONDR COMPLEX PO), Take 1 capsule by mouth 2 (two) times a day, Disp: , Rfl:     glucose blood (ACCU-CHEK GUIDE) test strip, Test blood sugar before meals and at bedtime, Disp: 300 each, Rfl: 1    metFORMIN (GLUCOPHAGE) 1000 MG tablet, Take 1 tablet (1,000 mg total) by mouth 2 (two) times a day, Disp: 180 tablet, Rfl: 1    metoprolol succinate (TOPROL-XL) 50 mg 24 hr tablet, Take 1 tablet (50 mg total) by mouth daily, Disp: 90 tablet, Rfl: 3    Omega-3 Fatty Acids (FISH OIL) 1200 MG CAPS, Take 1 capsule by mouth daily, Disp: , Rfl:     saccharomyces boulardii (FLORASTOR) 250 mg capsule, Take 250 mg by mouth 2 (two) times a day (Patient not taking: Reported on 2/24/2022 ), Disp: , Rfl:       Vitals: 02/24/22 0912   BP: 168/88   Pulse: 84   SpO2: 96%     Weight (last 2 days)     Date/Time Weight    02/24/22 0912 77 1 (170)        Physical Exam  Constitutional:       General: He is not in acute distress  Appearance: He is not diaphoretic  HENT:      Head: Normocephalic and atraumatic  Eyes:      General: No scleral icterus  Conjunctiva/sclera: Conjunctivae normal    Neck:      Vascular: No carotid bruit or JVD  Cardiovascular:      Rate and Rhythm: Normal rate and regular rhythm  Heart sounds: Normal heart sounds  No murmur heard  Pulmonary:      Effort: Pulmonary effort is normal  No respiratory distress  Breath sounds: Normal breath sounds  No decreased breath sounds, wheezing, rhonchi or rales  Musculoskeletal:      Cervical back: Normal range of motion  Right lower leg: Normal  No edema  Left lower leg: Normal  No edema  Skin:     General: Skin is warm and dry  Neurological:      Mental Status: He is alert and oriented to person, place, and time  Laboratory Studies:    Laboratory testing personally reviewed    Cardiac testing:     EKG reviewed personally:    Results for orders placed or performed in visit on 02/24/22   POCT ECG    Impression    Sinus rhythm, right bundle branch block         Cardiac catheterization  12/19-Occluded RCA, 99% om, significant distal left main disease/LAD disease as well    Echocardiogram  12/19-EF 60%, trace valve disease    Donna Santos MD    Portions of the record may have been created with voice recognition software  Occasional wrong word or "sound a like" substitutions may have occurred due to the inherent limitations of voice recognition software  Read the chart carefully and recognize, using context, where substitutions have occurred

## 2022-02-24 NOTE — PATIENT INSTRUCTIONS
If your blood pressure continues to trend greater than 140/85, it probably means we need to do more from an antihypertensive medication standpoint, he still have not been restarted on old blood pressure pill use to be on called hydrochlorothiazide  That being said, your blood pressure to family doctor in September was normal, so probably today's blood pressure has a lot to do with your emotions over your stepson  Please have a discussion with her family doctor at our next visit about starting Jardiance, metformin has no true heart disease prevention data, Luisana Morgan does, the mechanism is novel, and it works by removing sugar from her body through your kidney

## 2022-03-23 DIAGNOSIS — E11.9 TYPE 2 DIABETES MELLITUS (HCC): Chronic | ICD-10-CM

## 2022-03-23 DIAGNOSIS — Z95.1 S/P CABG (CORONARY ARTERY BYPASS GRAFT): ICD-10-CM

## 2022-03-23 RX ORDER — ATORVASTATIN CALCIUM 80 MG/1
80 TABLET, FILM COATED ORAL
Qty: 90 TABLET | Refills: 1 | Status: SHIPPED | OUTPATIENT
Start: 2022-03-23

## 2022-03-25 ENCOUNTER — RA CDI HCC (OUTPATIENT)
Dept: OTHER | Facility: HOSPITAL | Age: 77
End: 2022-03-25

## 2022-03-25 NOTE — PROGRESS NOTES
[facility] [address]  coding opportunities       Chart reviewed, no opportunity found: CHART REVIEWED, NO OPPORTUNITY FOUND        Patients Insurance     Medicare Insurance: Medicare

## 2022-03-26 DIAGNOSIS — I10 BENIGN ESSENTIAL HYPERTENSION: Chronic | ICD-10-CM

## 2022-03-28 ENCOUNTER — APPOINTMENT (OUTPATIENT)
Dept: LAB | Facility: CLINIC | Age: 77
End: 2022-03-28
Payer: MEDICARE

## 2022-03-28 DIAGNOSIS — E11.9 TYPE 2 DIABETES MELLITUS (HCC): Chronic | ICD-10-CM

## 2022-03-28 LAB
EST. AVERAGE GLUCOSE BLD GHB EST-MCNC: 134 MG/DL
GLUCOSE P FAST SERPL-MCNC: 114 MG/DL (ref 65–99)
HBA1C MFR BLD: 6.3 %

## 2022-03-28 PROCEDURE — 82947 ASSAY GLUCOSE BLOOD QUANT: CPT

## 2022-03-28 PROCEDURE — 83036 HEMOGLOBIN GLYCOSYLATED A1C: CPT

## 2022-03-28 PROCEDURE — 36415 COLL VENOUS BLD VENIPUNCTURE: CPT

## 2022-03-28 RX ORDER — METOPROLOL SUCCINATE 50 MG/1
TABLET, EXTENDED RELEASE ORAL
Qty: 90 TABLET | Refills: 3 | Status: SHIPPED | OUTPATIENT
Start: 2022-03-28 | End: 2022-05-12 | Stop reason: SDUPTHER

## 2022-04-04 ENCOUNTER — OFFICE VISIT (OUTPATIENT)
Dept: INTERNAL MEDICINE CLINIC | Age: 77
End: 2022-04-04
Payer: MEDICARE

## 2022-04-04 VITALS
HEART RATE: 70 BPM | OXYGEN SATURATION: 97 % | BODY MASS INDEX: 24.16 KG/M2 | WEIGHT: 172.6 LBS | TEMPERATURE: 97.8 F | DIASTOLIC BLOOD PRESSURE: 88 MMHG | HEIGHT: 71 IN | SYSTOLIC BLOOD PRESSURE: 154 MMHG

## 2022-04-04 DIAGNOSIS — I10 BENIGN ESSENTIAL HYPERTENSION: Chronic | ICD-10-CM

## 2022-04-04 DIAGNOSIS — E11.9 TYPE 2 DIABETES MELLITUS WITHOUT COMPLICATION, WITHOUT LONG-TERM CURRENT USE OF INSULIN (HCC): Primary | ICD-10-CM

## 2022-04-04 DIAGNOSIS — E78.00 HYPERCHOLESTEREMIA: Chronic | ICD-10-CM

## 2022-04-04 DIAGNOSIS — I25.10 ATHEROSCLEROSIS OF NATIVE CORONARY ARTERY OF NATIVE HEART WITHOUT ANGINA PECTORIS: ICD-10-CM

## 2022-04-04 DIAGNOSIS — E11.3292 MILD NONPROLIFERATIVE DIABETIC RETINOPATHY OF LEFT EYE WITHOUT MACULAR EDEMA ASSOCIATED WITH TYPE 2 DIABETES MELLITUS (HCC): ICD-10-CM

## 2022-04-04 DIAGNOSIS — R97.20 PSA ELEVATION: ICD-10-CM

## 2022-04-04 DIAGNOSIS — E11.9 TYPE 2 DIABETES MELLITUS (HCC): Chronic | ICD-10-CM

## 2022-04-04 PROCEDURE — 99214 OFFICE O/P EST MOD 30 MIN: CPT | Performed by: INTERNAL MEDICINE

## 2022-04-04 NOTE — PROGRESS NOTES
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)    Benign essential hypertension  Hypertension could be better controlled this time the blood pressure is the elevated  Hypercholesteremia    PSA elevation    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 without angina pectoris  Coronary artery disease is stable he is followed up by the cardiologist the cardiologist recommended Jardiance but looks like that it is expensive and I do not see significant reason for it since his diabetes is very well controlled and there are no signs or symptoms of heart failure  In he never developed heart failure         M*Modal software was used to dictate this note  It may contain errors with dictating incorrect words or incorrect spelling  Please contact the provider directly with any questions  Subjective:   Chief Complaint   Patient presents with    Follow-up     HTN  Review labs         Patient ID: Ibrahima Lucero is a 78 y o  male      HPI  This is a very pleasant 78 years young gentleman who is here today for the regular follow-up he is doing well  Hypertension is poor controlled his blood pressure was also elevated when he was seen by his cardiologist  Type 2 diabetes mellitus is very well controlled  Coronary artery disease is stable  Increased PSA is followed up by the urologist  The following portions of the patient's history were reviewed and updated as appropriate: allergies, current medications, past family history, past medical history, past social history, past surgical history and problem list     Review of Systems   Constitutional: Negative for appetite change, fatigue and fever  HENT: Negative for congestion, ear pain, hearing loss, nosebleeds, sneezing, tinnitus and voice change  Eyes: Negative for pain, discharge and redness  Respiratory: Negative for cough, chest tightness and wheezing  Cardiovascular: Negative for chest pain, palpitations and leg swelling  Gastrointestinal: Negative for abdominal pain, blood in stool, constipation, diarrhea, nausea and vomiting  Genitourinary: Negative for difficulty urinating, dysuria, hematuria and urgency  Musculoskeletal: Negative for arthralgias, back pain, gait problem and joint swelling  Skin: Negative for rash and wound  Allergic/Immunologic: Negative for environmental allergies  Neurological: Negative for dizziness, tremors, seizures, weakness, light-headedness and numbness  Hematological: Negative for adenopathy  Does not bruise/bleed easily  Psychiatric/Behavioral: Negative for behavioral problems and confusion  The patient is not nervous/anxious            Past Medical History:   Diagnosis Date    Chronic cough     RESOLVED: [id number]    Coronary artery disease     Diabetes mellitus (HCC)     Diabetic retinopathy (HCC)     HLD (hyperlipidemia)     HTN (hypertension)          Current Outpatient Medications:     amLODIPine (NORVASC) 5 mg tablet, TAKE 1 TABLET BY MOUTH EVERY DAY, Disp: 90 tablet, Rfl: 3    aspirin 81 MG tablet, Take 1 tablet (81 mg total) by mouth daily, Disp: 90 tablet, Rfl: 3    atorvastatin (LIPITOR) 80 mg tablet, Take 1 tablet (80 mg total) by mouth daily with dinner, Disp: 90 tablet, Rfl: 1    Calcium Carbonate (CALCIUM 500 PO), Take by mouth daily gummies, Disp: , Rfl:     Cholecalciferol (Vitamin D) 125 MCG (5000 UT) CAPS, Take by mouth daily, Disp: , Rfl:     Glucosamine-Chondroitin (GLUCOSAMINE CHONDR COMPLEX PO), Take 1 capsule by mouth 2 (two) times a day, Disp: , Rfl:     glucose blood (ACCU-CHEK GUIDE) test strip, Test blood sugar before meals and at bedtime, Disp: 300 each, Rfl: 1    metFORMIN (GLUCOPHAGE) 1000 MG tablet, Take 1 tablet (1,000 mg total) by mouth 2 (two) times a day, Disp: 180 tablet, Rfl: 1    metoprolol succinate (TOPROL-XL) 50 mg 24 hr tablet, TAKE 1 TABLET BY MOUTH EVERY DAY, Disp: 90 tablet, Rfl: 3    Omega-3 Fatty Acids (FISH OIL) 1200 MG CAPS, Take 1 capsule by mouth daily, Disp: , Rfl:     saccharomyces boulardii (FLORASTOR) 250 mg capsule, Take 250 mg by mouth 2 (two) times a day  , Disp: , Rfl:     Allergies   Allergen Reactions    Pollen Extract Allergic Rhinitis       Social History   Past Surgical History:   Procedure Laterality Date    CARDIAC CATHETERIZATION  12/09/2019    HAND SURGERY      SC CABG, ARTERY-VEIN, THREE N/A 12/13/2019    Procedure: CORONARY ARTERY BYPASS GRAFT (CABG) 4 VESSELS STEVEN to LAD ; SVG--> PDA , DIAGONAL, and OM;  Surgeon: Carly Galdamez DO;  [facility];  Service: Cardiac Surgery    SC ECHO TRANSESOPHAG R-T 2D W/PRB IMG ACQUISJ I&R N/A 12/13/2019    Procedure: TRANSESOPHAGEAL ECHOCARDIOGRAM (SHEBA); Surgeon: Carly Galdamez DO;  [facility];  Service: Cardiac Surgery    SC ENDOSCOPY W/VIDEO-ASST VEIN HARVEST,CABG Left 12/13/2019    Procedure: HARVEST VEIN ENDOSCOPIC ([address]);   Surgeon: Carly Galdamez DO;  [facility];  Service: Cardiac Surgery     Family History   Problem Relation Age of Onset    Heart failure Mother     Heart failure Father     Heart disease Father     Heart attack Father     Diabetes Other        Objective:  /88 (BP Location: Left arm, Patient Position: Sitting, Cuff Size: Standard)   Pulse 70   Temp 97 8 °F (36 6 °C) (Temporal)   Ht 5' 11" (1 803 m)   Wt 78 3 kg (172 lb 9 6 oz)   SpO2 97%   BMI 24 07 kg/m²        Physical Exam  Constitutional:       Appearance: He is well-developed  HENT:      Right Ear: External ear normal    Eyes:      Conjunctiva/sclera: Conjunctivae normal       Pupils: Pupils are equal, round, and reactive to light  Neck:      Thyroid: No thyromegaly  Vascular: No JVD  Cardiovascular:      Rate and Rhythm: Normal rate and regular rhythm  Heart sounds: Normal heart sounds  Pulmonary:      Breath sounds: Normal breath sounds  Abdominal:      General: Bowel sounds are normal       Palpations: Abdomen is soft  Musculoskeletal:         General: Normal range of motion  Cervical back: Normal range of motion  Lymphadenopathy:      Cervical: No cervical adenopathy  Skin:     General: Skin is dry  Neurological:      Mental Status: He is alert and oriented to person, place, and time  Deep Tendon Reflexes: Reflexes are normal and symmetric     Psychiatric:         Behavior: Behavior normal

## 2022-05-12 DIAGNOSIS — I10 BENIGN ESSENTIAL HYPERTENSION: Chronic | ICD-10-CM

## 2022-05-12 RX ORDER — AMLODIPINE BESYLATE 5 MG/1
5 TABLET ORAL DAILY
Qty: 90 TABLET | Refills: 3 | Status: SHIPPED | OUTPATIENT
Start: 2022-05-12

## 2022-05-12 RX ORDER — METOPROLOL SUCCINATE 50 MG/1
50 TABLET, EXTENDED RELEASE ORAL DAILY
Qty: 90 TABLET | Refills: 3 | Status: SHIPPED | OUTPATIENT
Start: 2022-05-12

## 2022-08-08 ENCOUNTER — APPOINTMENT (OUTPATIENT)
Dept: LAB | Facility: CLINIC | Age: 77
End: 2022-08-08
Payer: MEDICARE

## 2022-08-08 DIAGNOSIS — E11.9 TYPE 2 DIABETES MELLITUS WITHOUT COMPLICATION, WITHOUT LONG-TERM CURRENT USE OF INSULIN (HCC): ICD-10-CM

## 2022-08-08 DIAGNOSIS — I25.10 ATHEROSCLEROSIS OF NATIVE CORONARY ARTERY OF NATIVE HEART WITHOUT ANGINA PECTORIS: ICD-10-CM

## 2022-08-08 LAB
ANION GAP SERPL CALCULATED.3IONS-SCNC: 4 MMOL/L (ref 4–13)
BUN SERPL-MCNC: 19 MG/DL (ref 5–25)
CALCIUM SERPL-MCNC: 9 MG/DL (ref 8.3–10.1)
CHLORIDE SERPL-SCNC: 107 MMOL/L (ref 96–108)
CHOLEST SERPL-MCNC: 113 MG/DL
CO2 SERPL-SCNC: 30 MMOL/L (ref 21–32)
CREAT SERPL-MCNC: 1.03 MG/DL (ref 0.6–1.3)
CREAT UR-MCNC: 123 MG/DL
EST. AVERAGE GLUCOSE BLD GHB EST-MCNC: 126 MG/DL
GFR SERPL CREATININE-BSD FRML MDRD: 71 ML/MIN/1.73SQ M
GLUCOSE P FAST SERPL-MCNC: 108 MG/DL (ref 65–99)
HBA1C MFR BLD: 6 %
HDLC SERPL-MCNC: 40 MG/DL
LDLC SERPL CALC-MCNC: 63 MG/DL (ref 0–100)
MICROALBUMIN UR-MCNC: 22.3 MG/L (ref 0–20)
MICROALBUMIN/CREAT 24H UR: 18 MG/G CREATININE (ref 0–30)
NONHDLC SERPL-MCNC: 73 MG/DL
POTASSIUM SERPL-SCNC: 4.2 MMOL/L (ref 3.5–5.3)
SODIUM SERPL-SCNC: 141 MMOL/L (ref 135–147)
TRIGL SERPL-MCNC: 50 MG/DL

## 2022-08-08 PROCEDURE — 82043 UR ALBUMIN QUANTITATIVE: CPT | Performed by: INTERNAL MEDICINE

## 2022-08-08 PROCEDURE — 80048 BASIC METABOLIC PNL TOTAL CA: CPT

## 2022-08-08 PROCEDURE — 82570 ASSAY OF URINE CREATININE: CPT | Performed by: INTERNAL MEDICINE

## 2022-08-08 PROCEDURE — 83036 HEMOGLOBIN GLYCOSYLATED A1C: CPT

## 2022-08-08 PROCEDURE — 80061 LIPID PANEL: CPT

## 2022-08-08 PROCEDURE — 36415 COLL VENOUS BLD VENIPUNCTURE: CPT

## 2022-08-18 ENCOUNTER — OFFICE VISIT (OUTPATIENT)
Dept: INTERNAL MEDICINE CLINIC | Age: 77
End: 2022-08-18
Payer: MEDICARE

## 2022-08-18 VITALS
SYSTOLIC BLOOD PRESSURE: 158 MMHG | HEART RATE: 65 BPM | WEIGHT: 161 LBS | DIASTOLIC BLOOD PRESSURE: 82 MMHG | HEIGHT: 71 IN | OXYGEN SATURATION: 99 % | BODY MASS INDEX: 22.54 KG/M2 | TEMPERATURE: 97.4 F

## 2022-08-18 DIAGNOSIS — R97.20 PSA ELEVATION: ICD-10-CM

## 2022-08-18 DIAGNOSIS — E11.3292 MILD NONPROLIFERATIVE DIABETIC RETINOPATHY OF LEFT EYE WITHOUT MACULAR EDEMA ASSOCIATED WITH TYPE 2 DIABETES MELLITUS (HCC): Chronic | ICD-10-CM

## 2022-08-18 DIAGNOSIS — I10 BENIGN ESSENTIAL HYPERTENSION: Chronic | ICD-10-CM

## 2022-08-18 DIAGNOSIS — E11.9 TYPE 2 DIABETES MELLITUS (HCC): Chronic | ICD-10-CM

## 2022-08-18 DIAGNOSIS — E66.3 OVERWEIGHT (BMI 25.0-29.9): ICD-10-CM

## 2022-08-18 DIAGNOSIS — Z95.1 S/P CABG (CORONARY ARTERY BYPASS GRAFT): ICD-10-CM

## 2022-08-18 DIAGNOSIS — Z12.11 ENCOUNTER FOR SCREENING FOR MALIGNANT NEOPLASM OF COLON: Primary | ICD-10-CM

## 2022-08-18 PROCEDURE — 99214 OFFICE O/P EST MOD 30 MIN: CPT | Performed by: INTERNAL MEDICINE

## 2022-08-18 RX ORDER — ATORVASTATIN CALCIUM 80 MG/1
80 TABLET, FILM COATED ORAL
Qty: 90 TABLET | Refills: 1 | Status: SHIPPED | OUTPATIENT
Start: 2022-08-18

## 2022-08-18 NOTE — PROGRESS NOTES
Diabetic Foot Exam    Patient's shoes and socks removed  Right Foot/Ankle   Right Foot Inspection  Skin Exam: skin normal, skin intact and dry skin  No warmth, no callus, no erythema, no maceration, no abnormal color, no pre-ulcer, no ulcer and no callus  Toe Exam: ROM and strength within normal limits  Sensory   Vibration: intact  Proprioception: intact  Monofilament testing: intact    Vascular  Capillary refills: < 3 seconds  The right DP pulse is 1+  The right PT pulse is 1+  Left Foot/Ankle  Left Foot Inspection  Skin Exam: skin normal, skin intact and dry skin  No warmth, no erythema, no maceration, normal color, no pre-ulcer, no ulcer and no callus  Toe Exam: ROM and strength within normal limits  Sensory   Vibration: intact  Proprioception: intact  Monofilament testing: intact    Vascular  Capillary refills: < 3 seconds  The left DP pulse is 1+  The left PT pulse is 1+       Assign Risk Category  No deformity present  No loss of protective sensation  No weak pulses  Risk: 0

## 2022-08-18 NOTE — PROGRESS NOTES
Assessment/Plan:      Diagnoses and all orders for this visit:    Encounter for screening for malignant neoplasm of colon  -     Cologuard    S/P CABG (coronary artery bypass graft)  -     atorvastatin (LIPITOR) 80 mg tablet; Take 1 tablet (80 mg total) by mouth daily with dinner    Type 2 diabetes mellitus (HCC)  -     atorvastatin (LIPITOR) 80 mg tablet; Take 1 tablet (80 mg total) by mouth daily with dinner  -     Comprehensive metabolic panel; Future  -     UA w Reflex to Microscopic w Reflex to Culture  -     Hemoglobin A1C; Future    Benign essential hypertension  -     CBC and differential; Future  -     Comprehensive metabolic panel; Future  -     Cancel: Lipid panel; Future  -     TSH, 3rd generation with Free T4 reflex; Future  -     UA w Reflex to Microscopic w Reflex to Culture  -     Hemoglobin A1C; Future    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 (HCC)    PSA elevation    Overweight (BMI 25 0-29  9)             M*Modal software was used to dictate this note  It may contain errors with dictating incorrect words or incorrect spelling  Please contact the provider directly with any questions  Subjective:   Chief Complaint   Patient presents with    Follow-up     4 month-  labs 8/8/22- form given for DM eye exam- pt has appt scheduled for 10/4/22- DM FOOT EXAM STARTED        Patient ID: Prince Lyon is a 78 y o  male  HPI  This is a very pleasant 78 years young gentleman who is here today for the regular follow-up he is doing very well review of system is essentially unremarkable dryness of the leg and the feet noted but he is mostly in the water these days    Overall denying any chest pain shortness of breath followed up by the Cardiology type 2 diabetes mellitus is very nicely controlled with hemoglobin A1c 6 0 never had any problems with heart failure continue with the present medication lipid panel was also excellent  Coronary artery disease  Is stable followed up by the Cardiology   Hypertension could be better controlled his blood pressures are in 711D systolic for last few visits I will recommend him to check his blood pressures at home and send me the results if the blood pressure remain over 140s then I think we need to add some medication for blood pressure my next choice will be Ace inhibitor or arbs for better control of the diabetes his the goal of blood pressure should be less than 130  I gave him the instruction to give me his blood pressure reading at least twice a week in 1 month so that we can utilize new medication otherwise the we will see him back in about 6 month and follow-up  The following portions of the patient's history were reviewed and updated as appropriate: allergies, current medications, past family history, past medical history, past social history, past surgical history and problem list     Review of Systems   Constitutional: Negative for appetite change, fatigue and fever  HENT: Negative for congestion, ear pain, hearing loss, nosebleeds, sneezing, tinnitus and voice change  Eyes: Negative for pain, discharge and redness  Respiratory: Negative for cough, chest tightness and wheezing  Cardiovascular: Negative for chest pain, palpitations and leg swelling  Gastrointestinal: Negative for abdominal pain, blood in stool, constipation, diarrhea, nausea and vomiting  Genitourinary: Negative for difficulty urinating, dysuria, hematuria and urgency  Musculoskeletal: Negative for arthralgias, back pain, gait problem and joint swelling  Skin: Negative for rash and wound  Allergic/Immunologic: Negative for environmental allergies  Neurological: Negative for dizziness, tremors, seizures, weakness, light-headedness and numbness  Hematological: Negative for adenopathy  Does not bruise/bleed easily  Psychiatric/Behavioral: Negative for behavioral problems and confusion   The patient is not nervous/anxious            Past Medical History:   Diagnosis Date    Chronic cough     RESOLVED: [id number]    Coronary artery disease     Diabetes mellitus (HCC)     Diabetic retinopathy (HCC)     HLD (hyperlipidemia)     HTN (hypertension)          Current Outpatient Medications:     amLODIPine (NORVASC) 5 mg tablet, Take 1 tablet (5 mg total) by mouth in the morning , Disp: 90 tablet, Rfl: 3    aspirin 81 MG tablet, Take 1 tablet (81 mg total) by mouth daily, Disp: 90 tablet, Rfl: 3    atorvastatin (LIPITOR) 80 mg tablet, Take 1 tablet (80 mg total) by mouth daily with dinner, Disp: 90 tablet, Rfl: 1    Calcium Carbonate (CALCIUM 500 PO), Take by mouth daily gummies, Disp: , Rfl:     Cholecalciferol (Vitamin D) 125 MCG (5000 UT) CAPS, Take by mouth daily, Disp: , Rfl:     Glucosamine-Chondroitin (GLUCOSAMINE CHONDR COMPLEX PO), Take 1 capsule by mouth 2 (two) times a day, Disp: , Rfl:     glucose blood (ACCU-CHEK GUIDE) test strip, Test blood sugar before meals and at bedtime, Disp: 300 each, Rfl: 1    metFORMIN (GLUCOPHAGE) 1000 MG tablet, Take 1 tablet (1,000 mg total) by mouth 2 (two) times a day, Disp: 180 tablet, Rfl: 1    metoprolol succinate (TOPROL-XL) 50 mg 24 hr tablet, Take 1 tablet (50 mg total) by mouth in the morning , Disp: 90 tablet, Rfl: 3    Omega-3 Fatty Acids (FISH OIL) 1200 MG CAPS, Take 1 capsule by mouth daily, Disp: , Rfl:     saccharomyces boulardii (FLORASTOR) 250 mg capsule, Take 250 mg by mouth 2 (two) times a day  , Disp: , Rfl:     Allergies   Allergen Reactions    Pollen Extract Allergic Rhinitis       Social History   Past Surgical History:   Procedure Laterality Date    CARDIAC CATHETERIZATION  12/09/2019    HAND SURGERY      DE CABG, ARTERY-VEIN, THREE N/A 12/13/2019    Procedure: CORONARY ARTERY BYPASS GRAFT (CABG) 4 VESSELS STEVEN to LAD ; SVG--> PDA , DIAGONAL, and OM;  Surgeon: Laura Acosta DO;  [facility];  Service: Cardiac Surgery    CA ECHO TRANSESOPHAG R-T 2D W/PRB IMG ACQUISJ I&R N/A 12/13/2019    Procedure: TRANSESOPHAGEAL ECHOCARDIOGRAM (SHEBA); Surgeon: Jenny Hartley DO;  [facility];  Service: Cardiac Surgery    CA ENDOSCOPY W/VIDEO-ASST VEIN HARVEST,CABG Left 12/13/2019    Procedure: HARVEST VEIN ENDOSCOPIC ([address]); Surgeon: Jenny Hartley DO;  [facility];  Service: Cardiac Surgery     Family History   Problem Relation Age of Onset    Heart failure Mother     Heart failure Father     Heart disease Father     Heart attack Father     Diabetes Other        Objective:  /82 (BP Location: Left arm, Patient Position: Sitting, Cuff Size: Standard)   Pulse 65   Temp (!) 97 4 °F (36 3 °C) (Temporal)   Ht 5' 11" (1 803 m)   Wt 73 kg (161 lb)   SpO2 99% Comment: room air  BMI 22 45 kg/m²        Physical Exam  Constitutional:       Appearance: He is well-developed  HENT:      Right Ear: External ear normal    Eyes:      Conjunctiva/sclera: Conjunctivae normal       Pupils: Pupils are equal, round, and reactive to light  Neck:      Thyroid: No thyromegaly  Vascular: No JVD  Cardiovascular:      Rate and Rhythm: Normal rate and regular rhythm  Heart sounds: Normal heart sounds  Pulmonary:      Breath sounds: Normal breath sounds  Abdominal:      General: Bowel sounds are normal       Palpations: Abdomen is soft  Musculoskeletal:         General: Normal range of motion  Cervical back: Normal range of motion  Lymphadenopathy:      Cervical: No cervical adenopathy  Skin:     General: Skin is dry  Neurological:      Mental Status: He is alert and oriented to person, place, and time  Deep Tendon Reflexes: Reflexes are normal and symmetric     Psychiatric:         Behavior: Behavior normal

## 2022-08-29 ENCOUNTER — APPOINTMENT (OUTPATIENT)
Dept: LAB | Facility: CLINIC | Age: 77
End: 2022-08-29
Payer: MEDICARE

## 2022-08-29 DIAGNOSIS — R97.20 ELEVATED PSA: ICD-10-CM

## 2022-08-29 LAB — PSA SERPL-MCNC: 7.7 NG/ML (ref 0–4)

## 2022-08-29 PROCEDURE — 84153 ASSAY OF PSA TOTAL: CPT

## 2022-09-07 LAB — COLOGUARD RESULT REPORTABLE: NEGATIVE

## 2022-09-16 ENCOUNTER — OFFICE VISIT (OUTPATIENT)
Dept: UROLOGY | Facility: CLINIC | Age: 77
End: 2022-09-16
Payer: MEDICARE

## 2022-09-16 VITALS
HEART RATE: 67 BPM | BODY MASS INDEX: 22.76 KG/M2 | SYSTOLIC BLOOD PRESSURE: 140 MMHG | DIASTOLIC BLOOD PRESSURE: 80 MMHG | OXYGEN SATURATION: 97 % | HEIGHT: 71 IN | WEIGHT: 162.6 LBS

## 2022-09-16 DIAGNOSIS — N13.8 BPH WITH OBSTRUCTION/LOWER URINARY TRACT SYMPTOMS: Primary | ICD-10-CM

## 2022-09-16 DIAGNOSIS — N40.1 BPH WITH OBSTRUCTION/LOWER URINARY TRACT SYMPTOMS: Primary | ICD-10-CM

## 2022-09-16 PROCEDURE — 99213 OFFICE O/P EST LOW 20 MIN: CPT | Performed by: PHYSICIAN ASSISTANT

## 2022-09-16 RX ORDER — TAMSULOSIN HYDROCHLORIDE 0.4 MG/1
0.4 CAPSULE ORAL
Qty: 90 CAPSULE | Refills: 3 | Status: SHIPPED | OUTPATIENT
Start: 2022-09-16

## 2022-09-16 NOTE — PROGRESS NOTES
UROLOGY PROGRESS NOTE   Patient Identifiers: Samantha Dumont (MRN [id number])  Date of Service: 9/16/2022    Subjective:    79-year-old man history of elevated PSA  He had 2 previous prostate biopsies  A multiparametric MRI showing PI-RADS category 2  Prostate is 100 g in size  Current PSA 7 7 which is within his range  High PSA is 10 8  Having some frequency and nocturia  Has never tried any medication      Reason for visit:  Elevated PSA    Objective:     VITALS:    Vitals:    09/16/22 0949   BP: 140/80   Pulse: 67   SpO2: 97%     AUA SYMPTOM SCORE    Flowsheet Row Most Recent Value   AUA SYMPTOM SCORE    How often have you had a sensation of not emptying your bladder completely after you finished urinating? 3 (P)     How often have you had to urinate again less than two hours after you finished urinating? 2 (P)     How often have you found you stopped and started again several times when you urinate? 2 (P)     How often have you found it difficult to postpone urination? 2 (P)     How often have you had a weak urinary stream? 3 (P)     How often have you had to push or strain to begin urination? 1 (P)     How many times did you most typically get up to urinate from the time you went to bed at night until the time you got up in the morning? 5 (P)     Quality of Life: If you were to spend the rest of your life with your urinary condition just the way it is now, how would you feel about that? 2 (P)     AUA SYMPTOM SCORE 18 (P)             LABS:  Lab Results   Component Value Date    HGB 13 0 02/03/2020    HCT 40 9 02/03/2020    WBC 7 39 02/03/2020     02/03/2020   ]    Lab Results   Component Value Date     05/14/2014    K 4 2 08/08/2022     08/08/2022    CO2 30 08/08/2022    BUN 19 08/08/2022    CREATININE 1 03 08/08/2022    CALCIUM 9 0 08/08/2022    GLUCOSE 100 12/13/2019   ]        INPATIENT MEDS:    Current Outpatient Medications:     amLODIPine (NORVASC) 5 mg tablet, Take 1 tablet (5 mg total) by mouth in the morning , Disp: 90 tablet, Rfl: 3    aspirin 81 MG tablet, Take 1 tablet (81 mg total) by mouth daily, Disp: 90 tablet, Rfl: 3    atorvastatin (LIPITOR) 80 mg tablet, Take 1 tablet (80 mg total) by mouth daily with dinner, Disp: 90 tablet, Rfl: 1    Calcium Carbonate (CALCIUM 500 PO), Take by mouth daily gummies, Disp: , Rfl:     Cholecalciferol (Vitamin D) 125 MCG (5000 UT) CAPS, Take by mouth daily, Disp: , Rfl:     Glucosamine-Chondroitin (GLUCOSAMINE CHONDR COMPLEX PO), Take 1 capsule by mouth 2 (two) times a day, Disp: , Rfl:     glucose blood (ACCU-CHEK GUIDE) test strip, Test blood sugar before meals and at bedtime, Disp: 300 each, Rfl: 1    metFORMIN (GLUCOPHAGE) 1000 MG tablet, Take 1 tablet (1,000 mg total) by mouth 2 (two) times a day, Disp: 180 tablet, Rfl: 1    metoprolol succinate (TOPROL-XL) 50 mg 24 hr tablet, Take 1 tablet (50 mg total) by mouth in the morning , Disp: 90 tablet, Rfl: 3    Omega-3 Fatty Acids (FISH OIL) 1200 MG CAPS, Take 1 capsule by mouth daily, Disp: , Rfl:     saccharomyces boulardii (FLORASTOR) 250 mg capsule, Take 250 mg by mouth 2 (two) times a day  , Disp: , Rfl:       Physical Exam:   /80 (BP Location: Left arm, Patient Position: Sitting, Cuff Size: Standard)   Pulse 67   Ht 5' 11" (1 803 m)   Wt 73 8 kg (162 lb 9 6 oz)   SpO2 97%   BMI 22 68 kg/m²   GEN: no acute distress    RESP: breathing comfortably with no accessory muscle use    ABD: soft, non-tender, non-distended   INCISION:    EXT: no significant peripheral edema   (Male): Penis circumcised, phallus normal, meatus patent  Testicles descended into scrotum bilaterally without masses nor tenderness  No inguinal hernias bilaterally  ROGERIO: Prostate is enlarged at >80 grams  The prostate is not boggy  The prostate is not tender  No nodules noted      RADIOLOGY:   None     Assessment:   1  Elevated PSA  2   Large BPH     Plan:   -I gave him a trial of tamsulosin  -we discussed cystoscopy truss and uroflow  -TURP or simple prostatectomy also briefly reviewed  -

## 2022-10-10 DIAGNOSIS — E11.9 TYPE 2 DIABETES MELLITUS (HCC): Chronic | ICD-10-CM

## 2022-10-18 ENCOUNTER — OFFICE VISIT (OUTPATIENT)
Dept: INTERNAL MEDICINE CLINIC | Age: 77
End: 2022-10-18
Payer: MEDICARE

## 2022-10-18 VITALS
BODY MASS INDEX: 22.82 KG/M2 | OXYGEN SATURATION: 100 % | WEIGHT: 163 LBS | DIASTOLIC BLOOD PRESSURE: 80 MMHG | HEIGHT: 71 IN | HEART RATE: 59 BPM | SYSTOLIC BLOOD PRESSURE: 138 MMHG | TEMPERATURE: 97.9 F

## 2022-10-18 DIAGNOSIS — Z00.00 MEDICARE ANNUAL WELLNESS VISIT, SUBSEQUENT: Primary | ICD-10-CM

## 2022-10-18 DIAGNOSIS — Z23 FLU VACCINE NEED: ICD-10-CM

## 2022-10-18 PROCEDURE — 90662 IIV NO PRSV INCREASED AG IM: CPT

## 2022-10-18 PROCEDURE — G0008 ADMIN INFLUENZA VIRUS VAC: HCPCS

## 2022-10-18 PROCEDURE — G0439 PPPS, SUBSEQ VISIT: HCPCS | Performed by: INTERNAL MEDICINE

## 2022-10-18 NOTE — PATIENT INSTRUCTIONS
Medicare Preventive Visit Patient Instructions  Thank you for completing your Welcome to Medicare Visit or Medicare Annual Wellness Visit today  Your next wellness visit will be due in one year (10/19/2023)  The screening/preventive services that you may require over the next 5-10 years are detailed below  Some tests may not apply to you based off risk factors and/or age  Screening tests ordered at today's visit but not completed yet may show as past due  Also, please note that scanned in results may not display below  Preventive Screenings:  Service Recommendations Previous Testing/Comments   Colorectal Cancer Screening  · Colonoscopy    · Fecal Occult Blood Test (FOBT)/Fecal Immunochemical Test (FIT)  · Fecal DNA/Cologuard Test  · Flexible Sigmoidoscopy Age: 39-70 years old   Colonoscopy: every 10 years (May be performed more frequently if at higher risk)  OR  FOBT/FIT: every 1 year  OR  Cologuard: every 3 years  OR  Sigmoidoscopy: every 5 years  Screening may be recommended earlier than age 39 if at higher risk for colorectal cancer  Also, an individualized decision between you and your healthcare provider will decide whether screening between the ages of 74-80 would be appropriate   Colonoscopy: 08/30/2022  FOBT/FIT: Not on file  Cologuard: 08/30/2022  Sigmoidoscopy: Not on file    Screening Current     Prostate Cancer Screening Individualized decision between patient and health care provider in men between ages of 53-78   Medicare will cover every 12 months beginning on the day after your 50th birthday PSA: 7 7 ng/mL     Screening Current     Hepatitis C Screening Once for adults born between 1945 and 1965  More frequently in patients at high risk for Hepatitis C Hep C Antibody: 05/30/2017    Screening Current   Diabetes Screening 1-2 times per year if you're at risk for diabetes or have pre-diabetes Fasting glucose: 108 mg/dL (8/8/2022)  A1C: 6 0 % (8/8/2022)  Screening Not Indicated  History Diabetes Cholesterol Screening Once every 5 years if you don't have a lipid disorder  May order more often based on risk factors  Lipid panel: 08/08/2022  Screening Not Indicated  History Lipid Disorder      Other Preventive Screenings Covered by Medicare:  1  Abdominal Aortic Aneurysm (AAA) Screening: covered once if your at risk  You're considered to be at risk if you have a family history of AAA or a male between the age of 73-68 who smoking at least 100 cigarettes in your lifetime  2  Lung Cancer Screening: covers low dose CT scan once per year if you meet all of the following conditions: (1) Age 50-69; (2) No signs or symptoms of lung cancer; (3) Current smoker or have quit smoking within the last 15 years; (4) You have a tobacco smoking history of at least 20 pack years (packs per day x number of years you smoked); (5) You get a written order from a healthcare provider  3  Glaucoma Screening: covered annually if you're considered high risk: (1) You have diabetes OR (2) Family history of glaucoma OR (3)  aged 48 and older OR (3)  American aged 72 and older  3  Osteoporosis Screening: covered every 2 years if you meet one of the following conditions: (1) Have a vertebral abnormality; (2) On glucocorticoid therapy for more than 3 months; (3) Have primary hyperparathyroidism; (4) On osteoporosis medications and need to assess response to drug therapy  5  HIV Screening: covered annually if you're between the age of 12-76  Also covered annually if you are younger than 13 and older than 72 with risk factors for HIV infection  For pregnant patients, it is covered up to 3 times per pregnancy      Immunizations:  Immunization Recommendations   Influenza Vaccine Annual influenza vaccination during flu season is recommended for all persons aged >= 6 months who do not have contraindications   Pneumococcal Vaccine   * Pneumococcal conjugate vaccine = PCV13 (Prevnar 13), PCV15 (Vaxneuvance), PCV20 (Prevnar 20)  * Pneumococcal polysaccharide vaccine = PPSV23 (Pneumovax) Adults 2364 years old: 1-3 doses may be recommended based on certain risk factors  Adults 72 years old: 1-2 doses may be recommended based off what pneumonia vaccine you previously received   Hepatitis B Vaccine 3 dose series if at intermediate or high risk (ex: diabetes, end stage renal disease, liver disease)   Tetanus (Td) Vaccine - COST NOT COVERED BY MEDICARE PART B Following completion of primary series, a booster dose should be given every 10 years to maintain immunity against tetanus  Td may also be given as tetanus wound prophylaxis  Tdap Vaccine - COST NOT COVERED BY MEDICARE PART B Recommended at least once for all adults  For pregnant patients, recommended with each pregnancy  Shingles Vaccine (Shingrix) - COST NOT COVERED BY MEDICARE PART B  2 shot series recommended in those aged 48 and above     Health Maintenance Due:      Topic Date Due   • Colorectal Cancer Screening  08/30/2025   • Hepatitis C Screening  Completed     Immunizations Due:      Topic Date Due   • Influenza Vaccine (1) 09/01/2022     Advance Directives   What are advance directives? Advance directives are legal documents that state your wishes and plans for medical care  These plans are made ahead of time in case you lose your ability to make decisions for yourself  Advance directives can apply to any medical decision, such as the treatments you want, and if you want to donate organs  What are the types of advance directives? There are many types of advance directives, and each state has rules about how to use them  You may choose a combination of any of the following:  · Living will: This is a written record of the treatment you want  You can also choose which treatments you do not want, which to limit, and which to stop at a certain time  This includes surgery, medicine, IV fluid, and tube feedings  · Durable power of  for healthcare [facility]):   This is a written record that states who you want to make healthcare choices for you when you are unable to make them for yourself  This person, called a proxy, is usually a family member or a friend  You may choose more than 1 proxy  · Do not resuscitate (DNR) order:  A DNR order is used in case your heart stops beating or you stop breathing  It is a request not to have certain forms of treatment, such as CPR  A DNR order may be included in other types of advance directives  · Medical directive: This covers the care that you want if you are in a coma, near death, or unable to make decisions for yourself  You can list the treatments you want for each condition  Treatment may include pain medicine, surgery, blood transfusions, dialysis, IV or tube feedings, and a ventilator (breathing machine)  · Values history: This document has questions about your views, beliefs, and how you feel and think about life  This information can help others choose the care that you would choose  Why are advance directives important? An advance directive helps you control your care  Although spoken wishes may be used, it is better to have your wishes written down  Spoken wishes can be misunderstood, or not followed  Treatments may be given even if you do not want them  An advance directive may make it easier for your family to make difficult choices about your care  © Copyright [employer] 2018 Information is for End User's use only and may not be sold, redistributed or otherwise used for commercial purposes   All illustrations and images included in CareNotes® are the copyrighted property of [employer]  or [address] [place] [address] [employer]

## 2022-11-22 ENCOUNTER — TELEPHONE (OUTPATIENT)
Dept: ADMINISTRATIVE | Facility: OTHER | Age: 77
End: 2022-11-22

## 2022-11-22 NOTE — TELEPHONE ENCOUNTER
----- Message from Tracy Packer sent at 11/21/2022  1:51 PM EST -----  Regarding: Care Gap Request  11/21/22 1:51 PM    Hello, our patient attached above has had Diabetic Eye Exam completed/performed  Please assist in updating the patient chart by making an External outreach to [facility]- (Dr Candice Nieto located in [address] [place] [address] [phone], [address]   The date of service is 10/4/22      Thank you,  Janet Guzman  [facility]

## 2022-11-22 NOTE — TELEPHONE ENCOUNTER
Upon review of the In Basket request we were able to locate, review, and update the patient chart as requested for Diabetic Eye Exam     Any additional questions or concerns should be emailed to the Practice Liaisons via the appropriate education email address, please do not reply via In Basket      Thank you  Francois Reynoso MA

## 2022-12-17 PROBLEM — Z00.00 MEDICARE ANNUAL WELLNESS VISIT, SUBSEQUENT: Status: RESOLVED | Noted: 2022-10-18 | Resolved: 2022-12-17

## 2023-01-01 NOTE — H&P (VIEW-ONLY)
Consultation - Cardiothoracic Surgery   Elroy Alejandro 69 y o  male MRN: [id number]  [place] [facility] Encounter: [id number]    Physician Requesting Consult: Camryn Reyes MD    Reason for Consult / Principal Problem: MV CAD    Inpatient consult to Cardiothoracic Surgery  Consult performed by: Monisha Quintero PA-C  Consult ordered by: Bony Birmingham DO        History of Present Illness: Elroy Alejandro is a 69y o  year old male with a PMH of HTN, HLD, DM2, and diabetic retinopathy who presented to [facility] for elective cardiac cath for evaluation of stable exertional angina after a stress test demonstrated septal and inferior ischemia of a moderate degree  Patient noted exertional angina developing over the past several months  He notices this when he walks up an incline, especially in the cold whether  He describes it as a chest tightness that does not radiate and gets better with rest  He denies any associated symptoms such as diaphoreses, N/V, SOB, cough, LE edema, lightheadedness, or syncope  He has no cardiac history  MV CAD was found on cardiac cath  Cardiac surgery has been consulted to evaluate patient for possible CABG  He lives with his wife, drives, performs ADLs independently, is very physically active on a daily basis, and ambulates without assistance  He has a family history significant for heart disease, including both parents  He does not use tobacco products  He had a CT chest in the past which revealed an incidental finding of 43 mm ascending aortic aneurysm  There was no follow up   He sees Dr Basilia Barber as his PCP and Dr Maurice Morales as his cardiologist     Past Medical History:  Past Medical History:   Diagnosis Date    Chronic cough     RESOLVED: [id number]    Diabetes mellitus ([facility] [address] )     [facility]abetic retinopathy ([facility] )     HLD (hyperlipidemia)     HTN (hypertension)          Past Surgical History:   Past Surgical History:   Procedure Laterality Date    CARDIAC CATHETERIZATION  12/09/2019    HAND SURGERY           Family History:  Family History   Problem Relation Age of Onset    Heart failure Mother     Heart failure Father     Heart disease Father     Heart attack Father     Diabetes Other          Social History:  Social History     Substance and Sexual Activity   Alcohol Use Yes    Comment: occasional     Social History     Substance and Sexual Activity   Drug Use No     Social History     Tobacco Use   Smoking Status Never Smoker   Smokeless Tobacco Never Used     Marital Status: /Civil Union      Home Medications:   Prior to Admission medications    Medication Sig Start Date End Date Taking?  Authorizing Provider   aspirin 81 mg chewable tablet Chew 81 mg daily   Yes Historical Provider, MD   amLODIPine (NORVASC) 10 mg tablet Take 1 tablet (10 mg total) by mouth daily 11/21/19   Tobias Andersen MD   atorvastatin (LIPITOR) 40 mg tablet Take 1 tablet (40 mg total) by mouth daily 12/9/19   Tracy Galo DO   Coenzyme Q10 (COQ10) 100 MG CAPS Take 1 capsule by mouth daily    Historical Provider, MD   Glucosamine-Chondroitin 750-600 MG TABS Take 1 tablet by mouth daily    Historical Provider, MD   hydrochlorothiazide (HYDRODIURIL) 25 mg tablet Take 1 tablet (25 mg total) by mouth daily 11/21/19   Tobias Andersen MD   metFORMIN (GLUCOPHAGE) 1000 MG tablet Take 1 tablet (1,000 mg total) by mouth 2 (two) times a day 11/21/19   Tobias Andersen MD   Omega-3 Fatty Acids (FISH OIL) 645 MG CAPS Take 1 capsule by mouth 2 (two) times a day    Historical Provider, MD   simvastatin (ZOCOR) 40 mg tablet Take 1 tablet (40 mg total) by mouth daily 11/21/19   Tobias Andersen MD       Inpatient Medications:  Scheduled Meds:   Current Facility-Administered Medications:  sodium chloride 100 mL/hr Intravenous Continuous KONSTANTIN Damon Last Rate: Stopped (12/09/19 1051)   sodium chloride 100 mL/hr Intravenous Continuous Tracy Galo DO Last Rate: 100 mL/hr (12/09/19 1051)     Continuous Infusions: sodium chloride 100 mL/hr Last Rate: Stopped (12/09/19 1051)   sodium chloride 100 mL/hr Last Rate: 100 mL/hr (12/09/19 1051)     PRN Meds:       Allergies: Allergies   Allergen Reactions    Pollen Extract        Review of Systems:  Review of Systems   Constitutional: Negative for activity change, chills, fatigue and fever  HENT: Negative for facial swelling, nosebleeds and trouble swallowing  Eyes: Negative for pain and visual disturbance  Respiratory: Positive for chest tightness and shortness of breath  Negative for cough and wheezing  Cardiovascular: Positive for chest pain  Negative for palpitations and leg swelling  Gastrointestinal: Negative for abdominal pain, nausea and vomiting  Genitourinary: Negative for dysuria, flank pain and hematuria  Musculoskeletal: Negative for arthralgias, gait problem and joint swelling  Skin: Negative for color change and pallor  Neurological: Negative for seizures, syncope and numbness  Hematological: Negative for adenopathy  Does not bruise/bleed easily  Psychiatric/Behavioral: Negative for agitation, behavioral problems and confusion  Vital Signs:     Vitals:    12/09/19 0702 12/09/19 1100 12/09/19 1130   BP: 170/84 116/63 114/61   BP Location: Right arm     Pulse: 86 72 68   Resp: 18     Temp: (!) 97 4 °F (36 3 °C)     TempSrc: Oral     SpO2: 98%     Weight: 80 7 kg (178 lb)     Height: 5' 10" (1 778 m)       Invasive Devices     Peripheral Intravenous Line            Peripheral IV 12/09/19 Left Forearm less than 1 day                Physical Exam:  Physical Exam   Constitutional: He is oriented to person, place, and time  He appears well-developed and well-nourished  No distress  HENT:   Head: Normocephalic and atraumatic  Right Ear: External ear normal    Left Ear: External ear normal    Nose: Nose normal    Eyes: Right eye exhibits no discharge  Left eye exhibits no discharge  Neck: Neck supple  No JVD present     Cardiovascular: Normal rate and regular rhythm  Exam reveals no friction rub  No murmur heard  Pulmonary/Chest: Effort normal and breath sounds normal  He has no wheezes  He has no rales  Abdominal: Soft  Bowel sounds are normal  He exhibits no distension  There is no tenderness  Musculoskeletal: He exhibits no edema or deformity  Neurological: He is alert and oriented to person, place, and time  Skin: Skin is warm and dry  Capillary refill takes less than 2 seconds  No rash noted  He is not diaphoretic  No erythema  Psychiatric: He has a normal mood and affect  His behavior is normal        Lab Results:     Results from last 7 days   Lab Units 12/09/19  0743 12/03/19  1457   WBC Thousand/uL 6 43 8 56   HEMOGLOBIN g/dL 15 3 16 2   HEMATOCRIT % 44 9 48 1   PLATELETS Thousands/uL 159 217     Results from last 7 days   Lab Units 12/09/19  0743 12/03/19  1457   POTASSIUM mmol/L 3 8 3 6   CHLORIDE mmol/L 105 98*   CO2 mmol/L 32 33*   BUN mg/dL 18 20   CREATININE mg/dL 1 00 1 14   CALCIUM mg/dL 9 5 9 8     Results from last 7 days   Lab Units 12/09/19  0743 12/03/19  1457   INR  1 07 0 98     Lab Results   Component Value Date    HGBA1C 6 6 (H) 11/15/2019     No results found for: CKTOTAL, CKMB, CKMBINDEX, TROPONINI    Imaging Studies:     Cardiac Catheterization:   CORONARY CIRCULATION:  Proximal LAD: There was a tubular 90 % stenosis  Mid LAD: There was a discrete 99 % stenosis at the origin of D2   1st diagonal: There was a 90 % stenosis  Proximal RCA: There was a 100 % stenosis  This lesion is a chronic total occlusion  CT chest: 2/22/2019  IMPRESSION:  No active pulmonary disease  Stable benign right upper lobe nodules  Mild ascending aortic aneurysm, essentially unchanged since the prior study  I have personally reviewed pertinent films in PACS    Assessment:  Active Problems:    * No active hospital problems  *    Severe coronary artery disease;  Ongoing CABG workup    Plan:  Risks and benefits of coronary artery bypass grafting were discussed in detail today with the patient  They understand and wish to proceed with further workup and ultimately surgical intervention  We have ordered routine preoperative laboratory and vascular studies  Pending the results of these tests, they will be scheduled for surgery Friday, 12/13 with LISA Joyner was comfortable with our recommendations, and their questions were answered to their satisfaction  They will be discharged home today and following up in our office on Thursday, 12/12 at 10am, to receive pre-operative instructions  Thank you for allowing us to participate in the care of this patient       SIGNATURE: Nerissa Han PA-C  DATE: December 9, 2019  TIME: 12:16 PM Parent

## 2023-03-08 DIAGNOSIS — E11.9 TYPE 2 DIABETES MELLITUS (HCC): Chronic | ICD-10-CM

## 2023-03-08 DIAGNOSIS — Z95.1 S/P CABG (CORONARY ARTERY BYPASS GRAFT): ICD-10-CM

## 2023-03-09 RX ORDER — ATORVASTATIN CALCIUM 80 MG/1
80 TABLET, FILM COATED ORAL
Qty: 90 TABLET | Refills: 1 | Status: SHIPPED | OUTPATIENT
Start: 2023-03-09

## 2023-04-11 DIAGNOSIS — E11.9 TYPE 2 DIABETES MELLITUS WITHOUT COMPLICATION, WITHOUT LONG-TERM CURRENT USE OF INSULIN (HCC): Primary | Chronic | ICD-10-CM

## 2023-04-25 ENCOUNTER — OFFICE VISIT (OUTPATIENT)
Dept: CARDIOLOGY CLINIC | Facility: MEDICAL CENTER | Age: 78
End: 2023-04-25

## 2023-04-25 VITALS
SYSTOLIC BLOOD PRESSURE: 150 MMHG | BODY MASS INDEX: 23.24 KG/M2 | OXYGEN SATURATION: 98 % | WEIGHT: 166 LBS | HEIGHT: 71 IN | HEART RATE: 65 BPM | DIASTOLIC BLOOD PRESSURE: 80 MMHG

## 2023-04-25 DIAGNOSIS — I10 BENIGN ESSENTIAL HYPERTENSION: Primary | Chronic | ICD-10-CM

## 2023-04-25 RX ORDER — HYDROCHLOROTHIAZIDE 12.5 MG/1
12.5 TABLET ORAL DAILY
Qty: 90 TABLET | Refills: 3 | Status: SHIPPED | OUTPATIENT
Start: 2023-04-25

## 2023-04-25 NOTE — PROGRESS NOTES
[facility]  Office Progress Note  Mayela Diane 78 y o  male MRN: [id number]        Problems    1  Benign essential hypertension  hydrochlorothiazide (HYDRODIURIL) 12 5 mg tablet    CANCELED: POCT ECG          Impression:    Toney Murillo returns for a usual follow-up    Hypertension  Many blood pressures throughout 2022, and now today are uncontrolled  Today 150/80  He had been on HCTZ 25 mg prior to his bypass, that he never been reinstituted  Current therapy includes Toprol and amlodipine 5 mg    Hyperlipidemia  Lipids have been excellent    Type 2 diabetes  A1c he has been controlled    Right bundle branch block  Stable    Coronary artery disease  Workup in 2019 for stable angina resulted in discovery of multivessel CAD, status post CABG (lima to LAD, SVG to D1, SVG to OM, SVG to PDA  He walks extensively, he has no exertional or anginal symptoms  Plan    Add HCTZ 12-1/2 mg daily  Has follow-up with his PCP in 2 months, I recommended a few home blood pressure assessments between now and then  PCP already has labs ordered for surveillance which would suffice for adding HCTZ  6-month follow-up with me      HPI: Mayela Diane is a 78y o  year old male with CAD discovered in the setting of stable exertional angina, with abnormal stress test, and status post CABG x4 with LIMA to LAD, SVG to D1, SVG to OM, SVG to PDA in 2019, hypertension, diabetes, hyperlipidemia presents for follow-up visit  He walks extensively, he has no cardiac symptoms  His blood pressure is still not ideal, although he did have a low of 130/80 at his PCP in the fall, but most other blood pressures are greater than 140 and some greater than 695 systolic  There is definitely less stress in the home, his stepson, who had issues with drug abuse has moved out  His weights are quite stable, he is 163 pounds  This is about 20 pounds lower than the time of his bypass    His lipids have been well controlled  He has surveillance blood work ordered for his June appointment with his PCP  Review of Systems   Constitutional: Negative for appetite change, diaphoresis, fatigue and fever  Respiratory: Negative for chest tightness, shortness of breath and wheezing  Cardiovascular: Negative for chest pain, palpitations and leg swelling  Gastrointestinal: Negative for abdominal pain and blood in stool  Musculoskeletal: Negative for arthralgias and joint swelling  Skin: Negative for rash  Neurological: Negative for dizziness, syncope and light-headedness  Past Medical History:   Diagnosis Date   • Chronic cough     RESOLVED: [id number]   • Coronary artery disease    • Diabetes mellitus ([facility] )    • Diabetic retinopathy ([facility] )    • HLD (hyperlipidemia)    • HTN (hypertension)      Past Surgical History:   Procedure Laterality Date   • CARDIAC CATHETERIZATION  12/09/2019   • HAND SURGERY     • [place] CORONARY ARTERY BYP W/VEIN & ARTERY GRAFT 3 VEIN N/A 12/13/2019    Procedure: CORONARY ARTERY BYPASS GRAFT (CABG) 4 VESSELS STEVEN to LAD ; SVG--> PDA , DIAGONAL, and OM;  Surgeon: Molly Huang DO;  [facility];  Service: Cardiac Surgery   • [place] ECHO TRANSESOPHAG R-T 2D W/PRB IMG ACQUISJ I&R N/A 12/13/2019    Procedure: TRANSESOPHAGEAL ECHOCARDIOGRAM (SHEBA); Surgeon: Molly Huang DO;  [facility];  Service: Cardiac Surgery   • [place] NDSC SURG W/VIDEO-ASSISTED HARVEST VEIN CABG Left 12/13/2019    Procedure: HARVEST VEIN ENDOSCOPIC ([address]);   Surgeon: Molly Huang DO;  [facility];  Service: Cardiac Surgery     Social History     Substance and Sexual Activity   Alcohol Use Yes   • Alcohol/week: 5 0 standard drinks   • Types: 3 Glasses of wine, 1 Cans of beer, 1 Standard drinks or equivalent per week    Comment: occasional     Social History     Substance and Sexual Activity   Drug Use No     Social History     Tobacco Use   Smoking Status Never   Smokeless Tobacco Never     Family History   Problem Relation Age of Onset   • Heart failure Mother    • Heart failure Father    • Heart disease Father    • Heart attack Father    • Diabetes Other        Allergies: Allergies   Allergen Reactions   • Pollen Extract Allergic Rhinitis       Medications:     Current Outpatient Medications:   •  amLODIPine (NORVASC) 5 mg tablet, Take 1 tablet (5 mg total) by mouth in the morning , Disp: 90 tablet, Rfl: 3  •  aspirin 81 MG tablet, Take 1 tablet (81 mg total) by mouth daily, Disp: 90 tablet, Rfl: 3  •  atorvastatin (LIPITOR) 80 mg tablet, Take 1 tablet (80 mg total) by mouth daily with dinner, Disp: 90 tablet, Rfl: 1  •  Calcium Carbonate (CALCIUM 500 PO), Take by mouth daily gummies, Disp: , Rfl:   •  Cholecalciferol (Vitamin D) 125 MCG (5000 UT) CAPS, Take by mouth daily, Disp: , Rfl:   •  Glucosamine-Chondroitin (GLUCOSAMINE CHONDR COMPLEX PO), Take 1 capsule by mouth 2 (two) times a day, Disp: , Rfl:   •  hydrochlorothiazide (HYDRODIURIL) 12 5 mg tablet, Take 1 tablet (12 5 mg total) by mouth daily, Disp: 90 tablet, Rfl: 3  •  metFORMIN (GLUCOPHAGE) 1000 MG tablet, Take 1 tablet (1,000 mg total) by mouth 2 (two) times a day, Disp: 180 tablet, Rfl: 1  •  metoprolol succinate (TOPROL-XL) 50 mg 24 hr tablet, Take 1 tablet (50 mg total) by mouth in the morning , Disp: 90 tablet, Rfl: 3  •  Omega-3 Fatty Acids (FISH OIL) 1200 MG CAPS, Take 1 capsule by mouth daily, Disp: , Rfl:   •  tamsulosin (FLOMAX) 0 4 mg, Take 1 capsule (0 4 mg total) by mouth daily with dinner, Disp: 90 capsule, Rfl: 3      Vitals:    04/25/23 1324   BP: 150/80   Pulse: 65   SpO2: 98%     Weight (last 2 days)     Date/Time Weight    04/25/23 1324 75 3 (166)        Physical Exam  Constitutional:       General: He is not in acute distress  Appearance: He is not diaphoretic  HENT:      Head: Normocephalic and atraumatic  Eyes:      General: No scleral icterus  Conjunctiva/sclera: Conjunctivae normal    Neck:      Vascular: No JVD     Cardiovascular:      Rate "and Rhythm: Normal rate and regular rhythm  Heart sounds: Normal heart sounds  No murmur heard  Pulmonary:      Effort: Pulmonary effort is normal  No respiratory distress  Breath sounds: Normal breath sounds  No decreased breath sounds, wheezing, rhonchi or rales  Musculoskeletal:      Cervical back: Normal range of motion  Right lower leg: Normal  No edema  Left lower leg: Normal  No edema  Skin:     General: Skin is warm and dry  Neurological:      Mental Status: He is alert and oriented to person, place, and time  Laboratory Studies:    Laboratory testing personally reviewed    Cardiac testing:     EKG reviewed personally:    No results found for this visit on 04/25/23  Cardiac catheterization  12/19-Occluded RCA, 99% om, significant distal left main disease/LAD disease as well    Echocardiogram  12/19-EF 60%, trace valve disease    Hillary Rivero MD    Portions of the record may have been created with voice recognition software  Occasional wrong word or \"sound a like\" substitutions may have occurred due to the inherent limitations of voice recognition software  Read the chart carefully and recognize, using context, where substitutions have occurred    "

## 2023-05-16 DIAGNOSIS — I10 BENIGN ESSENTIAL HYPERTENSION: Chronic | ICD-10-CM

## 2023-05-16 RX ORDER — AMLODIPINE BESYLATE 5 MG/1
5 TABLET ORAL DAILY
Qty: 90 TABLET | Refills: 0 | Status: SHIPPED | OUTPATIENT
Start: 2023-05-16

## 2023-06-05 ENCOUNTER — APPOINTMENT (OUTPATIENT)
Dept: LAB | Facility: CLINIC | Age: 78
End: 2023-06-05
Payer: MEDICARE

## 2023-06-05 DIAGNOSIS — E11.9 TYPE 2 DIABETES MELLITUS WITHOUT COMPLICATION, WITHOUT LONG-TERM CURRENT USE OF INSULIN (HCC): Chronic | ICD-10-CM

## 2023-06-05 LAB
CHOLEST SERPL-MCNC: 108 MG/DL
CREAT UR-MCNC: 132 MG/DL
EST. AVERAGE GLUCOSE BLD GHB EST-MCNC: 131 MG/DL
GLUCOSE P FAST SERPL-MCNC: 109 MG/DL (ref 65–99)
HBA1C MFR BLD: 6.2 %
HDLC SERPL-MCNC: 40 MG/DL
LDLC SERPL CALC-MCNC: 59 MG/DL (ref 0–100)
MICROALBUMIN UR-MCNC: 21.4 MG/L (ref 0–20)
MICROALBUMIN/CREAT 24H UR: 16 MG/G CREATININE (ref 0–30)
NONHDLC SERPL-MCNC: 68 MG/DL
TRIGL SERPL-MCNC: 43 MG/DL

## 2023-06-05 PROCEDURE — 80061 LIPID PANEL: CPT

## 2023-06-05 PROCEDURE — 82947 ASSAY GLUCOSE BLOOD QUANT: CPT

## 2023-06-05 PROCEDURE — 82570 ASSAY OF URINE CREATININE: CPT | Performed by: INTERNAL MEDICINE

## 2023-06-05 PROCEDURE — 82043 UR ALBUMIN QUANTITATIVE: CPT | Performed by: INTERNAL MEDICINE

## 2023-06-05 PROCEDURE — 36415 COLL VENOUS BLD VENIPUNCTURE: CPT

## 2023-06-05 PROCEDURE — 83036 HEMOGLOBIN GLYCOSYLATED A1C: CPT

## 2023-06-13 ENCOUNTER — OFFICE VISIT (OUTPATIENT)
Dept: INTERNAL MEDICINE CLINIC | Age: 78
End: 2023-06-13
Payer: MEDICARE

## 2023-06-13 VITALS
HEART RATE: 70 BPM | OXYGEN SATURATION: 97 % | SYSTOLIC BLOOD PRESSURE: 128 MMHG | WEIGHT: 155.7 LBS | BODY MASS INDEX: 21.8 KG/M2 | HEIGHT: 71 IN | TEMPERATURE: 98.1 F | DIASTOLIC BLOOD PRESSURE: 76 MMHG

## 2023-06-13 DIAGNOSIS — E11.3292 MILD NONPROLIFERATIVE DIABETIC RETINOPATHY OF LEFT EYE WITHOUT MACULAR EDEMA ASSOCIATED WITH TYPE 2 DIABETES MELLITUS (HCC): ICD-10-CM

## 2023-06-13 DIAGNOSIS — E11.9 TYPE 2 DIABETES MELLITUS WITHOUT COMPLICATION, WITHOUT LONG-TERM CURRENT USE OF INSULIN (HCC): Primary | ICD-10-CM

## 2023-06-13 DIAGNOSIS — I10 BENIGN ESSENTIAL HYPERTENSION: ICD-10-CM

## 2023-06-13 DIAGNOSIS — E78.00 HYPERCHOLESTEREMIA: ICD-10-CM

## 2023-06-13 DIAGNOSIS — J30.2 SEASONAL ALLERGIES: ICD-10-CM

## 2023-06-13 DIAGNOSIS — D22.9 ATYPICAL MOLE: ICD-10-CM

## 2023-06-13 LAB
LEFT EYE DIABETIC RETINOPATHY: POSITIVE
RIGHT EYE DIABETIC RETINOPATHY: POSITIVE
SEVERITY (EYE EXAM): NORMAL

## 2023-06-13 PROCEDURE — 99214 OFFICE O/P EST MOD 30 MIN: CPT | Performed by: STUDENT IN AN ORGANIZED HEALTH CARE EDUCATION/TRAINING PROGRAM

## 2023-06-13 RX ORDER — METFORMIN HYDROCHLORIDE 500 MG/1
2000 TABLET, EXTENDED RELEASE ORAL
Qty: 360 TABLET | Refills: 3 | Status: CANCELLED | OUTPATIENT
Start: 2023-06-13

## 2023-06-13 RX ORDER — METFORMIN HYDROCHLORIDE 750 MG/1
750 TABLET, EXTENDED RELEASE ORAL 2 TIMES DAILY
Qty: 180 TABLET | Refills: 1 | Status: SHIPPED | OUTPATIENT
Start: 2023-06-13

## 2023-06-13 NOTE — PROGRESS NOTES
[facility]  INTERNAL MEDICINE OFFICE VISIT     PATIENT INFORMATION     Doris Sanchez   78 y o  male   MRN: [id number]    ASSESSMENT/PLAN     Diagnoses and all orders for this visit:    Type 2 diabetes mellitus without complication, without long-term current use of insulin ([address] )  Well controlled with A1c 6 2  Is on metformin 1000 mg BID but having gas with this  He is interested in switching to Metformin ER  · Will switch to metformin  mg BID, given his diabetes is well controlled  · Repeat BMP and A1c prior to next office visit  -     metFORMIN (GLUCOPHAGE-XR) 750 mg 24 hr tablet; Take 1 tablet (750 mg total) by mouth 2 (two) times a day  -     Hemoglobin A1C; Future  -     Basic metabolic panel; Future    Benign essential hypertension  /76 and 128/76 on recheck  His Cardiologist recently restarted his HCTZ 12 5 mg qd, which he is tolerating well  · Continue amlodipine 5 mg qd  · Continue HCTZ 12 5 mg qd  · Continue Toprol XL 50 mg qd    Atypical mole  Present for years on anterior right shoulder but growing over the last year  Irregular border with both brown and black spot  · Refer to Dermatology Dr Lexi Johnson  -     Ambulatory Referral to Dermatology; Future    Hypercholesteremia  Lipid panel /TG 43/HDL 40/LDL 59   · Continue lipitor 80 mg qd    Seasonal allergies  Patient complaining of post nasal drip symptoms  · Trial OTC claritin    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 ([address] )  Patient gets Eyelia injections with his Ophthalmologist  He was recently enrolled in study with new pill  He endorses some improvement in his vision since starting this pill  · Follow up Opthalmology     Schedule a follow-up appointment in 6 months      HEALTH MAINTENANCE     Immunization History   Administered Date(s) Administered   • COVID-19 MODERNA VACC 0 5 ML IM 01/29/2021, 02/26/2021, 10/27/2021, 10/13/2022   • COVID-19, unspecified 01/29/2021, 02/26/2021   • INFLUENZA 10/19/2015, 12/02/2016, 12/20/2017, 11/12/2018, 10/18/2022   • Influenza Split High Dose Preservative Free IM 09/24/2014, 10/19/2015, 12/02/2016, 12/20/2017   • Influenza, high dose seasonal 0 7 mL 11/12/2018, 09/30/2019, 10/12/2020, 09/29/2021, 10/18/2022   • Pneumococcal Conjugate 13-Valent 05/30/2019   • Pneumococcal Polysaccharide PPV23 05/08/2014   • Tdap 06/22/2018   • Zoster 02/12/2014   • Zoster Vaccine Recombinant 06/22/2021, 08/26/2021     CHIEF COMPLAINT     Chief Complaint   Patient presents with   • Follow-up     DM II  Review labs  STARTED DM FOOT EXAM   Discuss Metformin       HISTORY OF PRESENT ILLNESS     Norah Mccann is a 77 yo M with a PMH of T2DM, HTN, CAD s/p CABG, and diabetic retinopathy who presents today for follow up of chronic conditions  Patient normally gets Eyelia injections for his history of retinopathy/macular edema  Last month he enrolled in a study for a new pill to treat macular edema  He recently had labs done  Cholesterol was well controlled  A1c stable at 6 2  He is interested in switching to metformin ER because of history of gas  His BP today is 144/76 but 128/76 on recheck, which is more consistent with what he gets at home  He restarted HCTZ per his Cardiologist     Patient has a mole present on his right shoulder for years but he feels that over the last month it has started to grow in size  It is not painful or pruritic  No family history of cancer  REVIEW OF SYSTEMS     Review of Systems   Constitutional: Negative for appetite change, fatigue and fever  HENT: Negative for sore throat  Eyes: Positive for visual disturbance  Respiratory: Negative for cough and shortness of breath  Cardiovascular: Negative for chest pain  Gastrointestinal: Negative for abdominal pain, constipation, diarrhea and nausea  Gassiness   Genitourinary: Positive for difficulty urinating  Negative for dysuria     Musculoskeletal: "Negative for arthralgias and myalgias  Skin:        Atypical mole on right shoulder   Neurological: Negative for dizziness, light-headedness and headaches  OBJECTIVE     Vitals:    06/13/23 0854 06/13/23 0912   BP: 144/76 128/76   BP Location: Left arm Right arm   Patient Position: Sitting Sitting   Cuff Size: Standard Standard   Pulse: 70    Temp: 98 1 °F (36 7 °C)    TempSrc: Temporal    SpO2: 97%    Weight: 70 6 kg (155 lb 11 2 oz)    Height: 5' 11\" (1 803 m)      Physical Exam  Constitutional:       General: He is not in acute distress  Eyes:      Conjunctiva/sclera: Conjunctivae normal    Cardiovascular:      Rate and Rhythm: Normal rate and regular rhythm  Heart sounds: Normal heart sounds  Pulmonary:      Effort: Pulmonary effort is normal       Breath sounds: Normal breath sounds  Abdominal:      General: There is no distension  Palpations: Abdomen is soft  Tenderness: There is no abdominal tenderness  Musculoskeletal:      Cervical back: Neck supple  Right lower leg: No edema  Left lower leg: No edema  Skin:     General: Skin is warm and dry  Comments: 1 cm irregular brown mole on anterior right shoulder with small 2 mm round black spot on the left side of the lesion   Neurological:      Mental Status: He is alert  Psychiatric:         Behavior: Behavior is cooperative         CURRENT MEDICATIONS     Current Outpatient Medications:   •  amLODIPine (NORVASC) 5 mg tablet, Take 1 tablet (5 mg total) by mouth daily, Disp: 90 tablet, Rfl: 0  •  aspirin 81 MG tablet, Take 1 tablet (81 mg total) by mouth daily, Disp: 90 tablet, Rfl: 3  •  atorvastatin (LIPITOR) 80 mg tablet, Take 1 tablet (80 mg total) by mouth daily with dinner, Disp: 90 tablet, Rfl: 1  •  Calcium Carbonate (CALCIUM 500 PO), Take by mouth daily gummies, Disp: , Rfl:   •  Cholecalciferol (Vitamin D) 125 MCG (5000 UT) CAPS, Take by mouth daily, Disp: , Rfl:   •  Glucosamine-Chondroitin " (GLUCOSAMINE CHONDR COMPLEX PO), Take 1 capsule by mouth 2 (two) times a day, Disp: , Rfl:   •  hydrochlorothiazide (HYDRODIURIL) 12 5 mg tablet, Take 1 tablet (12 5 mg total) by mouth daily, Disp: 90 tablet, Rfl: 3  •  metFORMIN (GLUCOPHAGE-XR) 750 mg 24 hr tablet, Take 1 tablet (750 mg total) by mouth 2 (two) times a day, Disp: 180 tablet, Rfl: 1  •  metoprolol succinate (TOPROL-XL) 50 mg 24 hr tablet, Take 1 tablet (50 mg total) by mouth in the morning , Disp: 90 tablet, Rfl: 3  •  Omega-3 Fatty Acids (FISH OIL) 1200 MG CAPS, Take 1 capsule by mouth daily, Disp: , Rfl:   •  tamsulosin (FLOMAX) 0 4 mg, Take 1 capsule (0 4 mg total) by mouth daily with dinner, Disp: 90 capsule, Rfl: 3    PAST MEDICAL & SURGICAL HISTORY     Past Medical History:   Diagnosis Date   • Chronic cough     RESOLVED: [id number]   • Coronary artery disease    • Diabetes mellitus ([facility] )    • Diabetic retinopathy ([facility] )    • HLD (hyperlipidemia)    • HTN (hypertension)      Past Surgical History:   Procedure Laterality Date   • CARDIAC CATHETERIZATION  12/09/2019   • HAND SURGERY     • [place] CORONARY ARTERY BYP W/VEIN & ARTERY GRAFT 3 VEIN N/A 12/13/2019    Procedure: CORONARY ARTERY BYPASS GRAFT (CABG) 4 VESSELS STEVEN to LAD ; SVG--> PDA , DIAGONAL, and OM;  Surgeon: Jacey Peck DO;  [facility];  Service: Cardiac Surgery   • CO ECHO TRANSESOPHAG R-T 2D W/PRB IMG ACQUISJ I&R N/A 12/13/2019    Procedure: TRANSESOPHAGEAL ECHOCARDIOGRAM (SHEBA); Surgeon: Jacey Peck DO;  [facility];  Service: Cardiac Surgery   • [place] NDSC SURG W/VIDEO-ASSISTED HARVEST VEIN CABG Left 12/13/2019    Procedure: HARVEST VEIN ENDOSCOPIC ([address]);   Surgeon: Jacey Peck DO;  [facility];  Service: Cardiac Surgery     SOCIAL & FAMILY HISTORY     Social History     Socioeconomic History   • Marital status: /Civil Union     Spouse name: Not on file   • Number of children: Not on file   • Years of education: Not on file   • Highest education level: Not on file   Occupational History   • Not on file   Tobacco Use   • Smoking status: Never   • Smokeless tobacco: Never   Vaping Use   • Vaping Use: Never used   Substance and Sexual Activity   • Alcohol use: Yes     Alcohol/week: 5 0 standard drinks of alcohol     Types: 3 Glasses of wine, 1 Cans of beer, 1 Standard drinks or equivalent per week     Comment: occasional   • Drug use: No   • Sexual activity: Not Currently     Partners: Female   Other Topics Concern   • Not on file   Social History Narrative   • Not on file     Social Determinants of Health     Financial Resource Strain: Low Risk  (10/17/2022)    Overall Financial Resource Strain (CARDIA)    • Difficulty of Paying Living Expenses: Not hard at all   Food Insecurity: Not on file   Transportation Needs: No Transportation Needs (10/17/2022)    PRAPARE - Transportation    • Lack of Transportation (Medical): No    • Lack of Transportation (Non-Medical):  No   Physical Activity: Not on file   Stress: Not on file   Social Connections: Not on file   Intimate Partner Violence: Not on file   Housing Stability: Not on file     Social History     Substance and Sexual Activity   Alcohol Use Yes   • Alcohol/week: 5 0 standard drinks of alcohol   • Types: 3 Glasses of wine, 1 Cans of beer, 1 Standard drinks or equivalent per week    Comment: occasional     Substance and Sexual Activity   Alcohol Use Yes   • Alcohol/week: 5 0 standard drinks of alcohol   • Types: 3 Glasses of wine, 1 Cans of beer, 1 Standard drinks or equivalent per week    Comment: occasional        Substance and Sexual Activity   Drug Use No     Social History     Tobacco Use   Smoking Status Never   Smokeless Tobacco Never     Family History   Problem Relation Age of Onset   • Heart failure Mother    • Heart failure Father    • Heart disease Father    • Heart attack Father    • Diabetes Other              ==  Lennox Lands, MD PGY2  [facility]

## 2023-06-13 NOTE — PROGRESS NOTES
Diabetic Foot Exam    Patient's shoes and socks removed  Right Foot/Ankle   Right Foot Inspection  Skin Exam: skin intact and dry skin  Sensory   Proprioception: intact  Monofilament testing: intact    Vascular  The right DP pulse is 2+  Left Foot/Ankle  Left Foot Inspection  Skin Exam: skin intact and dry skin  Sensory   Proprioception: intact  Monofilament testing: intact    Vascular  The left DP pulse is 2+       Assign Risk Category  No deformity present  No loss of protective sensation  No weak pulses  Risk: 0

## 2023-07-17 DIAGNOSIS — I10 BENIGN ESSENTIAL HYPERTENSION: Chronic | ICD-10-CM

## 2023-07-17 RX ORDER — METOPROLOL SUCCINATE 50 MG/1
50 TABLET, EXTENDED RELEASE ORAL DAILY
Qty: 90 TABLET | Refills: 0 | Status: SHIPPED | OUTPATIENT
Start: 2023-07-17

## 2023-08-24 DIAGNOSIS — Z95.1 S/P CABG (CORONARY ARTERY BYPASS GRAFT): ICD-10-CM

## 2023-08-24 DIAGNOSIS — E11.9 TYPE 2 DIABETES MELLITUS (HCC): Chronic | ICD-10-CM

## 2023-08-24 DIAGNOSIS — N40.1 BPH WITH OBSTRUCTION/LOWER URINARY TRACT SYMPTOMS: ICD-10-CM

## 2023-08-24 DIAGNOSIS — N13.8 BPH WITH OBSTRUCTION/LOWER URINARY TRACT SYMPTOMS: ICD-10-CM

## 2023-08-24 DIAGNOSIS — I10 BENIGN ESSENTIAL HYPERTENSION: Chronic | ICD-10-CM

## 2023-08-24 RX ORDER — ATORVASTATIN CALCIUM 80 MG/1
80 TABLET, FILM COATED ORAL
Qty: 90 TABLET | Refills: 1 | Status: SHIPPED | OUTPATIENT
Start: 2023-08-24

## 2023-08-24 RX ORDER — AMLODIPINE BESYLATE 5 MG/1
5 TABLET ORAL DAILY
Qty: 90 TABLET | Refills: 0 | Status: SHIPPED | OUTPATIENT
Start: 2023-08-24

## 2023-08-24 RX ORDER — TAMSULOSIN HYDROCHLORIDE 0.4 MG/1
0.4 CAPSULE ORAL
Qty: 90 CAPSULE | Refills: 1 | Status: SHIPPED | OUTPATIENT
Start: 2023-08-24

## 2023-08-24 NOTE — TELEPHONE ENCOUNTER
Last Seen: 9/16/2022 [facility] ([place])

## 2023-08-31 ENCOUNTER — APPOINTMENT (OUTPATIENT)
Dept: LAB | Facility: CLINIC | Age: 78
End: 2023-08-31
Payer: MEDICARE

## 2023-08-31 DIAGNOSIS — N13.8 BPH WITH OBSTRUCTION/LOWER URINARY TRACT SYMPTOMS: ICD-10-CM

## 2023-08-31 DIAGNOSIS — N40.1 BPH WITH OBSTRUCTION/LOWER URINARY TRACT SYMPTOMS: ICD-10-CM

## 2023-08-31 LAB — PSA SERPL-MCNC: 9.79 NG/ML (ref 0–4)

## 2023-08-31 PROCEDURE — 84153 ASSAY OF PSA TOTAL: CPT

## 2023-08-31 PROCEDURE — 36415 COLL VENOUS BLD VENIPUNCTURE: CPT

## 2023-09-15 ENCOUNTER — OFFICE VISIT (OUTPATIENT)
Dept: UROLOGY | Facility: CLINIC | Age: 78
End: 2023-09-15
Payer: MEDICARE

## 2023-09-15 VITALS
OXYGEN SATURATION: 97 % | WEIGHT: 155.6 LBS | HEART RATE: 70 BPM | HEIGHT: 71 IN | DIASTOLIC BLOOD PRESSURE: 70 MMHG | RESPIRATION RATE: 16 BRPM | BODY MASS INDEX: 21.78 KG/M2 | SYSTOLIC BLOOD PRESSURE: 120 MMHG

## 2023-09-15 DIAGNOSIS — R97.20 ELEVATED PSA: Primary | ICD-10-CM

## 2023-09-15 PROCEDURE — 99213 OFFICE O/P EST LOW 20 MIN: CPT | Performed by: PHYSICIAN ASSISTANT

## 2023-09-15 NOTE — PROGRESS NOTES
UROLOGY PROGRESS NOTE   Patient Identifiers: Darcy Dukes (MRN [id number])  Date of Service: 9/15/2023    Subjective:   19-year-old man history of elevated PSA. He had 2 previous biopsies which were negative. His MRI showed category 2 with 100 g prostate. PSA 9.79. His highest PSA was over 10. No significant nodes no significant voiding complaints.     Reason for visit: Elevated PSA follow-up    Objective:     VITALS:    Vitals:    09/15/23 0802   BP: 120/70   Pulse: 70   Resp: 16   SpO2: 97%     AUA SYMPTOM SCORE    Flowsheet Row Most Recent Value   AUA SYMPTOM SCORE    How often have you had a sensation of not emptying your bladder completely after you finished urinating? 2 (P)     How often have you had to urinate again less than two hours after you finished urinating? 1 (P)     How often have you found you stopped and started again several times when you urinate? 1 (P)     How often have you found it difficult to postpone urination? 2 (P)     How often have you had a weak urinary stream? 1 (P)     How often have you had to push or strain to begin urination? 1 (P)     How many times did you most typically get up to urinate from the time you went to bed at night until the time you got up in the morning? 5 (P)     Quality of Life: If you were to spend the rest of your life with your urinary condition just the way it is now, how would you feel about that? 2 (P)     AUA SYMPTOM SCORE 13 (P)             LABS:  Lab Results   Component Value Date    HGB 13.0 02/03/2020    HCT 40.9 02/03/2020    WBC 7.39 02/03/2020     02/03/2020   ]    Lab Results   Component Value Date     05/14/2014    K 4.2 08/08/2022     08/08/2022    CO2 30 08/08/2022    BUN 19 08/08/2022    CREATININE 1.03 08/08/2022    CALCIUM 9.0 08/08/2022    GLUCOSE 100 12/13/2019   ]        INPATIENT MEDS:    Current Outpatient Medications:   •  amLODIPine (NORVASC) 5 mg tablet, Take 1 tablet (5 mg total) by mouth daily, Disp: 90 tablet, Rfl: 0  •  aspirin 81 MG tablet, Take 1 tablet (81 mg total) by mouth daily, Disp: 90 tablet, Rfl: 3  •  atorvastatin (LIPITOR) 80 mg tablet, Take 1 tablet (80 mg total) by mouth daily with dinner, Disp: 90 tablet, Rfl: 1  •  Calcium Carbonate (CALCIUM 500 PO), Take by mouth daily gummies, Disp: , Rfl:   •  Cholecalciferol (Vitamin D) 125 MCG (5000 UT) CAPS, Take by mouth daily, Disp: , Rfl:   •  Glucosamine-Chondroitin (GLUCOSAMINE CHONDR COMPLEX PO), Take 1 capsule by mouth 2 (two) times a day, Disp: , Rfl:   •  hydrochlorothiazide (HYDRODIURIL) 12.5 mg tablet, Take 1 tablet (12.5 mg total) by mouth daily, Disp: 90 tablet, Rfl: 3  •  metFORMIN (GLUCOPHAGE-XR) 750 mg 24 hr tablet, Take 1 tablet (750 mg total) by mouth 2 (two) times a day, Disp: 180 tablet, Rfl: 1  •  metoprolol succinate (TOPROL-XL) 50 mg 24 hr tablet, Take 1 tablet (50 mg total) by mouth daily, Disp: 90 tablet, Rfl: 0  •  Omega-3 Fatty Acids (FISH OIL) 1200 MG CAPS, Take 1 capsule by mouth daily, Disp: , Rfl:   •  tamsulosin (FLOMAX) 0.4 mg, Take 1 capsule (0.4 mg total) by mouth daily with dinner, Disp: 90 capsule, Rfl: 1      Physical Exam:   /70 (BP Location: Left arm, Patient Position: Sitting, Cuff Size: Large)   Pulse 70   Resp 16   Ht 5' 11" (1.803 m)   Wt 70.6 kg (155 lb 9.6 oz)   SpO2 97%   BMI 21.70 kg/m²   GEN: no acute distress    RESP: breathing comfortably with no accessory muscle use    ABD: soft, non-tender, non-distended   INCISION:    EXT: no significant peripheral edema   (Male): Penis circumcised, phallus normal, meatus patent. Testicles descended into scrotum bilaterally without masses nor tenderness. No inguinal hernias bilaterally. ROGERIO: Prostate is enlarged at 80+ grams. The prostate is not boggy. The prostate is not tender. No nodules noted      RADIOLOGY:   None    Assessment:   #1.   Elevated PSA    Plan:   -Recommend follow-up in 1 year with PSA in 6 months and prior to visit  -I will call him with any significant finding  -  -

## 2023-10-16 ENCOUNTER — APPOINTMENT (OUTPATIENT)
Dept: LAB | Facility: CLINIC | Age: 78
End: 2023-10-16
Payer: MEDICARE

## 2023-10-16 DIAGNOSIS — E11.9 TYPE 2 DIABETES MELLITUS WITHOUT COMPLICATION, WITHOUT LONG-TERM CURRENT USE OF INSULIN (HCC): ICD-10-CM

## 2023-10-16 LAB
ANION GAP SERPL CALCULATED.3IONS-SCNC: 8 MMOL/L
BUN SERPL-MCNC: 18 MG/DL (ref 5–25)
CALCIUM SERPL-MCNC: 9.4 MG/DL (ref 8.4–10.2)
CHLORIDE SERPL-SCNC: 102 MMOL/L (ref 96–108)
CO2 SERPL-SCNC: 32 MMOL/L (ref 21–32)
CREAT SERPL-MCNC: 1.13 MG/DL (ref 0.6–1.3)
EST. AVERAGE GLUCOSE BLD GHB EST-MCNC: 148 MG/DL
GFR SERPL CREATININE-BSD FRML MDRD: 63 ML/MIN/1.73SQ M
GLUCOSE P FAST SERPL-MCNC: 119 MG/DL (ref 65–99)
HBA1C MFR BLD: 6.8 %
POTASSIUM SERPL-SCNC: 4.1 MMOL/L (ref 3.5–5.3)
SODIUM SERPL-SCNC: 142 MMOL/L (ref 135–147)

## 2023-10-16 PROCEDURE — 36415 COLL VENOUS BLD VENIPUNCTURE: CPT

## 2023-10-16 PROCEDURE — 80048 BASIC METABOLIC PNL TOTAL CA: CPT

## 2023-10-16 PROCEDURE — 83036 HEMOGLOBIN GLYCOSYLATED A1C: CPT

## 2023-10-19 ENCOUNTER — RA CDI HCC (OUTPATIENT)
Dept: OTHER | Facility: HOSPITAL | Age: 78
End: 2023-10-19

## 2023-10-19 DIAGNOSIS — I10 BENIGN ESSENTIAL HYPERTENSION: Chronic | ICD-10-CM

## 2023-10-19 RX ORDER — METOPROLOL SUCCINATE 50 MG/1
50 TABLET, EXTENDED RELEASE ORAL DAILY
Qty: 90 TABLET | Refills: 0 | Status: SHIPPED | OUTPATIENT
Start: 2023-10-19

## 2023-10-23 ENCOUNTER — OFFICE VISIT (OUTPATIENT)
Dept: INTERNAL MEDICINE CLINIC | Age: 78
End: 2023-10-23
Payer: MEDICARE

## 2023-10-23 VITALS
HEART RATE: 65 BPM | DIASTOLIC BLOOD PRESSURE: 68 MMHG | OXYGEN SATURATION: 98 % | TEMPERATURE: 97.4 F | BODY MASS INDEX: 21.7 KG/M2 | HEIGHT: 71 IN | WEIGHT: 155 LBS | SYSTOLIC BLOOD PRESSURE: 124 MMHG

## 2023-10-23 DIAGNOSIS — Z23 ENCOUNTER FOR IMMUNIZATION: Primary | ICD-10-CM

## 2023-10-23 DIAGNOSIS — J33.9 RIGHT NASAL POLYPS: ICD-10-CM

## 2023-10-23 DIAGNOSIS — E11.3292 MILD NONPROLIFERATIVE DIABETIC RETINOPATHY OF LEFT EYE WITHOUT MACULAR EDEMA ASSOCIATED WITH TYPE 2 DIABETES MELLITUS (HCC): Chronic | ICD-10-CM

## 2023-10-23 DIAGNOSIS — Z23 NEED FOR INFLUENZA VACCINATION: ICD-10-CM

## 2023-10-23 DIAGNOSIS — R05.3 CHRONIC COUGH: ICD-10-CM

## 2023-10-23 DIAGNOSIS — I25.10 ATHEROSCLEROSIS OF NATIVE CORONARY ARTERY OF NATIVE HEART WITHOUT ANGINA PECTORIS: ICD-10-CM

## 2023-10-23 DIAGNOSIS — E11.9 TYPE 2 DIABETES MELLITUS WITHOUT COMPLICATION, WITHOUT LONG-TERM CURRENT USE OF INSULIN (HCC): Chronic | ICD-10-CM

## 2023-10-23 DIAGNOSIS — I25.119 ATHEROSCLEROSIS OF NATIVE CORONARY ARTERY OF NATIVE HEART WITH ANGINA PECTORIS (HCC): ICD-10-CM

## 2023-10-23 PROCEDURE — G0008 ADMIN INFLUENZA VIRUS VAC: HCPCS

## 2023-10-23 PROCEDURE — G0439 PPPS, SUBSEQ VISIT: HCPCS | Performed by: INTERNAL MEDICINE

## 2023-10-23 PROCEDURE — 90662 IIV NO PRSV INCREASED AG IM: CPT

## 2023-10-23 PROCEDURE — 99214 OFFICE O/P EST MOD 30 MIN: CPT | Performed by: INTERNAL MEDICINE

## 2023-10-23 NOTE — PROGRESS NOTES
Assessment and Plan:     Problem List Items Addressed This Visit    None  Visit Diagnoses       Encounter for immunization    -  Primary    Need for influenza vaccination                Depression Screening and Follow-up Plan: Patient was screened for depression during today's encounter. They screened negative with a PHQ-2 score of 0. Preventive health issues were discussed with patient, and age appropriate screening tests were ordered as noted in patient's After Visit Summary. Personalized health advice and appropriate referrals for health education or preventive services given if needed, as noted in patient's After Visit Summary. History of Present Illness:     Patient presents for a Medicare Wellness Visit    HPI   Patient Care Team:  Matthew Li MD as PCP - General  Milly Alvarado MD     Review of Systems:     Review of Systems   Constitutional:  Negative for chills and fever. HENT:  Positive for postnasal drip (Right stuffy nose). Negative for ear pain and sore throat. Eyes:  Negative for pain and visual disturbance. Respiratory:  Negative for cough and shortness of breath. Cardiovascular:  Negative for chest pain and palpitations. Gastrointestinal:  Negative for abdominal pain and vomiting. Genitourinary:  Negative for dysuria and hematuria. Musculoskeletal:  Negative for arthralgias and back pain. Skin:  Negative for color change and rash. Lesion on the skin as described above   Neurological:  Negative for seizures and syncope. All other systems reviewed and are negative. Problem List:     Patient Active Problem List   Diagnosis    Benign essential hypertension    Type 2 diabetes mellitus (HCC)    PSA elevation    Hypercholesteremia    Diabetic retinopathy ([address])    Seasonal allergies    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 without angina pectoris    Overweight (BMI 25.0-29. 9)      Past Medical and Surgical History:     Past Medical History: Diagnosis Date    Chronic cough     RESOLVED: [id number]    Coronary artery disease     Diabetes mellitus ([address])     Diabetic retinopathy ([address])     HLD (hyperlipidemia)     HTN (hypertension)      Past Surgical History:   Procedure Laterality Date    CARDIAC CATHETERIZATION  12/09/2019    HAND SURGERY      IL CORONARY ARTERY BYP W/VEIN & ARTERY GRAFT 3 VEIN N/A 12/13/2019    Procedure: CORONARY ARTERY BYPASS GRAFT (CABG) 4 VESSELS STEVEN to LAD ; SVG--> PDA , DIAGONAL, and OM;  Surgeon: Robert Hollins DO;  Location[facility];  Service: Cardiac Surgery    IL ECHO TRANSESOPHAG R-T 2D W/PRB IMG ACQUISJ I&R N/A 12/13/2019    Procedure: TRANSESOPHAGEAL ECHOCARDIOGRAM (SHEBA); Surgeon: Robert Hollins DO;  Location: [facility];  Service: Cardiac Surgery    IL NDSC SURG W/VIDEO-ASSISTED HARVEST VEIN CABG Left 12/13/2019    Procedure: HARVEST VEIN ENDOSCOPIC ([address]); Surgeon: Robert Hollins DO;  Location: [facility];  Service: Cardiac Surgery      Family History:     Family History   Problem Relation Age of Onset    Heart failure Mother     Heart failure Father     Heart disease Father     Heart attack Father     Diabetes Other       Social History:     Social History     Socioeconomic History    Marital status: /Civil Union     Spouse name: Not on file    Number of children: Not on file    Years of education: Not on file    Highest education level: Not on file   Occupational History    Not on file   Tobacco Use    Smoking status: Never    Smokeless tobacco: Never   Vaping Use    Vaping Use: Never used   Substance and Sexual Activity    Alcohol use:  Yes     Alcohol/week: 5.0 standard drinks of alcohol     Types: 3 Glasses of wine, 1 Cans of beer, 1 Standard drinks or equivalent per week     Comment: occasional    Drug use: No    Sexual activity: Not Currently     Partners: Female   Other Topics Concern    Not on file   Social History Narrative    Not on file     Social Determinants of Health     Financial Resource Strain: Low Risk  (10/16/2023)    Overall Financial Resource Strain (CARDIA)     Difficulty of Paying Living Expenses: Not very hard   Food Insecurity: Not on file   Transportation Needs: No Transportation Needs (10/16/2023)    PRAPARE - Transportation     Lack of Transportation (Medical): No     Lack of Transportation (Non-Medical): No   Physical Activity: Not on file   Stress: Not on file   Social Connections: Not on file   Intimate Partner Violence: Not on file   Housing Stability: Not on file      Medications and Allergies:     Current Outpatient Medications   Medication Sig Dispense Refill    amLODIPine (NORVASC) 5 mg tablet Take 1 tablet (5 mg total) by mouth daily 90 tablet 0    aspirin 81 MG tablet Take 1 tablet (81 mg total) by mouth daily 90 tablet 3    atorvastatin (LIPITOR) 80 mg tablet Take 1 tablet (80 mg total) by mouth daily with dinner 90 tablet 1    Calcium Carbonate (CALCIUM 500 PO) Take by mouth daily gummies      Cholecalciferol (Vitamin D) 125 MCG (5000 UT) CAPS Take by mouth daily      Glucosamine-Chondroitin (GLUCOSAMINE CHONDR COMPLEX PO) Take 1 capsule by mouth 2 (two) times a day      hydrochlorothiazide (HYDRODIURIL) 12.5 mg tablet Take 1 tablet (12.5 mg total) by mouth daily 90 tablet 3    metFORMIN (GLUCOPHAGE-XR) 750 mg 24 hr tablet Take 1 tablet (750 mg total) by mouth 2 (two) times a day 180 tablet 1    metoprolol succinate (TOPROL-XL) 50 mg 24 hr tablet Take 1 tablet (50 mg total) by mouth daily 90 tablet 0    Omega-3 Fatty Acids (FISH OIL) 1200 MG CAPS Take 1 capsule by mouth daily      tamsulosin (FLOMAX) 0.4 mg Take 1 capsule (0.4 mg total) by mouth daily with dinner 90 capsule 1     No current facility-administered medications for this visit.      Allergies   Allergen Reactions    Pollen Extract Allergic Rhinitis      Immunizations:     Immunization History   Administered Date(s) Administered    COVID-19 MODERNA VACC 0.5 ML IM 01/29/2021, 02/26/2021, 10/27/2021, 10/13/2022    COVID-19, unspecified 01/29/2021, 02/26/2021    INFLUENZA 10/19/2015, 12/02/2016, 12/20/2017, 11/12/2018, 10/18/2022    Influenza Split High Dose Preservative Free IM 09/24/2014, 10/19/2015, 12/02/2016, 12/20/2017    Influenza, high dose seasonal 0.7 mL 11/12/2018, 09/30/2019, 10/12/2020, 09/29/2021, 10/18/2022    Pneumococcal Conjugate 13-Valent 05/30/2019    Pneumococcal Polysaccharide PPV23 05/08/2014    Tdap 06/22/2018    Zoster 02/12/2014    Zoster Vaccine Recombinant 06/22/2021, 08/26/2021      Health Maintenance:         Topic Date Due    Colorectal Cancer Screening  08/30/2025    Hepatitis C Screening  Completed         Topic Date Due    COVID-19 Vaccine (7 - Mixed Product series) 02/13/2023    Influenza Vaccine (1) 09/01/2023      Medicare Screening Tests and Risk Assessments:     Randy Eng is here for his Subsequent Wellness visit. Last Medicare Wellness visit information reviewed, patient interviewed and updates made to the record today. Health Risk Assessment:   Patient rates overall health as very good. Patient feels that their physical health rating is same. Patient is very satisfied with their life. Eyesight was rated as same. Hearing was rated as same. Patient feels that their emotional and mental health rating is same. Patients states they are never, rarely angry. Patient states they are never, rarely unusually tired/fatigued. Pain experienced in the last 7 days has been none. Patient states that he has experienced no weight loss or gain in last 6 months. Depression Screening:   PHQ-2 Score: 0      Fall Risk Screening: In the past year, patient has experienced: no history of falling in past year      Home Safety:  Patient does not have trouble with stairs inside or outside of their home. Patient has working smoke alarms and has working carbon monoxide detector. Home safety hazards include: none. Nutrition:   Current diet is Regular.      Medications:   Patient is currently taking over-the-counter supplements. OTC medications include: see medication list. Patient is able to manage medications. Activities of Daily Living (ADLs)/Instrumental Activities of Daily Living (IADLs):   Walk and transfer into and out of bed and chair?: Yes  Dress and groom yourself?: Yes    Bathe or shower yourself?: Yes    Feed yourself? Yes  Do your laundry/housekeeping?: Yes  Manage your money, pay your bills and track your expenses?: Yes  Make your own meals?: Yes    Do your own shopping?: Yes    Previous Hospitalizations:   Any hospitalizations or ED visits within the last 12 months?: No      Advance Care Planning:   Living will: No    Durable POA for healthcare: No    Advanced directive: No    Advanced directive counseling given: Yes      Cognitive Screening:   Provider or family/friend/caregiver concerned regarding cognition?: No    PREVENTIVE SCREENINGS      Cardiovascular Screening:    General: Screening Not Indicated and History Lipid Disorder      Diabetes Screening:     General: Screening Not Indicated and History Diabetes      Colorectal Cancer Screening:     General: Screening Current      Prostate Cancer Screening:    General: Screening Not Indicated, Screening Current and Risks and Benefits Discussed      Osteoporosis Screening:    General: Screening Not Indicated      Abdominal Aortic Aneurysm (AAA) Screening:    Risk factors include: age between 70-75 yo        General: Screening Current      Lung Cancer Screening:     General: Screening Not Indicated      Hepatitis C Screening:    General: Screening Current    Screening, Brief Intervention, and Referral to Treatment (SBIRT)    Screening  Typical number of drinks in a day: 0  Typical number of drinks in a week: 2  Interpretation: Low risk drinking behavior.     AUDIT-C Screenin) How often did you have a drink containing alcohol in the past year? 2 to 4 times a month  2) How many drinks did you have on a typical day when you were drinking in the past year? 1 to 2  3) How often did you have 6 or more drinks on one occasion in the past year? never    AUDIT-C Score: 2  Interpretation: Score 0-3 (male): Negative screen for alcohol misuse    Single Item Drug Screening:  How often have you used an illegal drug (including marijuana) or a prescription medication for non-medical reasons in the past year? never    Single Item Drug Screen Score: 0  Interpretation: Negative screen for possible drug use disorder    Other Counseling Topics:   Car/seat belt/driving safety and calcium and vitamin D intake and regular weightbearing exercise. No results found. Physical Exam:     There were no vitals taken for this visit. Physical Exam  Vitals and nursing note reviewed. Constitutional:       General: He is not in acute distress. Appearance: He is well-developed. HENT:      Head: Normocephalic and atraumatic. Nose:      Comments: Possible right nasal polyp  Eyes:      Conjunctiva/sclera: Conjunctivae normal.   Cardiovascular:      Rate and Rhythm: Normal rate and regular rhythm. Heart sounds: No murmur heard. Pulmonary:      Effort: Pulmonary effort is normal. No respiratory distress. Breath sounds: Normal breath sounds. Abdominal:      Palpations: Abdomen is soft. Tenderness: There is no abdominal tenderness. Musculoskeletal:         General: No swelling. Cervical back: Neck supple. Skin:     General: Skin is warm and dry. Capillary Refill: Capillary refill takes less than 2 seconds. Findings: Lesion (Lesion on the right shoulder possible melanoma) present. Neurological:      Mental Status: He is alert.    Psychiatric:         Mood and Affect: Mood normal.          Khurram Plata MD

## 2023-10-23 NOTE — PATIENT INSTRUCTIONS
Medicare Preventive Visit Patient Instructions  Thank you for completing your Welcome to Medicare Visit or Medicare Annual Wellness Visit today. Your next wellness visit will be due in one year (10/23/2024). The screening/preventive services that you may require over the next 5-10 years are detailed below. Some tests may not apply to you based off risk factors and/or age. Screening tests ordered at today's visit but not completed yet may show as past due. Also, please note that scanned in results may not display below. Preventive Screenings:  Service Recommendations Previous Testing/Comments   Colorectal Cancer Screening  Colonoscopy    Fecal Occult Blood Test (FOBT)/Fecal Immunochemical Test (FIT)  Fecal DNA/Cologuard Test  Flexible Sigmoidoscopy Age: 43-73 years old   Colonoscopy: every 10 years (May be performed more frequently if at higher risk)  OR  FOBT/FIT: every 1 year  OR  Cologuard: every 3 years  OR  Sigmoidoscopy: every 5 years  Screening may be recommended earlier than age 39 if at higher risk for colorectal cancer. Also, an individualized decision between you and your healthcare provider will decide whether screening between the ages of 77-80 would be appropriate.  Colonoscopy: 08/30/2022  FOBT/FIT: Not on file  Cologuard: 08/30/2022  Sigmoidoscopy: Not on file          Prostate Cancer Screening Individualized decision between patient and health care provider in men between ages of 53-66   Medicare will cover every 12 months beginning on the day after your 50th birthday PSA: 9.79 ng/mL     Screening Not Indicated     Hepatitis C Screening Once for adults born between 1945 and 1965  More frequently in patients at high risk for Hepatitis C Hep C Antibody: 05/30/2017    Screening Current   Diabetes Screening 1-2 times per year if you're at risk for diabetes or have pre-diabetes Fasting glucose: 119 mg/dL (10/16/2023)  A1C: 6.8 % (10/16/2023)  Screening Not Indicated  History Diabetes   Cholesterol Screening Once every 5 years if you don't have a lipid disorder. May order more often based on risk factors. Lipid panel: 06/05/2023  Screening Not Indicated  History Lipid Disorder      Other Preventive Screenings Covered by Medicare:  Abdominal Aortic Aneurysm (AAA) Screening: covered once if your at risk. You're considered to be at risk if you have a family history of AAA or a male between the age of 70-76 who smoking at least 100 cigarettes in your lifetime. Lung Cancer Screening: covers low dose CT scan once per year if you meet all of the following conditions: (1) Age 48-67; (2) No signs or symptoms of lung cancer; (3) Current smoker or have quit smoking within the last 15 years; (4) You have a tobacco smoking history of at least 20 pack years (packs per day x number of years you smoked); (5) You get a written order from a healthcare provider. Glaucoma Screening: covered annually if you're considered high risk: (1) You have diabetes OR (2) Family history of glaucoma OR (3)  aged 48 and older OR (3)  American aged 72 and older  Osteoporosis Screening: covered every 2 years if you meet one of the following conditions: (1) Have a vertebral abnormality; (2) On glucocorticoid therapy for more than 3 months; (3) Have primary hyperparathyroidism; (4) On osteoporosis medications and need to assess response to drug therapy. HIV Screening: covered annually if you're between the age of 14-79. Also covered annually if you are younger than 13 and older than 72 with risk factors for HIV infection. For pregnant patients, it is covered up to 3 times per pregnancy.     Immunizations:  Immunization Recommendations   Influenza Vaccine Annual influenza vaccination during flu season is recommended for all persons aged >= 6 months who do not have contraindications   Pneumococcal Vaccine   * Pneumococcal conjugate vaccine = PCV13 (Prevnar 13), PCV15 (Vaxneuvance), PCV20 (Prevnar 20)  * Pneumococcal polysaccharide vaccine = PPSV23 (Pneumovax) Adults 45-61 yo with certain risk factors or if 69+ yo  If never received any pneumonia vaccine: recommend Prevnar 20 (PCV20)  Give PCV20 if previously received 1 dose of PCV13 or PPSV23   Hepatitis B Vaccine 3 dose series if at intermediate or high risk (ex: diabetes, end stage renal disease, liver disease)   Respiratory syncytial virus (RSV) Vaccine - COVERED BY MEDICARE PART D  * RSVPreF3 (Arexvy) CDC recommends that adults 61years of age and older may receive a single dose of RSV vaccine using shared clinical decision-making (SCDM)   Tetanus (Td) Vaccine - COST NOT COVERED BY MEDICARE PART B Following completion of primary series, a booster dose should be given every 10 years to maintain immunity against tetanus. Td may also be given as tetanus wound prophylaxis. Tdap Vaccine - COST NOT COVERED BY MEDICARE PART B Recommended at least once for all adults. For pregnant patients, recommended with each pregnancy. Shingles Vaccine (Shingrix) - COST NOT COVERED BY MEDICARE PART B  2 shot series recommended in those 19 years and older who have or will have weakened immune systems or those 50 years and older     Health Maintenance Due:      Topic Date Due   • Colorectal Cancer Screening  08/30/2025   • Hepatitis C Screening  Completed     Immunizations Due:      Topic Date Due   • COVID-19 Vaccine (7 - Mixed Product series) 02/13/2023   • Influenza Vaccine (1) 09/01/2023     Advance Directives   What are advance directives? Advance directives are legal documents that state your wishes and plans for medical care. These plans are made ahead of time in case you lose your ability to make decisions for yourself. Advance directives can apply to any medical decision, such as the treatments you want, and if you want to donate organs. What are the types of advance directives? There are many types of advance directives, and each state has rules about how to use them.  You may choose a combination of any of the following:  Living will: This is a written record of the treatment you want. You can also choose which treatments you do not want, which to limit, and which to stop at a certain time. This includes surgery, medicine, IV fluid, and tube feedings. Durable power of  for healthcare [facility]): This is a written record that states who you want to make healthcare choices for you when you are unable to make them for yourself. This person, called a proxy, is usually a family member or a friend. You may choose more than 1 proxy. Do not resuscitate (DNR) order:  A DNR order is used in case your heart stops beating or you stop breathing. It is a request not to have certain forms of treatment, such as CPR. A DNR order may be included in other types of advance directives. Medical directive: This covers the care that you want if you are in a coma, near death, or unable to make decisions for yourself. You can list the treatments you want for each condition. Treatment may include pain medicine, surgery, blood transfusions, dialysis, IV or tube feedings, and a ventilator (breathing machine). Values history: This document has questions about your views, beliefs, and how you feel and think about life. This information can help others choose the care that you would choose. Why are advance directives important? An advance directive helps you control your care. Although spoken wishes may be used, it is better to have your wishes written down. Spoken wishes can be misunderstood, or not followed. Treatments may be given even if you do not want them. An advance directive may make it easier for your family to make difficult choices about your care. © Copyright [employer] 2018 Information is for End User's use only and may not be sold, redistributed or otherwise used for commercial purposes.  All illustrations and images included in CareNotes® are the copyrighted property of [employer] or [facility]

## 2023-10-24 ENCOUNTER — TELEPHONE (OUTPATIENT)
Dept: ADMINISTRATIVE | Facility: OTHER | Age: 78
End: 2023-10-24

## 2023-10-24 NOTE — TELEPHONE ENCOUNTER
Upon review of the In Basket request and the patient's chart, initial outreach has been made via telephone call to facility. Please see Contacts section for details.      Thank you  Stan Flores

## 2023-10-24 NOTE — TELEPHONE ENCOUNTER
Upon review of the In Basket request and the patient's chart, initial outreach has been made via fax to facility. Please see Contacts section for details.      Eye exam x1    Thank you  Jose L Albert

## 2023-10-24 NOTE — TELEPHONE ENCOUNTER
Upon review of the In Basket request we were able to locate, review, and update the patient chart as requested for Immunization(s) C-19 vaccine . Any additional questions or concerns should be emailed to the Practice Liaisons via the appropriate education email address, please do not reply via In Basket.     Thank you  Katherine Thorne

## 2023-10-24 NOTE — TELEPHONE ENCOUNTER
Upon review of the In Basket request and the patient's chart, initial outreach has been made via telephone call to facility. Please see Contacts section for details.      Thank you  Kandis Redd

## 2023-10-24 NOTE — LETTER
Diabetic Eye Exam Form    Date Requested: 10/24/23  Patient: Ainsley Uribe  Patient : 1945   Referring Provider: Davis Enriquez MD      DIABETIC Eye Exam Date _______________________________      Type of Exam MUST be documented for Diabetic Eye Exams. Please CHECK ONE. Retinal Exam       Dilated Retinal Exam       OCT       Optomap-Iris Exam      Fundus Photography       Left Eye - Please check Retinopathy or No Retinopathy        Exam did show retinopathy    Exam did not show retinopathy       Right Eye - Please check Retinopathy or No Retinopathy       Exam did show retinopathy    Exam did not show retinopathy       Comments ___3 _______________________________________________________    Practice Providing Exam ______________________________________________    Exam Performed By (print name) _______________________________________      Provider Signature ___________________________________________________      These reports are needed for  compliance. Please fax this completed form and a copy of the Diabetic Eye Exam report to our office located at [address] [place] [address] as soon as possible via Fax [phone] attention [employer]: Phone [phone]  We thank you for your assistance in treating our mutual patient.

## 2023-10-24 NOTE — TELEPHONE ENCOUNTER
----- Message from Gerry Tee sent at 10/23/2023  2:54 PM EDT -----  Regarding: Care Gap Request  10/23/23 2:54 PM    Hello, our patient attached above has had Diabetic Eye Exam completed/performed. Please assist in updating the patient chart by making an External outreach to DR. Niharika Madsen [facility]  facility located in [place], [place] . The date of service is SEPT 2023.     Thank you,  Janet Guzman   [place]

## 2023-10-26 NOTE — TELEPHONE ENCOUNTER
10/26/23 10:49 AM    Hello, our patient Clarke Meigs has had Diabetic Eye Exam completed/performed. Please assist in updating the patient chart by pulling the document from the Media Tab. The date of service is 6/13/23.      Thank you,  Janet Guzman   Kandice Thompson

## 2023-10-27 PROCEDURE — 88305 TISSUE EXAM BY PATHOLOGIST: CPT | Performed by: STUDENT IN AN ORGANIZED HEALTH CARE EDUCATION/TRAINING PROGRAM

## 2023-10-27 PROCEDURE — 88313 SPECIAL STAINS GROUP 2: CPT | Performed by: STUDENT IN AN ORGANIZED HEALTH CARE EDUCATION/TRAINING PROGRAM

## 2023-10-27 PROCEDURE — 88342 IMHCHEM/IMCYTCHM 1ST ANTB: CPT | Performed by: STUDENT IN AN ORGANIZED HEALTH CARE EDUCATION/TRAINING PROGRAM

## 2023-10-27 PROCEDURE — 88341 IMHCHEM/IMCYTCHM EA ADD ANTB: CPT | Performed by: STUDENT IN AN ORGANIZED HEALTH CARE EDUCATION/TRAINING PROGRAM

## 2023-10-27 NOTE — TELEPHONE ENCOUNTER
Upon review of the In Basket request we were able to locate, review, and update the patient chart as requested for Diabetic Eye Exam. For 6-13-23     Any additional questions or concerns should be emailed to the Practice Liaisons via the appropriate education email address, please do not reply via In Basket.     Thank you  Brendon Singleton

## 2023-10-30 ENCOUNTER — LAB REQUISITION (OUTPATIENT)
Dept: LAB | Facility: HOSPITAL | Age: 78
End: 2023-10-30
Payer: MEDICARE

## 2023-10-30 DIAGNOSIS — C43.61 MALIGNANT MELANOMA OF RIGHT UPPER LIMB, INCLUDING SHOULDER (HCC): ICD-10-CM

## 2023-11-03 PROCEDURE — 88342 IMHCHEM/IMCYTCHM 1ST ANTB: CPT | Performed by: STUDENT IN AN ORGANIZED HEALTH CARE EDUCATION/TRAINING PROGRAM

## 2023-11-03 PROCEDURE — 88313 SPECIAL STAINS GROUP 2: CPT | Performed by: STUDENT IN AN ORGANIZED HEALTH CARE EDUCATION/TRAINING PROGRAM

## 2023-11-03 PROCEDURE — 88305 TISSUE EXAM BY PATHOLOGIST: CPT | Performed by: STUDENT IN AN ORGANIZED HEALTH CARE EDUCATION/TRAINING PROGRAM

## 2023-11-03 PROCEDURE — 88341 IMHCHEM/IMCYTCHM EA ADD ANTB: CPT | Performed by: STUDENT IN AN ORGANIZED HEALTH CARE EDUCATION/TRAINING PROGRAM

## 2023-11-15 PROBLEM — C43.61 MALIGNANT MELANOMA OF RIGHT UPPER EXTREMITY INCLUDING SHOULDER (HCC): Status: ACTIVE | Noted: 2023-11-15

## 2023-11-17 ENCOUNTER — CONSULT (OUTPATIENT)
Dept: SURGICAL ONCOLOGY | Facility: CLINIC | Age: 78
End: 2023-11-17
Payer: MEDICARE

## 2023-11-17 ENCOUNTER — TELEPHONE (OUTPATIENT)
Dept: HEMATOLOGY ONCOLOGY | Facility: CLINIC | Age: 78
End: 2023-11-17

## 2023-11-17 ENCOUNTER — APPOINTMENT (OUTPATIENT)
Dept: LAB | Facility: CLINIC | Age: 78
End: 2023-11-17
Payer: MEDICARE

## 2023-11-17 VITALS
DIASTOLIC BLOOD PRESSURE: 76 MMHG | BODY MASS INDEX: 21.87 KG/M2 | OXYGEN SATURATION: 97 % | HEIGHT: 71 IN | SYSTOLIC BLOOD PRESSURE: 136 MMHG | TEMPERATURE: 97.9 F | HEART RATE: 68 BPM | WEIGHT: 156.2 LBS

## 2023-11-17 DIAGNOSIS — I10 BENIGN ESSENTIAL HYPERTENSION: Chronic | ICD-10-CM

## 2023-11-17 DIAGNOSIS — C43.61 MALIGNANT MELANOMA OF RIGHT UPPER EXTREMITY INCLUDING SHOULDER (HCC): Primary | ICD-10-CM

## 2023-11-17 DIAGNOSIS — C43.61 MALIGNANT MELANOMA OF RIGHT UPPER EXTREMITY INCLUDING SHOULDER (HCC): ICD-10-CM

## 2023-11-17 LAB
ALBUMIN SERPL BCP-MCNC: 4.1 G/DL (ref 3.5–5)
ALP SERPL-CCNC: 64 U/L (ref 34–104)
ALT SERPL W P-5'-P-CCNC: 14 U/L (ref 7–52)
ANION GAP SERPL CALCULATED.3IONS-SCNC: 6 MMOL/L
AST SERPL W P-5'-P-CCNC: 16 U/L (ref 13–39)
ATRIAL RATE: 66 BPM
BASOPHILS # BLD AUTO: 0.03 THOUSANDS/ÂΜL (ref 0–0.1)
BASOPHILS NFR BLD AUTO: 1 % (ref 0–1)
BILIRUB SERPL-MCNC: 0.4 MG/DL (ref 0.2–1)
BUN SERPL-MCNC: 26 MG/DL (ref 5–25)
CALCIUM SERPL-MCNC: 9.9 MG/DL (ref 8.4–10.2)
CHLORIDE SERPL-SCNC: 102 MMOL/L (ref 96–108)
CO2 SERPL-SCNC: 32 MMOL/L (ref 21–32)
CREAT SERPL-MCNC: 1.21 MG/DL (ref 0.6–1.3)
EOSINOPHIL # BLD AUTO: 0.15 THOUSAND/ÂΜL (ref 0–0.61)
EOSINOPHIL NFR BLD AUTO: 2 % (ref 0–6)
ERYTHROCYTE [DISTWIDTH] IN BLOOD BY AUTOMATED COUNT: 14 % (ref 11.6–15.1)
GFR SERPL CREATININE-BSD FRML MDRD: 58 ML/MIN/1.73SQ M
GLUCOSE SERPL-MCNC: 99 MG/DL (ref 65–140)
HCT VFR BLD AUTO: 33.4 % (ref 36.5–49.3)
HGB BLD-MCNC: 10.4 G/DL (ref 12–17)
IMM GRANULOCYTES # BLD AUTO: 0.01 THOUSAND/UL (ref 0–0.2)
IMM GRANULOCYTES NFR BLD AUTO: 0 % (ref 0–2)
LYMPHOCYTES # BLD AUTO: 1.21 THOUSANDS/ÂΜL (ref 0.6–4.47)
LYMPHOCYTES NFR BLD AUTO: 19 % (ref 14–44)
MCH RBC QN AUTO: 26.9 PG (ref 26.8–34.3)
MCHC RBC AUTO-ENTMCNC: 31.1 G/DL (ref 31.4–37.4)
MCV RBC AUTO: 86 FL (ref 82–98)
MONOCYTES # BLD AUTO: 0.75 THOUSAND/ÂΜL (ref 0.17–1.22)
MONOCYTES NFR BLD AUTO: 12 % (ref 4–12)
NEUTROPHILS # BLD AUTO: 4.18 THOUSANDS/ÂΜL (ref 1.85–7.62)
NEUTS SEG NFR BLD AUTO: 66 % (ref 43–75)
NRBC BLD AUTO-RTO: 0 /100 WBCS
P AXIS: 47 DEGREES
PLATELET # BLD AUTO: 189 THOUSANDS/UL (ref 149–390)
PMV BLD AUTO: 10.7 FL (ref 8.9–12.7)
POTASSIUM SERPL-SCNC: 4.4 MMOL/L (ref 3.5–5.3)
PR INTERVAL: 198 MS
PROT SERPL-MCNC: 6.9 G/DL (ref 6.4–8.4)
QRS AXIS: 19 DEGREES
QRSD INTERVAL: 132 MS
QT INTERVAL: 454 MS
QTC INTERVAL: 475 MS
RBC # BLD AUTO: 3.87 MILLION/UL (ref 3.88–5.62)
SODIUM SERPL-SCNC: 140 MMOL/L (ref 135–147)
T WAVE AXIS: 29 DEGREES
VENTRICULAR RATE: 66 BPM
WBC # BLD AUTO: 6.33 THOUSAND/UL (ref 4.31–10.16)

## 2023-11-17 PROCEDURE — 36415 COLL VENOUS BLD VENIPUNCTURE: CPT

## 2023-11-17 PROCEDURE — 85025 COMPLETE CBC W/AUTO DIFF WBC: CPT

## 2023-11-17 PROCEDURE — 80053 COMPREHEN METABOLIC PANEL: CPT

## 2023-11-17 PROCEDURE — 99205 OFFICE O/P NEW HI 60 MIN: CPT | Performed by: SURGERY

## 2023-11-17 RX ORDER — OXYCODONE HYDROCHLORIDE 5 MG/1
5 TABLET ORAL EVERY 6 HOURS PRN
Qty: 10 TABLET | Refills: 0 | Status: SHIPPED | OUTPATIENT
Start: 2023-11-17

## 2023-11-17 RX ORDER — AMLODIPINE BESYLATE 5 MG/1
5 TABLET ORAL DAILY
Qty: 90 TABLET | Refills: 0 | Status: SHIPPED | OUTPATIENT
Start: 2023-11-17

## 2023-11-17 RX ORDER — CEFAZOLIN SODIUM 1 G/50ML
1000 SOLUTION INTRAVENOUS ONCE
Status: CANCELLED | OUTPATIENT
Start: 2023-11-17 | End: 2023-11-17

## 2023-11-17 NOTE — H&P (VIEW-ONLY)
Surgical Oncology Consult       [address] [facility]  [address]  [address] [address] [phone]    Divine Zhao  1945  [id number]  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 ([address])  -     Case request operating room: WIDE EXCISION MELANOMA RIGHT SHOULDER, BIOPSY LYMPH NODE SENTINEL; Standing  -     Comprehensive metabolic panel; Future  -     CBC and differential; Future  -     EKG 12 lead; Future  -     NM lymphatic melanoma; Future  -     oxyCODONE (Roxicodone) 5 immediate release tablet; Take 1 tablet (5 mg total) by mouth every 6 (six) hours as needed for moderate pain Max Daily Amount: 20 mg  -     NM pet ct tumor imaging whole body; Future  -     Case request operating room: WIDE EXCISION MELANOMA RIGHT SHOULDER, BIOPSY LYMPH NODE SENTINEL    Other orders  -     Incentive spirometry; Standing  -     Insert and maintain IV line; Standing  -     Void On-Call to O.R.; Standing  -     Place sequential compression device; Standing  -     ceFAZolin (ANCEF) IVPB (premix in dextrose) 1,000 mg 50 mL        Chief Complaint   Patient presents with    Consult       No follow-ups on file. Oncology History   Malignant melanoma of right upper extremity including shoulder ([address])   10/27/2023 Biopsy    punch biopsy:  A. Skin, right shoulder, punch excision:     MELANOMA (thickness: 1.8 mm); not seen at examined inked specimen margins (see note). Note: Focal follicular involvement by the melanoma is seen; otherwise, the absence of significant epidermal involvement by the tumor raises the possibility of recurrence/metastasis. Clinical pathological correlation is advised.  The lesional cells are positive for SOX10, MART-1, HMB45, and PRAME immunostains. Please see synoptic report for additional details. Synoptic report for melanoma of the skin  Thickness: 1.8 mm  Ulceration: not seen  Cam Phillips) level: IV  Type: primary dermal melanoma vs. metastasis/recurrence  Mitoses: 2/mm2  Microsatellites: not seen  Lymphovascular invasion: not seen  Neurotropism: not seen  Tumor regression: not seen  Tumor-infiltrating lymphocytes (TIL): absent  Margin assessment: not seen at examined inked specimen margins  Pathologic stage: pT2a  Associated nevus: not seen     11/17/2023 -  Cancer Staged    Staging form: Melanoma of the Skin, AJCC 8th Edition  - Clinical: Stage IB (cT2a, cN0, cM0) - Signed by Evie Patrick MD on 11/17/2023           History of Present Illness: 61-year-old male who noticed a mole on his left upper arm/shoulder approximately 6 months ago. This was not really changing. This may have had a small nodular component, but the patient did not like how this looks. He brought this to the attention of his dermatologist.  Biopsy was performed. This revealed melanoma, 1.8 mm and without ulceration. Margins were clear, but there was a concern that this could be a recurrence/metastasis since no significant epidermal component was seen. He comes in now to discuss further therapy. He denies any new abdominal pain, nausea, vomiting, back pain, bone pain, headaches. He does have a chronic cough, but nothing new. No family history of melanoma. No history of blistering sunburns growing up. Review of Systems  Complete ROS Surg Onc:   Constitutional: The patient denies new or recent history of general fatigue, no recent weight loss, no change in appetite. Eyes: No complaints of visual problems, no scleral icterus. ENT: no complaints of ear pain, no hoarseness, no difficulty swallowing,  no tinnitus and no new masses in head, oral cavity, or neck. Cardiovascular: No complaints of chest pain, no palpitations, no ankle edema. Respiratory: No complaints of shortness of breath, no cough. Gastrointestinal: No complaints of jaundice, no bloody stools, no pale stools. Genitourinary: No complaints of dysuria, no hematuria, no nocturia, no frequent urination, no urethral discharge. Musculoskeletal: No complaints of weakness, paralysis, joint stiffness or arthralgias. Integumentary: No complaints of rash, no new lesions. Neurological: No complaints of convulsions, no seizures, no dizziness. Hematologic/Lymphatic: No complaints of easy bruising. Endocrine:  No hot or cold intolerance. No polydipsia, polyphagia, or polyuria. Allergy/immunology:  No environmental allergies. No food allergies. Not immunocompromised. Skin:  No pallor or rash. No wound. Patient Active Problem List   Diagnosis    Benign essential hypertension    Type 2 diabetes mellitus (HCC)    PSA elevation    Hypercholesteremia    Diabetic retinopathy (HCC)    Seasonal allergies    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 with angina pectoris ([address])    Overweight (BMI 25.0-29. 9)    Malignant melanoma of right upper extremity including shoulder (HCC)     Past Medical History:   Diagnosis Date    Chronic cough     RESOLVED: [id number]    Coronary artery disease     Diabetes mellitus ([address])     Diabetic retinopathy ([address])     HLD (hyperlipidemia)     HTN (hypertension)     Hypertension      Past Surgical History:   Procedure Laterality Date    CARDIAC CATHETERIZATION  12/09/2019    HAND SURGERY      AK CORONARY ARTERY BYP W/VEIN & ARTERY GRAFT 3 VEIN N/A 12/13/2019    Procedure: CORONARY ARTERY BYPASS GRAFT (CABG) 4 VESSELS STEVEN to LAD ; SVG--> PDA , DIAGONAL, and OM;  Surgeon: Karsten Boudreaux DO;  Location: [facility];  Service: Cardiac Surgery    AK ECHO TRANSESOPHAG R-T 2D W/PRB IMG ACQUISJ I&R N/A 12/13/2019    Procedure: TRANSESOPHAGEAL ECHOCARDIOGRAM (SHEBA);   Surgeon: Karsten Boudreaux DO;  Location: [facility];  Service: Cardiac Surgery    MA NDSC SURG W/VIDEO-ASSISTED HARVEST VEIN CABG Left 12/13/2019    Procedure: HARVEST VEIN ENDOSCOPIC (EVH); Surgeon: Hoa Casanova DO;  Location: [facility];  Service: Cardiac Surgery     Family History   Problem Relation Age of Onset    Heart failure Mother     Heart failure Father     Heart disease Father     Heart attack Father     Diabetes Other      Social History     Socioeconomic History    Marital status: /Civil Union     Spouse name: Not on file    Number of children: Not on file    Years of education: Not on file    Highest education level: Not on file   Occupational History    Not on file   Tobacco Use    Smoking status: Never    Smokeless tobacco: Never   Vaping Use    Vaping Use: Never used   Substance and Sexual Activity    Alcohol use: Yes     Alcohol/week: 5.0 standard drinks of alcohol     Types: 3 Glasses of wine, 1 Cans of beer, 1 Standard drinks or equivalent per week     Comment: occasional    Drug use: No    Sexual activity: Not Currently     Partners: Female   Other Topics Concern    Not on file   Social History Narrative    Not on file     Social Determinants of Health     Financial Resource Strain: Low Risk  (10/16/2023)    Overall Financial Resource Strain (CARDIA)     Difficulty of Paying Living Expenses: Not very hard   Food Insecurity: Not on file   Transportation Needs: No Transportation Needs (10/16/2023)    PRAPARE - Transportation     Lack of Transportation (Medical): No     Lack of Transportation (Non-Medical):  No   Physical Activity: Not on file   Stress: Not on file   Social Connections: Not on file   Intimate Partner Violence: Not on file   Housing Stability: Not on file       Current Outpatient Medications:     amLODIPine (NORVASC) 5 mg tablet, Take 1 tablet (5 mg total) by mouth daily, Disp: 90 tablet, Rfl: 0    aspirin 81 MG tablet, Take 1 tablet (81 mg total) by mouth daily, Disp: 90 tablet, Rfl: 3    atorvastatin (LIPITOR) 80 mg tablet, Take 1 tablet (80 mg total) by mouth daily with dinner, Disp: 90 tablet, Rfl: 1    Calcium Carbonate (CALCIUM 500 PO), Take by mouth daily gummies, Disp: , Rfl:     Cholecalciferol (Vitamin D) 125 MCG (5000 UT) CAPS, Take by mouth daily, Disp: , Rfl:     Glucosamine-Chondroitin (GLUCOSAMINE CHONDR COMPLEX PO), Take 1 capsule by mouth 2 (two) times a day, Disp: , Rfl:     hydrochlorothiazide (HYDRODIURIL) 12.5 mg tablet, Take 1 tablet (12.5 mg total) by mouth daily, Disp: 90 tablet, Rfl: 3    metFORMIN (GLUCOPHAGE-XR) 750 mg 24 hr tablet, Take 1 tablet (750 mg total) by mouth 2 (two) times a day, Disp: 180 tablet, Rfl: 1    metoprolol succinate (TOPROL-XL) 50 mg 24 hr tablet, Take 1 tablet (50 mg total) by mouth daily, Disp: 90 tablet, Rfl: 0    Omega-3 Fatty Acids (FISH OIL) 1200 MG CAPS, Take 1 capsule by mouth daily, Disp: , Rfl:     oxyCODONE (Roxicodone) 5 immediate release tablet, Take 1 tablet (5 mg total) by mouth every 6 (six) hours as needed for moderate pain Max Daily Amount: 20 mg, Disp: 10 tablet, Rfl: 0    tamsulosin (FLOMAX) 0.4 mg, Take 1 capsule (0.4 mg total) by mouth daily with dinner, Disp: 90 capsule, Rfl: 1  Allergies   Allergen Reactions    Pollen Extract Allergic Rhinitis     Vitals:    11/17/23 1023   BP: 136/76   Pulse: 68   Temp: 97.9 °F (36.6 °C)   SpO2: 97%       Physical Exam   Constitutional: General appearance: The Patient is well-developed and well-nourished who appears the stated age in no acute distress. Patient is pleasant and talkative. HEENT:  Normocephalic. Sclerae are anicteric. Mucous membranes are moist. Neck is supple without adenopathy. No JVD. Chest: The lungs are clear to auscultation. Cardiac: Heart is regular rate. Abdomen: Abdomen is soft, non-tender, non-distended and without masses. Extremities: There is no clubbing or cyanosis. There is no edema. Symmetric.   Neuro: Grossly nonfocal. Gait is normal.     Lymphatic: No evidence of cervical adenopathy bilaterally. No evidence of axillary adenopathy bilaterally. Skin: Warm, anicteric. There is a healing biopsy site on the right upper arm. No evidence of local recurrence or satellite lesions. Psych:  Patient is pleasant and talkative. Breasts:      Pathology:  [unfilled]  Final Diagnosis   A. Skin, right shoulder, punch excision:     MELANOMA (thickness: 1.8 mm); not seen at examined inked specimen margins (see note). Note: Focal follicular involvement by the melanoma is seen; otherwise, the absence of significant epidermal involvement by the tumor raises the possibility of recurrence/metastasis. Clinical pathological correlation is advised. The lesional cells are positive for SOX10, MART-1, HMB45, and PRAME immunostains. Please see synoptic report for additional details. Synoptic report for melanoma of the skin  Thickness: 1.8 mm  Ulceration: not seen  Anatomic Neda Gagnon) level: IV  Type: primary dermal melanoma vs. metastasis/recurrence  Mitoses: 2/mm2  Microsatellites: not seen  Lymphovascular invasion: not seen  Neurotropism: not seen  Tumor regression: not seen  Tumor-infiltrating lymphocytes (TIL): absent  Margin assessment: not seen at examined inked specimen margins  Pathologic stage: pT2a  Associated nevus: not seen               Electronically signed by Ana Tovar MD on 11/3/2023 at 11:26 AM       Labs:      Imaging  No results found. I personally reviewed and interpreted the above laboratory and imaging data. Discussion/Summary: 26-year-old male with a clinical [id number] melanoma. He gives no signs or symptoms of metastasis. I suspect this is a primary tumor. Because of the question of metastasis, I would recommend obtaining a PET/CT prior to any surgical intervention. Assuming this shows no evidence of other disease, I would proceed with wide excision of the right arm melanoma. We discussed with the reasoning and rationale behind sentinel lymph node biopsy.   The risks of wide excision of the right arm melanoma with lymphatic mapping and sentinel lymph node biopsy were explained and include bleeding, infection, recurrence, need for further surgery, wound complications, neurovascular damage and lymphedema. Informed consent was obtained. Surgery is tentatively scheduled for 11/24 pending the PET/CT. He is agreeable to this plan. All his questions were answered.

## 2023-11-17 NOTE — TELEPHONE ENCOUNTER
Data: Vital signs within normal limits. Postpartum checks within normal limits - see flow record. Patient eating and drinking normally. Long catheter patent and draining adequate amount of urine. No apparent signs of infection. Incision healing well. Patient performing self cares and is able to care for infant.  Action: Patient medicated during the shift for pain. See MAR. Patient reassessed within 1 hour after each medication and pain was improved - patient stated she was comfortable.   Response: Positive attachment behaviors observed with infant.  present and supportive.      Patient Call    Who are you speaking with? Patient    If it is not the patient, are they listed on an active communication consent form? N/A   What is the reason for this call? Patient calling in regards to an EKG he needs to have done. I informed patient that the EKG order in his in chart under referrals. Patient stated he would go to the lab to get that done. Does this require a call back? No   If a call back is required, please list best call back number N/a   If a call back is required, advise that a message will be forwarded to their care team and someone will return their call as soon as possible. Did you relay this information to the patient?  No

## 2023-11-17 NOTE — PROGRESS NOTES
Surgical Oncology Consult       [address] [facility] [place]  [address] [place] [id number] [phone]    April Hartley  1945  [id number]  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 ([facility])  -     Case request operating room: WIDE EXCISION MELANOMA RIGHT SHOULDER, BIOPSY LYMPH NODE SENTINEL; Standing  -     Comprehensive metabolic panel; Future  -     CBC and differential; Future  -     EKG 12 lead; Future  -     NM lymphatic melanoma; Future  -     oxyCODONE (Roxicodone) 5 immediate release tablet; Take 1 tablet (5 mg total) by mouth every 6 (six) hours as needed for moderate pain Max Daily Amount: 20 mg  -     NM pet ct tumor imaging whole body; Future  -     Case request operating room: WIDE EXCISION MELANOMA RIGHT SHOULDER, BIOPSY LYMPH NODE SENTINEL    Other orders  -     Incentive spirometry; Standing  -     Insert and maintain IV line; Standing  -     Void On-Call to O.R.; Standing  -     Place sequential compression device; Standing  -     ceFAZolin (ANCEF) IVPB (premix in dextrose) 1,000 mg 50 mL        Chief Complaint   Patient presents with    Consult       No follow-ups on file. Oncology History   Malignant melanoma of right upper extremity including shoulder ([address] [facility])   10/27/2023 Biopsy    punch biopsy:  A. Skin, right shoulder, punch excision:     MELANOMA (thickness: 1.8 mm); not seen at examined inked specimen margins (see note). Note: Focal follicular involvement by the melanoma is seen; otherwise, the absence of significant epidermal involvement by the tumor raises the possibility of recurrence/metastasis. Clinical pathological correlation is advised.  The lesional cells are positive for SOX10, MART-1, HMB45, and PRAME immunostains. Please see synoptic report for additional details. Synoptic report for melanoma of the skin  Thickness: 1.8 mm  Ulceration: not seen  Anatomic Sean Ohara) level: IV  Type: primary dermal melanoma vs. metastasis/recurrence  Mitoses: 2/mm2  Microsatellites: not seen  Lymphovascular invasion: not seen  Neurotropism: not seen  Tumor regression: not seen  Tumor-infiltrating lymphocytes (TIL): absent  Margin assessment: not seen at examined inked specimen margins  Pathologic stage: pT2a  Associated nevus: not seen     11/17/2023 -  Cancer Staged    Staging form: Melanoma of the Skin, AJCC 8th Edition  - Clinical: Stage IB (cT2a, cN0, cM0) - Signed by Jolie Hopkins MD on 11/17/2023           History of Present Illness: 72-year-old male who noticed a mole on his left upper arm/shoulder approximately 6 months ago. This was not really changing. This may have had a small nodular component, but the patient did not like how this looks. He brought this to the attention of his dermatologist.  Biopsy was performed. This revealed melanoma, 1.8 mm and without ulceration. Margins were clear, but there was a concern that this could be a recurrence/metastasis since no significant epidermal component was seen. He comes in now to discuss further therapy. He denies any new abdominal pain, nausea, vomiting, back pain, bone pain, headaches. He does have a chronic cough, but nothing new. No family history of melanoma. No history of blistering sunburns growing up. Review of Systems  Complete ROS Surg Onc:   Constitutional: The patient denies new or recent history of general fatigue, no recent weight loss, no change in appetite. Eyes: No complaints of visual problems, no scleral icterus. ENT: no complaints of ear pain, no hoarseness, no difficulty swallowing,  no tinnitus and no new masses in head, oral cavity, or neck. Cardiovascular: No complaints of chest pain, no palpitations, no ankle edema. Respiratory: No complaints of shortness of breath, no cough. Gastrointestinal: No complaints of jaundice, no bloody stools, no pale stools. Genitourinary: No complaints of dysuria, no hematuria, no nocturia, no frequent urination, no urethral discharge. Musculoskeletal: No complaints of weakness, paralysis, joint stiffness or arthralgias. Integumentary: No complaints of rash, no new lesions. Neurological: No complaints of convulsions, no seizures, no dizziness. Hematologic/Lymphatic: No complaints of easy bruising. Endocrine:  No hot or cold intolerance. No polydipsia, polyphagia, or polyuria. Allergy/immunology:  No environmental allergies. No food allergies. Not immunocompromised. Skin:  No pallor or rash. No wound. Patient Active Problem List   Diagnosis    Benign essential hypertension    Type 2 diabetes mellitus (HCC)    PSA elevation    Hypercholesteremia    Diabetic retinopathy (HCC)    Seasonal allergies    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 with angina pectoris ([address])    Overweight (BMI 25.0-29. 9)    Malignant melanoma of right upper extremity including shoulder (HCC)     Past Medical History:   Diagnosis Date    Chronic cough     RESOLVED: [id number]    Coronary artery disease     Diabetes mellitus ([address])     Diabetic retinopathy ([address])     HLD (hyperlipidemia)     HTN (hypertension)     Hypertension      Past Surgical History:   Procedure Laterality Date    CARDIAC CATHETERIZATION  12/09/2019    HAND SURGERY      NV CORONARY ARTERY BYP W/VEIN & ARTERY GRAFT 3 VEIN N/A 12/13/2019    Procedure: CORONARY ARTERY BYPASS GRAFT (CABG) 4 VESSELS STEVEN to LAD ; SVG--> PDA , DIAGONAL, and OM;  Surgeon: Tammi Farrar DO;  Location: [facility];  Service: Cardiac Surgery    NV ECHO TRANSESOPHAG R-T 2D W/PRB IMG ACQUISJ I&R N/A 12/13/2019    Procedure: TRANSESOPHAGEAL ECHOCARDIOGRAM (SHEBA);   Surgeon: Tammi Farrar DO;  Location: [facility];  Service: Cardiac Surgery    VT NDSC SURG W/VIDEO-ASSISTED HARVEST VEIN CABG Left 12/13/2019    Procedure: HARVEST VEIN ENDOSCOPIC (EVH); Surgeon: Seth Uribe DO;  Location: [facility];  Service: Cardiac Surgery     Family History   Problem Relation Age of Onset    Heart failure Mother     Heart failure Father     Heart disease Father     Heart attack Father     Diabetes Other      Social History     Socioeconomic History    Marital status: /Civil Union     Spouse name: Not on file    Number of children: Not on file    Years of education: Not on file    Highest education level: Not on file   Occupational History    Not on file   Tobacco Use    Smoking status: Never    Smokeless tobacco: Never   Vaping Use    Vaping Use: Never used   Substance and Sexual Activity    Alcohol use: Yes     Alcohol/week: 5.0 standard drinks of alcohol     Types: 3 Glasses of wine, 1 Cans of beer, 1 Standard drinks or equivalent per week     Comment: occasional    Drug use: No    Sexual activity: Not Currently     Partners: Female   Other Topics Concern    Not on file   Social History Narrative    Not on file     Social Determinants of Health     Financial Resource Strain: Low Risk  (10/16/2023)    Overall Financial Resource Strain (CARDIA)     Difficulty of Paying Living Expenses: Not very hard   Food Insecurity: Not on file   Transportation Needs: No Transportation Needs (10/16/2023)    PRAPARE - Transportation     Lack of Transportation (Medical): No     Lack of Transportation (Non-Medical):  No   Physical Activity: Not on file   Stress: Not on file   Social Connections: Not on file   Intimate Partner Violence: Not on file   Housing Stability: Not on file       Current Outpatient Medications:     amLODIPine (NORVASC) 5 mg tablet, Take 1 tablet (5 mg total) by mouth daily, Disp: 90 tablet, Rfl: 0    aspirin 81 MG tablet, Take 1 tablet (81 mg total) by mouth daily, Disp: 90 tablet, Rfl: 3    atorvastatin (LIPITOR) 80 mg tablet, Take 1 tablet (80 mg total) by mouth daily with dinner, Disp: 90 tablet, Rfl: 1    Calcium Carbonate (CALCIUM 500 PO), Take by mouth daily gummies, Disp: , Rfl:     Cholecalciferol (Vitamin D) 125 MCG (5000 UT) CAPS, Take by mouth daily, Disp: , Rfl:     Glucosamine-Chondroitin (GLUCOSAMINE CHONDR COMPLEX PO), Take 1 capsule by mouth 2 (two) times a day, Disp: , Rfl:     hydrochlorothiazide (HYDRODIURIL) 12.5 mg tablet, Take 1 tablet (12.5 mg total) by mouth daily, Disp: 90 tablet, Rfl: 3    metFORMIN (GLUCOPHAGE-XR) 750 mg 24 hr tablet, Take 1 tablet (750 mg total) by mouth 2 (two) times a day, Disp: 180 tablet, Rfl: 1    metoprolol succinate (TOPROL-XL) 50 mg 24 hr tablet, Take 1 tablet (50 mg total) by mouth daily, Disp: 90 tablet, Rfl: 0    Omega-3 Fatty Acids (FISH OIL) 1200 MG CAPS, Take 1 capsule by mouth daily, Disp: , Rfl:     oxyCODONE (Roxicodone) 5 immediate release tablet, Take 1 tablet (5 mg total) by mouth every 6 (six) hours as needed for moderate pain Max Daily Amount: 20 mg, Disp: 10 tablet, Rfl: 0    tamsulosin (FLOMAX) 0.4 mg, Take 1 capsule (0.4 mg total) by mouth daily with dinner, Disp: 90 capsule, Rfl: 1  Allergies   Allergen Reactions    Pollen Extract Allergic Rhinitis     Vitals:    11/17/23 1023   BP: 136/76   Pulse: 68   Temp: 97.9 °F (36.6 °C)   SpO2: 97%       Physical Exam   Constitutional: General appearance: The Patient is well-developed and well-nourished who appears the stated age in no acute distress. Patient is pleasant and talkative. HEENT:  Normocephalic. Sclerae are anicteric. Mucous membranes are moist. Neck is supple without adenopathy. No JVD. Chest: The lungs are clear to auscultation. Cardiac: Heart is regular rate. Abdomen: Abdomen is soft, non-tender, non-distended and without masses. Extremities: There is no clubbing or cyanosis. There is no edema. Symmetric.   Neuro: Grossly nonfocal. Gait is normal.     Lymphatic: No evidence of cervical adenopathy bilaterally. No evidence of axillary adenopathy bilaterally. Skin: Warm, anicteric. There is a healing biopsy site on the right upper arm. No evidence of local recurrence or satellite lesions. Psych:  Patient is pleasant and talkative. Breasts:      Pathology:  [unfilled]  Final Diagnosis   A. Skin, right shoulder, punch excision:     MELANOMA (thickness: 1.8 mm); not seen at examined inked specimen margins (see note). Note: Focal follicular involvement by the melanoma is seen; otherwise, the absence of significant epidermal involvement by the tumor raises the possibility of recurrence/metastasis. Clinical pathological correlation is advised. The lesional cells are positive for SOX10, MART-1, HMB45, and PRAME immunostains. Please see synoptic report for additional details. Synoptic report for melanoma of the skin  Thickness: 1.8 mm  Ulceration: not seen  Anatomic Shadi Reyes) level: IV  Type: primary dermal melanoma vs. metastasis/recurrence  Mitoses: 2/mm2  Microsatellites: not seen  Lymphovascular invasion: not seen  Neurotropism: not seen  Tumor regression: not seen  Tumor-infiltrating lymphocytes (TIL): absent  Margin assessment: not seen at examined inked specimen margins  Pathologic stage: pT2a  Associated nevus: not seen               Electronically signed by Darrell Carreon MD on 11/3/2023 at 11:26 AM       Labs:      Imaging  No results found. I personally reviewed and interpreted the above laboratory and imaging data. Discussion/Summary: 79-year-old male with a clinical [id number] melanoma. He gives no signs or symptoms of metastasis. I suspect this is a primary tumor. Because of the question of metastasis, I would recommend obtaining a PET/CT prior to any surgical intervention. Assuming this shows no evidence of other disease, I would proceed with wide excision of the right arm melanoma. We discussed with the reasoning and rationale behind sentinel lymph node biopsy.   The risks of wide excision of the right arm melanoma with lymphatic mapping and sentinel lymph node biopsy were explained and include bleeding, infection, recurrence, need for further surgery, wound complications, neurovascular damage and lymphedema. Informed consent was obtained. Surgery is tentatively scheduled for 11/24 pending the PET/CT. He is agreeable to this plan. All his questions were answered.

## 2023-11-20 ENCOUNTER — HOSPITAL ENCOUNTER (OUTPATIENT)
Dept: RADIOLOGY | Age: 78
Discharge: HOME | End: 2023-11-20
Payer: MEDICARE

## 2023-11-20 DIAGNOSIS — C43.61 MALIGNANT MELANOMA OF RIGHT UPPER EXTREMITY INCLUDING SHOULDER (HCC): ICD-10-CM

## 2023-11-20 LAB — GLUCOSE SERPL-MCNC: 92 MG/DL (ref 65–140)

## 2023-11-20 PROCEDURE — 78816 PET IMAGE W/CT FULL BODY: CPT

## 2023-11-20 PROCEDURE — A9552 F18 FDG: HCPCS

## 2023-11-20 PROCEDURE — 82948 REAGENT STRIP/BLOOD GLUCOSE: CPT

## 2023-11-20 PROCEDURE — G1004 CDSM NDSC: HCPCS

## 2023-11-20 NOTE — LETTER
[address] [place] [id number]      November 22, 2023    MRN: [id number]     Phone: [phone]     Dear  Ana Kaba recently had a(n) Nuclear Medicine performed on 11/20/2023 at  [address] [place] [address] that was requested by Boni Aponte MD. The study was reviewed by a radiologist, which is a physician who specializes in medical imaging. The radiologist issued a report describing his or her findings. In that report there was a finding that the radiologist felt warranted further discussion with your health care provider and that discussion would be beneficial to you. The results were sent to Boni Aponte MD on 11/20/2023  2:24 PM. We recommend that you contact Boni Aponte MD at [phone] or set up an appointment to discuss the results of the imaging test. If you have already heard from Boni Aponte MD regarding the results of your study, you can disregard this letter. This letter is not meant to alarm you, but intended to encourage you to follow-up on your results with the provider that sent you for the imaging study. In addition, we have enclosed answers to frequently asked questions by other patients who have also received a letter to review results with their health care provider (see page two). Thank you for choosing [address] [place] [address] for your medical imaging needs. FREQUENTLY ASKED QUESTIONS    Why am I receiving this letter? [facility] requires us to notify patients who have findings on imaging exams that may require more testing or follow-up with a health professional within the next 3 months. How serious is the finding on the imaging test?  This letter is sent to all patients who may need follow-up or more testing within the next 3 months. Receiving this letter does not necessarily mean you have a life-threatening imaging finding or disease. Recommendations in the radiologist’s imaging report are general in nature and it is up to your healthcare provider to say whether those recommendations make sense for your situation. You are strongly encouraged to talk to your health care provider about the results and ask whether additional steps need to be taken. Where can I get a copy of the final report for my recent radiology exam?  To get a full copy of the report you can access your records online at [email or web address] or please contact [facility] Medical Records Department at [phone] Monday through Friday between 8 am and 6 pm.         What do I need to do now? Please contact your health care provider who requested the imaging study to discuss what further actions (if any) are needed. You may have already heard from (your ordering provider) in regard to this test in which case you can disregard this letter. NOTICE IN ACCORDANCE WITH THE [place] “PATIENT TEST RESULT INFORMATION ACT OF 2018”    You are receiving this notice as a result of a determination by your diagnostic imaging service that further discussions of your test results are warranted and would be beneficial to you. The complete results of your test or tests have been or will be sent to the health care practitioner that ordered the test or tests. It is recommended that you contact your health care practitioner to discuss your results as soon as possible.

## 2023-11-20 NOTE — PRE-PROCEDURE INSTRUCTIONS
Pre-Surgery Instructions:   Medication Instructions    amLODIPine (NORVASC) 5 mg tablet Take day of surgery. aspirin 81 MG tablet Stop taking 7 days prior to surgery. atorvastatin (LIPITOR) 80 mg tablet Take day of surgery. Cholecalciferol (Vitamin D) 125 MCG (5000 UT) CAPS Stop taking 7 days prior to surgery. Glucosamine-Chondroitin (GLUCOSAMINE CHONDR COMPLEX PO) Stop taking 7 days prior to surgery. hydrochlorothiazide (HYDRODIURIL) 12.5 mg tablet Hold day of surgery. metFORMIN (GLUCOPHAGE-XR) 750 mg 24 hr tablet Hold day of surgery. metoprolol succinate (TOPROL-XL) 50 mg 24 hr tablet Take day of surgery. Omega-3 Fatty Acids (FISH OIL) 1200 MG CAPS Stop taking 7 days prior to surgery. tamsulosin (FLOMAX) 0.4 mg Take as directed      Medication instructions for day surgery reviewed. Please use only a sip of water to take your instructed medications. Avoid all over the counter vitamins, supplements and NSAIDS for one week prior to surgery per anesthesia guidelines. Tylenol is ok to take as needed. You will receive a call one business day prior to surgery with an arrival time and hospital directions. If your surgery is scheduled on a Monday, the hospital will be calling you on the Friday prior to your surgery. If you have not heard from anyone by 8pm, please call the hospital supervisor through the hospital  at [phone]. Kay Blackwell [phone]). Do not eat or drink anything after midnight the night before your surgery, including candy, mints, lifesavers, or chewing gum. Do not drink alcohol 24hrs before your surgery. Try not to smoke at least 24hrs before your surgery. Follow the pre surgery showering instructions as listed in the [facility] Surgical Experience Booklet” or otherwise provided by your surgeon's office. Do not use a blade to shave the surgical area 1 week before surgery. It is okay to use a clean electric clippers up to 24 hours before surgery.  Do not apply any lotions, creams, including makeup, cologne, deodorant, or perfumes after showering on the day of your surgery. Do not use dry shampoo, hair spray, hair gel, or any type of hair products. No contact lenses, eye make-up, or artificial eyelashes. Remove nail polish, including gel polish, and any artificial, gel, or acrylic nails if possible. Remove all jewelry including rings and body piercing jewelry. Wear causal clothing that is easy to take on and off. Consider your type of surgery. Keep any valuables, jewelry, piercings at home. Please bring any specially ordered equipment (sling, braces) if indicated. Arrange for a responsible person to drive you to and from the hospital on the day of your surgery. Visitor Guidelines discussed. Call the surgeon's office with any new illnesses, exposures, or additional questions prior to surgery. Please reference your [facility] Surgical Experience Booklet” for additional information to prepare for your upcoming surgery.

## 2023-11-21 ENCOUNTER — TELEPHONE (OUTPATIENT)
Dept: SURGICAL ONCOLOGY | Facility: CLINIC | Age: 78
End: 2023-11-21

## 2023-11-24 ENCOUNTER — HOSPITAL ENCOUNTER (OUTPATIENT)
Facility: HOSPITAL | Age: 78
Setting detail: OUTPATIENT SURGERY
Discharge: HOME | End: 2023-11-24
Attending: SURGERY | Admitting: SURGERY
Payer: MEDICARE

## 2023-11-24 ENCOUNTER — HOSPITAL ENCOUNTER (OUTPATIENT)
Dept: NUCLEAR MEDICINE | Facility: HOSPITAL | Age: 78
Discharge: HOME | End: 2023-11-24
Attending: SURGERY
Payer: MEDICARE

## 2023-11-24 ENCOUNTER — ANESTHESIA (OUTPATIENT)
Dept: PERIOP | Facility: HOSPITAL | Age: 78
End: 2023-11-24
Payer: MEDICARE

## 2023-11-24 ENCOUNTER — ANESTHESIA EVENT (OUTPATIENT)
Dept: PERIOP | Facility: HOSPITAL | Age: 78
End: 2023-11-24
Payer: MEDICARE

## 2023-11-24 ENCOUNTER — HOSPITAL ENCOUNTER (OUTPATIENT)
Dept: RADIOLOGY | Age: 78
Discharge: HOME | End: 2023-11-24

## 2023-11-24 VITALS
OXYGEN SATURATION: 95 % | SYSTOLIC BLOOD PRESSURE: 169 MMHG | HEART RATE: 75 BPM | WEIGHT: 156 LBS | BODY MASS INDEX: 21.84 KG/M2 | HEIGHT: 71 IN | DIASTOLIC BLOOD PRESSURE: 87 MMHG | TEMPERATURE: 97.6 F | RESPIRATION RATE: 18 BRPM

## 2023-11-24 DIAGNOSIS — C43.61 MALIGNANT MELANOMA OF RIGHT UPPER EXTREMITY INCLUDING SHOULDER (HCC): ICD-10-CM

## 2023-11-24 LAB
GLUCOSE SERPL-MCNC: 123 MG/DL (ref 65–140)
GLUCOSE SERPL-MCNC: 135 MG/DL (ref 65–140)

## 2023-11-24 PROCEDURE — G1004 CDSM NDSC: HCPCS

## 2023-11-24 PROCEDURE — 38525 BIOPSY/REMOVAL LYMPH NODES: CPT | Performed by: SURGERY

## 2023-11-24 PROCEDURE — 88341 IMHCHEM/IMCYTCHM EA ADD ANTB: CPT | Performed by: STUDENT IN AN ORGANIZED HEALTH CARE EDUCATION/TRAINING PROGRAM

## 2023-11-24 PROCEDURE — 88305 TISSUE EXAM BY PATHOLOGIST: CPT | Performed by: STUDENT IN AN ORGANIZED HEALTH CARE EDUCATION/TRAINING PROGRAM

## 2023-11-24 PROCEDURE — 88307 TISSUE EXAM BY PATHOLOGIST: CPT | Performed by: STUDENT IN AN ORGANIZED HEALTH CARE EDUCATION/TRAINING PROGRAM

## 2023-11-24 PROCEDURE — 11606 EXC TR-EXT MAL+MARG >4 CM: CPT | Performed by: SURGERY

## 2023-11-24 PROCEDURE — A9541 TC99M SULFUR COLLOID: HCPCS

## 2023-11-24 PROCEDURE — 82948 REAGENT STRIP/BLOOD GLUCOSE: CPT

## 2023-11-24 PROCEDURE — 38900 IO MAP OF SENT LYMPH NODE: CPT | Performed by: SURGERY

## 2023-11-24 PROCEDURE — 78195 LYMPH SYSTEM IMAGING: CPT

## 2023-11-24 PROCEDURE — 88342 IMHCHEM/IMCYTCHM 1ST ANTB: CPT | Performed by: STUDENT IN AN ORGANIZED HEALTH CARE EDUCATION/TRAINING PROGRAM

## 2023-11-24 RX ORDER — MORPHINE SULFATE 10 MG/ML
2 INJECTION, SOLUTION INTRAMUSCULAR; INTRAVENOUS EVERY 2 HOUR PRN
Status: DISCONTINUED | OUTPATIENT
Start: 2023-11-24 | End: 2023-11-24 | Stop reason: HOSPADM

## 2023-11-24 RX ORDER — CEFAZOLIN SODIUM 1 G/50ML
1000 SOLUTION INTRAVENOUS ONCE
Status: COMPLETED | OUTPATIENT
Start: 2023-11-24 | End: 2023-11-24

## 2023-11-24 RX ORDER — SODIUM CHLORIDE, SODIUM LACTATE, POTASSIUM CHLORIDE, CALCIUM CHLORIDE 600; 310; 30; 20 MG/100ML; MG/100ML; MG/100ML; MG/100ML
INJECTION, SOLUTION INTRAVENOUS CONTINUOUS PRN
Status: DISCONTINUED | OUTPATIENT
Start: 2023-11-24 | End: 2023-11-24

## 2023-11-24 RX ORDER — SODIUM CHLORIDE, SODIUM LACTATE, POTASSIUM CHLORIDE, CALCIUM CHLORIDE 600; 310; 30; 20 MG/100ML; MG/100ML; MG/100ML; MG/100ML
20 INJECTION, SOLUTION INTRAVENOUS CONTINUOUS
Status: DISCONTINUED | OUTPATIENT
Start: 2023-11-24 | End: 2023-11-24 | Stop reason: HOSPADM

## 2023-11-24 RX ORDER — PROPOFOL 10 MG/ML
INJECTION, EMULSION INTRAVENOUS AS NEEDED
Status: DISCONTINUED | OUTPATIENT
Start: 2023-11-24 | End: 2023-11-24

## 2023-11-24 RX ORDER — LIDOCAINE HYDROCHLORIDE 20 MG/ML
INJECTION, SOLUTION EPIDURAL; INFILTRATION; INTRACAUDAL; PERINEURAL AS NEEDED
Status: DISCONTINUED | OUTPATIENT
Start: 2023-11-24 | End: 2023-11-24

## 2023-11-24 RX ORDER — BUPIVACAINE HYDROCHLORIDE 2.5 MG/ML
INJECTION, SOLUTION EPIDURAL; INFILTRATION; INTRACAUDAL AS NEEDED
Status: DISCONTINUED | OUTPATIENT
Start: 2023-11-24 | End: 2023-11-24 | Stop reason: HOSPADM

## 2023-11-24 RX ORDER — ONDANSETRON 2 MG/ML
INJECTION INTRAMUSCULAR; INTRAVENOUS AS NEEDED
Status: DISCONTINUED | OUTPATIENT
Start: 2023-11-24 | End: 2023-11-24

## 2023-11-24 RX ORDER — ISOSULFAN BLUE 50 MG/5ML
INJECTION, SOLUTION SUBCUTANEOUS AS NEEDED
Status: DISCONTINUED | OUTPATIENT
Start: 2023-11-24 | End: 2023-11-24 | Stop reason: HOSPADM

## 2023-11-24 RX ORDER — MAGNESIUM HYDROXIDE 1200 MG/15ML
LIQUID ORAL AS NEEDED
Status: DISCONTINUED | OUTPATIENT
Start: 2023-11-24 | End: 2023-11-24 | Stop reason: HOSPADM

## 2023-11-24 RX ORDER — FENTANYL CITRATE/PF 50 MCG/ML
25 SYRINGE (ML) INJECTION
Status: DISCONTINUED | OUTPATIENT
Start: 2023-11-24 | End: 2023-11-24 | Stop reason: HOSPADM

## 2023-11-24 RX ORDER — OXYCODONE HYDROCHLORIDE 5 MG/1
5 TABLET ORAL EVERY 4 HOURS PRN
Status: DISCONTINUED | OUTPATIENT
Start: 2023-11-24 | End: 2023-11-24 | Stop reason: HOSPADM

## 2023-11-24 RX ORDER — EPHEDRINE SULFATE 50 MG/ML
INJECTION INTRAVENOUS AS NEEDED
Status: DISCONTINUED | OUTPATIENT
Start: 2023-11-24 | End: 2023-11-24

## 2023-11-24 RX ORDER — FENTANYL CITRATE 50 UG/ML
INJECTION, SOLUTION INTRAMUSCULAR; INTRAVENOUS AS NEEDED
Status: DISCONTINUED | OUTPATIENT
Start: 2023-11-24 | End: 2023-11-24

## 2023-11-24 RX ORDER — MIDAZOLAM HYDROCHLORIDE 2 MG/2ML
INJECTION, SOLUTION INTRAMUSCULAR; INTRAVENOUS AS NEEDED
Status: DISCONTINUED | OUTPATIENT
Start: 2023-11-24 | End: 2023-11-24

## 2023-11-24 RX ORDER — ONDANSETRON 2 MG/ML
4 INJECTION INTRAMUSCULAR; INTRAVENOUS ONCE AS NEEDED
Status: DISCONTINUED | OUTPATIENT
Start: 2023-11-24 | End: 2023-11-24 | Stop reason: HOSPADM

## 2023-11-24 RX ORDER — DEXAMETHASONE SODIUM PHOSPHATE 10 MG/ML
INJECTION, SOLUTION INTRAMUSCULAR; INTRAVENOUS AS NEEDED
Status: DISCONTINUED | OUTPATIENT
Start: 2023-11-24 | End: 2023-11-24

## 2023-11-24 RX ORDER — ACETAMINOPHEN 160 MG/5ML
650 SUSPENSION ORAL EVERY 4 HOURS PRN
Status: DISCONTINUED | OUTPATIENT
Start: 2023-11-24 | End: 2023-11-24 | Stop reason: HOSPADM

## 2023-11-24 RX ADMIN — FENTANYL CITRATE 25 MCG: 50 INJECTION INTRAMUSCULAR; INTRAVENOUS at 09:02

## 2023-11-24 RX ADMIN — MIDAZOLAM 2 MG: 1 INJECTION INTRAMUSCULAR; INTRAVENOUS at 08:34

## 2023-11-24 RX ADMIN — SODIUM CHLORIDE, SODIUM LACTATE, POTASSIUM CHLORIDE, AND CALCIUM CHLORIDE: .6; .31; .03; .02 INJECTION, SOLUTION INTRAVENOUS at 08:33

## 2023-11-24 RX ADMIN — FENTANYL CITRATE 25 MCG: 50 INJECTION INTRAMUSCULAR; INTRAVENOUS at 08:57

## 2023-11-24 RX ADMIN — DEXAMETHASONE SODIUM PHOSPHATE 5 MG: 10 INJECTION, SOLUTION INTRAMUSCULAR; INTRAVENOUS at 08:46

## 2023-11-24 RX ADMIN — EPHEDRINE SULFATE 5 MG: 50 INJECTION INTRAVENOUS at 09:32

## 2023-11-24 RX ADMIN — CEFAZOLIN SODIUM 1000 MG: 1 SOLUTION INTRAVENOUS at 08:33

## 2023-11-24 RX ADMIN — FENTANYL CITRATE 25 MCG: 50 INJECTION INTRAMUSCULAR; INTRAVENOUS at 08:51

## 2023-11-24 RX ADMIN — ONDANSETRON 4 MG: 2 INJECTION INTRAMUSCULAR; INTRAVENOUS at 08:46

## 2023-11-24 RX ADMIN — EPHEDRINE SULFATE 10 MG: 50 INJECTION INTRAVENOUS at 09:35

## 2023-11-24 RX ADMIN — FENTANYL CITRATE 25 MCG: 50 INJECTION INTRAMUSCULAR; INTRAVENOUS at 08:42

## 2023-11-24 RX ADMIN — LIDOCAINE HYDROCHLORIDE 100 MG: 20 INJECTION, SOLUTION EPIDURAL; INFILTRATION; INTRACAUDAL at 08:42

## 2023-11-24 RX ADMIN — PROPOFOL 150 MG: 10 INJECTION, EMULSION INTRAVENOUS at 08:42

## 2023-11-24 NOTE — ANESTHESIA PREPROCEDURE EVALUATION
Procedure:  WIDE EXCISION MELANOMA RIGHT ARM (Right: Arm Upper)  BIOPSY LYMPH NODE SENTINEL, LYMPHATIC MAPPING (Inject: 7:30 am in Nuc Med by radiologist) (Right: Axilla)    Relevant Problems   CARDIO   (+) 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 with angina pectoris (HCC)   (+) Benign essential hypertension   (+) Hypercholesteremia      ENDO   (+) Type 2 diabetes mellitus (HCC)      Endocrine   (+) Diabetic retinopathy ([address])      CAD s/p 4v CABG in 2019    Physical Exam    Airway    Mallampati score: I  TM Distance: >3 FB  Neck ROM: full     Dental       Cardiovascular      Pulmonary      Other Findings        Anesthesia Plan  ASA Score- 3     Anesthesia Type- general with ASA Monitors. Additional Monitors:     Airway Plan: LMA. Plan Factors-Exercise tolerance (METS): >4 METS. Chart reviewed. Patient summary reviewed. Patient is not a current smoker. Obstructive sleep apnea risk education given perioperatively. Induction- intravenous. Postoperative Plan-     Informed Consent- Anesthetic plan and risks discussed with patient. I personally reviewed this patient with the CRNA. Discussed and agreed on the Anesthesia Plan with the CRNA. Cassandra Shelley

## 2023-11-24 NOTE — OP NOTE
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 LYMPHATIC MAPPING (Inject: 7:30 am in [address] [facility] by radiologist)  Injection of Lymphazurin by me    Specimen(s):  ID Type Source Tests Collected by Time Destination   1 : right axillary sentinel lymph node #1 Tissue Lymph Node, [place] TISSUE EXAM Farideh Olsen MD 2023  9:04 AM    2 : wide excision melanoma right shoulder - SEE COMMENTS Tissue Soft Tissue, Other TISSUE EXAM Farideh Olsen MD 2023  9:05 AM    3 : right axillary sentinel lymph node #2 Tissue Lymph Node, [place] TISSUE EXAM Farideh Olsen MD 2023  9:36 AM        Estimated Blood Loss:   Minimal    Drains:  * No LDAs found *    Anesthesia Type:   General    Operative Indications:  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 It was recommended that he undergo wide excision with sentinel lymph node biopsy. Risks and benefits were explained. Informed consent was obtained. Patient was brought to the operating room. Operative Findings:  Lesion measured 0.4 x 0.1 cm  Wide excision measured 4.9 x 2.5 cm    Ex Vivocount on sentinel node #1 was 220  Ex vivo count on sentinel node #2 was 899    Complications:   None    Procedure and Technique:  After identifying the patient, the patient was identified in radiology where the radiologist injected 500 µCi of technetium sulfur colloid around the melanoma.   Lymphoscintigraphy revealed uptake into the right axilla. I then marked the patient. Patient was then brought to the operating room and identified. General anesthesia was achieved. 1 cc of Lymphazurin was injected in 4 equal aliquots around the melanoma. Patient was then prepped and draped in the usual sterile fashion. A timeout was performed. We started with the sentinel node biopsy. An incision was made in the right axilla. Using sharp dissection this was taken through the skin, subtenons tissue and through the clavipectoral fascia. We then identified a blue lymphatic and traced this to a hot, blue lymph node. This was encircled with 2-0 Vicryl suture. Lymphatics were clamped, divided and ligated with 2-0 Vicryl suture. The ex vivo count on this lymph node was 220. At this point there was a small amount of radioactivity posteriorly, but it was unclear if this was just background activity. Consequently local anesthesia was now infiltrated into the wound and the wound was packed. We turned our attention to the wide excision. Because of some of the tightness of the skin of the shoulder, we could not do a full 2 cm margin, consequently 1.5 cm was mapped out circumferentially around the lesion and an elliptical incision measuring 4.9 x 9.5 cm was made after we infiltrated local anesthesia into the wound. .  Using sharp dissection this was taken through the skin, subtenons tissue and down to the underlying muscle fascia and was excised in total.  Specimen was oriented. Wound was copiously irrigated. There was excellent hemostasis. The background counts on the wide excision site were 220. We now probed the axilla again. There was 1 area of focal radioactivity. We dissected in this area and now found once again a lymph node that was traced blue. This was encircled with 2-0 Vicryl suture. Lymphatics were clamped, divided and ligated with 2-0 Vicryl suture. The ex vivo count on this lymph node was 164.   At this point there was no evidence of any palpable adenopathy, no blue nodes and no radioactivity greater than 10% of the hottest ex vivo node. The wound was now copiously irrigated. There was excellent hemostasis. The incision was approximated with interrupted 2-0 Vicryl sutures subcutaneously and a running 4-0 Monocryl suture in a subpectoral fashion and skin glue on the skin. The wide excision was once again irrigated and there was excellent hemostasis. The incision was approximated with interrupted 2-0 Vicryl sutures obtained to the end and interrupted 3-0 nylon sutures for the skin. Sterile dressings were applied. Patient was extubated and taken to the recovery room in stable condition having tolerated the procedure well. I was present and participated in all aspects of this procedure. I was present for the entire procedure.     Patient Disposition:  PACU     Wide Local Excision for Primary Cutaneous Melanoma - Excision 1 (Shoulder - Right)  Operation performed with curative intent Yes   Original B[place]low thickness of the lesion 1.8 mm (to the tenth of a millimeter)   Clinical margin width   (measured from the edge of the lesion or the prior excision scar) Other: 1.5 cm due to cosmetic/anatomic concerns   Depth of excision Full-thickness skin/subcutaneous tissue down to fascia (melanoma)            SIGNATURE: Deya Malone MD  DATE: November 24, 2023  TIME: 9:52 AM

## 2023-11-24 NOTE — DISCHARGE INSTR - AVS FIRST PAGE
POST-OPERATIVE WOUND CARE INSTRUCTIONS    Your wound is closed with:     Sutures on the outside on the shoulder-they will need to be removed in the office in 2 weeks   Dissolvable sutures/glue in the axilla       Wound care: You may remove your dressing after 24 hours   You may shower using soap and water to clean your wound. Gently pat it dry. You may redress your wound for comfort as needed. Activity:   Did not perform any heavy lifting or strenuous physical activity for 7 days. Your activity restrictions will be reevaluated at your postoperative visit. Medications: You may resume all your preoperative medications and diet. Pain medication as directed on the prescription given in the office. Other:   May use ice on the wound for 24-48 hours as needed for comfort. May place warm compresses to the axilla after 48 hours for comfort. Call the office at [phone] if you have any of the followin. Redness, swelling, heat, unusual drainage or heavy bleeding from the wound.    2. Fever greater than 101°F.   3. Pain not relieved by the prescribed pain medication

## 2023-11-24 NOTE — ANESTHESIA POSTPROCEDURE EVALUATION
Post-Op Assessment Note    CV Status:  Stable  Pain Score: 0    Pain management: adequate       Mental Status:  Alert and awake   Hydration Status:  Euvolemic   PONV Controlled:  Controlled   Airway Patency:  Patent     Post Op Vitals Reviewed: Yes    No anethesia notable event occurred.     Staff: CRNA               BP   183/87   Temp   97.4   Pulse  76   Resp   16   SpO2   99

## 2023-11-24 NOTE — INTERVAL H&P NOTE
H&P reviewed. After examining the patient I find no changes in the patients condition since the H&P had been written.     Vitals:    11/24/23 0646   BP: 144/68   Pulse: 67   Resp: 18   Temp: 97.9 °F (36.6 °C)   SpO2: 100%

## 2023-11-27 ENCOUNTER — TELEPHONE (OUTPATIENT)
Dept: INTERNAL MEDICINE CLINIC | Age: 78
End: 2023-11-27

## 2023-11-27 NOTE — TELEPHONE ENCOUNTER
Patient left message on machine:      I recently requested, and you put through a prescription to my plan for Amlodipine 5 milligram tablets. Somehow [facility]. Well, actually the [address] has lost the shipment and I'd like to request a quantity of five of these pills to go to the [facility] in [address] [facility]. Once again that would just be 5 pills of Amlodipine 5 milligram going to [facility] in [place] [facility] [place].

## 2023-11-28 DIAGNOSIS — I10 BENIGN ESSENTIAL HYPERTENSION: Chronic | ICD-10-CM

## 2023-11-28 RX ORDER — AMLODIPINE BESYLATE 5 MG/1
5 TABLET ORAL DAILY
Qty: 5 TABLET | Refills: 0 | Status: SHIPPED | OUTPATIENT
Start: 2023-11-28

## 2023-12-05 ENCOUNTER — TELEPHONE (OUTPATIENT)
Dept: SURGICAL ONCOLOGY | Facility: CLINIC | Age: 78
End: 2023-12-05

## 2023-12-05 PROCEDURE — 88341 IMHCHEM/IMCYTCHM EA ADD ANTB: CPT | Performed by: STUDENT IN AN ORGANIZED HEALTH CARE EDUCATION/TRAINING PROGRAM

## 2023-12-05 PROCEDURE — 88307 TISSUE EXAM BY PATHOLOGIST: CPT | Performed by: STUDENT IN AN ORGANIZED HEALTH CARE EDUCATION/TRAINING PROGRAM

## 2023-12-05 PROCEDURE — 88342 IMHCHEM/IMCYTCHM 1ST ANTB: CPT | Performed by: STUDENT IN AN ORGANIZED HEALTH CARE EDUCATION/TRAINING PROGRAM

## 2023-12-05 PROCEDURE — 88305 TISSUE EXAM BY PATHOLOGIST: CPT | Performed by: STUDENT IN AN ORGANIZED HEALTH CARE EDUCATION/TRAINING PROGRAM

## 2023-12-07 ENCOUNTER — HOSPITAL ENCOUNTER (OUTPATIENT)
Dept: CT IMAGING | Facility: HOSPITAL | Age: 78
Discharge: HOME | End: 2023-12-07
Payer: MEDICARE

## 2023-12-07 DIAGNOSIS — J33.0 ANTROCHOANAL POLYP: ICD-10-CM

## 2023-12-07 DIAGNOSIS — R05.3 CHRONIC COUGH: ICD-10-CM

## 2023-12-07 DIAGNOSIS — E11.9 TYPE 2 DIABETES MELLITUS WITHOUT COMPLICATION, WITHOUT LONG-TERM CURRENT USE OF INSULIN (HCC): ICD-10-CM

## 2023-12-07 DIAGNOSIS — J33.9 RIGHT NASAL POLYPS: ICD-10-CM

## 2023-12-07 DIAGNOSIS — J32.4 CHRONIC PANSINUSITIS: ICD-10-CM

## 2023-12-07 PROCEDURE — 70486 CT MAXILLOFACIAL W/O DYE: CPT

## 2023-12-07 PROCEDURE — G1004 CDSM NDSC: HCPCS

## 2023-12-08 RX ORDER — METFORMIN HYDROCHLORIDE 750 MG/1
750 TABLET, EXTENDED RELEASE ORAL 2 TIMES DAILY
Qty: 180 TABLET | Refills: 0 | Status: SHIPPED | OUTPATIENT
Start: 2023-12-08

## 2023-12-12 ENCOUNTER — OFFICE VISIT (OUTPATIENT)
Dept: SURGICAL ONCOLOGY | Facility: CLINIC | Age: 78
End: 2023-12-12

## 2023-12-12 VITALS
SYSTOLIC BLOOD PRESSURE: 118 MMHG | TEMPERATURE: 96.5 F | BODY MASS INDEX: 22.05 KG/M2 | RESPIRATION RATE: 15 BRPM | WEIGHT: 157.5 LBS | DIASTOLIC BLOOD PRESSURE: 74 MMHG | HEART RATE: 75 BPM | OXYGEN SATURATION: 94 % | HEIGHT: 71 IN

## 2023-12-12 DIAGNOSIS — C43.61 MALIGNANT MELANOMA OF RIGHT UPPER EXTREMITY INCLUDING SHOULDER (HCC): Primary | ICD-10-CM

## 2023-12-12 PROCEDURE — 99024 POSTOP FOLLOW-UP VISIT: CPT | Performed by: SURGERY

## 2023-12-12 NOTE — LETTER
December 12, 2023     Sandy Evans, [address]  [address]  [place] [address]    Patient: Michael Fitch   YOB: 1945   Date of Visit: 12/12/2023       Dear Dr. Crystal Galicia:    Thank you for referring Jazlyn Spicer to me for evaluation. Below are my notes for this consultation. If you have questions, please do not hesitate to call me. I look forward to following your patient along with you. Sincerely,        Ariel Mendosa MD        CC: Dinora Patel.MD Ariel MD  12/12/2023  8:52 AM  Sign when Signing Visit               Surgical Oncology Follow Up       [facility]  [address] [place] [address] [place] [address]    Michael Fitch  1945  [id number]  [address] 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)        Chief Complaint   Patient presents with   • Post-op       Return in about 6 months (around 6/12/2024) for Office Visit, with [address]. Oncology History   Malignant melanoma of right upper extremity including shoulder ([address])   10/27/2023 Biopsy    punch biopsy:  A. Skin, right shoulder, punch excision:     MELANOMA (thickness: 1.8 mm); not seen at examined inked specimen margins (see note). Note: Focal follicular involvement by the melanoma is seen; otherwise, the absence of significant epidermal involvement by the tumor raises the possibility of recurrence/metastasis. Clinical pathological correlation is advised. The lesional cells are positive for SOX10, MART-1, HMB45, and PRAME immunostains. Please see synoptic report for additional details.          Synoptic report for melanoma of the skin  Thickness: 1.8 mm  Ulceration: not seen  Anatomic Fe Ishmael) level: IV  Type: primary dermal melanoma vs. metastasis/recurrence  Mitoses: 2/mm2  Microsatellites: not seen  Lymphovascular invasion: not seen  Neurotropism: not seen  Tumor regression: not seen  Tumor-infiltrating lymphocytes (TIL): absent  Margin assessment: not seen at examined inked specimen margins  Pathologic stage: pT2a  Associated nevus: not seen     11/17/2023 -  Cancer Staged    Staging form: Melanoma of the Skin, AJCC 8th Edition  - Clinical: Stage IB (cT2a, cN0, cM0) - Signed by Sarah Uribe MD on 11/17/2023 11/24/2023 Surgery    Wide excision right shoulder melanoma with sentinel lymph node biopsy:    A. Lymph node, right axillary sentinel lymph node #1:  One lymph node; metastatic melanoma not seen (0/1)      B. Skin, right shoulder, excision:  -Prior procedure site changes present.  -Residual melanoma not seen. C. Lymph node, right axillary sentinel lymph node #2:  Two lymph nodes; metastatic melanoma not seen (0/2)     12/12/2023 -  Cancer Staged    Staging form: Melanoma of the Skin, AJCC 8th Edition  - Pathologic: Stage IB (ypT2a, pN0, cM0) - Signed by Sarah Uribe MD on 12/12/2023  Stage prefix: Post-therapy           Staging: [id number] melanoma, November 2023  Treatment history: Wide excision right shoulder melanoma with right axillary sentinel lymph node biopsy, November 2023  Current treatment: Observation  Disease status: KEDAR    History of Present Illness: Patient returns in follow-up of his wide excision. He is doing well. Margins were clear and lymph node was negative. Review of Systems  Complete ROS Surg Onc:   Complete ROS Surg Onc:   Constitutional: The patient denies new or recent history of general fatigue, no recent weight loss, no change in appetite. Eyes: No complaints of visual problems, no scleral icterus. ENT: no complaints of ear pain, no hoarseness, no difficulty swallowing,  no tinnitus and no new masses in head, oral cavity, or neck.    Cardiovascular: No complaints of chest pain, no palpitations, no ankle edema. Respiratory: No complaints of shortness of breath, no cough. Gastrointestinal: No complaints of jaundice, no bloody stools, no pale stools. Genitourinary: No complaints of dysuria, no hematuria, no nocturia, no frequent urination, no urethral discharge. Musculoskeletal: No complaints of weakness, paralysis, joint stiffness or arthralgias. Integumentary: No complaints of rash, no new lesions. Neurological: No complaints of convulsions, no seizures, no dizziness. Hematologic/Lymphatic: No complaints of easy bruising. Endocrine:  No hot or cold intolerance. No polydipsia, polyphagia, or polyuria. Allergy/immunology:  No environmental allergies. No food allergies. Not immunocompromised. Skin:  No pallor or rash. No wound. Patient Active Problem List   Diagnosis   • Benign essential hypertension   • Type 2 diabetes mellitus (HCC)   • PSA elevation   • Hypercholesteremia   • Diabetic retinopathy ([address])   • Seasonal allergies   • 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 with angina pectoris ([address])   • Overweight (BMI 25.0-29. 9)   • Malignant melanoma of right upper extremity including shoulder (HCC)     Past Medical History:   Diagnosis Date   • Chronic cough     RESOLVED: [id number]   • Coronary artery disease    • Diabetes mellitus ([address])    • Diabetic retinopathy ([address])    • HLD (hyperlipidemia)    • HTN (hypertension)    • Hypertension      Past Surgical History:   Procedure Laterality Date   • CARDIAC CATHETERIZATION  12/09/2019   • HAND SURGERY Left    • LYMPH NODE BIOPSY Right 11/24/2023    Procedure: BIOPSY LYMPH NODE SENTINEL, LYMPHATIC MAPPING (Inject: 7:30 am in Nuc Med by radiologist);   Surgeon: Yanelis Masterson MD;  Location[facility];  Service: Surgical Oncology   • MT CORONARY ARTERY BYP W/VEIN & ARTERY GRAFT 3 VEIN N/A 12/13/2019    Procedure: CORONARY ARTERY BYPASS GRAFT (CABG) 4 VESSELS STEVEN to LAD ; SVG--> PDA , DIAGONAL, and OM;  Surgeon: Petros Crenshaw DO Ki[place];  Location: [facility];  Service: Cardiac Surgery   • DE ECHO TRANSESOPHAG R-T 2D W/PRB IMG ACQUISJ I&R N/A 12/13/2019    Procedure: TRANSESOPHAGEAL ECHOCARDIOGRAM (SHEBA); Surgeon: Robert Hollins DO;  Location[facility];  Service: Cardiac Surgery   • DE NDSC SURG W/VIDEO-ASSISTED HARVEST VEIN CABG Left 12/13/2019    Procedure: HARVEST VEIN ENDOSCOPIC ([address]); Surgeon: Robert Hollins DO;  [facility];  Service: Cardiac Surgery   • SKIN LESION EXCISION Right 11/24/2023    Procedure: WIDE EXCISION MELANOMA RIGHT ARM;  Surgeon: Art Sheth MD;  [facility];  Service: Surgical Oncology     Family History   Problem Relation Age of Onset   • Heart failure Mother    • Heart failure Father    • Heart disease Father    • Heart attack Father    • Diabetes Other      Social History     Socioeconomic History   • Marital status: /Civil Union     Spouse name: Not on file   • Number of children: Not on file   • Years of education: Not on file   • Highest education level: Not on file   Occupational History   • Not on file   Tobacco Use   • Smoking status: Never   • Smokeless tobacco: Never   Vaping Use   • Vaping Use: Never used   Substance and Sexual Activity   • Alcohol use: Yes     Alcohol/week: 5.0 standard drinks of alcohol     Types: 3 Glasses of wine, 1 Cans of beer, 1 Standard drinks or equivalent per week     Comment: occasional   • Drug use: No   • Sexual activity: Not Currently     Partners: Female   Other Topics Concern   • Not on file   Social History Narrative   • Not on file     Social Determinants of Health     Financial Resource Strain: Low Risk  (10/16/2023)    Overall Financial Resource Strain (CARDIA)    • Difficulty of Paying Living Expenses: Not very hard   Food Insecurity: Not on file   Transportation Needs: No Transportation Needs (10/16/2023)    PRAPARE - Transportation    • Lack of Transportation (Medical): No    • Lack of Transportation (Non-Medical):  No Physical Activity: Not on file   Stress: Not on file   Social Connections: Not on file   Intimate Partner Violence: Not on file   Housing Stability: Not on file       Current Outpatient Medications:   •  amLODIPine (NORVASC) 5 mg tablet, Take 1 tablet (5 mg total) by mouth daily, Disp: 5 tablet, Rfl: 0  •  aspirin 81 MG tablet, Take 1 tablet (81 mg total) by mouth daily, Disp: 90 tablet, Rfl: 3  •  atorvastatin (LIPITOR) 80 mg tablet, Take 1 tablet (80 mg total) by mouth daily with dinner, Disp: 90 tablet, Rfl: 1  •  Cholecalciferol (Vitamin D) 125 MCG (5000 UT) CAPS, Take by mouth daily, Disp: , Rfl:   •  Glucosamine-Chondroitin (GLUCOSAMINE CHONDR COMPLEX PO), Take 1 capsule by mouth 2 (two) times a day, Disp: , Rfl:   •  hydrochlorothiazide (HYDRODIURIL) 12.5 mg tablet, Take 1 tablet (12.5 mg total) by mouth daily, Disp: 90 tablet, Rfl: 3  •  metFORMIN (GLUCOPHAGE-XR) 750 mg 24 hr tablet, Take 1 tablet (750 mg total) by mouth 2 (two) times a day, Disp: 180 tablet, Rfl: 0  •  metoprolol succinate (TOPROL-XL) 50 mg 24 hr tablet, Take 1 tablet (50 mg total) by mouth daily, Disp: 90 tablet, Rfl: 0  •  Omega-3 Fatty Acids (FISH OIL) 1200 MG CAPS, Take 1 capsule by mouth daily, Disp: , Rfl:   •  tamsulosin (FLOMAX) 0.4 mg, Take 1 capsule (0.4 mg total) by mouth daily with dinner, Disp: 90 capsule, Rfl: 1  •  Calcium Carbonate (CALCIUM 500 PO), Take by mouth daily gummies (Patient not taking: Reported on 12/12/2023), Disp: , Rfl:   •  oxyCODONE (Roxicodone) 5 immediate release tablet, Take 1 tablet (5 mg total) by mouth every 6 (six) hours as needed for moderate pain Max Daily Amount: 20 mg (Patient not taking: Reported on 12/12/2023), Disp: 10 tablet, Rfl: 0  Allergies   Allergen Reactions   • Pollen Extract Allergic Rhinitis     Vitals:    12/12/23 0830   BP: 118/74   Pulse: 75   Resp: 15   Temp: (!) 96.5 °F (35.8 °C)   SpO2: 94%       Physical Exam  Constitutional: General appearance:  The Patient is well-developed and well-nourished who appears the stated age in no acute distress. Patient is pleasant and talkative. HEENT:  Normocephalic. Sclerae are anicteric. Mucous membranes are moist.   Extremities: There is no clubbing or cyanosis. There is no edema. Symmetric. Neuro: Grossly nonfocal. Gait is normal.     Skin: Warm, anicteric. Wide excision site is healed well. No axillary seroma. Psych:  Patient is pleasant and talkative. Breasts:        Pathology:  Final Diagnosis   A. Lymph node, right axillary sentinel lymph node #1:     One lymph node; metastatic melanoma not seen (0/1) (see note). Note: S100, HMB45, and MART-1 immunostains were reviewed. Rare, nonspecific staining is occasionally seen on MART-1 immunostain that is not seen on other immunostains and H&E stain; accordingly, this staining is interpreted as being not clinically significant in nature. B. Skin, right shoulder, excision:     -Prior procedure site changes present.     -Residual melanoma not seen. C. Lymph node, right axillary sentinel lymph node #2:     Two lymph nodes; metastatic melanoma not seen (0/2) (see note). Note: S100, HMB45, and MART-1 immunostains were reviewed. Electronically signed by Trino Jamison MD on 12/5/2023 at  4:04 PM       Labs:      Imaging  CT sinus wo contrast    Result Date: 12/7/2023  Narrative: CT SINUSES INDICATION:   R05.3: Chronic cough J33.9: Nasal polyp, unspecified J32.4: Chronic pansinusitis J33.0: Polyp of nasal cavity. COMPARISON:  None. TECHNIQUE:  Axial CT imaging through the sinuses was performed. In addition, sagittal and coronal reformatted images were submitted for interpretation. Radiation dose length product (DLP) for this visit:  349.51 mGy-cm .   This examination, like all CT scans performed in the [facility], was performed utilizing techniques to minimize radiation dose exposure, including the use of iterative  reconstruction and automated exposure control. IMAGE QUALITY:  Diagnostic. FINDINGS: FRONTAL SINUSES: There is complete opacification of the right aspect of the frontal sinus extending inferiorly through the frontoethmoidal recess. The left frontal sinus is clear. ETHMOID AIR CELLS: Mucosal thickening of the anterior and mid ethmoid air cells, right significantly worse than left. MAXILLARY SINUSES: Complete opacification of the right maxillary sinus. There is a defect within the posterior lateral wall of the sinus inferiorly as a result of a lucency surrounding an impacted and rotated third molar. Soft tissue changes extend through the defect into the adjacent infratemporal fossa where there is mild soft tissue density. The left maxillary sinus demonstrates moderate inferior mucosal thickening. There is some calcification seen within the inferior aspect of the sinus as well  adjacent to bone implants which extend through the floor of the sinus, into the sinus. SPHENOID SINUSES:  Clear. NASAL SEPTUM AND CAVITY: Posteriorly the nasal septum is slightly deviated towards the left. There is a nodular mass identified within the mid to posterior aspect of the nasal cavity on the right extending posteriorly through the choana into the nasopharynx, best seen on series 3 image 23. This measures approximately 3.4 cm in AP diameter and approximately 1.5 cm in transverse diameter suggesting polyp. ANTERIOR OSTEOMEATAL COMPLEX: The right side is completely obstructed while the left is patent. SPHENOETHMOIDAL RECESS: Patent. OSSEOUS STRUCTURES: No bony erosion or destruction other than the floor of the right maxillary sinus described above. Normal cribriform plate and planum. Normal fovea ethmoidalis. Partial pneumatization of the left anterior clinoid. The bony walls of the  carotid canals are intact.  SOFT TISSUES:  Normal.     Impression: Extensive right-sided sinus opacification including complete opacification of the right frontal sinus and maxillary sinus with obstruction of the frontoethmoidal recess and ostiomeatal complex. There is a large bony defect within the posterior lateral wall of the right maxillary sinus inferiorly as a result of a lucency surrounding the impacted and rotated third maxillary molar. Moderate mucosal thickening within the inferior aspect of the left maxillary sinus with adjacent calcifications as a result of dental implants which breaches the floor of the maxillary sinus. 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 Workstation performed: [id number]     NM lymphatic melanoma    Result Date: 11/24/2023  Narrative: SENTINEL NODE LYMPHOSCINTIGRAPHY INDICATION:   Right shoulder melanoma. Localize sentinel node FINDINGS: 0.52 mCi Tc-99m sulfur colloid (0.6 cc volume) was administered intradermally in divided doses by myself in the region of the patients right shoulder melanoma. Scintigraphic images were obtained over the thorax and groins in multiple projections. Right axillary sentinel node identified. Lymph node of interest was marked by Dr. Sarah Harris: [place] lymph node localized to the right axilla. Workstation performed: [id number]     NM pet ct tumor imaging whole body    Result Date: 11/20/2023  Narrative: WHOLE-BODY PET/CT SCAN INDICATION: C43.61: Malignant melanoma of right upper limb, including shoulder   newly diagnosed malignant melanoma of the right shoulder. Initial staging. MODIFIER: PI COMPARISON: No prior PET scans. Chest CT, 2/22/2019 CELL TYPE: Malignant melanoma Herbie level IV TECHNIQUE:   8.5 mCi F-18-FD administered IV. Multiplanar attenuation corrected and non attenuation corrected PET images were acquired 60 minutes post injection. Contiguous, low dose, axial CT sections were obtained from the skull vertex through the feet. Intravenous contrast material was not utilized.   This examination, like all CT scans performed in the [facility], was performed utilizing techniques to minimize radiation dose exposure, including the use of iterative reconstruction  and automated exposure control. Fasting serum glucose: 92 mg/dl FINDINGS: VISUALIZED BRAIN: No acute abnormalities are seen. HEAD/NECK: No FDG avid cervical adenopathy is seen. CT images: Right maxillary sinus is opacified. Mild SUV avidity, SUV max 4.7, suggesting an inflammatory component there is also a right posterior nasopharyngeal mucosal thickening CHEST: No FDG avid soft tissue lesions are seen. There is no evidence of FDG avid right axillary adenopathy. No evidence of FDG avid right shoulder soft tissue nodule. CT images: Status post median sternotomy. No pleural or pericardial effusion. The 0.3 cm right upper lobe pulmonary nodule image 116, is unchanged from 2019. Ascending thoracic aortic diameter 4.2 cm similar to prior study ABDOMEN: No FDG avid soft tissue lesions are seen. CT images: Small hypodense nodules within the liver parenchyma for example image 162, remains stable since the prior chest CT and are not FDG avid. There is moderate left-sided hydronephrosis. PELVIS: No evidence of FDG avid adenopathy/metastatic disease. Increased activity associated with the distal rectum/anus, most likely physiologic or inflammatory. CT images: Urinary bladder is moderately distended and the prostate gland is markedly enlarged, measuring 6.4 x 4.7 cm. Median lobe enlarged. No definite intra prostatic FDG avidity with activity on image 297 favored to be arising from the adjacent rectum OSSEOUS STRUCTURES/EXTREMITIES: No FDG avid lesions are seen. CT images: No significant findings. Impression: 1. There is no evidence of FDG avid metastatic disease. No FDG avid right axillary adenopathy or right shoulder focal abnormality 2.  There is moderate left-sided hydronephrosis, a distended bladder, and markedly enlarged prostate gland including the median lobe. Recommend urological consultation for these findings. 3. Opacified right maxillary sinus with mild SUV avidity suggesting active inflammatory component The examination demonstrates a significant  finding and was documented as such in [facility] for liaison and referring practitioner notification. Workstation performed: [id number]     I personally reviewed and interpreted the above laboratory and imaging data. Discussion/Summary: 70-year-old male status post wide excision and sentinel lymph node biopsy for a [id number] melanoma of the right shoulder. He is doing well. Wide excision and negative margins and the sentinel node was negative. I would recommend observation. He will continue to follow-up with dermatology. I will see him again in 6 months for another clinical exam.  If he has any new, persistent or unexplained symptoms, he will contact us and directed imaging may be performed. He is already following with the ENT for the sinus CT findings. He is agreeable to this plan. All his questions were answered.

## 2023-12-12 NOTE — PROGRESS NOTES
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 Oncology History   Malignant melanoma of right upper extremity including shoulder ([address])   10/27/2023 Biopsy    punch biopsy:  A. Skin, right shoulder, punch excision:     MELANOMA (thickness: 1.8 mm); not seen at examined inked specimen margins (see note). Note: Focal follicular involvement by the melanoma is seen; otherwise, the absence of significant epidermal involvement by the tumor raises the possibility of recurrence/metastasis. Clinical pathological correlation is advised. The lesional cells are positive for SOX10, MART-1, HMB45, and PRAME immunostains. Please see synoptic report for additional details. Synoptic report for melanoma of the skin  Thickness: 1.8 mm  Ulceration: not seen  Anatomic Jerald Frias) level: IV  Type: primary dermal melanoma vs. metastasis/recurrence  Mitoses: 2/mm2  Microsatellites: not seen  Lymphovascular invasion: not seen  Neurotropism: not seen  Tumor regression: not seen  Tumor-infiltrating lymphocytes (TIL): absent  Margin assessment: not seen at examined inked specimen margins  Pathologic stage: pT2a  Associated nevus: not seen     11/17/2023 -  Cancer Staged    Staging form: Melanoma of the Skin, AJCC 8th Edition  - Clinical: Stage IB (cT2a, cN0, cM0) - Signed by Getachew Jorgensen MD on 11/17/2023 11/24/2023 Surgery    Wide excision right shoulder melanoma with sentinel lymph node biopsy:    A.  Lymph node, right axillary sentinel lymph node #1:  One lymph node; metastatic melanoma not seen (0/1)      B. Skin, right shoulder, excision:  -Prior procedure site changes present.  -Residual melanoma not seen. C. Lymph node, right axillary sentinel lymph node #2:  Two lymph nodes; metastatic melanoma not seen (0/2)     12/12/2023 -  Cancer Staged    Staging form: Melanoma of the Skin, AJCC 8th Edition  - Pathologic: Stage IB (ypT2a, pN0, cM0) - Signed by Chetan Madsen MD on 12/12/2023  Stage prefix: Post-therapy           Staging: [id number] melanoma, November 2023  Treatment history: Wide excision right shoulder melanoma with right axillary sentinel lymph node biopsy, November 2023  Current treatment: Observation  Disease status: KEDAR    History of Present Illness: Patient returns in follow-up of his wide excision. He is doing well. Margins were clear and lymph node was negative. Review of Systems  Complete ROS Surg Onc:   Complete ROS Surg Onc:   Constitutional: The patient denies new or recent history of general fatigue, no recent weight loss, no change in appetite. Eyes: No complaints of visual problems, no scleral icterus. ENT: no complaints of ear pain, no hoarseness, no difficulty swallowing,  no tinnitus and no new masses in head, oral cavity, or neck. Cardiovascular: No complaints of chest pain, no palpitations, no ankle edema. Respiratory: No complaints of shortness of breath, no cough. Gastrointestinal: No complaints of jaundice, no bloody stools, no pale stools. Genitourinary: No complaints of dysuria, no hematuria, no nocturia, no frequent urination, no urethral discharge. Musculoskeletal: No complaints of weakness, paralysis, joint stiffness or arthralgias. Integumentary: No complaints of rash, no new lesions. Neurological: No complaints of convulsions, no seizures, no dizziness. Hematologic/Lymphatic: No complaints of easy bruising. Endocrine:  No hot or cold intolerance.   No polydipsia, polyphagia, or polyuria. Allergy/immunology:  No environmental allergies. No food allergies. Not immunocompromised. Skin:  No pallor or rash. No wound. Patient Active Problem List   Diagnosis    Benign essential hypertension    Type 2 diabetes mellitus (HCC)    PSA elevation    Hypercholesteremia    Diabetic retinopathy (HCC)    Seasonal allergies    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 with angina pectoris ([address])    Overweight (BMI 25.0-29. 9)    Malignant melanoma of right upper extremity including shoulder (HCC)     Past Medical History:   Diagnosis Date    Chronic cough     RESOLVED: [id number]    Coronary artery disease     Diabetes mellitus ([address])     Diabetic retinopathy ([address])     HLD (hyperlipidemia)     HTN (hypertension)     Hypertension      Past Surgical History:   Procedure Laterality Date    CARDIAC CATHETERIZATION  12/09/2019    HAND SURGERY Left     LYMPH NODE BIOPSY Right 11/24/2023    Procedure: BIOPSY LYMPH NODE SENTINEL, LYMPHATIC MAPPING (Inject: 7:30 am in Nuc Med by radiologist); Surgeon: Quyen Cuellar MD;  Location[facility];  Service: Surgical Oncology    WA CORONARY ARTERY BYP W/VEIN & ARTERY GRAFT 3 VEIN N/A 12/13/2019    Procedure: CORONARY ARTERY BYPASS GRAFT (CABG) 4 VESSELS STEVEN to LAD ; SVG--> PDA , DIAGONAL, and OM;  Surgeon: Kajal Dave DO;  [facility];  Service: Cardiac Surgery    WA ECHO TRANSESOPHAG R-T 2D W/PRB IMG ACQUISJ I&R N/A 12/13/2019    Procedure: TRANSESOPHAGEAL ECHOCARDIOGRAM (SHEBA); Surgeon: Kajal Dave DO;  [facility];  Service: Cardiac Surgery    WA NDSC SURG W/VIDEO-ASSISTED HARVEST VEIN CABG Left 12/13/2019    Procedure: HARVEST VEIN ENDOSCOPIC ([address]);   Surgeon: Kajal Dave DO;  [facility];  Service: Cardiac Surgery    SKIN LESION EXCISION Right 11/24/2023    Procedure: WIDE EXCISION MELANOMA RIGHT ARM;  Surgeon: Quyen Cuellar MD;  [facility];  Service: Surgical Oncology     Family History   Problem Relation Age of Onset    Heart failure Mother     Heart failure Father     Heart disease Father     Heart attack Father     Diabetes Other      Social History     Socioeconomic History    Marital status: /Civil Union     Spouse name: Not on file    Number of children: Not on file    Years of education: Not on file    Highest education level: Not on file   Occupational History    Not on file   Tobacco Use    Smoking status: Never    Smokeless tobacco: Never   Vaping Use    Vaping Use: Never used   Substance and Sexual Activity    Alcohol use: Yes     Alcohol/week: 5.0 standard drinks of alcohol     Types: 3 Glasses of wine, 1 Cans of beer, 1 Standard drinks or equivalent per week     Comment: occasional    Drug use: No    Sexual activity: Not Currently     Partners: Female   Other Topics Concern    Not on file   Social History Narrative    Not on file     Social Determinants of Health     Financial Resource Strain: Low Risk  (10/16/2023)    Overall Financial Resource Strain (CARDIA)     Difficulty of Paying Living Expenses: Not very hard   Food Insecurity: Not on file   Transportation Needs: No Transportation Needs (10/16/2023)    PRAPARE - Transportation     Lack of Transportation (Medical): No     Lack of Transportation (Non-Medical):  No   Physical Activity: Not on file   Stress: Not on file   Social Connections: Not on file   Intimate Partner Violence: Not on file   Housing Stability: Not on file       Current Outpatient Medications:     amLODIPine (NORVASC) 5 mg tablet, Take 1 tablet (5 mg total) by mouth daily, Disp: 5 tablet, Rfl: 0    aspirin 81 MG tablet, Take 1 tablet (81 mg total) by mouth daily, Disp: 90 tablet, Rfl: 3    atorvastatin (LIPITOR) 80 mg tablet, Take 1 tablet (80 mg total) by mouth daily with dinner, Disp: 90 tablet, Rfl: 1    Cholecalciferol (Vitamin D) 125 MCG (5000 UT) CAPS, Take by mouth daily, Disp: , Rfl:     Glucosamine-Chondroitin (GLUCOSAMINE CHONDR COMPLEX PO), Take 1 capsule by mouth 2 (two) times a day, Disp: , Rfl:     hydrochlorothiazide (HYDRODIURIL) 12.5 mg tablet, Take 1 tablet (12.5 mg total) by mouth daily, Disp: 90 tablet, Rfl: 3    metFORMIN (GLUCOPHAGE-XR) 750 mg 24 hr tablet, Take 1 tablet (750 mg total) by mouth 2 (two) times a day, Disp: 180 tablet, Rfl: 0    metoprolol succinate (TOPROL-XL) 50 mg 24 hr tablet, Take 1 tablet (50 mg total) by mouth daily, Disp: 90 tablet, Rfl: 0    Omega-3 Fatty Acids (FISH OIL) 1200 MG CAPS, Take 1 capsule by mouth daily, Disp: , Rfl:     tamsulosin (FLOMAX) 0.4 mg, Take 1 capsule (0.4 mg total) by mouth daily with dinner, Disp: 90 capsule, Rfl: 1    Calcium Carbonate (CALCIUM 500 PO), Take by mouth daily gummies (Patient not taking: Reported on 12/12/2023), Disp: , Rfl:     oxyCODONE (Roxicodone) 5 immediate release tablet, Take 1 tablet (5 mg total) by mouth every 6 (six) hours as needed for moderate pain Max Daily Amount: 20 mg (Patient not taking: Reported on 12/12/2023), Disp: 10 tablet, Rfl: 0  Allergies   Allergen Reactions    Pollen Extract Allergic Rhinitis     Vitals:    12/12/23 0830   BP: 118/74   Pulse: 75   Resp: 15   Temp: (!) 96.5 °F (35.8 °C)   SpO2: 94%       Physical Exam  Constitutional: General appearance: The Patient is well-developed and well-nourished who appears the stated age in no acute distress. Patient is pleasant and talkative. HEENT:  Normocephalic. Sclerae are anicteric. Mucous membranes are moist.   Extremities: There is no clubbing or cyanosis. There is no edema. Symmetric. Neuro: Grossly nonfocal. Gait is normal.     Skin: Warm, anicteric. Wide excision site is healed well. No axillary seroma. Psych:  Patient is pleasant and talkative. Breasts:        Pathology:  Final Diagnosis   A. Lymph node, right axillary sentinel lymph node #1:     One lymph node; metastatic melanoma not seen (0/1) (see note).      Note: S100, HMB45, and MART-1 immunostains were reviewed. Rare, nonspecific staining is occasionally seen on MART-1 immunostain that is not seen on other immunostains and H&E stain; accordingly, this staining is interpreted as being not clinically significant in nature. B. Skin, right shoulder, excision:     -Prior procedure site changes present.     -Residual melanoma not seen. C. Lymph node, right axillary sentinel lymph node #2:     Two lymph nodes; metastatic melanoma not seen (0/2) (see note). Note: S100, HMB45, and MART-1 immunostains were reviewed. Electronically signed by Katheleen Mohs, MD on 12/5/2023 at  4:04 PM       Labs:      Imaging  CT sinus wo contrast    Result Date: 12/7/2023  Narrative: CT SINUSES INDICATION:   R05.3: Chronic cough J33.9: Nasal polyp, unspecified J32.4: Chronic pansinusitis J33.0: Polyp of nasal cavity. COMPARISON:  None. TECHNIQUE:  Axial CT imaging through the sinuses was performed. In addition, sagittal and coronal reformatted images were submitted for interpretation. Radiation dose length product (DLP) for this visit:  349.51 mGy-cm . This examination, like all CT scans performed in the [facility], was performed utilizing techniques to minimize radiation dose exposure, including the use of iterative  reconstruction and automated exposure control. IMAGE QUALITY:  Diagnostic. FINDINGS: FRONTAL SINUSES: There is complete opacification of the right aspect of the frontal sinus extending inferiorly through the frontoethmoidal recess. The left frontal sinus is clear. ETHMOID AIR CELLS: Mucosal thickening of the anterior and mid ethmoid air cells, right significantly worse than left. MAXILLARY SINUSES: Complete opacification of the right maxillary sinus. There is a defect within the posterior lateral wall of the sinus inferiorly as a result of a lucency surrounding an impacted and rotated third molar.  Soft tissue changes extend through the defect into the adjacent infratemporal fossa where there is mild soft tissue density. The left maxillary sinus demonstrates moderate inferior mucosal thickening. There is some calcification seen within the inferior aspect of the sinus as well  adjacent to bone implants which extend through the floor of the sinus, into the sinus. SPHENOID SINUSES:  Clear. NASAL SEPTUM AND CAVITY: Posteriorly the nasal septum is slightly deviated towards the left. There is a nodular mass identified within the mid to posterior aspect of the nasal cavity on the right extending posteriorly through the choana into the nasopharynx, best seen on series 3 image 23. This measures approximately 3.4 cm in AP diameter and approximately 1.5 cm in transverse diameter suggesting polyp. ANTERIOR OSTEOMEATAL COMPLEX: The right side is completely obstructed while the left is patent. SPHENOETHMOIDAL RECESS: Patent. OSSEOUS STRUCTURES: No bony erosion or destruction other than the floor of the right maxillary sinus described above. Normal cribriform plate and planum. Normal fovea ethmoidalis. Partial pneumatization of the left anterior clinoid. The bony walls of the  carotid canals are intact. SOFT TISSUES:  Normal.     Impression: Extensive right-sided sinus opacification including complete opacification of the right frontal sinus and maxillary sinus with obstruction of the frontoethmoidal recess and ostiomeatal complex. There is a large bony defect within the posterior lateral wall of the right maxillary sinus inferiorly as a result of a lucency surrounding the impacted and rotated third maxillary molar. Moderate mucosal thickening within the inferior aspect of the left maxillary sinus with adjacent calcifications as a result of dental implants which breaches the floor of the maxillary sinus. 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  Workstation performed: [id number]     NM lymphatic melanoma    Result Date: 11/24/2023  Narrative: SENTINEL NODE LYMPHOSCINTIGRAPHY INDICATION:   Right shoulder melanoma. Localize sentinel node FINDINGS: 0.52 mCi Tc-99m sulfur colloid (0.6 cc volume) was administered intradermally in divided doses by myself in the region of the patients right shoulder melanoma. Scintigraphic images were obtained over the thorax and groins in multiple projections. Right axillary sentinel node identified. Lymph node of interest was marked by Dr. Sood Bang: [place] lymph node localized to the right axilla. Workstation performed: [id number]     NM pet ct tumor imaging whole body    Result Date: 11/20/2023  Narrative: WHOLE-BODY PET/CT SCAN INDICATION: C43.61: Malignant melanoma of right upper limb, including shoulder   newly diagnosed malignant melanoma of the right shoulder. Initial staging. MODIFIER: PI COMPARISON: No prior PET scans. Chest CT, 2/22/2019 CELL TYPE: Malignant melanoma Herbie level IV TECHNIQUE:   8.5 mCi F-18-FD administered IV. Multiplanar attenuation corrected and non attenuation corrected PET images were acquired 60 minutes post injection. Contiguous, low dose, axial CT sections were obtained from the skull vertex through the feet. Intravenous contrast material was not utilized. This examination, like all CT scans performed in the [facility], was performed utilizing techniques to minimize radiation dose exposure, including the use of iterative reconstruction  and automated exposure control. Fasting serum glucose: 92 mg/dl FINDINGS: VISUALIZED BRAIN: No acute abnormalities are seen. HEAD/NECK: No FDG avid cervical adenopathy is seen. CT images: Right maxillary sinus is opacified. Mild SUV avidity, SUV max 4.7, suggesting an inflammatory component there is also a right posterior nasopharyngeal mucosal thickening CHEST: No FDG avid soft tissue lesions are seen. There is no evidence of FDG avid right axillary adenopathy.  No evidence of FDG avid right shoulder soft tissue nodule. CT images: Status post median sternotomy. No pleural or pericardial effusion. The 0.3 cm right upper lobe pulmonary nodule image 116, is unchanged from 2019. Ascending thoracic aortic diameter 4.2 cm similar to prior study ABDOMEN: No FDG avid soft tissue lesions are seen. CT images: Small hypodense nodules within the liver parenchyma for example image 162, remains stable since the prior chest CT and are not FDG avid. There is moderate left-sided hydronephrosis. PELVIS: No evidence of FDG avid adenopathy/metastatic disease. Increased activity associated with the distal rectum/anus, most likely physiologic or inflammatory. CT images: Urinary bladder is moderately distended and the prostate gland is markedly enlarged, measuring 6.4 x 4.7 cm. Median lobe enlarged. No definite intra prostatic FDG avidity with activity on image 297 favored to be arising from the adjacent rectum OSSEOUS STRUCTURES/EXTREMITIES: No FDG avid lesions are seen. CT images: No significant findings. Impression: 1. There is no evidence of FDG avid metastatic disease. No FDG avid right axillary adenopathy or right shoulder focal abnormality 2. There is moderate left-sided hydronephrosis, a distended bladder, and markedly enlarged prostate gland including the median lobe. Recommend urological consultation for these findings. 3. Opacified right maxillary sinus with mild SUV avidity suggesting active inflammatory component The examination demonstrates a significant  finding and was documented as such in [facility] for liaison and referring practitioner notification. Workstation performed: [id number]     I personally reviewed and interpreted the above laboratory and imaging data. Discussion/Summary: 32-year-old male status post wide excision and sentinel lymph node biopsy for a [id number] melanoma of the right shoulder. He is doing well.   Wide excision and negative margins and the sentinel node was negative. I would recommend observation. He will continue to follow-up with dermatology. I will see him again in 6 months for another clinical exam.  If he has any new, persistent or unexplained symptoms, he will contact us and directed imaging may be performed. He is already following with the ENT for the sinus CT findings. He is agreeable to this plan. All his questions were answered.

## 2024-01-02 NOTE — TELEPHONE ENCOUNTER
----- Message from DORON Diehl sent at 12/27/2023  2:32 PM CST -----  Regarding: Nystatin and Left Breast Rash  Dr. Tiesha Blackman,    We saw patient in the office today.  She has been wearing a vertalign TLSO for L1 compression fracture.  She did not tolerate the brace well and it has been discontinued.  She has what appears to be a fungal rash under her left breast.  It is not open, just red.  I was going to order nystatin powder for her but I was not able to identify the correct order, as it is not something we order.  Are you able to place an order for this-the patient's daughter Rosie asked that I reach out to you.    Thank you,    Polo     He does not need auth    Medicare is his Primary and AArp is his secondary

## 2024-01-10 ENCOUNTER — OFFICE VISIT (OUTPATIENT)
Dept: CARDIOLOGY CLINIC | Facility: MEDICAL CENTER | Age: 79
End: 2024-01-10
Payer: MEDICARE

## 2024-01-10 VITALS
DIASTOLIC BLOOD PRESSURE: 70 MMHG | BODY MASS INDEX: 21.84 KG/M2 | HEART RATE: 78 BPM | HEIGHT: 71 IN | SYSTOLIC BLOOD PRESSURE: 122 MMHG | WEIGHT: 156 LBS | OXYGEN SATURATION: 97 %

## 2024-01-10 DIAGNOSIS — E11.9 TYPE 2 DIABETES MELLITUS WITHOUT COMPLICATION, WITHOUT LONG-TERM CURRENT USE OF INSULIN (HCC): Chronic | ICD-10-CM

## 2024-01-10 DIAGNOSIS — I10 BENIGN ESSENTIAL HYPERTENSION: Chronic | ICD-10-CM

## 2024-01-10 DIAGNOSIS — I25.10 ATHEROSCLEROSIS OF NATIVE CORONARY ARTERY OF NATIVE HEART WITHOUT ANGINA PECTORIS: Primary | ICD-10-CM

## 2024-01-10 DIAGNOSIS — E11.3292 MILD NONPROLIFERATIVE DIABETIC RETINOPATHY OF LEFT EYE WITHOUT MACULAR EDEMA ASSOCIATED WITH TYPE 2 DIABETES MELLITUS (HCC): ICD-10-CM

## 2024-01-10 DIAGNOSIS — E78.00 HYPERCHOLESTEREMIA: Chronic | ICD-10-CM

## 2024-01-10 PROBLEM — D64.89 OTHER SPECIFIED ANEMIAS: Status: ACTIVE | Noted: 2024-01-10

## 2024-01-10 PROCEDURE — 99214 OFFICE O/P EST MOD 30 MIN: CPT | Performed by: INTERNAL MEDICINE

## 2024-01-10 NOTE — PROGRESS NOTES
[facility] Cardiology  Office Progress Note  Frantz Vasquez 78 y.o. male MRN: [id number]        Problems    1. 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 without angina pectoris        2. Hypercholesteremia        3. Benign essential hypertension        4. 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 Type 2 diabetes mellitus without complication, without long-term current use of insulin (HCC)            Impression:    Frantz returns for a usual follow-up    Hypertension  Pressure is currently under excellent control especially after the introduction of HCTZ.    Hyperlipidemia  Lipids are under exceptionally good control.    Type 2 diabetes  A1c is controlled    CKD 2/3  His GFR has been anywhere from 58-70, but very stable in the context of his risk factors of hypertension and diabetes.    Anemia  No obvious blood loss, no melena, no blood on the toilet paper, has been intermittent and mild since 2019, most recent hemoglobin 10.4  No prior workup, I urged him to speak with his PCP    Right bundle branch block  Stable and present since 2019    Coronary artery disease  Workup in 2019 for stable angina resulted in discovery of multivessel CAD, status post CABG (lima to LAD, SVG to D1, SVG to OM, SVG to PDA  No current anginal symptoms      Plan    Recommended he speak with his PCP about his intermittent mild anemia of unknown known etiology.  Otherwise cardiovascular risk factors are well-controlled  He does have diabetic retinopathy, he is under treatment  6-month follow-up recommended  Full labs already ordered by his PCP, and a follow-up CBC has been ordered preop by ENT for his nasal polyp resection      HPI: Frantz Vasquez is a 78 y.o. year old male with CAD discovered in the setting of stable exertional angina, with abnormal stress test, and status post CABG x4 with LIMA to LAD, SVG to D1, SVG to OM, SVG to PDA in  2019, hypertension, diabetes, hyperlipidemia presents for follow-up visit.     He walks extensively, he has no cardiac symptoms  He has had right bundle branch block for 4 years  His blood pressures are excellent after introducing low-dose HCTZ  His GFR runs borderline 58-70, stage II to stage III, he does not drink a lot of fluid, we discussed adequate hydration in the context of his risk factors of diabetes, hypertension.  He is also anemic, has been on and off anemic for a couple years, unaware of this diagnosis, not currently listed in his chart, he denies any obvious blood loss, melena.  He has nasal polyps, has been seeing Dr. Bah with ENT, and anticipates resection in the near future.        Review of Systems   Constitutional:  Negative for appetite change, diaphoresis, fatigue and fever.   Respiratory:  Negative for chest tightness, shortness of breath and wheezing.    Cardiovascular:  Negative for chest pain, palpitations and leg swelling.   Gastrointestinal:  Negative for abdominal pain and blood in stool.   Musculoskeletal:  Negative for arthralgias and joint swelling.   Skin:  Negative for rash.   Neurological:  Negative for dizziness, syncope and light-headedness.         Past Medical History:   Diagnosis Date   • Chronic cough     RESOLVED: [id number]   • Coronary artery disease    • Diabetes mellitus (HCC)    • Diabetic retinopathy (HCC)    • HLD (hyperlipidemia)    • HTN (hypertension)    • Hypertension      Past Surgical History:   Procedure Laterality Date   • CARDIAC CATHETERIZATION  12/09/2019   • HAND SURGERY Left    • LYMPH NODE BIOPSY Right 11/24/2023    Procedure: BIOPSY LYMPH NODE SENTINEL, LYMPHATIC MAPPING (Inject: 7:30 am in Nuc Med by radiologist);  Surgeon: Issac Triana MD;  [facility];  Service: Surgical Oncology   • WI CORONARY ARTERY BYP W/VEIN & ARTERY GRAFT 3 VEIN N/A 12/13/2019    Procedure: CORONARY ARTERY BYPASS GRAFT (CABG) 4 VESSELS STEVEN to LAD ; SVG--> PDA ,  DIAGONAL, and OM;  Surgeon: Elvin Huertas DO;  [facility];  Service: Cardiac Surgery   • [place] ECHO TRANSESOPHAG R-T 2D W/PRB IMG ACQUISJ I&R N/A 12/13/2019    Procedure: TRANSESOPHAGEAL ECHOCARDIOGRAM (SHEBA);  Surgeon: Elvin Huertas DO;  [facility];  Service: Cardiac Surgery   • IL NDSC SURG W/VIDEO-ASSISTED HARVEST VEIN CABG Left 12/13/2019    Procedure: HARVEST VEIN ENDOSCOPIC (EVH);  Surgeon: Elvin Huertas DO;  [facility];  Service: Cardiac Surgery   • SKIN LESION EXCISION Right 11/24/2023    Procedure: WIDE EXCISION MELANOMA RIGHT ARM;  Surgeon: Issac Triana MD;  [facility];  Service: Surgical Oncology     Social History     Substance and Sexual Activity   Alcohol Use Yes   • Alcohol/week: 5.0 standard drinks of alcohol   • Types: 3 Glasses of wine, 1 Cans of beer, 1 Standard drinks or equivalent per week    Comment: occasional     Social History     Substance and Sexual Activity   Drug Use No     Social History     Tobacco Use   Smoking Status Never   Smokeless Tobacco Never     Family History   Problem Relation Age of Onset   • Heart failure Mother    • Heart failure Father    • Heart disease Father    • Heart attack Father    • Diabetes Other        Allergies:  Allergies   Allergen Reactions   • Pollen Extract Allergic Rhinitis       Medications:     Current Outpatient Medications:   •  amLODIPine (NORVASC) 5 mg tablet, Take 1 tablet (5 mg total) by mouth daily, Disp: 5 tablet, Rfl: 0  •  aspirin 81 MG tablet, Take 1 tablet (81 mg total) by mouth daily, Disp: 90 tablet, Rfl: 3  •  atorvastatin (LIPITOR) 80 mg tablet, Take 1 tablet (80 mg total) by mouth daily with dinner, Disp: 90 tablet, Rfl: 1  •  Cholecalciferol (Vitamin D) 125 MCG (5000 UT) CAPS, Take by mouth daily, Disp: , Rfl:   •  Glucosamine-Chondroitin (GLUCOSAMINE CHONDR COMPLEX PO), Take 1 capsule by mouth 2 (two) times a day, Disp: , Rfl:   •  hydrochlorothiazide (HYDRODIURIL) 12.5 mg tablet,  Take 1 tablet (12.5 mg total) by mouth daily, Disp: 90 tablet, Rfl: 3  •  metFORMIN (GLUCOPHAGE-XR) 750 mg 24 hr tablet, Take 1 tablet (750 mg total) by mouth 2 (two) times a day, Disp: 180 tablet, Rfl: 0  •  metoprolol succinate (TOPROL-XL) 50 mg 24 hr tablet, Take 1 tablet (50 mg total) by mouth daily, Disp: 90 tablet, Rfl: 0  •  Omega-3 Fatty Acids (FISH OIL) 1200 MG CAPS, Take 1 capsule by mouth daily, Disp: , Rfl:   •  tamsulosin (FLOMAX) 0.4 mg, Take 1 capsule (0.4 mg total) by mouth daily with dinner, Disp: 90 capsule, Rfl: 1  •  Calcium Carbonate (CALCIUM 500 PO), Take by mouth daily gummies (Patient not taking: Reported on 12/12/2023), Disp: , Rfl:   •  oxyCODONE (Roxicodone) 5 immediate release tablet, Take 1 tablet (5 mg total) by mouth every 6 (six) hours as needed for moderate pain Max Daily Amount: 20 mg (Patient not taking: Reported on 12/12/2023), Disp: 10 tablet, Rfl: 0      Vitals:    01/10/24 0919   BP: 122/70   Pulse: 78   SpO2: 97%     Weight (last 2 days)     Date/Time Weight    01/10/24 0919 70.8 (156)        Physical Exam  Constitutional:       General: He is not in acute distress.     Appearance: He is not diaphoretic.   HENT:      Head: Normocephalic and atraumatic.   Eyes:      General: No scleral icterus.     Conjunctiva/sclera: Conjunctivae normal.   Neck:      Vascular: No JVD.   Cardiovascular:      Rate and Rhythm: Normal rate and regular rhythm.      Heart sounds: Normal heart sounds. No murmur heard.  Pulmonary:      Effort: Pulmonary effort is normal. No respiratory distress.      Breath sounds: Normal breath sounds. No decreased breath sounds, wheezing, rhonchi or rales.   Musculoskeletal:      Cervical back: Normal range of motion.      Right lower leg: Normal. No edema.      Left lower leg: Normal. No edema.   Skin:     General: Skin is warm and dry.   Neurological:      Mental Status: He is alert and oriented to person, place, and time.           Laboratory  "Studies:    Laboratory studies personally reviewed    Cardiac testing:     EKG reviewed personally:    No results found for this visit on 01/10/24.      Cardiac catheterization  12/19-Occluded RCA, 99% om, significant distal left main disease/LAD disease as well    Echocardiogram  12/19-EF 60%, trace valve disease    Matt Mackenzie MD    Portions of the record may have been created with voice recognition software.  Occasional wrong word or \"sound a like\" substitutions may have occurred due to the inherent limitations of voice recognition software.  Read the chart carefully and recognize, using context, where substitutions have occurred.  "

## 2024-01-11 ENCOUNTER — APPOINTMENT (OUTPATIENT)
Dept: LAB | Facility: CLINIC | Age: 79
End: 2024-01-11
Payer: MEDICARE

## 2024-01-11 DIAGNOSIS — Z01.818 PREOP TESTING: ICD-10-CM

## 2024-01-11 DIAGNOSIS — J33.9 RIGHT NASAL POLYPS: ICD-10-CM

## 2024-01-11 LAB
ANION GAP SERPL CALCULATED.3IONS-SCNC: 7 MMOL/L
BASOPHILS # BLD AUTO: 0.04 THOUSANDS/ÂΜL (ref 0–0.1)
BASOPHILS NFR BLD AUTO: 1 % (ref 0–1)
BUN SERPL-MCNC: 20 MG/DL (ref 5–25)
CALCIUM SERPL-MCNC: 9.1 MG/DL (ref 8.4–10.2)
CHLORIDE SERPL-SCNC: 103 MMOL/L (ref 96–108)
CO2 SERPL-SCNC: 31 MMOL/L (ref 21–32)
CREAT SERPL-MCNC: 1.15 MG/DL (ref 0.6–1.3)
EOSINOPHIL # BLD AUTO: 0.23 THOUSAND/ÂΜL (ref 0–0.61)
EOSINOPHIL NFR BLD AUTO: 4 % (ref 0–6)
ERYTHROCYTE [DISTWIDTH] IN BLOOD BY AUTOMATED COUNT: 14.7 % (ref 11.6–15.1)
GFR SERPL CREATININE-BSD FRML MDRD: 61 ML/MIN/1.73SQ M
GLUCOSE P FAST SERPL-MCNC: 92 MG/DL (ref 65–99)
HCT VFR BLD AUTO: 32.8 % (ref 36.5–49.3)
HGB BLD-MCNC: 10.1 G/DL (ref 12–17)
IMM GRANULOCYTES # BLD AUTO: 0.01 THOUSAND/UL (ref 0–0.2)
IMM GRANULOCYTES NFR BLD AUTO: 0 % (ref 0–2)
LYMPHOCYTES # BLD AUTO: 1.25 THOUSANDS/ÂΜL (ref 0.6–4.47)
LYMPHOCYTES NFR BLD AUTO: 21 % (ref 14–44)
MCH RBC QN AUTO: 26.1 PG (ref 26.8–34.3)
MCHC RBC AUTO-ENTMCNC: 30.8 G/DL (ref 31.4–37.4)
MCV RBC AUTO: 85 FL (ref 82–98)
MONOCYTES # BLD AUTO: 0.56 THOUSAND/ÂΜL (ref 0.17–1.22)
MONOCYTES NFR BLD AUTO: 10 % (ref 4–12)
NEUTROPHILS # BLD AUTO: 3.76 THOUSANDS/ÂΜL (ref 1.85–7.62)
NEUTS SEG NFR BLD AUTO: 64 % (ref 43–75)
NRBC BLD AUTO-RTO: 0 /100 WBCS
PLATELET # BLD AUTO: 206 THOUSANDS/UL (ref 149–390)
PMV BLD AUTO: 10.7 FL (ref 8.9–12.7)
POTASSIUM SERPL-SCNC: 4.2 MMOL/L (ref 3.5–5.3)
RBC # BLD AUTO: 3.87 MILLION/UL (ref 3.88–5.62)
SODIUM SERPL-SCNC: 141 MMOL/L (ref 135–147)
WBC # BLD AUTO: 5.85 THOUSAND/UL (ref 4.31–10.16)

## 2024-01-11 PROCEDURE — 80048 BASIC METABOLIC PNL TOTAL CA: CPT

## 2024-01-11 PROCEDURE — 85025 COMPLETE CBC W/AUTO DIFF WBC: CPT

## 2024-01-11 PROCEDURE — 36415 COLL VENOUS BLD VENIPUNCTURE: CPT

## 2024-01-17 DIAGNOSIS — I10 BENIGN ESSENTIAL HYPERTENSION: Chronic | ICD-10-CM

## 2024-01-17 RX ORDER — METOPROLOL SUCCINATE 50 MG/1
50 TABLET, EXTENDED RELEASE ORAL DAILY
Qty: 90 TABLET | Refills: 3 | Status: SHIPPED | OUTPATIENT
Start: 2024-01-17 | End: 2024-01-18 | Stop reason: SDUPTHER

## 2024-01-18 DIAGNOSIS — I10 BENIGN ESSENTIAL HYPERTENSION: Chronic | ICD-10-CM

## 2024-01-18 RX ORDER — METOPROLOL SUCCINATE 50 MG/1
50 TABLET, EXTENDED RELEASE ORAL DAILY
Qty: 90 TABLET | Refills: 3 | Status: SHIPPED | OUTPATIENT
Start: 2024-01-18 | End: 2024-01-18 | Stop reason: SDUPTHER

## 2024-01-19 RX ORDER — METOPROLOL SUCCINATE 50 MG/1
50 TABLET, EXTENDED RELEASE ORAL DAILY
Qty: 90 TABLET | Refills: 0 | Status: SHIPPED | OUTPATIENT
Start: 2024-01-19

## 2024-01-22 ENCOUNTER — ANESTHESIA EVENT (OUTPATIENT)
Dept: PERIOP | Facility: HOSPITAL | Age: 79
End: 2024-01-22
Payer: MEDICARE

## 2024-01-22 NOTE — PRE-PROCEDURE INSTRUCTIONS
Pre-Surgery Instructions:   Medication Instructions    amLODIPine (NORVASC) 5 mg tablet Take day of surgery.    aspirin 81 MG tablet Checking with cardio-triage line given    atorvastatin (LIPITOR) 80 mg tablet Take night before surgery    Cholecalciferol (Vitamin D) 125 MCG (5000 UT) CAPS Stop taking 7 days prior to surgery.    Glucosamine-Chondroitin (GLUCOSAMINE CHONDR COMPLEX PO) Stop taking 7 days prior to surgery.    hydrochlorothiazide (HYDRODIURIL) 12.5 mg tablet Hold day of surgery.    metFORMIN (GLUCOPHAGE-XR) 750 mg 24 hr tablet Hold day of surgery.    metoprolol succinate (TOPROL-XL) 50 mg 24 hr tablet Take day of surgery.    Omega-3 Fatty Acids (FISH OIL) 1200 MG CAPS Stop taking 7 days prior to surgery.    tamsulosin (FLOMAX) 0.4 mg Take night before surgery      Medication instructions for day surgery reviewed. Please use only a sip of water to take your instructed medications. Avoid all over the counter vitamins, supplements and NSAIDS for one week prior to surgery per anesthesia guidelines. Tylenol is ok to take as needed.     You will receive a call one business day prior to surgery with an arrival time and hospital directions. If your surgery is scheduled on a Monday, the hospital will be calling you on the Friday prior to your surgery. If you have not heard from anyone by 8pm, please call the hospital supervisor through the hospital  at [phone]. ([place] [phone]).    Do not eat or drink anything after midnight the night before your surgery, including candy, mints, lifesavers, or chewing gum. Do not drink alcohol 24hrs before your surgery. Try not to smoke at least 24hrs before your surgery.       Follow the pre surgery showering instructions as listed in the “My Surgical Experience Booklet” or otherwise provided by your surgeon's office. Do not use a blade to shave the surgical area 1 week before surgery. It is okay to use a clean electric clippers up to 24 hours before  surgery. Do not apply any lotions, creams, including makeup, cologne, deodorant, or perfumes after showering on the day of your surgery. Do not use dry shampoo, hair spray, hair gel, or any type of hair products.     No contact lenses, eye make-up, or artificial eyelashes. Remove nail polish, including gel polish, and any artificial, gel, or acrylic nails if possible. Remove all jewelry including rings and body piercing jewelry.     Wear causal clothing that is easy to take on and off. Consider your type of surgery.    Keep any valuables, jewelry, piercings at home. Please bring any specially ordered equipment (sling, braces) if indicated.    Arrange for a responsible person to drive you to and from the hospital on the day of your surgery. Visitor Guidelines discussed.     Call the surgeon's office with any new illnesses, exposures, or additional questions prior to surgery.    Please reference your “My Surgical Experience Booklet” for additional information to prepare for your upcoming surgery.

## 2024-01-24 ENCOUNTER — TELEPHONE (OUTPATIENT)
Dept: CARDIOLOGY CLINIC | Facility: CLINIC | Age: 79
End: 2024-01-24

## 2024-01-24 NOTE — TELEPHONE ENCOUNTER
Pt is having sinus surgery on 1/31/24.  He is on ASA 81 mg.  How long should he hold?      Dr. Matt Mackenzie pt.      Please advise

## 2024-01-31 ENCOUNTER — HOSPITAL ENCOUNTER (OUTPATIENT)
Facility: HOSPITAL | Age: 79
Setting detail: OUTPATIENT SURGERY
Discharge: HOME | End: 2024-01-31
Attending: OTOLARYNGOLOGY | Admitting: OTOLARYNGOLOGY
Payer: MEDICARE

## 2024-01-31 ENCOUNTER — ANESTHESIA (OUTPATIENT)
Dept: PERIOP | Facility: HOSPITAL | Age: 79
End: 2024-01-31
Payer: MEDICARE

## 2024-01-31 VITALS
RESPIRATION RATE: 18 BRPM | WEIGHT: 158 LBS | TEMPERATURE: 98 F | OXYGEN SATURATION: 97 % | DIASTOLIC BLOOD PRESSURE: 72 MMHG | BODY MASS INDEX: 22.12 KG/M2 | SYSTOLIC BLOOD PRESSURE: 144 MMHG | HEIGHT: 71 IN | HEART RATE: 68 BPM

## 2024-01-31 DIAGNOSIS — J33.9 RIGHT NASAL POLYPS: ICD-10-CM

## 2024-01-31 LAB — GLUCOSE SERPL-MCNC: 136 MG/DL (ref 65–140)

## 2024-01-31 PROCEDURE — 88341 IMHCHEM/IMCYTCHM EA ADD ANTB: CPT | Performed by: STUDENT IN AN ORGANIZED HEALTH CARE EDUCATION/TRAINING PROGRAM

## 2024-01-31 PROCEDURE — 87205 SMEAR GRAM STAIN: CPT | Performed by: OTOLARYNGOLOGY

## 2024-01-31 PROCEDURE — 82948 REAGENT STRIP/BLOOD GLUCOSE: CPT

## 2024-01-31 PROCEDURE — 88364 INSITU HYBRIDIZATION (FISH): CPT | Performed by: STUDENT IN AN ORGANIZED HEALTH CARE EDUCATION/TRAINING PROGRAM

## 2024-01-31 PROCEDURE — 88342 IMHCHEM/IMCYTCHM 1ST ANTB: CPT | Performed by: STUDENT IN AN ORGANIZED HEALTH CARE EDUCATION/TRAINING PROGRAM

## 2024-01-31 PROCEDURE — 87075 CULTR BACTERIA EXCEPT BLOOD: CPT | Performed by: OTOLARYNGOLOGY

## 2024-01-31 PROCEDURE — 31267 ENDOSCOPY MAXILLARY SINUS: CPT | Performed by: OTOLARYNGOLOGY

## 2024-01-31 PROCEDURE — 88331 PATH CONSLTJ SURG 1 BLK 1SPC: CPT | Performed by: STUDENT IN AN ORGANIZED HEALTH CARE EDUCATION/TRAINING PROGRAM

## 2024-01-31 PROCEDURE — 88365 INSITU HYBRIDIZATION (FISH): CPT | Performed by: STUDENT IN AN ORGANIZED HEALTH CARE EDUCATION/TRAINING PROGRAM

## 2024-01-31 PROCEDURE — 31253 NSL/SINS NDSC TOTAL: CPT | Performed by: OTOLARYNGOLOGY

## 2024-01-31 PROCEDURE — 88305 TISSUE EXAM BY PATHOLOGIST: CPT | Performed by: STUDENT IN AN ORGANIZED HEALTH CARE EDUCATION/TRAINING PROGRAM

## 2024-01-31 PROCEDURE — 87070 CULTURE OTHR SPECIMN AEROBIC: CPT | Performed by: OTOLARYNGOLOGY

## 2024-01-31 PROCEDURE — 61782 SCAN PROC CRANIAL EXTRA: CPT | Performed by: OTOLARYNGOLOGY

## 2024-01-31 RX ORDER — PROPOFOL 10 MG/ML
INJECTION, EMULSION INTRAVENOUS AS NEEDED
Status: DISCONTINUED | OUTPATIENT
Start: 2024-01-31 | End: 2024-01-31

## 2024-01-31 RX ORDER — ONDANSETRON 2 MG/ML
INJECTION INTRAMUSCULAR; INTRAVENOUS AS NEEDED
Status: DISCONTINUED | OUTPATIENT
Start: 2024-01-31 | End: 2024-01-31

## 2024-01-31 RX ORDER — METOCLOPRAMIDE HYDROCHLORIDE 5 MG/ML
10 INJECTION INTRAMUSCULAR; INTRAVENOUS ONCE AS NEEDED
Status: DISCONTINUED | OUTPATIENT
Start: 2024-01-31 | End: 2024-01-31 | Stop reason: HOSPADM

## 2024-01-31 RX ORDER — IBUPROFEN 400 MG/1
400 TABLET ORAL EVERY 6 HOURS PRN
Status: DISCONTINUED | OUTPATIENT
Start: 2024-01-31 | End: 2024-01-31 | Stop reason: HOSPADM

## 2024-01-31 RX ORDER — SODIUM CHLORIDE, SODIUM LACTATE, POTASSIUM CHLORIDE, CALCIUM CHLORIDE 600; 310; 30; 20 MG/100ML; MG/100ML; MG/100ML; MG/100ML
INJECTION, SOLUTION INTRAVENOUS CONTINUOUS PRN
Status: DISCONTINUED | OUTPATIENT
Start: 2024-01-31 | End: 2024-01-31

## 2024-01-31 RX ORDER — EPINEPHRINE 1 MG/ML
INJECTION, SOLUTION, CONCENTRATE INTRAVENOUS AS NEEDED
Status: DISCONTINUED | OUTPATIENT
Start: 2024-01-31 | End: 2024-01-31 | Stop reason: HOSPADM

## 2024-01-31 RX ORDER — DEXAMETHASONE SODIUM PHOSPHATE 10 MG/ML
INJECTION, SOLUTION INTRAMUSCULAR; INTRAVENOUS AS NEEDED
Status: DISCONTINUED | OUTPATIENT
Start: 2024-01-31 | End: 2024-01-31

## 2024-01-31 RX ORDER — LIDOCAINE HYDROCHLORIDE AND EPINEPHRINE 10; 10 MG/ML; UG/ML
INJECTION, SOLUTION INFILTRATION; PERINEURAL AS NEEDED
Status: DISCONTINUED | OUTPATIENT
Start: 2024-01-31 | End: 2024-01-31 | Stop reason: HOSPADM

## 2024-01-31 RX ORDER — HYDROMORPHONE HCL IN WATER/PF 6 MG/30 ML
0.2 PATIENT CONTROLLED ANALGESIA SYRINGE INTRAVENOUS
Status: DISCONTINUED | OUTPATIENT
Start: 2024-01-31 | End: 2024-01-31 | Stop reason: HOSPADM

## 2024-01-31 RX ORDER — HYDROMORPHONE HCL/PF 1 MG/ML
0.5 SYRINGE (ML) INJECTION
Status: DISCONTINUED | OUTPATIENT
Start: 2024-01-31 | End: 2024-01-31 | Stop reason: HOSPADM

## 2024-01-31 RX ORDER — OXYMETAZOLINE HYDROCHLORIDE 0.05 G/100ML
SPRAY NASAL AS NEEDED
Status: DISCONTINUED | OUTPATIENT
Start: 2024-01-31 | End: 2024-01-31 | Stop reason: HOSPADM

## 2024-01-31 RX ORDER — ONDANSETRON 2 MG/ML
4 INJECTION INTRAMUSCULAR; INTRAVENOUS ONCE AS NEEDED
Status: DISCONTINUED | OUTPATIENT
Start: 2024-01-31 | End: 2024-01-31 | Stop reason: HOSPADM

## 2024-01-31 RX ORDER — FENTANYL CITRATE/PF 50 MCG/ML
50 SYRINGE (ML) INJECTION
Status: DISCONTINUED | OUTPATIENT
Start: 2024-01-31 | End: 2024-01-31 | Stop reason: HOSPADM

## 2024-01-31 RX ORDER — FENTANYL CITRATE 50 UG/ML
INJECTION, SOLUTION INTRAMUSCULAR; INTRAVENOUS AS NEEDED
Status: DISCONTINUED | OUTPATIENT
Start: 2024-01-31 | End: 2024-01-31

## 2024-01-31 RX ORDER — MAGNESIUM HYDROXIDE 1200 MG/15ML
LIQUID ORAL AS NEEDED
Status: DISCONTINUED | OUTPATIENT
Start: 2024-01-31 | End: 2024-01-31 | Stop reason: HOSPADM

## 2024-01-31 RX ORDER — ROCURONIUM BROMIDE 10 MG/ML
INJECTION, SOLUTION INTRAVENOUS AS NEEDED
Status: DISCONTINUED | OUTPATIENT
Start: 2024-01-31 | End: 2024-01-31

## 2024-01-31 RX ORDER — DIPHENHYDRAMINE HYDROCHLORIDE 50 MG/ML
12.5 INJECTION INTRAMUSCULAR; INTRAVENOUS ONCE AS NEEDED
Status: DISCONTINUED | OUTPATIENT
Start: 2024-01-31 | End: 2024-01-31 | Stop reason: HOSPADM

## 2024-01-31 RX ORDER — ACETAMINOPHEN 325 MG/1
650 TABLET ORAL EVERY 6 HOURS PRN
Status: DISCONTINUED | OUTPATIENT
Start: 2024-01-31 | End: 2024-01-31 | Stop reason: HOSPADM

## 2024-01-31 RX ORDER — SODIUM CHLORIDE, SODIUM LACTATE, POTASSIUM CHLORIDE, CALCIUM CHLORIDE 600; 310; 30; 20 MG/100ML; MG/100ML; MG/100ML; MG/100ML
125 INJECTION, SOLUTION INTRAVENOUS CONTINUOUS
Status: DISCONTINUED | OUTPATIENT
Start: 2024-01-31 | End: 2024-01-31 | Stop reason: HOSPADM

## 2024-01-31 RX ORDER — LIDOCAINE HYDROCHLORIDE 10 MG/ML
0.5 INJECTION, SOLUTION EPIDURAL; INFILTRATION; INTRACAUDAL; PERINEURAL ONCE AS NEEDED
Status: DISCONTINUED | OUTPATIENT
Start: 2024-01-31 | End: 2024-01-31 | Stop reason: HOSPADM

## 2024-01-31 RX ADMIN — DEXAMETHASONE SODIUM PHOSPHATE 10 MG: 10 INJECTION, SOLUTION INTRAMUSCULAR; INTRAVENOUS at 13:44

## 2024-01-31 RX ADMIN — PHENYLEPHRINE HYDROCHLORIDE 20 MCG/MIN: 10 INJECTION INTRAVENOUS at 14:34

## 2024-01-31 RX ADMIN — PROPOFOL 150 MG: 10 INJECTION, EMULSION INTRAVENOUS at 13:44

## 2024-01-31 RX ADMIN — SUGAMMADEX 200 MG: 100 INJECTION, SOLUTION INTRAVENOUS at 15:22

## 2024-01-31 RX ADMIN — FENTANYL CITRATE 50 MCG: 50 INJECTION INTRAMUSCULAR; INTRAVENOUS at 14:11

## 2024-01-31 RX ADMIN — FENTANYL CITRATE 100 MCG: 50 INJECTION INTRAMUSCULAR; INTRAVENOUS at 13:44

## 2024-01-31 RX ADMIN — SODIUM CHLORIDE, SODIUM LACTATE, POTASSIUM CHLORIDE, AND CALCIUM CHLORIDE: .6; .31; .03; .02 INJECTION, SOLUTION INTRAVENOUS at 13:44

## 2024-01-31 RX ADMIN — ROCURONIUM BROMIDE 50 MG: 10 INJECTION, SOLUTION INTRAVENOUS at 13:44

## 2024-01-31 RX ADMIN — ONDANSETRON 4 MG: 2 INJECTION INTRAMUSCULAR; INTRAVENOUS at 15:25

## 2024-01-31 NOTE — H&P
H&P Exam - ENT   Frantz Vasquez 78 y.o. male MRN: [id number]  Unit/Bed#: OR POOL Encounter: [id number]    Assessment/Plan     Assessment:  Frantz Vasquez is a 78 y.o. male with right chronic sinusitis who presents for scheduled surgery    Plan:  To OR for R FESS, possible frozen section    History of Present Illness   HPI:  Frantz Vasquez is a 78 y.o. male who presents for scheduled surgery.    Historical Information   Past Medical History:   Diagnosis Date    Chronic cough     RESOLVED: 29MAY2015    Coronary artery disease     Diabetes mellitus (HCC)     Diabetic retinopathy (HCC)     HLD (hyperlipidemia)     HTN (hypertension)     Hypertension     Kidney stone      Past Surgical History:   Procedure Laterality Date    CARDIAC CATHETERIZATION  12/09/2019    HAND SURGERY Left     LYMPH NODE BIOPSY Right 11/24/2023    Procedure: BIOPSY LYMPH NODE SENTINEL, LYMPHATIC MAPPING (Inject: 7:30 am in Nuc Med by radiologist);  Surgeon: Issac Triana MD;  [facility];  Service: Surgical Oncology    [place] CORONARY ARTERY BYP W/VEIN & ARTERY GRAFT 3 VEIN N/A 12/13/2019    Procedure: CORONARY ARTERY BYPASS GRAFT (CABG) 4 VESSELS STEVEN to LAD ; SVG--> PDA , DIAGONAL, and OM;  Surgeon: Elvin Huertas DO;  [facility];  Service: Cardiac Surgery    CO ECHO TRANSESOPHAG R-T 2D W/PRB IMG ACQUISJ I&R N/A 12/13/2019    Procedure: TRANSESOPHAGEAL ECHOCARDIOGRAM (SHEBA);  Surgeon: Elvin Huertas DO;  [facility];  Service: Cardiac Surgery    CO NDSC SURG W/VIDEO-ASSISTED HARVEST VEIN CABG Left 12/13/2019    Procedure: HARVEST VEIN ENDOSCOPIC (EVH);  Surgeon: Elvin Huertas DO;  [facility];  Service: Cardiac Surgery    SKIN LESION EXCISION Right 11/24/2023    Procedure: WIDE EXCISION MELANOMA RIGHT ARM;  Surgeon: Issac Triana MD;  [facility];  Service: Surgical Oncology     Social History   Social History     Substance and Sexual Activity   Alcohol Use Yes    Alcohol/week: 5.0 standard  "drinks of alcohol    Types: 3 Glasses of wine, 1 Cans of beer, 1 Standard drinks or equivalent per week    Comment: wine with a meal     Social History     Substance and Sexual Activity   Drug Use No     Social History     Tobacco Use   Smoking Status Never   Smokeless Tobacco Never     E-Cigarette/Vaping    E-Cigarette Use Never User      E-Cigarette/Vaping Substances    Nicotine No     THC No     CBD No     Flavoring No     Other No     Unknown No      Family History:   Family History   Problem Relation Age of Onset    Heart failure Mother     Heart failure Father     Heart disease Father     Heart attack Father     Diabetes Other        Meds/Allergies   all medications and allergies reviewed  Allergies   Allergen Reactions    Pollen Extract Allergic Rhinitis       Objective   Vitals: Blood pressure 138/65, pulse 64, temperature 98.4 °F (36.9 °C), temperature source Temporal, resp. rate 18, height 5' 11\" (1.803 m), weight 71.7 kg (158 lb), SpO2 99%.    No intake or output data in the 24 hours ending 01/31/24 1256    Invasive Devices       Peripheral Intravenous Line  Duration             Peripheral IV 01/31/24 Left Antecubital <1 day                    Physical Exam  Constitutional: Oriented to person, place, and time. Well-developed and well-nourished, no apparent distress, non-toxic appearance. Cooperative, able to hear and answer questions without difficulty.    Voice: Normal voice quality.  Head: Normocephalic, atraumatic.  No scars, masses or lesions.  Face: Symmetric, no edema, no sinus tenderness.  Eyes: Vision grossly intact, extra-ocular movement intact.  Right Ear: External ear normal.  Auditory canal clear.  Tympanic membrane well-appearing, without retraction or scarring.  No fluid present. No post-auricular erythema or tenderness  Left Ear: External ear normal.  Auditory canal clear.  Tympanic membrane well-appearing, without retraction or scarring.  No fluid present.  No post-auricular erythema or " tenderness.  Nose: Septum midline, nares clear.  Mucosa moist, turbinates well appearing.  No crusting, polyps or discharge evident.  Oral cavity: Dentition intact.  Mucosa moist, lips normal.  Tongue mobile, floor of mouth normal.  Hard palate unremarkable.  No masses or lesions.   Oropharynx: Uvula is midline, sot palate normal.  Unremarkable oropharyngeal inlet.  Tonsils unremarkable.  Posterior pharyngeal wall clear. No masses or lesions.  Salivary glands:  Parotid glands and submandibular glands symmetric, no enlargement or tenderness.  Neck: Normal laryngeal elevation with swallow.  Trachea midline.  No masses or lesions. No palpable adenopathy.  Thyroid: normal in size, unremarkable without tenderness or palpable nodules.  Pulmonary/Chest: Normal effort and rate. No respiratory distress.   Cardiac: Regular rate and rhythm.   Abdomen: soft, non-tender.  Musculoskeletal: Normal range of motion.   Neurological: Cranial nerves 2-12 intact.  Skin: Skin is warm and dry.   Psychiatric: Normal mood and affect.    Lab Results: I have personally reviewed pertinent lab results.   Imaging: I have personally reviewed pertinent reports.    EKG, Pathology, and Other Studies: I have personally reviewed pertinent reports    Code Status: Prior  Advance Directive and Living Will:      Power of :    POLST:      None

## 2024-01-31 NOTE — OP NOTE
OPERATIVE REPORT  PATIENT NAME: Frantz Vasquez    :  1945  MRN: [id number]  Pt Location: [facility] OR ROOM 05    SURGERY DATE: 2024    Surgeons and Role:     * Tez Bah MD - Primary     * Kavya Salmeron MD - Assisting    Preop Diagnosis:  Right nasal polyps [J33.9]    Post-Op Diagnosis Codes:     * Right nasal polyps [J33.9]    Procedure(s):  RIGHT FUNCTIONAL ENDOSCOPIC SINUS SURGERY (FESS) IMAGED GUIDED. MAXILLARY. ETHMOIDS. FRONTALS    Specimen(s):  ID Type Source Tests Collected by Time Destination   1 : right maxillary sinus Tissue Maxillary Sinus TISSUE EXAM Tez Bah MD 2024 1418    2 : right maxillary rule out inverted papilloma Tissue Maxillary Sinus TISSUE EXAM Tez Bah MD 2024 1440    A : right sinus contents Tissue Other ANAEROBIC CULTURE AND GRAM STAIN, CULTURE, TISSUE AND GRAM STAIN Tez Bah MD 2024 1510        Estimated Blood Loss:   Minimal    Drains:  * No LDAs found *    Anesthesia Type:   General    Operative Indications:  Right nasal polyps [J33.9]  Large right antrochoanal polyp   Mucopus of frontal and maxillary sinuses   Chronic inflammatory changes of maxillary, ethmoid, and frontal mucosa    Operative Findings:  Large right antrochoanal polyp   Mucopus of frontal and maxillary sinuses right  Chronic inflammatory changes of maxillary, ethmoid, and frontal mucosa right  Lamina and skull base intact bilatearlly    Left healthy mucosa without infection or polyposis    Complications:   None    Procedure and Technique:  The patient was positively identified and transferred onto the operating table in the supine position. Appropriate monitoring devices were put in place, anesthesia was induced and the patient was intubated without difficulty. The operating room table was then turned 90 degrees, and the patient was draped in the usual clean fashion.  Before proceeding further, a time-out was taken during which the patient’s identification and planned  surgical procedure were confirmed.  The image guidance system was then set up calibrated and found to be working appropriately     Oyxmetazoline nasal spray was applied to right nasal cavities.  1% lidocaine with 1/100,000 epinephrine was injected to the lateral nasal wall, root of the MT,  and middle turbinate. After allowing adequate time for anesthetic and vasoconstrictive effect, surgery was begun on the right side by removing the uncinate process. After partial removal of uncinate process, large antrochoanal  polyp was visualized. Blakesley forceps were used to grasp the polyp and remove it from the stalk in the maxillary sinus. Frozen section was sent to rule out inverted papilloma. The middle turbinate was medialized and the basal lamella incised with a [place].  The ball probe was then used to back fracture the uncinate process which was then removed using the microdebrider.  The microdebrider was then used to enter the ethmoid bulla, and bony partitions within the anterior ethmoid cavity were removed back to the basal lamella, accomplishing anterior ethmoidectomy.  Care was taken to avoid stripping mucosa.  Additional dissection was then carried out further posteriorly.  Lamina and skull base were identified posteriorly.  The remaining anterior and posterior ethmoid air cells were then removed.  Maxillary sinus contents were then removed with curved shaver and pus was irrigated copiously. A 30 degree endoscope was then introduced to evaluate the skull base.  There additional polypoid edema mucosa and residual ethmoid partitions along the skull base.  The image guided frontal probe was required to evaluate the right frontal recess which was palpated. Polypoid mucosa was removed at the area of the frontal recess. The image guidance system was critical for removal of the frontal recess cells and identification of the frontal recess. Absorbable packing was placed in the middle meatus.    The left nasal cavity was  examined showing healthy mucosa.      Anesthesia was reversed, and the patient was awakened, extubated, and taken to the recovery room in stable condition.  All counts were correct at the end of the case, and no complications were encountered.      Patient Disposition:  PACU         SIGNATURE: Kavya Salmeron MD  DATE: January 31, 2024  TIME: 3:23 PM

## 2024-01-31 NOTE — ANESTHESIA PREPROCEDURE EVALUATION
"Procedure:  RIGHT FUNCTIONAL ENDOSCOPIC SINUS SURGERY (FESS) IMAGED GUIDED, MAXILLARY, ETHMOIDS, FRONTALS -  \"possible frozen sections\" (Nose)    Relevant Problems   ANESTHESIA (within normal limits)      CARDIO   (+) 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 without angina pectoris   (+) Benign essential hypertension   (+) Hypercholesteremia      ENDO   (+) Type 2 diabetes mellitus (HCC)      GI/HEPATIC (within normal limits)      /RENAL (within normal limits)      HEMATOLOGY   (+) Other specified anemias      MUSCULOSKELETAL (within normal limits)      NEURO/PSYCH (within normal limits)      PULMONARY (within normal limits)      Endocrine   (+) Diabetic retinopathy (HCC)        Physical Exam    Airway    Mallampati score: II  TM Distance: >3 FB  Neck ROM: full     Dental   No notable dental hx     Cardiovascular  Rhythm: regular, Rate: normal, Cardiovascular exam normal    Pulmonary  Pulmonary exam normal Breath sounds clear to auscultation    Other Findings        Anesthesia Plan  ASA Score- 3     Anesthesia Type- general with ASA Monitors.         Additional Monitors:     Airway Plan: ETT.           Plan Factors-Exercise tolerance (METS): >4 METS.    Chart reviewed. EKG reviewed. Imaging results reviewed. Existing labs reviewed. Patient summary reviewed.          Obstructive sleep apnea risk education given perioperatively.        Induction- intravenous.    Postoperative Plan- Plan for postoperative opioid use.     Informed Consent- Anesthetic plan and risks discussed with patient.  I personally reviewed this patient with the CRNA. Discussed and agreed on the Anesthesia Plan with the CRNA..            Recent labs personally reviewed:  Lab Results   Component Value Date    WBC 5.85 01/11/2024    HGB 10.1 (L) 01/11/2024     01/11/2024     Lab Results   Component Value Date     05/14/2014    K 4.2 01/11/2024    BUN 20 01/11/2024    CREATININE 1.15 01/11/2024    GLUCOSE 100 12/13/2019 "     Lab Results   Component Value Date    PTT 36 12/13/2019      Lab Results   Component Value Date    INR 1.28 (H) 12/16/2019       Blood type A    Lab Results   Component Value Date    HGBA1C 6.8 (H) 10/16/2023       I, Deyvi Gutierrez MD, have personally seen and evaluated the patient prior to anesthetic care.  I have reviewed the pre-anesthetic record, and other medical records if appropriate to the anesthetic care.  If a CRNA is involved in the case, I have reviewed the CRNA assessment, if present, and agree. Risks/benefits and alternatives discussed with patient including possible PONV, sore throat, and possibility of rare anesthetic and surgical emergencies.

## 2024-01-31 NOTE — ANESTHESIA POSTPROCEDURE EVALUATION
Post-Op Assessment Note    CV Status:  Stable  Pain Score: 0    Pain management: adequate       Mental Status:  Alert and awake   Hydration Status:  Euvolemic   PONV Controlled:  Controlled   Airway Patency:  Patent  Two or more mitigation strategies used for obstructive sleep apnea   Post Op Vitals Reviewed: Yes    No anethesia notable event occurred.    Staff: Anesthesiologist, CRNA               /72 (01/31/24 1533)    Temp   97.6   Pulse 68 (01/31/24 1533)   Resp 16 (01/31/24 1533)    SpO2   100

## 2024-01-31 NOTE — DISCHARGE INSTR - AVS FIRST PAGE
Functional Endoscopic Sinus Surgery for Rhinosinusitis   Office [phone]  Cell [phone]      WHAT YOU SHOULD KNOW:   Functional endoscopic sinus surgery (FESS) removes tissue that blocks your sinus openings.   AFTER YOU LEAVE:   Medicines:   Medicines  can help decrease pain or prevent bacterial infections. Ask your healthcare provider how to take prescription pain medicine safely.    Take your medicine as directed.  Call your healthcare provider if you think your medicine is not helping or if you have side effects. Tell him if you are allergic to any medicine. Keep a list of the medicines, vitamins, and herbs you take. Include the amounts, and when and why you take them. Bring the list or the pill bottles to follow-up visits. Carry your medicine list with you in case of an emergency.  Follow up with your healthcare provider as directed:  Write down your questions so you remember to ask them during your visits.  Home care:   No nose blowing until instructed.     Drink plenty of liquids.  Ask your healthcare provider how much liquid to drink each day and which liquids are best for you. Limit the amount of caffeine you drink.    Rinse your nose with salt water.  This may help loosen your thick mucous so that it comes out more easily when you blow your nose. Start using the [facility] Sinus rinse the night of surgery and continue to use twice daily until otherwise instructed.      Twice weekly, clean the sinus rinse bottle with hot, soapy water and microwave while wet for 60 seconds.    Use a cool-mist vaporizer or humidifier  to add moisture to the air. This helps thin your nasal discharge and prevent colds. Wash the humidifier each day with soap and warm water to keep it free of germs.    Wash your hands frequently.  Wash your hands after you come into contact with a person who has a cold. Germ-killing hand lotion or gel may be used to clean your hands when there is no water available.    Sleep with the  head of bed elevated to reduce swelling within the nose          Nasal packing:  Ask your healthcare provider how long the nasal packing will stay inside your nose. If it needs to be removed and replaced at home, ask your healthcare provider how to properly do this.  Contact your healthcare provider if:   You have a fever.     You have chills, a cough, or feel weak and achy.    You have bruises or swelling around your nose or eyes.    Any vision changes    You have nausea or vomiting.    Blood soaks through your nasal packing.    Your skin is itchy, swollen, or has a rash.    You have questions or concerns about your condition or care.  Seek care immediately or call 911 if:   You have a stiff neck or eye pain, especially when you look directly at light.    You have a severe headache that does not go away even after you take pain medicine.    You have clear fluid coming from your nose.    You have pus or a foul-smelling odor coming from your nose.    You have trouble breathing, seeing, talking, or thinking clearly.    Your face is getting numb.    Your symptoms come back or become worse.

## 2024-02-02 LAB
BACTERIA SPEC ANAEROBE CULT: NORMAL
BACTERIA TISS AEROBE CULT: NORMAL
GLUCOSE SERPL-MCNC: 133 MG/DL (ref 65–140)
GRAM STN SPEC: NORMAL

## 2024-02-06 PROCEDURE — 88365 INSITU HYBRIDIZATION (FISH): CPT | Performed by: STUDENT IN AN ORGANIZED HEALTH CARE EDUCATION/TRAINING PROGRAM

## 2024-02-06 PROCEDURE — 88342 IMHCHEM/IMCYTCHM 1ST ANTB: CPT | Performed by: STUDENT IN AN ORGANIZED HEALTH CARE EDUCATION/TRAINING PROGRAM

## 2024-02-06 PROCEDURE — 88364 INSITU HYBRIDIZATION (FISH): CPT | Performed by: STUDENT IN AN ORGANIZED HEALTH CARE EDUCATION/TRAINING PROGRAM

## 2024-02-06 PROCEDURE — 88341 IMHCHEM/IMCYTCHM EA ADD ANTB: CPT | Performed by: STUDENT IN AN ORGANIZED HEALTH CARE EDUCATION/TRAINING PROGRAM

## 2024-02-06 PROCEDURE — 88305 TISSUE EXAM BY PATHOLOGIST: CPT | Performed by: STUDENT IN AN ORGANIZED HEALTH CARE EDUCATION/TRAINING PROGRAM

## 2024-02-09 PROCEDURE — 87070 CULTURE OTHR SPECIMN AEROBIC: CPT | Performed by: OTOLARYNGOLOGY

## 2024-02-09 PROCEDURE — 87077 CULTURE AEROBIC IDENTIFY: CPT | Performed by: OTOLARYNGOLOGY

## 2024-02-09 PROCEDURE — 87075 CULTR BACTERIA EXCEPT BLOOD: CPT | Performed by: OTOLARYNGOLOGY

## 2024-02-09 PROCEDURE — 87205 SMEAR GRAM STAIN: CPT | Performed by: OTOLARYNGOLOGY

## 2024-02-09 PROCEDURE — 87186 SC STD MICRODIL/AGAR DIL: CPT | Performed by: OTOLARYNGOLOGY

## 2024-02-21 DIAGNOSIS — I10 BENIGN ESSENTIAL HYPERTENSION: Chronic | ICD-10-CM

## 2024-02-21 DIAGNOSIS — E11.9 TYPE 2 DIABETES MELLITUS WITHOUT COMPLICATION, WITHOUT LONG-TERM CURRENT USE OF INSULIN (HCC): ICD-10-CM

## 2024-02-21 RX ORDER — METFORMIN HYDROCHLORIDE 750 MG/1
750 TABLET, EXTENDED RELEASE ORAL 2 TIMES DAILY
Qty: 180 TABLET | Refills: 1 | Status: SHIPPED | OUTPATIENT
Start: 2024-02-21

## 2024-02-21 RX ORDER — AMLODIPINE BESYLATE 5 MG/1
5 TABLET ORAL DAILY
Qty: 90 TABLET | Refills: 1 | Status: SHIPPED | OUTPATIENT
Start: 2024-02-21

## 2024-02-25 DIAGNOSIS — N13.8 BPH WITH OBSTRUCTION/LOWER URINARY TRACT SYMPTOMS: ICD-10-CM

## 2024-02-25 DIAGNOSIS — N40.1 BPH WITH OBSTRUCTION/LOWER URINARY TRACT SYMPTOMS: ICD-10-CM

## 2024-02-26 RX ORDER — TAMSULOSIN HYDROCHLORIDE 0.4 MG/1
0.4 CAPSULE ORAL
Qty: 90 CAPSULE | Refills: 1 | Status: SHIPPED | OUTPATIENT
Start: 2024-02-26

## 2024-02-26 RX ORDER — TAMSULOSIN HYDROCHLORIDE 0.4 MG/1
0.4 CAPSULE ORAL
Qty: 90 CAPSULE | Refills: 0 | Status: SHIPPED | OUTPATIENT
Start: 2024-02-26 | End: 2024-02-26 | Stop reason: SDUPTHER

## 2024-03-04 ENCOUNTER — APPOINTMENT (OUTPATIENT)
Dept: LAB | Facility: CLINIC | Age: 79
End: 2024-03-04
Payer: MEDICARE

## 2024-03-04 LAB
CREAT UR-MCNC: 103.3 MG/DL
MICROALBUMIN UR-MCNC: 11.2 MG/L
MICROALBUMIN/CREAT 24H UR: 11 MG/G CREATININE (ref 0–30)

## 2024-03-11 DIAGNOSIS — E11.9 TYPE 2 DIABETES MELLITUS (HCC): Chronic | ICD-10-CM

## 2024-03-11 DIAGNOSIS — Z95.1 S/P CABG (CORONARY ARTERY BYPASS GRAFT): ICD-10-CM

## 2024-03-11 RX ORDER — ATORVASTATIN CALCIUM 80 MG/1
80 TABLET, FILM COATED ORAL
Qty: 90 TABLET | Refills: 0 | Status: ON HOLD | OUTPATIENT
Start: 2024-03-11

## 2024-03-18 ENCOUNTER — APPOINTMENT (OUTPATIENT)
Dept: LAB | Age: 79
DRG: 331 | End: 2024-03-18
Payer: MEDICARE

## 2024-03-18 ENCOUNTER — OFFICE VISIT (OUTPATIENT)
Dept: INTERNAL MEDICINE CLINIC | Age: 79
End: 2024-03-18
Payer: MEDICARE

## 2024-03-18 VITALS
WEIGHT: 155 LBS | HEIGHT: 71 IN | DIASTOLIC BLOOD PRESSURE: 60 MMHG | BODY MASS INDEX: 21.7 KG/M2 | HEART RATE: 69 BPM | OXYGEN SATURATION: 100 % | SYSTOLIC BLOOD PRESSURE: 120 MMHG | TEMPERATURE: 97.8 F

## 2024-03-18 DIAGNOSIS — D50.0 IRON DEFICIENCY ANEMIA DUE TO CHRONIC BLOOD LOSS: Primary | ICD-10-CM

## 2024-03-18 DIAGNOSIS — C43.61 MALIGNANT MELANOMA OF RIGHT UPPER EXTREMITY INCLUDING SHOULDER (HCC): ICD-10-CM

## 2024-03-18 DIAGNOSIS — I10 BENIGN ESSENTIAL HYPERTENSION: Chronic | ICD-10-CM

## 2024-03-18 DIAGNOSIS — E11.9 TYPE 2 DIABETES MELLITUS WITHOUT COMPLICATION, WITHOUT LONG-TERM CURRENT USE OF INSULIN (HCC): Chronic | ICD-10-CM

## 2024-03-18 DIAGNOSIS — D50.0 IRON DEFICIENCY ANEMIA DUE TO CHRONIC BLOOD LOSS: ICD-10-CM

## 2024-03-18 DIAGNOSIS — I25.119 ATHEROSCLEROSIS OF NATIVE CORONARY ARTERY OF NATIVE HEART WITH ANGINA PECTORIS (HCC): ICD-10-CM

## 2024-03-18 LAB
BASOPHILS # BLD AUTO: 0.04 THOUSANDS/ÂΜL (ref 0–0.1)
BASOPHILS NFR BLD AUTO: 1 % (ref 0–1)
EOSINOPHIL # BLD AUTO: 0.17 THOUSAND/ÂΜL (ref 0–0.61)
EOSINOPHIL NFR BLD AUTO: 3 % (ref 0–6)
ERYTHROCYTE [DISTWIDTH] IN BLOOD BY AUTOMATED COUNT: 14.6 % (ref 11.6–15.1)
FERRITIN SERPL-MCNC: 4 NG/ML (ref 24–336)
HCT VFR BLD AUTO: 27.3 % (ref 36.5–49.3)
HGB BLD-MCNC: 7.9 G/DL (ref 12–17)
IMM GRANULOCYTES # BLD AUTO: 0.03 THOUSAND/UL (ref 0–0.2)
IMM GRANULOCYTES NFR BLD AUTO: 1 % (ref 0–2)
IRON SATN MFR SERPL: 3 % (ref 15–50)
IRON SERPL-MCNC: 12 UG/DL (ref 50–212)
LYMPHOCYTES # BLD AUTO: 0.94 THOUSANDS/ÂΜL (ref 0.6–4.47)
LYMPHOCYTES NFR BLD AUTO: 15 % (ref 14–44)
MCH RBC QN AUTO: 23.3 PG (ref 26.8–34.3)
MCHC RBC AUTO-ENTMCNC: 28.9 G/DL (ref 31.4–37.4)
MCV RBC AUTO: 81 FL (ref 82–98)
MONOCYTES # BLD AUTO: 0.59 THOUSAND/ÂΜL (ref 0.17–1.22)
MONOCYTES NFR BLD AUTO: 9 % (ref 4–12)
NEUTROPHILS # BLD AUTO: 4.59 THOUSANDS/ÂΜL (ref 1.85–7.62)
NEUTS SEG NFR BLD AUTO: 71 % (ref 43–75)
NRBC BLD AUTO-RTO: 0 /100 WBCS
PLATELET # BLD AUTO: 243 THOUSANDS/UL (ref 149–390)
PMV BLD AUTO: 10.3 FL (ref 8.9–12.7)
RBC # BLD AUTO: 3.39 MILLION/UL (ref 3.88–5.62)
TIBC SERPL-MCNC: 352 UG/DL (ref 250–450)
UIBC SERPL-MCNC: 340 UG/DL (ref 155–355)
WBC # BLD AUTO: 6.36 THOUSAND/UL (ref 4.31–10.16)

## 2024-03-18 PROCEDURE — 99214 OFFICE O/P EST MOD 30 MIN: CPT | Performed by: INTERNAL MEDICINE

## 2024-03-18 PROCEDURE — 83540 ASSAY OF IRON: CPT

## 2024-03-18 PROCEDURE — G2211 COMPLEX E/M VISIT ADD ON: HCPCS | Performed by: INTERNAL MEDICINE

## 2024-03-18 PROCEDURE — 85025 COMPLETE CBC W/AUTO DIFF WBC: CPT

## 2024-03-18 PROCEDURE — 36415 COLL VENOUS BLD VENIPUNCTURE: CPT

## 2024-03-18 PROCEDURE — 83550 IRON BINDING TEST: CPT

## 2024-03-18 PROCEDURE — 82728 ASSAY OF FERRITIN: CPT

## 2024-03-18 NOTE — PROGRESS NOTES
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  It may contain errors with dictating incorrect words or incorrect spelling. Please contact the provider directly with any questions.    Subjective:   Chief Complaint   Patient presents with    Follow-up     Lab review         Patient ID: Frantz Vasquez is a 78 y.o. male.    HPI  This is a very pleasant 78 years young gentleman who is here today for the regular follow-up  Patient is here today for the follow-up.   Hypertension. I reviewed antihypertensive medication, patient does not have any side effects of  medications, no signs or symptoms of hypertension ,hypotension or orthostatic hypotension.  Patient is compliant with medications.  Blood workup related to hypertensive diagnosis reviewed.  Plan is to continue with the present management.  We will follow-up as a scheduled and adjust the doses of the medication as indicated.  Patient is here for the follow-up of diabetes type 2.  Hemoglobin A1c is very well controlled it is less than 7 no symptoms of type 2 diabetes mellitus.  Lipid panel, urine for microalbumin, foot examination all up-to-date.  Patient will continue with medications.  We will follow him up as a scheduled.  Discussed in detail with the patient regarding the type 2 diabetes mellitus and importance of blood pressure control, use  of ACE/ARB's for the prevention of renal complications.  Hypercholesterolemia stable and LDL cholesterol is in 50s this is where it should be  History of melanoma followed up by dermatologist  History of coronary artery bypass grafting followed up by the cardiology stable coronary artery disease  Occasional episodes of lightheadedness his anemia was noted we will follow-up with the iron profile and see what we can do for him    The following portions of the patient's history were reviewed and updated as appropriate: allergies, current medications, past family history, past medical history, past social history, past surgical history, and problem list.    Review of Systems   Constitutional:  Negative for appetite change, fatigue and fever.   HENT:  Negative for congestion, ear pain, hearing loss, nosebleeds, sneezing, tinnitus and voice change.    Eyes:  Negative for pain, discharge and redness.   Respiratory:  Negative for cough, chest tightness and wheezing.    Cardiovascular:  Negative for chest pain, palpitations and leg swelling.   Gastrointestinal:  Negative for abdominal pain, blood in stool, constipation, diarrhea, nausea and vomiting.   Genitourinary:  Negative for difficulty urinating, dysuria, hematuria and urgency.   Musculoskeletal:  Negative for arthralgias, back pain, gait problem and joint swelling.   Skin:  Negative for rash and wound.   Allergic/Immunologic: Negative for environmental allergies.   Neurological:  Positive for light-headedness. Negative for dizziness, tremors, seizures, weakness and numbness.   Hematological:  Negative for adenopathy. Does not bruise/bleed easily.   Psychiatric/Behavioral:  Negative for behavioral problems and confusion. The patient is not nervous/anxious.          Past Medical History:   Diagnosis Date    Chronic cough     RESOLVED: [id number]    Coronary artery disease     Diabetes mellitus (HCC)     Diabetic retinopathy (HCC)     HLD (hyperlipidemia)     HTN  (hypertension)     Hypertension     Kidney stone          Current Outpatient Medications:     amLODIPine (NORVASC) 5 mg tablet, Take 1 tablet (5 mg total) by mouth daily, Disp: 90 tablet, Rfl: 1    aspirin 81 MG tablet, Take 1 tablet (81 mg total) by mouth daily, Disp: 90 tablet, Rfl: 3    atorvastatin (LIPITOR) 80 mg tablet, Take 1 tablet (80 mg total) by mouth daily with dinner, Disp: 90 tablet, Rfl: 0    Cholecalciferol (Vitamin D) 125 MCG (5000 UT) CAPS, Take by mouth daily, Disp: , Rfl:     Glucosamine-Chondroitin (GLUCOSAMINE CHONDR COMPLEX PO), Take 1 capsule by mouth 2 (two) times a day, Disp: , Rfl:     hydrochlorothiazide (HYDRODIURIL) 12.5 mg tablet, Take 1 tablet (12.5 mg total) by mouth daily, Disp: 90 tablet, Rfl: 3    metFORMIN (GLUCOPHAGE-XR) 750 mg 24 hr tablet, Take 1 tablet (750 mg total) by mouth 2 (two) times a day, Disp: 180 tablet, Rfl: 1    metoprolol succinate (TOPROL-XL) 50 mg 24 hr tablet, Take 1 tablet (50 mg total) by mouth daily, Disp: 90 tablet, Rfl: 0    Omega-3 Fatty Acids (FISH OIL) 1200 MG CAPS, Take 1 capsule by mouth daily, Disp: , Rfl:     tamsulosin (FLOMAX) 0.4 mg, Take 1 capsule (0.4 mg total) by mouth daily with dinner, Disp: 90 capsule, Rfl: 1    Allergies   Allergen Reactions    Pollen Extract Allergic Rhinitis       Social History   Past Surgical History:   Procedure Laterality Date    CARDIAC CATHETERIZATION  12/09/2019    HAND SURGERY Left     LYMPH NODE BIOPSY Right 11/24/2023    Procedure: BIOPSY LYMPH NODE SENTINEL, LYMPHATIC MAPPING (Inject: 7:30 am in Nuc Med by radiologist);  Surgeon: Issac Triana MD;  [facility];  Service: Surgical Oncology    CA CORONARY ARTERY BYP W/VEIN & ARTERY GRAFT 3 VEIN N/A 12/13/2019    Procedure: CORONARY ARTERY BYPASS GRAFT (CABG) 4 VESSELS STEVEN to LAD ; SVG--> PDA , DIAGONAL, and OM;  Surgeon: Elvin Huertas DO;  [facility];  Service: Cardiac Surgery    CA ECHO TRANSESOPHAG R-T 2D W/PRB IMG ACQUISJ I&R N/A  "12/13/2019    Procedure: TRANSESOPHAGEAL ECHOCARDIOGRAM (SHEBA);  Surgeon: Elvin Huertas DO;  [facility];  Service: Cardiac Surgery    RI NDSC SURG W/VIDEO-ASSISTED HARVEST VEIN CABG Left 12/13/2019    Procedure: HARVEST VEIN ENDOSCOPIC (EVH);  Surgeon: Elvin Huertas DO;  [facility];  Service: Cardiac Surgery    RI STRTCTC CPTR ASSTD PX EXTRADURAL CRANIAL N/A 1/31/2024    Procedure: RIGHT FUNCTIONAL ENDOSCOPIC SINUS SURGERY (FESS) IMAGED GUIDED, MAXILLARY, ETHMOIDS, FRONTALS;  Surgeon: Tez Bah MD;  [facility];  Service: ENT    SKIN LESION EXCISION Right 11/24/2023    Procedure: WIDE EXCISION MELANOMA RIGHT ARM;  Surgeon: Issac Triana MD;  [facility];  Service: Surgical Oncology     Family History   Problem Relation Age of Onset    Heart failure Mother     Heart failure Father     Heart disease Father     Heart attack Father     Diabetes Other        Objective:  /60 (BP Location: Left arm, Patient Position: Sitting, Cuff Size: Standard)   Pulse 69   Temp 97.8 °F (36.6 °C) (Temporal)   Ht 5' 11\" (1.803 m)   Wt 70.3 kg (155 lb)   SpO2 100%   BMI 21.62 kg/m²        Physical Exam  Constitutional:       Appearance: He is well-developed.   HENT:      Right Ear: Tympanic membrane and external ear normal.      Left Ear: Tympanic membrane normal.   Eyes:      Conjunctiva/sclera: Conjunctivae normal.      Pupils: Pupils are equal, round, and reactive to light.   Neck:      Thyroid: No thyromegaly.      Vascular: No JVD.   Cardiovascular:      Rate and Rhythm: Normal rate and regular rhythm.      Heart sounds: Murmur heard.      Systolic murmur is present with a grade of 2/6.   Pulmonary:      Breath sounds: Normal breath sounds.   Abdominal:      General: Bowel sounds are normal.      Palpations: Abdomen is soft.   Musculoskeletal:         General: Normal range of motion.      Cervical back: Normal range of motion.   Lymphadenopathy:      Cervical: No cervical " adenopathy.   Skin:     General: Skin is dry.   Neurological:      General: No focal deficit present.      Mental Status: He is alert and oriented to person, place, and time. Mental status is at baseline.      Deep Tendon Reflexes: Reflexes are normal and symmetric.   Psychiatric:         Mood and Affect: Mood normal.         Behavior: Behavior normal.

## 2024-03-19 ENCOUNTER — HOSPITAL ENCOUNTER (INPATIENT)
Facility: HOSPITAL | Age: 79
LOS: 9 days | Discharge: HOME | DRG: 331 | End: 2024-03-28
Attending: INTERNAL MEDICINE | Admitting: INTERNAL MEDICINE
Payer: MEDICARE

## 2024-03-19 DIAGNOSIS — K63.89 COLONIC MASS: Primary | ICD-10-CM

## 2024-03-19 DIAGNOSIS — R33.9 URINARY RETENTION: ICD-10-CM

## 2024-03-19 DIAGNOSIS — D50.0 IRON DEFICIENCY ANEMIA DUE TO CHRONIC BLOOD LOSS: Primary | ICD-10-CM

## 2024-03-19 DIAGNOSIS — D50.9 IRON DEFICIENCY ANEMIA: ICD-10-CM

## 2024-03-19 DIAGNOSIS — D64.9 SYMPTOMATIC ANEMIA: ICD-10-CM

## 2024-03-19 LAB
ABO GROUP BLD: NORMAL
ANION GAP SERPL CALCULATED.3IONS-SCNC: 7 MMOL/L (ref 4–13)
BASOPHILS # BLD AUTO: 0.03 THOUSANDS/ÂΜL (ref 0–0.1)
BASOPHILS NFR BLD AUTO: 1 % (ref 0–1)
BLD GP AB SCN SERPL QL: NEGATIVE
BUN SERPL-MCNC: 22 MG/DL (ref 5–25)
CALCIUM SERPL-MCNC: 9.7 MG/DL (ref 8.4–10.2)
CHLORIDE SERPL-SCNC: 104 MMOL/L (ref 96–108)
CO2 SERPL-SCNC: 29 MMOL/L (ref 21–32)
CREAT SERPL-MCNC: 1.09 MG/DL (ref 0.6–1.3)
EOSINOPHIL # BLD AUTO: 0.17 THOUSAND/ÂΜL (ref 0–0.61)
EOSINOPHIL NFR BLD AUTO: 3 % (ref 0–6)
ERYTHROCYTE [DISTWIDTH] IN BLOOD BY AUTOMATED COUNT: 14.6 % (ref 11.6–15.1)
GFR SERPL CREATININE-BSD FRML MDRD: 65 ML/MIN/1.73SQ M
GLUCOSE SERPL-MCNC: 106 MG/DL (ref 65–140)
GLUCOSE SERPL-MCNC: 80 MG/DL (ref 65–140)
GLUCOSE SERPL-MCNC: 96 MG/DL (ref 65–140)
HCT VFR BLD AUTO: 27.3 % (ref 36.5–49.3)
HEMOCCULT STL QL: NEGATIVE
HGB BLD-MCNC: 8.1 G/DL (ref 12–17)
IMM GRANULOCYTES # BLD AUTO: 0.02 THOUSAND/UL (ref 0–0.2)
IMM GRANULOCYTES NFR BLD AUTO: 0 % (ref 0–2)
LYMPHOCYTES # BLD AUTO: 0.91 THOUSANDS/ÂΜL (ref 0.6–4.47)
LYMPHOCYTES NFR BLD AUTO: 15 % (ref 14–44)
MCH RBC QN AUTO: 23.7 PG (ref 26.8–34.3)
MCHC RBC AUTO-ENTMCNC: 29.7 G/DL (ref 31.4–37.4)
MCV RBC AUTO: 80 FL (ref 82–98)
MONOCYTES # BLD AUTO: 0.58 THOUSAND/ÂΜL (ref 0.17–1.22)
MONOCYTES NFR BLD AUTO: 10 % (ref 4–12)
NEUTROPHILS # BLD AUTO: 4.3 THOUSANDS/ÂΜL (ref 1.85–7.62)
NEUTS SEG NFR BLD AUTO: 71 % (ref 43–75)
NRBC BLD AUTO-RTO: 0 /100 WBCS
PLATELET # BLD AUTO: 238 THOUSANDS/UL (ref 149–390)
PMV BLD AUTO: 10.2 FL (ref 8.9–12.7)
POTASSIUM SERPL-SCNC: 4.2 MMOL/L (ref 3.5–5.3)
RBC # BLD AUTO: 3.42 MILLION/UL (ref 3.88–5.62)
RETICS # AUTO: ABNORMAL 10*3/UL (ref 14356–105094)
RETICS # CALC: 2.67 % (ref 0.37–1.87)
RH BLD: POSITIVE
SODIUM SERPL-SCNC: 140 MMOL/L (ref 135–147)
SPECIMEN EXPIRATION DATE: NORMAL
WBC # BLD AUTO: 6.01 THOUSAND/UL (ref 4.31–10.16)

## 2024-03-19 PROCEDURE — 82948 REAGENT STRIP/BLOOD GLUCOSE: CPT

## 2024-03-19 PROCEDURE — 80048 BASIC METABOLIC PNL TOTAL CA: CPT

## 2024-03-19 PROCEDURE — 85045 AUTOMATED RETICULOCYTE COUNT: CPT

## 2024-03-19 PROCEDURE — 86901 BLOOD TYPING SEROLOGIC RH(D): CPT

## 2024-03-19 PROCEDURE — 82272 OCCULT BLD FECES 1-3 TESTS: CPT

## 2024-03-19 PROCEDURE — 99222 1ST HOSP IP/OBS MODERATE 55: CPT | Performed by: INTERNAL MEDICINE

## 2024-03-19 PROCEDURE — 86850 RBC ANTIBODY SCREEN: CPT

## 2024-03-19 PROCEDURE — 85025 COMPLETE CBC W/AUTO DIFF WBC: CPT

## 2024-03-19 PROCEDURE — 86900 BLOOD TYPING SEROLOGIC ABO: CPT

## 2024-03-19 RX ORDER — INSULIN LISPRO 100 [IU]/ML
1-5 INJECTION, SOLUTION INTRAVENOUS; SUBCUTANEOUS
Status: DISCONTINUED | OUTPATIENT
Start: 2024-03-19 | End: 2024-03-25

## 2024-03-19 RX ORDER — TAMSULOSIN HYDROCHLORIDE 0.4 MG/1
0.4 CAPSULE ORAL
Status: DISCONTINUED | OUTPATIENT
Start: 2024-03-19 | End: 2024-03-27

## 2024-03-19 RX ORDER — HYDROCHLOROTHIAZIDE 12.5 MG/1
12.5 TABLET ORAL DAILY
Status: DISCONTINUED | OUTPATIENT
Start: 2024-03-20 | End: 2024-03-24

## 2024-03-19 RX ORDER — BISACODYL 5 MG/1
10 TABLET, DELAYED RELEASE ORAL SEE ADMIN INSTRUCTIONS
Status: COMPLETED | OUTPATIENT
Start: 2024-03-19 | End: 2024-03-19

## 2024-03-19 RX ORDER — METOPROLOL SUCCINATE 50 MG/1
50 TABLET, EXTENDED RELEASE ORAL DAILY
Status: DISCONTINUED | OUTPATIENT
Start: 2024-03-20 | End: 2024-03-28 | Stop reason: HOSPADM

## 2024-03-19 RX ORDER — ATORVASTATIN CALCIUM 80 MG/1
80 TABLET, FILM COATED ORAL
Status: DISCONTINUED | OUTPATIENT
Start: 2024-03-19 | End: 2024-03-28 | Stop reason: HOSPADM

## 2024-03-19 RX ORDER — SODIUM CHLORIDE 9 MG/ML
20 INJECTION, SOLUTION INTRAVENOUS ONCE
OUTPATIENT
Start: 2024-03-19

## 2024-03-19 RX ORDER — AMLODIPINE BESYLATE 5 MG/1
5 TABLET ORAL DAILY
Status: DISCONTINUED | OUTPATIENT
Start: 2024-03-20 | End: 2024-03-28 | Stop reason: HOSPADM

## 2024-03-19 RX ADMIN — IRON SUCROSE 300 MG: 20 INJECTION, SOLUTION INTRAVENOUS at 17:59

## 2024-03-19 RX ADMIN — BISACODYL 10 MG: 5 TABLET, COATED ORAL at 21:56

## 2024-03-19 RX ADMIN — POLYETHYLENE GLYCOL 3350, SODIUM SULFATE ANHYDROUS, SODIUM BICARBONATE, SODIUM CHLORIDE, POTASSIUM CHLORIDE 4000 ML: 236; 22.74; 6.74; 5.86; 2.97 POWDER, FOR SOLUTION ORAL at 18:31

## 2024-03-19 RX ADMIN — TAMSULOSIN HYDROCHLORIDE 0.4 MG: 0.4 CAPSULE ORAL at 17:59

## 2024-03-19 RX ADMIN — ATORVASTATIN CALCIUM 80 MG: 80 TABLET, FILM COATED ORAL at 17:59

## 2024-03-19 RX ADMIN — BISACODYL 10 MG: 5 TABLET, COATED ORAL at 18:27

## 2024-03-19 NOTE — ASSESSMENT & PLAN NOTE
Workup in 2019 for stable angina resulted in discovery of multivessel CAD, status post CABG (lima to LAD, SVG to D1, SVG to OM, SVG to PDA     Plan:  Continue statin and metoprolol succinate 50 mg qd

## 2024-03-19 NOTE — CONSULTS
Consultation - [facility]  Frantz Vasquez 78 y.o. male MRN: [id number]  Unit/Bed#: [facility] 910-01 Encounter: [id number]            Inpatient consult to gastroenterology     Date/Time  3/19/2024 6:01 PM     Performed by  Yazmin Dougherty DO   Authorized by  Janay Alcala DO             Reason for Consult / Principal Problem: Anemia        ASSESSMENT AND PLAN:      Mr. Frantz Vasquez is a 78-year-old male with a past medical history of HTN, DMII, HLD, and CAD status post CABG who presented to with symptomatic anemia for which GI is consulted.    # Symptomatic Anemia with Iron Deficiency: Presenting with lightheadedness/dizziness and had outpatient blood work completed which revealed hemoglobin of 7.9, decreased from prior hemoglobin of 10.1 on 1/1/2024.  Iron panel also demonstrating ferritin of 4 and iron saturation of 3 and therefore significant for profound iron deficiency anemia.  Patient without evidence of overt bleeding and currently not on any blood thinners.  No recent EGD/colonoscopy but has had negative Cologuard testing.  Given significant iron deficiency without, will plan to evaluate with bidirectional endoscopy.  Differential at this time remains broad and includes gastritis, peptic ulcer disease, AVM, diverticulum, colon polyp, hemorrhoids, and unable to fully rule out malignancy.  Will plan to complete bowel preparation tonight for endoscopic evaluation tomorrow.    Plan:  Will plan for EGD/colonoscopy tomorrow  Clear liquid diet today and NPO at midnight; Golytely/Dulcolax bowel prep orders placed   Agree with plan for Venofer transfusion  No indication for PPI therapy   Okay to continue ASA; avoid use of additional NSAIDs  Trend H&H; transfuse for goal of >7.0  Monitor hemodynamics; avoid episodes of hypotension   Maintain adequate IV access with 2 large bore Ivs  Monitor bowel movements; alert GI team of signs of overt GIB  Additional pain and symptom management per primary team      ______________________________________________________________________    HPI:  Mr. Frantz Vasquez is a 78-year-old male with a past medical history of hypertension, diabetes mellitus type 2, hypercholesterolemia and CAD status post CABG who presented to [facility] [place] with complaints of symptomatic anemia per PCP recommendation.  Patient was seen at PCP office yesterday on 3/18/2024 at which time he complained of fatigue and dizziness.  Since have been ongoing for the last few weeks and also worsened with abrupt movement.  Outpatient blood work revealed a hemoglobin of 7.9 with MCV of 81.  Iron panel also showed a ferritin of 4 with iron saturation of 3.  Patient had a colonoscopy completed many years ago, results not available to review, and has never had a prior upper endoscopy.  However, patient has completed Cologuard which has been negative, most recently 8/2022.    Patient seen and examined at time of consultation.  Patient sitting in bedside chair in no acute distress or visible discomfort.  Currently asymptomatic and with no complaints.  Denies any significant abdominal pain.  Denies any changes in bowel habits.  No signs of overt bleeding.  Denies any NSAID use or significant alcohol or tobacco use.  No unintentional weight loss.  No significant family history of colon cancer or other GI malignancies.  Repeat hemoglobin on admission 8.1    REVIEW OF SYSTEMS:    CONSTITUTIONAL: Denies any fever, chills, rigors, and weight loss.  HEENT: No earache or tinnitus. Denies hearing loss or visual disturbances.  CARDIOVASCULAR: No chest pain or palpitations.   RESPIRATORY: Denies any cough, hemoptysis, shortness of breath or dyspnea on exertion.  GASTROINTESTINAL: As noted in the History of Present Illness.   GENITOURINARY: No problems with urination. Denies any hematuria or dysuria.  NEUROLOGIC: No dizziness or vertigo, denies headaches.   MUSCULOSKELETAL: Denies any muscle or joint pain.   SKIN: Denies skin rashes or  itching.   ENDOCRINE: Denies excessive thirst. Denies intolerance to heat or cold.  PSYCHOSOCIAL: Denies depression or anxiety. Denies any recent memory loss.       Historical Information   Past Medical History:   Diagnosis Date    Chronic cough     RESOLVED: [id number]    Coronary artery disease     Diabetes mellitus (HCC)     Diabetic retinopathy (HCC)     HLD (hyperlipidemia)     HTN (hypertension)     Hypertension     Kidney stone      Past Surgical History:   Procedure Laterality Date    CARDIAC CATHETERIZATION  12/09/2019    HAND SURGERY Left     LYMPH NODE BIOPSY Right 11/24/2023    Procedure: BIOPSY LYMPH NODE SENTINEL, LYMPHATIC MAPPING (Inject: 7:30 am in Nuc Med by radiologist);  Surgeon: Issac Triana MD;  [facility];  Service: Surgical Oncology    MS CORONARY ARTERY BYP W/VEIN & ARTERY GRAFT 3 VEIN N/A 12/13/2019    Procedure: CORONARY ARTERY BYPASS GRAFT (CABG) 4 VESSELS STEVEN to LAD ; SVG--> PDA , DIAGONAL, and OM;  Surgeon: Elvin Huertas DO;  [facility];  Service: Cardiac Surgery    MS ECHO TRANSESOPHAG R-T 2D W/PRB IMG ACQUISJ I&R N/A 12/13/2019    Procedure: TRANSESOPHAGEAL ECHOCARDIOGRAM (SHEBA);  Surgeon: Elvin Huertas DO;  [facility];  Service: Cardiac Surgery    MS NDSC SURG W/VIDEO-ASSISTED HARVEST VEIN CABG Left 12/13/2019    Procedure: HARVEST VEIN ENDOSCOPIC (EVH);  Surgeon: Elvin Huertas DO;  [facility];  Service: Cardiac Surgery    MS STRTCTC CPTR ASSTD PX EXTRADURAL CRANIAL N/A 1/31/2024    Procedure: RIGHT FUNCTIONAL ENDOSCOPIC SINUS SURGERY (FESS) IMAGED GUIDED, MAXILLARY, ETHMOIDS, FRONTALS;  Surgeon: Tez Bah MD;  [facility];  Service: ENT    SKIN LESION EXCISION Right 11/24/2023    Procedure: WIDE EXCISION MELANOMA RIGHT ARM;  Surgeon: Issac Triana MD;  [facility];  Service: Surgical Oncology     Social History   Social History     Substance and Sexual Activity   Alcohol Use Yes    Alcohol/week: 5.0  standard drinks of alcohol    Types: 3 Glasses of wine, 1 Cans of beer, 1 Standard drinks or equivalent per week    Comment: wine with a meal     Social History     Substance and Sexual Activity   Drug Use No     Social History     Tobacco Use   Smoking Status Never   Smokeless Tobacco Never     Family History   Problem Relation Age of Onset    Heart failure Mother     Heart failure Father     Heart disease Father     Heart attack Father     Diabetes Other        Meds/Allergies     Medications Prior to Admission   Medication    amLODIPine (NORVASC) 5 mg tablet    atorvastatin (LIPITOR) 80 mg tablet    hydrochlorothiazide (HYDRODIURIL) 12.5 mg tablet    metFORMIN (GLUCOPHAGE-XR) 750 mg 24 hr tablet    metoprolol succinate (TOPROL-XL) 50 mg 24 hr tablet    tamsulosin (FLOMAX) 0.4 mg    Cholecalciferol (Vitamin D) 125 MCG (5000 UT) CAPS    Glucosamine-Chondroitin (GLUCOSAMINE CHONDR COMPLEX PO)    Omega-3 Fatty Acids (FISH OIL) 1200 MG CAPS     Current Facility-Administered Medications   Medication Dose Route Frequency    [START ON 3/20/2024] amLODIPine (NORVASC) tablet 5 mg  5 mg Oral Daily    atorvastatin (LIPITOR) tablet 80 mg  80 mg Oral Daily With Dinner    [START ON 3/20/2024] hydroCHLOROthiazide tablet 12.5 mg  12.5 mg Oral Daily    insulin lispro (HumALOG/ADMELOG) 100 units/mL subcutaneous injection 1-5 Units  1-5 Units Subcutaneous TID AC    [START ON 3/20/2024] metoprolol succinate (TOPROL-XL) 24 hr tablet 50 mg  50 mg Oral Daily    tamsulosin (FLOMAX) capsule 0.4 mg  0.4 mg Oral Daily With Dinner       Allergies   Allergen Reactions    Pollen Extract Allergic Rhinitis           Objective     Blood pressure 127/64, pulse 67, temperature 98.3 °F (36.8 °C), resp. rate 18, SpO2 100%. There is no height or weight on file to calculate BMI.    No intake or output data in the 24 hours ending 03/19/24 1409      PHYSICAL EXAM:      General Appearance:   Alert, cooperative, no distress   HEENT:   Normocephalic,  atraumatic, anicteric.     Neck:  Supple, symmetrical, trachea midline   Lungs:   Clear to auscultation bilaterally; no rales, rhonchi or wheezing; respirations unlabored    Heart::   Regular rate and rhythm; no murmur, rub, or gallop.   Abdomen:   Soft, non-tender, non-distended; normal bowel sounds; no masses, no organomegaly    Genitalia:   Deferred    Rectal:   Deferred    Extremities:  No cyanosis, clubbing or edema    Pulses:  2+ and symmetric all extremities    Skin:  No jaundice, rashes, or lesions    Lymph nodes:  No palpable cervical lymphadenopathy        Lab Results:   No visits with results within 1 Day(s) from this visit.   Latest known visit with results is:   Appointment on 03/18/2024   Component Date Value    WBC 03/18/2024 6.36     RBC 03/18/2024 3.39 (L)     Hemoglobin 03/18/2024 7.9 (L)     Hematocrit 03/18/2024 27.3 (L)     MCV 03/18/2024 81 (L)     MCH 03/18/2024 23.3 (L)     MCHC 03/18/2024 28.9 (L)     RDW 03/18/2024 14.6     MPV 03/18/2024 10.3     Platelets 03/18/2024 243     nRBC 03/18/2024 0     Neutrophils Relative 03/18/2024 71     Immature Grans % 03/18/2024 1     Lymphocytes Relative 03/18/2024 15     Monocytes Relative 03/18/2024 9     Eosinophils Relative 03/18/2024 3     Basophils Relative 03/18/2024 1     Neutrophils Absolute 03/18/2024 4.59     Absolute Immature Grans 03/18/2024 0.03     Absolute Lymphocytes 03/18/2024 0.94     Absolute Monocytes 03/18/2024 0.59     Eosinophils Absolute 03/18/2024 0.17     Basophils Absolute 03/18/2024 0.04     Iron Saturation 03/18/2024 3 (L)     TIBC 03/18/2024 352     Iron 03/18/2024 12 (L)     UIBC 03/18/2024 340     Ferritin 03/18/2024 4 (L)        Imaging Studies: I have personally reviewed pertinent imaging studies.

## 2024-03-19 NOTE — ASSESSMENT & PLAN NOTE
Anemia hemoglobin 10.1 in January to 7.9 daily prior to admission.  Iron saturation 3%; iron 12; ferritin 4.  Symptomatic dizziness / fatigue which resolved on admission. No blood per rectum / no melena.  Pt has no recorded colonoscopies / endoscopies and last Cologuard 8/2022 negative.  Calculated [place] Iron Deficiency 1192 mg.  Transfusion consent signed    Repeat Hgb today 8.1.   Reticulocyte count obtained. Reticulocyte Index 1.16 indicating hypoproliferation    Colonoscopy found 4x4 cm mass in ascending colon. Colorectal Surgery service will take over as primary service.    Plan  Trend hgb, transfuse for signs of instability / symptomatic  Initiate iron infusion 300mg total 4days    Occult stool test negative

## 2024-03-19 NOTE — CASE MANAGEMENT
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) 11/15/2023    Overweight (BMI 25.0-29.9) 2019    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 with angina pectoris (HCC)     Seasonal allergies 10/15/2018    Diabetic retinopathy (HCC) 2017    Benign essential hypertension 2014    Type 2 diabetes mellitus (HCC) 2014    PSA elevation 2014    Hypercholesteremia 2014      LOS (days): 0  Geometric Mean LOS (GMLOS) (days):   Days to GMLOS:     OBJECTIVE:              Current admission status: Inpatient  Referral Reason: Other (post acute placement/dispo planning)    Preferred Pharmacy:   [facility]   [address] [address]  [facility] [address]  Phone: [phone]     [id number] - [place], [place] - [address] [address]  Phone: [phone] Fax: [phone]    [facility] [address] [address]  Phone: [phone] Fax: [phone]    Primary Care Provider: Melissa Corey MD    Primary Insurance: MEDICARE  Secondary Insurance: AARP    ASSESSMENT:  Active Health Care Proxies    There are no active Health Care Proxies on file.                 Readmission Root Cause  30 Day Readmission: No    Patient Information  Admitted from:: Home  Mental Status: Alert  During Assessment patient was accompanied by: Spouse  Assessment information provided by:: Patient, Spouse  Primary Caregiver: Self  Support Systems: Self, Spouse/significant other  County of Residence:  [place]  What city do you live in?: [place]  Home entry access options. Select all that apply.: No steps to enter home  Type of Current Residence: 3 story home  Upon entering residence, is there a bedroom on the main floor (no further steps)?: No  A bedroom is located on the following floor levels of residence (select all that apply):: 3rd Floor  Upon entering residence, is there a bathroom on the main floor (no further steps)?: Yes  Number of steps to 3rd floor from main floor: Two Flights (Has elevator in home)  Living Arrangements: Lives w/ Spouse/significant other  Is patient a ?: No    Activities of Daily Living Prior to Admission  Functional Status: Independent  Completes ADLs independently?: Yes  Ambulates independently?: Yes  Does patient use assisted devices?: No  Does patient currently own DME?: No  Does patient have a history of Outpatient Therapy (PT/OT)?: Yes  Does the patient have a history of Short-Term Rehab?: No  Does patient have a history of HHC?: No  Does patient currently have HHC?: No         Patient Information Continued  Income Source: Pension/prison  Does patient have prescription coverage?: Yes  Does patient receive dialysis treatments?: No  Does patient have a history of substance abuse?: No  Does patient have a history of Mental Health Diagnosis?: No         Means of Transportation  Means of Transport to Appts:: Drives Self      Social Determinants of Health (SDOH)      Flowsheet Row Most Recent Value   Housing Stability    In the last 12 months, was there a time when you were not able to pay the mortgage or rent on time? N   In the last 12 months, how many places have you lived? 1   In the last 12 months, was there a time when you did not have a steady place to sleep or slept in a shelter (including now)? N   Transportation Needs    In the past 12 months, has lack of transportation kept you from medical appointments or from getting medications? no   In the past 12 months,  has lack of transportation kept you from meetings, work, or from getting things needed for daily living? No   Food Insecurity    Within the past 12 months, you worried that your food would run out before you got the money to buy more. Never true   Within the past 12 months, the food you bought just didn't last and you didn't have money to get more. Never true   Utilities    In the past 12 months has the electric, gas, oil, or water company threatened to shut off services in your home? No            DISCHARGE DETAILS:    Discharge planning discussed with:: Pt and pt's wife  Freedom of Choice: Yes     CM contacted family/caregiver?: Yes  Were Treatment Team discharge recommendations reviewed with patient/caregiver?: Yes  Did patient/caregiver verbalize understanding of patient care needs?: Yes  Were patient/caregiver advised of the risks associated with not following Treatment Team discharge recommendations?: Yes    Contacts  Patient Contacts: Hayde Vasquez  Relationship to Patient:: Family  Contact Method: In Person  Reason/Outcome: Emergency Contact    Requested Home Health Care         Is the patient interested in HHC at discharge?: No    DME Referral Provided  Referral made for DME?: No    Other Referral/Resources/Interventions Provided:  Interventions: None Indicated  Referral Comments: no CM needs indicated         Treatment Team Recommendation: Home  Discharge Destination Plan:: Home                CM met with pt to complete assessment and explain CM role.  Pt's wife present during assessment.  Pt currently resides with his wife in a 3 story home, no MILAN.  Pt reports bedroom is on 3rd floor and bath on all floors.  Pt reports they have an elevator in their home to access all levels.  Pt is independent with all ADL's prior to admission.  Pt has no DME.  Pt reports previous outpatient PT, denies STR and HHC.  Pt denies any mental health or substance use hx.  Pt is retired and drives.  CM will continue to follow  for discharge planning needs.

## 2024-03-19 NOTE — H&P
"      INTERNAL MEDICINE RESIDENCY ADMISSION H&P     Name: Frantz Vasquez   Age & Sex: 78 y.o. male   MRN: [id number]  Unit/Bed#: [facility] 910-01   Encounter: [id number]  Primary Care Provider: Melissa Corey MD    Code Status: Level 1 - Full Code  Admission Status: INPATIENT   Disposition: Patient requires Med/Surg    Admit to team: SOD Team C     ASSESSMENT/PLAN     Principal Problem:    Iron deficiency anemia  Active Problems:    Benign essential hypertension    Type 2 diabetes mellitus (HCC)    Hypercholesteremia    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 with angina pectoris (HCC)      * Iron deficiency anemia  Assessment & Plan  Anemia hemoglobin 10.1 in January to 7.9 daily prior to admission.  Iron saturation 3%; iron 12; ferritin 4.  Symptomatic dizziness / fatigue which resolved on admission. No blood per rectum / no melena.  Pt has no recorded colonoscopies / endoscopies and last Cologuard 8/2022 negative.  Calculated Ganzoni Iron Deficiency 1192 mg.  Transfusion consent signed    Repeat Hgb today 8.1.   Reticulocyte count obtained. Reticulocyte Index 1.16 indicating hypoproliferation    Trend hgb, transfuse for signs of instability / symptomatic  Initiate iron infusion 300mg total 4days    Occult stool test   GI consult    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 with angina pectoris (HCC)  Assessment & Plan  Workup in 2019 for stable angina resulted in discovery of multivessel CAD, status post CABG (lima to LAD, SVG to D1, SVG to OM, SVG to PDA     Plan:  Continue statin and metoprolol succinate 50 mg qd    Hypercholesteremia  Assessment & Plan  Continue home statin    Type 2 diabetes mellitus (HCC)  Assessment & Plan  Lab Results   Component Value Date    HGBA1C 6.9 (H) 03/04/2024       No results for input(s): \"POCGLU\" in the last 72 hours.    Blood Sugar Average: Last 72 hrs:    Hold home metformin  Start SSI      Benign essential hypertension  Assessment & " Plan  Continue home amlodipine and HCTZ        VTE Pharmacologic Prophylaxis: RX contraindicated due to: concern GI bleed  VTE Mechanical Prophylaxis: sequential compression device    CHIEF COMPLAINT   No chief complaint on file.     HISTORY OF PRESENT ILLNESS     Patient is a 78-year-old male with history anemia (baseline hemoglobin 10-11), HTN, DM2, CAD, malignant melanoma right shoulder s/p excision with sentinel lymph node biopsy.    Pt presented to [facility] from home as direct admit at recommendation of PCP Dr. Corey. Patient was seen by PCP in office yesterday 3/18 complained of symptoms of fatigue and dizziness.  PCP ordered CBC along with iron panel showing hemoglobin 7.9. Iron sat 3%, iron 12, ferritin 4. Pt has no recorded colonoscopies / endoscopies and last Cologuard 2022 negative. Patient admitted to [facility] for possible transfusion/further workup.    Pt seen and examined. States for past one week gets lightheaded when standing / fatigue easily. Pt initially thought was dehydration due to recent bought of diarrhea that has since resolved. States no longer feeling lightheaded. No SOB / no CP n/v/d. No melena. No bright red blood in stool.     REVIEW OF SYSTEMS     Review of Systems   Constitutional:  Negative for chills, diaphoresis, fatigue and fever.   Gastrointestinal:  Negative for abdominal distention, anal bleeding, diarrhea and nausea.   Musculoskeletal: Negative.    Neurological:  Negative for light-headedness and headaches.     OBJECTIVE     Vitals:    24 1321 24 1535   BP: 127/64 130/67   Pulse: 67 66   Resp: 18 17   Temp: 98.3 °F (36.8 °C) 98.3 °F (36.8 °C)   SpO2: 100% 100%      Temperature:   Temp (24hrs), Av.3 °F (36.8 °C), Min:98.3 °F (36.8 °C), Max:98.3 °F (36.8 °C)    Temperature: 98.3 °F (36.8 °C)  Intake & Output:  I/O       None          Weights:        There is no height or weight on file to calculate BMI.  Weight (last 2 days)       None          Physical  Exam  Constitutional:       Appearance: He is not ill-appearing, toxic-appearing or diaphoretic.   HENT:      Mouth/Throat:      Mouth: Mucous membranes are dry.   Cardiovascular:      Rate and Rhythm: Normal rate and regular rhythm.      Heart sounds: No murmur heard.  Pulmonary:      Effort: Pulmonary effort is normal.      Breath sounds: Normal breath sounds.   Abdominal:      General: Abdomen is flat. Bowel sounds are normal.      Palpations: Abdomen is soft.   Musculoskeletal:      Right lower leg: No edema.      Left lower leg: No edema.   Skin:     General: Skin is warm and dry.   Neurological:      Mental Status: He is alert and oriented to person, place, and time. Mental status is at baseline.   Psychiatric:         Mood and Affect: Mood normal.         Behavior: Behavior normal.         Thought Content: Thought content normal.         Judgment: Judgment normal.       PAST MEDICAL HISTORY     Past Medical History:   Diagnosis Date    Chronic cough     RESOLVED: 29MAY2015    Coronary artery disease     Diabetes mellitus (HCC)     Diabetic retinopathy (HCC)     HLD (hyperlipidemia)     HTN (hypertension)     Hypertension     Kidney stone      PAST SURGICAL HISTORY     Past Surgical History:   Procedure Laterality Date    CARDIAC CATHETERIZATION  12/09/2019    HAND SURGERY Left     LYMPH NODE BIOPSY Right 11/24/2023    Procedure: BIOPSY LYMPH NODE SENTINEL, LYMPHATIC MAPPING (Inject: 7:30 am in Nuc Med by radiologist);  Surgeon: Issac Triana MD;  Location: [facility];  Service: Surgical Oncology    [place] CORONARY ARTERY BYP W/VEIN & ARTERY GRAFT 3 VEIN N/A 12/13/2019    Procedure: CORONARY ARTERY BYPASS GRAFT (CABG) 4 VESSELS STEVEN to LAD ; SVG--> PDA , DIAGONAL, and OM;  Surgeon: Elvin Huertas DO;  Location: [facility];  Service: Cardiac Surgery    KY ECHO TRANSESOPHAG R-T 2D W/PRB IMG ACQUISJ I&R N/A 12/13/2019    Procedure: TRANSESOPHAGEAL ECHOCARDIOGRAM (SHEBA);  Surgeon: Elvin Huertas DO;  Location:  [facility];  Service: Cardiac Surgery    VT NDSC SURG W/VIDEO-ASSISTED HARVEST VEIN CABG Left 12/13/2019    Procedure: HARVEST VEIN ENDOSCOPIC (EVH);  Surgeon: Elvin Huertas DO;  [facility];  Service: Cardiac Surgery    VT STRTCTC CPTR ASSTD PX EXTRADURAL CRANIAL N/A 1/31/2024    Procedure: RIGHT FUNCTIONAL ENDOSCOPIC SINUS SURGERY (FESS) IMAGED GUIDED, MAXILLARY, ETHMOIDS, FRONTALS;  Surgeon: Tez Bah MD;  [facility]: [facility];  Service: ENT    SKIN LESION EXCISION Right 11/24/2023    Procedure: WIDE EXCISION MELANOMA RIGHT ARM;  Surgeon: Issac Triana MD;  Location: [facility];  Service: Surgical Oncology     SOCIAL & FAMILY HISTORY     Social History     Substance and Sexual Activity   Alcohol Use Yes    Alcohol/week: 5.0 standard drinks of alcohol    Types: 3 Glasses of wine, 1 Cans of beer, 1 Standard drinks or equivalent per week    Comment: wine with a meal       Social History     Substance and Sexual Activity   Drug Use No     Social History     Tobacco Use   Smoking Status Never   Smokeless Tobacco Never     Family History   Problem Relation Age of Onset    Heart failure Mother     Heart failure Father     Heart disease Father     Heart attack Father     Diabetes Other      LABORATORY DATA     Labs: I have personally reviewed pertinent reports.    Results from last 7 days   Lab Units 03/19/24  1347 03/18/24  1240   WBC Thousand/uL 6.01 6.36   HEMOGLOBIN g/dL 8.1* 7.9*   HEMATOCRIT % 27.3* 27.3*   PLATELETS Thousands/uL 238 243   NEUTROS PCT % 71 71   MONOS PCT % 10 9   EOS PCT % 3 3      Results from last 7 days   Lab Units 03/19/24  1347   POTASSIUM mmol/L 4.2   CHLORIDE mmol/L 104   CO2 mmol/L 29   BUN mg/dL 22   CREATININE mg/dL 1.09   CALCIUM mg/dL 9.7                          Micro:  Lab Results   Component Value Date    WOUNDCULT 1+ Growth of Proteus mirabilis (A) 02/09/2024    WOUNDCULT 1+ Growth of Klebsiella oxytoca (A) 02/09/2024    WOUNDCULT 1+ Growth of 02/09/2024      IMAGING & DIAGNOSTIC TESTS     Imaging: I have personally reviewed pertinent reports.    No results found.  EKG, Pathology, and Other Studies: I have personally reviewed pertinent reports.     ALLERGIES     Allergies   Allergen Reactions    Pollen Extract Allergic Rhinitis     MEDICATIONS PRIOR TO ARRIVAL     Prior to Admission medications    Medication Sig Start Date End Date Taking? Authorizing Provider   amLODIPine (NORVASC) 5 mg tablet Take 1 tablet (5 mg total) by mouth daily 2/21/24   Dimas Richter MD   atorvastatin (LIPITOR) 80 mg tablet Take 1 tablet (80 mg total) by mouth daily with dinner 3/11/24   Melissa Corey MD   Cholecalciferol (Vitamin D) 125 MCG (5000 UT) CAPS Take by mouth daily    Historical Provider, MD   Glucosamine-Chondroitin (GLUCOSAMINE CHONDR COMPLEX PO) Take 1 capsule by mouth 2 (two) times a day    Historical Provider, MD   hydrochlorothiazide (HYDRODIURIL) 12.5 mg tablet Take 1 tablet (12.5 mg total) by mouth daily 4/25/23   Matt Mackenzie MD   metFORMIN (GLUCOPHAGE-XR) 750 mg 24 hr tablet Take 1 tablet (750 mg total) by mouth 2 (two) times a day 2/21/24   Melissa Corey MD   metoprolol succinate (TOPROL-XL) 50 mg 24 hr tablet Take 1 tablet (50 mg total) by mouth daily 1/19/24   Matt Mcakenzie MD   Omega-3 Fatty Acids (FISH OIL) 1200 MG CAPS Take 1 capsule by mouth daily    Historical Provider, MD   tamsulosin (FLOMAX) 0.4 mg Take 1 capsule (0.4 mg total) by mouth daily with dinner 2/26/24   Ryland Jara PA-C   aspirin 81 MG tablet Take 1 tablet (81 mg total) by mouth daily 2/10/20 3/19/24  Matt Mackenzie MD     MEDICATIONS ADMINISTERED IN LAST 24 HOURS     CURRENT MEDICATIONS     Current Facility-Administered Medications   Medication Dose Route Frequency Provider Last Rate    [START ON 3/20/2024] amLODIPine  5 mg Oral Daily Kera Weston DO      atorvastatin  80 mg Oral Daily With Dinner Kera Weston DO      [START ON 3/20/2024]  "hydroCHLOROthiazide  12.5 mg Oral Daily Kera Weston DO      insulin lispro  1-5 Units Subcutaneous TID AC Kera Weston DO      iron sucrose  300 mg Intravenous Daily Kera Weston DO      [START ON 3/20/2024] metoprolol succinate  50 mg Oral Daily Kera Weston DO      tamsulosin  0.4 mg Oral Daily With Dinner Kera Weston DO                 Admission Time  I spent 30 minutes admitting the patient.  This involved direct patient contact where I performed a full history and physical, reviewing previous records, and reviewing laboratory and other diagnostic studies.    Portions of the record may have been created with voice recognition software.  Occasional wrong word or \"sound a like\" substitutions may have occurred due to the inherent limitations of voice recognition software.  Read the chart carefully and recognize, using context, where substitutions have occurred.    ==  Janay Alcala DO,   [facility]  Internal Medicine Residency, PGY-1    "

## 2024-03-19 NOTE — QUICK NOTE
QUICK NOTE    Patient has refused SCD Foot Pumps. Was explained why we use them and the risks he undertakes by electing not to wear them. Pt was agreeable and signed refusal form.    Order was Dc'd.    Varinder Landis MD  Internal Medicine Residency, PGY-1

## 2024-03-19 NOTE — ASSESSMENT & PLAN NOTE
Lab Results   Component Value Date    HGBA1C 6.9 (H) 03/04/2024       Recent Labs     03/19/24  2125 03/20/24  0803 03/20/24  1048 03/20/24  1342   POCGLU 96 85 102 96       Blood Sugar Average: Last 72 hrs:  (P) 91.8  Hold home metformin  Start SSI

## 2024-03-20 ENCOUNTER — ANESTHESIA EVENT (INPATIENT)
Dept: GASTROENTEROLOGY | Facility: HOSPITAL | Age: 79
DRG: 331 | End: 2024-03-20
Payer: MEDICARE

## 2024-03-20 ENCOUNTER — APPOINTMENT (INPATIENT)
Dept: GASTROENTEROLOGY | Facility: HOSPITAL | Age: 79
DRG: 331 | End: 2024-03-20
Payer: MEDICARE

## 2024-03-20 ENCOUNTER — ANESTHESIA (INPATIENT)
Dept: GASTROENTEROLOGY | Facility: HOSPITAL | Age: 79
DRG: 331 | End: 2024-03-20
Payer: MEDICARE

## 2024-03-20 PROBLEM — D50.9 IRON DEFICIENCY ANEMIA: Chronic | Status: ACTIVE | Noted: 2024-03-19

## 2024-03-20 LAB
ANION GAP SERPL CALCULATED.3IONS-SCNC: 12 MMOL/L (ref 4–13)
BASOPHILS # BLD AUTO: 0.04 THOUSANDS/ÂΜL (ref 0–0.1)
BASOPHILS NFR BLD AUTO: 1 % (ref 0–1)
BUN SERPL-MCNC: 19 MG/DL (ref 5–25)
CALCIUM SERPL-MCNC: 9.1 MG/DL (ref 8.4–10.2)
CHLORIDE SERPL-SCNC: 101 MMOL/L (ref 96–108)
CO2 SERPL-SCNC: 28 MMOL/L (ref 21–32)
CREAT SERPL-MCNC: 1.04 MG/DL (ref 0.6–1.3)
EOSINOPHIL # BLD AUTO: 0.21 THOUSAND/ÂΜL (ref 0–0.61)
EOSINOPHIL NFR BLD AUTO: 3 % (ref 0–6)
ERYTHROCYTE [DISTWIDTH] IN BLOOD BY AUTOMATED COUNT: 14.8 % (ref 11.6–15.1)
GFR SERPL CREATININE-BSD FRML MDRD: 69 ML/MIN/1.73SQ M
GLUCOSE SERPL-MCNC: 101 MG/DL (ref 65–140)
GLUCOSE SERPL-MCNC: 102 MG/DL (ref 65–140)
GLUCOSE SERPL-MCNC: 125 MG/DL (ref 65–140)
GLUCOSE SERPL-MCNC: 197 MG/DL (ref 65–140)
GLUCOSE SERPL-MCNC: 85 MG/DL (ref 65–140)
GLUCOSE SERPL-MCNC: 96 MG/DL (ref 65–140)
HCT VFR BLD AUTO: 27.3 % (ref 36.5–49.3)
HGB BLD-MCNC: 8.1 G/DL (ref 12–17)
IMM GRANULOCYTES # BLD AUTO: 0.02 THOUSAND/UL (ref 0–0.2)
IMM GRANULOCYTES NFR BLD AUTO: 0 % (ref 0–2)
LYMPHOCYTES # BLD AUTO: 1.08 THOUSANDS/ÂΜL (ref 0.6–4.47)
LYMPHOCYTES NFR BLD AUTO: 16 % (ref 14–44)
MCH RBC QN AUTO: 23.3 PG (ref 26.8–34.3)
MCHC RBC AUTO-ENTMCNC: 29.7 G/DL (ref 31.4–37.4)
MCV RBC AUTO: 78 FL (ref 82–98)
MONOCYTES # BLD AUTO: 0.65 THOUSAND/ÂΜL (ref 0.17–1.22)
MONOCYTES NFR BLD AUTO: 10 % (ref 4–12)
NEUTROPHILS # BLD AUTO: 4.79 THOUSANDS/ÂΜL (ref 1.85–7.62)
NEUTS SEG NFR BLD AUTO: 70 % (ref 43–75)
NRBC BLD AUTO-RTO: 0 /100 WBCS
PLATELET # BLD AUTO: 233 THOUSANDS/UL (ref 149–390)
PMV BLD AUTO: 10 FL (ref 8.9–12.7)
POTASSIUM SERPL-SCNC: 3.8 MMOL/L (ref 3.5–5.3)
RBC # BLD AUTO: 3.48 MILLION/UL (ref 3.88–5.62)
SODIUM SERPL-SCNC: 141 MMOL/L (ref 135–147)
WBC # BLD AUTO: 6.79 THOUSAND/UL (ref 4.31–10.16)

## 2024-03-20 PROCEDURE — 88305 TISSUE EXAM BY PATHOLOGIST: CPT | Performed by: PATHOLOGY

## 2024-03-20 PROCEDURE — 0DB68ZX EXCISION OF STOMACH, VIA NATURAL OR ARTIFICIAL OPENING ENDOSCOPIC, DIAGNOSTIC: ICD-10-PCS | Performed by: INTERNAL MEDICINE

## 2024-03-20 PROCEDURE — 0DB98ZX EXCISION OF DUODENUM, VIA NATURAL OR ARTIFICIAL OPENING ENDOSCOPIC, DIAGNOSTIC: ICD-10-PCS | Performed by: INTERNAL MEDICINE

## 2024-03-20 PROCEDURE — 82948 REAGENT STRIP/BLOOD GLUCOSE: CPT

## 2024-03-20 PROCEDURE — 97162 PT EVAL MOD COMPLEX 30 MIN: CPT

## 2024-03-20 PROCEDURE — 85025 COMPLETE CBC W/AUTO DIFF WBC: CPT

## 2024-03-20 PROCEDURE — 97166 OT EVAL MOD COMPLEX 45 MIN: CPT

## 2024-03-20 PROCEDURE — 45381 COLONOSCOPY SUBMUCOUS NJX: CPT | Performed by: INTERNAL MEDICINE

## 2024-03-20 PROCEDURE — 43239 EGD BIOPSY SINGLE/MULTIPLE: CPT | Performed by: INTERNAL MEDICINE

## 2024-03-20 PROCEDURE — 99232 SBSQ HOSP IP/OBS MODERATE 35: CPT | Performed by: INTERNAL MEDICINE

## 2024-03-20 PROCEDURE — 80048 BASIC METABOLIC PNL TOTAL CA: CPT

## 2024-03-20 PROCEDURE — 45380 COLONOSCOPY AND BIOPSY: CPT | Performed by: INTERNAL MEDICINE

## 2024-03-20 PROCEDURE — 0DBK8ZX EXCISION OF ASCENDING COLON, VIA NATURAL OR ARTIFICIAL OPENING ENDOSCOPIC, DIAGNOSTIC: ICD-10-PCS | Performed by: INTERNAL MEDICINE

## 2024-03-20 RX ORDER — PROPOFOL 10 MG/ML
INJECTION, EMULSION INTRAVENOUS AS NEEDED
Status: DISCONTINUED | OUTPATIENT
Start: 2024-03-20 | End: 2024-03-20

## 2024-03-20 RX ORDER — SODIUM CHLORIDE, SODIUM LACTATE, POTASSIUM CHLORIDE, CALCIUM CHLORIDE 600; 310; 30; 20 MG/100ML; MG/100ML; MG/100ML; MG/100ML
INJECTION, SOLUTION INTRAVENOUS CONTINUOUS PRN
Status: DISCONTINUED | OUTPATIENT
Start: 2024-03-20 | End: 2024-03-20

## 2024-03-20 RX ORDER — PROPOFOL 10 MG/ML
INJECTION, EMULSION INTRAVENOUS CONTINUOUS PRN
Status: DISCONTINUED | OUTPATIENT
Start: 2024-03-20 | End: 2024-03-20

## 2024-03-20 RX ORDER — PHENYLEPHRINE HCL IN 0.9% NACL 1 MG/10 ML
SYRINGE (ML) INTRAVENOUS AS NEEDED
Status: DISCONTINUED | OUTPATIENT
Start: 2024-03-20 | End: 2024-03-20

## 2024-03-20 RX ORDER — FENTANYL CITRATE 50 UG/ML
INJECTION, SOLUTION INTRAMUSCULAR; INTRAVENOUS AS NEEDED
Status: DISCONTINUED | OUTPATIENT
Start: 2024-03-20 | End: 2024-03-20

## 2024-03-20 RX ADMIN — FENTANYL CITRATE 50 MCG: 50 INJECTION INTRAMUSCULAR; INTRAVENOUS at 14:09

## 2024-03-20 RX ADMIN — TAMSULOSIN HYDROCHLORIDE 0.4 MG: 0.4 CAPSULE ORAL at 20:58

## 2024-03-20 RX ADMIN — Medication 100 MCG: at 14:41

## 2024-03-20 RX ADMIN — Medication 100 MCG: at 14:55

## 2024-03-20 RX ADMIN — ATORVASTATIN CALCIUM 80 MG: 80 TABLET, FILM COATED ORAL at 20:58

## 2024-03-20 RX ADMIN — PROPOFOL 100 MG: 10 INJECTION, EMULSION INTRAVENOUS at 14:06

## 2024-03-20 RX ADMIN — FENTANYL CITRATE 50 MCG: 50 INJECTION INTRAMUSCULAR; INTRAVENOUS at 14:06

## 2024-03-20 RX ADMIN — PROPOFOL 40 MG: 10 INJECTION, EMULSION INTRAVENOUS at 14:16

## 2024-03-20 RX ADMIN — PROPOFOL 120 MCG/KG/MIN: 10 INJECTION, EMULSION INTRAVENOUS at 14:16

## 2024-03-20 RX ADMIN — IRON SUCROSE 300 MG: 20 INJECTION, SOLUTION INTRAVENOUS at 08:05

## 2024-03-20 RX ADMIN — PROPOFOL 40 MG: 10 INJECTION, EMULSION INTRAVENOUS at 14:13

## 2024-03-20 RX ADMIN — SODIUM CHLORIDE, SODIUM LACTATE, POTASSIUM CHLORIDE, AND CALCIUM CHLORIDE: .6; .31; .03; .02 INJECTION, SOLUTION INTRAVENOUS at 14:02

## 2024-03-20 RX ADMIN — METOPROLOL SUCCINATE 50 MG: 50 TABLET, EXTENDED RELEASE ORAL at 08:05

## 2024-03-20 RX ADMIN — PROPOFOL 50 MG: 10 INJECTION, EMULSION INTRAVENOUS at 14:08

## 2024-03-20 RX ADMIN — PROPOFOL 40 MG: 10 INJECTION, EMULSION INTRAVENOUS at 14:10

## 2024-03-20 RX ADMIN — HYDROCHLOROTHIAZIDE 12.5 MG: 12.5 TABLET ORAL at 08:05

## 2024-03-20 RX ADMIN — AMLODIPINE BESYLATE 5 MG: 5 TABLET ORAL at 08:05

## 2024-03-20 NOTE — CONSULTS
Colorectal Surgery  Consultation    Assessment:  78 year old male admitted for symptomatic iron deficiency amenia receiving Venofer infusions s/p colonoscopy today revealing malignant appearing mass (4 x 4 cm) in the mid ascending colon.    Vital signs stable, afebrile.  On room air.    Hgb 8.1 (8.1, 7.9)    Plan:  Follow up colonoscopy biopsies today   Will discuss operative planning with attending  Will obtain CEA   Monitor Hgb, transfusion as needed for Hgb <7  Recommend DVT ppx   Venofer infusions per primary team  Rest of care per primary team    History of Present Illness   HPI:  Frantz Vasquez is a 78 y.o. male with a past medical history of T2DM, HTN, HLD, CAD s/p CABG (2019)who presents with lightheadedness and presyncopal episodes last week. He presented to his physician for evaluation at which time CBC was obtained revealing decreased hemoglobin prompting iron panel. He was recommended to go to an outpatient clinic for iron infusions but the soonest date was in three weeks. Because of this, he was recommended to present to the hospital. Since admission, he has received two infusions of Venofer.     Review of Systems   Constitutional:  Positive for activity change. Negative for appetite change, chills, fever and unexpected weight change.   HENT:  Negative for congestion.    Respiratory:  Negative for chest tightness and shortness of breath.    Cardiovascular:  Negative for chest pain and leg swelling.   Gastrointestinal:  Negative for abdominal distention, abdominal pain, anal bleeding, blood in stool, diarrhea, nausea and vomiting.   Genitourinary:  Negative for difficulty urinating.   Musculoskeletal:  Negative for back pain.   Skin:  Negative for color change and wound.   Neurological:  Positive for weakness and light-headedness.   Psychiatric/Behavioral:  Negative for agitation, behavioral problems and confusion.    All other systems reviewed and are negative.      Historical Information   Past Medical  History:   Diagnosis Date    Chronic cough     RESOLVED: [id number]    Coronary artery disease     Diabetes mellitus (HCC)     Diabetic retinopathy (HCC)     HLD (hyperlipidemia)     HTN (hypertension)     Hypertension     Kidney stone      Past Surgical History:   Procedure Laterality Date    CARDIAC CATHETERIZATION  12/09/2019    HAND SURGERY Left     LYMPH NODE BIOPSY Right 11/24/2023    Procedure: BIOPSY LYMPH NODE SENTINEL, LYMPHATIC MAPPING (Inject: 7:30 am in Nuc Med by radiologist);  Surgeon: Issac Triana MD;  [facility];  Service: Surgical Oncology    ID CORONARY ARTERY BYP W/VEIN & ARTERY GRAFT 3 VEIN N/A 12/13/2019    Procedure: CORONARY ARTERY BYPASS GRAFT (CABG) 4 VESSELS STEVEN to LAD ; SVG--> PDA , DIAGONAL, and OM;  Surgeon: Elvin Huertas DO;  [facility];  Service: Cardiac Surgery    ID ECHO TRANSESOPHAG R-T 2D W/PRB IMG ACQUISJ I&R N/A 12/13/2019    Procedure: TRANSESOPHAGEAL ECHOCARDIOGRAM (SHEBA);  Surgeon: Elvin Huertas DO;  [facility];  Service: Cardiac Surgery    ID NDSC SURG W/VIDEO-ASSISTED HARVEST VEIN CABG Left 12/13/2019    Procedure: HARVEST VEIN ENDOSCOPIC (EVH);  Surgeon: Elvin Huertas DO;  [facility];  Service: Cardiac Surgery    ID STRTCTC CPTR ASSTD PX EXTRADURAL CRANIAL N/A 1/31/2024    Procedure: RIGHT FUNCTIONAL ENDOSCOPIC SINUS SURGERY (FESS) IMAGED GUIDED, MAXILLARY, ETHMOIDS, FRONTALS;  Surgeon: Tez Bah MD;  [facility];  Service: ENT    SKIN LESION EXCISION Right 11/24/2023    Procedure: WIDE EXCISION MELANOMA RIGHT ARM;  Surgeon: Issac Triana MD;  [facility];  Service: Surgical Oncology     Social History   Social History     Substance and Sexual Activity   Alcohol Use Yes    Alcohol/week: 5.0 standard drinks of alcohol    Types: 3 Glasses of wine, 1 Cans of beer, 1 Standard drinks or equivalent per week    Comment: wine with a meal     Social History     Substance and Sexual Activity   Drug Use No      Social History     Tobacco Use   Smoking Status Never   Smokeless Tobacco Never     Family History   Problem Relation Age of Onset    Heart failure Mother     Heart failure Father     Heart disease Father     Heart attack Father     Diabetes Other        Meds/Allergies   current meds:   Current Facility-Administered Medications   Medication Dose Route Frequency    amLODIPine (NORVASC) tablet 5 mg  5 mg Oral Daily    atorvastatin (LIPITOR) tablet 80 mg  80 mg Oral Daily With Dinner    hydroCHLOROthiazide tablet 12.5 mg  12.5 mg Oral Daily    insulin lispro (HumALOG/ADMELOG) 100 units/mL subcutaneous injection 1-5 Units  1-5 Units Subcutaneous TID AC    iron sucrose (VENOFER) 300 mg in sodium chloride 0.9 % 250 mL IVPB  300 mg Intravenous Daily    metoprolol succinate (TOPROL-XL) 24 hr tablet 50 mg  50 mg Oral Daily    tamsulosin (FLOMAX) capsule 0.4 mg  0.4 mg Oral Daily With Dinner     Allergies   Allergen Reactions    Pollen Extract Allergic Rhinitis       Objective   First Vitals:   Blood Pressure: 127/64 (03/19/24 1321)  Pulse: 67 (03/19/24 1321)  Temperature: 98.3 °F (36.8 °C) (03/19/24 1321)  Temp Source: Oral (03/20/24 1319)  Respirations: 18 (03/19/24 1321)  SpO2: 100 % (03/19/24 1321)    Current Vitals:   Blood Pressure: 130/77 (03/20/24 1530)  Pulse: 75 (03/20/24 1530)  Temperature: 97.5 °F (36.4 °C) (03/20/24 1530)  Temp Source: Axillary (03/20/24 1530)  Respirations: 18 (03/20/24 1530)  SpO2: 98 % (03/20/24 1530)      Intake/Output Summary (Last 24 hours) at 3/20/2024 1747  Last data filed at 3/20/2024 1503  Gross per 24 hour   Intake 300 ml   Output 350 ml   Net -50 ml       Invasive Devices       Peripheral Intravenous Line  Duration             Peripheral IV 03/19/24 Distal;Dorsal (posterior);Left Forearm 1 day                    Physical Exam  Vitals and nursing note reviewed.   Constitutional:       General: He is not in acute distress.     Appearance: Normal appearance. He is not ill-appearing.    HENT:      Head: Normocephalic and atraumatic.      Nose: Nose normal.      Mouth/Throat:      Mouth: Mucous membranes are moist.      Pharynx: Oropharynx is clear.   Eyes:      Extraocular Movements: Extraocular movements intact.   Cardiovascular:      Rate and Rhythm: Normal rate and regular rhythm.   Pulmonary:      Effort: Pulmonary effort is normal. No respiratory distress.   Abdominal:      General: Abdomen is flat. There is no distension.      Palpations: Abdomen is soft.      Tenderness: There is no abdominal tenderness. There is no guarding or rebound.   Musculoskeletal:      Cervical back: Neck supple.      Right lower leg: No edema.      Left lower leg: No edema.   Skin:     General: Skin is warm and dry.   Neurological:      General: No focal deficit present.      Mental Status: He is alert and oriented to person, place, and time. Mental status is at baseline.   Psychiatric:         Mood and Affect: Mood normal.         Behavior: Behavior normal.         Thought Content: Thought content normal.         Lab Results: CBC:   Lab Results   Component Value Date    WBC 6.79 03/20/2024    HGB 8.1 (L) 03/20/2024    HCT 27.3 (L) 03/20/2024    MCV 78 (L) 03/20/2024     03/20/2024    RBC 3.48 (L) 03/20/2024    MCH 23.3 (L) 03/20/2024    MCHC 29.7 (L) 03/20/2024    RDW 14.8 03/20/2024    MPV 10.0 03/20/2024    NRBC 0 03/20/2024   , CMP:   Lab Results   Component Value Date    SODIUM 141 03/20/2024    K 3.8 03/20/2024     03/20/2024    CO2 28 03/20/2024    BUN 19 03/20/2024    CREATININE 1.04 03/20/2024    CALCIUM 9.1 03/20/2024    EGFR 69 03/20/2024     Imaging: I have personally reviewed pertinent reports.    EKG, Pathology, and Other Studies: I have personally reviewed pertinent reports.      Counseling / Coordination of Care  Total floor / unit time spent today 30 minutes.  Greater than 50% of total time was spent with the patient and / or family counseling and / or coordination of care.    Kira  MD Mary Ann  3/20/2024 5:47 PM

## 2024-03-20 NOTE — ANESTHESIA PREPROCEDURE EVALUATION
Procedure:  COLONOSCOPY  EGD    Relevant Problems   CARDIO   (+) 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 with angina pectoris (HCC)   (+) Benign essential hypertension   (+) Hypercholesteremia      ENDO   (+) Type 2 diabetes mellitus (HCC)      HEMATOLOGY   (+) Iron deficiency anemia   (+) Symptomatic anemia        Physical Exam    Airway    Mallampati score: II  TM Distance: >3 FB  Neck ROM: full     Dental   No notable dental hx     Cardiovascular  Rhythm: regular, Rate: normal, No weak pulses    Pulmonary   No stridor    Other Findings        Anesthesia Plan  ASA Score- 3     Anesthesia Type- IV sedation with anesthesia with ASA Monitors.         Additional Monitors:     Airway Plan:            Plan Factors-    Chart reviewed. EKG reviewed.  Existing labs reviewed. Patient summary reviewed.          Obstructive sleep apnea risk education given perioperatively.        Induction- intravenous.    Postoperative Plan-     Informed Consent- Anesthetic plan and risks discussed with patient.  I personally reviewed this patient with the CRNA. Discussed and agreed on the Anesthesia Plan with the CRNA..

## 2024-03-20 NOTE — PROGRESS NOTES
INTERNAL MEDICINE RESIDENCY PROGRESS NOTE     Name: Frantz Vasquez   Age & Sex: 78 y.o. male   MRN: [id number]  Unit/Bed#: [facility] 910-01   Encounter: [id number]  Team: SOD Team C     PATIENT INFORMATION     Name: Frantz Vasquez   Age & Sex: 78 y.o. male   MRN: [id number]  Hospital Stay Days: 1    ASSESSMENT/PLAN     Principal Problem:    Iron deficiency anemia  Active Problems:    Benign essential hypertension    Type 2 diabetes mellitus (HCC)    Hypercholesteremia    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 with angina pectoris (HCC)      * Iron deficiency anemia  Assessment & Plan  Anemia hemoglobin 10.1 in January to 7.9 daily prior to admission.  Iron saturation 3%; iron 12; ferritin 4.  Symptomatic dizziness / fatigue which resolved on admission. No blood per rectum / no melena.  Pt has no recorded colonoscopies / endoscopies and last Cologuard 8/2022 negative.  Calculated Ganzoni Iron Deficiency 1192 mg.  Transfusion consent signed    Repeat Hgb today 8.1.   Reticulocyte count obtained. Reticulocyte Index 1.16 indicating hypoproliferation    Plan  Patient will have endoscopy and colonoscopy performed by GI later today  Trend hgb, transfuse for signs of instability / symptomatic  Initiate iron infusion 300mg total 4days    Occult stool test negative    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 with angina pectoris (HCC)  Assessment & Plan  Workup in 2019 for stable angina resulted in discovery of multivessel CAD, status post CABG (lima to LAD, SVG to D1, SVG to OM, SVG to PDA     Plan:  Continue statin and metoprolol succinate 50 mg qd    Hypercholesteremia  Assessment & Plan  Continue home statin    Type 2 diabetes mellitus (HCC)  Assessment & Plan  Lab Results   Component Value Date    HGBA1C 6.9 (H) 03/04/2024       Recent Labs     03/19/24  2125 03/20/24  0803 03/20/24  1048 03/20/24  1342   POCGLU 96 85 102 96       Blood Sugar Average: Last 72 hrs:  (P) 91.8  Hold home  metformin  Start SSI      Benign essential hypertension  Assessment & Plan  Continue home amlodipine and HCTZ        Disposition: Discharge pending endoscopic eval with GI     SUBJECTIVE     Patient seen and examined. No acute events overnight.  Patient endorses mild fatigue, otherwise feeling asymptomatic.  Denied palpitations, abdominal pain, progressive weight loss, dysphagia, melena, hematochezia, nausea, vomiting, diarrhea, constipation, irregular bowel movements, early satiety, abdominal distention and food intolerance.    OBJECTIVE     Vitals:    24 1321 24 1535 24 2159 24 1319   BP: 127/64 130/67 136/67 143/68   Pulse: 67 66 75 65   Resp: 18   18   Temp: 98.3 °F (36.8 °C) 98.3 °F (36.8 °C) 98.6 °F (37 °C) 98.5 °F (36.9 °C)   TempSrc:    Oral   SpO2: 100% 100% 98% 95%      Temperature:   Temp (24hrs), Av.5 °F (36.9 °C), Min:98.3 °F (36.8 °C), Max:98.6 °F (37 °C)    Temperature: 98.5 °F (36.9 °C)  Intake & Output:  I/O          0701   0700  0701   0700  0701   0700    P.O.   0    I.V.   300    Total Intake   300    Urine  0     Stool  350     Total Output  350     Net  -350 +300           Unmeasured Urine Occurrence  4 x 2 x    Unmeasured Stool Occurrence  2 x           Weights:        There is no height or weight on file to calculate BMI.  Weight (last 2 days)       None          Physical Exam  LABORATORY DATA     Labs: I have personally reviewed pertinent reports.  Results from last 7 days   Lab Units 24  0510 24  1347 24  1240   WBC Thousand/uL 6.79 6.01 6.36   HEMOGLOBIN g/dL 8.1* 8.1* 7.9*   HEMATOCRIT % 27.3* 27.3* 27.3*   PLATELETS Thousands/uL 233 238 243   NEUTROS PCT % 70 71 71   MONOS PCT % 10 10 9   EOS PCT % 3 3 3      Results from last 7 days   Lab Units 24  0510 24  1347   POTASSIUM mmol/L 3.8 4.2   CHLORIDE mmol/L 101 104   CO2 mmol/L 28 29   BUN mg/dL 19 22   CREATININE mg/dL 1.04 1.09   CALCIUM mg/dL 9.1  "9.7                            IMAGING & DIAGNOSTIC TESTING     Radiology Results: I have personally reviewed pertinent reports.  EGD    Result Date: 3/20/2024  Impression: The cricopharynx, upper third of the esophagus, middle third of the esophagus and lower third of the esophagus appeared normal. Schatzki ring in the lower third of the esophagus 2 cm type I hiatal hernia The cardia, fundus of the stomach, body of the stomach, greater curve of the stomach, lesser curve of the stomach, incisura, antrum, prepyloric region, pylorus and hiatal hernia appeared normal. Performed random biopsy to rule out H. pylori. The duodenal bulb, 1st part of the duodenum and 2nd part of the duodenum appeared normal. Performed random biopsy to rule out celiac disease. RECOMMENDATION:  Await pathology results  Proceed with colonoscopy    Scott Fuentes MD     Other Diagnostic Testing: I have personally reviewed pertinent reports.    ACTIVE MEDICATIONS     Current Facility-Administered Medications   Medication Dose Route Frequency    amLODIPine (NORVASC) tablet 5 mg  5 mg Oral Daily    atorvastatin (LIPITOR) tablet 80 mg  80 mg Oral Daily With Dinner    hydroCHLOROthiazide tablet 12.5 mg  12.5 mg Oral Daily    insulin lispro (HumALOG/ADMELOG) 100 units/mL subcutaneous injection 1-5 Units  1-5 Units Subcutaneous TID AC    iron sucrose (VENOFER) 300 mg in sodium chloride 0.9 % 250 mL IVPB  300 mg Intravenous Daily    metoprolol succinate (TOPROL-XL) 24 hr tablet 50 mg  50 mg Oral Daily    polyethylene glycol (GOLYTELY) bowel prep 4,000 mL  4,000 mL Oral See Admin Instructions    tamsulosin (FLOMAX) capsule 0.4 mg  0.4 mg Oral Daily With Dinner       VTE Pharmacologic Prophylaxis: None  VTE Mechanical Prophylaxis: sequential compression device    Portions of the record may have been created with voice recognition software.  Occasional wrong word or \"sound a like\" substitutions may have occurred due to the inherent limitations of voice " recognition software.  Read the chart carefully and recognize, using context, where substitutions have occurred.  ==  Varinder Landis MD  [facility]  Internal Medicine Residency PGY-1

## 2024-03-20 NOTE — PHYSICAL THERAPY NOTE
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)    Symptomatic anemia    Iron deficiency anemia       Past Medical History  Past Medical History:   Diagnosis Date    Chronic cough     RESOLVED: [id number]    Coronary artery disease     Diabetes mellitus (HCC)     Diabetic retinopathy (HCC)     HLD (hyperlipidemia)     HTN (hypertension)     Hypertension     Kidney stone        Past Surgical History  Past Surgical History:   Procedure Laterality Date    CARDIAC CATHETERIZATION  12/09/2019    HAND SURGERY Left     LYMPH NODE BIOPSY Right 11/24/2023    Procedure: BIOPSY LYMPH NODE SENTINEL, LYMPHATIC MAPPING (Inject: 7:30 am in Nuc Med by radiologist);  Surgeon: Issac Triana MD;  [facility];  Service: Surgical Oncology    PA CORONARY ARTERY BYP W/VEIN & ARTERY GRAFT 3 VEIN N/A 12/13/2019    Procedure: CORONARY ARTERY BYPASS GRAFT (CABG) 4 VESSELS STEVEN to LAD ; SVG--> PDA , DIAGONAL, and OM;  Surgeon: Elvin Huertas DO;  [facility];  Service: Cardiac Surgery    PA ECHO TRANSESOPHAG R-T 2D W/PRB IMG ACQUISJ I&R N/A 12/13/2019    Procedure: TRANSESOPHAGEAL ECHOCARDIOGRAM (SHEBA);  Surgeon: Elvin Huertas DO;  [facility];  Service: Cardiac Surgery    PA NDSC SURG W/VIDEO-ASSISTED HARVEST VEIN CABG Left 12/13/2019    Procedure: HARVEST VEIN ENDOSCOPIC (EVH);  Surgeon: Elvin Huertas DO;  [facility];  Service: Cardiac Surgery    PA STRTCTC CPTR ASSTD PX EXTRADURAL CRANIAL N/A  1/31/2024    Procedure: RIGHT FUNCTIONAL ENDOSCOPIC SINUS SURGERY (FESS) IMAGED GUIDED, MAXILLARY, ETHMOIDS, FRONTALS;  Surgeon: Tez Bah MD;  [facility];  Service: ENT    SKIN LESION EXCISION Right 11/24/2023    Procedure: WIDE EXCISION MELANOMA RIGHT ARM;  Surgeon: Issac rTiana MD;  [facility];  Service: Surgical Oncology        03/20/24 0901   PT Last Visit   PT Visit Date 03/20/24   Note Type   Note type Evaluation   Pain Assessment   Pain Assessment Tool 0-10   Pain Score No Pain   Restrictions/Precautions   Weight Bearing Precautions Per Order No   Other Precautions Multiple lines;Fall Risk   Home Living   Type of Home House   Home Layout Multi-level  (0 MILAN; elevator within home between floors)   Bathroom Shower/Tub Walk-in shower   Bathroom Toilet Raised   Bathroom Equipment Grab bars in shower;Built-in shower seat   Home Equipment   (denies)   Prior Function   Level of [place] Independent with ADLs;Independent with functional mobility;Independent with IADLS   Lives With Spouse   Receives Help From Family   IADLs Independent with driving;Independent with meal prep;Independent with medication management   Falls in the last 6 months 0   Vocational Retired   General   Family/Caregiver Present No   Cognition   Overall Cognitive Status WFL   Arousal/Participation Alert   Orientation Level Oriented X4   Memory Within functional limits   Following Commands Follows all commands and directions without difficulty   Comments pt pleasant and cooperative   RLE Assessment   RLE Assessment WFL   LLE Assessment   LLE Assessment WFL   Bed Mobility   Supine to Sit Unable to assess   Sit to Supine Unable to assess   Additional Comments pt found/ left sitting OOB in recliner with all needs wihtin reach   Transfers   Sit to Stand 7  Independent   Stand to Sit 7  Independent   Additional Comments transfers withou AD   Ambulation/Elevation   Gait pattern   (slow, mild lateral sway)   Gait Assistance  5  Supervision   Assistive Device None   Distance 200'   Balance   Static Sitting Fair +   Dynamic Sitting Fair +   Static Standing Fair   Dynamic Standing Fair   Ambulatory Fair   Endurance Deficit   Endurance Deficit No   Activity Tolerance   Activity Tolerance Patient tolerated treatment well   Medical Staff Made Aware ADRIANA Candelaria; co-session completed this date 2* increased medical complexity and multiple co-morbidities   Nurse Made Aware RN cleared   Assessment   Prognosis Good   Assessment Pt seen for mod complexity PT evaluation. Pt with active PT eval/treat and up and OOB as tolerated orders. Pt is a 78 y.o. male who was admitted to [facility] on 3/19 with iron deficiency anemia. Pt's active problems include: HTN, type 2 DM, hypercholesteremia, atherosclerosis of native coronary artery.Pt  has a past medical history of Chronic cough, Coronary artery disease, Diabetes mellitus (HCC), Diabetic retinopathy (HCC), HLD (hyperlipidemia), HTN (hypertension), Hypertension, and Kidney stone. Pt resides with spouse in 3 [place] with 0 MILAN and was independent prior to hospital admission. Currently upon evaluation pt performing functional transfers at I level and ambulation at S level without AD. Pt was left sitting OOB in recliner at the end of PT session with all needs within reach. Pt with no questions or concerns regarding d/c home; appears to be functioning at/ near baseline mobility levels. Pt with no further acute inpatient PT needs at this time- please re-consult if needed. PT to discharge pt and recommends home with no therapy needs. Encourage pt to ambulate at least 3-4x/day with restorative/ nursing staff while remaining in hospital. The patient's AM-PAC Basic Mobility Inpatient Short Form Raw Score is 22. A Raw score of greater than 16 suggests the patient may benefit from discharge to home. Please also refer to the recommendation of the Physical Therapist for safe discharge planning.   Goals   Patient  Goals to go home   Plan   PT Frequency   (eval only- d/c PT)   Discharge Recommendation   Rehab Resource Intensity Level, PT No post-acute rehabilitation needs   AM-PAC Basic Mobility Inpatient   Turning in Flat Bed Without Bedrails 4   Lying on Back to Sitting on Edge of Flat Bed Without Bedrails 4   Moving Bed to Chair 4   Standing Up From Chair Using Arms 4   Walk in Room 3   Climb 3-5 Stairs With Railing 3   Basic Mobility Inpatient Raw Score 22   Basic Mobility Standardized Score 47.4   [facility] Highest Level Of Mobility   -HLM Goal 7: Walk 25 feet or more   -HLM Achieved 7: Walk 25 feet or more   Modified [place] Scale   Modified [place] Scale 1           Xiao Sheth, PT DPT

## 2024-03-20 NOTE — OCCUPATIONAL THERAPY NOTE
Occupational Therapy Evaluation     Patient Name: Frantz Vasquez  Today's Date: 3/20/2024  Problem List  Principal Problem:    Iron deficiency anemia  Active Problems:    Benign essential hypertension    Type 2 diabetes mellitus (HCC)    Hypercholesteremia    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 with angina pectoris (HCC)    Past Medical History  Past Medical History:   Diagnosis Date    Chronic cough     RESOLVED: [id number]    Coronary artery disease     Diabetes mellitus (HCC)     Diabetic retinopathy (HCC)     HLD (hyperlipidemia)     HTN (hypertension)     Hypertension     Kidney stone      Past Surgical History  Past Surgical History:   Procedure Laterality Date    CARDIAC CATHETERIZATION  12/09/2019    HAND SURGERY Left     LYMPH NODE BIOPSY Right 11/24/2023    Procedure: BIOPSY LYMPH NODE SENTINEL, LYMPHATIC MAPPING (Inject: 7:30 am in Nuc Med by radiologist);  Surgeon: Issac Triana MD;  [facility];  Service: Surgical Oncology    OR CORONARY ARTERY BYP W/VEIN & ARTERY GRAFT 3 VEIN N/A 12/13/2019    Procedure: CORONARY ARTERY BYPASS GRAFT (CABG) 4 VESSELS STEVEN to LAD ; SVG--> PDA , DIAGONAL, and OM;  Surgeon: Evlin Huertas DO;  [facility]: [facility];  Service: Cardiac Surgery    OR ECHO TRANSESOPHAG R-T 2D W/PRB IMG ACQUISJ I&R N/A 12/13/2019    Procedure: TRANSESOPHAGEAL ECHOCARDIOGRAM (SHEBA);  Surgeon: Elvin Huertas DO;  [facility];  Service: Cardiac Surgery    OR NDSC SURG W/VIDEO-ASSISTED HARVEST VEIN CABG Left 12/13/2019    Procedure: HARVEST VEIN ENDOSCOPIC (EVH);  Surgeon: Elvin Huertas DO;  [facility];  Service: Cardiac Surgery    OR STRTCTC CPTR ASSTD PX EXTRADURAL CRANIAL N/A 1/31/2024    Procedure: RIGHT FUNCTIONAL ENDOSCOPIC SINUS SURGERY (FESS) IMAGED GUIDED, MAXILLARY, ETHMOIDS, FRONTALS;  Surgeon: Tez Bah MD;  [facility];  Service: ENT    SKIN LESION EXCISION Right 11/24/2023    Procedure: WIDE EXCISION MELANOMA  RIGHT ARM;  Surgeon: Issac Triana MD;  [facility];  Service: Surgical Oncology        03/20/24 0851   OT Last Visit   OT Visit Date 03/20/24   Note Type   Note type Evaluation   Pain Assessment   Pain Assessment Tool 0-10   Pain Score No Pain   Restrictions/Precautions   Weight Bearing Precautions Per Order No   Other Precautions Multiple lines;Fall Risk   Home Living   Type of Home House   Home Layout Multi-level;Bed/bath upstairs;Elevator  (3 SH w/ 0 MILAN)   Bathroom Shower/Tub Walk-in shower   Bathroom Toilet Raised   Bathroom Equipment Grab bars in shower;Built-in shower seat   Home Equipment   (pt denies)   Prior Function   Level of [place] Independent with ADLs;Independent with functional mobility;Independent with IADLS   Lives With Spouse   Receives Help From Family   IADLs Independent with driving;Independent with meal prep;Independent with medication management   Falls in the last 6 months 0   Vocational Retired   Lifestyle   Autonomy Pt reports I w/ ADLs, IADLs, and fxnl mobility w/o AD at baseline; + driving.   Reciprocal Relationships Pt lives w/ his spouse.   Service to Others Pt is retired.   Intrinsic Gratification Pt enjoys gardening.   General   Family/Caregiver Present No   ADL   Where Assessed Chair   Eating Assistance 7  Independent   Grooming Assistance 7  Independent   UB Bathing Assistance 7  Independent   LB Bathing Assistance 6  Modified Independent   UB Dressing Assistance 7  Independent   LB Dressing Assistance 6  Modified independent   Toileting Assistance  6  Modified independent   Bed Mobility   Supine to Sit Unable to assess   Sit to Supine Unable to assess   Additional Comments Pt seated OOB in chair upon therapist's arrival and at end of OT evaluation w/ all needs within reach.   Transfers   Sit to Stand 7  Independent   Stand to Sit 7  Independent   Additional Comments completed w/o AD   Functional Mobility   Functional Mobility 5  Supervision   Additional Comments Pt  ambulated greater than household distance w/ S and w/o AD.   Additional items   (no AD)   Balance   Static Sitting Fair +   Dynamic Sitting Fair +   Static Standing Fair   Dynamic Standing Fair   Ambulatory Fair   Activity Tolerance   Activity Tolerance Patient tolerated treatment well   Medical Staff Made Aware PTXiao, due to pt's medical complexity and multiple comorbidities   Nurse Made Aware RN clearance prior to session   RUE Assessment   RUE Assessment WFL   LUE Assessment   LUE Assessment WFL   Hand Function   Gross Motor Coordination Functional   Fine Motor Coordination Functional   Cognition   Overall Cognitive Status WFL   Arousal/Participation Alert;Responsive;Cooperative   Attention Within functional limits   Orientation Level Oriented X4   Memory Within functional limits   Following Commands Follows all commands and directions without difficulty   Comments Pt was pleasant, cooperative, and willing to participate in OT evaluation.   Assessment   Assessment Pt is a 78 y.o. male seen for OT evaluation s/p admission to [facility] on 3/19/2024. Pt diagnosed with Iron deficiency anemia. Pt has a significant PMH impacting occupational performance including: Chronic cough, Coronary artery disease, Diabetes mellitus (HCC), Diabetic retinopathy (HCC), HLD (hyperlipidemia), HTN (hypertension), Hypertension, and Kidney stone. Pt with active OT evaluation and treatment orders and activity orders. PTA, pt living with his spouse in a 3 SH w/ 0 MILAN and an elevator between each floor level. Pt agreeable and willing to participate in OT evaluation. During evaluation, pt was I for eating, grooming, and UB ADLs and mod I for LB ADLs and toileting. Pt was also I for transfers and S for fxnl mobility w/o AD. Pt performing ADLs at baseline level of independence, and he has good social support upon return home. No further acute OT needs identified at this time. Recommend continued active ADL participation and  mobilization with hospital staff while in the hospital to increase pt’s endurance and strength upon D/C. From OT standpoint, recommend D/C to home with family support when medically cleared. D/C pt from OT caseload at this time.   Goals   Patient Goals to go home   Plan   OT Frequency Eval only   Discharge Recommendation   Rehab Resource Intensity Level, OT No post-acute rehabilitation needs   AM-PAC Daily Activity Inpatient   Lower Body Dressing 4   Bathing 4   Toileting 4   Upper Body Dressing 4   Grooming 4   Eating 4   Daily Activity Raw Score 24   Daily Activity Standardized Score (Calc for Raw Score >=11) 57.54   AM-PAC Applied Cognition Inpatient   Following a Speech/Presentation 4   Understanding Ordinary Conversation 4   Taking Medications 4   Remembering Where Things Are Placed or Put Away 4   Remembering List of 4-5 Errands 4   Taking Care of Complicated Tasks 4   Applied Cognition Raw Score 24   Applied Cognition Standardized Score 62.21       The patient's raw score on the AM-PAC Daily Activity Inpatient Short Form is 24. A raw score of greater than or equal to 19 suggests the patient may benefit from discharge to home. Please refer to the recommendation of the Occupational Therapist for safe discharge planning.    Teagan Cameron MS, OTR/L

## 2024-03-20 NOTE — PLAN OF CARE
Problem: PAIN - ADULT  Goal: Verbalizes/displays adequate comfort level or baseline comfort level  Description: Interventions:  - Encourage patient to monitor pain and request assistance  - Assess pain using appropriate pain scale  - Administer analgesics based on type and severity of pain and evaluate response  - Implement non-pharmacological measures as appropriate and evaluate response  - Consider cultural and social influences on pain and pain management  - Notify physician/advanced practitioner if interventions unsuccessful or patient reports new pain  Outcome: Progressing     Problem: INFECTION - ADULT  Goal: Absence or prevention of progression during hospitalization  Description: INTERVENTIONS:  - Assess and monitor for signs and symptoms of infection  - Monitor lab/diagnostic results  - Monitor all insertion sites, i.e. indwelling lines, tubes, and drains  - Monitor endotracheal if appropriate and nasal secretions for changes in amount and color  - [place] appropriate cooling/warming therapies per order  - Administer medications as ordered  - Instruct and encourage patient and family to use good hand hygiene technique  - Identify and instruct in appropriate isolation precautions for identified infection/condition  Outcome: Progressing     Problem: SAFETY ADULT  Goal: Maintain or return to baseline ADL function  Description: INTERVENTIONS:  -  Assess patient's ability to carry out ADLs; assess patient's baseline for ADL function and identify physical deficits which impact ability to perform ADLs (bathing, care of mouth/teeth, toileting, grooming, dressing, etc.)  - Assess/evaluate cause of self-care deficits   - Assess range of motion  - Assess patient's mobility; develop plan if impaired  - Assess patient's need for assistive devices and provide as appropriate  - Encourage maximum independence but intervene and supervise when necessary  - Involve family in performance of ADLs  - Assess for home care  needs following discharge   - Consider OT consult to assist with ADL evaluation and planning for discharge  - Provide patient education as appropriate  Outcome: Progressing

## 2024-03-21 LAB
ALBUMIN SERPL BCP-MCNC: 3.9 G/DL (ref 3.5–5)
ALP SERPL-CCNC: 69 U/L (ref 34–104)
ALT SERPL W P-5'-P-CCNC: 19 U/L (ref 7–52)
ANION GAP SERPL CALCULATED.3IONS-SCNC: 9 MMOL/L (ref 4–13)
AST SERPL W P-5'-P-CCNC: 17 U/L (ref 13–39)
BASOPHILS # BLD AUTO: 0.02 THOUSANDS/ÂΜL (ref 0–0.1)
BASOPHILS NFR BLD AUTO: 0 % (ref 0–1)
BILIRUB SERPL-MCNC: 0.33 MG/DL (ref 0.2–1)
BUN SERPL-MCNC: 16 MG/DL (ref 5–25)
CALCIUM SERPL-MCNC: 9.2 MG/DL (ref 8.4–10.2)
CEA SERPL-MCNC: 4.2 NG/ML (ref 0–3)
CHLORIDE SERPL-SCNC: 102 MMOL/L (ref 96–108)
CO2 SERPL-SCNC: 29 MMOL/L (ref 21–32)
CREAT SERPL-MCNC: 1.12 MG/DL (ref 0.6–1.3)
EOSINOPHIL # BLD AUTO: 0.15 THOUSAND/ÂΜL (ref 0–0.61)
EOSINOPHIL NFR BLD AUTO: 2 % (ref 0–6)
ERYTHROCYTE [DISTWIDTH] IN BLOOD BY AUTOMATED COUNT: 14.8 % (ref 11.6–15.1)
GFR SERPL CREATININE-BSD FRML MDRD: 63 ML/MIN/1.73SQ M
GLUCOSE SERPL-MCNC: 118 MG/DL (ref 65–140)
GLUCOSE SERPL-MCNC: 118 MG/DL (ref 65–140)
GLUCOSE SERPL-MCNC: 136 MG/DL (ref 65–140)
GLUCOSE SERPL-MCNC: 150 MG/DL (ref 65–140)
GLUCOSE SERPL-MCNC: 164 MG/DL (ref 65–140)
HCT VFR BLD AUTO: 26.7 % (ref 36.5–49.3)
HGB BLD-MCNC: 7.9 G/DL (ref 12–17)
IMM GRANULOCYTES # BLD AUTO: 0.05 THOUSAND/UL (ref 0–0.2)
IMM GRANULOCYTES NFR BLD AUTO: 1 % (ref 0–2)
LYMPHOCYTES # BLD AUTO: 0.89 THOUSANDS/ÂΜL (ref 0.6–4.47)
LYMPHOCYTES NFR BLD AUTO: 9 % (ref 14–44)
MCH RBC QN AUTO: 23.5 PG (ref 26.8–34.3)
MCHC RBC AUTO-ENTMCNC: 29.6 G/DL (ref 31.4–37.4)
MCV RBC AUTO: 80 FL (ref 82–98)
MONOCYTES # BLD AUTO: 0.78 THOUSAND/ÂΜL (ref 0.17–1.22)
MONOCYTES NFR BLD AUTO: 8 % (ref 4–12)
NEUTROPHILS # BLD AUTO: 7.66 THOUSANDS/ÂΜL (ref 1.85–7.62)
NEUTS SEG NFR BLD AUTO: 80 % (ref 43–75)
NRBC BLD AUTO-RTO: 0 /100 WBCS
PLATELET # BLD AUTO: 224 THOUSANDS/UL (ref 149–390)
PMV BLD AUTO: 9.9 FL (ref 8.9–12.7)
POTASSIUM SERPL-SCNC: 3.8 MMOL/L (ref 3.5–5.3)
PROT SERPL-MCNC: 6.4 G/DL (ref 6.4–8.4)
RBC # BLD AUTO: 3.36 MILLION/UL (ref 3.88–5.62)
SODIUM SERPL-SCNC: 140 MMOL/L (ref 135–147)
WBC # BLD AUTO: 9.55 THOUSAND/UL (ref 4.31–10.16)

## 2024-03-21 PROCEDURE — 82948 REAGENT STRIP/BLOOD GLUCOSE: CPT

## 2024-03-21 PROCEDURE — 88305 TISSUE EXAM BY PATHOLOGIST: CPT | Performed by: PATHOLOGY

## 2024-03-21 PROCEDURE — 85025 COMPLETE CBC W/AUTO DIFF WBC: CPT | Performed by: INTERNAL MEDICINE

## 2024-03-21 PROCEDURE — 82378 CARCINOEMBRYONIC ANTIGEN: CPT

## 2024-03-21 PROCEDURE — 80053 COMPREHEN METABOLIC PANEL: CPT | Performed by: INTERNAL MEDICINE

## 2024-03-21 PROCEDURE — 99223 1ST HOSP IP/OBS HIGH 75: CPT | Performed by: COLON & RECTAL SURGERY

## 2024-03-21 PROCEDURE — 99232 SBSQ HOSP IP/OBS MODERATE 35: CPT | Performed by: INTERNAL MEDICINE

## 2024-03-21 RX ORDER — HEPARIN SODIUM 5000 [USP'U]/ML
5000 INJECTION, SOLUTION INTRAVENOUS; SUBCUTANEOUS EVERY 8 HOURS SCHEDULED
Status: DISCONTINUED | OUTPATIENT
Start: 2024-03-21 | End: 2024-03-28

## 2024-03-21 RX ADMIN — INSULIN LISPRO 1 UNITS: 100 INJECTION, SOLUTION INTRAVENOUS; SUBCUTANEOUS at 16:46

## 2024-03-21 RX ADMIN — IRON SUCROSE 300 MG: 20 INJECTION, SOLUTION INTRAVENOUS at 09:05

## 2024-03-21 RX ADMIN — METOPROLOL SUCCINATE 50 MG: 50 TABLET, EXTENDED RELEASE ORAL at 09:05

## 2024-03-21 RX ADMIN — HYDROCHLOROTHIAZIDE 12.5 MG: 12.5 TABLET ORAL at 09:05

## 2024-03-21 RX ADMIN — ATORVASTATIN CALCIUM 80 MG: 80 TABLET, FILM COATED ORAL at 16:58

## 2024-03-21 RX ADMIN — AMLODIPINE BESYLATE 5 MG: 5 TABLET ORAL at 09:05

## 2024-03-21 RX ADMIN — TAMSULOSIN HYDROCHLORIDE 0.4 MG: 0.4 CAPSULE ORAL at 16:58

## 2024-03-21 NOTE — PROGRESS NOTES
INTERNAL MEDICINE RESIDENCY PROGRESS NOTE     Name: Frantz Vasquez   Age & Sex: 78 y.o. male   MRN: [id number]  Unit/Bed#: [facility] 910-01   Encounter: [id number]  Team: SOD Team C     PATIENT INFORMATION     Name: Frantz Vasquez   Age & Sex: 78 y.o. male   MRN: [id number]  Hospital Stay Days: 2    ASSESSMENT/PLAN     Principal Problem:    Iron deficiency anemia  Active Problems:    Benign essential hypertension    Type 2 diabetes mellitus (HCC)    Hypercholesteremia    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 with angina pectoris (HCC)      * Iron deficiency anemia  Assessment & Plan  Anemia hemoglobin 10.1 in January to 7.9 daily prior to admission.  Iron saturation 3%; iron 12; ferritin 4.  Symptomatic dizziness / fatigue which resolved on admission. No blood per rectum / no melena.  Pt has no recorded colonoscopies / endoscopies and last Cologuard 8/2022 negative.  Calculated Ganzoni Iron Deficiency 1192 mg.  Transfusion consent signed    Repeat Hgb today 8.1.   Reticulocyte count obtained. Reticulocyte Index 1.16 indicating hypoproliferation    Colonoscopy found 4x4 cm mass in ascending colon. Colorectal Surgery service will take over as primary service.    Plan  Trend hgb, transfuse for signs of instability / symptomatic  Initiate iron infusion 300mg total 4days    Occult stool test negative    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 with angina pectoris (HCC)  Assessment & Plan  Workup in 2019 for stable angina resulted in discovery of multivessel CAD, status post CABG (lima to LAD, SVG to D1, SVG to OM, SVG to PDA     Plan:  Continue statin and metoprolol succinate 50 mg qd    Hypercholesteremia  Assessment & Plan  Continue home statin    Type 2 diabetes mellitus (HCC)  Assessment & Plan  Lab Results   Component Value Date    HGBA1C 6.9 (H) 03/04/2024       Recent Labs     03/19/24  2125 03/20/24  0803 03/20/24  1048 03/20/24  1342   POCGLU 96 85 102 96       Blood Sugar  "Average: Last 72 hrs:  (P) 91.8  Hold home metformin  Start SSI      Benign essential hypertension  Assessment & Plan  Continue home amlodipine and HCTZ        Disposition: Discharge pending surgical eval with CRS service -- who will take over as primary team.     SUBJECTIVE     Patient seen and examined. No acute events overnight.  Patient endorses feeling well, denies abdominal pain, abdominal distention, diarrhea, constipation, nausea and vomiting.  Had a lengthy conversation with patient regarding colonoscopy findings and plan with colorectal surgery service.    OBJECTIVE     Vitals:    24 2246 24 0741 24 1111 24 1543   BP: 132/64 141/66  128/65   Pulse: 70 89  71   Resp:  16  17   Temp:  99.4 °F (37.4 °C)  98.9 °F (37.2 °C)   TempSrc:       SpO2: 98% 97%  99%   Weight:   68.3 kg (150 lb 9.2 oz)    Height:   5' 10\" (1.778 m)       Temperature:   Temp (24hrs), Av.2 °F (37.3 °C), Min:98.9 °F (37.2 °C), Max:99.4 °F (37.4 °C)    Temperature: 98.9 °F (37.2 °C)  Intake & Output:  I/O          0701   0700  0701   0700  0701   0700    P.O.  480 236    I.V. (mL/kg)  300     Total Intake(mL/kg)  780 236 (3.5)    Urine (mL/kg/hr) 0      Stool 350      Total Output 350      Net -350 +780 +236           Unmeasured Urine Occurrence 4 x 4 x     Unmeasured Stool Occurrence 2 x            Weights:   IBW (Ideal Body Weight): 73 kg    Body mass index is 21.61 kg/m².  Weight (last 2 days)       Date/Time Weight    24 1111 68.3 (150.57)          Physical Exam  Constitutional:       General: He is not in acute distress.     Appearance: Normal appearance. He is not ill-appearing, toxic-appearing or diaphoretic.   Cardiovascular:      Rate and Rhythm: Normal rate and regular rhythm.      Pulses: Normal pulses.      Heart sounds: Normal heart sounds. No murmur heard.     No friction rub. No gallop.   Pulmonary:      Effort: Pulmonary effort is normal. No respiratory " distress.      Breath sounds: Normal breath sounds. No stridor. No wheezing, rhonchi or rales.   Chest:      Chest wall: No tenderness.   Abdominal:      General: Abdomen is flat. Bowel sounds are normal. There is no distension.      Palpations: Abdomen is soft. There is no mass.      Tenderness: There is no abdominal tenderness. There is no guarding or rebound.      Hernia: No hernia is present.   Musculoskeletal:      Right lower leg: No edema.      Left lower leg: No edema.   Neurological:      Mental Status: He is alert.       LABORATORY DATA     Labs: I have personally reviewed pertinent reports.  Results from last 7 days   Lab Units 03/21/24  0436 03/20/24  0510 03/19/24  1347   WBC Thousand/uL 9.55 6.79 6.01   HEMOGLOBIN g/dL 7.9* 8.1* 8.1*   HEMATOCRIT % 26.7* 27.3* 27.3*   PLATELETS Thousands/uL 224 233 238   NEUTROS PCT % 80* 70 71   MONOS PCT % 8 10 10   EOS PCT % 2 3 3      Results from last 7 days   Lab Units 03/21/24  0436 03/20/24  0510 03/19/24  1347   POTASSIUM mmol/L 3.8 3.8 4.2   CHLORIDE mmol/L 102 101 104   CO2 mmol/L 29 28 29   BUN mg/dL 16 19 22   CREATININE mg/dL 1.12 1.04 1.09   CALCIUM mg/dL 9.2 9.1 9.7   ALK PHOS U/L 69  --   --    ALT U/L 19  --   --    AST U/L 17  --   --                             IMAGING & DIAGNOSTIC TESTING     Radiology Results: I have personally reviewed pertinent reports.  EGD    Result Date: 3/20/2024  Impression: The cricopharynx, upper third of the esophagus, middle third of the esophagus and lower third of the esophagus appeared normal. Schatzki ring in the lower third of the esophagus 2 cm type I hiatal hernia The cardia, fundus of the stomach, body of the stomach, greater curve of the stomach, lesser curve of the stomach, incisura, antrum, prepyloric region, pylorus and hiatal hernia appeared normal. Performed random biopsy to rule out H. pylori. The duodenal bulb, 1st part of the duodenum and 2nd part of the duodenum appeared normal. Performed random biopsy  "to rule out celiac disease. RECOMMENDATION:  Await pathology results  Proceed with colonoscopy    Scott Fuentes MD     Colonoscopy    Result Date: 3/20/2024  Impression: Single malignant-appearing mass measuring 4 cm x 4 cm in the mid ascending colon, covering one third of the circumference; performed cold forceps biopsy with partial removal; tattooed 5 cm distal to the finding The ileocecal valve, cecum, hepatic flexure, transverse colon, splenic flexure, descending colon, sigmoid colon, rectosigmoid and rectum appeared normal. RECOMMENDATION:  Await pathology results  Repeat colonoscopy in 1 year  Personal history of colon cancer   Return to medical surgical floors Okay to resume regular diet Consultation placed for colorectal surgery Plan discussed with patient and wife GI team will sign off, please reach out with any questions or concerns    Scott Fuentes MD     Other Diagnostic Testing: I have personally reviewed pertinent reports.    ACTIVE MEDICATIONS     Current Facility-Administered Medications   Medication Dose Route Frequency    amLODIPine (NORVASC) tablet 5 mg  5 mg Oral Daily    atorvastatin (LIPITOR) tablet 80 mg  80 mg Oral Daily With Dinner    hydroCHLOROthiazide tablet 12.5 mg  12.5 mg Oral Daily    insulin lispro (HumALOG/ADMELOG) 100 units/mL subcutaneous injection 1-5 Units  1-5 Units Subcutaneous TID AC    iron sucrose (VENOFER) 300 mg in sodium chloride 0.9 % 250 mL IVPB  300 mg Intravenous Daily    metoprolol succinate (TOPROL-XL) 24 hr tablet 50 mg  50 mg Oral Daily    tamsulosin (FLOMAX) capsule 0.4 mg  0.4 mg Oral Daily With Dinner       VTE Pharmacologic Prophylaxis: Heparin  VTE Mechanical Prophylaxis: REfused    Portions of the record may have been created with voice recognition software.  Occasional wrong word or \"sound a like\" substitutions may have occurred due to the inherent limitations of voice recognition software.  Read the chart carefully and recognize, using context, where " substitutions have occurred.  ==  Varinder Landis MD  [facility]  Internal Medicine Residency PGY-1

## 2024-03-21 NOTE — PROGRESS NOTES
"Progress Note - Colorectal Surgery   Frantz Vasquez 78 y.o. male MRN: [id number]  Unit/Bed#: [facility] 910-01 Encounter: [id number]    Assessment:  78 year old male admitted for symptomatic iron deficiency amenia receiving Venofer infusions s/p colonoscopy today revealing malignant appearing mass (4 x 4 cm) in the mid ascending colon.     Plan:  -regular diet  -f/u H/H  -venofer through 3/23  -f/u pathology from EGD and Cscope  -dvt ppx  -oob and ambulation TID  -encourage IS use  -PT/OT      Subjective/Objective     Subjective: No acute events overnight.  No nausea or vomiting.  Passing flatus no bowel movements at this time.  Denies any discomfort    Objective: AVSS on room air    Blood pressure 132/65, pulse 71, temperature 99 °F (37.2 °C), resp. rate 16, height 5' 10\" (1.778 m), weight 68.3 kg (150 lb 9.2 oz), SpO2 94%.,Body mass index is 21.61 kg/m².    UOP: X 8 unmeasured      Invasive Devices       Peripheral Intravenous Line  Duration             Peripheral IV 03/19/24 Distal;Dorsal (posterior);Left Forearm 2 days                    Physical Exam:  General: No acute distress, alert and oriented  CV: RRR  Lungs: Normal work of breathing   Abdomen: Soft, nontender, nondistended  Skin: Warm, dry    Lab, Imaging and other studies:  Recent Labs     03/20/24  0510 03/21/24  0436   WBC 6.79 9.55   HGB 8.1* 7.9*    224   SODIUM 141 140   K 3.8 3.8    102   CO2 28 29   BUN 19 16   CREATININE 1.04 1.12   GLUC 101 118   CALCIUM 9.1 9.2   AST  --  17   ALT  --  19   ALKPHOS  --  69   TBILI  --  0.33     VTE Pharmacologic Prophylaxis: Heparin  VTE Mechanical Prophylaxis: sequential compression device        "

## 2024-03-21 NOTE — QUICK NOTE
Patient Blood Management/SOC Review  Chart reviewed and discussed with SOD Team & Dr Torres.  SARAH due to chronic blood loss from Ascending colon mass (presumed malignancy)- Ganzoni equation shows deficit ~ 868 mg. Currently received 2/4 scheduled Venofer 300 mg qd doses.   Consider addition of oral B12 and folate supplementation due to expected robust erythropoiesis. Minimize diagnostic phlebotomy.  Consider transfusion only for symptomatic anemia rather than for any absolute hemoglobin trigger.  Careful titration of BP medications in light of degree of anemia, currently BP /54-77 in last 24 hours on home 3 agent regimen (bb,amlodipine/hctz).      Sheldon Guzman DO

## 2024-03-21 NOTE — PLAN OF CARE
Problem: PAIN - ADULT  Goal: Verbalizes/displays adequate comfort level or baseline comfort level  Description: Interventions:  - Encourage patient to monitor pain and request assistance  - Assess pain using appropriate pain scale  - Administer analgesics based on type and severity of pain and evaluate response  - Implement non-pharmacological measures as appropriate and evaluate response  - Consider cultural and social influences on pain and pain management  - Notify physician/advanced practitioner if interventions unsuccessful or patient reports new pain  Outcome: Progressing     Problem: INFECTION - ADULT  Goal: Absence or prevention of progression during hospitalization  Description: INTERVENTIONS:  - Assess and monitor for signs and symptoms of infection  - Monitor lab/diagnostic results  - Monitor all insertion sites, i.e. indwelling lines, tubes, and drains  - Monitor endotracheal if appropriate and nasal secretions for changes in amount and color  - [place] appropriate cooling/warming therapies per order  - Administer medications as ordered  - Instruct and encourage patient and family to use good hand hygiene technique  - Identify and instruct in appropriate isolation precautions for identified infection/condition  Outcome: Progressing     Problem: SAFETY ADULT  Goal: Patient will remain free of falls  Description: INTERVENTIONS:  - Educate patient/family on patient safety including physical limitations  - Instruct patient to call for assistance with activity   - Consult OT/PT to assist with strengthening/mobility   - Keep Call bell within reach  - Keep bed low and locked with side rails adjusted as appropriate  - Keep care items and personal belongings within reach  - Initiate and maintain comfort rounds  - Make Fall Risk Sign visible to staff  - Offer Toileting every  Hours, in advance of need  - Initiate/Maintain alarm  - Obtain necessary fall risk management equipment:   - Apply yellow socks and  bracelet for high fall risk patients  - Consider moving patient to room near nurses station  Outcome: Progressing

## 2024-03-22 LAB
GLUCOSE SERPL-MCNC: 131 MG/DL (ref 65–140)
GLUCOSE SERPL-MCNC: 146 MG/DL (ref 65–140)
GLUCOSE SERPL-MCNC: 151 MG/DL (ref 65–140)
GLUCOSE SERPL-MCNC: 226 MG/DL (ref 65–140)

## 2024-03-22 PROCEDURE — 99232 SBSQ HOSP IP/OBS MODERATE 35: CPT | Performed by: COLON & RECTAL SURGERY

## 2024-03-22 PROCEDURE — 82948 REAGENT STRIP/BLOOD GLUCOSE: CPT

## 2024-03-22 RX ADMIN — HYDROCHLOROTHIAZIDE 12.5 MG: 12.5 TABLET ORAL at 08:09

## 2024-03-22 RX ADMIN — INSULIN LISPRO 1 UNITS: 100 INJECTION, SOLUTION INTRAVENOUS; SUBCUTANEOUS at 08:09

## 2024-03-22 RX ADMIN — ATORVASTATIN CALCIUM 80 MG: 80 TABLET, FILM COATED ORAL at 17:39

## 2024-03-22 RX ADMIN — AMLODIPINE BESYLATE 5 MG: 5 TABLET ORAL at 08:09

## 2024-03-22 RX ADMIN — METOPROLOL SUCCINATE 50 MG: 50 TABLET, EXTENDED RELEASE ORAL at 08:09

## 2024-03-22 RX ADMIN — TAMSULOSIN HYDROCHLORIDE 0.4 MG: 0.4 CAPSULE ORAL at 17:39

## 2024-03-22 RX ADMIN — IRON SUCROSE 300 MG: 20 INJECTION, SOLUTION INTRAVENOUS at 08:09

## 2024-03-22 RX ADMIN — INSULIN LISPRO 2 UNITS: 100 INJECTION, SOLUTION INTRAVENOUS; SUBCUTANEOUS at 13:53

## 2024-03-22 NOTE — PLAN OF CARE
Problem: PAIN - ADULT  Goal: Verbalizes/displays adequate comfort level or baseline comfort level  Description: Interventions:  - Encourage patient to monitor pain and request assistance  - Assess pain using appropriate pain scale  - Administer analgesics based on type and severity of pain and evaluate response  - Implement non-pharmacological measures as appropriate and evaluate response  - Consider cultural and social influences on pain and pain management  - Notify physician/advanced practitioner if interventions unsuccessful or patient reports new pain  Outcome: Progressing     Problem: INFECTION - ADULT  Goal: Absence or prevention of progression during hospitalization  Description: INTERVENTIONS:  - Assess and monitor for signs and symptoms of infection  - Monitor lab/diagnostic results  - Monitor all insertion sites, i.e. indwelling lines, tubes, and drains  - Monitor endotracheal if appropriate and nasal secretions for changes in amount and color  - [place] appropriate cooling/warming therapies per order  - Administer medications as ordered  - Instruct and encourage patient and family to use good hand hygiene technique  - Identify and instruct in appropriate isolation precautions for identified infection/condition  Outcome: Progressing     Problem: SAFETY ADULT  Goal: Patient will remain free of falls  Description: INTERVENTIONS:  - Educate patient/family on patient safety including physical limitations  - Instruct patient to call for assistance with activity   - Consult OT/PT to assist with strengthening/mobility   - Keep Call bell within reach  - Keep bed low and locked with side rails adjusted as appropriate  - Keep care items and personal belongings within reach  - Initiate and maintain comfort rounds  - Make Fall Risk Sign visible to staff  - Offer Toileting every  Hours, in advance of need  - Initiate/Maintain alarm  - Obtain necessary fall risk management equipment:   - Apply yellow socks and  bracelet for high fall risk patients  - Consider moving patient to room near nurses station  Outcome: Progressing

## 2024-03-22 NOTE — CASE MANAGEMENT
Case Management Progress Note    Patient name Frantz Vasquez  Location [facility] 910/[facility] 910- MRN [id number]  : 1945 Date 3/22/2024       LOS (days): 3  Geometric Mean LOS (GMLOS) (days): 2.8  Days to GMLOS:0        OBJECTIVE:        Current admission status: Inpatient  Preferred Pharmacy:   [facility]  [address] [address]  [facility] [address]  Phone: [phone]     [id number] - [place], [place] - [facility] [id number] [facility] [address]  Phone: [phone] Fax: [phone]    [facility] - [address] [address]  Phone: [phone] Fax: [phone]    Primary Care Provider: Melissa Corey MD    Primary Insurance: MEDICARE  Secondary Insurance: [employer]    PROGRESS NOTE: CM consulted with medical provider, pt not medically cleared for discharge.  Pt scheduled to go to OR on Monday.  CM will continue to follow for discharge planning needs.

## 2024-03-22 NOTE — QUICK NOTE
Patient switched to colorectal surgery service on 3/21/24 from [facility] Internal [facility]. Spoke w/colorectal service and they are not requesting consult. We will sign off. Please tiger text us w any questions should the need arise.    Kera Villarreal MD   PGY-3 Internal Medicine  [place] Resident  [place] Senior Resident

## 2024-03-23 LAB
ANION GAP SERPL CALCULATED.3IONS-SCNC: 8 MMOL/L (ref 4–13)
BASOPHILS # BLD AUTO: 0.02 THOUSANDS/ÂΜL (ref 0–0.1)
BASOPHILS NFR BLD AUTO: 0 % (ref 0–1)
BUN SERPL-MCNC: 18 MG/DL (ref 5–25)
CALCIUM SERPL-MCNC: 9.2 MG/DL (ref 8.4–10.2)
CHLORIDE SERPL-SCNC: 101 MMOL/L (ref 96–108)
CO2 SERPL-SCNC: 30 MMOL/L (ref 21–32)
CREAT SERPL-MCNC: 1.18 MG/DL (ref 0.6–1.3)
EOSINOPHIL # BLD AUTO: 0.21 THOUSAND/ÂΜL (ref 0–0.61)
EOSINOPHIL NFR BLD AUTO: 3 % (ref 0–6)
ERYTHROCYTE [DISTWIDTH] IN BLOOD BY AUTOMATED COUNT: 15 % (ref 11.6–15.1)
GFR SERPL CREATININE-BSD FRML MDRD: 59 ML/MIN/1.73SQ M
GLUCOSE SERPL-MCNC: 124 MG/DL (ref 65–140)
GLUCOSE SERPL-MCNC: 128 MG/DL (ref 65–140)
GLUCOSE SERPL-MCNC: 137 MG/DL (ref 65–140)
GLUCOSE SERPL-MCNC: 172 MG/DL (ref 65–140)
GLUCOSE SERPL-MCNC: 216 MG/DL (ref 65–140)
HCT VFR BLD AUTO: 26.3 % (ref 36.5–49.3)
HGB BLD-MCNC: 7.8 G/DL (ref 12–17)
IMM GRANULOCYTES # BLD AUTO: 0.05 THOUSAND/UL (ref 0–0.2)
IMM GRANULOCYTES NFR BLD AUTO: 1 % (ref 0–2)
LYMPHOCYTES # BLD AUTO: 0.95 THOUSANDS/ÂΜL (ref 0.6–4.47)
LYMPHOCYTES NFR BLD AUTO: 13 % (ref 14–44)
MCH RBC QN AUTO: 23.4 PG (ref 26.8–34.3)
MCHC RBC AUTO-ENTMCNC: 29.7 G/DL (ref 31.4–37.4)
MCV RBC AUTO: 79 FL (ref 82–98)
MONOCYTES # BLD AUTO: 0.74 THOUSAND/ÂΜL (ref 0.17–1.22)
MONOCYTES NFR BLD AUTO: 10 % (ref 4–12)
NEUTROPHILS # BLD AUTO: 5.39 THOUSANDS/ÂΜL (ref 1.85–7.62)
NEUTS SEG NFR BLD AUTO: 73 % (ref 43–75)
NRBC BLD AUTO-RTO: 0 /100 WBCS
PLATELET # BLD AUTO: 215 THOUSANDS/UL (ref 149–390)
PMV BLD AUTO: 11 FL (ref 8.9–12.7)
POTASSIUM SERPL-SCNC: 3.8 MMOL/L (ref 3.5–5.3)
RBC # BLD AUTO: 3.34 MILLION/UL (ref 3.88–5.62)
SODIUM SERPL-SCNC: 139 MMOL/L (ref 135–147)
WBC # BLD AUTO: 7.36 THOUSAND/UL (ref 4.31–10.16)

## 2024-03-23 PROCEDURE — 80048 BASIC METABOLIC PNL TOTAL CA: CPT | Performed by: PHYSICIAN ASSISTANT

## 2024-03-23 PROCEDURE — 99232 SBSQ HOSP IP/OBS MODERATE 35: CPT | Performed by: COLON & RECTAL SURGERY

## 2024-03-23 PROCEDURE — 82948 REAGENT STRIP/BLOOD GLUCOSE: CPT

## 2024-03-23 PROCEDURE — 85025 COMPLETE CBC W/AUTO DIFF WBC: CPT | Performed by: PHYSICIAN ASSISTANT

## 2024-03-23 RX ORDER — SODIUM CHLORIDE, SODIUM GLUCONATE, SODIUM ACETATE, POTASSIUM CHLORIDE, MAGNESIUM CHLORIDE, SODIUM PHOSPHATE, DIBASIC, AND POTASSIUM PHOSPHATE .53; .5; .37; .037; .03; .012; .00082 G/100ML; G/100ML; G/100ML; G/100ML; G/100ML; G/100ML; G/100ML
100 INJECTION, SOLUTION INTRAVENOUS CONTINUOUS
Status: DISCONTINUED | OUTPATIENT
Start: 2024-03-24 | End: 2024-03-23

## 2024-03-23 RX ORDER — BISACODYL 5 MG/1
10 TABLET, DELAYED RELEASE ORAL 2 TIMES DAILY
Status: COMPLETED | OUTPATIENT
Start: 2024-03-24 | End: 2024-03-24

## 2024-03-23 RX ORDER — NEOMYCIN SULFATE 500 MG/1
1000 TABLET ORAL 3 TIMES DAILY
Status: COMPLETED | OUTPATIENT
Start: 2024-03-24 | End: 2024-03-25

## 2024-03-23 RX ORDER — POLYETHYLENE GLYCOL 3350 17 G/17G
238 POWDER, FOR SOLUTION ORAL ONCE
Status: COMPLETED | OUTPATIENT
Start: 2024-03-24 | End: 2024-03-24

## 2024-03-23 RX ORDER — SODIUM CHLORIDE, SODIUM GLUCONATE, SODIUM ACETATE, POTASSIUM CHLORIDE, MAGNESIUM CHLORIDE, SODIUM PHOSPHATE, DIBASIC, AND POTASSIUM PHOSPHATE .53; .5; .37; .037; .03; .012; .00082 G/100ML; G/100ML; G/100ML; G/100ML; G/100ML; G/100ML; G/100ML
75 INJECTION, SOLUTION INTRAVENOUS CONTINUOUS
Status: DISCONTINUED | OUTPATIENT
Start: 2024-03-24 | End: 2024-03-25

## 2024-03-23 RX ORDER — POTASSIUM CHLORIDE 20 MEQ/1
20 TABLET, EXTENDED RELEASE ORAL ONCE
Status: COMPLETED | OUTPATIENT
Start: 2024-03-23 | End: 2024-03-23

## 2024-03-23 RX ORDER — METRONIDAZOLE 500 MG/1
500 TABLET ORAL 3 TIMES DAILY
Status: COMPLETED | OUTPATIENT
Start: 2024-03-24 | End: 2024-03-24

## 2024-03-23 RX ADMIN — INSULIN LISPRO 2 UNITS: 100 INJECTION, SOLUTION INTRAVENOUS; SUBCUTANEOUS at 12:04

## 2024-03-23 RX ADMIN — IRON SUCROSE 300 MG: 20 INJECTION, SOLUTION INTRAVENOUS at 16:54

## 2024-03-23 RX ADMIN — HEPARIN SODIUM 5000 UNITS: 5000 INJECTION INTRAVENOUS; SUBCUTANEOUS at 13:51

## 2024-03-23 RX ADMIN — HYDROCHLOROTHIAZIDE 12.5 MG: 12.5 TABLET ORAL at 09:03

## 2024-03-23 RX ADMIN — METOPROLOL SUCCINATE 50 MG: 50 TABLET, EXTENDED RELEASE ORAL at 09:03

## 2024-03-23 RX ADMIN — TAMSULOSIN HYDROCHLORIDE 0.4 MG: 0.4 CAPSULE ORAL at 15:55

## 2024-03-23 RX ADMIN — POTASSIUM CHLORIDE 20 MEQ: 1500 TABLET, EXTENDED RELEASE ORAL at 09:03

## 2024-03-23 RX ADMIN — HEPARIN SODIUM 5000 UNITS: 5000 INJECTION INTRAVENOUS; SUBCUTANEOUS at 06:03

## 2024-03-23 RX ADMIN — HEPARIN SODIUM 5000 UNITS: 5000 INJECTION INTRAVENOUS; SUBCUTANEOUS at 22:29

## 2024-03-23 RX ADMIN — AMLODIPINE BESYLATE 5 MG: 5 TABLET ORAL at 09:03

## 2024-03-23 RX ADMIN — ATORVASTATIN CALCIUM 80 MG: 80 TABLET, FILM COATED ORAL at 15:55

## 2024-03-23 NOTE — PROGRESS NOTES
"Progress Note - Colorectal Surgery   Frantz Vasquez 78 y.o. male MRN: [id number]  Unit/Bed#: [facility] 910-01 Encounter: [id number]    Assessment:  78 year old male admitted for symptomatic iron deficiency amenia receiving Venofer infusions s/p colonoscopy today revealing malignant appearing mass (4 x 4 cm) in the mid ascending colon.     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    Plan:  -Continue diet as tolerated, transition to CLD tomorrow  -Will plan for [place] bowel prep on 3/24  -Surgical planning for Monday 3/25  -DVT ppx      Subjective/Objective     Subjective: No acute events overnight. Doing well, no bloody bms.    Objective:     Blood pressure 128/65, pulse 77, temperature 98.8 °F (37.1 °C), resp. rate 18, height 5' 10\" (1.778 m), weight 68.3 kg (150 lb 9.2 oz), SpO2 100%.,Body mass index is 21.61 kg/m².        Invasive Devices       Peripheral Intravenous Line  Duration             Peripheral IV 03/23/24 Left;Proximal;Ventral (anterior) Forearm <1 day                    Physical Exam:  General: NAD  HENT: NCAT MMM  Neck: supple, no JVD  CV: nl rate  Lungs: nl wob. No resp distress  ABD: Soft, nontender, nondistended  Extrem: No CCE  Neuro: AAOx3      Lab, Imaging and other studies:  Recent Labs     03/21/24  0436 03/23/24  0419   WBC 9.55 7.36   HGB 7.9* 7.8*    215   SODIUM 140 139   K 3.8 3.8    101   CO2 29 30   BUN 16 18   CREATININE 1.12 1.18   GLUC 118 124   CALCIUM 9.2 9.2   AST 17  --    ALT 19  --    ALKPHOS 69  --    TBILI 0.33  --      VTE Pharmacologic Prophylaxis: Heparin  VTE Mechanical Prophylaxis: sequential compression device        "

## 2024-03-23 NOTE — PLAN OF CARE
Problem: PAIN - ADULT  Goal: Verbalizes/displays adequate comfort level or baseline comfort level  Description: Interventions:  - Encourage patient to monitor pain and request assistance  - Assess pain using appropriate pain scale  - Administer analgesics based on type and severity of pain and evaluate response  - Implement non-pharmacological measures as appropriate and evaluate response  - Consider cultural and social influences on pain and pain management  - Notify physician/advanced practitioner if interventions unsuccessful or patient reports new pain  Outcome: Progressing     Problem: INFECTION - ADULT  Goal: Absence or prevention of progression during hospitalization  Description: INTERVENTIONS:  - Assess and monitor for signs and symptoms of infection  - Monitor lab/diagnostic results  - Monitor all insertion sites, i.e. indwelling lines, tubes, and drains  - Monitor endotracheal if appropriate and nasal secretions for changes in amount and color  - [place] appropriate cooling/warming therapies per order  - Administer medications as ordered  - Instruct and encourage patient and family to use good hand hygiene technique  - Identify and instruct in appropriate isolation precautions for identified infection/condition  Outcome: Progressing  Goal: Absence of fever/infection during neutropenic period  Description: INTERVENTIONS:  - Monitor WBC    Outcome: Progressing     Problem: SAFETY ADULT  Goal: Patient will remain free of falls  Description: INTERVENTIONS:  - Educate patient/family on patient safety including physical limitations  - Instruct patient to call for assistance with activity   - Consult OT/PT to assist with strengthening/mobility   - Keep Call bell within reach  - Keep bed low and locked with side rails adjusted as appropriate  - Keep care items and personal belongings within reach  - Initiate and maintain comfort rounds  - Make Fall Risk Sign visible to staff  - Offer Toileting every  Hours,  in advance of need  - Initiate/Maintain alarm  - Obtain necessary fall risk management equipment:   - Apply yellow socks and bracelet for high fall risk patients  - Consider moving patient to room near nurses station  Outcome: Progressing  Goal: Maintain or return to baseline ADL function  Description: INTERVENTIONS:  -  Assess patient's ability to carry out ADLs; assess patient's baseline for ADL function and identify physical deficits which impact ability to perform ADLs (bathing, care of mouth/teeth, toileting, grooming, dressing, etc.)  - Assess/evaluate cause of self-care deficits   - Assess range of motion  - Assess patient's mobility; develop plan if impaired  - Assess patient's need for assistive devices and provide as appropriate  - Encourage maximum independence but intervene and supervise when necessary  - Involve family in performance of ADLs  - Assess for home care needs following discharge   - Consider OT consult to assist with ADL evaluation and planning for discharge  - Provide patient education as appropriate  Outcome: Progressing  Goal: Maintains/Returns to pre admission functional level  Description: INTERVENTIONS:  - Perform AM-PAC 6 Click Basic Mobility/ Daily Activity assessment daily.  - Set and communicate daily mobility goal to care team and patient/family/caregiver.   - Collaborate with rehabilitation services on mobility goals if consulted  - Perform Range of Motion  times a day.  - Reposition patient every  hours.  - Dangle patient  times a day  - Stand patient  times a day  - Ambulate patient  times a day  - Out of bed to chair  times a day   - Out of bed for meals  times a day  - Out of bed for toileting  - Record patient progress and toleration of activity level   Outcome: Progressing     Problem: DISCHARGE PLANNING  Goal: Discharge to home or other facility with appropriate resources  Description: INTERVENTIONS:  - Identify barriers to discharge w/patient and caregiver  - Arrange for  needed discharge resources and transportation as appropriate  - Identify discharge learning needs (meds, wound care, etc.)  - Arrange for interpretive services to assist at discharge as needed  - Refer to Case Management Department for coordinating discharge planning if the patient needs post-hospital services based on physician/advanced practitioner order or complex needs related to functional status, cognitive ability, or social support system  Outcome: Progressing     Problem: Knowledge Deficit  Goal: Patient/family/caregiver demonstrates understanding of disease process, treatment plan, medications, and discharge instructions  Description: Complete learning assessment and assess knowledge base.  Interventions:  - Provide teaching at level of understanding  - Provide teaching via preferred learning methods  Outcome: Progressing

## 2024-03-23 NOTE — PLAN OF CARE
Problem: PAIN - ADULT  Goal: Verbalizes/displays adequate comfort level or baseline comfort level  Description: Interventions:  - Encourage patient to monitor pain and request assistance  - Assess pain using appropriate pain scale  - Administer analgesics based on type and severity of pain and evaluate response  - Implement non-pharmacological measures as appropriate and evaluate response  - Consider cultural and social influences on pain and pain management  - Notify physician/advanced practitioner if interventions unsuccessful or patient reports new pain  Outcome: Progressing     Problem: INFECTION - ADULT  Goal: Absence or prevention of progression during hospitalization  Description: INTERVENTIONS:  - Assess and monitor for signs and symptoms of infection  - Monitor lab/diagnostic results  - Monitor all insertion sites, i.e. indwelling lines, tubes, and drains  - Monitor endotracheal if appropriate and nasal secretions for changes in amount and color  - [place] appropriate cooling/warming therapies per order  - Administer medications as ordered  - Instruct and encourage patient and family to use good hand hygiene technique  - Identify and instruct in appropriate isolation precautions for identified infection/condition  Outcome: Progressing  Goal: Absence of fever/infection during neutropenic period  Description: INTERVENTIONS:  - Monitor WBC    Outcome: Progressing     Problem: SAFETY ADULT  Goal: Patient will remain free of falls  Description: INTERVENTIONS:  - Educate patient/family on patient safety including physical limitations  - Instruct patient to call for assistance with activity   - Consult OT/PT to assist with strengthening/mobility   - Keep Call bell within reach  - Keep bed low and locked with side rails adjusted as appropriate  - Keep care items and personal belongings within reach  - Initiate and maintain comfort rounds  - Make Fall Risk Sign visible to staff  - Offer Toileting every  Hours,  in advance of need  - Initiate/Maintain no alarm  - Obtain necessary fall risk management equipment:   - Apply yellow socks and bracelet for high fall risk patients  - Consider moving patient to room near nurses station  Outcome: Progressing  Goal: Maintain or return to baseline ADL function  Description: INTERVENTIONS:  -  Assess patient's ability to carry out ADLs; assess patient's baseline for ADL function and identify physical deficits which impact ability to perform ADLs (bathing, care of mouth/teeth, toileting, grooming, dressing, etc.)  - Assess/evaluate cause of self-care deficits   - Assess range of motion  - Assess patient's mobility; develop plan if impaired  - Assess patient's need for assistive devices and provide as appropriate  - Encourage maximum independence but intervene and supervise when necessary  - Involve family in performance of ADLs  - Assess for home care needs following discharge   - Consider OT consult to assist with ADL evaluation and planning for discharge  - Provide patient education as appropriate  Outcome: Progressing  Goal: Maintains/Returns to pre admission functional level  Description: INTERVENTIONS:  - Perform AM-PAC 6 Click Basic Mobility/ Daily Activity assessment daily.  - Set and communicate daily mobility goal to care team and patient/family/caregiver.   - Collaborate with rehabilitation services on mobility goals if consulted  - Perform Range of Motion  times a day.  - Reposition patient every  hours.  - Dangle patient  times a day  - Stand patient  times a day  - Ambulate patient  times a day  - Out of bed to chair  times a day   - Out of bed for meals  times a day  - Out of bed for toileting  - Record patient progress and toleration of activity level   Outcome: Progressing     Problem: DISCHARGE PLANNING  Goal: Discharge to home or other facility with appropriate resources  Description: INTERVENTIONS:  - Identify barriers to discharge w/patient and caregiver  - Arrange  for needed discharge resources and transportation as appropriate  - Identify discharge learning needs (meds, wound care, etc.)  - Arrange for interpretive services to assist at discharge as needed  - Refer to Case Management Department for coordinating discharge planning if the patient needs post-hospital services based on physician/advanced practitioner order or complex needs related to functional status, cognitive ability, or social support system  Outcome: Progressing     Problem: Knowledge Deficit  Goal: Patient/family/caregiver demonstrates understanding of disease process, treatment plan, medications, and discharge instructions  Description: Complete learning assessment and assess knowledge base.  Interventions:  - Provide teaching at level of understanding  - Provide teaching via preferred learning methods  Outcome: Progressing

## 2024-03-24 LAB
ABO GROUP BLD: NORMAL
BLD GP AB SCN SERPL QL: NEGATIVE
GLUCOSE SERPL-MCNC: 132 MG/DL (ref 65–140)
GLUCOSE SERPL-MCNC: 136 MG/DL (ref 65–140)
GLUCOSE SERPL-MCNC: 211 MG/DL (ref 65–140)
GLUCOSE SERPL-MCNC: 67 MG/DL (ref 65–140)
GLUCOSE SERPL-MCNC: 99 MG/DL (ref 65–140)
RH BLD: POSITIVE
SPECIMEN EXPIRATION DATE: NORMAL

## 2024-03-24 PROCEDURE — 99233 SBSQ HOSP IP/OBS HIGH 50: CPT | Performed by: COLON & RECTAL SURGERY

## 2024-03-24 PROCEDURE — 86850 RBC ANTIBODY SCREEN: CPT | Performed by: SURGERY

## 2024-03-24 PROCEDURE — 86900 BLOOD TYPING SEROLOGIC ABO: CPT | Performed by: SURGERY

## 2024-03-24 PROCEDURE — 86901 BLOOD TYPING SEROLOGIC RH(D): CPT | Performed by: SURGERY

## 2024-03-24 PROCEDURE — 82948 REAGENT STRIP/BLOOD GLUCOSE: CPT

## 2024-03-24 PROCEDURE — 86923 COMPATIBILITY TEST ELECTRIC: CPT

## 2024-03-24 RX ORDER — METRONIDAZOLE 500 MG/100ML
500 INJECTION, SOLUTION INTRAVENOUS
Status: COMPLETED | OUTPATIENT
Start: 2024-03-25 | End: 2024-03-25

## 2024-03-24 RX ORDER — CEFAZOLIN SODIUM 2 G/50ML
2000 SOLUTION INTRAVENOUS
Status: COMPLETED | OUTPATIENT
Start: 2024-03-25 | End: 2024-03-25

## 2024-03-24 RX ADMIN — BISACODYL 10 MG: 5 TABLET, COATED ORAL at 11:53

## 2024-03-24 RX ADMIN — SODIUM CHLORIDE, SODIUM GLUCONATE, SODIUM ACETATE, POTASSIUM CHLORIDE, MAGNESIUM CHLORIDE, SODIUM PHOSPHATE, DIBASIC, AND POTASSIUM PHOSPHATE 75 ML/HR: .53; .5; .37; .037; .03; .012; .00082 INJECTION, SOLUTION INTRAVENOUS at 10:36

## 2024-03-24 RX ADMIN — BISACODYL 10 MG: 5 TABLET, COATED ORAL at 18:14

## 2024-03-24 RX ADMIN — METRONIDAZOLE 500 MG: 500 TABLET ORAL at 15:59

## 2024-03-24 RX ADMIN — METRONIDAZOLE 500 MG: 500 TABLET ORAL at 21:54

## 2024-03-24 RX ADMIN — HEPARIN SODIUM 5000 UNITS: 5000 INJECTION INTRAVENOUS; SUBCUTANEOUS at 13:51

## 2024-03-24 RX ADMIN — NEOMYCIN SULFATE 1000 MG: 500 TABLET ORAL at 15:58

## 2024-03-24 RX ADMIN — METRONIDAZOLE 500 MG: 500 TABLET ORAL at 14:28

## 2024-03-24 RX ADMIN — INSULIN LISPRO 2 UNITS: 100 INJECTION, SOLUTION INTRAVENOUS; SUBCUTANEOUS at 11:53

## 2024-03-24 RX ADMIN — HEPARIN SODIUM 5000 UNITS: 5000 INJECTION INTRAVENOUS; SUBCUTANEOUS at 21:54

## 2024-03-24 RX ADMIN — POLYETHYLENE GLYCOL 3350 238 G: 17 POWDER, FOR SOLUTION ORAL at 08:14

## 2024-03-24 RX ADMIN — TAMSULOSIN HYDROCHLORIDE 0.4 MG: 0.4 CAPSULE ORAL at 15:59

## 2024-03-24 RX ADMIN — ATORVASTATIN CALCIUM 80 MG: 80 TABLET, FILM COATED ORAL at 15:59

## 2024-03-24 RX ADMIN — SODIUM CHLORIDE, SODIUM GLUCONATE, SODIUM ACETATE, POTASSIUM CHLORIDE, MAGNESIUM CHLORIDE, SODIUM PHOSPHATE, DIBASIC, AND POTASSIUM PHOSPHATE 75 ML/HR: .53; .5; .37; .037; .03; .012; .00082 INJECTION, SOLUTION INTRAVENOUS at 21:54

## 2024-03-24 RX ADMIN — IRON SUCROSE 300 MG: 20 INJECTION, SOLUTION INTRAVENOUS at 08:14

## 2024-03-24 RX ADMIN — NEOMYCIN SULFATE 1000 MG: 500 TABLET ORAL at 14:28

## 2024-03-24 RX ADMIN — HEPARIN SODIUM 5000 UNITS: 5000 INJECTION INTRAVENOUS; SUBCUTANEOUS at 06:53

## 2024-03-24 NOTE — PLAN OF CARE
Problem: PAIN - ADULT  Goal: Verbalizes/displays adequate comfort level or baseline comfort level  Description: Interventions:  - Encourage patient to monitor pain and request assistance  - Assess pain using appropriate pain scale  - Administer analgesics based on type and severity of pain and evaluate response  - Implement non-pharmacological measures as appropriate and evaluate response  - Consider cultural and social influences on pain and pain management  - Notify physician/advanced practitioner if interventions unsuccessful or patient reports new pain  Outcome: Progressing     Problem: INFECTION - ADULT  Goal: Absence or prevention of progression during hospitalization  Description: INTERVENTIONS:  - Assess and monitor for signs and symptoms of infection  - Monitor lab/diagnostic results  - Monitor all insertion sites, i.e. indwelling lines, tubes, and drains  - Monitor endotracheal if appropriate and nasal secretions for changes in amount and color  - [place] appropriate cooling/warming therapies per order  - Administer medications as ordered  - Instruct and encourage patient and family to use good hand hygiene technique  - Identify and instruct in appropriate isolation precautions for identified infection/condition  Outcome: Progressing  Goal: Absence of fever/infection during neutropenic period  Description: INTERVENTIONS:  - Monitor WBC    Outcome: Progressing     Problem: SAFETY ADULT  Goal: Patient will remain free of falls  Description: INTERVENTIONS:  - Educate patient/family on patient safety including physical limitations  - Instruct patient to call for assistance with activity   - Consult OT/PT to assist with strengthening/mobility   - Keep Call bell within reach  - Keep bed low and locked with side rails adjusted as appropriate  - Keep care items and personal belongings within reach  - Initiate and maintain comfort rounds  - Make Fall Risk Sign visible to staff  - Offer Toileting every  Hours,  in advance of need  - Initiate/Maintain alarm  - Obtain necessary fall risk management equipment:   - Apply yellow socks and bracelet for high fall risk patients  - Consider moving patient to room near nurses station  Outcome: Progressing  Goal: Maintain or return to baseline ADL function  Description: INTERVENTIONS:  -  Assess patient's ability to carry out ADLs; assess patient's baseline for ADL function and identify physical deficits which impact ability to perform ADLs (bathing, care of mouth/teeth, toileting, grooming, dressing, etc.)  - Assess/evaluate cause of self-care deficits   - Assess range of motion  - Assess patient's mobility; develop plan if impaired  - Assess patient's need for assistive devices and provide as appropriate  - Encourage maximum independence but intervene and supervise when necessary  - Involve family in performance of ADLs  - Assess for home care needs following discharge   - Consider OT consult to assist with ADL evaluation and planning for discharge  - Provide patient education as appropriate  Outcome: Progressing  Goal: Maintains/Returns to pre admission functional level  Description: INTERVENTIONS:  - Perform AM-PAC 6 Click Basic Mobility/ Daily Activity assessment daily.  - Set and communicate daily mobility goal to care team and patient/family/caregiver.   - Collaborate with rehabilitation services on mobility goals if consulted  - Perform Range of Motion  times a day.  - Reposition patient every  hours.  - Dangle patient  times a day  - Stand patient  times a day  - Ambulate patient  times a day  - Out of bed to chair  times a day   - Out of bed for meals  times a day  - Out of bed for toileting  - Record patient progress and toleration of activity level   Outcome: Progressing     Problem: DISCHARGE PLANNING  Goal: Discharge to home or other facility with appropriate resources  Description: INTERVENTIONS:  - Identify barriers to discharge w/patient and caregiver  - Arrange for  needed discharge resources and transportation as appropriate  - Identify discharge learning needs (meds, wound care, etc.)  - Arrange for interpretive services to assist at discharge as needed  - Refer to Case Management Department for coordinating discharge planning if the patient needs post-hospital services based on physician/advanced practitioner order or complex needs related to functional status, cognitive ability, or social support system  Outcome: Progressing     Problem: Knowledge Deficit  Goal: Patient/family/caregiver demonstrates understanding of disease process, treatment plan, medications, and discharge instructions  Description: Complete learning assessment and assess knowledge base.  Interventions:  - Provide teaching at level of understanding  - Provide teaching via preferred learning methods  Outcome: Progressing

## 2024-03-24 NOTE — PLAN OF CARE
Problem: PAIN - ADULT  Goal: Verbalizes/displays adequate comfort level or baseline comfort level  Description: Interventions:  - Encourage patient to monitor pain and request assistance  - Assess pain using appropriate pain scale  - Administer analgesics based on type and severity of pain and evaluate response  - Implement non-pharmacological measures as appropriate and evaluate response  - Consider cultural and social influences on pain and pain management  - Notify physician/advanced practitioner if interventions unsuccessful or patient reports new pain  Outcome: Progressing     Problem: INFECTION - ADULT  Goal: Absence or prevention of progression during hospitalization  Description: INTERVENTIONS:  - Assess and monitor for signs and symptoms of infection  - Monitor lab/diagnostic results  - Monitor all insertion sites, i.e. indwelling lines, tubes, and drains  - Monitor endotracheal if appropriate and nasal secretions for changes in amount and color  - [place] appropriate cooling/warming therapies per order  - Administer medications as ordered  - Instruct and encourage patient and family to use good hand hygiene technique  - Identify and instruct in appropriate isolation precautions for identified infection/condition  Outcome: Progressing  Goal: Absence of fever/infection during neutropenic period  Description: INTERVENTIONS:  - Monitor WBC    Outcome: Progressing     Problem: SAFETY ADULT  Goal: Patient will remain free of falls  Description: INTERVENTIONS:  - Educate patient/family on patient safety including physical limitations  - Instruct patient to call for assistance with activity   - Consult OT/PT to assist with strengthening/mobility   - Keep Call bell within reach  - Keep bed low and locked with side rails adjusted as appropriate  - Keep care items and personal belongings within reach  - Initiate and maintain comfort rounds  - Make Fall Risk Sign visible to staff  - Offer Toileting every  Hours,  in advance of need  - Initiate/Maintain alarm  - Obtain necessary fall risk management equipment:   - Apply yellow socks and bracelet for high fall risk patients  - Consider moving patient to room near nurses station  Outcome: Progressing  Goal: Maintain or return to baseline ADL function  Description: INTERVENTIONS:  -  Assess patient's ability to carry out ADLs; assess patient's baseline for ADL function and identify physical deficits which impact ability to perform ADLs (bathing, care of mouth/teeth, toileting, grooming, dressing, etc.)  - Assess/evaluate cause of self-care deficits   - Assess range of motion  - Assess patient's mobility; develop plan if impaired  - Assess patient's need for assistive devices and provide as appropriate  - Encourage maximum independence but intervene and supervise when necessary  - Involve family in performance of ADLs  - Assess for home care needs following discharge   - Consider OT consult to assist with ADL evaluation and planning for discharge  - Provide patient education as appropriate  Outcome: Progressing  Goal: Maintains/Returns to pre admission functional level  Description: INTERVENTIONS:  - Perform AM-PAC 6 Click Basic Mobility/ Daily Activity assessment daily.  - Set and communicate daily mobility goal to care team and patient/family/caregiver.   - Collaborate with rehabilitation services on mobility goals if consulted  - Perform Range of Motion  times a day.  - Reposition patient every  hours.  - Dangle patient  times a day  - Stand patient  times a day  - Ambulate patient  times a day  - Out of bed to chair  times a day   - Out of bed for meals  times a day  - Out of bed for toileting  - Record patient progress and toleration of activity level   Outcome: Progressing     Problem: DISCHARGE PLANNING  Goal: Discharge to home or other facility with appropriate resources  Description: INTERVENTIONS:  - Identify barriers to discharge w/patient and caregiver  - Arrange for  needed discharge resources and transportation as appropriate  - Identify discharge learning needs (meds, wound care, etc.)  - Arrange for interpretive services to assist at discharge as needed  - Refer to Case Management Department for coordinating discharge planning if the patient needs post-hospital services based on physician/advanced practitioner order or complex needs related to functional status, cognitive ability, or social support system  Outcome: Progressing     Problem: Knowledge Deficit  Goal: Patient/family/caregiver demonstrates understanding of disease process, treatment plan, medications, and discharge instructions  Description: Complete learning assessment and assess knowledge base.  Interventions:  - Provide teaching at level of understanding  - Provide teaching via preferred learning methods  Outcome: Progressing

## 2024-03-24 NOTE — PROGRESS NOTES
"Colorectal Surgery  Progress Note   Frantz Vasquez 78 y.o. male MRN: [id number]  Unit/Bed#: [facility] 910-01 Encounter: [id number]    Assessment:  78 year old male admitted for symptomatic iron deficiency amenia receiving Venofer infusions s/p colonoscopy today revealing malignant appearing mass (4 x 4 cm) in the mid ascending colon.     Vital signs stable, afebrile.     No new labs    Plan:  CLD with clear Ensures as tolerated  Bowel prep today for planned operative intervention tomorrow  NPO pMN  Isolyte 75  Continue daily Venofer infusions   DVT ppx: Heparin  Pain/ nausea control PRN  OOB/ ambulation  Incentive Spirometry      Subjective/Objective   Subjective:   Patient seen and examined at bedside, in no acute distress. No acute events overnight. Denies pain.    Objective:   Vitals:Blood pressure 108/65, pulse 75, temperature 98.9 °F (37.2 °C), resp. rate 18, height 5' 10\" (1.778 m), weight 68.3 kg (150 lb 9.2 oz), SpO2 99%.  Temp (24hrs), Av.8 °F (37.1 °C), Min:98.6 °F (37 °C), Max:98.9 °F (37.2 °C)        Intake/Output Summary (Last 24 hours) at 3/24/2024 0754  Last data filed at 3/23/2024 0911  Gross per 24 hour   Intake 180 ml   Output --   Net 180 ml       Invasive Devices       Peripheral Intravenous Line  Duration             Peripheral IV 24 Dorsal (posterior);Right Forearm <1 day                    Physical Exam:  General: No acute distress, alert and oriented  CV: Well perfused, regular rate and rhythm  Lungs: Normal work of breathing, no increased respiratory effort   Abdomen: Soft, non-tender, non-distended.  Extremities: No edema, clubbing or cyanosis  Skin: Warm, dry    Lab Results: Results: I have personally reviewed all pertinent laboratory/tests results  VTE Prophylaxis: Sequential compression device (Venodyne)  and Heparin    "

## 2024-03-25 ENCOUNTER — ANESTHESIA EVENT (INPATIENT)
Dept: PERIOP | Facility: HOSPITAL | Age: 79
DRG: 331 | End: 2024-03-25
Payer: MEDICARE

## 2024-03-25 ENCOUNTER — ANESTHESIA (INPATIENT)
Dept: PERIOP | Facility: HOSPITAL | Age: 79
DRG: 331 | End: 2024-03-25
Payer: MEDICARE

## 2024-03-25 LAB
ANION GAP SERPL CALCULATED.3IONS-SCNC: 9 MMOL/L (ref 4–13)
BASE EXCESS BLDA CALC-SCNC: -1 MMOL/L (ref -2–3)
BASOPHILS # BLD AUTO: 0.03 THOUSANDS/ÂΜL (ref 0–0.1)
BASOPHILS NFR BLD AUTO: 1 % (ref 0–1)
BUN SERPL-MCNC: 14 MG/DL (ref 5–25)
CA-I BLD-SCNC: 1.25 MMOL/L (ref 1.12–1.32)
CALCIUM SERPL-MCNC: 9.1 MG/DL (ref 8.4–10.2)
CHLORIDE SERPL-SCNC: 103 MMOL/L (ref 96–108)
CO2 SERPL-SCNC: 28 MMOL/L (ref 21–32)
CREAT SERPL-MCNC: 1.16 MG/DL (ref 0.6–1.3)
EOSINOPHIL # BLD AUTO: 0.21 THOUSAND/ÂΜL (ref 0–0.61)
EOSINOPHIL NFR BLD AUTO: 4 % (ref 0–6)
ERYTHROCYTE [DISTWIDTH] IN BLOOD BY AUTOMATED COUNT: 17.3 % (ref 11.6–15.1)
FIO2 GAS DIL.REBREATH: 50 L
GFR SERPL CREATININE-BSD FRML MDRD: 60 ML/MIN/1.73SQ M
GLUCOSE SERPL-MCNC: 112 MG/DL (ref 65–140)
GLUCOSE SERPL-MCNC: 113 MG/DL (ref 65–140)
GLUCOSE SERPL-MCNC: 144 MG/DL (ref 65–140)
GLUCOSE SERPL-MCNC: 152 MG/DL (ref 65–140)
GLUCOSE SERPL-MCNC: 160 MG/DL (ref 65–140)
GLUCOSE SERPL-MCNC: 163 MG/DL (ref 65–140)
GLUCOSE SERPL-MCNC: 166 MG/DL (ref 65–140)
GLUCOSE SERPL-MCNC: 175 MG/DL (ref 65–140)
GLUCOSE SERPL-MCNC: 55 MG/DL (ref 65–140)
HCO3 BLDA-SCNC: 23.7 MMOL/L (ref 22–28)
HCT VFR BLD AUTO: 24.7 % (ref 36.5–49.3)
HCT VFR BLD CALC: 21 % (ref 36.5–49.3)
HGB BLD-MCNC: 7.5 G/DL (ref 12–17)
HGB BLDA-MCNC: 7.1 G/DL (ref 12–17)
IMM GRANULOCYTES # BLD AUTO: 0.04 THOUSAND/UL (ref 0–0.2)
IMM GRANULOCYTES NFR BLD AUTO: 1 % (ref 0–2)
LYMPHOCYTES # BLD AUTO: 0.78 THOUSANDS/ÂΜL (ref 0.6–4.47)
LYMPHOCYTES NFR BLD AUTO: 13 % (ref 14–44)
MCH RBC QN AUTO: 24.7 PG (ref 26.8–34.3)
MCHC RBC AUTO-ENTMCNC: 30.4 G/DL (ref 31.4–37.4)
MCV RBC AUTO: 81 FL (ref 82–98)
MONOCYTES # BLD AUTO: 0.7 THOUSAND/ÂΜL (ref 0.17–1.22)
MONOCYTES NFR BLD AUTO: 12 % (ref 4–12)
NEUTROPHILS # BLD AUTO: 4.17 THOUSANDS/ÂΜL (ref 1.85–7.62)
NEUTS SEG NFR BLD AUTO: 69 % (ref 43–75)
NRBC BLD AUTO-RTO: 0 /100 WBCS
PCO2 BLD: 25 MMOL/L (ref 21–32)
PCO2 BLD: 40.7 MM HG (ref 36–44)
PH BLD: 7.37 [PH] (ref 7.35–7.45)
PLATELET # BLD AUTO: 200 THOUSANDS/UL (ref 149–390)
PMV BLD AUTO: 10.1 FL (ref 8.9–12.7)
PO2 BLD: 206 MM HG (ref 75–129)
POTASSIUM BLD-SCNC: 3.7 MMOL/L (ref 3.5–5.3)
POTASSIUM SERPL-SCNC: 3.6 MMOL/L (ref 3.5–5.3)
RBC # BLD AUTO: 3.04 MILLION/UL (ref 3.88–5.62)
SAO2 % BLD FROM PO2: 100 % (ref 60–85)
SODIUM BLD-SCNC: 139 MMOL/L (ref 136–145)
SODIUM SERPL-SCNC: 140 MMOL/L (ref 135–147)
SPECIMEN SOURCE: ABNORMAL
WBC # BLD AUTO: 5.93 THOUSAND/UL (ref 4.31–10.16)

## 2024-03-25 PROCEDURE — 44204 LAPARO PARTIAL COLECTOMY: CPT

## 2024-03-25 PROCEDURE — NC001 PR NO CHARGE

## 2024-03-25 PROCEDURE — 84295 ASSAY OF SERUM SODIUM: CPT

## 2024-03-25 PROCEDURE — 84132 ASSAY OF SERUM POTASSIUM: CPT

## 2024-03-25 PROCEDURE — 80048 BASIC METABOLIC PNL TOTAL CA: CPT | Performed by: SURGERY

## 2024-03-25 PROCEDURE — 99024 POSTOP FOLLOW-UP VISIT: CPT | Performed by: COLON & RECTAL SURGERY

## 2024-03-25 PROCEDURE — S2900 ROBOTIC SURGICAL SYSTEM: HCPCS | Performed by: COLON & RECTAL SURGERY

## 2024-03-25 PROCEDURE — 82803 BLOOD GASES ANY COMBINATION: CPT

## 2024-03-25 PROCEDURE — 82330 ASSAY OF CALCIUM: CPT

## 2024-03-25 PROCEDURE — 85025 COMPLETE CBC W/AUTO DIFF WBC: CPT | Performed by: SURGERY

## 2024-03-25 PROCEDURE — 44204 LAPARO PARTIAL COLECTOMY: CPT | Performed by: COLON & RECTAL SURGERY

## 2024-03-25 PROCEDURE — 4A1BXSH MONITORING OF GASTROINTESTINAL VASCULAR PERFUSION USING INDOCYANINE GREEN DYE, EXTERNAL APPROACH: ICD-10-PCS | Performed by: COLON & RECTAL SURGERY

## 2024-03-25 PROCEDURE — 8E0W4CZ ROBOTIC ASSISTED PROCEDURE OF TRUNK REGION, PERCUTANEOUS ENDOSCOPIC APPROACH: ICD-10-PCS | Performed by: COLON & RECTAL SURGERY

## 2024-03-25 PROCEDURE — NC001 PR NO CHARGE: Performed by: COLON & RECTAL SURGERY

## 2024-03-25 PROCEDURE — 88309 TISSUE EXAM BY PATHOLOGIST: CPT | Performed by: PATHOLOGY

## 2024-03-25 PROCEDURE — 82947 ASSAY GLUCOSE BLOOD QUANT: CPT

## 2024-03-25 PROCEDURE — 85014 HEMATOCRIT: CPT

## 2024-03-25 PROCEDURE — 82948 REAGENT STRIP/BLOOD GLUCOSE: CPT

## 2024-03-25 PROCEDURE — 0DTF4ZZ RESECTION OF RIGHT LARGE INTESTINE, PERCUTANEOUS ENDOSCOPIC APPROACH: ICD-10-PCS | Performed by: COLON & RECTAL SURGERY

## 2024-03-25 RX ORDER — METHOCARBAMOL 500 MG/1
500 TABLET, FILM COATED ORAL EVERY 8 HOURS
Status: DISCONTINUED | OUTPATIENT
Start: 2024-03-25 | End: 2024-03-28 | Stop reason: HOSPADM

## 2024-03-25 RX ORDER — HYDROMORPHONE HCL/PF 1 MG/ML
SYRINGE (ML) INJECTION AS NEEDED
Status: DISCONTINUED | OUTPATIENT
Start: 2024-03-25 | End: 2024-03-25

## 2024-03-25 RX ORDER — ONDANSETRON 2 MG/ML
INJECTION INTRAMUSCULAR; INTRAVENOUS AS NEEDED
Status: DISCONTINUED | OUTPATIENT
Start: 2024-03-25 | End: 2024-03-25

## 2024-03-25 RX ORDER — ONDANSETRON 2 MG/ML
4 INJECTION INTRAMUSCULAR; INTRAVENOUS EVERY 6 HOURS PRN
Status: DISCONTINUED | OUTPATIENT
Start: 2024-03-25 | End: 2024-03-28 | Stop reason: HOSPADM

## 2024-03-25 RX ORDER — GABAPENTIN 100 MG/1
100 CAPSULE ORAL 3 TIMES DAILY
Status: DISCONTINUED | OUTPATIENT
Start: 2024-03-25 | End: 2024-03-28 | Stop reason: HOSPADM

## 2024-03-25 RX ORDER — ROCURONIUM BROMIDE 10 MG/ML
INJECTION, SOLUTION INTRAVENOUS AS NEEDED
Status: DISCONTINUED | OUTPATIENT
Start: 2024-03-25 | End: 2024-03-25

## 2024-03-25 RX ORDER — PROPOFOL 10 MG/ML
INJECTION, EMULSION INTRAVENOUS AS NEEDED
Status: DISCONTINUED | OUTPATIENT
Start: 2024-03-25 | End: 2024-03-25

## 2024-03-25 RX ORDER — INSULIN LISPRO 100 [IU]/ML
1-5 INJECTION, SOLUTION INTRAVENOUS; SUBCUTANEOUS EVERY 6 HOURS
Status: DISCONTINUED | OUTPATIENT
Start: 2024-03-25 | End: 2024-03-26

## 2024-03-25 RX ORDER — SODIUM CHLORIDE, SODIUM LACTATE, POTASSIUM CHLORIDE, CALCIUM CHLORIDE 600; 310; 30; 20 MG/100ML; MG/100ML; MG/100ML; MG/100ML
INJECTION, SOLUTION INTRAVENOUS CONTINUOUS PRN
Status: DISCONTINUED | OUTPATIENT
Start: 2024-03-25 | End: 2024-03-25

## 2024-03-25 RX ORDER — HEPARIN SODIUM 5000 [USP'U]/ML
INJECTION, SOLUTION INTRAVENOUS; SUBCUTANEOUS AS NEEDED
Status: DISCONTINUED | OUTPATIENT
Start: 2024-03-25 | End: 2024-03-25

## 2024-03-25 RX ORDER — ONDANSETRON 2 MG/ML
4 INJECTION INTRAMUSCULAR; INTRAVENOUS ONCE AS NEEDED
Status: DISCONTINUED | OUTPATIENT
Start: 2024-03-25 | End: 2024-03-25

## 2024-03-25 RX ORDER — SODIUM CHLORIDE 9 MG/ML
INJECTION, SOLUTION INTRAVENOUS CONTINUOUS PRN
Status: DISCONTINUED | OUTPATIENT
Start: 2024-03-25 | End: 2024-03-25

## 2024-03-25 RX ORDER — HYDROMORPHONE HCL IN WATER/PF 6 MG/30 ML
0.2 PATIENT CONTROLLED ANALGESIA SYRINGE INTRAVENOUS
Status: DISCONTINUED | OUTPATIENT
Start: 2024-03-25 | End: 2024-03-25

## 2024-03-25 RX ORDER — MAGNESIUM HYDROXIDE 1200 MG/15ML
LIQUID ORAL AS NEEDED
Status: DISCONTINUED | OUTPATIENT
Start: 2024-03-25 | End: 2024-03-25 | Stop reason: HOSPADM

## 2024-03-25 RX ORDER — LIDOCAINE HYDROCHLORIDE 10 MG/ML
INJECTION, SOLUTION EPIDURAL; INFILTRATION; INTRACAUDAL; PERINEURAL AS NEEDED
Status: DISCONTINUED | OUTPATIENT
Start: 2024-03-25 | End: 2024-03-25

## 2024-03-25 RX ORDER — LIDOCAINE HYDROCHLORIDE 10 MG/ML
INJECTION, SOLUTION EPIDURAL; INFILTRATION; INTRACAUDAL; PERINEURAL AS NEEDED
Status: DISCONTINUED | OUTPATIENT
Start: 2024-03-25 | End: 2024-03-25 | Stop reason: HOSPADM

## 2024-03-25 RX ORDER — PHENYLEPHRINE HCL IN 0.9% NACL 1 MG/10 ML
SYRINGE (ML) INTRAVENOUS AS NEEDED
Status: DISCONTINUED | OUTPATIENT
Start: 2024-03-25 | End: 2024-03-25

## 2024-03-25 RX ORDER — DEXAMETHASONE SODIUM PHOSPHATE 10 MG/ML
INJECTION, SOLUTION INTRAMUSCULAR; INTRAVENOUS AS NEEDED
Status: DISCONTINUED | OUTPATIENT
Start: 2024-03-25 | End: 2024-03-25

## 2024-03-25 RX ORDER — FENTANYL CITRATE 50 UG/ML
INJECTION, SOLUTION INTRAMUSCULAR; INTRAVENOUS AS NEEDED
Status: DISCONTINUED | OUTPATIENT
Start: 2024-03-25 | End: 2024-03-25

## 2024-03-25 RX ORDER — GLYCOPYRROLATE 0.2 MG/ML
INJECTION INTRAMUSCULAR; INTRAVENOUS AS NEEDED
Status: DISCONTINUED | OUTPATIENT
Start: 2024-03-25 | End: 2024-03-25

## 2024-03-25 RX ORDER — ACETAMINOPHEN 325 MG/1
975 TABLET ORAL EVERY 8 HOURS
Status: DISCONTINUED | OUTPATIENT
Start: 2024-03-25 | End: 2024-03-28 | Stop reason: HOSPADM

## 2024-03-25 RX ORDER — SODIUM CHLORIDE, SODIUM GLUCONATE, SODIUM ACETATE, POTASSIUM CHLORIDE, MAGNESIUM CHLORIDE, SODIUM PHOSPHATE, DIBASIC, AND POTASSIUM PHOSPHATE .53; .5; .37; .037; .03; .012; .00082 G/100ML; G/100ML; G/100ML; G/100ML; G/100ML; G/100ML; G/100ML
84 INJECTION, SOLUTION INTRAVENOUS CONTINUOUS
Status: DISCONTINUED | OUTPATIENT
Start: 2024-03-25 | End: 2024-03-27

## 2024-03-25 RX ORDER — INDOCYANINE GREEN AND WATER 25 MG
KIT INJECTION AS NEEDED
Status: DISCONTINUED | OUTPATIENT
Start: 2024-03-25 | End: 2024-03-25

## 2024-03-25 RX ADMIN — PROPOFOL 150 MG: 10 INJECTION, EMULSION INTRAVENOUS at 12:11

## 2024-03-25 RX ADMIN — INSULIN LISPRO 1 UNITS: 100 INJECTION, SOLUTION INTRAVENOUS; SUBCUTANEOUS at 23:03

## 2024-03-25 RX ADMIN — GABAPENTIN 100 MG: 100 CAPSULE ORAL at 21:38

## 2024-03-25 RX ADMIN — NOREPINEPHRINE BITARTRATE 2 MCG/MIN: 1 INJECTION, SOLUTION, CONCENTRATE INTRAVENOUS at 13:34

## 2024-03-25 RX ADMIN — SODIUM CHLORIDE: 0.9 INJECTION, SOLUTION INTRAVENOUS at 12:05

## 2024-03-25 RX ADMIN — NEOMYCIN SULFATE 1000 MG: 500 TABLET ORAL at 00:08

## 2024-03-25 RX ADMIN — METHOCARBAMOL 500 MG: 500 TABLET ORAL at 17:09

## 2024-03-25 RX ADMIN — GABAPENTIN 100 MG: 100 CAPSULE ORAL at 17:09

## 2024-03-25 RX ADMIN — Medication 100 MCG: at 12:11

## 2024-03-25 RX ADMIN — ROCURONIUM BROMIDE 20 MG: 10 INJECTION, SOLUTION INTRAVENOUS at 12:54

## 2024-03-25 RX ADMIN — TAMSULOSIN HYDROCHLORIDE 0.4 MG: 0.4 CAPSULE ORAL at 17:09

## 2024-03-25 RX ADMIN — ACETAMINOPHEN 975 MG: 325 TABLET, FILM COATED ORAL at 17:09

## 2024-03-25 RX ADMIN — HYDROMORPHONE HYDROCHLORIDE 1 MG: 1 INJECTION, SOLUTION INTRAMUSCULAR; INTRAVENOUS; SUBCUTANEOUS at 12:53

## 2024-03-25 RX ADMIN — ACETAMINOPHEN 975 MG: 325 TABLET, FILM COATED ORAL at 23:02

## 2024-03-25 RX ADMIN — INDOCYANINE GREEN AND WATER 7.5 MG: KIT at 14:16

## 2024-03-25 RX ADMIN — SODIUM CHLORIDE, SODIUM LACTATE, POTASSIUM CHLORIDE, AND CALCIUM CHLORIDE: .6; .31; .03; .02 INJECTION, SOLUTION INTRAVENOUS at 13:52

## 2024-03-25 RX ADMIN — SODIUM CHLORIDE, SODIUM GLUCONATE, SODIUM ACETATE, POTASSIUM CHLORIDE, MAGNESIUM CHLORIDE, SODIUM PHOSPHATE, DIBASIC, AND POTASSIUM PHOSPHATE 100 ML/HR: .53; .5; .37; .037; .03; .012; .00082 INJECTION, SOLUTION INTRAVENOUS at 17:10

## 2024-03-25 RX ADMIN — METRONIDAZOLE: 500 INJECTION, SOLUTION INTRAVENOUS at 12:31

## 2024-03-25 RX ADMIN — ONDANSETRON 4 MG: 2 INJECTION INTRAMUSCULAR; INTRAVENOUS at 14:34

## 2024-03-25 RX ADMIN — CEFAZOLIN SODIUM 2000 MG: 2 SOLUTION INTRAVENOUS at 12:35

## 2024-03-25 RX ADMIN — DEXAMETHASONE SODIUM PHOSPHATE 10 MG: 10 INJECTION, SOLUTION INTRAMUSCULAR; INTRAVENOUS at 12:16

## 2024-03-25 RX ADMIN — IRON SUCROSE 300 MG: 20 INJECTION, SOLUTION INTRAVENOUS at 09:19

## 2024-03-25 RX ADMIN — GLYCOPYRROLATE 0.2 MCG: 0.2 INJECTION, SOLUTION INTRAMUSCULAR; INTRAVENOUS at 13:49

## 2024-03-25 RX ADMIN — SUGAMMADEX 200 MG: 100 INJECTION, SOLUTION INTRAVENOUS at 14:34

## 2024-03-25 RX ADMIN — METOPROLOL SUCCINATE 50 MG: 50 TABLET, EXTENDED RELEASE ORAL at 09:19

## 2024-03-25 RX ADMIN — ATORVASTATIN CALCIUM 80 MG: 80 TABLET, FILM COATED ORAL at 17:09

## 2024-03-25 RX ADMIN — LIDOCAINE HYDROCHLORIDE 50 MG: 10 INJECTION, SOLUTION EPIDURAL; INFILTRATION; INTRACAUDAL; PERINEURAL at 12:11

## 2024-03-25 RX ADMIN — FENTANYL CITRATE 50 MCG: 50 INJECTION INTRAMUSCULAR; INTRAVENOUS at 12:05

## 2024-03-25 RX ADMIN — AMLODIPINE BESYLATE 5 MG: 5 TABLET ORAL at 09:19

## 2024-03-25 RX ADMIN — Medication: at 15:51

## 2024-03-25 RX ADMIN — FENTANYL CITRATE 50 MCG: 50 INJECTION INTRAMUSCULAR; INTRAVENOUS at 12:11

## 2024-03-25 RX ADMIN — HEPARIN SODIUM 5000 UNITS: 5000 INJECTION INTRAVENOUS; SUBCUTANEOUS at 22:59

## 2024-03-25 RX ADMIN — HEPARIN SODIUM 5000 UNITS: 5000 INJECTION INTRAVENOUS; SUBCUTANEOUS at 12:37

## 2024-03-25 RX ADMIN — ROCURONIUM BROMIDE 30 MG: 10 INJECTION, SOLUTION INTRAVENOUS at 13:55

## 2024-03-25 RX ADMIN — METHOCARBAMOL 500 MG: 500 TABLET ORAL at 23:01

## 2024-03-25 RX ADMIN — ROCURONIUM BROMIDE 50 MG: 10 INJECTION, SOLUTION INTRAVENOUS at 12:11

## 2024-03-25 NOTE — PLAN OF CARE
Problem: PAIN - ADULT  Goal: Verbalizes/displays adequate comfort level or baseline comfort level  Description: Interventions:  - Encourage patient to monitor pain and request assistance  - Assess pain using appropriate pain scale  - Administer analgesics based on type and severity of pain and evaluate response  - Implement non-pharmacological measures as appropriate and evaluate response  - Consider cultural and social influences on pain and pain management  - Notify physician/advanced practitioner if interventions unsuccessful or patient reports new pain  Outcome: Progressing     Problem: INFECTION - ADULT  Goal: Absence or prevention of progression during hospitalization  Description: INTERVENTIONS:  - Assess and monitor for signs and symptoms of infection  - Monitor lab/diagnostic results  - Monitor all insertion sites, i.e. indwelling lines, tubes, and drains  - Monitor endotracheal if appropriate and nasal secretions for changes in amount and color  - [place] appropriate cooling/warming therapies per order  - Administer medications as ordered  - Instruct and encourage patient and family to use good hand hygiene technique  - Identify and instruct in appropriate isolation precautions for identified infection/condition  Outcome: Progressing  Goal: Absence of fever/infection during neutropenic period  Description: INTERVENTIONS:  - Monitor WBC    Outcome: Progressing     Problem: SAFETY ADULT  Goal: Patient will remain free of falls  Description: INTERVENTIONS:  - Educate patient/family on patient safety including physical limitations  - Instruct patient to call for assistance with activity   - Consult OT/PT to assist with strengthening/mobility   - Keep Call bell within reach  - Keep bed low and locked with side rails adjusted as appropriate  - Keep care items and personal belongings within reach  - Initiate and maintain comfort rounds  - Make Fall Risk Sign visible to staff  - Offer Toileting every  Hours,  in advance of need  - Initiate/Maintain alarm  - Obtain necessary fall risk management equipment:   - Apply yellow socks and bracelet for high fall risk patients  - Consider moving patient to room near nurses station  Outcome: Progressing  Goal: Maintain or return to baseline ADL function  Description: INTERVENTIONS:  -  Assess patient's ability to carry out ADLs; assess patient's baseline for ADL function and identify physical deficits which impact ability to perform ADLs (bathing, care of mouth/teeth, toileting, grooming, dressing, etc.)  - Assess/evaluate cause of self-care deficits   - Assess range of motion  - Assess patient's mobility; develop plan if impaired  - Assess patient's need for assistive devices and provide as appropriate  - Encourage maximum independence but intervene and supervise when necessary  - Involve family in performance of ADLs  - Assess for home care needs following discharge   - Consider OT consult to assist with ADL evaluation and planning for discharge  - Provide patient education as appropriate  Outcome: Progressing  Goal: Maintains/Returns to pre admission functional level  Description: INTERVENTIONS:  - Perform AM-PAC 6 Click Basic Mobility/ Daily Activity assessment daily.  - Set and communicate daily mobility goal to care team and patient/family/caregiver.   - Collaborate with rehabilitation services on mobility goals if consulted  - Perform Range of Motion  times a day.  - Reposition patient every  hours.  - Dangle patient  times a day  - Stand patient  times a day  - Ambulate patient  times a day  - Out of bed to chair  times a day   - Out of bed for meals  times a day  - Out of bed for toileting  - Record patient progress and toleration of activity level   Outcome: Progressing     Problem: DISCHARGE PLANNING  Goal: Discharge to home or other facility with appropriate resources  Description: INTERVENTIONS:  - Identify barriers to discharge w/patient and caregiver  - Arrange for  needed discharge resources and transportation as appropriate  - Identify discharge learning needs (meds, wound care, etc.)  - Arrange for interpretive services to assist at discharge as needed  - Refer to Case Management Department for coordinating discharge planning if the patient needs post-hospital services based on physician/advanced practitioner order or complex needs related to functional status, cognitive ability, or social support system  Outcome: Progressing     Problem: Knowledge Deficit  Goal: Patient/family/caregiver demonstrates understanding of disease process, treatment plan, medications, and discharge instructions  Description: Complete learning assessment and assess knowledge base.  Interventions:  - Provide teaching at level of understanding  - Provide teaching via preferred learning methods  Outcome: Progressing     Problem: GASTROINTESTINAL - ADULT  Goal: Minimal or absence of nausea and/or vomiting  Description: INTERVENTIONS:  - Administer IV fluids if ordered to ensure adequate hydration  - Maintain NPO status until nausea and vomiting are resolved  - Nasogastric tube if ordered  - Administer ordered antiemetic medications as needed  - Provide nonpharmacologic comfort measures as appropriate  - Advance diet as tolerated, if ordered  - Consider nutrition services referral to assist patient with adequate nutrition and appropriate food choices  Outcome: Progressing  Goal: Maintains or returns to baseline bowel function  Description: INTERVENTIONS:  - Assess bowel function  - Encourage oral fluids to ensure adequate hydration  - Administer IV fluids if ordered to ensure adequate hydration  - Administer ordered medications as needed  - Encourage mobilization and activity  - Consider nutritional services referral to assist patient with adequate nutrition and appropriate food choices  Outcome: Progressing  Goal: Maintains adequate nutritional intake  Description: INTERVENTIONS:  - Monitor percentage  of each meal consumed  - Identify factors contributing to decreased intake, treat as appropriate  - Assist with meals as needed  - Monitor I&O, weight, and lab values if indicated  - Obtain nutrition services referral as needed  Outcome: Progressing  Goal: Establish and maintain optimal ostomy function  Description: INTERVENTIONS:  - Assess bowel function  - Encourage oral fluids to ensure adequate hydration  - Administer IV fluids if ordered to ensure adequate hydration   - Administer ordered medications as needed  - Encourage mobilization and activity  - Nutrition services referral to assist patient with appropriate food choices  - Assess stoma site  - Consider wound care consult   Outcome: Progressing  Goal: Oral mucous membranes remain intact  Description: INTERVENTIONS  - Assess oral mucosa and hygiene practices  - Implement preventative oral hygiene regimen  - Implement oral medicated treatments as ordered  - Initiate Nutrition services referral as needed  Outcome: Progressing     Problem: METABOLIC, FLUID AND ELECTROLYTES - ADULT  Goal: Electrolytes maintained within normal limits  Description: INTERVENTIONS:  - Monitor labs and assess patient for signs and symptoms of electrolyte imbalances  - Administer electrolyte replacement as ordered  - Monitor response to electrolyte replacements, including repeat lab results as appropriate  - Instruct patient on fluid and nutrition as appropriate  Outcome: Progressing  Goal: Fluid balance maintained  Description: INTERVENTIONS:  - Monitor labs   - Monitor I/O and WT  - Instruct patient on fluid and nutrition as appropriate  - Assess for signs & symptoms of volume excess or deficit  Outcome: Progressing  Goal: Glucose maintained within target range  Description: INTERVENTIONS:  - Monitor Blood Glucose as ordered  - Assess for signs and symptoms of hyperglycemia and hypoglycemia  - Administer ordered medications to maintain glucose within target range  - Assess  nutritional intake and initiate nutrition service referral as needed  Outcome: Progressing

## 2024-03-25 NOTE — PLAN OF CARE
Problem: INFECTION - ADULT  Goal: Absence or prevention of progression during hospitalization  Description: INTERVENTIONS:  - Assess and monitor for signs and symptoms of infection  - Monitor lab/diagnostic results  - Monitor all insertion sites, i.e. indwelling lines, tubes, and drains  - Monitor endotracheal if appropriate and nasal secretions for changes in amount and color  - [place] appropriate cooling/warming therapies per order  - Administer medications as ordered  - Instruct and encourage patient and family to use good hand hygiene technique  - Identify and instruct in appropriate isolation precautions for identified infection/condition  Outcome: Progressing  Goal: Absence of fever/infection during neutropenic period  Description: INTERVENTIONS:  - Monitor WBC    Outcome: Progressing

## 2024-03-25 NOTE — PROGRESS NOTES
"Postop check- Colorectal Surgery   Frantz Vasquez 78 y.o. male MRN: [id number]  Unit/Bed#: OR POOL Encounter: [id number]    Assessment:  78-year-old male HTN, HLD, DM, CAD s/p CABG, Iron deficiency anemia POD#0 s/p robotic assisted right hemicolectomy by Dr. Torres.     [place]: 150cc clear yellow urine    Plan:  -N.p.o./sips of clear liquids  -Isolyte at 100  -SQH for DVT prophylaxis  -[place] in place  -Accurate I's and O's  -PCA Dilaudid pump for pain control with additional multimodal regimen  -As needed antiemetics  -Resume all home medications    Subjective/Objective   Chief Complaint: Patient offers no other complaints at this time.  Denies any chest pain or shortness of breath.  Denies any nausea or vomiting.     Subjective:   Pain: PCA-D pump  N/V: denies any  Tolerating Diet: NPO/Sips CLD  -Flatus and -BM  OOB and ambulating starting 3/26    Objective:     Blood pressure 142/65, pulse 80, temperature 98.7 °F (37.1 °C), resp. rate 17, height 5' 10\" (1.778 m), weight 68.3 kg (150 lb 9.2 oz), SpO2 100%.,Body mass index is 21.61 kg/m².      Intake/Output Summary (Last 24 hours) at 3/25/2024 1553  Last data filed at 3/25/2024 1430  Gross per 24 hour   Intake 1700 ml   Output 1100 ml   Net 600 ml       Invasive Devices       Peripheral Intravenous Line  Duration             Peripheral IV 03/23/24 Dorsal (posterior);Right Forearm 1 day              Drain  Duration             Urethral Catheter Latex 16 Fr. <1 day                    Physical Exam:    General: Pt is AAOx3, lying down in bed in NAD.   HEENT: normocephalic, moist mucous membranes   Neck: Supple, non-tender, ROM intact   CV: RRR, no murmurs, gallops, rubs. S1 and S2.   Resp: Lung sounds clear to auscultation B/L, normal respiratory effort no wheezes, rhonchi, rhales   Abd: Soft, with no tenderness, non-distended, non-tympanitic. Hypoactive bowelsounds all 4 quadrants. No rebound or guarding. No CVA tenderness. Abdominal incisions clean, dry, intact, " "with no tenderness. Glue in place.    Ext: Warm with no cyanosis, no edema, no deformities. ROM intact   Skin: No rashes, bruises, ulcers.   Neuro: Sensation intact all 4 extremities. 5+ strength all 4 extremities.     Lab, Imaging and other studies:I have personally reviewed pertinent lab results.  , CBC:   Lab Results   Component Value Date    WBC 5.93 03/25/2024    HGB 7.1 (L) 03/25/2024    HCT 21 (L) 03/25/2024    MCV 81 (L) 03/25/2024     03/25/2024    RBC 3.04 (L) 03/25/2024    MCH 24.7 (L) 03/25/2024    MCHC 30.4 (L) 03/25/2024    RDW 17.3 (H) 03/25/2024    MPV 10.1 03/25/2024    NRBC 0 03/25/2024   , CMP:   Lab Results   Component Value Date    SODIUM 140 03/25/2024    K 3.6 03/25/2024     03/25/2024    CO2 25 03/25/2024    BUN 14 03/25/2024    CREATININE 1.16 03/25/2024    GLUCOSE 166 (H) 03/25/2024    CALCIUM 9.1 03/25/2024    EGFR 60 03/25/2024   , Coagulation: No results found for: \"PT\", \"INR\", \"APTT\", Urinalysis: No results found for: \"COLORU\", \"CLARITYU\", \"SPECGRAV\", \"PHUR\", \"LEUKOCYTESUR\", \"NITRITE\", \"PROTEINUA\", \"GLUCOSEU\", \"KETONESU\", \"BILIRUBINUR\", \"BLOODU\", Amylase: No results found for: \"AMYLASE\", Lipase: No results found for: \"LIPASE\"  VTE Pharmacologic Prophylaxis: Heparin  VTE Mechanical Prophylaxis: sequential compression device    Zara Rene    "

## 2024-03-25 NOTE — OP NOTE
OPERATIVE REPORT  PATIENT NAME: Frantz Vasquez    :  1945  MRN: [id number]  Pt Location: [facility] OR ROOM 14    SURGERY DATE: 3/25/2024    Surgeons and Role:     * Anders Torres MD - Primary     * Carly Hendrickson PA-C - Assisting-she was required as no qualified resident available, bedside robot position, robot docking, instrument exchange, suction, traction/countertraction, assistance with anastomosis, undocking, closure      Preop Diagnosis:  Iron deficiency anemia [D50.9]  Colonic mass [K63.89]    Post-Op Diagnosis Codes:     * Iron deficiency anemia [D50.9]     * Colonic mass [K63.89]    Procedure(s):  -Diagnostic laparoscopy  -Robotic assisted right hemicolectomy  -Ileum to mid transverse colon, side-to-side, functional end-to-end stapled ileocolic anastomosis  -Intraoperative fluorescence angiography with ICG/firefly    Specimen(s):  ID Type Source Tests Collected by Time Destination   1 : Right Hemicolectomy Tissue Large Intestine, Right/Ascending Colon TISSUE EXAM Anders Torres MD 3/25/2024 1408      Estimated Blood Loss:   Minimal    Drains:  Urethral Catheter Latex 16 Fr. (Active)   Site Assessment Patent 24 1218   Collection Container Standard drainage bag 24 1218   Securement Method Other (Comment) 24 1218   Number of days: 0     Anesthesia Type:   General    Operative Indications:  Iron deficiency anemia [D50.9]  Colonic mass [K63.89]  Ascending colon cancer, adenocarcinoma      Operative Findings:  -tattoo located,ileum to transverse colon, side-to-side functional end-to-end stapled ileocolic anastomosis,good fluorescence Firefly proximal to stapler, ileum and transverse colon    -Arm 1 Suprapubic off midline to Right,8mm port  Fenestrated Bipolar  -Arm 2 Infraumbilical 12mm port camera, eventually enlarged to extraction incision  -Arm 3-left midepigastric vessel sealer, 8mm port  -Arm4-LUQ TipUp 8mm  -Assist LUQ 5mm port    Complications:   None    Procedure and  Technique:  Frantz is a 78 year old male prior history melanoma, admitted for symptomatic iron deficiency amenia receiving Venofer infusions s/p colonoscopy inpatient revealing malignant appearing mass (4 x 4 cm) in the mid ascending colon, adenocarcinoma, colon cancer.    We discussed resection in a face-to-face, personal, informed consent process, the benefits, alternatives, risks including not limited to bleeding, infection, risks of anesthesia, open surgery, DVT/PE, heart attack, stroke, death, damage to local structures(e.g. ureter, duodenum),anastomotic leak requiring reoperation, temporary versus permanent stoma. They understood these risks,signed informed consent and wish to proceed.    He was brought to the operating room where general endotracheal anesthesia was induced without event.  He was placed in supine position pink pad positioner, attention to joint/bony extremities.  Venodynes were on and running throughout the procedure.  He was given cefazolin/Flagyl prior to skin incision, heparin subcutaneous on table.    He was ChloraPrep abdomen and sterile draped/Ioban after appropriate drying time.  After time-out was taken per protocol procedure began with infraumbilical 12 mm port incision, [place] approach, incising through the subcutaneous tissues with electrocautery until fascia was approached, entering the peritoneal cavity without event, 12 mm robotic trocar, blunt, was placed without event, 15 mm CO2 pneumoperitoneum insufflated,diagnostic laparoscopy showed ascending tattoo, no other obvious peritoneal or hepatic lesion.    Remaining ports were then placed as listed in the findings,remote centers were placed at appropriate level, camera was placed and aimed at the right lower quadrant after bringing the robot in and docking over the right hip.  Targeting was accomplished with appropriate hand support at the ports which had all tension released after instrument placement and targeting was  completed.    Once docking was completed I moved to the console where the instruments were oriented in consistent view in the field to avoid injury.    The ileocolic fat pad was grasped and raised cephalad. The ileocolic pedicle was easily visualized and skeletonized sweeping down the retroperitoneal structures. This was ligated high between triple burns of the vessel sealer.The medial lateral dissection was then continued in this plane sweeping all retroperitoneal structures down and away until hepatic flexure was encountered and the duodenum was swept out of the field.     The mesentery to this area was also taken between double burns of the Enseal energy device up to the terminal ileum and distally up to the middle transverse colon.  Then entered the lesser sac from the omental colic attachments at the transverse colon, visualizing the posterior stomach, and taking down these attachments until the transverse colon was well medialized, the lateral dissection was then undertaken on the white line of [place] and medializing the entire right colon until it was ready for anastomosis, ensuring free terminal ileum and transverse colon after taking down omental colic attachments.    At this point a grasper was placed on the ileocolic pedicle to bring it into the eventual extraction, robotic instruments were removed under vision and robot was undocked without event and pulled away from the field.  I scrubbed back into the  field at this time.    A 5cm extraction incision was made extending from the umbilical trocar. An  wound retractor was immediately placed to protect the wound and the previously grasped and marked viscera was brought into the extraction incision.    The then grasped pedicle was brought into the incision and inspected. The ileum and transverse colon were lined up on the antimesenteric side of the small bowel and on the antimesenteric taenia of the large bowel. With a 3-0 Vicryl suture.  Enterotomies were made and JULIETTE blue load 100 was placed and after orientation and appropriate compression time fired. This was opened showing good common channel and no hemorrhage. The Allis clamps were then used to close this open end and bring it through a TA60 blue load, note that once this was clamped anesthesia injected indocyanine green for firefly fluorescence which showed good/prompt green coloration of the ileum and colon all the way up to the staple line, then fired after appropriate compression time. This was removed passed off as specimen the corners and crossing staple line were also placed in the similar fashion with 3-0 Vicryl suture on this anastomosis.    Irrigation was undertaken and ran clean. The colon bundle closing tray was used to change all  gloves and provide a clean field for closure which was undertaken with fascial #1 PDS, 1% lidocaine infiltrated at all sites for local anesthetic. Skin was closed after irrigation with 4-0 Monocryl and Histoacryl glue at the incision and remaining port sites.    All sponge needle instrument/RF Wand counts were correct I was present and scrubbed for the entirety of the procedure, patient was extubated and brought to the recovery room in stable condition.     I was present for the entire procedure.    Patient Disposition:  PACU     Colon Resection  Operation performed with curative intent Yes   Tumor Location (select all that apply) Ascending colon   Extent of colon and vascular resection (select all that apply) Right hemicolectomy - ileocolic, right colic (if present)          SIGNATURE: Anders Torres MD  DATE: March 25, 2024  TIME: 2:44 PM

## 2024-03-25 NOTE — ANESTHESIA POSTPROCEDURE EVALUATION
Post-Op Assessment Note    CV Status:  Stable  Pain Score: 0    Pain management: adequate       Mental Status:  Awake and sleepy   Hydration Status:  Stable   PONV Controlled:  None   Airway Patency:  Patent     Post Op Vitals Reviewed: Yes    No anethesia notable event occurred.    Staff: Anesthesiologist, CRNA               BP   135/63   Temp      Pulse  80   Resp   14   SpO2   100

## 2024-03-25 NOTE — PROGRESS NOTES
"Progress Note - Colorectal   Frantz Vasquez 78 y.o. male MRN: [id number]  Unit/Bed#: [facility] 910-01 Encounter: [id number]      Objective: Patient is doing well and ready for the OR later this morning.  He has been bowel prepped, did drink cherry Gatorade with his bowel prep.  No nausea, no emesis.  No abdominal pain.  He has been out of bed and ambulating well.  Patient had his [place] prep.  However doses were given at 2:30 pm Sunday, 4 pm Sunday and 9 pm Sunday for the Flagyl and 0008 Monday for the neomycin  Patient continues on DVT prophylaxis.    7 UAs  Multiple bowel movements -clear    Blood pressure 132/71, pulse 77, temperature 98.9 °F (37.2 °C), temperature source Oral, resp. rate 17, height 5' 10\" (1.778 m), weight 68.3 kg (150 lb 9.2 oz), SpO2 97%.,Body mass index is 21.61 kg/m².      Intake/Output Summary (Last 24 hours) at 3/25/2024 0509  Last data filed at 3/24/2024 1820  Gross per 24 hour   Intake 1616 ml   Output --   Net 1616 ml       Invasive Devices       Peripheral Intravenous Line  Duration             Peripheral IV 03/23/24 Dorsal (posterior);Right Forearm 1 day                    Physical Exam:   Awake alert orientated x 3 no acute distress  Abdomen is soft nondistended, no masses palpable, active bowel sounds, no guarding  Extremities: No edema of any extremities,    Lab, Imaging and other studies: Pending  VTE Pharmacologic Prophylaxis: Heparin  VTE Mechanical Prophylaxis: sequential compression device    Assessment:  Ascending colon mass  Iron deficiency anemia  History of a CABG  Diabetes type 2    Plan:  Teach incentive spirometry  Continue IV fluids at 75 mL/h  Continue n.p.o.  Surgery later this morning for robotic right colon  Continue subcu heparin for DVT prophylaxis  Since n.p.o. will change blood sugar checks to every 6 hours  "

## 2024-03-25 NOTE — ANESTHESIA PREPROCEDURE EVALUATION
Procedure:  RESECTION COLON RIGHT LAPAROSCOPIC W/ ROBOTICS (Abdomen)    Relevant Problems   CARDIO   (+) 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 with angina pectoris (HCC)   (+) Benign essential hypertension   (+) Hypercholesteremia      ENDO   (+) Type 2 diabetes mellitus (HCC)      HEMATOLOGY   (+) Iron deficiency anemia   (+) Symptomatic anemia      Hgb 7.5, plt 200  Cr 1.16       Anesthesia Plan  ASA Score- 3     Anesthesia Type- general with ASA Monitors.         Additional Monitors: arterial line.    Airway Plan: ETT.    Comment: General anesthesia, endotracheal tube; standard ASA monitors. Risks and benefits discussed with patient; patient consented and agrees to proceed.    I saw and evaluated the patient. If seen with CRNA, we have discussed the anesthetic plan and I am in agreement that the plan is appropriate for the patient.  Large bore IV access  Arterial line for abg - patient is anemic from his lesion.       Plan Factors-    Chart reviewed.   Existing labs reviewed.                   Induction- intravenous.    Postoperative Plan- Plan for postoperative opioid use. Planned trial extubation    Informed Consent- Anesthetic plan and risks discussed with patient.  I personally reviewed this patient with the CRNA. Discussed and agreed on the Anesthesia Plan with the CRNA..

## 2024-03-25 NOTE — CASE MANAGEMENT
Case Management Progress Note    Patient name Frantz Vasquez  Location OR POOL/OR POOL MRN [id number]  : 1945 Date 3/25/2024       LOS (days): 6  Geometric Mean LOS (GMLOS) (days): 2.8  Days to GMLOS:-3.2        OBJECTIVE:        Current admission status: Inpatient  Preferred Pharmacy:   [facility]  [address] [address]  [facility] [address]  Phone: [phone]     [id number] - [place], [place] - [address] [address] [facility] [address]  Phone: [phone] Fax: [phone]    [facility] - [place], [place] - [address] [place] [address]  Phone: [phone] Fax: [phone]    Primary Care Provider: Melissa Corey MD    Primary Insurance: MEDICARE  Secondary Insurance: AARP    PROGRESS NOTE: CM consulted with medical provider, pt not medically cleared for discharge at this time. Pt scheduled for OR today.  CM will continue to follow for discharge planning needs.

## 2024-03-26 LAB
ANION GAP SERPL CALCULATED.3IONS-SCNC: 10 MMOL/L (ref 4–13)
BASOPHILS # BLD AUTO: 0.01 THOUSANDS/ÂΜL (ref 0–0.1)
BASOPHILS NFR BLD AUTO: 0 % (ref 0–1)
BUN SERPL-MCNC: 19 MG/DL (ref 5–25)
CALCIUM SERPL-MCNC: 8.6 MG/DL (ref 8.4–10.2)
CHLORIDE SERPL-SCNC: 103 MMOL/L (ref 96–108)
CO2 SERPL-SCNC: 28 MMOL/L (ref 21–32)
CREAT SERPL-MCNC: 1.24 MG/DL (ref 0.6–1.3)
EOSINOPHIL # BLD AUTO: 0 THOUSAND/ÂΜL (ref 0–0.61)
EOSINOPHIL NFR BLD AUTO: 0 % (ref 0–6)
ERYTHROCYTE [DISTWIDTH] IN BLOOD BY AUTOMATED COUNT: 18.6 % (ref 11.6–15.1)
GFR SERPL CREATININE-BSD FRML MDRD: 56 ML/MIN/1.73SQ M
GLUCOSE SERPL-MCNC: 114 MG/DL (ref 65–140)
GLUCOSE SERPL-MCNC: 120 MG/DL (ref 65–140)
GLUCOSE SERPL-MCNC: 177 MG/DL (ref 65–140)
GLUCOSE SERPL-MCNC: 194 MG/DL (ref 65–140)
GLUCOSE SERPL-MCNC: 236 MG/DL (ref 65–140)
HCT VFR BLD AUTO: 27.1 % (ref 36.5–49.3)
HGB BLD-MCNC: 7.8 G/DL (ref 12–17)
IMM GRANULOCYTES # BLD AUTO: 0.05 THOUSAND/UL (ref 0–0.2)
IMM GRANULOCYTES NFR BLD AUTO: 1 % (ref 0–2)
LYMPHOCYTES # BLD AUTO: 0.57 THOUSANDS/ÂΜL (ref 0.6–4.47)
LYMPHOCYTES NFR BLD AUTO: 6 % (ref 14–44)
MAGNESIUM SERPL-MCNC: 1.4 MG/DL (ref 1.9–2.7)
MCH RBC QN AUTO: 24.1 PG (ref 26.8–34.3)
MCHC RBC AUTO-ENTMCNC: 28.8 G/DL (ref 31.4–37.4)
MCV RBC AUTO: 84 FL (ref 82–98)
MONOCYTES # BLD AUTO: 1.11 THOUSAND/ÂΜL (ref 0.17–1.22)
MONOCYTES NFR BLD AUTO: 11 % (ref 4–12)
NEUTROPHILS # BLD AUTO: 8.37 THOUSANDS/ÂΜL (ref 1.85–7.62)
NEUTS SEG NFR BLD AUTO: 82 % (ref 43–75)
NRBC BLD AUTO-RTO: 0 /100 WBCS
PHOSPHATE SERPL-MCNC: 3.3 MG/DL (ref 2.3–4.1)
PLATELET # BLD AUTO: 230 THOUSANDS/UL (ref 149–390)
PMV BLD AUTO: 10.4 FL (ref 8.9–12.7)
POTASSIUM SERPL-SCNC: 3.9 MMOL/L (ref 3.5–5.3)
RBC # BLD AUTO: 3.24 MILLION/UL (ref 3.88–5.62)
SODIUM SERPL-SCNC: 141 MMOL/L (ref 135–147)
WBC # BLD AUTO: 10.11 THOUSAND/UL (ref 4.31–10.16)

## 2024-03-26 PROCEDURE — 99024 POSTOP FOLLOW-UP VISIT: CPT | Performed by: COLON & RECTAL SURGERY

## 2024-03-26 PROCEDURE — 84100 ASSAY OF PHOSPHORUS: CPT | Performed by: PHYSICIAN ASSISTANT

## 2024-03-26 PROCEDURE — 85025 COMPLETE CBC W/AUTO DIFF WBC: CPT | Performed by: PHYSICIAN ASSISTANT

## 2024-03-26 PROCEDURE — 97164 PT RE-EVAL EST PLAN CARE: CPT

## 2024-03-26 PROCEDURE — 97168 OT RE-EVAL EST PLAN CARE: CPT

## 2024-03-26 PROCEDURE — 80048 BASIC METABOLIC PNL TOTAL CA: CPT | Performed by: PHYSICIAN ASSISTANT

## 2024-03-26 PROCEDURE — 83735 ASSAY OF MAGNESIUM: CPT | Performed by: PHYSICIAN ASSISTANT

## 2024-03-26 PROCEDURE — 82948 REAGENT STRIP/BLOOD GLUCOSE: CPT

## 2024-03-26 RX ORDER — MAGNESIUM SULFATE HEPTAHYDRATE 40 MG/ML
4 INJECTION, SOLUTION INTRAVENOUS ONCE
Status: COMPLETED | OUTPATIENT
Start: 2024-03-26 | End: 2024-03-26

## 2024-03-26 RX ORDER — INSULIN LISPRO 100 [IU]/ML
1-5 INJECTION, SOLUTION INTRAVENOUS; SUBCUTANEOUS
Status: DISCONTINUED | OUTPATIENT
Start: 2024-03-26 | End: 2024-03-27

## 2024-03-26 RX ADMIN — HEPARIN SODIUM 5000 UNITS: 5000 INJECTION INTRAVENOUS; SUBCUTANEOUS at 05:55

## 2024-03-26 RX ADMIN — ACETAMINOPHEN 975 MG: 325 TABLET, FILM COATED ORAL at 23:40

## 2024-03-26 RX ADMIN — TAMSULOSIN HYDROCHLORIDE 0.4 MG: 0.4 CAPSULE ORAL at 15:55

## 2024-03-26 RX ADMIN — HEPARIN SODIUM 5000 UNITS: 5000 INJECTION INTRAVENOUS; SUBCUTANEOUS at 15:56

## 2024-03-26 RX ADMIN — MAGNESIUM SULFATE HEPTAHYDRATE 4 G: 40 INJECTION, SOLUTION INTRAVENOUS at 08:03

## 2024-03-26 RX ADMIN — HEPARIN SODIUM 5000 UNITS: 5000 INJECTION INTRAVENOUS; SUBCUTANEOUS at 23:00

## 2024-03-26 RX ADMIN — AMLODIPINE BESYLATE 5 MG: 5 TABLET ORAL at 08:04

## 2024-03-26 RX ADMIN — GABAPENTIN 100 MG: 100 CAPSULE ORAL at 20:32

## 2024-03-26 RX ADMIN — METOPROLOL SUCCINATE 50 MG: 50 TABLET, EXTENDED RELEASE ORAL at 08:04

## 2024-03-26 RX ADMIN — INSULIN LISPRO 1 UNITS: 100 INJECTION, SOLUTION INTRAVENOUS; SUBCUTANEOUS at 17:36

## 2024-03-26 RX ADMIN — METHOCARBAMOL 500 MG: 500 TABLET ORAL at 23:40

## 2024-03-26 RX ADMIN — GABAPENTIN 100 MG: 100 CAPSULE ORAL at 15:55

## 2024-03-26 RX ADMIN — SODIUM CHLORIDE, SODIUM GLUCONATE, SODIUM ACETATE, POTASSIUM CHLORIDE, MAGNESIUM CHLORIDE, SODIUM PHOSPHATE, DIBASIC, AND POTASSIUM PHOSPHATE 100 ML/HR: .53; .5; .37; .037; .03; .012; .00082 INJECTION, SOLUTION INTRAVENOUS at 03:54

## 2024-03-26 RX ADMIN — ATORVASTATIN CALCIUM 80 MG: 80 TABLET, FILM COATED ORAL at 15:55

## 2024-03-26 RX ADMIN — IRON SUCROSE 300 MG: 20 INJECTION, SOLUTION INTRAVENOUS at 08:14

## 2024-03-26 RX ADMIN — INSULIN LISPRO 2 UNITS: 100 INJECTION, SOLUTION INTRAVENOUS; SUBCUTANEOUS at 12:09

## 2024-03-26 RX ADMIN — GABAPENTIN 100 MG: 100 CAPSULE ORAL at 08:04

## 2024-03-26 RX ADMIN — ACETAMINOPHEN 975 MG: 325 TABLET, FILM COATED ORAL at 08:04

## 2024-03-26 RX ADMIN — ACETAMINOPHEN 975 MG: 325 TABLET, FILM COATED ORAL at 15:56

## 2024-03-26 RX ADMIN — METHOCARBAMOL 500 MG: 500 TABLET ORAL at 08:04

## 2024-03-26 RX ADMIN — SODIUM CHLORIDE, SODIUM GLUCONATE, SODIUM ACETATE, POTASSIUM CHLORIDE, MAGNESIUM CHLORIDE, SODIUM PHOSPHATE, DIBASIC, AND POTASSIUM PHOSPHATE 84 ML/HR: .53; .5; .37; .037; .03; .012; .00082 INJECTION, SOLUTION INTRAVENOUS at 17:35

## 2024-03-26 RX ADMIN — METHOCARBAMOL 500 MG: 500 TABLET ORAL at 15:55

## 2024-03-26 NOTE — RESTORATIVE TECHNICIAN NOTE
Restorative Technician Note      Patient Name: Frantz Vasquez     Restorative Tech Visit Date: 03/26/24  Note Type: Mobility  Patient Position Upon Consult: Bedside chair  Activity Performed: Ambulated  Assistive Device: Other (Comment) (None)  Patient Position at End of Consult: Bedside chair; All needs within reach    Venkatesh Vitale, Restorative Technician

## 2024-03-26 NOTE — PLAN OF CARE
Problem: PAIN - ADULT  Goal: Verbalizes/displays adequate comfort level or baseline comfort level  Description: Interventions:  - Encourage patient to monitor pain and request assistance  - Assess pain using appropriate pain scale  - Administer analgesics based on type and severity of pain and evaluate response  - Implement non-pharmacological measures as appropriate and evaluate response  - Consider cultural and social influences on pain and pain management  - Notify physician/advanced practitioner if interventions unsuccessful or patient reports new pain  Outcome: Progressing     Problem: INFECTION - ADULT  Goal: Absence or prevention of progression during hospitalization  Description: INTERVENTIONS:  - Assess and monitor for signs and symptoms of infection  - Monitor lab/diagnostic results  - Monitor all insertion sites, i.e. indwelling lines, tubes, and drains  - Monitor endotracheal if appropriate and nasal secretions for changes in amount and color  - [place] appropriate cooling/warming therapies per order  - Administer medications as ordered  - Instruct and encourage patient and family to use good hand hygiene technique  - Identify and instruct in appropriate isolation precautions for identified infection/condition  Outcome: Progressing  Goal: Absence of fever/infection during neutropenic period  Description: INTERVENTIONS:  - Monitor WBC    Outcome: Progressing     Problem: SAFETY ADULT  Goal: Patient will remain free of falls  Description: INTERVENTIONS:  - Educate patient/family on patient safety including physical limitations  - Instruct patient to call for assistance with activity   - Consult OT/PT to assist with strengthening/mobility   - Keep Call bell within reach  - Keep bed low and locked with side rails adjusted as appropriate  - Keep care items and personal belongings within reach  - Initiate and maintain comfort rounds  - Make Fall Risk Sign visible to staff  - Apply yellow socks and bracelet  for high fall risk patients  - Consider moving patient to room near nurses station  Outcome: Progressing  Goal: Maintain or return to baseline ADL function  Description: INTERVENTIONS:  -  Assess patient's ability to carry out ADLs; assess patient's baseline for ADL function and identify physical deficits which impact ability to perform ADLs (bathing, care of mouth/teeth, toileting, grooming, dressing, etc.)  - Assess/evaluate cause of self-care deficits   - Assess range of motion  - Assess patient's mobility; develop plan if impaired  - Assess patient's need for assistive devices and provide as appropriate  - Encourage maximum independence but intervene and supervise when necessary  - Involve family in performance of ADLs  - Assess for home care needs following discharge   - Consider OT consult to assist with ADL evaluation and planning for discharge  - Provide patient education as appropriate  Outcome: Progressing  Goal: Maintains/Returns to pre admission functional level  Description: INTERVENTIONS:  - Perform AM-PAC 6 Click Basic Mobility/ Daily Activity assessment daily.  - Set and communicate daily mobility goal to care team and patient/family/caregiver.   - Collaborate with rehabilitation services on mobility goals if consulted  - Out of bed for toileting  - Record patient progress and toleration of activity level   Outcome: Progressing     Problem: DISCHARGE PLANNING  Goal: Discharge to home or other facility with appropriate resources  Description: INTERVENTIONS:  - Identify barriers to discharge w/patient and caregiver  - Arrange for needed discharge resources and transportation as appropriate  - Identify discharge learning needs (meds, wound care, etc.)  - Arrange for interpretive services to assist at discharge as needed  - Refer to Case Management Department for coordinating discharge planning if the patient needs post-hospital services based on physician/advanced practitioner order or complex needs  related to functional status, cognitive ability, or social support system  Outcome: Progressing     Problem: Knowledge Deficit  Goal: Patient/family/caregiver demonstrates understanding of disease process, treatment plan, medications, and discharge instructions  Description: Complete learning assessment and assess knowledge base.  Interventions:  - Provide teaching at level of understanding  - Provide teaching via preferred learning methods  Outcome: Progressing     Problem: GASTROINTESTINAL - ADULT  Goal: Minimal or absence of nausea and/or vomiting  Description: INTERVENTIONS:  - Administer IV fluids if ordered to ensure adequate hydration  - Maintain NPO status until nausea and vomiting are resolved  - Nasogastric tube if ordered  - Administer ordered antiemetic medications as needed  - Provide nonpharmacologic comfort measures as appropriate  - Advance diet as tolerated, if ordered  - Consider nutrition services referral to assist patient with adequate nutrition and appropriate food choices  Outcome: Progressing  Goal: Maintains or returns to baseline bowel function  Description: INTERVENTIONS:  - Assess bowel function  - Encourage oral fluids to ensure adequate hydration  - Administer IV fluids if ordered to ensure adequate hydration  - Administer ordered medications as needed  - Encourage mobilization and activity  - Consider nutritional services referral to assist patient with adequate nutrition and appropriate food choices  Outcome: Progressing  Goal: Maintains adequate nutritional intake  Description: INTERVENTIONS:  - Monitor percentage of each meal consumed  - Identify factors contributing to decreased intake, treat as appropriate  - Assist with meals as needed  - Monitor I&O, weight, and lab values if indicated  - Obtain nutrition services referral as needed  Outcome: Progressing  Goal: Establish and maintain optimal ostomy function  Description: INTERVENTIONS:  - Assess bowel function  - Encourage oral  fluids to ensure adequate hydration  - Administer IV fluids if ordered to ensure adequate hydration   - Administer ordered medications as needed  - Encourage mobilization and activity  - Nutrition services referral to assist patient with appropriate food choices  - Assess stoma site  - Consider wound care consult   Outcome: Progressing  Goal: Oral mucous membranes remain intact  Description: INTERVENTIONS  - Assess oral mucosa and hygiene practices  - Implement preventative oral hygiene regimen  - Implement oral medicated treatments as ordered  - Initiate Nutrition services referral as needed  Outcome: Progressing     Problem: METABOLIC, FLUID AND ELECTROLYTES - ADULT  Goal: Electrolytes maintained within normal limits  Description: INTERVENTIONS:  - Monitor labs and assess patient for signs and symptoms of electrolyte imbalances  - Administer electrolyte replacement as ordered  - Monitor response to electrolyte replacements, including repeat lab results as appropriate  - Instruct patient on fluid and nutrition as appropriate  Outcome: Progressing  Goal: Fluid balance maintained  Description: INTERVENTIONS:  - Monitor labs   - Monitor I/O and WT  - Instruct patient on fluid and nutrition as appropriate  - Assess for signs & symptoms of volume excess or deficit  Outcome: Progressing  Goal: Glucose maintained within target range  Description: INTERVENTIONS:  - Monitor Blood Glucose as ordered  - Assess for signs and symptoms of hyperglycemia and hypoglycemia  - Administer ordered medications to maintain glucose within target range  - Assess nutritional intake and initiate nutrition service referral as needed  Outcome: Progressing     Problem: Nutrition/Hydration-ADULT  Goal: Nutrient/Hydration intake appropriate for improving, restoring or maintaining nutritional needs  Description: Monitor and assess patient's nutrition/hydration status for malnutrition. Collaborate with interdisciplinary team and initiate plan and  interventions as ordered.  Monitor patient's weight and dietary intake as ordered or per policy. Utilize nutrition screening tool and intervene as necessary. Determine patient's food preferences and provide high-protein, high-caloric foods as appropriate.     INTERVENTIONS:  - Monitor oral intake, urinary output, labs, and treatment plans  - Assess nutrition and hydration status and recommend course of action  - Evaluate amount of meals eaten  - Assist patient with eating if necessary   - Allow adequate time for meals  - Recommend/ encourage appropriate diets, oral nutritional supplements, and vitamin/mineral supplements  - Order, calculate, and assess calorie counts as needed  - Recommend, monitor, and adjust tube feedings and TPN/PPN based on assessed needs  - Assess need for intravenous fluids  - Provide specific nutrition/hydration education as appropriate  - Include patient/family/caregiver in decisions related to nutrition  Outcome: Progressing

## 2024-03-26 NOTE — PROGRESS NOTES
"Colorectal Surgery  Progress Note   Frantz Vasquez 78 y.o. male MRN: [id number]  Unit/Bed#: [facility] 910-01 Encounter: [id number]    Assessment:  78 year old male admitted for symptomatic iron deficiency amenia receiving Venofer infusions s/p colonoscopy revealing malignant appearing mass (4 x 4 cm) in the mid ascending colon, now s/p 3/26 robotic assisted right hemicolectomy by Dr. Torres.     Vital signs stable, afebrile. On room air    UOP: 1950 cc    AM labs pending    Plan:  NPO with sips - advance to clears   Isolyte 100 - transition to MIVF  Discontinue PCA with transition to multimodal pain regimen  Follow up surgical pathology  Discontinue mar  Continue venofer infusions - follow up AM labs  DVT ppx: Heparin  Pain/ nausea control PRN  OOB/ ambulation  PT/OT  Incentive Spirometry      Subjective/Objective   Subjective:   Patient seen and examined at bedside, in no acute distress. No acute events overnight. Patient's pain is well controlled. He denies nausea or vomiting. No bowel function at this time. Reports pain controlled with minimal use of PCA    Objective:   Vitals:Blood pressure 115/52, pulse 55, temperature 98.5 °F (36.9 °C), resp. rate 18, height 5' 10\" (1.778 m), weight 68.3 kg (150 lb 9.2 oz), SpO2 96%.  Temp (24hrs), Av.8 °F (37.1 °C), Min:98.5 °F (36.9 °C), Max:99.2 °F (37.3 °C)        Intake/Output Summary (Last 24 hours) at 3/26/2024 0603  Last data filed at 3/26/2024 0246  Gross per 24 hour   Intake 1400.6 ml   Output 1950 ml   Net -549.4 ml       Invasive Devices       Peripheral Intravenous Line  Duration             Peripheral IV 24 Dorsal (posterior);Right Forearm 2 days              Drain  Duration             Urethral Catheter Latex 16 Fr. <1 day                    Physical Exam:  General: No acute distress, alert and oriented  CV: Well perfused, regular rate and rhythm  Lungs: Normal work of breathing, no increased respiratory effort   Abdomen: Soft, appropriately tender, " non-distended. Incisions clean, dry and intact.  Extremities: No edema, clubbing or cyanosis  Skin: Warm, dry    Lab Results: Results: pending  VTE Prophylaxis: Sequential compression device (Venodyne)  and Heparin

## 2024-03-26 NOTE — PHYSICAL THERAPY NOTE
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)    Symptomatic anemia    Iron deficiency anemia       Past Medical History  Past Medical History:   Diagnosis Date    Chronic cough     RESOLVED: [id number]    Coronary artery disease     Diabetes mellitus (HCC)     Diabetic retinopathy (HCC)     HLD (hyperlipidemia)     HTN (hypertension)     Hypertension     Kidney stone        Past Surgical History  Past Surgical History:   Procedure Laterality Date    CARDIAC CATHETERIZATION  12/09/2019    HAND SURGERY Left     HEMICOLOECTOMY W/ ANASTOMOSIS N/A 3/25/2024    Procedure: RESECTION COLON RIGHT LAPAROSCOPIC W/ ROBOTICS;  Surgeon: Anders Torres MD;  [facility];  Service: Colorectal    LYMPH NODE BIOPSY Right 11/24/2023    Procedure: BIOPSY LYMPH NODE SENTINEL, LYMPHATIC MAPPING (Inject: 7:30 am in Nuc Med by radiologist);  Surgeon: Issac Triana MD;  [facility];  Service: Surgical Oncology    IL CORONARY ARTERY BYP W/VEIN & ARTERY GRAFT 3 VEIN N/A 12/13/2019    Procedure: CORONARY ARTERY BYPASS GRAFT (CABG) 4 VESSELS STEVEN to LAD ; SVG--> PDA , DIAGONAL, and OM;  Surgeon: Elvin Huertas DO;  [facility];  Service: Cardiac Surgery    IL ECHO TRANSESOPHAG R-T 2D W/PRB IMG ACQUISJ I&R N/A 12/13/2019    Procedure: TRANSESOPHAGEAL ECHOCARDIOGRAM (SHEBA);  Surgeon: Elvin Huertas DO;  [facility];  Service: Cardiac Surgery    IL NDSC SURG W/VIDEO-ASSISTED HARVEST VEIN CABG Left 12/13/2019     Procedure: HARVEST VEIN ENDOSCOPIC (EVH);  Surgeon: Elvin Huertas DO;  [facility];  Service: Cardiac Surgery    RI STRTCTC CPTR ASSTD PX EXTRADURAL CRANIAL N/A 1/31/2024    Procedure: RIGHT FUNCTIONAL ENDOSCOPIC SINUS SURGERY (FESS) IMAGED GUIDED, MAXILLARY, ETHMOIDS, FRONTALS;  Surgeon: Tez Bah MD;  [facility];  Service: ENT    SKIN LESION EXCISION Right 11/24/2023    Procedure: WIDE EXCISION MELANOMA RIGHT ARM;  Surgeon: Issac Triana MD;  [facility];  Service: Surgical Oncology        03/26/24 0841   PT Last Visit   PT Visit Date 03/26/24   Note Type   Note type Re-Evaluation   Pain Assessment   Pain Assessment Tool 0-10   Pain Score No Pain   Restrictions/Precautions   Weight Bearing Precautions Per Order No   Other Precautions Multiple lines;Fall Risk   Home Living   Type of Home House   Home Layout Multi-level  (0 emili; elevator within home between floors)   Bathroom Shower/Tub Walk-in shower   Bathroom Toilet Raised   Bathroom Equipment Grab bars in shower;Built-in shower seat   Home Equipment   (denies)   Prior Function   Level of [place] Independent with ADLs;Independent with functional mobility;Independent with IADLS   Lives With Spouse   Receives Help From Family   IADLs Independent with driving;Independent with meal prep;Independent with medication management   Falls in the last 6 months 0   Vocational Retired   General   Family/Caregiver Present No   Cognition   Overall Cognitive Status WFL   Arousal/Participation Alert   Attention Within functional limits   Orientation Level Oriented X4   Memory Within functional limits   Following Commands Follows all commands and directions without difficulty   Comments pleasant and cooperative   RLE Assessment   RLE Assessment WFL   LLE Assessment   LLE Assessment WFL   Bed Mobility   Supine to Sit 5  Supervision   Additional items HOB elevated;Bedrails   Sit to Supine Unable to assess   Additional Comments pt found  supine in bed upon arrival. Pt left sitting OOB in recliner with all needs wihtin reach   Transfers   Sit to Stand 5  Supervision   Stand to Sit 5  Supervision   Additional Comments transfers without AD   Ambulation/Elevation   Gait pattern Short stride;Decreased foot clearance   Gait Assistance 5  Supervision   Additional items Verbal cues   Assistive Device None   Distance 250'   Balance   Static Sitting Fair +   Dynamic Sitting Fair +   Static Standing Fair   Dynamic Standing Fair   Ambulatory Fair   Activity Tolerance   Activity Tolerance Patient tolerated treatment well   Medical Staff Made Aware ADRIANA Candelaria; co-session completed this date 2* increased medical complexity and multiple co-morbidities   Nurse Made Aware RN cleared   Assessment   Prognosis Good   Assessment Pt seen for PT re-evaluation 2* now s/p robotic assisted R hemicolectomy on 3/26. Pt with active PT eval/treat and up AT/ ambulate orders. Pt is a 78 y.o. male who was [facility] to [facility] on 3/19 with iron deficiency anemia. Pt  has a past medical history of Chronic cough, Coronary artery disease, Diabetes mellitus (HCC), Diabetic retinopathy (HCC), HLD (hyperlipidemia), HTN (hypertension), Hypertension, and Kidney stone. Pt resides with spouse in multi-level home with 0 MILAN and was independent prior to hospital admission. Upon re-evaluation pt is performing bed mobility tasks with S level, functional transfers with S level and is ambulating with S level without AD. Pt was left sitting OOB in recliner at the end of PT session with all needs in reach. Pt with no questions or concerns regarding d/c home at this time; please re-consult if needed. PT to d/c pt and recommends return home with increased social support as needed. Encourage pt to ambulate at least 3-4x/day with nursing or restorative staff while remaining in hospital. The patient's AM-PAC Basic Mobility Inpatient Short Form Raw Score is 18. A Raw score of greater than 16  suggests the patient may benefit from discharge to home. Please also refer to the recommendation of the Physical Therapist for safe discharge planning.   Goals   Patient Goals to go home   Plan   PT Frequency   (eval only- d/c PT)   Discharge Recommendation   Rehab Resource Intensity Level, PT No post-acute rehabilitation needs   AM-PAC Basic Mobility Inpatient   Turning in Flat Bed Without Bedrails 3   Lying on Back to Sitting on Edge of Flat Bed Without Bedrails 3   Moving Bed to Chair 3   Standing Up From Chair Using Arms 3   Walk in Room 3   Climb 3-5 Stairs With Railing 3   Basic Mobility Inpatient Raw Score 18   Basic Mobility Standardized Score 41.05   [facility] Highest Level Of Mobility   JH-HLM Goal 6: Walk 10 steps or more   JH-HLM Achieved 8: Walk 250 feet ot more   Modified Ирина Scale   Modified Ирина Scale 2           Xiao Sheth, PT DPT

## 2024-03-26 NOTE — OCCUPATIONAL THERAPY NOTE
Occupational Therapy Re-Evaluation     Patient Name: Frantz Vasquez  Today's Date: 3/26/2024  Problem List  Principal Problem:    Iron deficiency anemia  Active Problems:    Benign essential hypertension    Type 2 diabetes mellitus (HCC)    Hypercholesteremia    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 with angina pectoris (HCC)    Past Medical History  Past Medical History:   Diagnosis Date    Chronic cough     RESOLVED: [id number]    Coronary artery disease     Diabetes mellitus (HCC)     Diabetic retinopathy (HCC)     HLD (hyperlipidemia)     HTN (hypertension)     Hypertension     Kidney stone      Past Surgical History  Past Surgical History:   Procedure Laterality Date    CARDIAC CATHETERIZATION  12/09/2019    HAND SURGERY Left     HEMICOLOECTOMY W/ ANASTOMOSIS N/A 3/25/2024    Procedure: RESECTION COLON RIGHT LAPAROSCOPIC W/ ROBOTICS;  Surgeon: Anders Torres MD;  [facility];  Service: Colorectal    LYMPH NODE BIOPSY Right 11/24/2023    Procedure: BIOPSY LYMPH NODE SENTINEL, LYMPHATIC MAPPING (Inject: 7:30 am in Nuc Med by radiologist);  Surgeon: Issac Triana MD;  [facility];  Service: Surgical Oncology    IA CORONARY ARTERY BYP W/VEIN & ARTERY GRAFT 3 VEIN N/A 12/13/2019    Procedure: CORONARY ARTERY BYPASS GRAFT (CABG) 4 VESSELS STEVEN to LAD ; SVG--> PDA , DIAGONAL, and OM;  Surgeon: Elvin Huertas DO;  [facility];  Service: Cardiac Surgery    IA ECHO TRANSESOPHAG R-T 2D W/PRB IMG ACQUISJ I&R N/A 12/13/2019    Procedure: TRANSESOPHAGEAL ECHOCARDIOGRAM (SHEBA);  Surgeon: Elvin Huertas DO;  [facility];  Service: Cardiac Surgery    IA NDSC SURG W/VIDEO-ASSISTED HARVEST VEIN CABG Left 12/13/2019    Procedure: HARVEST VEIN ENDOSCOPIC (EVH);  Surgeon: Elvin Huertas DO;  [facility];  Service: Cardiac Surgery    IA STRTCTC CPTR ASSTD PX EXTRADURAL CRANIAL N/A 1/31/2024    Procedure: RIGHT FUNCTIONAL ENDOSCOPIC SINUS SURGERY (FESS)  IMAGED GUIDED, MAXILLARY, ETHMOIDS, FRONTALS;  Surgeon: Tez Bah MD;  [facility];  Service: ENT    SKIN LESION EXCISION Right 11/24/2023    Procedure: WIDE EXCISION MELANOMA RIGHT ARM;  Surgeon: Issac Triana MD;  [facility];  Service: Surgical Oncology        03/26/24 0842   OT Last Visit   OT Visit Date 03/26/24   Note Type   Note type Re-Evaluation   Pain Assessment   Pain Assessment Tool 0-10   Pain Score No Pain   Restrictions/Precautions   Weight Bearing Precautions Per Order No   Other Precautions Multiple lines;Fall Risk   Home Living   Additional Comments Please refers to pt's initial OT evaluation on 3/20 for pt's home set up information.   Prior Function   Comments Please refer to pt's initial OT evaluation on 3/20 for pt's PLOF information.   Lifestyle   Autonomy Pt reports I w/ ADLs, IADLs, and fxnl mobility w/o AD at baseline; + driving.   Reciprocal Relationships Pt lives w/ his spouse.   Service to Others Pt is retired.   Intrinsic Gratification Pt enjoys gardening.   General   Family/Caregiver Present No   ADL   Where Assessed Edge of bed   Eating Assistance 7  Independent   Grooming Assistance 7  Independent   UB Bathing Assistance 6  Modified Independent   LB Bathing Assistance 5  Supervision/Setup   UB Dressing Assistance 6  Modified independent   LB Dressing Assistance 5  Supervision/Setup   Toileting Assistance  5  Supervision/Setup   Bed Mobility   Supine to Sit 5  Supervision   Additional items HOB elevated;Bedrails   Sit to Supine Unable to assess   Additional Comments Pt seated OOB in chair at end of OT session w/ all needs within reach.   Transfers   Sit to Stand 5  Supervision   Stand to Sit 5  Supervision   Additional Comments completed w/o AD   Functional Mobility   Functional Mobility 5  Supervision   Additional Comments Pt ambulated community distance w/ S and w/o AD.   Additional items   (no AD)   Balance   Static Sitting Fair +   Dynamic Sitting Fair +    Static Standing Fair   Dynamic Standing Fair   Ambulatory Fair   Activity Tolerance   Activity Tolerance Patient tolerated treatment well   Medical Staff Made Aware PTXiao, due to pt's medical complexity and multiple comorbidities   Nurse Made Aware RN clearance prior to session   RUE Assessment   RUE Assessment WFL   LUE Assessment   LUE Assessment WFL   Hand Function   Gross Motor Coordination Functional   Fine Motor Coordination Functional   Cognition   Overall Cognitive Status WFL   Arousal/Participation Alert;Responsive;Cooperative   Attention Within functional limits   Orientation Level Oriented X4   Memory Within functional limits   Following Commands Follows all commands and directions without difficulty   Comments Pt was pleasant, cooperative, and willing to participate in OT re-evaluation.   Assessment   Assessment Pt is a 78 y.o. male seen for OT re-evaluation s/p robotic assisted right hemicolectomy on 3/25. Pt s/p admission to [facility] on 3/19/2024. Pt diagnosed with Iron deficiency anemia. Pt has a significant PMH impacting occupational performance including: Chronic cough, Coronary artery disease, Diabetes mellitus (HCC), Diabetic retinopathy (HCC), HLD (hyperlipidemia), HTN (hypertension), Hypertension, and Kidney stone. Pt with active OT evaluation and treatment orders and activity orders. Please refer to pt's initial OT evaluation on 3/20 for pt's home setup and PLOF information. Pt agreeable and willing to participate in OT evaluation. During evaluation, pt was I for eating and grooming, mod I for UB ADLs, and S for LB ADLs and toileting. Pt was also S for bed mobility, transfers, and fxnl mobility w/o AD today. Pt performing ADLs at/near baseline level of independence and has good social support upon return home. No further acute OT needs identified at this time. Recommend continued active ADL participation and mobilization with hospital staff while in the hospital to increase  pt’s endurance and strength upon D/C. From OT standpoint, recommend D/C to home with family support when medically cleared. D/C pt from OT caseload at this time.   Goals   Patient Goals to go home   Plan   OT Frequency Eval only   Discharge Recommendation   Rehab Resource Intensity Level, OT No post-acute rehabilitation needs   AM-PAC Daily Activity Inpatient   Lower Body Dressing 3   Bathing 3   Toileting 3   Upper Body Dressing 4   Grooming 4   Eating 4   Daily Activity Raw Score 21   Daily Activity Standardized Score (Calc for Raw Score >=11) 44.27   AM-PAC Applied Cognition Inpatient   Following a Speech/Presentation 4   Understanding Ordinary Conversation 4   Taking Medications 4   Remembering Where Things Are Placed or Put Away 4   Remembering List of 4-5 Errands 4   Taking Care of Complicated Tasks 4   Applied Cognition Raw Score 24   Applied Cognition Standardized Score 62.21       The patient's raw score on the AM-PAC Daily Activity Inpatient Short Form is 21. A raw score of greater than or equal to 19 suggests the patient may benefit from discharge to home. Please refer to the recommendation of the Occupational Therapist for safe discharge planning.    Teagan Cameron MS, OTR/L

## 2024-03-27 ENCOUNTER — TELEPHONE (OUTPATIENT)
Dept: OTHER | Facility: HOSPITAL | Age: 79
End: 2024-03-27

## 2024-03-27 LAB
ABO GROUP BLD BPU: NORMAL
ABO GROUP BLD BPU: NORMAL
ANION GAP SERPL CALCULATED.3IONS-SCNC: 8 MMOL/L (ref 4–13)
BPU ID: NORMAL
BPU ID: NORMAL
BUN SERPL-MCNC: 17 MG/DL (ref 5–25)
CALCIUM SERPL-MCNC: 8.6 MG/DL (ref 8.4–10.2)
CHLORIDE SERPL-SCNC: 101 MMOL/L (ref 96–108)
CO2 SERPL-SCNC: 28 MMOL/L (ref 21–32)
CREAT SERPL-MCNC: 1.08 MG/DL (ref 0.6–1.3)
CROSSMATCH: NORMAL
CROSSMATCH: NORMAL
GFR SERPL CREATININE-BSD FRML MDRD: 66 ML/MIN/1.73SQ M
GLUCOSE SERPL-MCNC: 127 MG/DL (ref 65–140)
GLUCOSE SERPL-MCNC: 134 MG/DL (ref 65–140)
GLUCOSE SERPL-MCNC: 185 MG/DL (ref 65–140)
GLUCOSE SERPL-MCNC: 197 MG/DL (ref 65–140)
MAGNESIUM SERPL-MCNC: 1.9 MG/DL (ref 1.9–2.7)
POTASSIUM SERPL-SCNC: 3.7 MMOL/L (ref 3.5–5.3)
SODIUM SERPL-SCNC: 137 MMOL/L (ref 135–147)
UNIT DISPENSE STATUS: NORMAL
UNIT DISPENSE STATUS: NORMAL
UNIT PRODUCT CODE: NORMAL
UNIT PRODUCT CODE: NORMAL
UNIT PRODUCT VOLUME: 350 ML
UNIT PRODUCT VOLUME: 350 ML
UNIT RH: NORMAL
UNIT RH: NORMAL

## 2024-03-27 PROCEDURE — 99024 POSTOP FOLLOW-UP VISIT: CPT | Performed by: COLON & RECTAL SURGERY

## 2024-03-27 PROCEDURE — 83735 ASSAY OF MAGNESIUM: CPT | Performed by: PHYSICIAN ASSISTANT

## 2024-03-27 PROCEDURE — 51702 INSERT TEMP BLADDER CATH: CPT

## 2024-03-27 PROCEDURE — NC001 PR NO CHARGE

## 2024-03-27 PROCEDURE — 80048 BASIC METABOLIC PNL TOTAL CA: CPT | Performed by: PHYSICIAN ASSISTANT

## 2024-03-27 PROCEDURE — 82948 REAGENT STRIP/BLOOD GLUCOSE: CPT

## 2024-03-27 PROCEDURE — 0T9B70Z DRAINAGE OF BLADDER WITH DRAINAGE DEVICE, VIA NATURAL OR ARTIFICIAL OPENING: ICD-10-PCS | Performed by: UROLOGY

## 2024-03-27 PROCEDURE — 99222 1ST HOSP IP/OBS MODERATE 55: CPT | Performed by: UROLOGY

## 2024-03-27 RX ORDER — INSULIN LISPRO 100 [IU]/ML
1-6 INJECTION, SOLUTION INTRAVENOUS; SUBCUTANEOUS
Status: DISCONTINUED | OUTPATIENT
Start: 2024-03-27 | End: 2024-03-28 | Stop reason: HOSPADM

## 2024-03-27 RX ORDER — FINASTERIDE 5 MG/1
5 TABLET, FILM COATED ORAL DAILY
Status: DISCONTINUED | OUTPATIENT
Start: 2024-03-27 | End: 2024-03-28 | Stop reason: HOSPADM

## 2024-03-27 RX ORDER — FINASTERIDE 5 MG/1
5 TABLET, FILM COATED ORAL DAILY
Qty: 30 TABLET | Refills: 3 | Status: CANCELLED | OUTPATIENT
Start: 2024-03-27

## 2024-03-27 RX ORDER — LIDOCAINE HYDROCHLORIDE 20 MG/ML
1 JELLY TOPICAL ONCE
Status: COMPLETED | OUTPATIENT
Start: 2024-03-27 | End: 2024-03-27

## 2024-03-27 RX ORDER — OXYCODONE HYDROCHLORIDE 10 MG/1
10 TABLET ORAL EVERY 4 HOURS PRN
Status: DISCONTINUED | OUTPATIENT
Start: 2024-03-27 | End: 2024-03-28 | Stop reason: HOSPADM

## 2024-03-27 RX ORDER — TAMSULOSIN HYDROCHLORIDE 0.4 MG/1
0.4 CAPSULE ORAL 2 TIMES DAILY
Status: DISCONTINUED | OUTPATIENT
Start: 2024-03-27 | End: 2024-03-28 | Stop reason: HOSPADM

## 2024-03-27 RX ORDER — POTASSIUM CHLORIDE 20 MEQ/1
40 TABLET, EXTENDED RELEASE ORAL ONCE
Status: COMPLETED | OUTPATIENT
Start: 2024-03-27 | End: 2024-03-27

## 2024-03-27 RX ORDER — MAGNESIUM SULFATE 1 G/100ML
1 INJECTION INTRAVENOUS ONCE
Status: COMPLETED | OUTPATIENT
Start: 2024-03-27 | End: 2024-03-27

## 2024-03-27 RX ORDER — OXYCODONE HYDROCHLORIDE 5 MG/1
5 TABLET ORAL EVERY 4 HOURS PRN
Status: DISCONTINUED | OUTPATIENT
Start: 2024-03-27 | End: 2024-03-28 | Stop reason: HOSPADM

## 2024-03-27 RX ORDER — TAMSULOSIN HYDROCHLORIDE 0.4 MG/1
0.4 CAPSULE ORAL ONCE
Status: COMPLETED | OUTPATIENT
Start: 2024-03-27 | End: 2024-03-27

## 2024-03-27 RX ADMIN — GABAPENTIN 100 MG: 100 CAPSULE ORAL at 08:29

## 2024-03-27 RX ADMIN — SODIUM CHLORIDE, SODIUM GLUCONATE, SODIUM ACETATE, POTASSIUM CHLORIDE, MAGNESIUM CHLORIDE, SODIUM PHOSPHATE, DIBASIC, AND POTASSIUM PHOSPHATE 84 ML/HR: .53; .5; .37; .037; .03; .012; .00082 INJECTION, SOLUTION INTRAVENOUS at 05:58

## 2024-03-27 RX ADMIN — METHOCARBAMOL 500 MG: 500 TABLET ORAL at 22:25

## 2024-03-27 RX ADMIN — ACETAMINOPHEN 975 MG: 325 TABLET, FILM COATED ORAL at 15:57

## 2024-03-27 RX ADMIN — HEPARIN SODIUM 5000 UNITS: 5000 INJECTION INTRAVENOUS; SUBCUTANEOUS at 22:25

## 2024-03-27 RX ADMIN — HEPARIN SODIUM 5000 UNITS: 5000 INJECTION INTRAVENOUS; SUBCUTANEOUS at 15:58

## 2024-03-27 RX ADMIN — METOPROLOL SUCCINATE 50 MG: 50 TABLET, EXTENDED RELEASE ORAL at 08:29

## 2024-03-27 RX ADMIN — ACETAMINOPHEN 975 MG: 325 TABLET, FILM COATED ORAL at 08:29

## 2024-03-27 RX ADMIN — ACETAMINOPHEN 975 MG: 325 TABLET, FILM COATED ORAL at 22:24

## 2024-03-27 RX ADMIN — TAMSULOSIN HYDROCHLORIDE 0.4 MG: 0.4 CAPSULE ORAL at 08:29

## 2024-03-27 RX ADMIN — METHOCARBAMOL 500 MG: 500 TABLET ORAL at 08:29

## 2024-03-27 RX ADMIN — IRON SUCROSE 300 MG: 20 INJECTION, SOLUTION INTRAVENOUS at 08:29

## 2024-03-27 RX ADMIN — TAMSULOSIN HYDROCHLORIDE 0.4 MG: 0.4 CAPSULE ORAL at 22:25

## 2024-03-27 RX ADMIN — GABAPENTIN 100 MG: 100 CAPSULE ORAL at 15:58

## 2024-03-27 RX ADMIN — INSULIN LISPRO 1 UNITS: 100 INJECTION, SOLUTION INTRAVENOUS; SUBCUTANEOUS at 12:11

## 2024-03-27 RX ADMIN — LIDOCAINE HYDROCHLORIDE 1 APPLICATION: 20 JELLY TOPICAL at 12:49

## 2024-03-27 RX ADMIN — HEPARIN SODIUM 5000 UNITS: 5000 INJECTION INTRAVENOUS; SUBCUTANEOUS at 06:13

## 2024-03-27 RX ADMIN — AMLODIPINE BESYLATE 5 MG: 5 TABLET ORAL at 08:29

## 2024-03-27 RX ADMIN — MAGNESIUM SULFATE HEPTAHYDRATE 1 G: 1 INJECTION, SOLUTION INTRAVENOUS at 10:24

## 2024-03-27 RX ADMIN — METHOCARBAMOL 500 MG: 500 TABLET ORAL at 15:58

## 2024-03-27 RX ADMIN — ATORVASTATIN CALCIUM 80 MG: 80 TABLET, FILM COATED ORAL at 15:58

## 2024-03-27 RX ADMIN — GABAPENTIN 100 MG: 100 CAPSULE ORAL at 22:27

## 2024-03-27 RX ADMIN — INSULIN LISPRO 2 UNITS: 100 INJECTION, SOLUTION INTRAVENOUS; SUBCUTANEOUS at 17:23

## 2024-03-27 RX ADMIN — FINASTERIDE 5 MG: 5 TABLET, FILM COATED ORAL at 15:58

## 2024-03-27 RX ADMIN — POTASSIUM CHLORIDE 40 MEQ: 1500 TABLET, EXTENDED RELEASE ORAL at 08:29

## 2024-03-27 NOTE — PLAN OF CARE
Problem: PAIN - ADULT  Goal: Verbalizes/displays adequate comfort level or baseline comfort level  Description: Interventions:  - Encourage patient to monitor pain and request assistance  - Assess pain using appropriate pain scale  - Administer analgesics based on type and severity of pain and evaluate response  - Implement non-pharmacological measures as appropriate and evaluate response  - Consider cultural and social influences on pain and pain management  - Notify physician/advanced practitioner if interventions unsuccessful or patient reports new pain  Outcome: Progressing     Problem: INFECTION - ADULT  Goal: Absence or prevention of progression during hospitalization  Description: INTERVENTIONS:  - Assess and monitor for signs and symptoms of infection  - Monitor lab/diagnostic results  - Monitor all insertion sites, i.e. indwelling lines, tubes, and drains  - Monitor endotracheal if appropriate and nasal secretions for changes in amount and color  - [place] appropriate cooling/warming therapies per order  - Administer medications as ordered  - Instruct and encourage patient and family to use good hand hygiene technique  - Identify and instruct in appropriate isolation precautions for identified infection/condition  Outcome: Progressing  Goal: Absence of fever/infection during neutropenic period  Description: INTERVENTIONS:  - Monitor WBC    Outcome: Progressing     Problem: SAFETY ADULT  Goal: Patient will remain free of falls  Description: INTERVENTIONS:  - Educate patient/family on patient safety including physical limitations  - Instruct patient to call for assistance with activity   - Consult OT/PT to assist with strengthening/mobility   - Keep Call bell within reach  - Keep bed low and locked with side rails adjusted as appropriate  - Keep care items and personal belongings within reach  - Initiate and maintain comfort rounds  - Make Fall Risk Sign visible to staff  - Offer Toileting every  Hours,  in advance of need  - Initiate/Maintain alarm  - Obtain necessary fall risk management equipment:   - Apply yellow socks and bracelet for high fall risk patients  - Consider moving patient to room near nurses station  Outcome: Progressing  Goal: Maintain or return to baseline ADL function  Description: INTERVENTIONS:  -  Assess patient's ability to carry out ADLs; assess patient's baseline for ADL function and identify physical deficits which impact ability to perform ADLs (bathing, care of mouth/teeth, toileting, grooming, dressing, etc.)  - Assess/evaluate cause of self-care deficits   - Assess range of motion  - Assess patient's mobility; develop plan if impaired  - Assess patient's need for assistive devices and provide as appropriate  - Encourage maximum independence but intervene and supervise when necessary  - Involve family in performance of ADLs  - Assess for home care needs following discharge   - Consider OT consult to assist with ADL evaluation and planning for discharge  - Provide patient education as appropriate  Outcome: Progressing  Goal: Maintains/Returns to pre admission functional level  Description: INTERVENTIONS:  - Perform AM-PAC 6 Click Basic Mobility/ Daily Activity assessment daily.  - Set and communicate daily mobility goal to care team and patient/family/caregiver.   - Collaborate with rehabilitation services on mobility goals if consulted  - Perform Range of Motion  times a day.  - Reposition patient every  hours.  - Dangle patient  times a day  - Stand patient  times a day  - Ambulate patient  times a day  - Out of bed to chair times a day   - Out of bed for meals  times a day  - Out of bed for toileting  - Record patient progress and toleration of activity level   Outcome: Progressing     Problem: DISCHARGE PLANNING  Goal: Discharge to home or other facility with appropriate resources  Description: INTERVENTIONS:  - Identify barriers to discharge w/patient and caregiver  - Arrange for  needed discharge resources and transportation as appropriate  - Identify discharge learning needs (meds, wound care, etc.)  - Arrange for interpretive services to assist at discharge as needed  - Refer to Case Management Department for coordinating discharge planning if the patient needs post-hospital services based on physician/advanced practitioner order or complex needs related to functional status, cognitive ability, or social support system  Outcome: Progressing     Problem: Knowledge Deficit  Goal: Patient/family/caregiver demonstrates understanding of disease process, treatment plan, medications, and discharge instructions  Description: Complete learning assessment and assess knowledge base.  Interventions:  - Provide teaching at level of understanding  - Provide teaching via preferred learning methods  Outcome: Progressing     Problem: GASTROINTESTINAL - ADULT  Goal: Minimal or absence of nausea and/or vomiting  Description: INTERVENTIONS:  - Administer IV fluids if ordered to ensure adequate hydration  - Maintain NPO status until nausea and vomiting are resolved  - Nasogastric tube if ordered  - Administer ordered antiemetic medications as needed  - Provide nonpharmacologic comfort measures as appropriate  - Advance diet as tolerated, if ordered  - Consider nutrition services referral to assist patient with adequate nutrition and appropriate food choices  Outcome: Progressing  Goal: Maintains or returns to baseline bowel function  Description: INTERVENTIONS:  - Assess bowel function  - Encourage oral fluids to ensure adequate hydration  - Administer IV fluids if ordered to ensure adequate hydration  - Administer ordered medications as needed  - Encourage mobilization and activity  - Consider nutritional services referral to assist patient with adequate nutrition and appropriate food choices  Outcome: Progressing  Goal: Maintains adequate nutritional intake  Description: INTERVENTIONS:  - Monitor percentage  of each meal consumed  - Identify factors contributing to decreased intake, treat as appropriate  - Assist with meals as needed  - Monitor I&O, weight, and lab values if indicated  - Obtain nutrition services referral as needed  Outcome: Progressing  Goal: Establish and maintain optimal ostomy function  Description: INTERVENTIONS:  - Assess bowel function  - Encourage oral fluids to ensure adequate hydration  - Administer IV fluids if ordered to ensure adequate hydration   - Administer ordered medications as needed  - Encourage mobilization and activity  - Nutrition services referral to assist patient with appropriate food choices  - Assess stoma site  - Consider wound care consult   Outcome: Progressing  Goal: Oral mucous membranes remain intact  Description: INTERVENTIONS  - Assess oral mucosa and hygiene practices  - Implement preventative oral hygiene regimen  - Implement oral medicated treatments as ordered  - Initiate Nutrition services referral as needed  Outcome: Progressing     Problem: METABOLIC, FLUID AND ELECTROLYTES - ADULT  Goal: Electrolytes maintained within normal limits  Description: INTERVENTIONS:  - Monitor labs and assess patient for signs and symptoms of electrolyte imbalances  - Administer electrolyte replacement as ordered  - Monitor response to electrolyte replacements, including repeat lab results as appropriate  - Instruct patient on fluid and nutrition as appropriate  Outcome: Progressing  Goal: Fluid balance maintained  Description: INTERVENTIONS:  - Monitor labs   - Monitor I/O and WT  - Instruct patient on fluid and nutrition as appropriate  - Assess for signs & symptoms of volume excess or deficit  Outcome: Progressing  Goal: Glucose maintained within target range  Description: INTERVENTIONS:  - Monitor Blood Glucose as ordered  - Assess for signs and symptoms of hyperglycemia and hypoglycemia  - Administer ordered medications to maintain glucose within target range  - Assess  nutritional intake and initiate nutrition service referral as needed  Outcome: Progressing     Problem: Nutrition/Hydration-ADULT  Goal: Nutrient/Hydration intake appropriate for improving, restoring or maintaining nutritional needs  Description: Monitor and assess patient's nutrition/hydration status for malnutrition. Collaborate with interdisciplinary team and initiate plan and interventions as ordered.  Monitor patient's weight and dietary intake as ordered or per policy. Utilize nutrition screening tool and intervene as necessary. Determine patient's food preferences and provide high-protein, high-caloric foods as appropriate.     INTERVENTIONS:  - Monitor oral intake, urinary output, labs, and treatment plans  - Assess nutrition and hydration status and recommend course of action  - Evaluate amount of meals eaten  - Assist patient with eating if necessary   - Allow adequate time for meals  - Recommend/ encourage appropriate diets, oral nutritional supplements, and vitamin/mineral supplements  - Order, calculate, and assess calorie counts as needed  - Recommend, monitor, and adjust tube feedings and TPN/PPN based on assessed needs  - Assess need for intravenous fluids  - Provide specific nutrition/hydration education as appropriate  - Include patient/family/caregiver in decisions related to nutrition  Outcome: Progressing

## 2024-03-27 NOTE — TELEPHONE ENCOUNTER
Patient seen in consultation for urinary retention and difficult [place] catheter placement.  Status post colo-rectal procedure.  16 Spanish coudé catheter placed at bedside.  Flomax increased to 0.4 mg twice daily.  Please schedule void trial 1 week

## 2024-03-27 NOTE — RESTORATIVE TECHNICIAN NOTE
Restorative Technician Note      Patient Name: Frantz Vasquez     Restorative Tech Visit Date: 03/27/24  Note Type: Mobility  Patient Position Upon Consult: Supine  Activity Performed: Ambulated  Assistive Device: Other (Comment) (None)  Patient Position at End of Consult: Bedside chair; All needs within reach    Venkatesh Vitale, Restorative Technician

## 2024-03-27 NOTE — CONSULTS
Consultation - Urology   Frantz Vasquez 78 y.o. male MRN: [id number]  Unit/Bed#: [facility] 910-01 Encounter: [id number]      Assessment/Plan      Assessment:  78-year-old male s/p colonoscopy revealing malignant appearing mass in the ascending colon now s/p robotic assisted right hemicolectomy by Dr. Torres on 3/26 presents with urinary retention.  Patient has a past medical history of coronary artery disease, diabetes, HLD, HTN, and BPH.  Prior catheter insertion attempt failed.  16 Divehi coudé catheter was placed.  Immediate output was about 900 cc of clear yellow urine.    Plan:  -Follow-up 1 week with urology for catheter removal  -Lawton catheter maintenance  -Monitor for postobstructive diuresis  -PT OT  -Defer all other treatment to primary team    History of Present Illness   Attending: Anders Torres MD  Reason for Consult / Principal Problem: Urinary retention  HPI: Frantz Vasquez is a 78 y.o. year old male who presents with urinary retention with a past medical history of coronary artery disease, diabetes, HLD, HTN and BPH.  Patient currently being followed by colorectal for ascending colon mass. S right hemicolectomy on 3/26 given by Dr. Torres.  Patient developed difficulty urinating.  Patient required straight cath x 1 overnight that had 650 cc.  He denied any nausea or vomiting.  No fevers.  Patient out of bed and ambulating on his own.  No other complaints were mentioned    Inpatient consult to Urology  Consult performed by: Sami Bernal PA-C  Consult ordered by: Zion Oreilly MD          Review of Systems   Constitutional:  Negative for activity change, appetite change, chills, fatigue and fever.   Respiratory:  Negative for cough, chest tightness, shortness of breath and wheezing.    Cardiovascular:  Negative for chest pain and palpitations.   Gastrointestinal:  Negative for abdominal distention, abdominal pain, anal bleeding, diarrhea and nausea.   Genitourinary:  Positive for decreased urine  volume and difficulty urinating. Negative for dysuria, frequency, hematuria, penile pain and urgency.   Skin:  Negative for pallor and rash.   Neurological:  Negative for dizziness, facial asymmetry and headaches.   Psychiatric/Behavioral:  Negative for agitation, behavioral problems, confusion and decreased concentration.        Historical Information   Past Medical History:   Diagnosis Date    Chronic cough     RESOLVED: [id number]    Coronary artery disease     Diabetes mellitus (HCC)     Diabetic retinopathy (HCC)     HLD (hyperlipidemia)     HTN (hypertension)     Hypertension     Kidney stone      Past Surgical History:   Procedure Laterality Date    CARDIAC CATHETERIZATION  12/09/2019    HAND SURGERY Left     HEMICOLOECTOMY W/ ANASTOMOSIS N/A 3/25/2024    Procedure: RESECTION COLON RIGHT LAPAROSCOPIC W/ ROBOTICS;  Surgeon: Anders Torres MD;  [facility];  Service: Colorectal    LYMPH NODE BIOPSY Right 11/24/2023    Procedure: BIOPSY LYMPH NODE SENTINEL, LYMPHATIC MAPPING (Inject: 7:30 am in Nuc Med by radiologist);  Surgeon: Issac Triana MD;  [facility];  Service: Surgical Oncology    OK CORONARY ARTERY BYP W/VEIN & ARTERY GRAFT 3 VEIN N/A 12/13/2019    Procedure: CORONARY ARTERY BYPASS GRAFT (CABG) 4 VESSELS STEVEN to LAD ; SVG--> PDA , DIAGONAL, and OM;  Surgeon: Elvin Huertas DO;  [facility];  Service: Cardiac Surgery    OK ECHO TRANSESOPHAG R-T 2D W/PRB IMG ACQUISJ I&R N/A 12/13/2019    Procedure: TRANSESOPHAGEAL ECHOCARDIOGRAM (SHEBA);  Surgeon: Elvin Huertas DO;  [facility];  Service: Cardiac Surgery    OK NDSC SURG W/VIDEO-ASSISTED HARVEST VEIN CABG Left 12/13/2019    Procedure: HARVEST VEIN ENDOSCOPIC (EVH);  Surgeon: Elvin Huertas DO;  [facility];  Service: Cardiac Surgery    OK STRTCTC CPTR ASSTD PX EXTRADURAL CRANIAL N/A 1/31/2024    Procedure: RIGHT FUNCTIONAL ENDOSCOPIC SINUS SURGERY (FESS) IMAGED GUIDED, MAXILLARY, ETHMOIDS,  FRONTALS;  Surgeon: Tez Bah MD;  Location: [facility];  Service: ENT    SKIN LESION EXCISION Right 11/24/2023    Procedure: WIDE EXCISION MELANOMA RIGHT ARM;  Surgeon: Issac Triana MD;  [facility];  Service: Surgical Oncology     Social History   Social History     Substance and Sexual Activity   Alcohol Use Yes    Alcohol/week: 5.0 standard drinks of alcohol    Types: 3 Glasses of wine, 1 Cans of beer, 1 Standard drinks or equivalent per week    Comment: wine with a meal     @DRUGHX  E-Cigarette/Vaping    E-Cigarette Use Never User      E-Cigarette/Vaping Substances    Nicotine No     THC No     CBD No     Flavoring No     Other No     Unknown No      Social History     Tobacco Use   Smoking Status Never   Smokeless Tobacco Never     Family History:   Family History   Problem Relation Age of Onset    Heart failure Mother     Heart failure Father     Heart disease Father     Heart attack Father     Diabetes Other        Meds/Allergies   all current active meds have been reviewed and current meds:   Current Facility-Administered Medications   Medication Dose Route Frequency    acetaminophen (TYLENOL) tablet 975 mg  975 mg Oral Q8H    amLODIPine (NORVASC) tablet 5 mg  5 mg Oral Daily    atorvastatin (LIPITOR) tablet 80 mg  80 mg Oral Daily With Dinner    gabapentin (NEURONTIN) capsule 100 mg  100 mg Oral TID    heparin (porcine) subcutaneous injection 5,000 Units  5,000 Units Subcutaneous Q8H MARISSA    insulin lispro (HumALOG/ADMELOG) 100 units/mL subcutaneous injection 1-6 Units  1-6 Units Subcutaneous TID AC    iron sucrose (VENOFER) 300 mg in sodium chloride 0.9 % 250 mL IVPB  300 mg Intravenous Daily    methocarbamol (ROBAXIN) tablet 500 mg  500 mg Oral Q8H    metoprolol succinate (TOPROL-XL) 24 hr tablet 50 mg  50 mg Oral Daily    ondansetron (ZOFRAN) injection 4 mg  4 mg Intravenous Q6H PRN    oxyCODONE (ROXICODONE) immediate release tablet 10 mg  10 mg Oral Q4H PRN    oxyCODONE (ROXICODONE) IR  "tablet 5 mg  5 mg Oral Q4H PRN    tamsulosin (FLOMAX) capsule 0.4 mg  0.4 mg Oral BID     Allergies   Allergen Reactions    Pollen Extract Allergic Rhinitis       Objective   Vitals: Blood pressure 147/71, pulse 69, temperature 98.3 °F (36.8 °C), resp. rate 18, height 5' 10\" (1.778 m), weight 68.3 kg (150 lb 9.2 oz), SpO2 100%.    I/O last 24 hours:  In: 750.4 [P.O.:500; I.V.:0.4; IV Piggyback:250]  Out: 3218 [Urine:3218]    Invasive Devices       Peripheral Intravenous Line  Duration             Peripheral IV 03/25/24 Left;Ventral (anterior) Forearm 2 days    Peripheral IV 03/25/24 Right Antecubital 2 days                    Physical Exam  Constitutional:       General: He is not in acute distress.     Appearance: Normal appearance. He is normal weight. He is not ill-appearing.   HENT:      Head: Normocephalic and atraumatic.      Right Ear: External ear normal.      Left Ear: External ear normal.      Nose: Nose normal.   Eyes:      Conjunctiva/sclera: Conjunctivae normal.      Pupils: Pupils are equal, round, and reactive to light.   Cardiovascular:      Rate and Rhythm: Normal rate and regular rhythm.      Pulses: Normal pulses.      Heart sounds: Normal heart sounds. No murmur heard.     No friction rub. No gallop.   Pulmonary:      Effort: Pulmonary effort is normal. No respiratory distress.      Breath sounds: Normal breath sounds. No stridor. No wheezing or rhonchi.   Abdominal:      General: Abdomen is flat. Bowel sounds are normal. There is no distension.      Palpations: Abdomen is soft.      Tenderness: There is no abdominal tenderness. There is no guarding.   Genitourinary:     Penis: Normal.       Comments: Prostate mildly enlarged due to BPH.  Coudé catheter was noted around prostate which made it difficult to get the bladder.  Position of the coudé catheter around the prostate was done.  Successful insertion was done showing clear yellow output of urine  Musculoskeletal:      Cervical back: Neck " supple.   Skin:     General: Skin is warm and dry.      Coloration: Skin is not jaundiced or pale.      Findings: No erythema.   Neurological:      General: No focal deficit present.      Mental Status: He is alert and oriented to person, place, and time.   Psychiatric:         Mood and Affect: Mood normal.         Behavior: Behavior normal.         Lab Results: I have personally reviewed pertinent reports.      CMP:   Lab Results   Component Value Date    SODIUM 137 03/27/2024     03/27/2024    CO2 28 03/27/2024    BUN 17 03/27/2024    CREATININE 1.08 03/27/2024    CALCIUM 8.6 03/27/2024    EGFR 66 03/27/2024       Imaging Studies: I have personally reviewed pertinent reports.    EKG, Pathology, and Other Studies: I have personally reviewed pertinent reports.    VTE Prophylaxis: Sequential compression device (Venodyne)     Code Status: Level 1 - Full Code  Advance Directive and Living Will:      Power of :    POLST:      Counseling / Coordination of Care  Total floor / unit time spent today 30 minutes. Greater than 50% of total time was spent with the patient and / or family counseling and / or coordination of care. A description of the counseling / coordination of care.

## 2024-03-27 NOTE — PLAN OF CARE
Problem: SAFETY ADULT  Goal: Maintains/Returns to pre admission functional level  Description: INTERVENTIONS:  - Perform AM-PAC 6 Click Basic Mobility/ Daily Activity assessment daily.  - Set and communicate daily mobility goal to care team and patient/family/caregiver.   - Collaborate with rehabilitation services on mobility goals if consulted  - Out of bed for toileting  - Record patient progress and toleration of activity level   Outcome: Progressing

## 2024-03-27 NOTE — PROGRESS NOTES
"Colorectal Surgery  Progress Note   Frantz Vasquez 78 y.o. male MRN: [id number]  [facility] Encounter: [id number]    Assessment:  78 year old male admitted for symptomatic iron deficiency amenia receiving Venofer infusions s/p colonoscopy revealing malignant appearing mass (4 x 4 cm) in the mid ascending colon, now s/p 3/26 robotic assisted right hemicolectomy by Dr. Torres.     Vital signs stable, afebrile. On room air    Patient required straight cath x 1 overnight for 650 cc.  Was out of bed and urinating into urinal this morning.    UOP: 2.5 L    AM labs pending      Plan:  CLD with toast and crackers - advance with return of bowel function  [place]olyte [address]  Pain control with PCA - transition to multimodal pain regimen with diet advancement  Follow up surgical pathology  Continue venofer infusions   DVT ppx: Heparin  Pain/ nausea control PRN  OOB/ ambulation  PT/OT  Incentive Spirometry      Subjective/Objective   Subjective:   Patient seen and examined at bedside, in no acute distress. No acute events overnight. Patient's pain is well controlled. He denies nausea or vomiting.  No flatus, no bowel movements.    Objective:   Vitals:Blood pressure 148/70, pulse 64, temperature 98.4 °F (36.9 °C), temperature source Oral, resp. rate 17, height 5' 10\" (1.778 m), weight 68.3 kg (150 lb 9.2 oz), SpO2 98%.  Temp (24hrs), Av.3 °F (36.8 °C), Min:97.5 °F (36.4 °C), Max:98.8 °F (37.1 °C)        Intake/Output Summary (Last 24 hours) at 3/27/2024 0549  Last data filed at 3/27/2024 0503  Gross per 24 hour   Intake 610.6 ml   Output 2428 ml   Net -1817.4 ml       Invasive Devices       Peripheral Intravenous Line  Duration             Peripheral IV 24 Dorsal (posterior);Right Forearm 3 days    Peripheral IV 24 Left;Ventral (anterior) Forearm 1 day    Peripheral IV 24 Right Antecubital 1 day                    Physical Exam:  General: No acute distress, alert and oriented  CV: Well perfused, " regular rate and rhythm  Lungs: Normal work of breathing, no increased respiratory effort   Abdomen: Soft, appropriately tender, non-distended. Incisions clean, dry and intact.  Extremities: No edema, clubbing or cyanosis  Skin: Warm, dry    Lab Results: Results: pending  VTE Prophylaxis: Sequential compression device (Venodyne)  and Heparin

## 2024-03-27 NOTE — PROCEDURES
"Universal Protocol:  Procedure performed by: (Kristin Ayala)  Consent: Verbal consent obtained. Written consent not obtained.  Risks and benefits: risks, benefits and alternatives were discussed  Consent given by: patient  Time out: Immediately prior to procedure a \"time out\" was called to verify the correct patient, procedure, equipment, support staff and site/side marked as required.  Patient understanding: patient states understanding of the procedure being performed  Patient consent: the patient's understanding of the procedure matches consent given  Procedure consent: procedure consent matches procedure scheduled  Relevant documents: relevant documents present and verified  Patient identity confirmed: verbally with patient and arm band    Bladder catheterization    Date/Time: 3/27/2024 1:33 PM    Performed by: Sami Bernal PA-C  Authorized by: Sami Bernal PA-C    Patient location:  Bedside  Consent:     Consent given by:  Patient  Universal protocol:     Procedure explained and questions answered to patient or proxy's satisfaction: yes      Relevant documents present and verified: yes      Immediately prior to procedure a time out was called: yes      Patient identity confirmed:  Verbally with patient and arm band  Procedure details:     Bladder irrigation: yes      Catheter insertion:  Indwelling    Approach: natural orifice      Catheter type:  Coude    Catheter size:  16 Fr    Number of attempts:  1    Successful placement: yes      Urine characteristics:  Blood-tinged  Post-procedure details:     Patient tolerance of procedure:  Tolerated well, no immediate complications  Comments:      Topical lidocane was inserted into the penis prior to insertion of catheter to relieve patient of irritation          "

## 2024-03-28 VITALS
BODY MASS INDEX: 21.56 KG/M2 | HEART RATE: 69 BPM | SYSTOLIC BLOOD PRESSURE: 122 MMHG | DIASTOLIC BLOOD PRESSURE: 59 MMHG | WEIGHT: 150.57 LBS | HEIGHT: 70 IN | RESPIRATION RATE: 17 BRPM | TEMPERATURE: 98.4 F | OXYGEN SATURATION: 95 %

## 2024-03-28 LAB
ANION GAP SERPL CALCULATED.3IONS-SCNC: 6 MMOL/L (ref 4–13)
BASOPHILS # BLD AUTO: 0.02 THOUSANDS/ÂΜL (ref 0–0.1)
BASOPHILS NFR BLD AUTO: 0 % (ref 0–1)
BUN SERPL-MCNC: 20 MG/DL (ref 5–25)
CALCIUM SERPL-MCNC: 8.7 MG/DL (ref 8.4–10.2)
CHLORIDE SERPL-SCNC: 104 MMOL/L (ref 96–108)
CO2 SERPL-SCNC: 28 MMOL/L (ref 21–32)
CREAT SERPL-MCNC: 1.2 MG/DL (ref 0.6–1.3)
EOSINOPHIL # BLD AUTO: 0.17 THOUSAND/ÂΜL (ref 0–0.61)
EOSINOPHIL NFR BLD AUTO: 2 % (ref 0–6)
ERYTHROCYTE [DISTWIDTH] IN BLOOD BY AUTOMATED COUNT: 21 % (ref 11.6–15.1)
GFR SERPL CREATININE-BSD FRML MDRD: 58 ML/MIN/1.73SQ M
GLUCOSE SERPL-MCNC: 102 MG/DL (ref 65–140)
GLUCOSE SERPL-MCNC: 103 MG/DL (ref 65–140)
GLUCOSE SERPL-MCNC: 193 MG/DL (ref 65–140)
HCT VFR BLD AUTO: 24.5 % (ref 36.5–49.3)
HGB BLD-MCNC: 7.4 G/DL (ref 12–17)
IMM GRANULOCYTES # BLD AUTO: 0.03 THOUSAND/UL (ref 0–0.2)
IMM GRANULOCYTES NFR BLD AUTO: 0 % (ref 0–2)
LYMPHOCYTES # BLD AUTO: 0.82 THOUSANDS/ÂΜL (ref 0.6–4.47)
LYMPHOCYTES NFR BLD AUTO: 11 % (ref 14–44)
MCH RBC QN AUTO: 25.5 PG (ref 26.8–34.3)
MCHC RBC AUTO-ENTMCNC: 30.2 G/DL (ref 31.4–37.4)
MCV RBC AUTO: 85 FL (ref 82–98)
MONOCYTES # BLD AUTO: 0.8 THOUSAND/ÂΜL (ref 0.17–1.22)
MONOCYTES NFR BLD AUTO: 11 % (ref 4–12)
NEUTROPHILS # BLD AUTO: 5.33 THOUSANDS/ÂΜL (ref 1.85–7.62)
NEUTS SEG NFR BLD AUTO: 76 % (ref 43–75)
NRBC BLD AUTO-RTO: 0 /100 WBCS
PLATELET # BLD AUTO: 186 THOUSANDS/UL (ref 149–390)
PMV BLD AUTO: 10.7 FL (ref 8.9–12.7)
POTASSIUM SERPL-SCNC: 4 MMOL/L (ref 3.5–5.3)
RBC # BLD AUTO: 2.9 MILLION/UL (ref 3.88–5.62)
SODIUM SERPL-SCNC: 138 MMOL/L (ref 135–147)
WBC # BLD AUTO: 7.17 THOUSAND/UL (ref 4.31–10.16)

## 2024-03-28 PROCEDURE — 80048 BASIC METABOLIC PNL TOTAL CA: CPT | Performed by: PHYSICIAN ASSISTANT

## 2024-03-28 PROCEDURE — 85025 COMPLETE CBC W/AUTO DIFF WBC: CPT | Performed by: PHYSICIAN ASSISTANT

## 2024-03-28 PROCEDURE — 88309 TISSUE EXAM BY PATHOLOGIST: CPT | Performed by: PATHOLOGY

## 2024-03-28 PROCEDURE — 82948 REAGENT STRIP/BLOOD GLUCOSE: CPT

## 2024-03-28 RX ORDER — GABAPENTIN 100 MG/1
100 CAPSULE ORAL 3 TIMES DAILY
Qty: 15 CAPSULE | Refills: 0 | Status: SHIPPED | OUTPATIENT
Start: 2024-03-28 | End: 2024-04-02

## 2024-03-28 RX ORDER — FINASTERIDE 5 MG/1
5 TABLET, FILM COATED ORAL DAILY
Qty: 30 TABLET | Refills: 0 | Status: SHIPPED | OUTPATIENT
Start: 2024-03-29 | End: 2024-04-28

## 2024-03-28 RX ORDER — METHOCARBAMOL 500 MG/1
500 TABLET, FILM COATED ORAL EVERY 8 HOURS
Qty: 15 TABLET | Refills: 0 | Status: SHIPPED | OUTPATIENT
Start: 2024-03-28 | End: 2024-04-02

## 2024-03-28 RX ORDER — ENOXAPARIN SODIUM 100 MG/ML
40 INJECTION SUBCUTANEOUS
Qty: 10 ML | Refills: 0 | Status: SHIPPED | OUTPATIENT
Start: 2024-03-29 | End: 2024-04-23

## 2024-03-28 RX ORDER — TAMSULOSIN HYDROCHLORIDE 0.4 MG/1
0.4 CAPSULE ORAL 2 TIMES DAILY
Qty: 180 CAPSULE | Refills: 3 | Status: SHIPPED | OUTPATIENT
Start: 2024-03-28

## 2024-03-28 RX ORDER — OXYCODONE HYDROCHLORIDE 5 MG/1
5 TABLET ORAL EVERY 4 HOURS PRN
Qty: 12 TABLET | Refills: 0 | Status: SHIPPED | OUTPATIENT
Start: 2024-03-28 | End: 2024-03-31

## 2024-03-28 RX ORDER — ACETAMINOPHEN 325 MG/1
975 TABLET ORAL EVERY 8 HOURS
COMMUNITY
Start: 2024-03-28

## 2024-03-28 RX ORDER — ENOXAPARIN SODIUM 100 MG/ML
40 INJECTION SUBCUTANEOUS
Status: DISCONTINUED | OUTPATIENT
Start: 2024-03-28 | End: 2024-03-28 | Stop reason: HOSPADM

## 2024-03-28 RX ADMIN — INSULIN LISPRO 2 UNITS: 100 INJECTION, SOLUTION INTRAVENOUS; SUBCUTANEOUS at 11:54

## 2024-03-28 RX ADMIN — FINASTERIDE 5 MG: 5 TABLET, FILM COATED ORAL at 08:01

## 2024-03-28 RX ADMIN — METOPROLOL SUCCINATE 50 MG: 50 TABLET, EXTENDED RELEASE ORAL at 08:01

## 2024-03-28 RX ADMIN — ENOXAPARIN SODIUM 40 MG: 40 INJECTION SUBCUTANEOUS at 08:01

## 2024-03-28 RX ADMIN — TAMSULOSIN HYDROCHLORIDE 0.4 MG: 0.4 CAPSULE ORAL at 08:01

## 2024-03-28 RX ADMIN — AMLODIPINE BESYLATE 5 MG: 5 TABLET ORAL at 08:01

## 2024-03-28 RX ADMIN — GABAPENTIN 100 MG: 100 CAPSULE ORAL at 08:01

## 2024-03-28 RX ADMIN — IRON SUCROSE 300 MG: 20 INJECTION, SOLUTION INTRAVENOUS at 10:12

## 2024-03-28 RX ADMIN — METHOCARBAMOL 500 MG: 500 TABLET ORAL at 06:05

## 2024-03-28 RX ADMIN — ACETAMINOPHEN 975 MG: 325 TABLET, FILM COATED ORAL at 06:05

## 2024-03-28 NOTE — PROGRESS NOTES
"Colorectal Surgery  Progress Note   Frantz Vasquez 78 y.o. male MRN: [id number]  Unit/Bed#: [facility] 910-01 Encounter: [id number]    Assessment:  78 year old male admitted for symptomatic iron deficiency amenia receiving Venofer infusions s/p colonoscopy revealing malignant appearing mass (4 x 4 cm) in the mid ascending colon, s/p 3/26 robotic assisted right hemicolectomy     AVSS  UOP: 3.3L      Plan:  Continue lo res diet  Multimodal pain control  Continue mar catheter, appreciate urology recommendations  Continue venofer infusions   DVT ppx: Heparin, transition to lovenox on dc. Lovenox teaching  OOB/ ambulation  PT/OT  Dispo planning for today      Subjective/Objective   Subjective:   No acute events overnight. Pain is controlled. Having bowel function had bm.    Objective:   Vitals:Blood pressure 132/67, pulse 71, temperature 98.4 °F (36.9 °C), resp. rate 17, height 5' 10\" (1.778 m), weight 68.3 kg (150 lb 9.2 oz), SpO2 93%.  Temp (24hrs), Av.6 °F (37 °C), Min:98.2 °F (36.8 °C), Max:99.5 °F (37.5 °C)        Intake/Output Summary (Last 24 hours) at 3/28/2024 0643  Last data filed at 3/28/2024 0612  Gross per 24 hour   Intake 140 ml   Output 3350 ml   Net -3210 ml       Invasive Devices       Peripheral Intravenous Line  Duration             Peripheral IV 24 Left;Ventral (anterior) Forearm 2 days    Peripheral IV 24 Right Antecubital 2 days              Drain  Duration             Urethral Catheter <1 day                    Physical Exam:  General: NAD  HENT: NCAT MMM  Neck: supple, no JVD  CV: nl rate  Lungs: nl wob. No resp distress  ABD: Soft, nontender, nondistended, incisions c/d/i  Extrem: No CCE  Neuro: AAOx3      Lab Results: Results: pending  VTE Prophylaxis: Sequential compression device (Venodyne)  and Heparin    "

## 2024-03-28 NOTE — CASE MANAGEMENT
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 [address]  Phone: [phone]     [id number] - [place], [place] - [facility] [address] [address]  Phone: [phone] Fax: [phone]    [facility] - [place], [place] - [address] [place] [address]  Phone: [phone] Fax: [phone]    Primary Care Provider: Melissa Corey MD    Primary Insurance: MEDICARE  Secondary Insurance: AARP    DISCHARGE DETAILS:    Discharge planning discussed with:: Pt  Freedom of Choice: Yes     CM contacted family/caregiver?: No- see comments (Pt alert and oriented)  Were Treatment Team discharge recommendations reviewed with patient/caregiver?: Yes  Did patient/caregiver verbalize understanding of patient care needs?: Yes  Were patient/caregiver advised of the risks associated with not following Treatment Team discharge recommendations?: Yes         Requested Home  Health Care         Is the patient interested in HHC at discharge?: No    DME Referral Provided  Referral made for DME?: No    Other Referral/Resources/Interventions Provided:  Interventions: HHC  Referral Comments: CM discussed HHC with pt for mar cath, pt declined HHC at this time.         Treatment Team Recommendation: Home  Discharge Destination Plan:: Home  Transport at Discharge : Family           ETA of Transport (Date): 03/28/24  ETA of Transport (Time): 1200     Transfer Mode: Self  Accompanied by: Family member     IMM Given (Date):: 03/28/24  IMM Given to:: Patient                            CM discussed discharge with pt, asked about HHC for mar cath, pt declined.  Pt does have a follow-up appointment with urology next week.  CM reviewed IMM with pt, pt in agreement and signed IMM.

## 2024-03-28 NOTE — PLAN OF CARE
Problem: PAIN - ADULT  Goal: Verbalizes/displays adequate comfort level or baseline comfort level  Description: Interventions:  - Encourage patient to monitor pain and request assistance  - Assess pain using appropriate pain scale  - Administer analgesics based on type and severity of pain and evaluate response  - Implement non-pharmacological measures as appropriate and evaluate response  - Consider cultural and social influences on pain and pain management  - Notify physician/advanced practitioner if interventions unsuccessful or patient reports new pain  Outcome: Progressing     Problem: INFECTION - ADULT  Goal: Absence or prevention of progression during hospitalization  Description: INTERVENTIONS:  - Assess and monitor for signs and symptoms of infection  - Monitor lab/diagnostic results  - Monitor all insertion sites, i.e. indwelling lines, tubes, and drains  - Monitor endotracheal if appropriate and nasal secretions for changes in amount and color  - [place] appropriate cooling/warming therapies per order  - Administer medications as ordered  - Instruct and encourage patient and family to use good hand hygiene technique  - Identify and instruct in appropriate isolation precautions for identified infection/condition  Outcome: Progressing  Goal: Absence of fever/infection during neutropenic period  Description: INTERVENTIONS:  - Monitor WBC    Outcome: Progressing

## 2024-03-28 NOTE — DISCHARGE SUMMARY
Discharge Summary - Colorectal Surgery   Frantz Vasquez 78 y.o. 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  He was symptomatic at that time with dizziness and fatigue and was admitted to the hospital.  Patient denied any melena, no bloody bowel movements, no hemoptysis or hematemesis.  Denies any recent weight loss.  Is a diabetic type II.    Procedures Performed:   3/20/24 EGD/Colonoscopy  3/25/24 Robotic Right hemicolectomy - Dr. [facility] [place] [facility] Course: Patient was admitted through the medical service service and gastroenterology was consulted for an EGD and colonoscopy.  They started the patient on Venofer 300 mg daily for 4 doses.  Gastroenterology did take the patient down for an EGD which showed normal esophagus he does have a Schatzki's ring in the lower third of his esophagus and a 2 cm type I hiatal hernia.  They also performed a colonoscopy which showed a single malignant appearing mass that was traversable measuring 4 cm x 4 cm in the mid ascending colon, covering one third of the circumference.  Pathology of that mass revealed moderately differentiated invasive adenocarcinoma.  The path from his EGD to rule out celiac disease was negative.  Patient has a CEA level of 4.2.  Patient was then prepped again for the operating room and on 3/25/2024 he was taken for robotic right hemicolectomy.  Patient has done well postoperatively however due to his enlarged prostate he needed to be straight cathed 3 times after his Lawton was removed on postop day #1.  Patient was placed back on his Flomax and an additional 0.4 mg was given to the patient in the  morning.  Urology was consulted and a Lawton was placed and patient will be followed in the office within a week.  Patient is familiar with a Lawton as well as a leg bag.  Patient is now able to tolerate a low residue diet constant carb.  He was on subcu heparin throughout the admission for DVT prophylaxis and was switched to Lovenox on the day of discharge with Lovenox teaching.  He has 25 more days of the Lovenox to take.  Scripts were sent to [facility] on [address] for gabapentin 100 mg 3 times daily x 5 days with no refills, Robaxin 500 mg every 6 hours x 5 days and no refills, Tylenol extra strength every 8 hours, and oxycodone 5 mg every 4-6 hours as needed for pain 15 oxycodone pills were prescribed and no refills.  All discharge instructions were given to the patient.  He may shower.  And he is not to hesitate to call the office for fevers of 101.5 or higher increasing abdominal pain or vomiting.    Pathology pending    Complications: None    Condition at Discharge: good     Discharge instructions/Information to patient and family:   See after visit summary for information provided to patient and family.      Provisions for Follow-Up Care:  See after visit summary for information related to follow-up care and any pertinent home health orders.      Disposition: Home    Planned Readmission: No    Discharge Statement   I spent 30 minutes discharging the patient. This time was spent on the day of discharge. I had direct contact with the patient on the day of discharge. Additional documentation is required if more than 30 minutes were spent on discharge.     Discharge Medications:  See after visit summary for reconciled discharge medications provided to patient and family.

## 2024-03-29 ENCOUNTER — TRANSITIONAL CARE MANAGEMENT (OUTPATIENT)
Dept: INTERNAL MEDICINE CLINIC | Facility: OTHER | Age: 79
End: 2024-03-29

## 2024-03-29 ENCOUNTER — DOCUMENTATION (OUTPATIENT)
Dept: HEMATOLOGY ONCOLOGY | Facility: CLINIC | Age: 79
End: 2024-03-29

## 2024-03-29 NOTE — PROGRESS NOTES
Chart rvw'd on 3/29/2024      Colonoscopy date-  3/20/2024    Impression-  Single malignant-appearing mass measuring 4 cm x 4 cm in the mid ascending colon, covering one third of the circumference; performed cold forceps biopsy with partial removal; tattooed 5 cm distal to the finding  The ileocecal valve, cecum, hepatic flexure, transverse colon, splenic flexure, descending colon, sigmoid colon, rectosigmoid and rectum appeared normal.    Pathology-  A. Duodenum, biopsy:  -   Duodenal mucosa with focal chronic peptic duodenitis-type changes.  -   No evidence of celiac disease.      B. Stomach, biopsy:  -   Gastric antral mucosa with mild chronic inactive gastritis and mild reactive changes.  -   Gastric oxyntic mucosa with minimal chronic inflammation.  -   Negative for intestinal metaplasia and dysplasia.  -   Negative for Helicobacter pylori-type organisms on H&E stain.       C. Ascending colon mass, biopsy:  -   Invasive adenocarcinoma, moderately differentiated, arising in fragments of tubular adenoma (see comment).    Labs-  3/21/2024 CEA 4.2    Imaging-  11/20/2023 PET CT-  1. There is no evidence of FDG avid metastatic disease. No FDG avid right axillary adenopathy or right shoulder focal abnormality  2. There is moderate left-sided hydronephrosis, a distended bladder, and markedly enlarged prostate gland including the median lobe. Recommend urological consultation for these findings.  3. Opacified right maxillary sinus with mild SUV avidity suggesting active inflammatory component    Scheduled appointments-  Patient is scheduled with Dr. Torres (colorectal surgeon) on 4/23/2024 POST OP.    Surgical date-  3/28/2024    Post surgical pathology-  A. Terminal ileum, ascending colon, and appendix, right hemicolectomy:  - Adenocarcinoma (2.3 cm), moderately differentiated, arising in a tubular adenoma.  - Tumor invades muscularis propria (pT2).  - Terminal ileum and appendix with no pathologic abnormality.  -  Resection margins are negative.  - Twenty two lymph nodes, negative for malignancy (0/22).    COLON AND RECTUM: Resection   8th Edition - Protocol posted: 12/13/2023COLON AND RECTUM: RESECTION - All Specimens  SPECIMEN   Procedure  Right hemicolectomy   TUMOR   Tumor Site  Ascending colon   Histologic Type  Adenocarcinoma   Histologic Grade  G2, moderately differentiated   Tumor Size  Greatest dimension (Centimeters): 2.3 cm   Additional Dimension (Centimeters)  2.3 cm     0.5 cm   Tumor Extent  Invades into muscularis propria   Macroscopic Tumor Perforation  Not identified   Lymphatic and / or Vascular Invasion  Not identified   Perineural Invasion  Not identified   Tumor Budding Score  Low (0-4)   Number of Tumor Buds  4 per 'hotspot' field   Type of Polyp in which Invasive Carcinoma Arose  Tubular adenoma   Treatment Effect  No known presurgical therapy   MARGINS   Margin Status for Invasive Carcinoma  All margins negative for invasive carcinoma   Closest Margin(s) to Invasive Carcinoma  Radial (circumferential)   Distance from Invasive Carcinoma to Closest Margin  3.7 cm   Margin Status for Non-Invasive Tumor  All margins negative for high-grade dysplasia / intramucosal carcinoma and low-grade dysplasia   REGIONAL LYMPH NODES   Regional Lymph Node Status  All regional lymph nodes negative for tumor   Number of Lymph Nodes Examined  22   Tumor Deposits  Not identified   pTNM CLASSIFICATION (AJCC 8th Edition)   Reporting of pT, pN, and (when applicable) pM categories is based on information available to the pathologist at the time the report is issued. As per the AJCC (Chapter 1, 8th Ed.) it is the managing physician’s responsibility to establish the final pathologic stage based upon all pertinent information, including but potentially not limited to this pathology report.   pT Category  pT2   pN Category  pN0       pT2, pN0

## 2024-04-02 ENCOUNTER — PROCEDURE VISIT (OUTPATIENT)
Dept: UROLOGY | Facility: CLINIC | Age: 79
End: 2024-04-02
Payer: MEDICARE

## 2024-04-02 DIAGNOSIS — R33.9 URINARY RETENTION: ICD-10-CM

## 2024-04-02 DIAGNOSIS — N40.1 BPH WITH OBSTRUCTION/LOWER URINARY TRACT SYMPTOMS: Primary | ICD-10-CM

## 2024-04-02 DIAGNOSIS — N13.8 BPH WITH OBSTRUCTION/LOWER URINARY TRACT SYMPTOMS: Primary | ICD-10-CM

## 2024-04-02 LAB — POST-VOID RESIDUAL VOLUME, ML POC: 574 ML

## 2024-04-02 PROCEDURE — 99211 OFF/OP EST MAY X REQ PHY/QHP: CPT

## 2024-04-02 PROCEDURE — 51702 INSERT TEMP BLADDER CATH: CPT

## 2024-04-02 PROCEDURE — 51798 US URINE CAPACITY MEASURE: CPT

## 2024-04-02 NOTE — PROGRESS NOTES
"4/2/2024    Frantz Vasquez  1945  [id number]    Diagnosis      Patient presents for void trial managed by our office    Plan  Patient will return in 2 weeks for 2nd void trial. He will continue taking his Flomax and Proscar as prescribed.     Procedure voiding trial      Patient returned this afternoon. Patient states able to void but only did so once. Patient voided 0 ml while in office. Bladder ultrasound performed and PVR measured 574ml.    Recent Results (from the past 4 hour(s))   POCT Measure PVR    Collection Time: 04/02/24  3:11 PM   Result Value Ref Range    POST-VOID RESIDUAL VOLUME, ML  mL         Universal Protocol:  Consent: Verbal consent obtained.  Risks and benefits: risks, benefits and alternatives were discussed  Consent given by: patient  Time out: Immediately prior to procedure a \"time out\" was called to verify the correct patient, procedure, equipment, support staff and site/side marked as required.  Patient understanding: patient states understanding of the procedure being performed  Patient consent: the patient's understanding of the procedure matches consent given  Patient identity confirmed: verbally with patient    Bladder catheterization    Date/Time: 4/2/2024 8:30 AM    Performed by: Jazmine Samson RN  Authorized by: Hay Genao MD    Patient location:  Bedside  Consent:     Consent given by:  Patient  Universal protocol:     Procedure explained and questions answered to patient or proxy's satisfaction: yes      Immediately prior to procedure a time out was called: yes      Patient identity confirmed:  Verbally with patient  Pre-procedure details:     Procedure purpose:  Therapeutic    Preparation: Patient was prepped and draped in usual sterile fashion    Anesthesia (see MAR for exact dosages):     Anesthesia method:  None  Procedure details:     Bladder irrigation: no      Catheter insertion:  Indwelling    Approach: natural orifice      Catheter type:  Coude and " Lawton    Catheter size:  18 Fr    Number of attempts:  1    Successful placement: yes      Urine characteristics:  Clear and yellow  Post-procedure details:     Patient tolerance of procedure:  Tolerated well, no immediate complications  Comments:      Catheter placed with no difficulty. 10 ml of sterile water inflated into balloon. 650 mL of urine drained. Patient tolerated well. Attached to stat lock and leg bag. Extra supplies given per pt request.          Jazmine Samson RN

## 2024-04-02 NOTE — PROGRESS NOTES
4/2/2024    Frantz Vasquez  1945  [id number]    Diagnosis      Patient presents for mar removal managed by our office    Procedure Mar removal    Mar catheter removed after deflation of an intact balloon. Patient tolerated well. Encouraged patient to hydrate well and return this afternoon for post void residual.  he knows he may return early if uncomfortable and unable to urinate. Patient agrees to this plan.        Cyn Jaramillo RN

## 2024-04-03 ENCOUNTER — DOCUMENTATION (OUTPATIENT)
Dept: HEMATOLOGY ONCOLOGY | Facility: CLINIC | Age: 79
End: 2024-04-03

## 2024-04-03 NOTE — PROGRESS NOTES
In-basket message received from Dr[facility]. Chart reviewed and prep completed.

## 2024-04-04 ENCOUNTER — OFFICE VISIT (OUTPATIENT)
Dept: INTERNAL MEDICINE CLINIC | Age: 79
End: 2024-04-04
Payer: MEDICARE

## 2024-04-04 VITALS
DIASTOLIC BLOOD PRESSURE: 68 MMHG | WEIGHT: 146 LBS | SYSTOLIC BLOOD PRESSURE: 130 MMHG | HEIGHT: 70 IN | TEMPERATURE: 98.1 F | OXYGEN SATURATION: 97 % | BODY MASS INDEX: 20.9 KG/M2 | HEART RATE: 65 BPM

## 2024-04-04 DIAGNOSIS — C18.9 ADENOCARCINOMA, COLON (HCC): Primary | ICD-10-CM

## 2024-04-04 DIAGNOSIS — R33.9 URINARY RETENTION: ICD-10-CM

## 2024-04-04 DIAGNOSIS — E78.00 HYPERCHOLESTEREMIA: ICD-10-CM

## 2024-04-04 DIAGNOSIS — E11.9 TYPE 2 DIABETES MELLITUS WITHOUT COMPLICATION, WITHOUT LONG-TERM CURRENT USE OF INSULIN (HCC): ICD-10-CM

## 2024-04-04 DIAGNOSIS — C43.61 MALIGNANT MELANOMA OF RIGHT UPPER EXTREMITY INCLUDING SHOULDER (HCC): ICD-10-CM

## 2024-04-04 DIAGNOSIS — D50.0 IRON DEFICIENCY ANEMIA DUE TO CHRONIC BLOOD LOSS: Chronic | ICD-10-CM

## 2024-04-04 DIAGNOSIS — I10 BENIGN ESSENTIAL HYPERTENSION: ICD-10-CM

## 2024-04-04 PROCEDURE — 99495 TRANSJ CARE MGMT MOD F2F 14D: CPT | Performed by: INTERNAL MEDICINE

## 2024-04-04 RX ORDER — FERROUS SULFATE 324(65)MG
324 TABLET, DELAYED RELEASE (ENTERIC COATED) ORAL EVERY OTHER DAY
Qty: 30 TABLET | Refills: 1 | Status: SHIPPED | OUTPATIENT
Start: 2024-04-04

## 2024-04-04 NOTE — PROGRESS NOTES
Diabetic Foot Exam    Patient's shoes and socks removed.    Right Foot/Ankle   Right Foot Inspection  Skin Exam: skin normal and skin intact. No dry skin, no warmth, no callus, no erythema, no maceration, no abnormal color, no pre-ulcer, no ulcer and no callus.     Sensory   Vibration: intact  Proprioception: intact  Monofilament testing: intact    Vascular  Capillary refills: < 3 seconds  The right DP pulse is 1+. The right PT pulse is 1+.     Left Foot/Ankle  Left Foot Inspection  Skin Exam: skin normal and skin intact. No dry skin, no warmth, no erythema, no maceration, normal color, no pre-ulcer, no ulcer and no callus.     Sensory   Vibration: intact  Proprioception: intact  Monofilament testing: intact    Vascular  Capillary refills: < 3 seconds  The left DP pulse is 1+. The left PT pulse is 1+.     Assign Risk Category  No deformity present  No loss of protective sensation  No weak pulses  Risk: 0    Assessment & Plan     1. Adenocarcinoma, colon ([facility])  S/p surgery for adenocarcinoma of the colon negative margins and negative lymph nodes  2. Iron deficiency anemia due to chronic blood loss  Iron deficiency anemia secondary to adenocarcinoma of the colon I will recommend him to take ferrous sulfate 1 every other day  3. Benign essential hypertension  Blood pressure is very well-controlled  4. Type 2 diabetes mellitus without complication, without long-term current use of insulin ([facility])  Type 2 diabetes mellitus is very well-controlled  5. Hypercholesteremia    6. 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 Urinary retention  Postoperative urinary retention he is on Lawton catheter for 2 weeks and followed up with urology       Subjective     Transitional Care Management Review:   Frantz Vasquez is a 78 y.o. male here for TCM follow up.     During the TCM phone call patient stated:  TCM Call       Date and time call was made  3/29/2024 12:25 PM    [facility]  care reviewed  Records reviewed    Patient was hospitialized at  [facility]    Date of Admission  03/19/24    Date of discharge  03/28/24    Diagnosis  iron deficiency anemia    Disposition  Home    Were the patients medications reviewed and updated  Yes    Current Symptoms  Cough          TCM Call       Post hospital issues  None    Should patient be enrolled in anticoag monitoring?  No    Scheduled for follow up?  Yes    Patients specialists  Other (comment); Cardiologist    Cardiologist name  Tabitha Pearce 1/8/20  &  Matt Hood 2/10/20     Cardiologist contact #  [phone]    Other specialists names  Elvin Huertas Do [facility]    Other specialists contcat #  [phone]    Did you obtain your prescribed medications  Yes  wife picking them up    Do you need help managing your prescriptions or medications  No    Is transportation to your appointment needed  No    Specify why  unable to drive until seen by surgeon    I have advised the patient to call PCP with any new or worsening symptoms  Rigo Deras CMA          This is a very pleasant 78 years young gentleman who was recently admitted to the [facility] for iron deficiency anemia EGD and the colonoscopy was done and subsequently he was operated for colon cancer right hemicolectomy was done patient is discharged doing well except for the post operative urinary retention and he is using the Lawton catheter otherwise review of system is essentially unremarkable.    Hospital Course: Patient was admitted through the medical service service and gastroenterology was consulted for an EGD and colonoscopy.  They started the patient on Venofer 300 mg daily for 4 doses.  Gastroenterology did take the patient down for an EGD which showed normal esophagus he does have a Schatzki's ring in the lower third of his esophagus and  a 2 cm type I hiatal hernia.  They also performed a colonoscopy which showed a single malignant appearing mass that was traversable measuring 4 cm x 4 cm in the mid ascending colon, covering one third of the circumference.  Pathology of that mass revealed moderately differentiated invasive adenocarcinoma.  The path from his EGD to rule out celiac disease was negative.  Patient has a CEA level of 4.2.  Patient was then prepped again for the operating room and on 3/25/2024 he was taken for robotic right hemicolectomy.  Patient has done well postoperatively however due to his enlarged prostate he needed to be straight cathed 3 times after his Lawton was removed on postop day #1.  Patient was placed back on his Flomax and an additional 0.4 mg was given to the patient in the morning.  Urology was consulted and a Lawton was placed and patient will be followed in the office within a week.  Patient is familiar with a Lawton as well as a leg bag.  Patient is now able to tolerate a low residue diet constant carb.  He was on subcu heparin throughout the admission for DVT prophylaxis and was switched to Lovenox on the day of discharge with Lovenox teaching.  He has 25 more days of the Lovenox to take.  Scripts were sent to [facility] pharmacy on [address] for gabapentin 100 mg 3 times daily x 5 days with no refills, Robaxin 500 mg every 6 hours x 5 days and no refills, Tylenol extra strength every 8 hours, and oxycodone 5 mg every 4-6 hours as needed for pain 15 oxycodone pills were prescribed and no refills.  All discharge instructions were given to the patient.  He may shower.  And he is not to hesitate to call the office for fevers of 101.5 or higher increasing abdominal pain or vomiting.    Last hemoglobin and hematocrit was 7.4/24 although patient is not symptomatic anymore from lightheadedness or anything which he was having before I will start him on every other day ferrous sulfate and see how he does with  that    Hypertension is very well-controlled continue with the same medication no changes.  Patient is here today for the follow-up.   Hypertension. I reviewed antihypertensive medication, patient does not have any side effects of  medications, no signs or symptoms of hypertension ,hypotension or orthostatic hypotension.  Patient is compliant with medications.  Blood workup related to hypertensive diagnosis reviewed.  Plan is to continue with the present management.  We will follow-up as a scheduled and adjust the doses of the medication as indicated.    2 diabetes mellitus foot examination was essentially unremarkable will get the hemoglobin A1c before the next appointment and follow-up in 2 months      Review of Systems   Constitutional:  Positive for fatigue. Negative for appetite change and fever.   HENT:  Negative for congestion, ear pain, hearing loss, nosebleeds, sneezing, tinnitus and voice change.    Eyes:  Negative for pain, discharge and redness.   Respiratory:  Negative for cough, chest tightness and wheezing.    Cardiovascular:  Negative for chest pain, palpitations and leg swelling.   Gastrointestinal:  Negative for abdominal pain, blood in stool, constipation, diarrhea, nausea and vomiting.   Genitourinary:  Negative for difficulty urinating, dysuria, hematuria and urgency.        Urinary retention status post Lawton catheter   Musculoskeletal:  Negative for arthralgias, back pain, gait problem and joint swelling.   Skin:  Negative for rash and wound.   Allergic/Immunologic: Negative for environmental allergies.   Neurological:  Negative for dizziness, tremors, seizures, weakness, light-headedness and numbness.   Hematological:  Negative for adenopathy. Does not bruise/bleed easily.   Psychiatric/Behavioral:  Negative for behavioral problems and confusion. The patient is not nervous/anxious.        Objective     /68 (BP Location: Left arm, Patient Position: Sitting, Cuff Size: Standard)   Pulse  "65   Temp 98.1 °F (36.7 °C) (Temporal)   Ht 5' 10\" (1.778 m)   Wt 66.2 kg (146 lb)   SpO2 97%   BMI 20.95 kg/m²      Physical Exam  Vitals and nursing note reviewed.   Constitutional:       General: He is not in acute distress.     Appearance: He is well-developed.   HENT:      Head: Normocephalic and atraumatic.   Eyes:      Conjunctiva/sclera: Conjunctivae normal.   Cardiovascular:      Rate and Rhythm: Normal rate and regular rhythm.      Pulses: no weak pulses.           Dorsalis pedis pulses are 1+ on the right side and 1+ on the left side.        Posterior tibial pulses are 1+ on the right side and 1+ on the left side.      Heart sounds: No murmur heard.  Pulmonary:      Effort: Pulmonary effort is normal. No respiratory distress.      Breath sounds: Normal breath sounds.   Abdominal:      Palpations: Abdomen is soft.      Tenderness: There is no abdominal tenderness.      Comments: Abdominal wound after laparoscopic surgery for colon cancer and right hemicolectomy are all healing well with no signs of any infection   Musculoskeletal:         General: No swelling.      Cervical back: Neck supple.   Feet:      Right foot:      Skin integrity: No ulcer, skin breakdown, erythema, warmth, callus or dry skin.      Left foot:      Skin integrity: No ulcer, skin breakdown, erythema, warmth, callus or dry skin.   Skin:     General: Skin is warm and dry.      Capillary Refill: Capillary refill takes less than 2 seconds.   Neurological:      Mental Status: He is alert.   Psychiatric:         Mood and Affect: Mood normal.           Melissa Corey MD         "

## 2024-04-15 ENCOUNTER — PROCEDURE VISIT (OUTPATIENT)
Dept: UROLOGY | Facility: CLINIC | Age: 79
End: 2024-04-15
Payer: MEDICARE

## 2024-04-15 VITALS
BODY MASS INDEX: 20.9 KG/M2 | SYSTOLIC BLOOD PRESSURE: 130 MMHG | OXYGEN SATURATION: 99 % | HEART RATE: 74 BPM | WEIGHT: 146 LBS | HEIGHT: 70 IN | DIASTOLIC BLOOD PRESSURE: 78 MMHG

## 2024-04-15 DIAGNOSIS — R33.9 URINARY RETENTION: Primary | ICD-10-CM

## 2024-04-15 LAB — POST-VOID RESIDUAL VOLUME, ML POC: 338 ML

## 2024-04-15 PROCEDURE — 51798 US URINE CAPACITY MEASURE: CPT

## 2024-04-15 PROCEDURE — 51702 INSERT TEMP BLADDER CATH: CPT

## 2024-04-15 PROCEDURE — 99211 OFF/OP EST MAY X REQ PHY/QHP: CPT

## 2024-04-15 NOTE — PROGRESS NOTES
"4/15/2024    Frantz LISA Vasquez  1945  [id number]    Diagnosis  Chief Complaint    Urinary Retention         Patient presents for TOV  managed by our office    Plan  Patient will will return for cysto trus as scheduled     Procedure Lawton removal/voiding trial    Lawton catheter removed after deflation of an intact balloon. Patient tolerated well. Encouraged patient to hydrate well and return this afternoon for post void residual.  he knows he may return early if uncomfortable and unable to urinate. Patient agrees to this plan.    No results found for this or any previous visit (from the past 4 hour(s)).        Vitals:    04/15/24 0837   BP: 130/78   Pulse: 74   SpO2: 99%   Weight: 66.2 kg (146 lb)   Height: 5' 10\" (1.778 m)           Cyn Jaramillo RN       "

## 2024-04-15 NOTE — PROGRESS NOTES
"4/15/2024    Frantz Vasquez  1945  [id number]    Diagnosis  Chief Complaint    Urinary Retention         Patient presents for 2nd void trial  managed by our office    Plan  Patient will return as scheduled for cysto/trus.  Patient will contact the office with any questions or concerns    Procedure Lawton removal/voiding trial      Patient returned this afternoon. Patient states unable to void. Patient voided 0 ml while in office. Bladder ultrasound performed and PVR measured 338ml.      Recent Results (from the past 1 hour(s))   POCT Measure PVR    Collection Time: 04/15/24  3:28 PM   Result Value Ref Range    POST-VOID RESIDUAL VOLUME, ML  mL    Universal Protocol:  Consent: Verbal consent obtained.  Risks and benefits: risks, benefits and alternatives were discussed  Consent given by: patient  Time out: Immediately prior to procedure a \"time out\" was called to verify the correct patient, procedure, equipment, support staff and site/side marked as required.  Patient understanding: patient states understanding of the procedure being performed  Patient consent: the patient's understanding of the procedure matches consent given  Patient identity confirmed: verbally with patient    Bladder catheterization    Date/Time: 4/15/2024 8:30 AM    Performed by: Jazmine Samson RN  Authorized by: Brock Stephens MD    Patient location:  Bedside  Consent:     Consent given by:  Patient  Universal protocol:     Procedure explained and questions answered to patient or proxy's satisfaction: yes      Immediately prior to procedure a time out was called: yes      Patient identity confirmed:  Verbally with patient  Pre-procedure details:     Procedure purpose:  Therapeutic    Preparation: Patient was prepped and draped in usual sterile fashion    Anesthesia (see MAR for exact dosages):     Anesthesia method:  None  Procedure details:     Bladder irrigation: no      Catheter insertion:  Indwelling    Approach: natural " "orifice      Catheter type:  Coude, Lawton and latex    Catheter size:  16 Fr    Number of attempts:  1    Successful placement: yes      Urine characteristics:  Yellow  Post-procedure details:     Patient tolerance of procedure:  Tolerated well, no immediate complications  Comments:      Lawton inserted with no issues. 325 ML of urine drained. 10 ml inflated into balloon. Attached to stat lock and leg bag. Extra supplies given per pt request.        Vitals:    04/15/24 0837   BP: 130/78   Pulse: 74   SpO2: 99%   Weight: 66.2 kg (146 lb)   Height: 5' 10\" (1.778 m)           Jazmine Samson RN       "

## 2024-04-19 DIAGNOSIS — I10 BENIGN ESSENTIAL HYPERTENSION: Chronic | ICD-10-CM

## 2024-04-19 RX ORDER — METOPROLOL SUCCINATE 50 MG/1
50 TABLET, EXTENDED RELEASE ORAL DAILY
Qty: 90 TABLET | Refills: 1 | Status: SHIPPED | OUTPATIENT
Start: 2024-04-19

## 2024-04-19 NOTE — PROGRESS NOTES
Colon and Rectal Surgery   Frantz Vasquez 78 y.o. male MRN: [id number]   Encounter: [id number]  04/23/24   9:59 AM        ASSESSMENT:    Frantz returns today, he is doing nicely approximately 1 month from robotic right hemicolectomy, discussed pathology with him inpatient, T2/N0, 0/22 lymph nodes, stage I colon cancer, this is a technical cure, and will not require adjuvant therapy.  He has history of melanoma, he had PET/CT 11/2023, discussed with him that this serves as reasonable staging given the proximity, and will follow-up with likely 1 more CT scan chest/abdomen/pelvis this calendar year unless there are more melanoma surveillance ongoing.  He has labs from his PCP to follow-up his prior anemia, abdomen is soft, bowel habits are normalizing without complaints, well-healed wounds.    PLAN:  -CEA level by September 2024  -6months office followup  -Will add repeat CT scan at that time as 11/2023 PET/CT prior  CC:  MD Dr. Kamilah Rust, RN  Traci Clarke  GI Tumor Conference    HPI  Frantz Vasquez is a 78 y.o. male who presents for a post operative evaluation. 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  Final Diagnosis  A. Terminal ileum, ascending colon, and appendix, right hemicolectomy:  - Adenocarcinoma (2.3 cm), moderately differentiated, arising in a tubular adenoma.  - Tumor invades muscularis propria (pT2).  - Terminal ileum and appendix with no pathologic abnormality.  - Resection margins are negative.  - Twenty two lymph nodes, negative for malignancy (0/22).        Lab Results   Component Value Date    WBC 7.17 03/28/2024    HGB 7.4 (L) 03/28/2024    HCT 24.5 (L) 03/28/2024    MCV 85 03/28/2024     03/28/2024     Lab Results   Component Value Date    SODIUM 138 03/28/2024    K 4.0 03/28/2024     03/28/2024    CO2 28 03/28/2024    BUN 20 03/28/2024    CREATININE 1.20 03/28/2024     GLUC 103 03/28/2024    CALCIUM 8.7 03/28/2024       Historical Information   Past Medical History:   Diagnosis Date    Chronic cough     RESOLVED: [id number]    Coronary artery disease     Diabetes mellitus (HCC)     Diabetic retinopathy (HCC)     HLD (hyperlipidemia)     HTN (hypertension)     Hypertension     Kidney stone      Past Surgical History:   Procedure Laterality Date    CARDIAC CATHETERIZATION  12/09/2019    HAND SURGERY Left     HEMICOLOECTOMY W/ ANASTOMOSIS N/A 3/25/2024    Procedure: RESECTION COLON RIGHT LAPAROSCOPIC W/ ROBOTICS;  Surgeon: Anders Torres MD;  [facility];  Service: Colorectal    LYMPH NODE BIOPSY Right 11/24/2023    Procedure: BIOPSY LYMPH NODE SENTINEL, LYMPHATIC MAPPING (Inject: 7:30 am in Nuc Med by radiologist);  Surgeon: Issac Triana MD;  [facility];  Service: Surgical Oncology    AL CORONARY ARTERY BYP W/VEIN & ARTERY GRAFT 3 VEIN N/A 12/13/2019    Procedure: CORONARY ARTERY BYPASS GRAFT (CABG) 4 VESSELS STEVEN to LAD ; SVG--> PDA , DIAGONAL, and OM;  Surgeon: Elvin Huertas DO;  [facility];  Service: Cardiac Surgery    AL ECHO TRANSESOPHAG R-T 2D W/PRB IMG ACQUISJ I&R N/A 12/13/2019    Procedure: TRANSESOPHAGEAL ECHOCARDIOGRAM (SHEBA);  Surgeon: Elvin Huertas DO;  [facility];  Service: Cardiac Surgery    AL NDSC SURG W/VIDEO-ASSISTED HARVEST VEIN CABG Left 12/13/2019    Procedure: HARVEST VEIN ENDOSCOPIC (EVH);  Surgeon: Elvin Huertas DO;  [facility];  Service: Cardiac Surgery    AL STRTCTC CPTR ASSTD PX EXTRADURAL CRANIAL N/A 1/31/2024    Procedure: RIGHT FUNCTIONAL ENDOSCOPIC SINUS SURGERY (FESS) IMAGED GUIDED, MAXILLARY, ETHMOIDS, FRONTALS;  Surgeon: Tez Bah MD;  [facility];  Service: ENT    SKIN LESION EXCISION Right 11/24/2023    Procedure: WIDE EXCISION MELANOMA RIGHT ARM;  Surgeon: Issac Triana MD;  [facility];  Service: Surgical Oncology       Meds/Allergies       Current  Outpatient Medications:     amLODIPine (NORVASC) 5 mg tablet, Take 1 tablet (5 mg total) by mouth daily, Disp: 90 tablet, Rfl: 1    atorvastatin (LIPITOR) 80 mg tablet, Take 1 tablet (80 mg total) by mouth daily with dinner, Disp: 90 tablet, Rfl: 0    Cholecalciferol (Vitamin D) 125 MCG (5000 UT) CAPS, Take by mouth daily, Disp: , Rfl:     ferrous sulfate 324 (65 Fe) mg, Take 1 tablet (324 mg total) by mouth every other day, Disp: 30 tablet, Rfl: 1    finasteride (PROSCAR) 5 mg tablet, Take 1 tablet (5 mg total) by mouth daily, Disp: 30 tablet, Rfl: 0    Glucosamine-Chondroitin (GLUCOSAMINE CHONDR COMPLEX PO), Take 1 capsule by mouth 2 (two) times a day, Disp: , Rfl:     hydroCHLOROthiazide 12.5 mg tablet, Take 1 tablet (12.5 mg total) by mouth daily, Disp: 90 tablet, Rfl: 1    metFORMIN (GLUCOPHAGE-XR) 750 mg 24 hr tablet, Take 1 tablet (750 mg total) by mouth 2 (two) times a day, Disp: 180 tablet, Rfl: 1    metoprolol succinate (TOPROL-XL) 50 mg 24 hr tablet, Take 1 tablet (50 mg total) by mouth daily, Disp: 90 tablet, Rfl: 1    Omega-3 Fatty Acids (FISH OIL) 1200 MG CAPS, Take 1 capsule by mouth daily, Disp: , Rfl:     tamsulosin (FLOMAX) 0.4 mg, Take 1 capsule (0.4 mg total) by mouth 2 (two) times a day, Disp: 180 capsule, Rfl: 3    acetaminophen (TYLENOL) 325 mg tablet, Take 3 tablets (975 mg total) by mouth every 8 (eight) hours, Disp: , Rfl:     enoxaparin (LOVENOX) 40 mg/0.4 mL, Inject 0.4 mL (40 mg total) under the skin every 24 hours for 25 days, Disp: 10 mL, Rfl: 0    gabapentin (NEURONTIN) 100 mg capsule, Take 1 capsule (100 mg total) by mouth 3 (three) times a day for 5 days (Patient not taking: Reported on 4/4/2024), Disp: 15 capsule, Rfl: 0    methocarbamol (ROBAXIN) 500 mg tablet, Take 1 tablet (500 mg total) by mouth every 8 (eight) hours for 5 days (Patient not taking: Reported on 4/4/2024), Disp: 15 tablet, Rfl: 0      Allergies   Allergen Reactions    Pollen Extract Allergic Rhinitis  "        Social History   Social History     Substance and Sexual Activity   Alcohol Use Yes    Alcohol/week: 5.0 standard drinks of alcohol    Types: 3 Glasses of wine, 1 Cans of beer, 1 Standard drinks or equivalent per week    Comment: wine with a meal     Social History     Substance and Sexual Activity   Drug Use No     Social History     Tobacco Use   Smoking Status Never   Smokeless Tobacco Never         Family History:   Family History   Problem Relation Age of Onset    Heart failure Mother     Heart failure Father     Heart disease Father     Heart attack Father     Diabetes Other        Review of Systems    Objective     Current Vitals:   Vitals:    04/23/24 0856   BP: 126/68   Weight: 66.2 kg (146 lb)   Height: 5' 10\" (1.778 m)       Physical Exam:  General:no distress  Neck:supple  Pulm:no increased work of breathing  Abdomen:soft,nontender, incisions well-healed  Extremities:no edema        "

## 2024-04-21 DIAGNOSIS — I10 BENIGN ESSENTIAL HYPERTENSION: Chronic | ICD-10-CM

## 2024-04-22 RX ORDER — HYDROCHLOROTHIAZIDE 12.5 MG/1
12.5 TABLET ORAL DAILY
Qty: 90 TABLET | Refills: 1 | Status: SHIPPED | OUTPATIENT
Start: 2024-04-22

## 2024-04-23 ENCOUNTER — OFFICE VISIT (OUTPATIENT)
Age: 79
End: 2024-04-23

## 2024-04-23 VITALS
WEIGHT: 146 LBS | DIASTOLIC BLOOD PRESSURE: 68 MMHG | HEIGHT: 70 IN | SYSTOLIC BLOOD PRESSURE: 126 MMHG | BODY MASS INDEX: 20.9 KG/M2

## 2024-04-23 DIAGNOSIS — C18.2 CANCER OF ASCENDING COLON (HCC): Primary | ICD-10-CM

## 2024-04-23 PROCEDURE — 99024 POSTOP FOLLOW-UP VISIT: CPT | Performed by: COLON & RECTAL SURGERY

## 2024-04-23 NOTE — PATIENT INSTRUCTIONS
ASSESSMENT:    Frantz returns today, he is doing nicely approximately 1 month from robotic right hemicolectomy, discussed pathology with him inpatient, T2/N0, 0/22 lymph nodes, stage I colon cancer, this is a technical cure, and will not require adjuvant therapy.  He has history of melanoma, he had PET/CT 11/2023, discussed with him that this serves as reasonable staging given the proximity, and will follow-up with likely 1 more CT scan chest/abdomen/pelvis this calendar year unless there are more melanoma surveillance ongoing.  He has labs from his PCP to follow-up his prior anemia, abdomen is soft, bowel habits are normalizing without complaints, well-healed wounds.    PLAN:  -CEA level by September 2024  -6months office followup  -Will add repeat CT scan at that time as 11/2023 PET/CT prior  CC:  MD Dr. Kamilah Rust, RN  Traci Clarke  GI Tumor Conference

## 2024-04-24 ENCOUNTER — TELEPHONE (OUTPATIENT)
Age: 79
End: 2024-04-24

## 2024-04-24 DIAGNOSIS — R33.9 URINARY RETENTION: ICD-10-CM

## 2024-04-24 RX ORDER — FINASTERIDE 5 MG/1
5 TABLET, FILM COATED ORAL DAILY
Qty: 90 TABLET | Refills: 3 | Status: SHIPPED | OUTPATIENT
Start: 2024-04-24 | End: 2024-05-24

## 2024-04-24 NOTE — TELEPHONE ENCOUNTER
The patient was calling because of 2 things:    1) He would like to know if he needs to continue the finasteride (PROSCAR) 5 mg tablet that was given at discharge on 3/29. He was only given a 30 day supply. If the doctor wants him to continue, he would need a new script.    2) He was wondering if he could possible come in for another TOV and scan to potentially have the catheter removed prior to the Complex Cysto on 5/16. He just really wants it removed.    He is requesting a call back for both issues.    Frantz  [phone]

## 2024-04-25 ENCOUNTER — DOCUMENTATION (OUTPATIENT)
Dept: HEMATOLOGY ONCOLOGY | Facility: CLINIC | Age: 79
End: 2024-04-25

## 2024-04-25 NOTE — PROGRESS NOTES
RECTAL/GI MULTIDISCIPLINARY CASE REVIEW    DATE: 4/25/2024      PRESENTING DOCTOR: Dr. Torres      DIAGNOSIS: Adenocarcinoma of the ascending colon      Frantz Vasquez was presented at the Rectal/GI Multidisciplinary Conference today.      PHYSICIAN RECOMMENDED PLAN:    -Standard surveillance. Plan for a repeat CT scan fall of 2024.  -Patient is scheduled with Dr. Torres on 11/1/2024 for a six month follow up appointment.     Team agreed to plan.    The final treatment plan will be left to the discretion of the patient and the treating physician.     DISCLAIMERS:  TO THE TREATING PHYSICIAN:  This conference is a meeting of clinicians from various specialty areas who evaluate and discuss patients for whom a multidisciplinary treatment approach is being considered. Please note that the above opinion was a consensus of the conference attendees and is intended only to assist in quality care of the discussed patient.  The responsibility for follow up on the input given during the conference, along with any final decisions regarding plan of care, is that of the patient and the patient's provider. Accordingly, appointments have only been recommended based on this information and have NOT been scheduled unless otherwise noted.      TO THE PATIENT:  This summary is a brief record of major aspects of your cancer treatment. You may choose to share a copy with any of your doctors or nurses. However, this is not a detailed or comprehensive record of your care.      NCCN guidelines were readily available for review at this discussion

## 2024-05-01 NOTE — TELEPHONE ENCOUNTER
Patient called back again to check if provider would okay another TOV?    Please review.    Call back [phone]

## 2024-05-03 ENCOUNTER — PROCEDURE VISIT (OUTPATIENT)
Dept: UROLOGY | Facility: AMBULATORY SURGERY CENTER | Age: 79
End: 2024-05-03
Payer: MEDICARE

## 2024-05-03 VITALS
WEIGHT: 148.4 LBS | HEIGHT: 70 IN | DIASTOLIC BLOOD PRESSURE: 70 MMHG | OXYGEN SATURATION: 100 % | BODY MASS INDEX: 21.24 KG/M2 | HEART RATE: 61 BPM | SYSTOLIC BLOOD PRESSURE: 140 MMHG

## 2024-05-03 DIAGNOSIS — R33.9 URINARY RETENTION: Primary | ICD-10-CM

## 2024-05-03 LAB — POST-VOID RESIDUAL VOLUME, ML POC: 450 ML

## 2024-05-03 PROCEDURE — 51798 US URINE CAPACITY MEASURE: CPT | Performed by: UROLOGY

## 2024-05-03 PROCEDURE — 99211 OFF/OP EST MAY X REQ PHY/QHP: CPT | Performed by: UROLOGY

## 2024-05-03 PROCEDURE — 51702 INSERT TEMP BLADDER CATH: CPT | Performed by: UROLOGY

## 2024-05-03 NOTE — PROGRESS NOTES
"5/3/2024    Frantz Vasquez  1945  [id number]    Diagnosis  Chief Complaint    Benign Prostatic Hypertrophy; Urinary Retention         Patient presents for void trial managed by Dr. Chavez    Plan  Patient will follow up in 2 weeks  with a Cysto    Procedure Lawton removal/voiding trial    Lawton catheter removed after deflation of an intact balloon. Patient tolerated well. Encouraged patient to hydrate well and return this afternoon for post void residual.  he knows he may return early if uncomfortable and unable to urinate. Patient agrees to this plan.    Patient returned this afternoon. Patient states unable to void. Patient voided 0 ml while in office. Bladder ultrasound performed and PVR measured 450ml.    Recent Results (from the past 4 hour(s))   POCT Measure PVR    Collection Time: 05/03/24  2:47 PM   Result Value Ref Range    POST-VOID RESIDUAL VOLUME, ML  mL     16 f coude catheter placed and 500 ml of urine expelled - attached to leg bag. Patient would like another void trial prior to Cysto- but I don't think recommended with check with provider and  call him      Vitals:    05/03/24 0830   BP: 140/70   Pulse: 61   SpO2: 100%   Weight: 67.3 kg (148 lb 6.4 oz)   Height: 5' 10\" (1.778 m)           Lisseth Srivastava RN       "

## 2024-05-09 ENCOUNTER — HOSPITAL ENCOUNTER (OUTPATIENT)
Dept: NON INVASIVE DIAGNOSTICS | Facility: CLINIC | Age: 79
Discharge: HOME | End: 2024-05-09
Payer: MEDICARE

## 2024-05-09 VITALS
SYSTOLIC BLOOD PRESSURE: 140 MMHG | BODY MASS INDEX: 21.24 KG/M2 | HEART RATE: 60 BPM | WEIGHT: 148.37 LBS | HEIGHT: 70 IN | DIASTOLIC BLOOD PRESSURE: 70 MMHG

## 2024-05-09 DIAGNOSIS — I25.119 ATHEROSCLEROSIS OF NATIVE CORONARY ARTERY OF NATIVE HEART WITH ANGINA PECTORIS (HCC): ICD-10-CM

## 2024-05-09 LAB
AORTIC ROOT: 3 CM
APICAL FOUR CHAMBER EJECTION FRACTION: 69 %
ASCENDING AORTA: 4.3 CM
AV LVOT MEAN GRADIENT: 2 MMHG
AV LVOT PEAK GRADIENT: 4 MMHG
AV REGURGITATION PRESSURE HALF TIME: 675 MS
BSA FOR ECHO PROCEDURE: 1.84 M2
DOP CALC LVOT PEAK VEL VTI: 24.14 CM
DOP CALC LVOT PEAK VEL: 0.96 M/S
E WAVE DECELERATION TIME: 236 MS
E/A RATIO: 1.06
FRACTIONAL SHORTENING: 40 (ref 28–44)
INTERVENTRICULAR SEPTUM IN DIASTOLE (PARASTERNAL SHORT AXIS VIEW): 0.7 CM
INTERVENTRICULAR SEPTUM: 0.7 CM (ref 0.6–1.1)
LAAS-AP2: 18.7 CM2
LAAS-AP4: 18.5 CM2
LEFT ATRIUM AREA SYSTOLE SINGLE PLANE A4C: 17.7 CM2
LEFT ATRIUM SIZE: 3.8 CM
LEFT ATRIUM VOLUME (MOD BIPLANE): 53 ML
LEFT ATRIUM VOLUME INDEX (MOD BIPLANE): 28.8 ML/M2
LEFT INTERNAL DIMENSION IN SYSTOLE: 2.7 CM (ref 2.1–4)
LEFT VENTRICULAR INTERNAL DIMENSION IN DIASTOLE: 4.5 CM (ref 3.5–6)
LEFT VENTRICULAR POSTERIOR WALL IN END DIASTOLE: 0.7 CM
LEFT VENTRICULAR STROKE VOLUME: 63 ML
LVSV (TEICH): 63 ML
MV E'TISSUE VEL-SEP: 8 CM/S
MV PEAK A VEL: 0.8 M/S
MV PEAK E VEL: 85 CM/S
MV STENOSIS PRESSURE HALF TIME: 68 MS
MV VALVE AREA P 1/2 METHOD: 3.24
RIGHT ATRIUM AREA SYSTOLE A4C: 16 CM2
RIGHT VENTRICLE ID DIMENSION: 2.9 CM
SL CV AV DECELERATION TIME RETROGRADE: 2329 MS
SL CV AV PEAK GRADIENT RETROGRADE: 63 MMHG
SL CV LEFT ATRIUM LENGTH A2C: 5.5 CM
SL CV LV EF: 60
SL CV PED ECHO LEFT VENTRICLE DIASTOLIC VOLUME (MOD BIPLANE) 2D: 91 ML
SL CV PED ECHO LEFT VENTRICLE SYSTOLIC VOLUME (MOD BIPLANE) 2D: 28 ML
TR MAX PG: 24 MMHG
TR PEAK VELOCITY: 2.5 M/S
TRICUSPID ANNULAR PLANE SYSTOLIC EXCURSION: 1.7 CM
TRICUSPID VALVE PEAK REGURGITATION VELOCITY: 2.45 M/S

## 2024-05-09 PROCEDURE — 93306 TTE W/DOPPLER COMPLETE: CPT | Performed by: INTERNAL MEDICINE

## 2024-05-09 PROCEDURE — 93306 TTE W/DOPPLER COMPLETE: CPT

## 2024-05-16 ENCOUNTER — PROCEDURE VISIT (OUTPATIENT)
Dept: UROLOGY | Facility: CLINIC | Age: 79
End: 2024-05-16
Payer: MEDICARE

## 2024-05-16 VITALS
OXYGEN SATURATION: 100 % | BODY MASS INDEX: 21.42 KG/M2 | HEIGHT: 70 IN | DIASTOLIC BLOOD PRESSURE: 82 MMHG | HEART RATE: 69 BPM | WEIGHT: 149.6 LBS | SYSTOLIC BLOOD PRESSURE: 134 MMHG

## 2024-05-16 DIAGNOSIS — R33.9 URINARY RETENTION: Primary | ICD-10-CM

## 2024-05-16 DIAGNOSIS — R97.20 PSA ELEVATION: ICD-10-CM

## 2024-05-16 PROCEDURE — 99214 OFFICE O/P EST MOD 30 MIN: CPT | Performed by: UROLOGY

## 2024-05-16 PROCEDURE — 52000 CYSTOURETHROSCOPY: CPT | Performed by: UROLOGY

## 2024-05-16 NOTE — ASSESSMENT & PLAN NOTE
The patient has a persistently elevated PSA with multiple negative biopsies unremarkable MRI years ago and stable PSA through last year.  I do not think any further workup is needed at this time.

## 2024-05-16 NOTE — PATIENT INSTRUCTIONS
The options for treating your enlarged prostate causing voiding issues are    1) HoLEP surgery  2) robotic simple prostatectomy  3) prostate artery embolization PAE which is done by interventional radiology

## 2024-05-16 NOTE — PROGRESS NOTES
Assessment/Plan:    Urinary retention  The patient has a very large prostate gland associated with persistent urinary retention despite medical therapy.    He wants to try another voiding trial next week which is reasonable.  He is staying on Flomax and finasteride.  However I told him regardless if he is able to void I think he warrants a outlet surgery as he is otherwise at risk for persistent or recurrent retention    We discussed options for procedural intervention. Given the size of his gland these center on robotic simple prostatectomy versus HoLEP surgery versus prostate artery embolization.  Discussed the risks and benefits of each. Robotic simple prostatectomy offers excellent functional outcomes requires entry into the abdomen and catheter for 2 weeks while the bladder is healing with cystogram to follow and has risks of bleeding urine leak bowel injury and usually temporary incontinence.  HoLEP surgery requires special equipment and an experienced urologist and has risks of bleeding and fluid absorption and almost guaranteed incontinence for a few weeks to months.  Prostate artery embolization is performed by Interventional Radiology through a minimally invasive approach through femoral vessels. This is a relatively new technology that has not been studied in a wide population but limited results show good efficacy but will take time for them to result as the prostate shrinks over time.  We discussed these procedures will likely improve obstructive symptoms and may or may not improve irritative symptoms such as frequency and urgency.  After discussion the patient can think about options and let us know if/how he wants to proceed.    I told him I recommend HoLEP surgery given his median lobe and history of recent abdominal surgery but I think it is still quite reasonable to attempt a robotic simple prostatectomy which she is considering because of its reduced risk of leakage.  Prostate artery embolization  is less likely be successful given his median lobe    PSA elevation  The patient has a persistently elevated PSA with multiple negative biopsies unremarkable MRI years ago and stable PSA through last year.  I do not think any further workup is needed at this time.          Subjective:      Patient ID: Frantz Vasquez is a 78 y.o. male.    HPI  77-year-old man history of elevated PSA and more recently persistent retention.    The patient had a catheter placed during for robotic right hemicolectomy surgery March 28, 2024 he suffered postoperative retention resulting in straight catheterizations and then catheter placement.  He followed up in the urology clinic but unfortunately had 3 failed trials of void in each 2 weeks apart last 5/3/24.    The patient has a known enlarged prostate has a distant MRI for elevated PSA and this showed 100 g gland. He had 2 previous biopsies which were negative. His 2018 MRI showed category 2 with 100 g prostate. PSA 9.79. His highest PSA was over 10. No significant nodes no significant voiding complaints.     Cystoscopy today showed trilobar hypertrophy of the bladder.  Prostate volume was 103g.  Catheter was replaced.    Past Surgical History:   Procedure Laterality Date    CARDIAC CATHETERIZATION  12/09/2019    HAND SURGERY Left     HEMICOLOECTOMY W/ ANASTOMOSIS N/A 3/25/2024    Procedure: RESECTION COLON RIGHT LAPAROSCOPIC W/ ROBOTICS;  Surgeon: Anders Torres MD;  [facility];  Service: Colorectal    LYMPH NODE BIOPSY Right 11/24/2023    Procedure: BIOPSY LYMPH NODE SENTINEL, LYMPHATIC MAPPING (Inject: 7:30 am in Nuc Med by radiologist);  Surgeon: Issac Triana MD;  [facility];  Service: Surgical Oncology    MD CORONARY ARTERY BYP W/VEIN & ARTERY GRAFT 3 VEIN N/A 12/13/2019    Procedure: CORONARY ARTERY BYPASS GRAFT (CABG) 4 VESSELS STEVEN to LAD ; SVG--> PDA , DIAGONAL, and OM;  Surgeon: Elvin Huertas DO;  [facility];  Service: Cardiac Surgery     UT ECHO TRANSESOPHAG R-T 2D W/PRB IMG ACQUISJ I&R N/A 12/13/2019    Procedure: TRANSESOPHAGEAL ECHOCARDIOGRAM (SHEBA);  Surgeon: Elvin Huertas DO;  [facility];  Service: Cardiac Surgery    UT NDSC SURG W/VIDEO-ASSISTED HARVEST VEIN CABG Left 12/13/2019    Procedure: HARVEST VEIN ENDOSCOPIC (EVH);  Surgeon: Elvin Huertas DO;  [facility];  Service: Cardiac Surgery    UT STRTCTC CPTR ASSTD PX EXTRADURAL CRANIAL N/A 1/31/2024    Procedure: RIGHT FUNCTIONAL ENDOSCOPIC SINUS SURGERY (FESS) IMAGED GUIDED, MAXILLARY, ETHMOIDS, FRONTALS;  Surgeon: Tez Bah MD;  [facility];  Service: ENT    SKIN LESION EXCISION Right 11/24/2023    Procedure: WIDE EXCISION MELANOMA RIGHT ARM;  Surgeon: Issac Triana MD;  [facility];  Service: Surgical Oncology        Past Medical History:   Diagnosis Date    Chronic cough     RESOLVED: [id number]    Coronary artery disease     Diabetes mellitus (HCC)     Diabetic retinopathy (HCC)     HLD (hyperlipidemia)     HTN (hypertension)     Hypertension     Kidney stone         AUA SYMPTOM SCORE      Flowsheet Row Most Recent Value   AUA SYMPTOM SCORE    How often have you had a sensation of not emptying your bladder completely after you finished urinating? 0 (P)     How often have you had to urinate again less than two hours after you finished urinating? 0 (P)     How often have you found you stopped and started again several times when you urinate? 0 (P)     How often have you found it difficult to postpone urination? 0 (P)     How often have you had a weak urinary stream? 0 (P)     How often have you had to push or strain to begin urination? 0 (P)     How many times did you most typically get up to urinate from the time you went to bed at night until the time you got up in the morning? 0 (P)     Quality of Life: If you were to spend the rest of your life with your urinary condition just the way it is now, how would you feel about that? 5 (P)    "  AUA SYMPTOM SCORE 0 (P)               Review of Systems   Constitutional:  Negative for chills and fever.   HENT:  Negative for ear pain and sore throat.    Eyes:  Negative for pain and visual disturbance.   Respiratory:  Negative for cough and shortness of breath.    Cardiovascular:  Negative for chest pain and palpitations.   Gastrointestinal:  Negative for abdominal pain and vomiting.   Genitourinary:  Negative for dysuria and hematuria.   Musculoskeletal:  Negative for arthralgias and back pain.   Skin:  Negative for color change and rash.   Neurological:  Negative for seizures and syncope.   All other systems reviewed and are negative.        Objective:      /82 (BP Location: Left arm, Patient Position: Sitting, Cuff Size: Adult)   Pulse 69   Ht 5' 10\" (1.778 m)   Wt 67.9 kg (149 lb 9.6 oz)   SpO2 100%   BMI 21.47 kg/m²     Lab Results   Component Value Date    PSA 9.79 (H) 08/31/2023    PSA 7.7 (H) 08/29/2022    PSA 6.7 (H) 08/10/2021    PSA 5.3 (H) 08/11/2020    PSA 8.8 (H) 05/14/2019    PSA 10.8 (H) 11/05/2018    PSA 10.6 (H) 10/08/2018    PSA 7.1 (H) 09/26/2017    PSA 7.4 (H) 09/13/2016    PSA 7.5 (H) 02/15/2016          Physical Exam  Vitals reviewed.   Constitutional:       Appearance: Normal appearance. He is normal weight.   HENT:      Head: Normocephalic and atraumatic.   Eyes:      Pupils: Pupils are equal, round, and reactive to light.   Abdominal:      General: Abdomen is flat.   Neurological:      General: No focal deficit present.      Mental Status: He is alert and oriented to person, place, and time.   Psychiatric:         Mood and Affect: Mood normal.         Thought Content: Thought content normal.            Cystoscopy     Date/Time  5/16/2024 3:00 PM     Performed by  Callum Chavez MD   Authorized by  Callum Chavez MD     Universal Protocol:  Consent: Written consent obtained.  Risks and benefits: risks, benefits and alternatives were discussed  Consent given by: patient  Time " "out: Immediately prior to procedure a \"time out\" was called to verify the correct patient, procedure, equipment, support staff and site/side marked as required.  Patient understanding: patient states understanding of the procedure being performed  Patient consent: the patient's understanding of the procedure matches consent given  Procedure consent: procedure consent matches procedure scheduled  Patient identity confirmed: verbally with patient      Procedure Details:  Procedure type: cystoscopy    Patient tolerance: Patient tolerated the procedure well with no immediate complications    Additional Procedure Details: A time-out was performed identifying the correct patient site and procedure.  A MA chaperone was in the room.  A flexible cystoscope was introduced into the urethra.  The pendulous urethra was normal.  There was moderate snow globe effect in bladder  The prostatic urethra showed bilateral lobar hypertrophy with a moderate to large sized median lobe.  The bladder did not have any lesions concerning for malignancy but did have bullous edema  There were moderate trabeculations and small cellules diverticula.  The ureteral orifices were in orthotopic position.      Biopsy prostate     Date/Time  5/16/2024 3:00 PM     Performed by  Callum Chavez MD   Authorized by  Callum Chavez MD     Universal Protocol   Consent: Written consent obtained.  Consent given by: patient  Time out: Immediately prior to procedure a \"time out\" was called to verify the correct patient, procedure, equipment, support staff and site/side marked as required.  Patient understanding: patient states understanding of the procedure being performed  Patient consent: the patient's understanding of the procedure matches consent given  Procedure consent: procedure consent matches procedure scheduled  Relevant documents: relevant documents present and verified  Patient identity confirmed: verbally with patient      Local anesthesia used: no   "   Anesthesia   Local anesthesia used: no     Sedation   Patient sedated: no        Specimen: no   Procedure Details   Procedure Notes: The patient was placed in left lateral decubitus position.  An ultrasound probe was introduced into the rectum.  The prostate was evaluated and measurements made showing the prostate to be 103 cc in size.  Patient tolerance: patient tolerated the procedure well with no immediate complications               Orders  Orders Placed This Encounter   Procedures    Cystoscopy     This order was created via procedure documentation    Biopsy prostate     This order was created via procedure documentation

## 2024-05-16 NOTE — ASSESSMENT & PLAN NOTE
The patient has a very large prostate gland associated with persistent urinary retention despite medical therapy.    He wants to try another voiding trial next week which is reasonable.  He is staying on Flomax and finasteride.  However I told him regardless if he is able to void I think he warrants a outlet surgery as he is otherwise at risk for persistent or recurrent retention    We discussed options for procedural intervention. Given the size of his gland these center on robotic simple prostatectomy versus HoLEP surgery versus prostate artery embolization.  Discussed the risks and benefits of each. Robotic simple prostatectomy offers excellent functional outcomes requires entry into the abdomen and catheter for 2 weeks while the bladder is healing with cystogram to follow and has risks of bleeding urine leak bowel injury and usually temporary incontinence.  HoLEP surgery requires special equipment and an experienced urologist and has risks of bleeding and fluid absorption and almost guaranteed incontinence for a few weeks to months.  Prostate artery embolization is performed by Interventional Radiology through a minimally invasive approach through femoral vessels. This is a relatively new technology that has not been studied in a wide population but limited results show good efficacy but will take time for them to result as the prostate shrinks over time.  We discussed these procedures will likely improve obstructive symptoms and may or may not improve irritative symptoms such as frequency and urgency.  After discussion the patient can think about options and let us know if/how he wants to proceed.    I told him I recommend HoLEP surgery given his median lobe and history of recent abdominal surgery but I think it is still quite reasonable to attempt a robotic simple prostatectomy which she is considering because of its reduced risk of leakage.  Prostate artery embolization is less likely be successful given his  median lobe

## 2024-05-24 ENCOUNTER — PROCEDURE VISIT (OUTPATIENT)
Dept: UROLOGY | Facility: CLINIC | Age: 79
End: 2024-05-24
Payer: MEDICARE

## 2024-05-24 ENCOUNTER — TELEPHONE (OUTPATIENT)
Dept: UROLOGY | Facility: CLINIC | Age: 79
End: 2024-05-24

## 2024-05-24 ENCOUNTER — PREP FOR PROCEDURE (OUTPATIENT)
Dept: UROLOGY | Facility: CLINIC | Age: 79
End: 2024-05-24

## 2024-05-24 VITALS — OXYGEN SATURATION: 98 % | HEART RATE: 78 BPM | SYSTOLIC BLOOD PRESSURE: 150 MMHG | DIASTOLIC BLOOD PRESSURE: 90 MMHG

## 2024-05-24 DIAGNOSIS — R33.9 URINARY RETENTION: Primary | ICD-10-CM

## 2024-05-24 DIAGNOSIS — N40.1 URINARY RETENTION DUE TO BENIGN PROSTATIC HYPERPLASIA: Primary | ICD-10-CM

## 2024-05-24 DIAGNOSIS — R33.8 URINARY RETENTION DUE TO BENIGN PROSTATIC HYPERPLASIA: Primary | ICD-10-CM

## 2024-05-24 LAB — POST-VOID RESIDUAL VOLUME, ML POC: 376 ML

## 2024-05-24 PROCEDURE — 99211 OFF/OP EST MAY X REQ PHY/QHP: CPT

## 2024-05-24 PROCEDURE — 51798 US URINE CAPACITY MEASURE: CPT

## 2024-05-24 PROCEDURE — 51702 INSERT TEMP BLADDER CATH: CPT

## 2024-05-24 RX ORDER — HEPARIN SODIUM 5000 [USP'U]/ML
5000 INJECTION, SOLUTION INTRAVENOUS; SUBCUTANEOUS ONCE
OUTPATIENT
Start: 2024-05-24 | End: 2024-05-24

## 2024-05-24 NOTE — PROGRESS NOTES
"5/24/2024    Frantz Vasquez  1945  [id number]    Diagnosis      Patient presents for mar removal/tov managed by Dr. Chavez    Plan  Patient will return as scheduled.     Procedure Mar removal/voiding trial    Mar catheter removed after deflation of an intact balloon. Patient tolerated well. Encouraged patient to hydrate well and return this afternoon for post void residual.  he knows he may return early if uncomfortable and unable to urinate. Patient agrees to this plan.    Patient returned this afternoon. Patient states unable to void. Patient voided 0 ml while in office. Bladder ultrasound performed and PVR measured 376 ml.    Recent Results (from the past 4 hour(s))   POCT Measure PVR    Collection Time: 05/24/24  2:52 PM   Result Value Ref Range    POST-VOID RESIDUAL VOLUME, ML  mL     Universal Protocol:  Consent: Verbal consent obtained.  Risks and benefits: risks, benefits and alternatives were discussed  Consent given by: patient  Time out: Immediately prior to procedure a \"time out\" was called to verify the correct patient, procedure, equipment, support staff and site/side marked as required.  Patient understanding: patient states understanding of the procedure being performed  Patient consent: the patient's understanding of the procedure matches consent given  Patient identity confirmed: verbally with patient    Bladder catheterization    Date/Time: 5/24/2024 8:30 AM    Performed by: Jazmine Samson RN  Authorized by: Javi Lowry DO    Patient location:  Bedside  Consent:     Consent given by:  Patient  Universal protocol:     Procedure explained and questions answered to patient or proxy's satisfaction: yes      Immediately prior to procedure a time out was called: yes      Patient identity confirmed:  Verbally with patient  Pre-procedure details:     Procedure purpose:  Therapeutic    Preparation: Patient was prepped and draped in usual sterile fashion    Anesthesia (see MAR for " exact dosages):     Anesthesia method:  None  Procedure details:     Bladder irrigation: no      Catheter insertion:  Indwelling    Approach: natural orifice      Catheter type:  Coude, latex and Lawton    Catheter size:  16 Fr    Number of attempts:  1    Successful placement: yes      Urine characteristics:  Yellow  Post-procedure details:     Patient tolerance of procedure:  Tolerated well, no immediate complications  Comments:      Lawton inserted with no issues. 350 mL of urine drained. 10 ml inflated into balloon. Attached to stat lock and leg bag. Extra supplies given per pt request.               Vitals:    05/24/24 0819   BP: 150/90   BP Location: Left arm   Patient Position: Sitting   Cuff Size: Adult   Pulse: 78   SpO2: 98%           Jazmine Samson RN

## 2024-05-24 NOTE — TELEPHONE ENCOUNTER
Patient was seen for void trial and did not pass. (See visit encounter) Is requesting sx as discussed with MD.

## 2024-05-26 DIAGNOSIS — I10 BENIGN ESSENTIAL HYPERTENSION: Chronic | ICD-10-CM

## 2024-05-27 RX ORDER — AMLODIPINE BESYLATE 5 MG/1
5 TABLET ORAL DAILY
Qty: 90 TABLET | Refills: 1 | Status: SHIPPED | OUTPATIENT
Start: 2024-05-27

## 2024-05-29 NOTE — TELEPHONE ENCOUNTER
Called patient to schedule robotic prostatectomy. I did let patient know I had first available date of 8/12/24 with Dr. Chavez at the [facility]. Patient expressed he has been thinking this over and [facility] then [facility]. Patient expressed he would like more info on the 2 as he has only been goggling. I did let patient know I will hold 8/12 date if he wishes to proceed with prostatectomy surgery. Message to be sent to Dr. Chavez.

## 2024-06-03 ENCOUNTER — APPOINTMENT (OUTPATIENT)
Dept: LAB | Facility: CLINIC | Age: 79
End: 2024-06-03
Payer: MEDICARE

## 2024-06-03 DIAGNOSIS — D50.0 IRON DEFICIENCY ANEMIA DUE TO CHRONIC BLOOD LOSS: Chronic | ICD-10-CM

## 2024-06-03 DIAGNOSIS — E11.9 TYPE 2 DIABETES MELLITUS WITHOUT COMPLICATION, WITHOUT LONG-TERM CURRENT USE OF INSULIN (HCC): ICD-10-CM

## 2024-06-03 LAB
ALBUMIN SERPL BCP-MCNC: 3.9 G/DL (ref 3.5–5)
ALP SERPL-CCNC: 57 U/L (ref 34–104)
ALT SERPL W P-5'-P-CCNC: 20 U/L (ref 7–52)
ANION GAP SERPL CALCULATED.3IONS-SCNC: 8 MMOL/L (ref 4–13)
AST SERPL W P-5'-P-CCNC: 20 U/L (ref 13–39)
BASOPHILS # BLD AUTO: 0.03 THOUSANDS/ÂΜL (ref 0–0.1)
BASOPHILS NFR BLD AUTO: 1 % (ref 0–1)
BILIRUB SERPL-MCNC: 0.39 MG/DL (ref 0.2–1)
BUN SERPL-MCNC: 25 MG/DL (ref 5–25)
CALCIUM SERPL-MCNC: 9.2 MG/DL (ref 8.4–10.2)
CHLORIDE SERPL-SCNC: 102 MMOL/L (ref 96–108)
CO2 SERPL-SCNC: 30 MMOL/L (ref 21–32)
CREAT SERPL-MCNC: 1.09 MG/DL (ref 0.6–1.3)
EOSINOPHIL # BLD AUTO: 0.19 THOUSAND/ÂΜL (ref 0–0.61)
EOSINOPHIL NFR BLD AUTO: 4 % (ref 0–6)
ERYTHROCYTE [DISTWIDTH] IN BLOOD BY AUTOMATED COUNT: 18.6 % (ref 11.6–15.1)
EST. AVERAGE GLUCOSE BLD GHB EST-MCNC: 123 MG/DL
GFR SERPL CREATININE-BSD FRML MDRD: 65 ML/MIN/1.73SQ M
GLUCOSE P FAST SERPL-MCNC: 92 MG/DL (ref 65–99)
HBA1C MFR BLD: 5.9 %
HCT VFR BLD AUTO: 39.7 % (ref 36.5–49.3)
HGB BLD-MCNC: 13.1 G/DL (ref 12–17)
IMM GRANULOCYTES # BLD AUTO: 0.02 THOUSAND/UL (ref 0–0.2)
IMM GRANULOCYTES NFR BLD AUTO: 0 % (ref 0–2)
LYMPHOCYTES # BLD AUTO: 1.28 THOUSANDS/ÂΜL (ref 0.6–4.47)
LYMPHOCYTES NFR BLD AUTO: 24 % (ref 14–44)
MCH RBC QN AUTO: 30.5 PG (ref 26.8–34.3)
MCHC RBC AUTO-ENTMCNC: 33 G/DL (ref 31.4–37.4)
MCV RBC AUTO: 93 FL (ref 82–98)
MONOCYTES # BLD AUTO: 0.59 THOUSAND/ÂΜL (ref 0.17–1.22)
MONOCYTES NFR BLD AUTO: 11 % (ref 4–12)
NEUTROPHILS # BLD AUTO: 3.34 THOUSANDS/ÂΜL (ref 1.85–7.62)
NEUTS SEG NFR BLD AUTO: 60 % (ref 43–75)
NRBC BLD AUTO-RTO: 0 /100 WBCS
PLATELET # BLD AUTO: 134 THOUSANDS/UL (ref 149–390)
PMV BLD AUTO: 10.4 FL (ref 8.9–12.7)
POTASSIUM SERPL-SCNC: 4.5 MMOL/L (ref 3.5–5.3)
PROT SERPL-MCNC: 6.3 G/DL (ref 6.4–8.4)
RBC # BLD AUTO: 4.29 MILLION/UL (ref 3.88–5.62)
SODIUM SERPL-SCNC: 140 MMOL/L (ref 135–147)
WBC # BLD AUTO: 5.45 THOUSAND/UL (ref 4.31–10.16)

## 2024-06-03 PROCEDURE — 83036 HEMOGLOBIN GLYCOSYLATED A1C: CPT

## 2024-06-03 PROCEDURE — 36415 COLL VENOUS BLD VENIPUNCTURE: CPT

## 2024-06-03 PROCEDURE — 85025 COMPLETE CBC W/AUTO DIFF WBC: CPT

## 2024-06-03 PROCEDURE — 80053 COMPREHEN METABOLIC PANEL: CPT

## 2024-06-10 ENCOUNTER — OFFICE VISIT (OUTPATIENT)
Dept: INTERNAL MEDICINE CLINIC | Age: 79
End: 2024-06-10
Payer: MEDICARE

## 2024-06-10 VITALS
BODY MASS INDEX: 20.9 KG/M2 | SYSTOLIC BLOOD PRESSURE: 118 MMHG | TEMPERATURE: 98 F | WEIGHT: 146 LBS | DIASTOLIC BLOOD PRESSURE: 70 MMHG | OXYGEN SATURATION: 99 % | HEART RATE: 70 BPM | HEIGHT: 70 IN

## 2024-06-10 DIAGNOSIS — D50.0 IRON DEFICIENCY ANEMIA DUE TO CHRONIC BLOOD LOSS: Chronic | ICD-10-CM

## 2024-06-10 DIAGNOSIS — K04.7 DENTAL INFECTION: ICD-10-CM

## 2024-06-10 DIAGNOSIS — E11.9 TYPE 2 DIABETES MELLITUS WITHOUT COMPLICATION, WITHOUT LONG-TERM CURRENT USE OF INSULIN (HCC): ICD-10-CM

## 2024-06-10 DIAGNOSIS — D69.6 PLATELETS DECREASED (HCC): ICD-10-CM

## 2024-06-10 DIAGNOSIS — I10 BENIGN ESSENTIAL HYPERTENSION: Primary | ICD-10-CM

## 2024-06-10 PROBLEM — Z85.820 ENCOUNTER FOR FOLLOW-UP SURVEILLANCE OF MELANOMA: Status: ACTIVE | Noted: 2024-06-10

## 2024-06-10 PROBLEM — Z08 ENCOUNTER FOR FOLLOW-UP SURVEILLANCE OF MELANOMA: Status: ACTIVE | Noted: 2024-06-10

## 2024-06-10 PROBLEM — Z85.820 HISTORY OF MALIGNANT MELANOMA: Status: ACTIVE | Noted: 2023-11-15

## 2024-06-10 PROCEDURE — G2211 COMPLEX E/M VISIT ADD ON: HCPCS

## 2024-06-10 PROCEDURE — 99214 OFFICE O/P EST MOD 30 MIN: CPT

## 2024-06-10 RX ORDER — AMOXICILLIN AND CLAVULANATE POTASSIUM 875; 125 MG/1; MG/1
1 TABLET, FILM COATED ORAL EVERY 12 HOURS SCHEDULED
Qty: 14 TABLET | Refills: 0 | Status: SHIPPED | OUTPATIENT
Start: 2024-06-10 | End: 2024-06-17

## 2024-06-10 NOTE — PROGRESS NOTES
[facility]  INTERNAL MEDICINE   [place], [place]  Clinic Visit Note  Frantz Vasquez 78 y.o. male   MRN: [id number]    Assessment and Plan    Diagnoses and all orders for this visit:    Benign essential hypertension  Blood Pressure: 118/70; well-controlled  Continue 12.5 mg HCTZ and amlodipine 5 mg po qd     Type 2 diabetes mellitus without complication, without long-term current use of insulin (HCC)  Lab Results   Component Value Date    HGBA1C 5.9 (H) 06/03/2024     Continue 750 mg BID metformin  -     Glucose, fasting; Future  -     Hemoglobin A1C; Future    Iron deficiency anemia due to chronic blood loss  Continue oral iron every other day  Anemia has significantly improved since resection of colon cancer (R hemicolectomy) and 3 doses of 300 mg IV venofer while hospitalized  -     CBC and differential; Future - for next visit    Dental infection  Patient states he gets intermittent dental pain in R upper mouth; per discussion with his ENT and OMFS they suspect it is more related to dentition and there is a low threshold to start abx to prevent spread into the upper maxilla/palatine regions.Advised patient to take augmentin should he develop signs of infection such as fever, redness in the mouth, purulence, and/or severe dental pain.  -     amoxicillin-clavulanate (AUGMENTIN) 875-125 mg per tablet; Take 1 tablet by mouth every 12 (twelve) hours for 7 days      Health Maintenance/Care gaps addressed today:   None due today        Follow up in our clinic in August 2024 for 2 month follow up.    Subjective   CHIEF COMPLAINT:   Chief Complaint   Patient presents with   • Follow-up     2 month follow up adenocarcinoma, colon       HISTORY OF PRESENT ILLNESS:  Frantz Vasquez is a 78 y.o. male with pmhx of colon cancer s/p R hemicolectomy, HTN, DM2 w/retinopathy, iron deficiency anemia, local melanoma s/p resection R shoulder, CAD presenting to clinic today for 2 month follow up for colorectal  "adenocarcinoma.  Patient is s/p R hemicolectomy for colorectal AC discovered during colonoscopy for prfounf iron deficiency anemia (was 7 from a baseline near 14). 22 lymph nodes were sampled by surg onc and were all negative for local metastases. He does not have any further planned chemo/immunotherapy due to surgical cure.    States his pain post-op (colorectal sx) has largely resolved.   Has been havingliquid stools from time to time but states colostomy is feeling normal/no redness or discharge.     Has dermatology follow up in 1 week for annual skin check for melanoma.  It was locally excised from R shoulder and has not spread anywhere.      Echocardiogram done May showed normal ejection fraction, hypermobile atrial septum, no significant valvular pathologies, and mildly enlarged ascending aorta at 4.3 cm.          Review of Systems   Constitutional:  Negative for chills, fatigue and fever.   Respiratory:  Negative for cough and shortness of breath.    Cardiovascular:  Negative for chest pain and palpitations.   Gastrointestinal:  Positive for diarrhea. Negative for abdominal pain, constipation (occasional, self-limited, comes and goes), nausea and vomiting.   Skin:  Negative for rash.   Neurological:  Negative for weakness and numbness.     Objective     Vitals:    06/10/24 0918   BP: 118/70   BP Location: Left arm   Patient Position: Sitting   Cuff Size: Standard   Pulse: 70   Temp: 98 °F (36.7 °C)   TempSrc: Temporal   SpO2: 99%   Weight: 66.2 kg (146 lb)   Height: 5' 10\" (1.778 m)     Physical Exam  Vitals reviewed.   Constitutional:       General: He is not in acute distress.  HENT:      Head: Normocephalic and atraumatic.      Mouth/Throat:      Mouth: Mucous membranes are moist.      Pharynx: Oropharynx is clear.   Eyes:      General: No scleral icterus.     Extraocular Movements: Extraocular movements intact.   Cardiovascular:      Rate and Rhythm: Normal rate and regular rhythm.      Heart sounds: No " murmur heard.     No friction rub. No gallop.   Pulmonary:      Effort: Pulmonary effort is normal. No respiratory distress.      Breath sounds: No stridor. No wheezing or rales.   Abdominal:      General: Bowel sounds are normal. There is no distension.      Palpations: There is no mass.      Tenderness: There is no abdominal tenderness. There is no guarding.   Musculoskeletal:         General: Normal range of motion.   Skin:     General: Skin is warm and dry.      Findings: No rash.   Neurological:      Mental Status: He is alert.      Sensory: No sensory deficit.   Psychiatric:         Mood and Affect: Mood normal.         Behavior: Behavior normal.         Thought Content: Thought content normal.         History     Current Outpatient Medications:   •  amLODIPine (NORVASC) 5 mg tablet, Take 1 tablet (5 mg total) by mouth daily, Disp: 90 tablet, Rfl: 1  •  ASPIRIN 81 PO, Take by mouth, Disp: , Rfl:   •  atorvastatin (LIPITOR) 80 mg tablet, Take 1 tablet (80 mg total) by mouth daily with dinner, Disp: 90 tablet, Rfl: 0  •  Cholecalciferol (Vitamin D) 125 MCG (5000 UT) CAPS, Take by mouth daily, Disp: , Rfl:   •  ferrous sulfate 324 (65 Fe) mg, Take 1 tablet (324 mg total) by mouth every other day, Disp: 30 tablet, Rfl: 1  •  finasteride (PROSCAR) 5 mg tablet, Take 1 tablet (5 mg total) by mouth daily, Disp: 90 tablet, Rfl: 3  •  Glucosamine-Chondroitin (GLUCOSAMINE CHONDR COMPLEX PO), Take 1 capsule by mouth 2 (two) times a day, Disp: , Rfl:   •  hydroCHLOROthiazide 12.5 mg tablet, Take 1 tablet (12.5 mg total) by mouth daily, Disp: 90 tablet, Rfl: 1  •  metFORMIN (GLUCOPHAGE-XR) 750 mg 24 hr tablet, Take 1 tablet (750 mg total) by mouth 2 (two) times a day, Disp: 180 tablet, Rfl: 1  •  metoprolol succinate (TOPROL-XL) 50 mg 24 hr tablet, Take 1 tablet (50 mg total) by mouth daily, Disp: 90 tablet, Rfl: 1  •  Omega-3 Fatty Acids (FISH OIL) 1200 MG CAPS, Take 1 capsule by mouth daily, Disp: , Rfl:   •  tamsulosin  (FLOMAX) 0.4 mg, Take 1 capsule (0.4 mg total) by mouth 2 (two) times a day, Disp: 180 capsule, Rfl: 3  Past Medical History:   Diagnosis Date   • Chronic cough     RESOLVED: [id number]   • Coronary artery disease    • Diabetes mellitus (HCC)    • Diabetic retinopathy (HCC)    • HLD (hyperlipidemia)    • HTN (hypertension)    • Hypertension    • Kidney stone      Past Surgical History:   Procedure Laterality Date   • CARDIAC CATHETERIZATION  12/09/2019   • HAND SURGERY Left    • HEMICOLOECTOMY W/ ANASTOMOSIS N/A 3/25/2024    Procedure: RESECTION COLON RIGHT LAPAROSCOPIC W/ ROBOTICS;  Surgeon: Anders Torres MD;  [facility];  Service: Colorectal   • LYMPH NODE BIOPSY Right 11/24/2023    Procedure: BIOPSY LYMPH NODE SENTINEL, LYMPHATIC MAPPING (Inject: 7:30 am in Nuc Med by radiologist);  Surgeon: Issac Triana MD;  [facility];  Service: Surgical Oncology   • OR CORONARY ARTERY BYP W/VEIN & ARTERY GRAFT 3 VEIN N/A 12/13/2019    Procedure: CORONARY ARTERY BYPASS GRAFT (CABG) 4 VESSELS STEVEN to LAD ; SVG--> PDA , DIAGONAL, and OM;  Surgeon: Elvin Huertas DO;  [facility];  Service: Cardiac Surgery   • OR ECHO TRANSESOPHAG R-T 2D W/PRB IMG ACQUISJ I&R N/A 12/13/2019    Procedure: TRANSESOPHAGEAL ECHOCARDIOGRAM (SHEBA);  Surgeon: Elvin Huertas DO;  [facility];  Service: Cardiac Surgery   • OR NDSC SURG W/VIDEO-ASSISTED HARVEST VEIN CABG Left 12/13/2019    Procedure: HARVEST VEIN ENDOSCOPIC (EVH);  Surgeon: Elvin Huertas DO;  [facility];  Service: Cardiac Surgery   • OR STRTCTC CPTR ASSTD PX EXTRADURAL CRANIAL N/A 1/31/2024    Procedure: RIGHT FUNCTIONAL ENDOSCOPIC SINUS SURGERY (FESS) IMAGED GUIDED, MAXILLARY, ETHMOIDS, FRONTALS;  Surgeon: Tez Bah MD;  [facility];  Service: ENT   • SKIN LESION EXCISION Right 11/24/2023    Procedure: WIDE EXCISION MELANOMA RIGHT ARM;  Surgeon: Issac Triana MD;  [facility];  Service: Surgical Oncology      Social History     Socioeconomic History   • Marital status: /Civil Union     Spouse name: Not on file   • Number of children: Not on file   • Years of education: Not on file   • Highest education level: Not on file   Occupational History   • Not on file   Tobacco Use   • Smoking status: Never   • Smokeless tobacco: Never   Vaping Use   • Vaping status: Never Used   Substance and Sexual Activity   • Alcohol use: Yes     Alcohol/week: 5.0 standard drinks of alcohol     Types: 3 Glasses of wine, 1 Cans of beer, 1 Standard drinks or equivalent per week     Comment: wine with a meal   • Drug use: No   • Sexual activity: Not Currently     Partners: Female   Other Topics Concern   • Not on file   Social History Narrative   • Not on file     Social Determinants of Health     Financial Resource Strain: Low Risk  (10/16/2023)    Overall Financial Resource Strain (CARDIA)    • Difficulty of Paying Living Expenses: Not very hard   Food Insecurity: No Food Insecurity (3/19/2024)    Hunger Vital Sign    • Worried About Running Out of Food in the Last Year: Never true    • Ran Out of Food in the Last Year: Never true   Transportation Needs: No Transportation Needs (3/19/2024)    PRAPARE - Transportation    • Lack of Transportation (Medical): No    • Lack of Transportation (Non-Medical): No   Physical Activity: Not on file   Stress: Not on file   Social Connections: Not on file   Intimate Partner Violence: Not on file   Housing Stability: Low Risk  (3/19/2024)    Housing Stability Vital Sign    • Unable to Pay for Housing in the Last Year: No    • Number of Times Moved in the Last Year: 1    • Homeless in the Last Year: No     Family History   Problem Relation Age of Onset   • Heart failure Mother    • Heart failure Father    • Heart disease Father    • Heart attack Father    • Diabetes Other          Kera Villarreal MD  [facility] Internal Medicine PGY-3  [address]  [address]  [place]  [address]  [phone]      PLEASE NOTE:  This encounter was completed utilizing the [employer] Direct Speech Voice Recognition Software. Grammatical errors, random word insertions, pronoun errors and incomplete sentences are occasional consequences of the system due to software limitations, ambient noise and hardware issues.These may be missed by proof reading prior to affixing electronic signature. Any questions or concerns about the content, text or information contained within the body of this dictation should be directly addressed to the physician for clarification. Please do not hesitate to call me directly if you have any any questions or concerns.

## 2024-06-13 ENCOUNTER — OFFICE VISIT (OUTPATIENT)
Dept: SURGICAL ONCOLOGY | Facility: CLINIC | Age: 79
End: 2024-06-13
Payer: MEDICARE

## 2024-06-13 VITALS
TEMPERATURE: 97.9 F | BODY MASS INDEX: 21.47 KG/M2 | HEIGHT: 70 IN | SYSTOLIC BLOOD PRESSURE: 130 MMHG | DIASTOLIC BLOOD PRESSURE: 64 MMHG | WEIGHT: 150 LBS | OXYGEN SATURATION: 99 % | HEART RATE: 60 BPM

## 2024-06-13 DIAGNOSIS — R22.31 AXILLARY MASS, RIGHT: ICD-10-CM

## 2024-06-13 DIAGNOSIS — Z85.820 ENCOUNTER FOR FOLLOW-UP SURVEILLANCE OF MELANOMA: Primary | ICD-10-CM

## 2024-06-13 DIAGNOSIS — Z85.820 HISTORY OF MALIGNANT MELANOMA: ICD-10-CM

## 2024-06-13 DIAGNOSIS — Z08 ENCOUNTER FOR FOLLOW-UP SURVEILLANCE OF MELANOMA: Primary | ICD-10-CM

## 2024-06-13 PROCEDURE — 99214 OFFICE O/P EST MOD 30 MIN: CPT

## 2024-06-13 PROCEDURE — G2211 COMPLEX E/M VISIT ADD ON: HCPCS

## 2024-06-13 NOTE — PROGRESS NOTES
Surgical Oncology Follow Up       [facility] SURGICAL ONCOLOGY [place]  [address]  [place] [address]    Frantz Vasquez  1945  [id number]  [facility] SURGICAL ONCOLOGY [place]  [address]  [place] [address]    Diagnoses and all orders for this visit:    Encounter for follow-up surveillance of melanoma    History of malignant melanoma  -     US axilla; Future    Axillary mass, right  -     US axilla; Future        Chief Complaint   Patient presents with    Follow-up       Return in about 6 months (around 12/13/2024) for Office Visit, Imaging - See orders.      Oncology History   History of malignant melanoma   10/27/2023 Biopsy    punch biopsy:  A. Skin, right shoulder, punch excision:     MELANOMA (thickness: 1.8 mm); not seen at examined inked specimen margins (see note).     Note: Focal follicular involvement by the melanoma is seen; otherwise, the absence of significant epidermal involvement by the tumor raises the possibility of recurrence/metastasis. Clinical pathological correlation is advised. The lesional cells are positive for SOX10, MART-1, HMB45, and PRAME immunostains. Please see synoptic report for additional details.         Synoptic report for melanoma of the skin  Thickness: 1.8 mm  Ulceration: not seen  Anatomic (Herbie) level: IV  Type: primary dermal melanoma vs. metastasis/recurrence  Mitoses: 2/mm2  Microsatellites: not seen  Lymphovascular invasion: not seen  Neurotropism: not seen  Tumor regression: not seen  Tumor-infiltrating lymphocytes (TIL): absent  Margin assessment: not seen at examined inked specimen margins  Pathologic stage: pT2a  Associated nevus: not seen     11/17/2023 -  Cancer Staged    Staging form: Melanoma of the Skin, AJCC 8th Edition  - Clinical: Stage IB (cT2a, cN0, cM0) - Signed by Issac Triana MD on 11/17/2023 11/24/2023 Surgery    Wide excision right  shoulder melanoma with sentinel lymph node biopsy:    A. Lymph node, right axillary sentinel lymph node #1:  One lymph node; metastatic melanoma not seen (0/1)      B. Skin, right shoulder, excision:  -Prior procedure site changes present.  -Residual melanoma not seen.    C. 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  He is currently 7 months s/p wide excision with sentinel lymph node biopsy and doing well.  He denies any new lumps or lesions, pain or numbness.  He is not experiencing any headaches, dizziness, abdominal pain or weight loss.  He did recently have colon resection for a T2N0 colon cancer but has recovered well.  He is planning to undergo aquablation later this year for an obstructive benign enlarged prostate.        Review of Systems   Constitutional:  Negative for activity change, appetite change, fatigue and unexpected weight change.   HENT: Negative.     Respiratory: Negative.  Negative for shortness of breath.    Cardiovascular: Negative.    Gastrointestinal:  Negative for abdominal pain, nausea and vomiting.   Genitourinary:  Positive for difficulty urinating.   Musculoskeletal: Negative.    Skin: Negative.  Negative for color change and wound.   Neurological: Negative.  Negative for dizziness, light-headedness and headaches.   Hematological: Negative.  Negative for adenopathy.   Psychiatric/Behavioral: Negative.             Patient Active Problem List   Diagnosis    Benign essential hypertension    Type 2 diabetes mellitus (HCC)    PSA elevation     Hypercholesteremia    Diabetic retinopathy (HCC)    Seasonal allergies    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 with angina pectoris (HCC)    Overweight (BMI 25.0-29.9)    History of malignant melanoma    Symptomatic anemia    Iron deficiency anemia    Adenocarcinoma, colon (HCC)    Urinary retention    Encounter for follow-up surveillance of melanoma    Platelets decreased (HCC)     Past Medical History:   Diagnosis Date    Chronic cough     RESOLVED: 29MAY2015    Coronary artery disease     Diabetes mellitus (HCC)     Diabetic retinopathy (HCC)     HLD (hyperlipidemia)     HTN (hypertension)     Hypertension     Kidney stone      Past Surgical History:   Procedure Laterality Date    CARDIAC CATHETERIZATION  12/09/2019    HAND SURGERY Left     HEMICOLOECTOMY W/ ANASTOMOSIS N/A 3/25/2024    Procedure: RESECTION COLON RIGHT LAPAROSCOPIC W/ ROBOTICS;  Surgeon: Anders Torres MD;  [facility];  Service: Colorectal    LYMPH NODE BIOPSY Right 11/24/2023    Procedure: BIOPSY LYMPH NODE SENTINEL, LYMPHATIC MAPPING (Inject: 7:30 am in Nuc Med by radiologist);  Surgeon: Issac Triana MD;  [facility];  Service: Surgical Oncology    NE CORONARY ARTERY BYP W/VEIN & ARTERY GRAFT 3 VEIN N/A 12/13/2019    Procedure: CORONARY ARTERY BYPASS GRAFT (CABG) 4 VESSELS STEVEN to LAD ; SVG--> PDA , DIAGONAL, and OM;  Surgeon: Elvin Heurtas DO;  [facility];  Service: Cardiac Surgery    NE ECHO TRANSESOPHAG R-T 2D W/PRB IMG ACQUISJ I&R N/A 12/13/2019    Procedure: TRANSESOPHAGEAL ECHOCARDIOGRAM (SHEBA);  Surgeon: Elvin Huertas DO;  [facility];  Service: Cardiac Surgery    NE NDSC SURG W/VIDEO-ASSISTED HARVEST VEIN CABG Left 12/13/2019    Procedure: HARVEST VEIN ENDOSCOPIC (EVH);  Surgeon: Elvin Huertas DO;  [facility];  Service: Cardiac Surgery    NE STRTCTC CPTR ASSTD PX EXTRADURAL CRANIAL N/A 1/31/2024    Procedure: RIGHT FUNCTIONAL ENDOSCOPIC SINUS SURGERY  (FESS) IMAGED GUIDED, MAXILLARY, ETHMOIDS, FRONTALS;  Surgeon: Tez Bah MD;  [facility];  Service: ENT    SKIN LESION EXCISION Right 11/24/2023    Procedure: WIDE EXCISION MELANOMA RIGHT ARM;  Surgeon: Issac Triana MD;  [facility];  Service: Surgical Oncology     Family History   Problem Relation Age of Onset    Heart failure Mother     Heart failure Father     Heart disease Father     Heart attack Father     Diabetes Other      Social History     Socioeconomic History    Marital status: /Civil Union     Spouse name: Not on file    Number of children: Not on file    Years of education: Not on file    Highest education level: Not on file   Occupational History    Not on file   Tobacco Use    Smoking status: Never    Smokeless tobacco: Never   Vaping Use    Vaping status: Never Used   Substance and Sexual Activity    Alcohol use: Yes     Alcohol/week: 5.0 standard drinks of alcohol     Types: 3 Glasses of wine, 1 Cans of beer, 1 Standard drinks or equivalent per week     Comment: wine with a meal    Drug use: No    Sexual activity: Not Currently     Partners: Female   Other Topics Concern    Not on file   Social History Narrative    Not on file     Social Determinants of Health     Financial Resource Strain: Low Risk  (10/16/2023)    Overall Financial Resource Strain (CARDIA)     Difficulty of Paying Living Expenses: Not very hard   Food Insecurity: No Food Insecurity (3/19/2024)    Hunger Vital Sign     Worried About Running Out of Food in the Last Year: Never true     Ran Out of Food in the Last Year: Never true   Transportation Needs: No Transportation Needs (3/19/2024)    PRAPARE - Transportation     Lack of Transportation (Medical): No     Lack of Transportation (Non-Medical): No   Physical Activity: Not on file   Stress: Not on file   Social Connections: Not on file   Intimate Partner Violence: Not on file   Housing Stability: Low Risk  (3/19/2024)    Housing Stability Vital  Sign     Unable to Pay for Housing in the Last Year: No     Number of Times Moved in the Last Year: 1     Homeless in the Last Year: No       Current Outpatient Medications:     amLODIPine (NORVASC) 5 mg tablet, Take 1 tablet (5 mg total) by mouth daily, Disp: 90 tablet, Rfl: 1    amoxicillin-clavulanate (AUGMENTIN) 875-125 mg per tablet, Take 1 tablet by mouth every 12 (twelve) hours for 7 days, Disp: 14 tablet, Rfl: 0    ASPIRIN 81 PO, Take by mouth, Disp: , Rfl:     atorvastatin (LIPITOR) 80 mg tablet, Take 1 tablet (80 mg total) by mouth daily with dinner, Disp: 90 tablet, Rfl: 0    Cholecalciferol (Vitamin D) 125 MCG (5000 UT) CAPS, Take by mouth daily, Disp: , Rfl:     ferrous sulfate 324 (65 Fe) mg, Take 1 tablet (324 mg total) by mouth every other day, Disp: 30 tablet, Rfl: 1    finasteride (PROSCAR) 5 mg tablet, Take 1 tablet (5 mg total) by mouth daily, Disp: 90 tablet, Rfl: 3    Glucosamine-Chondroitin (GLUCOSAMINE CHONDR COMPLEX PO), Take 1 capsule by mouth 2 (two) times a day, Disp: , Rfl:     hydroCHLOROthiazide 12.5 mg tablet, Take 1 tablet (12.5 mg total) by mouth daily, Disp: 90 tablet, Rfl: 1    metFORMIN (GLUCOPHAGE-XR) 750 mg 24 hr tablet, Take 1 tablet (750 mg total) by mouth 2 (two) times a day, Disp: 180 tablet, Rfl: 1    metoprolol succinate (TOPROL-XL) 50 mg 24 hr tablet, Take 1 tablet (50 mg total) by mouth daily, Disp: 90 tablet, Rfl: 1    Omega-3 Fatty Acids (FISH OIL) 1200 MG CAPS, Take 1 capsule by mouth daily, Disp: , Rfl:     tamsulosin (FLOMAX) 0.4 mg, Take 1 capsule (0.4 mg total) by mouth 2 (two) times a day, Disp: 180 capsule, Rfl: 3  Allergies   Allergen Reactions    Pollen Extract Allergic Rhinitis     Vitals:    06/13/24 0839   BP: 130/64   Pulse: 60   Temp: 97.9 °F (36.6 °C)   SpO2: 99%       Physical Exam  Vitals reviewed.   Constitutional:       General: He is not in acute distress.     Appearance: Normal appearance. He is normal weight. He is not ill-appearing or  toxic-appearing.   HENT:      Head: Normocephalic and atraumatic.      Nose: Nose normal.      Mouth/Throat:      Mouth: Mucous membranes are moist.   Eyes:      General: No scleral icterus.  Cardiovascular:      Rate and Rhythm: Normal rate.   Pulmonary:      Effort: Pulmonary effort is normal.   Musculoskeletal:         General: Normal range of motion.      Cervical back: Normal range of motion and neck supple.   Lymphadenopathy:      Cervical: No cervical adenopathy.      Upper Body:      Right upper body: No supraclavicular adenopathy.      Left upper body: No supraclavicular, axillary or pectoral adenopathy.      Comments: [place]-sized firm mobile mass in anterior right axilla   Skin:     General: Skin is warm and dry.   Neurological:      General: No focal deficit present.      Mental Status: He is alert and oriented to person, place, and time.   Psychiatric:         Mood and Affect: Mood normal.         Behavior: Behavior normal.         Thought Content: Thought content normal.         Judgment: Judgment normal.           Discussion/Summary: This is a 77 y/o male who presents today for melanoma surveillance.  On exam, there is a mass in the anterior right axilla.  I have explained that this could be a small residual seroma, cyst, scar tissue or enlarged lymph node.  I will send him for US now.  Assuming nothing concerning is seen, we will see him again in 6 months for another clinical exam. If there are suspicious findings on US, I will arrange a biopsy.  He is agreeable to the plan, all questions were answered today.

## 2024-06-13 NOTE — LETTER
June 13, 2024     Issac Triana MD  [address] [facility]  2nd Floor  [facility] [address]    Patient: Frantz Vasquez   YOB: 1945   Date of Visit: 6/13/2024       Dear Dr. Triana:    Thank you for referring Frantz Vasquez to me for evaluation. Below are my notes for this consultation.    If you have questions, please do not hesitate to call me. I look forward to following your patient along with you.         Sincerely,        KONSTANTIN Farley        CC: No Recipients    KONSTANTIN Farley  6/13/2024  9:28 AM  Sign when Signing Visit               Surgical Oncology Follow Up       [address] [place] [address] SURGICAL ONCOLOGY [address] [address]    Frantz Vasquez  1945  [id number]  [facility] SURGICAL ONCOLOGY [address] [address]    Diagnoses and all orders for this visit:    Encounter for follow-up surveillance of melanoma    History of malignant melanoma  -     US axilla; Future    Axillary mass, right  -     US axilla; Future        Chief Complaint   Patient presents with   • Follow-up       Return in about 6 months (around 12/13/2024) for Office Visit, Imaging - See orders.      Oncology History   History of malignant melanoma   10/27/2023 Biopsy    punch biopsy:  A. Skin, right shoulder, punch excision:     MELANOMA (thickness: 1.8 mm); not seen at examined inked specimen margins (see note).     Note: Focal follicular involvement by the melanoma is seen; otherwise, the absence of significant epidermal involvement by the tumor raises the possibility of recurrence/metastasis. Clinical pathological correlation is advised. The lesional cells are positive for SOX10, MART-1, HMB45, and PRAME immunostains. Please see synoptic report for additional details.         Synoptic report for melanoma of the skin  Thickness: 1.8 mm  Ulceration: not seen  Anatomic (Herbie) level: IV  Type:  primary dermal melanoma vs. metastasis/recurrence  Mitoses: 2/mm2  Microsatellites: not seen  Lymphovascular invasion: not seen  Neurotropism: not seen  Tumor regression: not seen  Tumor-infiltrating lymphocytes (TIL): absent  Margin assessment: not seen at examined inked specimen margins  Pathologic stage: pT2a  Associated nevus: not seen     11/17/2023 -  Cancer Staged    Staging form: Melanoma of the Skin, AJCC 8th Edition  - Clinical: Stage IB (cT2a, cN0, cM0) - Signed by Issac Triana MD on 11/17/2023 11/24/2023 Surgery    Wide excision right shoulder melanoma with sentinel lymph node biopsy:    A. Lymph node, right axillary sentinel lymph node #1:  One lymph node; metastatic melanoma not seen (0/1)      B. Skin, right shoulder, excision:  -Prior procedure site changes present.  -Residual melanoma not seen.    C. 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  He is currently 7 months s/p wide excision with sentinel lymph node biopsy and doing well.  He denies any new lumps or lesions, pain or numbness.  He is not experiencing any headaches, dizziness, abdominal pain or weight loss.  He did recently have colon resection for a T2N0 colon cancer but has recovered well.  He is planning to undergo aquablation later this year for an obstructive benign enlarged prostate.        Review of Systems   Constitutional:  Negative for activity change, appetite change, fatigue and unexpected weight change.   HENT: Negative.     Respiratory: Negative.  Negative for shortness of breath.     Cardiovascular: Negative.    Gastrointestinal:  Negative for abdominal pain, nausea and vomiting.   Genitourinary:  Positive for difficulty urinating.   Musculoskeletal: Negative.    Skin: Negative.  Negative for color change and wound.   Neurological: Negative.  Negative for dizziness, light-headedness and headaches.   Hematological: Negative.  Negative for adenopathy.   Psychiatric/Behavioral: Negative.             Patient Active Problem List   Diagnosis   • Benign essential hypertension   • Type 2 diabetes mellitus (HCC)   • PSA elevation   • Hypercholesteremia   • Diabetic retinopathy (HCC)   • Seasonal allergies   • 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 with angina pectoris (HCC)   • Overweight (BMI 25.0-29.9)   • History of malignant melanoma   • Symptomatic anemia   • Iron deficiency anemia   • Adenocarcinoma, colon (HCC)   • Urinary retention   • Encounter for follow-up surveillance of melanoma   • Platelets decreased (HCC)     Past Medical History:   Diagnosis Date   • Chronic cough     RESOLVED: 29MAY2015   • Coronary artery disease    • Diabetes mellitus (HCC)    • Diabetic retinopathy (HCC)    • HLD (hyperlipidemia)    • HTN (hypertension)    • Hypertension    • Kidney stone      Past Surgical History:   Procedure Laterality Date   • CARDIAC CATHETERIZATION  12/09/2019   • HAND SURGERY Left    • HEMICOLOECTOMY W/ ANASTOMOSIS N/A 3/25/2024    Procedure: RESECTION COLON RIGHT LAPAROSCOPIC W/ ROBOTICS;  Surgeon: Anders Torres MD;  [facility];  Service: Colorectal   • LYMPH NODE BIOPSY Right 11/24/2023    Procedure: BIOPSY LYMPH NODE SENTINEL, LYMPHATIC MAPPING (Inject: 7:30 am in Nuc Med by radiologist);  Surgeon: Issac Triana MD;  [facility];  Service: Surgical Oncology   • [place] CORONARY ARTERY BYP W/VEIN & ARTERY GRAFT 3 VEIN N/A 12/13/2019    Procedure: CORONARY ARTERY BYPASS GRAFT (CABG) 4 VESSELS STEVEN to LAD ; SVG--> PDA , DIAGONAL, and OM;  Surgeon: Elvin  DO Kiya;  [facility];  Service: Cardiac Surgery   • [place] ECHO TRANSESOPHAG R-T 2D W/PRB IMG ACQUISJ I&R N/A 12/13/2019    Procedure: TRANSESOPHAGEAL ECHOCARDIOGRAM (SHEBA);  Surgeon: Elvin Huertas DO;  [facility];  Service: Cardiac Surgery   • [place] NDSC SURG W/VIDEO-ASSISTED HARVEST VEIN CABG Left 12/13/2019    Procedure: HARVEST VEIN ENDOSCOPIC (EVH);  Surgeon: Elvin Huertas DO;  [facility];  Service: Cardiac Surgery   • [place] STRTCTC CPTR ASSTD PX EXTRADURAL CRANIAL N/A 1/31/2024    Procedure: RIGHT FUNCTIONAL ENDOSCOPIC SINUS SURGERY (FESS) IMAGED GUIDED, MAXILLARY, ETHMOIDS, FRONTALS;  Surgeon: Tez Bah MD;  [facility];  Service: ENT   • SKIN LESION EXCISION Right 11/24/2023    Procedure: WIDE EXCISION MELANOMA RIGHT ARM;  Surgeon: Issac Triana MD;  [facility];  Service: Surgical Oncology     Family History   Problem Relation Age of Onset   • Heart failure Mother    • Heart failure Father    • Heart disease Father    • Heart attack Father    • Diabetes Other      Social History     Socioeconomic History   • Marital status: /Civil Union     Spouse name: Not on file   • Number of children: Not on file   • Years of education: Not on file   • Highest education level: Not on file   Occupational History   • Not on file   Tobacco Use   • Smoking status: Never   • Smokeless tobacco: Never   Vaping Use   • Vaping status: Never Used   Substance and Sexual Activity   • Alcohol use: Yes     Alcohol/week: 5.0 standard drinks of alcohol     Types: 3 Glasses of wine, 1 Cans of beer, 1 Standard drinks or equivalent per week     Comment: wine with a meal   • Drug use: No   • Sexual activity: Not Currently     Partners: Female   Other Topics Concern   • Not on file   Social History Narrative   • Not on file     Social Determinants of Health     Financial Resource Strain: Low Risk  (10/16/2023)    Overall Financial Resource Strain (CARDIA)    • Difficulty of  Paying Living Expenses: Not very hard   Food Insecurity: No Food Insecurity (3/19/2024)    Hunger Vital Sign    • Worried About Running Out of Food in the Last Year: Never true    • Ran Out of Food in the Last Year: Never true   Transportation Needs: No Transportation Needs (3/19/2024)    PRAPARE - Transportation    • Lack of Transportation (Medical): No    • Lack of Transportation (Non-Medical): No   Physical Activity: Not on file   Stress: Not on file   Social Connections: Not on file   Intimate Partner Violence: Not on file   Housing Stability: Low Risk  (3/19/2024)    Housing Stability Vital Sign    • Unable to Pay for Housing in the Last Year: No    • Number of Times Moved in the Last Year: 1    • Homeless in the Last Year: No       Current Outpatient Medications:   •  amLODIPine (NORVASC) 5 mg tablet, Take 1 tablet (5 mg total) by mouth daily, Disp: 90 tablet, Rfl: 1  •  amoxicillin-clavulanate (AUGMENTIN) 875-125 mg per tablet, Take 1 tablet by mouth every 12 (twelve) hours for 7 days, Disp: 14 tablet, Rfl: 0  •  ASPIRIN 81 PO, Take by mouth, Disp: , Rfl:   •  atorvastatin (LIPITOR) 80 mg tablet, Take 1 tablet (80 mg total) by mouth daily with dinner, Disp: 90 tablet, Rfl: 0  •  Cholecalciferol (Vitamin D) 125 MCG (5000 UT) CAPS, Take by mouth daily, Disp: , Rfl:   •  ferrous sulfate 324 (65 Fe) mg, Take 1 tablet (324 mg total) by mouth every other day, Disp: 30 tablet, Rfl: 1  •  finasteride (PROSCAR) 5 mg tablet, Take 1 tablet (5 mg total) by mouth daily, Disp: 90 tablet, Rfl: 3  •  Glucosamine-Chondroitin (GLUCOSAMINE CHONDR COMPLEX PO), Take 1 capsule by mouth 2 (two) times a day, Disp: , Rfl:   •  hydroCHLOROthiazide 12.5 mg tablet, Take 1 tablet (12.5 mg total) by mouth daily, Disp: 90 tablet, Rfl: 1  •  metFORMIN (GLUCOPHAGE-XR) 750 mg 24 hr tablet, Take 1 tablet (750 mg total) by mouth 2 (two) times a day, Disp: 180 tablet, Rfl: 1  •  metoprolol succinate (TOPROL-XL) 50 mg 24 hr tablet, Take 1 tablet  (50 mg total) by mouth daily, Disp: 90 tablet, Rfl: 1  •  Omega-3 Fatty Acids (FISH OIL) 1200 MG CAPS, Take 1 capsule by mouth daily, Disp: , Rfl:   •  tamsulosin (FLOMAX) 0.4 mg, Take 1 capsule (0.4 mg total) by mouth 2 (two) times a day, Disp: 180 capsule, Rfl: 3  Allergies   Allergen Reactions   • Pollen Extract Allergic Rhinitis     Vitals:    06/13/24 0839   BP: 130/64   Pulse: 60   Temp: 97.9 °F (36.6 °C)   SpO2: 99%       Physical Exam  Vitals reviewed.   Constitutional:       General: He is not in acute distress.     Appearance: Normal appearance. He is normal weight. He is not ill-appearing or toxic-appearing.   HENT:      Head: Normocephalic and atraumatic.      Nose: Nose normal.      Mouth/Throat:      Mouth: Mucous membranes are moist.   Eyes:      General: No scleral icterus.  Cardiovascular:      Rate and Rhythm: Normal rate.   Pulmonary:      Effort: Pulmonary effort is normal.   Musculoskeletal:         General: Normal range of motion.      Cervical back: Normal range of motion and neck supple.   Lymphadenopathy:      Cervical: No cervical adenopathy.      Upper Body:      Right upper body: No supraclavicular adenopathy.      Left upper body: No supraclavicular, axillary or pectoral adenopathy.      Comments: [place]-sized firm mobile mass in anterior right axilla   Skin:     General: Skin is warm and dry.   Neurological:      General: No focal deficit present.      Mental Status: He is alert and oriented to person, place, and time.   Psychiatric:         Mood and Affect: Mood normal.         Behavior: Behavior normal.         Thought Content: Thought content normal.         Judgment: Judgment normal.           Discussion/Summary: This is a 77 y/o male who presents today for melanoma surveillance.  On exam, there is a mass in the anterior right axilla.  I have explained that this could be a small residual seroma, cyst, scar tissue or enlarged lymph node.  I will send him for US now.  Assuming nothing  concerning is seen, we will see him again in 6 months for another clinical exam. If there are suspicious findings on US, I will arrange a biopsy.  He is agreeable to the plan, all questions were answered today.

## 2024-06-19 ENCOUNTER — HOSPITAL ENCOUNTER (OUTPATIENT)
Dept: ULTRASOUND IMAGING | Facility: HOSPITAL | Age: 79
Discharge: HOME | End: 2024-06-19
Payer: MEDICARE

## 2024-06-19 DIAGNOSIS — Z85.820 HISTORY OF MALIGNANT MELANOMA: ICD-10-CM

## 2024-06-19 DIAGNOSIS — R22.31 AXILLARY MASS, RIGHT: ICD-10-CM

## 2024-06-19 PROCEDURE — 76882 US LMTD JT/FCL EVL NVASC XTR: CPT

## 2024-06-23 DIAGNOSIS — E11.9 TYPE 2 DIABETES MELLITUS (HCC): Chronic | ICD-10-CM

## 2024-06-23 DIAGNOSIS — Z95.1 S/P CABG (CORONARY ARTERY BYPASS GRAFT): ICD-10-CM

## 2024-06-24 RX ORDER — ATORVASTATIN CALCIUM 80 MG/1
80 TABLET, FILM COATED ORAL
Qty: 90 TABLET | Refills: 1 | Status: SHIPPED | OUTPATIENT
Start: 2024-06-24

## 2024-06-27 ENCOUNTER — TELEPHONE (OUTPATIENT)
Age: 79
End: 2024-06-27

## 2024-06-27 ENCOUNTER — OFFICE VISIT (OUTPATIENT)
Dept: UROLOGY | Facility: CLINIC | Age: 79
End: 2024-06-27
Payer: MEDICARE

## 2024-06-27 VITALS
SYSTOLIC BLOOD PRESSURE: 126 MMHG | BODY MASS INDEX: 21.62 KG/M2 | HEART RATE: 71 BPM | HEIGHT: 70 IN | WEIGHT: 151 LBS | DIASTOLIC BLOOD PRESSURE: 72 MMHG | OXYGEN SATURATION: 98 %

## 2024-06-27 DIAGNOSIS — R33.9 URINARY RETENTION: Primary | ICD-10-CM

## 2024-06-27 PROCEDURE — 99213 OFFICE O/P EST LOW 20 MIN: CPT

## 2024-06-27 NOTE — PROGRESS NOTES
6/27/2024      No chief complaint on file.        Assessment and Plan    78 y.o. male managed by Dr. Chavez    Urinary Retention  -Has indwelling mar. Last replaced (5/16/24). Mar catheter exchanged today with 16 Fr, 10mL balloon mar. Urine return upon placement. Patient tolerated well.   -Cysto (5/16/24) bilateral lobar hypertrophy with a moderate to large sized median lobe, moderate trabeculations and small cellules diverticula. No lesions in bladder, but did have bullous edema.   -Dr. Chavez recommend HoLEP surgery or RALP given his median lobe and history of recent abdominal surgery (3/28/24). Patient states that he is interested in pursuing Aquablation and is planning to see [facility] on 8/15.   -Patient will return next month for TOV.     2. History of Elevated PSA  -Prostate volume 100 g  -2 previous negative biopsies.  -Highest PSA over 10  -2018 MRI revealed category 2 with 100 g prostate  -Per Dr. Chavez's most recent note, no further workup is needed.           History of Present Illness  Frantz Vasquez is a 78 y.o. male here with history of elevated PSA and urinary retention.     The patient had a catheter placed during robotic right hemicolectomy surgery (3/28/24), he suffered postoperative retention resulting in straight catheterizations and then catheter placement. He has unfortunately had 3 failed TOVs. He expresses today that he would like another void trial in which patient will return next month for another TOV. He is on finasteride and flomax daily.      The patient has a known enlarged prostate MRI prostate (2018), category 2, 100 g. He had 2 previous biopsies which were negative.  PSA 9.79 (8/31/23) His highest PSA was over 10 (11/5/18).     Review of Systems   Constitutional:  Negative for activity change, fatigue and fever.   HENT:  Negative for congestion, rhinorrhea and sore throat.    Eyes:  Negative for photophobia, redness and visual disturbance.   Respiratory:  Negative  "for cough, shortness of breath and wheezing.    Cardiovascular:  Negative for chest pain, palpitations and leg swelling.   Gastrointestinal:  Negative for abdominal pain, diarrhea, nausea and vomiting.   Genitourinary:  Positive for difficulty urinating. Negative for dysuria, flank pain, frequency, hematuria and urgency.   Neurological:  Negative for weakness, light-headedness and headaches.           AUA SYMPTOM SCORE      Flowsheet Row Most Recent Value   AUA SYMPTOM SCORE    How often have you had a sensation of not emptying your bladder completely after you finished urinating? 5 (P)     How often have you had to urinate again less than two hours after you finished urinating? 5 (P)     How often have you found you stopped and started again several times when you urinate? 5 (P)     How often have you found it difficult to postpone urination? 5 (P)     How often have you had a weak urinary stream? 5 (P)     How often have you had to push or strain to begin urination? 5 (P)     How many times did you most typically get up to urinate from the time you went to bed at night until the time you got up in the morning? 5 (P)     Quality of Life: If you were to spend the rest of your life with your urinary condition just the way it is now, how would you feel about that? 6 (P)     AUA SYMPTOM SCORE 35 (P)               Vitals  Vitals:    06/27/24 1352   BP: 126/72   BP Location: Left arm   Patient Position: Sitting   Cuff Size: Standard   Pulse: 71   SpO2: 98%   Weight: 68.5 kg (151 lb)   Height: 5' 10\" (1.778 m)       Physical Exam  Constitutional:       Appearance: Normal appearance. He is not toxic-appearing.   HENT:      Head: Normocephalic.      Mouth/Throat:      Pharynx: Oropharynx is clear.   Eyes:      Extraocular Movements: Extraocular movements intact.      Pupils: Pupils are equal, round, and reactive to light.   Pulmonary:      Effort: Pulmonary effort is normal. No respiratory distress.   Genitourinary:     " Comments: 16 Fr, 10 mL balloon mar in place draining yellow, clear urine.   Musculoskeletal:         General: Normal range of motion.      Cervical back: Normal range of motion.   Neurological:      Mental Status: He is alert and oriented to person, place, and time. Mental status is at baseline.      Gait: Gait normal.   Psychiatric:         Mood and Affect: Mood normal.         Behavior: Behavior normal.         Thought Content: Thought content normal.         Judgment: Judgment normal.           Past History  Past Medical History:   Diagnosis Date    Chronic cough     RESOLVED: [id number]    Coronary artery disease     Diabetes mellitus (HCC)     Diabetic retinopathy (HCC)     HLD (hyperlipidemia)     HTN (hypertension)     Hypertension     Kidney stone      Social History     Socioeconomic History    Marital status: /Civil Union     Spouse name: None    Number of children: None    Years of education: None    Highest education level: None   Occupational History    None   Tobacco Use    Smoking status: Never    Smokeless tobacco: Never   Vaping Use    Vaping status: Never Used   Substance and Sexual Activity    Alcohol use: Yes     Alcohol/week: 5.0 standard drinks of alcohol     Types: 3 Glasses of wine, 1 Cans of beer, 1 Standard drinks or equivalent per week     Comment: wine with a meal    Drug use: No    Sexual activity: Not Currently     Partners: Female   Other Topics Concern    None   Social History Narrative    None     Social Determinants of Health     Financial Resource Strain: Low Risk  (10/16/2023)    Overall Financial Resource Strain (CARDIA)     Difficulty of Paying Living Expenses: Not very hard   Food Insecurity: No Food Insecurity (3/19/2024)    Hunger Vital Sign     Worried About Running Out of Food in the Last Year: Never true     Ran Out of Food in the Last Year: Never true   Transportation Needs: No Transportation Needs (3/19/2024)    PRAPARE - Transportation     Lack of Transportation  (Medical): No     Lack of Transportation (Non-Medical): No   Physical Activity: Not on file   Stress: Not on file   Social Connections: Not on file   Intimate Partner Violence: Not on file   Housing Stability: Low Risk  (3/19/2024)    Housing Stability Vital Sign     Unable to Pay for Housing in the Last Year: No     Number of Times Moved in the Last Year: 1     Homeless in the Last Year: No     Social History     Tobacco Use   Smoking Status Never   Smokeless Tobacco Never     Family History   Problem Relation Age of Onset    Heart failure Mother     Heart failure Father     Heart disease Father     Heart attack Father     Diabetes Other        The following portions of the patient's history were reviewed and updated as appropriate: allergies, current medications, past medical history, past social history, past surgical history and problem list.    Results  No results found for this or any previous visit (from the past 1 hour(s)).]  Lab Results   Component Value Date    PSA 9.79 (H) 08/31/2023    PSA 7.7 (H) 08/29/2022    PSA 6.7 (H) 08/10/2021    PSA 5.3 (H) 08/11/2020     Lab Results   Component Value Date    GLUCOSE 166 (H) 03/25/2024    CALCIUM 9.2 06/03/2024     05/14/2014    K 4.5 06/03/2024    CO2 30 06/03/2024     06/03/2024    BUN 25 06/03/2024    CREATININE 1.09 06/03/2024     Lab Results   Component Value Date    WBC 5.45 06/03/2024    HGB 13.1 06/03/2024    HCT 39.7 06/03/2024    MCV 93 06/03/2024     (L) 06/03/2024       KONSTANTIN Gomez

## 2024-06-27 NOTE — TELEPHONE ENCOUNTER
Pt had appt today at [place] for mar catheter change. Pt calling due to he just got home and sts the catheter fell out. Sent message to office and clinical said to have him come back over now and they will be able to help him. Pt understood and will head back to the office.

## 2024-06-28 ENCOUNTER — TELEPHONE (OUTPATIENT)
Dept: SURGICAL ONCOLOGY | Facility: CLINIC | Age: 79
End: 2024-06-28

## 2024-06-28 NOTE — TELEPHONE ENCOUNTER
Patient called stating he got a letter in the mail from [facility]. Patient states he had a US done on 6/19 and was under the impression that [facility] was going to call patient and go over results. Patient will still like a call from the provider.     I scheduled patient to see provider on 7/5, also advised if appointment is not needed we will cancel this appt.     Thank you!

## 2024-07-03 ENCOUNTER — TELEPHONE (OUTPATIENT)
Dept: SURGICAL ONCOLOGY | Facility: CLINIC | Age: 79
End: 2024-07-03

## 2024-07-03 DIAGNOSIS — Z85.820 HISTORY OF MALIGNANT MELANOMA: ICD-10-CM

## 2024-07-03 DIAGNOSIS — M79.89 OTHER SPECIFIED SOFT TISSUE DISORDERS: Primary | ICD-10-CM

## 2024-07-15 ENCOUNTER — TELEPHONE (OUTPATIENT)
Dept: UROLOGY | Facility: CLINIC | Age: 79
End: 2024-07-15

## 2024-07-15 ENCOUNTER — PROCEDURE VISIT (OUTPATIENT)
Dept: UROLOGY | Facility: CLINIC | Age: 79
End: 2024-07-15
Payer: MEDICARE

## 2024-07-15 DIAGNOSIS — R33.9 URINARY RETENTION: Primary | ICD-10-CM

## 2024-07-15 LAB — POST-VOID RESIDUAL VOLUME, ML POC: 468 ML

## 2024-07-15 PROCEDURE — 51702 INSERT TEMP BLADDER CATH: CPT

## 2024-07-15 PROCEDURE — 51798 US URINE CAPACITY MEASURE: CPT

## 2024-07-15 NOTE — TELEPHONE ENCOUNTER
Patient signed a records release for us to release records to [facility]. He has an appointment there on 8/15. He will be calling back with the exact name of the doctor and their location so we can get a fax number to send records. Please relay message back to office when patient calls back.

## 2024-07-15 NOTE — PROGRESS NOTES
7/15/2024    Frantz Vasquez  1945  [id number]    Diagnosis  Chief Complaint    Urinary Retention         Patient presents for TOV managed by our office    Plan  Patient scheduled for mar change in 5 weeks. Seeing [facility] in 4 weeks. If he decides to transfer care to them he will cancel appt with us     Procedure Mar removal/voiding trial    Mar catheter removed after deflation of an intact balloon. Patient tolerated well. Encouraged patient to hydrate well and return this afternoon for post void residual.  he knows he may return early if uncomfortable and unable to urinate. Patient agrees to this plan.    Patient returned this afternoon. Patient states unable to void. Patient voided 0 ml while in office and was unable to void through out the day . Bladder ultrasound performed and PVR measured 468ml.    Patient in agreement to forgo any further TOV and will keep mar in for the time being. Will decide which way he would like to go in terms of surgery pending consult with [facility]    Recent Results (from the past 4 hour(s))   POCT Measure PVR    Collection Time: 07/15/24  2:48 PM   Result Value Ref Range    POST-VOID RESIDUAL VOLUME, ML  mL       Universal Protocol:  Consent: Verbal consent obtained.  Risks and benefits: risks, benefits and alternatives were discussed  Consent given by: patient  Patient understanding: patient states understanding of the procedure being performed  Patient consent: the patient's understanding of the procedure matches consent given  Patient identity confirmed: verbally with patient    Bladder catheterization    Date/Time: 7/15/2024 8:30 AM    Performed by: Cyn Jaramillo RN  Authorized by: Anders Hernandez MD    Patient location:  Bedside  Consent:     Consent given by:  Patient  Universal protocol:     Procedure explained and questions answered to patient or proxy's satisfaction: yes      Patient identity confirmed:  Verbally with patient  Pre-procedure details:      Procedure purpose:  Therapeutic  Anesthesia (see MAR for exact dosages):     Anesthesia method:  None  Procedure details:     Bladder irrigation: no      Catheter insertion:  Indwelling    Approach: natural orifice      Catheter type:  Coude, latex and Mar    Catheter size:  16 Fr    Number of attempts:  1    Successful placement: yes      Urine characteristics:  Yellow  Post-procedure details:     Patient tolerance of procedure:  Tolerated well, no immediate complications  Comments:      Patient prepped with betadine. New mar inserted with no issues. 10 ml sterile water used to inflate balloon. 500 ml yellow urine on return. Attached to stat lock and leg bag. Extra supplies provided        There were no vitals filed for this visit.        Cyn Jaramillo RN

## 2024-07-17 NOTE — TELEPHONE ENCOUNTER
Patient called stating He will be seen   Dr. Bev Rojas  at [facility]     Phone number [phone]  He will call back with the fax number for the records to be sent.

## 2024-07-17 NOTE — TELEPHONE ENCOUNTER
Patient called back with the fax number for [facility]. Records are to be sent to number below     Fax number [phone]

## 2024-07-18 NOTE — TELEPHONE ENCOUNTER
I called and spoke to [facility]. I told them I am having trouble faxing the records for this patient. I tried one more time and was able to get a fax confirmation that the records have been sent. If [facility] calls back, please verify with them that they got the records.

## 2024-07-18 NOTE — TELEPHONE ENCOUNTER
I called and spoke to patient. I have tried a few times to get fax to go through to the fax number on this encounter. The patient stated the office was having issues with their phone lines yesterday and to try again today. I will try this afternoon.

## 2024-07-19 NOTE — TELEPHONE ENCOUNTER
I called [facility] and confirmed they did receive the fax of patient's records. I called patient's wife and advised her they did receive all his records. No further action needed.

## 2024-07-25 DIAGNOSIS — R33.9 URINARY RETENTION: ICD-10-CM

## 2024-07-25 RX ORDER — FINASTERIDE 5 MG/1
5 TABLET, FILM COATED ORAL DAILY
Qty: 100 TABLET | Refills: 1 | Status: SHIPPED | OUTPATIENT
Start: 2024-07-25 | End: 2025-02-10

## 2024-08-05 ENCOUNTER — APPOINTMENT (OUTPATIENT)
Dept: LAB | Facility: CLINIC | Age: 79
End: 2024-08-05
Payer: MEDICARE

## 2024-08-05 DIAGNOSIS — R97.20 ELEVATED PROSTATE SPECIFIC ANTIGEN (PSA): ICD-10-CM

## 2024-08-05 DIAGNOSIS — E11.9 TYPE 2 DIABETES MELLITUS WITHOUT COMPLICATION, WITHOUT LONG-TERM CURRENT USE OF INSULIN (HCC): ICD-10-CM

## 2024-08-05 DIAGNOSIS — C18.2 CANCER OF ASCENDING COLON (HCC): ICD-10-CM

## 2024-08-05 LAB
CEA SERPL-MCNC: 3.9 NG/ML (ref 0–3)
EST. AVERAGE GLUCOSE BLD GHB EST-MCNC: 140 MG/DL
GLUCOSE P FAST SERPL-MCNC: 110 MG/DL (ref 65–99)
HBA1C MFR BLD: 6.5 %
PSA SERPL-MCNC: 3.24 NG/ML (ref 0–4)

## 2024-08-05 PROCEDURE — 83036 HEMOGLOBIN GLYCOSYLATED A1C: CPT

## 2024-08-05 PROCEDURE — 36415 COLL VENOUS BLD VENIPUNCTURE: CPT

## 2024-08-05 PROCEDURE — 82947 ASSAY GLUCOSE BLOOD QUANT: CPT

## 2024-08-05 PROCEDURE — 82378 CARCINOEMBRYONIC ANTIGEN: CPT

## 2024-08-05 PROCEDURE — 84153 ASSAY OF PSA TOTAL: CPT

## 2024-08-13 ENCOUNTER — HOSPITAL ENCOUNTER (EMERGENCY)
Facility: HOSPITAL | Age: 79
Discharge: HOME | End: 2024-08-13
Attending: EMERGENCY MEDICINE
Payer: MEDICARE

## 2024-08-13 VITALS
TEMPERATURE: 97.8 F | HEART RATE: 62 BPM | SYSTOLIC BLOOD PRESSURE: 142 MMHG | BODY MASS INDEX: 21.9 KG/M2 | DIASTOLIC BLOOD PRESSURE: 67 MMHG | OXYGEN SATURATION: 100 % | HEIGHT: 70 IN | WEIGHT: 153 LBS | RESPIRATION RATE: 16 BRPM

## 2024-08-13 DIAGNOSIS — Z46.6 URINARY CATHETER (FOLEY) CHANGE REQUIRED: ICD-10-CM

## 2024-08-13 DIAGNOSIS — T83.091A OBSTRUCTION OF FOLEY CATHETER, INITIAL ENCOUNTER (HCC): Primary | ICD-10-CM

## 2024-08-13 PROCEDURE — 99283 EMERGENCY DEPT VISIT LOW MDM: CPT

## 2024-08-13 PROCEDURE — 99284 EMERGENCY DEPT VISIT MOD MDM: CPT | Performed by: EMERGENCY MEDICINE

## 2024-08-13 RX ORDER — LIDOCAINE HYDROCHLORIDE 20 MG/ML
1 JELLY TOPICAL ONCE
Status: COMPLETED | OUTPATIENT
Start: 2024-08-13 | End: 2024-08-13

## 2024-08-13 RX ADMIN — LIDOCAINE HYDROCHLORIDE 1 APPLICATION: 20 JELLY TOPICAL at 08:51

## 2024-08-13 NOTE — ED PROVIDER NOTES
Pt Name: Frantz Vasquez  MRN: [id number]  Birthdate 1945  Age/Sex: 78 y.o. male  Date of evaluation: 8/13/2024  PCP: Melissa Corey MD    CHIEF COMPLAINT    Chief Complaint   Patient presents with    Urinary Catheter Problem     Pt reports catheter isnt draining properly. Noticed it lastnight but this am noticed not leaking. Slight abd pressure. Has appt next Monday to get replaced. Had this one in for a month         HPI    78 y.o. male presenting with difficulty with drainage from his Lawton catheter.  Patient states that he has a chronic indwelling Lawton catheter due to enlarged prostate, he is pursuing surgical treatment of same.  He states the current catheter has been in for about a month, does have an outpatient appointment in approximately 1 weeks time to get the Lawton catheter replaced but states that last night he noted that the catheter is not draining properly and there is some leakage of urine around the catheter.  He has had increased pressure in the suprapubic area today, denies fever, nausea, vomiting, trauma, other symptoms.      HPI      Past Medical and Surgical History    Past Medical History:   Diagnosis Date    Chronic cough     RESOLVED: [id number]    Coronary artery disease     Diabetes mellitus (HCC)     Diabetic retinopathy (HCC)     HLD (hyperlipidemia)     HTN (hypertension)     Hypertension     Kidney stone        Past Surgical History:   Procedure Laterality Date    CARDIAC CATHETERIZATION  12/09/2019    HAND SURGERY Left     HEMICOLOECTOMY W/ ANASTOMOSIS N/A 3/25/2024    Procedure: RESECTION COLON RIGHT LAPAROSCOPIC W/ ROBOTICS;  Surgeon: Anders Torres MD;  [facility];  Service: Colorectal    LYMPH NODE BIOPSY Right 11/24/2023    Procedure: BIOPSY LYMPH NODE SENTINEL, LYMPHATIC MAPPING (Inject: 7:30 am in Nuc Med by radiologist);  Surgeon: Issac Triana MD;  [facility];  Service: Surgical Oncology    [place] CORONARY ARTERY BYP W/VEIN & ARTERY GRAFT 3 VEIN N/A  12/13/2019    Procedure: CORONARY ARTERY BYPASS GRAFT (CABG) 4 VESSELS STEVEN to LAD ; SVG--> PDA , DIAGONAL, and OM;  Surgeon: Elvin Huertas DO;  [facility];  Service: Cardiac Surgery    WY ECHO TRANSESOPHAG R-T 2D W/PRB IMG ACQUISJ I&R N/A 12/13/2019    Procedure: TRANSESOPHAGEAL ECHOCARDIOGRAM (SHEBA);  Surgeon: Elvin Huertas DO;  [facility];  Service: Cardiac Surgery    WY NDSC SURG W/VIDEO-ASSISTED HARVEST VEIN CABG Left 12/13/2019    Procedure: HARVEST VEIN ENDOSCOPIC (EVH);  Surgeon: Elvin Huertas DO;  [facility];  Service: Cardiac Surgery    W[place] STRTCTC CPTR ASSTD PX EXTRADURAL CRANIAL N/A 1/31/2024    Procedure: RIGHT FUNCTIONAL ENDOSCOPIC SINUS SURGERY (FESS) IMAGED GUIDED, MAXILLARY, ETHMOIDS, FRONTALS;  Surgeon: Tez Bah MD;  [facility];  Service: ENT    SKIN LESION EXCISION Right 11/24/2023    Procedure: WIDE EXCISION MELANOMA RIGHT ARM;  Surgeon: Issac Triana MD;  [facility];  Service: Surgical Oncology       Family History   Problem Relation Age of Onset    Heart failure Mother     Heart failure Father     Heart disease Father     Heart attack Father     Diabetes Other        Social History     Tobacco Use    Smoking status: Never    Smokeless tobacco: Never   Vaping Use    Vaping status: Never Used   Substance Use Topics    Alcohol use: Yes     Alcohol/week: 5.0 standard drinks of alcohol     Types: 3 Glasses of wine, 1 Cans of beer, 1 Standard drinks or equivalent per week     Comment: wine with a meal    Drug use: No           Allergies    Allergies   Allergen Reactions    Pollen Extract Allergic Rhinitis       Home Medications    Prior to Admission medications    Medication Sig Start Date End Date Taking? Authorizing Provider   amLODIPine (NORVASC) 5 mg tablet Take 1 tablet (5 mg total) by mouth daily 5/27/24   Dimas Richter MD   ASPIRIN 81 PO Take by mouth    Historical Provider, MD   atorvastatin (LIPITOR) 80 mg tablet Take 1  tablet (80 mg total) by mouth daily with dinner 6/24/24   Melissa Corey MD   Cholecalciferol (Vitamin D) 125 MCG (5000 UT) CAPS Take by mouth daily    Historical Provider, MD   ferrous sulfate 324 (65 Fe) mg Take 1 tablet (324 mg total) by mouth every other day 4/4/24   Melissa Corey MD   finasteride (PROSCAR) 5 mg tablet Take 1 tablet (5 mg total) by mouth daily 7/25/24 2/10/25  KONSTANTIN Monterroso   Glucosamine-Chondroitin (GLUCOSAMINE CHONDR COMPLEX PO) Take 1 capsule by mouth 2 (two) times a day    Historical Provider, MD   hydroCHLOROthiazide 12.5 mg tablet Take 1 tablet (12.5 mg total) by mouth daily 4/22/24   Matt Mackenzie MD   metFORMIN (GLUCOPHAGE-XR) 750 mg 24 hr tablet Take 1 tablet (750 mg total) by mouth 2 (two) times a day 2/21/24   Melissa Corey MD   metoprolol succinate (TOPROL-XL) 50 mg 24 hr tablet Take 1 tablet (50 mg total) by mouth daily 4/19/24   Matt Mackenzie MD   Omega-3 Fatty Acids (FISH OIL) 1200 MG CAPS Take 1 capsule by mouth daily    Historical Provider, MD   tamsulosin (FLOMAX) 0.4 mg Take 1 capsule (0.4 mg total) by mouth 2 (two) times a day 3/28/24   Swapna Roberson PA-C           Review of Systems    Review of Systems   Constitutional:  Negative for appetite change, chills and diaphoresis.   HENT:  Negative for drooling, facial swelling, trouble swallowing and voice change.    Respiratory:  Negative for apnea, shortness of breath and wheezing.    Cardiovascular:  Negative for chest pain and leg swelling.   Gastrointestinal:  Positive for abdominal distention. Negative for abdominal pain, diarrhea, nausea and vomiting.   Genitourinary:  Positive for difficulty urinating. Negative for dysuria and urgency.   Musculoskeletal:  Negative for arthralgias, back pain, gait problem and neck pain.   Skin:  Negative for color change, rash and wound.   Neurological:  Negative for seizures, speech difficulty, weakness and headaches.   Psychiatric/Behavioral:  Negative for agitation,  behavioral problems and dysphoric mood. The patient is not nervous/anxious.            All other systems reviewed and negative.    Physical Exam      ED Triage Vitals   Temperature Pulse Respirations Blood Pressure SpO2   08/13/24 0807 08/13/24 0807 08/13/24 0807 08/13/24 0807 08/13/24 0807   97.8 °F (36.6 °C) 69 16 (!) 174/83 99 %      Temp Source Heart Rate Source Patient Position - Orthostatic VS BP Location FiO2 (%)   08/13/24 0807 08/13/24 0807 08/13/24 0807 08/13/24 0807 --   Oral Monitor Sitting Right arm       Pain Score       08/13/24 0815       No Pain               Physical Exam  Vitals and nursing note reviewed.   Constitutional:       General: He is not in acute distress.     Appearance: He is well-developed. He is not ill-appearing, toxic-appearing or diaphoretic.   HENT:      Head: Normocephalic and atraumatic.      Right Ear: External ear normal.      Left Ear: External ear normal.      Nose: Nose normal. No congestion or rhinorrhea.      Mouth/Throat:      Mouth: Mucous membranes are moist.      Pharynx: Oropharynx is clear. No oropharyngeal exudate or posterior oropharyngeal erythema.   Eyes:      Conjunctiva/sclera: Conjunctivae normal.      Pupils: Pupils are equal, round, and reactive to light.   Neck:      Trachea: No tracheal deviation.   Cardiovascular:      Rate and Rhythm: Normal rate and regular rhythm.      Pulses: Normal pulses.      Heart sounds: Normal heart sounds. No murmur heard.  Pulmonary:      Effort: Pulmonary effort is normal. No respiratory distress.      Breath sounds: Normal breath sounds. No stridor. No wheezing or rales.   Abdominal:      General: There is distension.      Palpations: Abdomen is soft.      Tenderness: There is abdominal tenderness. There is no guarding or rebound.      Comments: Mild suprapubic tenderness to palpation and distention, no rebound or guarding   Genitourinary:     Comments: Lawton catheter in place in the penile urethra, no evidence of trauma  or other abnormalities  Musculoskeletal:         General: No deformity. Normal range of motion.      Cervical back: Normal range of motion and neck supple.      Right lower leg: No edema.      Left lower leg: No edema.   Skin:     General: Skin is warm and dry.      Capillary Refill: Capillary refill takes less than 2 seconds.      Findings: No rash.   Neurological:      Mental Status: He is alert and oriented to person, place, and time.   Psychiatric:         Behavior: Behavior normal.         Thought Content: Thought content normal.         Judgment: Judgment normal.              Diagnostic Results      Labs:    Results Reviewed       None            All labs reviewed and utilized in the medical decision making process    Radiology:    No orders to display       All radiology studies independently viewed by me and interpreted by the radiologist.    Procedure    Procedures        ED Course of Care and Re-Assessments      Lawton catheter changed with relief of urinary retention    Medications   lidocaine (URO-JET) 2 % urethral/mucosal gel 1 Application (1 Application Urethral Given 8/13/24 0851)           FINAL IMPRESSION    Final diagnoses:   Obstruction of Lawton catheter, initial encounter ([facility])   Urinary catheter (Lawton) change required         DISPOSITION/PLAN    Presentation as above with malfunctioning Lawton catheter felt likely to be obstructed.  Vital signs reassuring, examination as above.  After discussion of risks and benefits, Lawton catheter changed with resolution of obstruction.  Do not suspect urinary tract infection, sepsis, appendicitis, cholecystitis, other acute life threat.   discharged with strict return precautions, follow-up primary care doctor as well as urology.  Time reflects when diagnosis was documented in both MDM as applicable and the Disposition within this note       Time User Action Codes Description Comment    8/13/2024  9:13 AM Juarez Yeh Add [T83.091A] Obstruction of Lawton  catheter, initial encounter ([facility])     8/13/2024  9:13 AM YehJuarez mcgraw Add [Z46.6] Urinary catheter (Lawton) change required           ED Disposition       ED Disposition   Discharge    Condition   Stable    Date/Time   Tue Aug 13, 2024  9:14 AM    Comment   Frantz Vasquez discharge to home/self care.                   Follow-up Information       Follow up With Specialties Details Why Contact Info Additional Information    Critical access hospital Emergency Department Emergency Medicine Go to  If symptoms worsen [facility] [address]  [phone] Critical access hospital Emergency Department, [address] [place] [address], [phone]    Melissa Corey MD Internal Medicine Call in 1 day To discuss this visit and schedule close follow-up [address] [place] [address]  [phone]                 PATIENT REFERRED TO:    Critical access hospital Emergency Department  [facility] [address]  [phone]  Go to   If symptoms worsen    Melissa Corey MD  [address] [place] [address]  [phone]    Call in 1 day  To discuss this visit and schedule close follow-up      DISCHARGE MEDICATIONS:    Discharge Medication List as of 8/13/2024  9:14 AM        CONTINUE these medications which have NOT CHANGED    Details   amLODIPine (NORVASC) 5 mg tablet Take 1 tablet (5 mg total) by mouth daily, Starting Mon 5/27/2024, Normal      ASPIRIN 81 PO Take by mouth, Historical Med      atorvastatin (LIPITOR) 80 mg tablet Take 1 tablet (80 mg total) by mouth daily with dinner, Starting Mon 6/24/2024, Normal      Cholecalciferol (Vitamin D) 125 MCG (5000 UT) CAPS Take by mouth daily, Historical Med      ferrous sulfate 324 (65 Fe) mg Take 1 tablet (324 mg total) by mouth every other day, Starting Thu 4/4/2024, Normal      finasteride (PROSCAR) 5 mg tablet Take 1 tablet (5 mg total) by mouth daily, Starting Thu  "7/25/2024, Until Mon 2/10/2025, Normal      Glucosamine-Chondroitin (GLUCOSAMINE CHONDR COMPLEX PO) Take 1 capsule by mouth 2 (two) times a day, Historical Med      hydroCHLOROthiazide 12.5 mg tablet Take 1 tablet (12.5 mg total) by mouth daily, Starting Mon 4/22/2024, Normal      metFORMIN (GLUCOPHAGE-XR) 750 mg 24 hr tablet Take 1 tablet (750 mg total) by mouth 2 (two) times a day, Starting Wed 2/21/2024, Normal      metoprolol succinate (TOPROL-XL) 50 mg 24 hr tablet Take 1 tablet (50 mg total) by mouth daily, Starting Fri 4/19/2024, Normal      Omega-3 Fatty Acids (FISH OIL) 1200 MG CAPS Take 1 capsule by mouth daily, Historical Med      tamsulosin (FLOMAX) 0.4 mg Take 1 capsule (0.4 mg total) by mouth 2 (two) times a day, Starting Thu 3/28/2024, Normal             No discharge procedures on file.         Juarez Yeh MD    Portions of the record may have been created with voice recognition software.  Occasional wrong word or \"sound alike\" substitutions may have occurred due to the inherent limitations of voice recognition software.  Please read the chart carefully and recognize, using context, where substitutions have occurred     Juarez Yeh MD  08/13/24 3403    "

## 2024-08-14 ENCOUNTER — OFFICE VISIT (OUTPATIENT)
Dept: INTERNAL MEDICINE CLINIC | Age: 79
End: 2024-08-14
Payer: MEDICARE

## 2024-08-14 VITALS
HEART RATE: 63 BPM | HEIGHT: 70 IN | OXYGEN SATURATION: 98 % | SYSTOLIC BLOOD PRESSURE: 120 MMHG | DIASTOLIC BLOOD PRESSURE: 80 MMHG | TEMPERATURE: 97.6 F | BODY MASS INDEX: 21.82 KG/M2 | WEIGHT: 152.4 LBS

## 2024-08-14 DIAGNOSIS — Z85.820 HISTORY OF MALIGNANT MELANOMA: ICD-10-CM

## 2024-08-14 DIAGNOSIS — R33.9 URINARY RETENTION: ICD-10-CM

## 2024-08-14 DIAGNOSIS — E11.9 TYPE 2 DIABETES MELLITUS WITHOUT COMPLICATION, WITHOUT LONG-TERM CURRENT USE OF INSULIN (HCC): Primary | ICD-10-CM

## 2024-08-14 DIAGNOSIS — Z13.9 SCREENING DUE: ICD-10-CM

## 2024-08-14 DIAGNOSIS — R97.20 PSA ELEVATION: ICD-10-CM

## 2024-08-14 PROCEDURE — G2211 COMPLEX E/M VISIT ADD ON: HCPCS | Performed by: INTERNAL MEDICINE

## 2024-08-14 PROCEDURE — 99214 OFFICE O/P EST MOD 30 MIN: CPT | Performed by: INTERNAL MEDICINE

## 2024-08-14 NOTE — ASSESSMENT & PLAN NOTE
Plans to follow-up with urology over the next week for possible prostate resection/aqua therapy.  Using straight catheter for urination.  Seen in ED yesterday for clogged cath no issues at appointment today.

## 2024-08-14 NOTE — ASSESSMENT & PLAN NOTE
Lab Results   Component Value Date    HGBA1C 6.5 (H) 08/05/2024     A1c 5.9-> 6.5 over 2-month period.  States that he is very active at home and eating a varied/healthy diet.  Compliant with metformin.  Does not check blood glucose at home.      Urged Mediterranean type diet continued exercise as prior  Will follow-up A1c in 3 months in November  Continue metformin at current dose  If A1c greater than 7 at next recheck may need to consider changing daily blood glucose and/or increasing metformin

## 2024-08-14 NOTE — PROGRESS NOTES
Ambulatory Visit  Name: Frantz Vasquez      : 1945      MRN: [id number]  Encounter Provider: Janay Alcala DO  Encounter Date: 2024   Encounter department: [facility] [place]    Assessment & Plan   1. Type 2 diabetes mellitus without complication, without long-term current use of insulin (HCC)  Assessment & Plan:    Lab Results   Component Value Date    HGBA1C 6.5 (H) 2024     A1c 5.9-> 6.5 over 2-month period.  States that he is very active at home and eating a varied/healthy diet.  Compliant with metformin.  Does not check blood glucose at home.      Urged Mediterranean type diet continued exercise as prior  Will follow-up A1c in 3 months in November  Continue metformin at current dose  If A1c greater than 7 at next recheck may need to consider changing daily blood glucose and/or increasing metformin  2. Screening due  -     Lipid panel; Future; Expected date: 2024  -     Hemoglobin A1C; Future; Expected date: 2024  3. PSA elevation  Assessment & Plan:  Plans to follow-up with urology over the next week for possible prostate resection/aqua therapy.  Using straight catheter for urination.  Seen in ED yesterday for clogged cath no issues at appointment today.       History of Present Illness     28-year-old male past medical history of CAD, DM, tabetic retinopathy, hypertension, hyperlipidemia, s/p hemicolectomy, enlarged prostate with elevated PSA.  Here for follow-up.   was seen in emergency room yesterday as chronic Lawton was clogged.  Fixed in ED with no further issue today.  States he will be following up with urology later this week to discuss possible prostate procedures.  [facility] he is continuing to get diarrhea which is gradually improving from his hemicolectomy.   has no other complaints as of today/any other concerns.        Review of Systems   Constitutional:  Negative for chills and fatigue.   HENT:  Negative for congestion.   "  Respiratory:  Negative for shortness of breath.    Gastrointestinal:  Positive for diarrhea. Negative for abdominal pain.   Genitourinary:  Positive for difficulty urinating.   Musculoskeletal: Negative.    Psychiatric/Behavioral: Negative.             Objective     /80 (BP Location: Left arm, Patient Position: Sitting, Cuff Size: Standard)   Pulse 63   Temp 97.6 °F (36.4 °C) (Temporal)   Ht 5' 10\" (1.778 m)   Wt 69.1 kg (152 lb 6.4 oz)   SpO2 98%   BMI 21.87 kg/m²     Physical Exam  Vitals and nursing note reviewed.   Constitutional:       General: He is not in acute distress.     Appearance: He is not ill-appearing, toxic-appearing or diaphoretic.   HENT:      Head: Normocephalic and atraumatic.      Mouth/Throat:      Mouth: Mucous membranes are moist.      Pharynx: Oropharynx is clear.   Eyes:      General: No scleral icterus.  Cardiovascular:      Rate and Rhythm: Normal rate and regular rhythm.      Heart sounds: No murmur heard.  Pulmonary:      Effort: Pulmonary effort is normal.      Breath sounds: Normal breath sounds. No wheezing, rhonchi or rales.   Genitourinary:     Comments: Catheter/Lawton in place  Musculoskeletal:      Cervical back: Neck supple.      Right lower leg: No edema.      Left lower leg: No edema.   Skin:     General: Skin is warm and dry.   Neurological:      Mental Status: He is alert and oriented to person, place, and time. Mental status is at baseline.   Psychiatric:         Mood and Affect: Mood normal.         Behavior: Behavior normal.         Thought Content: Thought content normal.         Judgment: Judgment normal.       Administrative Statements   I have spent a total time of 25 minutes in caring for this patient on the day of the visit/encounter including Diagnostic results, Prognosis, and Risks and benefits of tx options.        "

## 2024-08-20 DIAGNOSIS — R33.9 URINARY RETENTION: ICD-10-CM

## 2024-08-22 RX ORDER — TAMSULOSIN HYDROCHLORIDE 0.4 MG/1
0.4 CAPSULE ORAL 2 TIMES DAILY
Qty: 180 CAPSULE | Refills: 0 | Status: SHIPPED | OUTPATIENT
Start: 2024-08-22

## 2024-08-26 ENCOUNTER — TELEPHONE (OUTPATIENT)
Dept: INTERNAL MEDICINE CLINIC | Age: 79
End: 2024-08-26

## 2024-08-27 ENCOUNTER — TELEPHONE (OUTPATIENT)
Dept: ADMINISTRATIVE | Facility: OTHER | Age: 79
End: 2024-08-27

## 2024-08-27 NOTE — TELEPHONE ENCOUNTER
Upon review of the In Basket request we were able to locate, review, and update the patient chart as requested for Diabetic Eye Exam.    Any additional questions or concerns should be emailed to the Practice Liaisons via the appropriate education email address, please do not reply via In Basket.    Thank you  Skylar Barkley MA   [facility]

## 2024-08-27 NOTE — TELEPHONE ENCOUNTER
----- Message from Rigo NGUYEN sent at 8/26/2024  8:32 PM EDT -----  08/26/24 8:33 PM    Hello, our patient Frantz Vasquez has had Diabetic Eye Exam completed/performed. Please assist in updating the patient chart by pulling the document from the Media Tab. The date of service is 8/21/2024.     Thank you,  Rigo Deras  [facility] PRIMARY [facility]

## 2024-09-03 ENCOUNTER — HOSPITAL ENCOUNTER (OUTPATIENT)
Dept: ULTRASOUND IMAGING | Facility: HOSPITAL | Age: 79
Discharge: HOME | End: 2024-09-03
Payer: MEDICARE

## 2024-09-03 DIAGNOSIS — Z85.820 HISTORY OF MALIGNANT MELANOMA: ICD-10-CM

## 2024-09-03 DIAGNOSIS — M79.89 OTHER SPECIFIED SOFT TISSUE DISORDERS: ICD-10-CM

## 2024-09-03 PROCEDURE — 76882 US LMTD JT/FCL EVL NVASC XTR: CPT

## 2024-09-05 ENCOUNTER — CONSULT (OUTPATIENT)
Dept: INTERNAL MEDICINE CLINIC | Age: 79
End: 2024-09-05
Payer: MEDICARE

## 2024-09-05 VITALS
OXYGEN SATURATION: 99 % | DIASTOLIC BLOOD PRESSURE: 80 MMHG | SYSTOLIC BLOOD PRESSURE: 130 MMHG | BODY MASS INDEX: 22.19 KG/M2 | TEMPERATURE: 97.8 F | HEIGHT: 70 IN | WEIGHT: 155 LBS | HEART RATE: 56 BPM

## 2024-09-05 DIAGNOSIS — Z01.818 PRE-OP EXAMINATION: Primary | ICD-10-CM

## 2024-09-05 DIAGNOSIS — I10 BENIGN ESSENTIAL HYPERTENSION: ICD-10-CM

## 2024-09-05 DIAGNOSIS — N40.1 BENIGN PROSTATIC HYPERPLASIA WITH LOWER URINARY TRACT SYMPTOMS, SYMPTOM DETAILS UNSPECIFIED: ICD-10-CM

## 2024-09-05 DIAGNOSIS — I25.10 CORONARY ARTERY DISEASE INVOLVING NATIVE HEART WITHOUT ANGINA PECTORIS, UNSPECIFIED VESSEL OR LESION TYPE: ICD-10-CM

## 2024-09-05 DIAGNOSIS — E11.9 TYPE 2 DIABETES MELLITUS WITHOUT COMPLICATION, WITHOUT LONG-TERM CURRENT USE OF INSULIN (HCC): ICD-10-CM

## 2024-09-05 PROCEDURE — 99214 OFFICE O/P EST MOD 30 MIN: CPT | Performed by: PHYSICIAN ASSISTANT

## 2024-09-05 PROCEDURE — 93000 ELECTROCARDIOGRAM COMPLETE: CPT | Performed by: PHYSICIAN ASSISTANT

## 2024-09-05 RX ORDER — METFORMIN HYDROCHLORIDE 750 MG/1
750 TABLET, EXTENDED RELEASE ORAL 2 TIMES DAILY
Qty: 180 TABLET | Refills: 1 | Status: SHIPPED | OUTPATIENT
Start: 2024-09-05

## 2024-09-05 NOTE — PROGRESS NOTES
Assessment/Plan:         Diagnoses and all orders for this visit:    Pre-op examination  Comments:  6 METS  EKG reveals NSR with RBBB (chronic)  pt optimized for upcoming surgery    Type 2 diabetes mellitus without complication, without long-term current use of insulin (HCC)  Comments:  well controlled  Orders:  -     metFORMIN (GLUCOPHAGE-XR) 750 mg 24 hr tablet; Take 1 tablet (750 mg total) by mouth 2 (two) times a day    Benign essential hypertension    Coronary artery disease involving native heart without angina pectoris, unspecified vessel or lesion type  Comments:  EKG NSR  chronic RBBB  continue atorvastatin        Pt will go for pre op labs next week     Subjective:      Patient ID: Frantz Vasquez is a 78 y.o. male.    Presurgical Evaluation    Subjective:     Patient ID: Frantz Vasquez is a 78 y.o. male.    Patient presents with:  Pre-op Clearance: Pre Op Clearance  Margie cho/ Dr. Rojas       The following portions of the patient's history were reviewed and updated as appropriate: allergies, current medications, past family history, past medical history, past social history, past surgical history, and problem list.    Procedure date: 9/18/24    Surgeon:  Dr Rojas  Planned procedure:    Diagnosis for procedure:  BPH     Prior anesthesia: Yes   General; Complications:  None / Tolerated well    CAD History: CAD    Pulmonary History: None    Renal history: None    Diabetes History:  Type 2  Controlled     Neurological History: None     On Immunosuppressant meds/biologics: No      Preop labs/testing available and reviewed: yes       EKG yes      Functional capacity: Shovel snow                          6 Mets   Pick the highest level patient can comfortably perform   4 mets or greater for surgery    RCRI  High Risk surgery?         1 Point  CAD History:         1 Point   MI; Positive Stress Test; CP due to Mi;  Nitrate Usage to control Angina; Pathologic Q wave on EKG  CHF Active:         1 Point   Pulm  Edema; Paroxysmal Nocturnal Dyspnea;  Bibasilar Rales (crackles);S3; CHF on CXR  Cerebrovascular Disease (TIA or CVA):     1 Point  DM on Insulin:        1 Point  Serum Creat >2.0 mg/dl:       1 Point          Total Points: 0     Scorin: Class I, Very Low Risk (0.4%)     1: Class II, Low risk (0.9%)     2: Class III Moderate (6.6%)     3: Class IV High (>11%)      BRANDAN Risk:  GFR:        For PCP:  If GFR>60, Hold ACE/ARB/Diuretic on the day of surgery, and NSAIDS 10 days before.    If GFR<45, Consider PRE and POST op Nephrology Consult.    If 46 <GFR> 59 : Has Patient had BRANDAN in last 6 Months? no   If YES: Preop Nephrology consult   If No:  Post Op Nephrology consult.                       The following portions of the patient's history were reviewed and updated as appropriate: allergies, current medications, past family history, past medical history, past social history, past surgical history, and problem list.    Review of Systems   Constitutional:  Negative for activity change, appetite change, chills, diaphoresis, fatigue and fever.   HENT:  Negative for congestion and sore throat.    Eyes:  Negative for pain and redness.   Respiratory:  Negative for cough, shortness of breath and wheezing.    Cardiovascular:  Negative for chest pain, palpitations and leg swelling.   Gastrointestinal:  Negative for abdominal pain, constipation, diarrhea and nausea.   Genitourinary:         Requiring catheter presently    Musculoskeletal:  Negative for arthralgias, back pain and gait problem.   Skin:  Negative for rash and wound.   Allergic/Immunologic: Negative for environmental allergies.   Neurological:  Negative for dizziness, light-headedness and headaches.   Psychiatric/Behavioral:  Negative for sleep disturbance. The patient is not nervous/anxious.          Past Medical History:   Diagnosis Date    Chronic cough     RESOLVED: 2015    Coronary artery disease     Diabetes mellitus (HCC)     Diabetic retinopathy (HCC)      HLD (hyperlipidemia)     HTN (hypertension)     Hypertension     Kidney stone          Current Outpatient Medications:     amLODIPine (NORVASC) 5 mg tablet, Take 1 tablet (5 mg total) by mouth daily, Disp: 90 tablet, Rfl: 1    ASPIRIN 81 PO, Take by mouth, Disp: , Rfl:     atorvastatin (LIPITOR) 80 mg tablet, Take 1 tablet (80 mg total) by mouth daily with dinner, Disp: 90 tablet, Rfl: 1    Cholecalciferol (Vitamin D) 125 MCG (5000 UT) CAPS, Take by mouth daily, Disp: , Rfl:     ferrous sulfate 324 (65 Fe) mg, Take 1 tablet (324 mg total) by mouth every other day (Patient taking differently: Take 324 mg by mouth once a week), Disp: 30 tablet, Rfl: 1    finasteride (PROSCAR) 5 mg tablet, Take 1 tablet (5 mg total) by mouth daily, Disp: 100 tablet, Rfl: 1    Glucosamine-Chondroitin (GLUCOSAMINE CHONDR COMPLEX PO), Take 1 capsule by mouth 2 (two) times a day, Disp: , Rfl:     hydroCHLOROthiazide 12.5 mg tablet, Take 1 tablet (12.5 mg total) by mouth daily, Disp: 90 tablet, Rfl: 1    metFORMIN (GLUCOPHAGE-XR) 750 mg 24 hr tablet, Take 1 tablet (750 mg total) by mouth 2 (two) times a day, Disp: 180 tablet, Rfl: 1    metoprolol succinate (TOPROL-XL) 50 mg 24 hr tablet, Take 1 tablet (50 mg total) by mouth daily, Disp: 90 tablet, Rfl: 1    Omega-3 Fatty Acids (FISH OIL) 1200 MG CAPS, Take 1 capsule by mouth daily, Disp: , Rfl:     tamsulosin (FLOMAX) 0.4 mg, Take 1 capsule (0.4 mg total) by mouth 2 (two) times a day, Disp: 180 capsule, Rfl: 0    Allergies   Allergen Reactions    Pollen Extract Allergic Rhinitis       Social History   Past Surgical History:   Procedure Laterality Date    CARDIAC CATHETERIZATION  12/09/2019    HAND SURGERY Left     HEMICOLOECTOMY W/ ANASTOMOSIS N/A 3/25/2024    Procedure: RESECTION COLON RIGHT LAPAROSCOPIC W/ ROBOTICS;  Surgeon: Anders Torres MD;  [facility];  Service: Colorectal    LYMPH NODE BIOPSY Right 11/24/2023    Procedure: BIOPSY LYMPH NODE SENTINEL, LYMPHATIC  "MAPPING (Inject: 7:30 am in Nuc Med by radiologist);  Surgeon: Issac Triana MD;  [facility];  Service: Surgical Oncology    [place] CORONARY ARTERY BYP W/VEIN & ARTERY GRAFT 3 VEIN N/A 12/13/2019    Procedure: CORONARY ARTERY BYPASS GRAFT (CABG) 4 VESSELS STEVEN to LAD ; SVG--> PDA , DIAGONAL, and OM;  Surgeon: Elvin Huertas DO;  [facility];  Service: Cardiac Surgery    NY ECHO TRANSESOPHAG R-T 2D W/PRB IMG ACQUISJ I&R N/A 12/13/2019    Procedure: TRANSESOPHAGEAL ECHOCARDIOGRAM (SHEBA);  Surgeon: Elvin Huertas DO;  [facility];  Service: Cardiac Surgery    NY NDSC SURG W/VIDEO-ASSISTED HARVEST VEIN CABG Left 12/13/2019    Procedure: HARVEST VEIN ENDOSCOPIC (EVH);  Surgeon: Elvin Huertas DO;  [facility];  Service: Cardiac Surgery    NY STRTCTC CPTR ASSTD PX EXTRADURAL CRANIAL N/A 1/31/2024    Procedure: RIGHT FUNCTIONAL ENDOSCOPIC SINUS SURGERY (FESS) IMAGED GUIDED, MAXILLARY, ETHMOIDS, FRONTALS;  Surgeon: Tez Bah MD;  [facility];  Service: ENT    SKIN LESION EXCISION Right 11/24/2023    Procedure: WIDE EXCISION MELANOMA RIGHT ARM;  Surgeon: Issac Triana MD;  [facility];  Service: Surgical Oncology     Family History   Problem Relation Age of Onset    Heart failure Mother     Heart failure Father     Heart disease Father     Heart attack Father     Diabetes Other        Objective:  /80 (BP Location: Left arm, Patient Position: Sitting, Cuff Size: Standard)   Pulse 56   Temp 97.8 °F (36.6 °C) (Temporal)   Ht 5' 10\" (1.778 m)   Wt 70.3 kg (155 lb)   SpO2 99% Comment: room air  BMI 22.24 kg/m²        Physical Exam  Vitals reviewed.   Constitutional:       General: He is not in acute distress.  HENT:      Head: Normocephalic and atraumatic.      Right Ear: Tympanic membrane, ear canal and external ear normal. There is no impacted cerumen.      Left Ear: Tympanic membrane, ear canal and external ear normal. There is no impacted cerumen. "      Nose: Nose normal.   Eyes:      General:         Right eye: No discharge.         Left eye: No discharge.      Extraocular Movements: Extraocular movements intact.      Conjunctiva/sclera: Conjunctivae normal.      Pupils: Pupils are equal, round, and reactive to light.   Cardiovascular:      Rate and Rhythm: Normal rate and regular rhythm.      Heart sounds: Murmur heard.   Pulmonary:      Effort: Pulmonary effort is normal. No respiratory distress.      Breath sounds: Normal breath sounds. No wheezing or rales.   Musculoskeletal:         General: Normal range of motion.      Cervical back: Normal range of motion. No rigidity.      Right lower leg: No edema.      Left lower leg: No edema.   Lymphadenopathy:      Cervical: No cervical adenopathy.   Skin:     General: Skin is warm.      Findings: No erythema or rash.   Neurological:      General: No focal deficit present.      Mental Status: He is alert and oriented to person, place, and time.   Psychiatric:         Mood and Affect: Mood normal.

## 2024-09-06 ENCOUNTER — TELEPHONE (OUTPATIENT)
Dept: SURGICAL ONCOLOGY | Facility: CLINIC | Age: 79
End: 2024-09-06

## 2024-09-06 DIAGNOSIS — M79.89 OTHER SPECIFIED SOFT TISSUE DISORDERS: Primary | ICD-10-CM

## 2024-09-06 DIAGNOSIS — Z85.820 HISTORY OF MALIGNANT MELANOMA: ICD-10-CM

## 2024-09-06 NOTE — TELEPHONE ENCOUNTER
Called patient and reviewed US results.  The previously identified abnormal lymph node has not changed.  We will repeat US in 3 months for continued surveillance.  Patient thankful for the call.

## 2024-09-06 NOTE — TELEPHONE ENCOUNTER
Called both Frantz and [facility]. We were able to get him scheduled for his repeat US. I confirmed the date, time and location with Frantz. He was agreeable. I stated that the information for this appointment will be on [facility] as well.

## 2024-09-10 ENCOUNTER — PROCEDURE VISIT (OUTPATIENT)
Dept: UROLOGY | Facility: CLINIC | Age: 79
End: 2024-09-10
Payer: MEDICARE

## 2024-09-10 VITALS
WEIGHT: 150 LBS | DIASTOLIC BLOOD PRESSURE: 76 MMHG | HEIGHT: 70 IN | BODY MASS INDEX: 21.47 KG/M2 | SYSTOLIC BLOOD PRESSURE: 130 MMHG | OXYGEN SATURATION: 99 % | HEART RATE: 65 BPM

## 2024-09-10 DIAGNOSIS — R33.9 URINARY RETENTION: Primary | ICD-10-CM

## 2024-09-10 PROCEDURE — 51702 INSERT TEMP BLADDER CATH: CPT

## 2024-09-10 NOTE — PROGRESS NOTES
"9/10/2024  Frantz Vasquez is a 78 y.o. male  [id number]    Diagnosis:  Chief Complaint    Urinary Retention         Patient presents for routine mar exchange managed by Dr. Chavez    Plan:  Patient will be getting an aquablation with [facility] next week and subsequent TOV. Will call office there after   Patient instructed to call with any questions or concerns in the meantime.    No orders of the defined types were placed in this encounter.       Vitals:    09/10/24 1254   BP: 130/76   Pulse: 65   SpO2: 99%   Weight: 68 kg (150 lb)   Height: 5' 10\" (1.778 m)           Procedure:    Universal Protocol:  procedure performed by consultantConsent: Verbal consent obtained.  Risks and benefits: risks, benefits and alternatives were discussed  Consent given by: patient  Patient understanding: patient states understanding of the procedure being performed  Patient consent: the patient's understanding of the procedure matches consent given  Patient identity confirmed: verbally with patient    Bladder catheterization    Date/Time: 9/10/2024 1:00 PM    Performed by: Cyn Jaramillo RN  Authorized by: Hay Genao MD    Patient location:  Bedside  Consent:     Consent given by:  Patient  Universal protocol:     Procedure explained and questions answered to patient or proxy's satisfaction: yes      Patient identity confirmed:  Verbally with patient  Pre-procedure details:     Procedure purpose:  Therapeutic  Anesthesia (see MAR for exact dosages):     Anesthesia method:  None  Procedure details:     Bladder irrigation: no      Catheter insertion:  Indwelling    Approach: natural orifice      Catheter type:  Coude, latex and Mar    Catheter size:  16 Fr    Number of attempts:  1    Successful placement: yes      Urine characteristics:  Yellow and blood-tinged  Post-procedure details:     Patient tolerance of procedure:  Tolerated well, no immediate complications  Comments:       Patient prepped with betadine. New mar inserted " with no issues. 10 ml sterile water used to inflate balloon.flushed with 20 ml sterile water Attached to stat lock and leg bag. Extra supplies provided           Cyn Jaramillo RN    picc line insertion

## 2024-09-17 ENCOUNTER — TELEPHONE (OUTPATIENT)
Age: 79
End: 2024-09-17

## 2024-10-22 ENCOUNTER — TELEPHONE (OUTPATIENT)
Age: 79
End: 2024-10-22

## 2024-10-22 NOTE — TELEPHONE ENCOUNTER
Patients GI provider:  Dr. Torres    Number to return call: [phone]    Reason for call: Pt calling to speak with Dr. Torres about his CEA lab work. Pt states he went for other labs in Aug and they did his CEA test. Pt would like a call back to know if he should have the test run again.     Scheduled procedure/appointment date if applicable: Apt/11/1/24

## 2024-10-24 DIAGNOSIS — I10 BENIGN ESSENTIAL HYPERTENSION: Chronic | ICD-10-CM

## 2024-10-24 RX ORDER — AMLODIPINE BESYLATE 5 MG/1
5 TABLET ORAL DAILY
Qty: 90 TABLET | Refills: 1 | Status: SHIPPED | OUTPATIENT
Start: 2024-10-24

## 2024-10-24 RX ORDER — HYDROCHLOROTHIAZIDE 12.5 MG/1
12.5 TABLET ORAL DAILY
Qty: 90 TABLET | Refills: 0 | Status: SHIPPED | OUTPATIENT
Start: 2024-10-24

## 2024-10-24 RX ORDER — METOPROLOL SUCCINATE 50 MG/1
50 TABLET, EXTENDED RELEASE ORAL DAILY
Qty: 90 TABLET | Refills: 0 | Status: SHIPPED | OUTPATIENT
Start: 2024-10-24

## 2024-10-28 NOTE — TELEPHONE ENCOUNTER
Patient has an upcoming appointment on 11/1 and was to have his CEA blood work completed in September. Patient just realized that when he went for blood work in August that the CEA was drawn then.  He would like to know if he needs to have another CEA done before his 11/1 office visit.        Please advise.

## 2024-10-30 NOTE — PROGRESS NOTES
Colon and Rectal Surgery   Frantz Vasquez 78 y.o. male MRN: [id number]   Encounter: [id number]  24   1:24 PM          Ambulatory Visit  Name: Frantz Vasquez      : 1945      MRN: [id number]  Encounter Provider: Anders Torres MD  Encounter Date: 2024   Encounter department: [facility] COLON AND RECTAL SURGERY [place]    Assessment & Plan  Adenocarcinoma, colon (HCC)  ASSESSMENT:    Frantz returns today in good spirits, 7months month from robotic right hemicolectomy 3/25/24, discussed pathology with him inpatient, T2/N0, 0/22 lymph nodes, stage I colon cancer, this is a technical cure, and will not require adjuvant therapy.    He has history of melanoma, he had PET/CT 2023, discussed with him that this serves as reasonable staging given the proximity, would only follow his oncology team melanoma surveillance at this time from an imaging standpoint.       PLAN:  -Ordered CEA for 2025  -Dr. Fuentes for followup/recall colonoscopy as set  -9months office followup  CC:  MD Dr. Kamilah Rust Dr.    Orders:    CEA; Future      HPI  Frantz Vasquez is a 78 y.o. male is here today for surveillance of ascending colon cancer. His last office visit was on 24. He offers no complaints today.     He is status post robotic right hemicolectomy on 3/25/24 for T2/N0, 0/22 lymph nodes, stage I colon cancer     Lab Results   Component Value Date    CEA 3.9 (H) 2024      Lab Results   Component Value Date    WBC 5.45 2024    HGB 13.1 2024    HCT 39.7 2024    MCV 93 2024     (L) 2024      Lab Results   Component Value Date     2014    SODIUM 140 2024    K 4.5 2024     2024    CO2 30 2024    ANIONGAP 3 (L) 2014    AGAP 8 2024    BUN 25 2024    CREATININE 1.09 2024    GLUC 103 2024    GLUF 110 (H) 2024    CALCIUM 9.2 2024    AST 20 2024    ALT 20 2024     ALKPHOS 57 06/03/2024    PROT 7.9 05/14/2014    TP 6.3 (L) 06/03/2024    BILITOT 0.5 05/14/2014    TBILI 0.39 06/03/2024    EGFR 65 06/03/2024           Historical Information   Past Medical History:   Diagnosis Date    Chronic cough     RESOLVED: [id number]    Coronary artery disease     Diabetes mellitus (HCC)     Diabetic retinopathy (HCC)     HLD (hyperlipidemia)     HTN (hypertension)     Hypertension     Kidney stone      Past Surgical History:   Procedure Laterality Date    CARDIAC CATHETERIZATION  12/09/2019    HAND SURGERY Left     HEMICOLOECTOMY W/ ANASTOMOSIS N/A 3/25/2024    Procedure: RESECTION COLON RIGHT LAPAROSCOPIC W/ ROBOTICS;  Surgeon: Anders Torres MD;  [facility];  Service: Colorectal    LYMPH NODE BIOPSY Right 11/24/2023    Procedure: BIOPSY LYMPH NODE SENTINEL, LYMPHATIC MAPPING (Inject: 7:30 am in Nuc Med by radiologist);  Surgeon: Issac Triana MD;  [facility];  Service: Surgical Oncology    [place] CORONARY ARTERY BYP W/VEIN & ARTERY GRAFT 3 VEIN N/A 12/13/2019    Procedure: CORONARY ARTERY BYPASS GRAFT (CABG) 4 VESSELS STEVEN to LAD ; SVG--> PDA , DIAGONAL, and OM;  Surgeon: Elvin Huertas DO;  [facility];  Service: Cardiac Surgery    [place] ECHO TRANSESOPHAG R-T 2D W/PRB IMG ACQUISJ I&R N/A 12/13/2019    Procedure: TRANSESOPHAGEAL ECHOCARDIOGRAM (SHEBA);  Surgeon: Elvin Huertas DO;  [facility];  Service: Cardiac Surgery    [place] NDSC SURG W/VIDEO-ASSISTED HARVEST VEIN CABG Left 12/13/2019    Procedure: HARVEST VEIN ENDOSCOPIC (EVH);  Surgeon: Elvin Huertas DO;  [facility];  Service: Cardiac Surgery    [place] STRTCTC CPTR ASSTD PX EXTRADURAL CRANIAL N/A 1/31/2024    Procedure: RIGHT FUNCTIONAL ENDOSCOPIC SINUS SURGERY (FESS) IMAGED GUIDED, MAXILLARY, ETHMOIDS, FRONTALS;  Surgeon: Tez Bah MD;  [facility];  Service: ENT    SKIN LESION EXCISION Right 11/24/2023    Procedure: WIDE EXCISION MELANOMA RIGHT ARM;  Surgeon: Issac  MD Kamilah;  [facility];  Service: Surgical Oncology       Meds/Allergies       Current Outpatient Medications:     amLODIPine (NORVASC) 5 mg tablet, Take 1 tablet (5 mg total) by mouth daily, Disp: 90 tablet, Rfl: 1    ASPIRIN 81 PO, Take by mouth, Disp: , Rfl:     atorvastatin (LIPITOR) 80 mg tablet, Take 1 tablet (80 mg total) by mouth daily with dinner, Disp: 90 tablet, Rfl: 1    Cholecalciferol (Vitamin D) 125 MCG (5000 UT) CAPS, Take by mouth daily, Disp: , Rfl:     ferrous sulfate 324 (65 Fe) mg, Take 1 tablet (324 mg total) by mouth every other day (Patient taking differently: Take 324 mg by mouth once a week), Disp: 30 tablet, Rfl: 1    finasteride (PROSCAR) 5 mg tablet, Take 1 tablet (5 mg total) by mouth daily, Disp: 100 tablet, Rfl: 1    Glucosamine-Chondroitin (GLUCOSAMINE CHONDR COMPLEX PO), Take 1 capsule by mouth 2 (two) times a day, Disp: , Rfl:     hydroCHLOROthiazide 12.5 mg tablet, Take 1 tablet (12.5 mg total) by mouth daily, Disp: 90 tablet, Rfl: 0    metFORMIN (GLUCOPHAGE-XR) 750 mg 24 hr tablet, Take 1 tablet (750 mg total) by mouth 2 (two) times a day, Disp: 180 tablet, Rfl: 1    metoprolol succinate (TOPROL-XL) 50 mg 24 hr tablet, Take 1 tablet (50 mg total) by mouth daily, Disp: 90 tablet, Rfl: 0    Omega-3 Fatty Acids (FISH OIL) 1200 MG CAPS, Take 1 capsule by mouth daily, Disp: , Rfl:     tamsulosin (FLOMAX) 0.4 mg, Take 1 capsule (0.4 mg total) by mouth 2 (two) times a day, Disp: 180 capsule, Rfl: 0      Allergies   Allergen Reactions    Pollen Extract Allergic Rhinitis         Social History   Social History     Substance and Sexual Activity   Alcohol Use Yes    Alcohol/week: 5.0 standard drinks of alcohol    Types: 3 Glasses of wine, 1 Cans of beer, 1 Standard drinks or equivalent per week    Comment: wine with a meal     Social History     Substance and Sexual Activity   Drug Use No     Social History     Tobacco Use   Smoking Status Never   Smokeless Tobacco Never  "        Family History:   Family History   Problem Relation Age of Onset    Heart failure Mother     Heart failure Father     Heart disease Father     Heart attack Father     Diabetes Other      Objective   Current Vitals:   Vitals:    11/01/24 1316   Weight: 68.5 kg (151 lb)   Height: 5' 10\" (1.778 m)     Physical Exam:  General:no distress  Neck:supple  Pulm:no increased work of breathing  CV:sinus  Abdomen:soft,nontender  Extremities:no edema        "

## 2024-11-01 ENCOUNTER — OFFICE VISIT (OUTPATIENT)
Age: 79
End: 2024-11-01
Payer: MEDICARE

## 2024-11-01 VITALS — HEIGHT: 70 IN | WEIGHT: 151 LBS | BODY MASS INDEX: 21.62 KG/M2

## 2024-11-01 DIAGNOSIS — C18.9 ADENOCARCINOMA, COLON (HCC): Primary | ICD-10-CM

## 2024-11-01 PROCEDURE — 99214 OFFICE O/P EST MOD 30 MIN: CPT | Performed by: COLON & RECTAL SURGERY

## 2024-11-03 NOTE — ASSESSMENT & PLAN NOTE
ASSESSMENT:    Frantz returns today in good spirits, 7months month from robotic right hemicolectomy 3/25/24, discussed pathology with him inpatient, T2/N0, 0/22 lymph nodes, stage I colon cancer, this is a technical cure, and will not require adjuvant therapy.    He has history of melanoma, he had PET/CT 11/2023, discussed with him that this serves as reasonable staging given the proximity, would only follow his oncology team melanoma surveillance at this time from an imaging standpoint.       PLAN:  -Ordered CEA for 2/2025  -Dr. Fuentes for followup/recall colonoscopy as set  -9months office followup  CC:  MD Dr. Kamilah Rust Dr.    Orders:    CEA; Future

## 2024-11-13 ENCOUNTER — RA CDI HCC (OUTPATIENT)
Dept: OTHER | Facility: HOSPITAL | Age: 79
End: 2024-11-13

## 2024-11-14 ENCOUNTER — APPOINTMENT (OUTPATIENT)
Dept: LAB | Facility: CLINIC | Age: 79
End: 2024-11-14
Payer: MEDICARE

## 2024-11-14 DIAGNOSIS — Z13.9 SCREENING DUE: ICD-10-CM

## 2024-11-14 LAB
CHOLEST SERPL-MCNC: 117 MG/DL (ref ?–200)
EST. AVERAGE GLUCOSE BLD GHB EST-MCNC: 123 MG/DL
HBA1C MFR BLD: 5.9 %
HDLC SERPL-MCNC: 33 MG/DL
LDLC SERPL CALC-MCNC: 66 MG/DL (ref 0–100)
NONHDLC SERPL-MCNC: 84 MG/DL
TRIGL SERPL-MCNC: 90 MG/DL (ref ?–150)

## 2024-11-14 PROCEDURE — 83036 HEMOGLOBIN GLYCOSYLATED A1C: CPT

## 2024-11-14 PROCEDURE — 80061 LIPID PANEL: CPT

## 2024-11-19 ENCOUNTER — RESULTS FOLLOW-UP (OUTPATIENT)
Dept: OTHER | Facility: HOSPITAL | Age: 79
End: 2024-11-19

## 2024-11-21 ENCOUNTER — OFFICE VISIT (OUTPATIENT)
Dept: INTERNAL MEDICINE CLINIC | Age: 79
End: 2024-11-21
Payer: MEDICARE

## 2024-11-21 VITALS
OXYGEN SATURATION: 98 % | SYSTOLIC BLOOD PRESSURE: 134 MMHG | BODY MASS INDEX: 22.19 KG/M2 | HEIGHT: 70 IN | TEMPERATURE: 97.6 F | DIASTOLIC BLOOD PRESSURE: 70 MMHG | HEART RATE: 62 BPM | WEIGHT: 155 LBS

## 2024-11-21 DIAGNOSIS — C18.9 ADENOCARCINOMA, COLON (HCC): ICD-10-CM

## 2024-11-21 DIAGNOSIS — I10 BENIGN ESSENTIAL HYPERTENSION: ICD-10-CM

## 2024-11-21 DIAGNOSIS — Z85.820 HISTORY OF MALIGNANT MELANOMA: ICD-10-CM

## 2024-11-21 DIAGNOSIS — E11.9 TYPE 2 DIABETES MELLITUS WITHOUT COMPLICATION, WITHOUT LONG-TERM CURRENT USE OF INSULIN (HCC): Primary | ICD-10-CM

## 2024-11-21 DIAGNOSIS — G89.29 CHRONIC RIGHT SHOULDER PAIN: ICD-10-CM

## 2024-11-21 DIAGNOSIS — M25.511 CHRONIC RIGHT SHOULDER PAIN: ICD-10-CM

## 2024-11-21 PROCEDURE — G0439 PPPS, SUBSEQ VISIT: HCPCS | Performed by: INTERNAL MEDICINE

## 2024-11-21 PROCEDURE — 99214 OFFICE O/P EST MOD 30 MIN: CPT | Performed by: INTERNAL MEDICINE

## 2024-11-21 NOTE — PATIENT INSTRUCTIONS
Medicare Preventive Visit Patient Instructions  Thank you for completing your Welcome to Medicare Visit or Medicare Annual Wellness Visit today. Your next wellness visit will be due in one year (11/22/2025).  The screening/preventive services that you may require over the next 5-10 years are detailed below. Some tests may not apply to you based off risk factors and/or age. Screening tests ordered at today's visit but not completed yet may show as past due. Also, please note that scanned in results may not display below.  Preventive Screenings:  Service Recommendations Previous Testing/Comments   Colorectal Cancer Screening  Colonoscopy    Fecal Occult Blood Test (FOBT)/Fecal Immunochemical Test (FIT)  Fecal DNA/Cologuard Test  Flexible Sigmoidoscopy Age: 45-75 years old   Colonoscopy: every 10 years (May be performed more frequently if at higher risk)  OR  FOBT/FIT: every 1 year  OR  Cologuard: every 3 years  OR  Sigmoidoscopy: every 5 years  Screening may be recommended earlier than age 45 if at higher risk for colorectal cancer. Also, an individualized decision between you and your healthcare provider will decide whether screening between the ages of 76-85 would be appropriate. Colonoscopy: 03/20/2024  FOBT/FIT: 03/19/2024  Cologuard: 08/30/2022  Sigmoidoscopy: Not on file    History Colorectal Cancer     Prostate Cancer Screening Individualized decision between patient and health care provider in men between ages of 55-69   Medicare will cover every 12 months beginning on the day after your 50th birthday PSA: 3.237 ng/mL     Screening Not Indicated     Hepatitis C Screening Once for adults born between 1945 and 1965  More frequently in patients at high risk for Hepatitis C Hep C Antibody: 05/30/2017    Screening Current   Diabetes Screening 1-2 times per year if you're at risk for diabetes or have pre-diabetes Fasting glucose: 110 mg/dL (8/5/2024)  A1C: 5.9 % (11/14/2024)  Screening Not Indicated  History  Diabetes   Cholesterol Screening Once every 5 years if you don't have a lipid disorder. May order more often based on risk factors. Lipid panel: 11/14/2024  Screening Not Indicated  History Lipid Disorder      Other Preventive Screenings Covered by Medicare:  Abdominal Aortic Aneurysm (AAA) Screening: covered once if your at risk. You're considered to be at risk if you have a family history of AAA or a male between the age of 65-75 who smoking at least 100 cigarettes in your lifetime.  Lung Cancer Screening: covers low dose CT scan once per year if you meet all of the following conditions: (1) Age 55-77; (2) No signs or symptoms of lung cancer; (3) Current smoker or have quit smoking within the last 15 years; (4) You have a tobacco smoking history of at least 20 pack years (packs per day x number of years you smoked); (5) You get a written order from a healthcare provider.  Glaucoma Screening: covered annually if you're considered high risk: (1) You have diabetes OR (2) Family history of glaucoma OR (3)  aged 50 and older OR (4)  American aged 65 and older  Osteoporosis Screening: covered every 2 years if you meet one of the following conditions: (1) Have a vertebral abnormality; (2) On glucocorticoid therapy for more than 3 months; (3) Have primary hyperparathyroidism; (4) On osteoporosis medications and need to assess response to drug therapy.  HIV Screening: covered annually if you're between the age of 15-65. Also covered annually if you are younger than 15 and older than 65 with risk factors for HIV infection. For pregnant patients, it is covered up to 3 times per pregnancy.    Immunizations:  Immunization Recommendations   Influenza Vaccine Annual influenza vaccination during flu season is recommended for all persons aged >= 6 months who do not have contraindications   Pneumococcal Vaccine   * Pneumococcal conjugate vaccine = PCV13 (Prevnar 13), PCV15 (Vaxneuvance), PCV20 (Prevnar  20)  * Pneumococcal polysaccharide vaccine = PPSV23 (Pneumovax) Adults 19-63 yo with certain risk factors or if 65+ yo  If never received any pneumonia vaccine: recommend Prevnar 20 (PCV20)  Give PCV20 if previously received 1 dose of PCV13 or PPSV23   Hepatitis B Vaccine 3 dose series if at intermediate or high risk (ex: diabetes, end stage renal disease, liver disease)   Respiratory syncytial virus (RSV) Vaccine - COVERED BY MEDICARE PART D  * RSVPreF3 (Arexvy) CDC recommends that adults 60 years of age and older may receive a single dose of RSV vaccine using shared clinical decision-making (SCDM)   Tetanus (Td) Vaccine - COST NOT COVERED BY MEDICARE PART B Following completion of primary series, a booster dose should be given every 10 years to maintain immunity against tetanus. Td may also be given as tetanus wound prophylaxis.   Tdap Vaccine - COST NOT COVERED BY MEDICARE PART B Recommended at least once for all adults. For pregnant patients, recommended with each pregnancy.   Shingles Vaccine (Shingrix) - COST NOT COVERED BY MEDICARE PART B  2 shot series recommended in those 19 years and older who have or will have weakened immune systems or those 50 years and older     Health Maintenance Due:      Topic Date Due   • Hepatitis C Screening  Completed   • Colorectal Cancer Screening  Discontinued     Immunizations Due:  There are no preventive care reminders to display for this patient.  Advance Directives   What are advance directives?  Advance directives are legal documents that state your wishes and plans for medical care. These plans are made ahead of time in case you lose your ability to make decisions for yourself. Advance directives can apply to any medical decision, such as the treatments you want, and if you want to donate organs.   What are the types of advance directives?  There are many types of advance directives, and each state has rules about how to use them. You may choose a combination of any of  the following:  Living will:  This is a written record of the treatment you want. You can also choose which treatments you do not want, which to limit, and which to stop at a certain time. This includes surgery, medicine, IV fluid, and tube feedings.   Durable power of  for healthcare (DPAHC):  This is a written record that states who you want to make healthcare choices for you when you are unable to make them for yourself. This person, called a proxy, is usually a family member or a friend. You may choose more than 1 proxy.  Do not resuscitate (DNR) order:  A DNR order is used in case your heart stops beating or you stop breathing. It is a request not to have certain forms of treatment, such as CPR. A DNR order may be included in other types of advance directives.  Medical directive:  This covers the care that you want if you are in a coma, near death, or unable to make decisions for yourself. You can list the treatments you want for each condition. Treatment may include pain medicine, surgery, blood transfusions, dialysis, IV or tube feedings, and a ventilator (breathing machine).  Values history:  This document has questions about your views, beliefs, and how you feel and think about life. This information can help others choose the care that you would choose.  Why are advance directives important?  An advance directive helps you control your care. Although spoken wishes may be used, it is better to have your wishes written down. Spoken wishes can be misunderstood, or not followed. Treatments may be given even if you do not want them. An advance directive may make it easier for your family to make difficult choices about your care.       © Copyright [employer] 2018 Information is for End User's use only and may not be sold, redistributed or otherwise used for commercial purposes. All illustrations and images included in CareNotes® are the copyrighted property of [employer] or [employer]

## 2024-11-21 NOTE — PROGRESS NOTES
Name: Frantz Vasquez      : 1945      MRN: [id number]  Encounter Provider: Melissa Corey MD  Encounter Date: 2024   Encounter department: [facility]    Assessment & Plan  Type 2 diabetes mellitus without complication, without long-term current use of insulin (HCC)    Lab Results   Component Value Date    HGBA1C 5.9 (H) 2024       Orders:    Albumin / creatinine urine ratio; Future    Albumin / creatinine urine ratio; Future    Hemoglobin A1C; Future    Lipid panel; Future    Basic metabolic panel; Future    Adenocarcinoma, colon (HCC)  Status post resection of adenocarcinoma followed up by the colorectal surgery no new problems       Benign essential hypertension  Hypertension very well-controlled continue with the same medication       History of malignant melanoma  Followed up by oncology surgery for malignant melanoma surveillance and follow-up       Chronic right shoulder pain  Right shoulder pain and limitation of the movement basically abduction is abnormal and painful we will ask the physical therapy to review and help him and if needed then we will ask one of the specialist to see and get an MRI    Orders:    Ambulatory Referral to Physical Therapy; Future       Preventive health issues were discussed with patient, and age appropriate screening tests were ordered as noted in patient's After Visit Summary. Personalized health advice and appropriate referrals for health education or preventive services given if needed, as noted in patient's After Visit Summary.    History of Present Illness     This is a very pleasant 78 years young gentleman for regular follow-up  Also Medicare wellness examination and questionnaire was essentially unremarkable up to date    Patient is here today for the follow-up.   Hypertension. I reviewed antihypertensive medication, patient does not have any side effects of  medications, no signs or symptoms of hypertension  ,hypotension or orthostatic hypotension.  Patient is compliant with medications.  Blood workup related to hypertensive diagnosis reviewed.  Plan is to continue with the present management.  We will follow-up as a scheduled and adjust the doses of the medication as indicated.    Table coronary artery disease no chest pain or shortness of breath and patient doing some exercises regularly    Type 2 diabetes mellitus very well-controlled.  Patient is here for the follow-up of diabetes type 2.  Hemoglobin A1c is very well controlled it is less than 7 no symptoms of type 2 diabetes mellitus.  Lipid panel, urine for microalbumin, foot examination all up-to-date.  Patient will continue with medications.  We will follow him up as a scheduled.  Discussed in detail with the patient regarding the type 2 diabetes mellitus and importance of blood pressure control, use of ACE/ARB's for the prevention of renal complications.    Hypercholesterolemia reviewed the lipid panel patient is on statin therapy.  Side effects of statin medications.  Patient is taking his medications  regularly.  Comorbidities include hypertension, diabetes, coronary artery disease.  plan is to continue with the present management continue to follow-up with the lipid panel    History of melanoma followed up by the oncological surgery    History of colon cancer s/p resection doing very well postsurgically CEA levels are followed up by the colorectal surgery    Shoulder pain with a problem with lifting the arm above 90 degrees is painful also decreased strength of the shoulder will get the physical therapy and further if symptoms does not improve       Patient Care Team:  Melissa Corey MD as PCP - General  MD Issac Younger MD (Surgical Oncology)  KONSTANTIN Farley as Nurse Practitioner (Surgical Oncology)    Review of Systems   Constitutional:  Negative for appetite change, fatigue and fever.   HENT:  Negative for congestion, ear pain,  hearing loss, nosebleeds, sneezing, tinnitus and voice change.    Eyes:  Negative for pain, discharge and redness.   Respiratory:  Negative for cough, chest tightness and wheezing.    Cardiovascular:  Negative for chest pain, palpitations and leg swelling.   Gastrointestinal:  Negative for abdominal pain, blood in stool, constipation, diarrhea, nausea and vomiting.   Genitourinary:  Negative for difficulty urinating, dysuria, hematuria and urgency.   Musculoskeletal:  Negative for arthralgias, back pain, gait problem and joint swelling.   Skin:  Negative for rash and wound.   Allergic/Immunologic: Negative for environmental allergies.   Neurological:  Negative for dizziness, tremors, seizures, weakness, light-headedness and numbness.   Hematological:  Negative for adenopathy. Does not bruise/bleed easily.   Psychiatric/Behavioral:  Negative for behavioral problems and confusion. The patient is not nervous/anxious.      Medical History Reviewed by provider this encounter:       Annual Wellness Visit Questionnaire   Frantz is here for his Subsequent Wellness visit. Last Medicare Wellness visit information reviewed, patient interviewed and updates made to the record today.      Health Risk Assessment:   Patient rates overall health as excellent. Patient feels that their physical health rating is same. Patient is very satisfied with their life. Eyesight was rated as same. Hearing was rated as same. Patient feels that their emotional and mental health rating is same. Patients states they are never, rarely angry. Patient states they are never, rarely unusually tired/fatigued. Pain experienced in the last 7 days has been none. Patient states that he has experienced no weight loss or gain in last 6 months.     Depression Screening:   PHQ-2 Score: 0      Fall Risk Screening:   In the past year, patient has experienced: no history of falling in past year      Home Safety:  Patient does not have trouble with stairs inside or  outside of their home. Patient has working smoke alarms and has working carbon monoxide detector. Home safety hazards include: none.     Nutrition:   Current diet is Regular.     Medications:   Patient is currently taking over-the-counter supplements. OTC medications include: see medication list. Patient is able to manage medications.     Activities of Daily Living (ADLs)/Instrumental Activities of Daily Living (IADLs):   Walk and transfer into and out of bed and chair?: Yes  Dress and groom yourself?: Yes    Bathe or shower yourself?: Yes    Feed yourself? Yes  Do your laundry/housekeeping?: Yes  Manage your money, pay your bills and track your expenses?: Yes  Make your own meals?: Yes    Do your own shopping?: Yes    Previous Hospitalizations:   Any hospitalizations or ED visits within the last 12 months?: Yes    How many hospitalizations have you had in the last year?: 1-2    Advance Care Planning:   Living will: No    Advanced directive: No    Advanced directive counseling given: Yes      PREVENTIVE SCREENINGS      Cardiovascular Screening:    General: Screening Not Indicated and History Lipid Disorder      Diabetes Screening:     General: Screening Not Indicated and History Diabetes      Colorectal Cancer Screening:     General: History Colorectal Cancer      Prostate Cancer Screening:    General: Screening Not Indicated      Lung Cancer Screening:     General: Screening Not Indicated      Hepatitis C Screening:    General: Screening Current    Screening, Brief Intervention, and Referral to Treatment (SBIRT)    Screening  Typical number of drinks in a day: 0  Typical number of drinks in a week: 3  Interpretation: Low risk drinking behavior.    Single Item Drug Screening:  How often have you used an illegal drug (including marijuana) or a prescription medication for non-medical reasons in the past year? never    Single Item Drug Screen Score: 0  Interpretation: Negative screen for possible drug use  "disorder    Other Counseling Topics:   Car/seat belt/driving safety and calcium and vitamin D intake and regular weightbearing exercise.     Social Drivers of Health     Financial Resource Strain: Low Risk  (9/18/2024)    Received from [facility]    Overall Financial Resource Strain (CARDIA)     Difficulty of Paying Living Expenses: Not very hard   Food Insecurity: No Food Insecurity (9/18/2024)    Received from [facility]    Hunger Vital Sign     Worried About Running Out of Food in the Last Year: Never true     Ran Out of Food in the Last Year: Never true   Transportation Needs: No Transportation Needs (9/18/2024)    Received from [facility]    PRAPARE - Transportation     Lack of Transportation (Medical): No     Lack of Transportation (Non-Medical): No   Housing Stability: Low Risk  (9/18/2024)    Received from [facility]    Housing Stability Vital Sign     Unable to Pay for Housing in the Last Year: No     Number of Times Moved in the Last Year: 0     Homeless in the Last Year: No   Utilities: Not At Risk (9/18/2024)    Received from [facility]     Threatened with loss of utilities: No     No results found.    Objective   Ht 5' 10\" (1.778 m)   Wt 70.3 kg (155 lb)   BMI 22.24 kg/m²     Physical Exam  Vitals and nursing note reviewed.   Constitutional:       Appearance: Normal appearance. He is normal weight.   HENT:      Head: Normocephalic and atraumatic.      Right Ear: Tympanic membrane normal.      Left Ear: Tympanic membrane normal.      Nose: Nose normal.      Mouth/Throat:      Mouth: Mucous membranes are moist.   Eyes:      Extraocular Movements: Extraocular movements intact.      Pupils: Pupils are equal, round, and reactive to light.   Cardiovascular:      Rate and Rhythm: Normal rate and regular rhythm.      Pulses: Normal pulses.      Heart sounds: Normal heart sounds.   Pulmonary:      Effort: " Pulmonary effort is normal.      Breath sounds: Normal breath sounds.   Abdominal:      General: Abdomen is flat. Bowel sounds are normal.      Palpations: Abdomen is soft.   Musculoskeletal:         General: No swelling or deformity.      Right shoulder: Tenderness present. Decreased range of motion. Decreased strength.      Cervical back: Normal range of motion and neck supple.   Skin:     General: Skin is warm.      Capillary Refill: Capillary refill takes 2 to 3 seconds.   Neurological:      General: No focal deficit present.      Mental Status: He is alert and oriented to person, place, and time. Mental status is at baseline.   Psychiatric:         Mood and Affect: Mood normal.         Behavior: Behavior normal.

## 2024-11-21 NOTE — ASSESSMENT & PLAN NOTE
Lab Results   Component Value Date    HGBA1C 5.9 (H) 11/14/2024       Orders:    Albumin / creatinine urine ratio; Future    Albumin / creatinine urine ratio; Future    Hemoglobin A1C; Future    Lipid panel; Future    Basic metabolic panel; Future

## 2024-12-06 ENCOUNTER — HOSPITAL ENCOUNTER (OUTPATIENT)
Dept: ULTRASOUND IMAGING | Facility: HOSPITAL | Age: 79
Discharge: HOME | End: 2024-12-06
Payer: MEDICARE

## 2024-12-06 DIAGNOSIS — Z85.820 HISTORY OF MALIGNANT MELANOMA: ICD-10-CM

## 2024-12-06 DIAGNOSIS — M79.89 OTHER SPECIFIED SOFT TISSUE DISORDERS: ICD-10-CM

## 2024-12-06 PROCEDURE — 76882 US LMTD JT/FCL EVL NVASC XTR: CPT

## 2024-12-13 ENCOUNTER — OFFICE VISIT (OUTPATIENT)
Dept: SURGICAL ONCOLOGY | Facility: CLINIC | Age: 79
End: 2024-12-13
Payer: MEDICARE

## 2024-12-13 VITALS
TEMPERATURE: 97.3 F | SYSTOLIC BLOOD PRESSURE: 136 MMHG | OXYGEN SATURATION: 98 % | DIASTOLIC BLOOD PRESSURE: 80 MMHG | HEART RATE: 67 BPM | BODY MASS INDEX: 22.62 KG/M2 | WEIGHT: 158 LBS | HEIGHT: 70 IN

## 2024-12-13 DIAGNOSIS — M79.89 OTHER SPECIFIED SOFT TISSUE DISORDERS: ICD-10-CM

## 2024-12-13 DIAGNOSIS — Z08 ENCNTR FOR FOLLOW-UP EXAM AFTER TRTMT FOR MALIGNANT NEOPLASM: Primary | ICD-10-CM

## 2024-12-13 DIAGNOSIS — Z85.820 HISTORY OF MALIGNANT MELANOMA: ICD-10-CM

## 2024-12-13 PROCEDURE — G2211 COMPLEX E/M VISIT ADD ON: HCPCS

## 2024-12-13 PROCEDURE — 99213 OFFICE O/P EST LOW 20 MIN: CPT

## 2024-12-13 NOTE — LETTER
2024     Issac Triana MD  [address] [facility]  2nd Floor  [facility] [address]    Patient: Frantz Vasquez   YOB: 1945   Date of Visit: 2024       Dear Dr. Triana:    Thank you for referring Frantz Vasquez to me for evaluation. Below are my notes for this consultation.    If you have questions, please do not hesitate to call me. I look forward to following your patient along with you.         Sincerely,        KONSTANTIN Farley        CC: No Recipients    KONSTANTIN Farley  2024  9:35 AM  Sign when Signing Visit  Name: Frantz Vasquez      : 1945      MRN: [id number]  Encounter Provider: KONSTANTIN Farley  Encounter Date: 2024   Encounter department: [facility] [place]  :  Assessment & Plan  Encntr for follow-up exam after trtmt for malignant neoplasm         History of malignant melanoma  There is no evidence of disease recurrence at this time.  US shows persistent axillary seroma, as well a stable 4mm nodule.  We will plan to see him again in 6 months with repeat US.  Orders:  •  US extremity soft tissue; Future    Other specified soft tissue disorders    Orders:  •  US extremity soft tissue; Future        History of Present Illness{?Quick Links Encounters * My Last Note * Last Note in Specialty * Snapshot * Since Last Visit * History :34119}  Chief Complaint   Patient presents with   • Follow-up     Return in about 6 months (around 2025) for Office Visit, Imaging - See orders.    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  He is currently KEDAR at 1 year and doing well.  He denies any new lumps or lesions, and continues to see his dermatologist regularly.  He denies any new headaches, dizziness, abdominal pain or weight loss.  Serial imaging of the axilla was performed on September 3 and , which  demonstrated overall stability of the soft tissue nodule, possibly a lymph node, now measuring 4mm.  Post-operative seroma is still present as well.      {There is no content from the last Pertinent Medical History section.}  Oncology History  Oncology History   History of malignant melanoma   10/27/2023 Biopsy    punch biopsy:  A. Skin, right shoulder, punch excision:     MELANOMA (thickness: 1.8 mm); not seen at examined inked specimen margins (see note).     Note: Focal follicular involvement by the melanoma is seen; otherwise, the absence of significant epidermal involvement by the tumor raises the possibility of recurrence/metastasis. Clinical pathological correlation is advised. The lesional cells are positive for SOX10, MART-1, HMB45, and PRAME immunostains. Please see synoptic report for additional details.         Synoptic report for melanoma of the skin  Thickness: 1.8 mm  Ulceration: not seen  Anatomic (Herbie) level: IV  Type: primary dermal melanoma vs. metastasis/recurrence  Mitoses: 2/mm2  Microsatellites: not seen  Lymphovascular invasion: not seen  Neurotropism: not seen  Tumor regression: not seen  Tumor-infiltrating lymphocytes (TIL): absent  Margin assessment: not seen at examined inked specimen margins  Pathologic stage: pT2a  Associated nevus: not seen     11/17/2023 -  Cancer Staged    Staging form: Melanoma of the Skin, AJCC 8th Edition  - Clinical: Stage IB (cT2a, cN0, cM0) - Signed by Issac Triana MD on 11/17/2023 11/24/2023 Surgery    Wide excision right shoulder melanoma with sentinel lymph node biopsy:    A. Lymph node, right axillary sentinel lymph node #1:  One lymph node; metastatic melanoma not seen (0/1)      B. Skin, right shoulder, excision:  -Prior procedure site changes present.  -Residual melanoma not seen.    C. Lymph node, right axillary sentinel lymph node #2:  Two lymph nodes; metastatic melanoma not seen (0/2)     11/24/2023 -  Cancer Staged    Staging form: Melanoma  of the Skin, AJCC 8th Edition  - Pathologic stage from 11/24/2023: Stage IB (pT2a, pN0, cM0) - Signed by KONSTANTIN Farley on 6/10/2024  Stage prefix: Initial diagnosis       Adenocarcinoma, colon (HCC)   4/4/2024 Initial Diagnosis    Adenocarcinoma, colon (HCC)     11/3/2024 -  Cancer Staged    Staging form: Colon and Rectum, AJCC 8th Edition  - Pathologic: Stage I (pT2, pN0, cM0) - Signed by Anders Torres MD on 11/3/2024  Total positive nodes: 0  Total nodes examined: 22            Review of Systems   Constitutional:  Negative for activity change, appetite change, fatigue and unexpected weight change.   HENT: Negative.     Respiratory: Negative.     Cardiovascular: Negative.    Gastrointestinal: Negative.  Negative for abdominal pain.   Musculoskeletal: Negative.    Skin: Negative.  Negative for color change and wound.   Neurological: Negative.  Negative for dizziness and headaches.   Hematological: Negative.  Negative for adenopathy.   Psychiatric/Behavioral: Negative.                 Current Outpatient Medications   Medication Sig Dispense Refill   • amLODIPine (NORVASC) 5 mg tablet Take 1 tablet (5 mg total) by mouth daily 90 tablet 1   • ASPIRIN 81 PO Take by mouth     • atorvastatin (LIPITOR) 80 mg tablet Take 1 tablet (80 mg total) by mouth daily with dinner 90 tablet 1   • Cholecalciferol (Vitamin D) 125 MCG (5000 UT) CAPS Take by mouth daily     • Glucosamine-Chondroitin (GLUCOSAMINE CHONDR COMPLEX PO) Take 1 capsule by mouth 2 (two) times a day     • hydroCHLOROthiazide 12.5 mg tablet Take 1 tablet (12.5 mg total) by mouth daily 90 tablet 0   • metFORMIN (GLUCOPHAGE-XR) 750 mg 24 hr tablet Take 1 tablet (750 mg total) by mouth 2 (two) times a day 180 tablet 1   • metoprolol succinate (TOPROL-XL) 50 mg 24 hr tablet Take 1 tablet (50 mg total) by mouth daily 90 tablet 0   • Omega-3 Fatty Acids (FISH OIL) 1200 MG CAPS Take 1 capsule by mouth daily       No current facility-administered medications  "for this visit.      Objective{?Quick Links Trend Vitals * Enter New Vitals * Results Review * Timeline (Adult) * Labs * Imaging * Cardiology * Procedures * Lung Cancer Screening * Surgical eConsent :79299}  /80   Pulse 67   Temp (!) 97.3 °F (36.3 °C)   Ht 5' 10\" (1.778 m)   Wt 71.7 kg (158 lb)   SpO2 98%   BMI 22.67 kg/m²     ECOG ECOG Performance Status: 0 - Fully active, able to carry on all pre-disease performance without restriction  Physical Exam  Vitals reviewed.   Constitutional:       General: He is not in acute distress.     Appearance: Normal appearance. He is normal weight. He is not ill-appearing or toxic-appearing.   HENT:      Head: Normocephalic and atraumatic.   Eyes:      General: No scleral icterus.  Cardiovascular:      Rate and Rhythm: Normal rate.   Pulmonary:      Effort: Pulmonary effort is normal.   Musculoskeletal:         General: No swelling or tenderness. Normal range of motion.      Cervical back: Normal range of motion and neck supple. No tenderness.   Lymphadenopathy:      Cervical: No cervical adenopathy.      Upper Body:      Right upper body: No supraclavicular or axillary adenopathy.      Left upper body: No supraclavicular adenopathy.      Comments: Palpable mobile collection in right axilla consistent with seroma   Skin:     General: Skin is warm and dry.      Coloration: Skin is not jaundiced.      Findings: No erythema.      Comments: Right shoulder scar is well-healed, without any evidence of nodularity or pigmentation.  No in-transit lesions present on exam.     Neurological:      General: No focal deficit present.      Mental Status: He is alert and oriented to person, place, and time.   Psychiatric:         Mood and Affect: Mood normal.         Behavior: Behavior normal.         Thought Content: Thought content normal.         Judgment: Judgment normal.            Radiology Results Review : I have reviewed images/report studies in PACS as described above (in " the HPI).  Other Study Results Review : No additional pertinent studies reviewed.

## 2024-12-13 NOTE — ASSESSMENT & PLAN NOTE
There is no evidence of disease recurrence at this time.  US shows persistent axillary seroma, as well a stable 4mm nodule.  We will plan to see him again in 6 months with repeat US.  Orders:  •  US extremity soft tissue; Future

## 2024-12-13 NOTE — PROGRESS NOTES
Name: Frantz Vasquez      : 1945      MRN: [id number]  Encounter Provider: KONSTANTIN Farley  Encounter Date: 2024   Encounter department: [facility] [place]  :  Assessment & Plan  Encntr for follow-up exam after trtmt for malignant neoplasm         History of malignant melanoma  There is no evidence of disease recurrence at this time.  US shows persistent axillary seroma, as well a stable 4mm nodule.  We will plan to see him again in 6 months with repeat US.  Orders:  •  US extremity soft tissue; Future    Other specified soft tissue disorders    Orders:  •  US extremity soft tissue; Future        History of Present Illness   Chief Complaint   Patient presents with   • Follow-up     Return in about 6 months (around 2025) for Office Visit, Imaging - See orders.    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  He is currently KEDAR at 1 year and doing well.  He denies any new lumps or lesions, and continues to see his dermatologist regularly.  He denies any new headaches, dizziness, abdominal pain or weight loss.  Serial imaging of the axilla was performed on September 3 and , which demonstrated overall stability of the soft tissue nodule, possibly a lymph node, now measuring 4mm.  Post-operative seroma is still present as well.      Oncology History   Oncology History   History of malignant melanoma   10/27/2023 Biopsy    punch biopsy:  A. Skin, right shoulder, punch excision:     MELANOMA (thickness: 1.8 mm); not seen at examined inked specimen margins (see note).     Note: Focal follicular involvement by the melanoma is seen; otherwise, the absence of significant epidermal involvement by the tumor raises the possibility of recurrence/metastasis. Clinical pathological correlation is advised. The lesional cells are positive for SOX10, MART-1,  HMB45, and PRAME immunostains. Please see synoptic report for additional details.         Synoptic report for melanoma of the skin  Thickness: 1.8 mm  Ulceration: not seen  Anatomic (Herbie) level: IV  Type: primary dermal melanoma vs. metastasis/recurrence  Mitoses: 2/mm2  Microsatellites: not seen  Lymphovascular invasion: not seen  Neurotropism: not seen  Tumor regression: not seen  Tumor-infiltrating lymphocytes (TIL): absent  Margin assessment: not seen at examined inked specimen margins  Pathologic stage: pT2a  Associated nevus: not seen     11/17/2023 -  Cancer Staged    Staging form: Melanoma of the Skin, AJCC 8th Edition  - Clinical: Stage IB (cT2a, cN0, cM0) - Signed by Issac Triana MD on 11/17/2023 11/24/2023 Surgery    Wide excision right shoulder melanoma with sentinel lymph node biopsy:    A. Lymph node, right axillary sentinel lymph node #1:  One lymph node; metastatic melanoma not seen (0/1)      B. Skin, right shoulder, excision:  -Prior procedure site changes present.  -Residual melanoma not seen.    C. Lymph node, right axillary sentinel lymph node #2:  Two lymph nodes; metastatic melanoma not seen (0/2)     11/24/2023 -  Cancer Staged    Staging form: Melanoma of the Skin, AJCC 8th Edition  - Pathologic stage from 11/24/2023: Stage IB (pT2a, pN0, cM0) - Signed by KONSTANTIN Farley on 6/10/2024  Stage prefix: Initial diagnosis       Adenocarcinoma, colon (HCC)   4/4/2024 Initial Diagnosis    Adenocarcinoma, colon (HCC)     11/3/2024 -  Cancer Staged    Staging form: Colon and Rectum, AJCC 8th Edition  - Pathologic: Stage I (pT2, pN0, cM0) - Signed by Anders Torres MD on 11/3/2024  Total positive nodes: 0  Total nodes examined: 22            Review of Systems   Constitutional:  Negative for activity change, appetite change, fatigue and unexpected weight change.   HENT: Negative.     Respiratory: Negative.     Cardiovascular: Negative.    Gastrointestinal: Negative.  Negative for  "abdominal pain.   Musculoskeletal: Negative.    Skin: Negative.  Negative for color change and wound.   Neurological: Negative.  Negative for dizziness and headaches.   Hematological: Negative.  Negative for adenopathy.   Psychiatric/Behavioral: Negative.                 Current Outpatient Medications   Medication Sig Dispense Refill   • amLODIPine (NORVASC) 5 mg tablet Take 1 tablet (5 mg total) by mouth daily 90 tablet 1   • ASPIRIN 81 PO Take by mouth     • atorvastatin (LIPITOR) 80 mg tablet Take 1 tablet (80 mg total) by mouth daily with dinner 90 tablet 1   • Cholecalciferol (Vitamin D) 125 MCG (5000 UT) CAPS Take by mouth daily     • Glucosamine-Chondroitin (GLUCOSAMINE CHONDR COMPLEX PO) Take 1 capsule by mouth 2 (two) times a day     • hydroCHLOROthiazide 12.5 mg tablet Take 1 tablet (12.5 mg total) by mouth daily 90 tablet 0   • metFORMIN (GLUCOPHAGE-XR) 750 mg 24 hr tablet Take 1 tablet (750 mg total) by mouth 2 (two) times a day 180 tablet 1   • metoprolol succinate (TOPROL-XL) 50 mg 24 hr tablet Take 1 tablet (50 mg total) by mouth daily 90 tablet 0   • Omega-3 Fatty Acids (FISH OIL) 1200 MG CAPS Take 1 capsule by mouth daily       No current facility-administered medications for this visit.      Objective   /80   Pulse 67   Temp (!) 97.3 °F (36.3 °C)   Ht 5' 10\" (1.778 m)   Wt 71.7 kg (158 lb)   SpO2 98%   BMI 22.67 kg/m²     ECOG ECOG Performance Status: 0 - Fully active, able to carry on all pre-disease performance without restriction  Physical Exam  Vitals reviewed.   Constitutional:       General: He is not in acute distress.     Appearance: Normal appearance. He is normal weight. He is not ill-appearing or toxic-appearing.   HENT:      Head: Normocephalic and atraumatic.   Eyes:      General: No scleral icterus.  Cardiovascular:      Rate and Rhythm: Normal rate.   Pulmonary:      Effort: Pulmonary effort is normal.   Musculoskeletal:         General: No swelling or tenderness. Normal " range of motion.      Cervical back: Normal range of motion and neck supple. No tenderness.   Lymphadenopathy:      Cervical: No cervical adenopathy.      Upper Body:      Right upper body: No supraclavicular or axillary adenopathy.      Left upper body: No supraclavicular adenopathy.      Comments: Palpable mobile collection in right axilla consistent with seroma   Skin:     General: Skin is warm and dry.      Coloration: Skin is not jaundiced.      Findings: No erythema.      Comments: Right shoulder scar is well-healed, without any evidence of nodularity or pigmentation.  No in-transit lesions present on exam.     Neurological:      General: No focal deficit present.      Mental Status: He is alert and oriented to person, place, and time.   Psychiatric:         Mood and Affect: Mood normal.         Behavior: Behavior normal.         Thought Content: Thought content normal.         Judgment: Judgment normal.            Radiology Results Review : I have reviewed images/report studies in PACS as described above (in the HPI).  Other Study Results Review : No additional pertinent studies reviewed.

## 2024-12-13 NOTE — LETTER
2024     Issac Triana MD  [address] [facility]  2nd Floor  [facility] [address]    Patient: Frantz Vasquez   YOB: 1945   Date of Visit: 2024       Dear Dr. Triana:    Thank you for referring Frantz Vasquez to me for evaluation. Below are my notes for this consultation.    If you have questions, please do not hesitate to call me. I look forward to following your patient along with you.         Sincerely,        KONSTANTIN Farley        CC: No Recipients    KONSTANTIN Farley  2024  9:36 AM  Sign when Signing Visit  Name: Frantz Vasquez      : 1945      MRN: [id number]  Encounter Provider: KONSTANTIN Farley  Encounter Date: 2024   Encounter department: [facility] [place]  :  Assessment & Plan  Encntr for follow-up exam after trtmt for malignant neoplasm         History of malignant melanoma  There is no evidence of disease recurrence at this time.  US shows persistent axillary seroma, as well a stable 4mm nodule.  We will plan to see him again in 6 months with repeat US.  Orders:  •  US extremity soft tissue; Future    Other specified soft tissue disorders    Orders:  •  US extremity soft tissue; Future        History of Present Illness  Chief Complaint   Patient presents with   • Follow-up     Return in about 6 months (around 2025) for Office Visit, Imaging - See orders.    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  He is currently KEDAR at 1 year and doing well.  He denies any new lumps or lesions, and continues to see his dermatologist regularly.  He denies any new headaches, dizziness, abdominal pain or weight loss.  Serial imaging of the axilla was performed on September 3 and , which demonstrated overall stability of the soft tissue nodule, possibly a lymph node, now measuring 4mm.  Post-operative  seroma is still present as well.      Oncology History  Oncology History   History of malignant melanoma   10/27/2023 Biopsy    punch biopsy:  A. Skin, right shoulder, punch excision:     MELANOMA (thickness: 1.8 mm); not seen at examined inked specimen margins (see note).     Note: Focal follicular involvement by the melanoma is seen; otherwise, the absence of significant epidermal involvement by the tumor raises the possibility of recurrence/metastasis. Clinical pathological correlation is advised. The lesional cells are positive for SOX10, MART-1, HMB45, and PRAME immunostains. Please see synoptic report for additional details.         Synoptic report for melanoma of the skin  Thickness: 1.8 mm  Ulceration: not seen  Anatomic (Herbie) level: IV  Type: primary dermal melanoma vs. metastasis/recurrence  Mitoses: 2/mm2  Microsatellites: not seen  Lymphovascular invasion: not seen  Neurotropism: not seen  Tumor regression: not seen  Tumor-infiltrating lymphocytes (TIL): absent  Margin assessment: not seen at examined inked specimen margins  Pathologic stage: pT2a  Associated nevus: not seen     11/17/2023 -  Cancer Staged    Staging form: Melanoma of the Skin, AJCC 8th Edition  - Clinical: Stage IB (cT2a, cN0, cM0) - Signed by Issac Triana MD on 11/17/2023 11/24/2023 Surgery    Wide excision right shoulder melanoma with sentinel lymph node biopsy:    A. Lymph node, right axillary sentinel lymph node #1:  One lymph node; metastatic melanoma not seen (0/1)      B. Skin, right shoulder, excision:  -Prior procedure site changes present.  -Residual melanoma not seen.    C. Lymph node, right axillary sentinel lymph node #2:  Two lymph nodes; metastatic melanoma not seen (0/2)     11/24/2023 -  Cancer Staged    Staging form: Melanoma of the Skin, AJCC 8th Edition  - Pathologic stage from 11/24/2023: Stage IB (pT2a, pN0, cM0) - Signed by KONSTANTIN Farley on 6/10/2024  Stage prefix: Initial diagnosis      "  Adenocarcinoma, colon (HCC)   4/4/2024 Initial Diagnosis    Adenocarcinoma, colon (HCC)     11/3/2024 -  Cancer Staged    Staging form: Colon and Rectum, AJCC 8th Edition  - Pathologic: Stage I (pT2, pN0, cM0) - Signed by Anders Torres MD on 11/3/2024  Total positive nodes: 0  Total nodes examined: 22            Review of Systems   Constitutional:  Negative for activity change, appetite change, fatigue and unexpected weight change.   HENT: Negative.     Respiratory: Negative.     Cardiovascular: Negative.    Gastrointestinal: Negative.  Negative for abdominal pain.   Musculoskeletal: Negative.    Skin: Negative.  Negative for color change and wound.   Neurological: Negative.  Negative for dizziness and headaches.   Hematological: Negative.  Negative for adenopathy.   Psychiatric/Behavioral: Negative.                 Current Outpatient Medications   Medication Sig Dispense Refill   • amLODIPine (NORVASC) 5 mg tablet Take 1 tablet (5 mg total) by mouth daily 90 tablet 1   • ASPIRIN 81 PO Take by mouth     • atorvastatin (LIPITOR) 80 mg tablet Take 1 tablet (80 mg total) by mouth daily with dinner 90 tablet 1   • Cholecalciferol (Vitamin D) 125 MCG (5000 UT) CAPS Take by mouth daily     • Glucosamine-Chondroitin (GLUCOSAMINE CHONDR COMPLEX PO) Take 1 capsule by mouth 2 (two) times a day     • hydroCHLOROthiazide 12.5 mg tablet Take 1 tablet (12.5 mg total) by mouth daily 90 tablet 0   • metFORMIN (GLUCOPHAGE-XR) 750 mg 24 hr tablet Take 1 tablet (750 mg total) by mouth 2 (two) times a day 180 tablet 1   • metoprolol succinate (TOPROL-XL) 50 mg 24 hr tablet Take 1 tablet (50 mg total) by mouth daily 90 tablet 0   • Omega-3 Fatty Acids (FISH OIL) 1200 MG CAPS Take 1 capsule by mouth daily       No current facility-administered medications for this visit.      Objective  /80   Pulse 67   Temp (!) 97.3 °F (36.3 °C)   Ht 5' 10\" (1.778 m)   Wt 71.7 kg (158 lb)   SpO2 98%   BMI 22.67 kg/m²     ECOG ECOG " Performance Status: 0 - Fully active, able to carry on all pre-disease performance without restriction  Physical Exam  Vitals reviewed.   Constitutional:       General: He is not in acute distress.     Appearance: Normal appearance. He is normal weight. He is not ill-appearing or toxic-appearing.   HENT:      Head: Normocephalic and atraumatic.   Eyes:      General: No scleral icterus.  Cardiovascular:      Rate and Rhythm: Normal rate.   Pulmonary:      Effort: Pulmonary effort is normal.   Musculoskeletal:         General: No swelling or tenderness. Normal range of motion.      Cervical back: Normal range of motion and neck supple. No tenderness.   Lymphadenopathy:      Cervical: No cervical adenopathy.      Upper Body:      Right upper body: No supraclavicular or axillary adenopathy.      Left upper body: No supraclavicular adenopathy.      Comments: Palpable mobile collection in right axilla consistent with seroma   Skin:     General: Skin is warm and dry.      Coloration: Skin is not jaundiced.      Findings: No erythema.      Comments: Right shoulder scar is well-healed, without any evidence of nodularity or pigmentation.  No in-transit lesions present on exam.     Neurological:      General: No focal deficit present.      Mental Status: He is alert and oriented to person, place, and time.   Psychiatric:         Mood and Affect: Mood normal.         Behavior: Behavior normal.         Thought Content: Thought content normal.         Judgment: Judgment normal.            Radiology Results Review : I have reviewed images/report studies in PACS as described above (in the HPI).  Other Study Results Review : No additional pertinent studies reviewed.

## 2024-12-16 ENCOUNTER — EVALUATION (OUTPATIENT)
Dept: PHYSICAL THERAPY | Facility: CLINIC | Age: 79
End: 2024-12-16
Payer: MEDICARE

## 2024-12-16 DIAGNOSIS — M25.511 CHRONIC RIGHT SHOULDER PAIN: ICD-10-CM

## 2024-12-16 DIAGNOSIS — G89.29 CHRONIC RIGHT SHOULDER PAIN: ICD-10-CM

## 2024-12-16 PROCEDURE — 97161 PT EVAL LOW COMPLEX 20 MIN: CPT | Performed by: PHYSICAL THERAPIST

## 2024-12-16 PROCEDURE — 97110 THERAPEUTIC EXERCISES: CPT | Performed by: PHYSICAL THERAPIST

## 2024-12-16 NOTE — PROGRESS NOTES
PT Evaluation     Today's date: 2024  Patient name: Frantz Vasquez  : 1945  MRN: [id number]  Referring provider: Melissa Corey MD  Dx:   Encounter Diagnosis     ICD-10-CM    1. Chronic right shoulder pain  M25.511 Ambulatory Referral to Physical Therapy    G89.29                      Assessment  Impairments: abnormal or restricted ROM, activity intolerance, impaired physical strength, lacks appropriate home exercise program, pain with function, scapular dyskinesis and poor posture   Functional limitations: sleep disturbed, inability to reach over head or behind plane of body    Assessment details: 2024: Frantz Vasquez is a 79 y.o. male who presents with pain, decreased strength, decreased ROM, decreased joint mobility, and postural dysfunction. Due to these impairments, patient has difficulty performing ADL's, recreational activities, lifting/carrying, reaching and sleeping. Patient's clinical presentation is consistent with their referring diagnosis of Chronic right shoulder pain. His presentation is consistent w/ GH joint dysfunction following surgery almost a year ago and will benefit from progressive ROM. Strength testing mildly limited w/ negative empty can testing. Pain occurs at end range shoulder ROM and does not linger, cervical derangement not suspected.  Plan: Ambulatory Referral to Physical Therapy  Patient has been educated in home exercise program and plan of care. Patient would benefit from skilled physical therapy services to address their aforementioned functional limitations and progress towards prior level of function and independence with home exercise program.       Goals  Short Term Goals to be met in 4 weeks (2025)  1. Pt to be independent w/ prelimary HEP  2. PROM R shoulder to be within 5 degrees of L shoulder for flexion & abd; within 10 deg for ER/IR to prepare for full AROM.  3. Pt to report at least 50% improvement in donning a jacket.  4. Improve AROM flexion  and abduction to 140 and 125 deg or better respectively to improve subjective function by at least 25%.    Long Term Goals to be met in 12 weeks (3/10/2024)  1. AROM R shoulder to be within 5 degrees of L shoulder w/o pain to allow ease w/ ADL's and IADL's  2. Improve strength to 4+/5 or better to allow lifting, reaching and carrying w/o c/o.  3. Undisturbed sleep, including if pt rolls onto R side.  4. To report ease w/ functional ER/IR and OH reaching motions to achieve FOTO score of 68 or better.      Plan  Patient would benefit from: PT eval and skilled physical therapy  Planned modality interventions: cryotherapy, thermotherapy: hydrocollator packs and unattended electrical stimulation    Planned therapy interventions: manual therapy, neuromuscular re-education, therapeutic exercise, therapeutic activities, home exercise program, stretching, patient education and postural training    Frequency: 2x week (2-3x/week)  Duration in weeks: 12  Plan of Care beginning date: 2024  Plan of Care expiration date: 3/10/2025  Treatment plan discussed with: patient  Plan details:             Subjective Evaluation    History of Present Illness  Mechanism of injury: Subjective: Pt reports chronic R shoulder pain w/ putting on a jacket (horiz abd behind body), and noticed recently pain w/ pull starting his  and with having to put his R arm in 90/90 position ER/abd for imaging study. He states these motions are limited in mobility and painful at end range. He did have similar symptoms approximately 5 years ago which responded well to PT at that time.  He denies paresthesias. Pain is in superior/anterior R shoulder and lateral arm and described as sharp.  Pain  At best pain ratin  At worst pain ratin  Progression: no change    Social Support    Employment status: not working  Hand dominance: right  Life stress: retired but very active          Objective    Objective    Posture:   2024: mod forward  head and thoracic kyphosis posture correction    AROM: standing R  L     2024  Shoulder flex        140*  145  Shoulder abd       100*  175  ER @90 abd  60*  85  Functional IR  L5*  T9      PROM: supine  R  L     2024  Shoulder flex        150*  175  Shoulder abd       130*  180  ER @ 90 abd  65*  85  IR @ 90 abd  10*  50  Shld exten (stand) 65*  85    Cervical AROM loss:    2024  Flexion: nil     Extension:  Min/mod    R rotation:  nil    L rotation:  Min 10%    R Sidebend:  Min 30%    L Sidebend: Mod 50%    Retraction:  Min 25%       MMT:    R  L     2024  Shoulder flex  4+/5  5/5  Shoulder abd (C5) 4/5  4+/5  Internal Rotation 5/5  5/5  External Rotation 4+/5  5/5  Elbow flex (bicep C6) 4+/5  5/5  Elbow ext (tricep C7) 5/5  5/5    MECHANICAL ASSESSMENT:   2024: not performed - s/s most consistent w/ shld dysfunction     DERMATOMAL TESTIN2024: WNL C2-T1 B/L    Tenderness/Palpation:  2024: + TTP R LBP and posterior shld infraspinatus   Pain w/ end range movements w/ tight end feel    JOINT MOBILITY:  2024: GH joint mobility mod nascimento infer/ post glide R    FUNCTION:  2024: Pt is limited with  reaching overhead in flexion - limited and painful   Elevation in abd plane limited to 100 deg so pt cannot reach OH in this plane   Poor parish to functional ER/IR behind plane of body   Cannot lie on right shoulder; sleep is disturbed   Reports weakness w/ OH lifting       Precautions: hx of melanoma and some lymph nodes removed 2024  Past Medical History:   Diagnosis Date    Chronic cough     RESOLVED: [id number]    Coronary artery disease     Diabetes mellitus (HCC)     Diabetic retinopathy (HCC)     HLD (hyperlipidemia)     HTN (hypertension)     Hypertension     Kidney stone       Past Surgical History:   Procedure Laterality Date    CARDIAC CATHETERIZATION  2019    HAND SURGERY Left     HEMICOLOECTOMY W/ ANASTOMOSIS N/A  "03/25/2024    Procedure: RESECTION COLON RIGHT LAPAROSCOPIC W/ ROBOTICS;  Surgeon: Anders Torres MD;  [facility];  Service: Colorectal    LYMPH NODE BIOPSY Right 11/24/2023    Procedure: BIOPSY LYMPH NODE SENTINEL, LYMPHATIC MAPPING (Inject: 7:30 am in Nuc Med by radiologist);  Surgeon: Issac Triana MD;  [facility];  Service: Surgical Oncology    AR CORONARY ARTERY BYP W/VEIN & ARTERY GRAFT 3 VEIN N/A 12/13/2019    Procedure: CORONARY ARTERY BYPASS GRAFT (CABG) 4 VESSELS STEVEN to LAD ; SVG--> PDA , DIAGONAL, and OM;  Surgeon: Elvin Huertas DO;  [facility];  Service: Cardiac Surgery    AR ECHO TRANSESOPHAG R-T 2D W/PRB IMG ACQUISJ I&R N/A 12/13/2019    Procedure: TRANSESOPHAGEAL ECHOCARDIOGRAM (SHEBA);  Surgeon: Elvin Huertas DO;  [facility];  Service: Cardiac Surgery    AR NDSC SURG W/VIDEO-ASSISTED HARVEST VEIN CABG Left 12/13/2019    Procedure: HARVEST VEIN ENDOSCOPIC (EVH);  Surgeon: Elvin Huertas DO;  [facility];  Service: Cardiac Surgery    AR STRTCTC CPTR ASSTD PX EXTRADURAL CRANIAL N/A 01/31/2024    Procedure: RIGHT FUNCTIONAL ENDOSCOPIC SINUS SURGERY (FESS) IMAGED GUIDED, MAXILLARY, ETHMOIDS, FRONTALS;  Surgeon: Tez Bah MD;  [facility];  Service: ENT    PROSTATE CRYOABLATION  09/18/2024    AQUABLATION    SKIN LESION EXCISION Right 11/24/2023    Procedure: WIDE EXCISION MELANOMA RIGHT ARM;  Surgeon: Issac Triana MD;  [facility];  Service: Surgical Oncology   SOC: 12/16/2024  FOTO: 12/16/2024  POC EXP: 3/10/2025  DAILY TREATMENT LOG:  date 12/16/2024       Visit #/auth 1       Manual                        Neuro Re-Ed                B/L shld ER w/ scap set        B/L shld exten w/ scap set                                        Ther Ex        Supine B/L shld flex w/ cane 5\"x10       Supine R shld ER w/ cane @90 abd w/ towel roll @ elbow 10\"x5       Side lying R shld abd 1x10       IR w/ SOS R 10\"x5       Stand R shld " "exten w/ cane 5\"x10                               Ther Activity                        Gait Training                        Modalities                        HEP:  Access Code: [email or web address]  URL: [email or web address]  Date: 12/16/2024  Prepared by: Chrissy Ornelas    Exercises  - Supine Shoulder Flexion Extension AAROM with Dowel  - 1-2 x daily - 5 reps - 10 hold  - Supine Shoulder External Rotation with Dowel (Mirrored)  - 1-2 x daily - 5 reps - 10 hold  - Sidelying Shoulder Abduction Full Range of Motion  - 1-2 x daily - 10 reps - 3 sec hold  - Modified Posterior Capsule Stretch (Mirrored)  - 1-2 x daily - 5 reps - 10 hold  - Standing Shoulder Extension AAROM with Dowel  - 1-2 x daily - 10 reps - 3 hold         "

## 2024-12-18 ENCOUNTER — OFFICE VISIT (OUTPATIENT)
Dept: PHYSICAL THERAPY | Facility: CLINIC | Age: 79
End: 2024-12-18
Payer: MEDICARE

## 2024-12-18 DIAGNOSIS — M25.511 CHRONIC RIGHT SHOULDER PAIN: Primary | ICD-10-CM

## 2024-12-18 DIAGNOSIS — G89.29 CHRONIC RIGHT SHOULDER PAIN: Primary | ICD-10-CM

## 2024-12-18 PROCEDURE — 97110 THERAPEUTIC EXERCISES: CPT

## 2024-12-18 PROCEDURE — 97112 NEUROMUSCULAR REEDUCATION: CPT

## 2024-12-18 NOTE — PROGRESS NOTES
Daily Note     Today's date: 2024  Patient name: Frantz Vasquez  : 1945  MRN: [id number]  Referring provider: Melissa Corey MD  Dx:   Encounter Diagnosis     ICD-10-CM    1. Chronic right shoulder pain  M25.511     G89.29                      Subjective: pt reports that his home program went well and nothing bothered him as he went through it. No noted pain at rest on arrival to session. He did reach away from his body to reach his cane for his HEP and he had some pain with this, his pain cont to be intermittent at this time.       Objective: See treatment diary below      Assessment: Tolerated treatment well. Pt had stiffness at end range motions but not noted increased pain during session. Tolerated additional global R shoulder strengthening w/o complaints. Patient would benefit from continued PT to cont to progress HEP per tolerance and dec pain w/ functional behind body reaching.       Plan: Continue per plan of care.      Precautions: hx of melanoma and some lymph nodes removed 2024  Past Medical History:   Diagnosis Date    Chronic cough     RESOLVED: 2015    Coronary artery disease     Diabetes mellitus (HCC)     Diabetic retinopathy (HCC)     HLD (hyperlipidemia)     HTN (hypertension)     Hypertension     Kidney stone       Past Surgical History:   Procedure Laterality Date    CARDIAC CATHETERIZATION  2019    HAND SURGERY Left     HEMICOLOECTOMY W/ ANASTOMOSIS N/A 2024    Procedure: RESECTION COLON RIGHT LAPAROSCOPIC W/ ROBOTICS;  Surgeon: Anders Torres MD;  [facility];  Service: Colorectal    LYMPH NODE BIOPSY Right 2023    Procedure: BIOPSY LYMPH NODE SENTINEL, LYMPHATIC MAPPING (Inject: 7:30 am in Nuc Med by radiologist);  Surgeon: Issac Triana MD;  [facility];  Service: Surgical Oncology    [place] CORONARY ARTERY BYP W/VEIN & ARTERY GRAFT 3 VEIN N/A 2019    Procedure: CORONARY ARTERY BYPASS GRAFT (CABG) 4 VESSELS STEVEN to LAD ; SVG-->  "PDA , DIAGONAL, and OM;  Surgeon: Elvin Huertas DO;  [facility];  Service: Cardiac Surgery    [place] ECHO TRANSESOPHAG R-T 2D W/PRB IMG ACQUISJ I&R N/A 12/13/2019    Procedure: TRANSESOPHAGEAL ECHOCARDIOGRAM (SHEBA);  Surgeon: Elvin Huertas DO;  [facility];  Service: Cardiac Surgery    KY NDSC SURG W/VIDEO-ASSISTED HARVEST VEIN CABG Left 12/13/2019    Procedure: HARVEST VEIN ENDOSCOPIC (EVH);  Surgeon: Elvin Huertas DO;  [facility];  Service: Cardiac Surgery    KY STRTCTC CPTR ASSTD PX EXTRADURAL CRANIAL N/A 01/31/2024    Procedure: RIGHT FUNCTIONAL ENDOSCOPIC SINUS SURGERY (FESS) IMAGED GUIDED, MAXILLARY, ETHMOIDS, FRONTALS;  Surgeon: Tez Bah MD;  [facility];  Service: ENT    PROSTATE CRYOABLATION  09/18/2024    AQUABLATION    SKIN LESION EXCISION Right 11/24/2023    Procedure: WIDE EXCISION MELANOMA RIGHT ARM;  Surgeon: Issac Triana MD;  [facility];  Service: Surgical Oncology   SOC: 12/16/2024  FOTO: 12/16/2024  POC EXP: 3/10/2025  DAILY TREATMENT LOG:  date 12/16/2024 12/18/2024      Visit #/auth 1 2       Manual                        Neuro Re-Ed  18'              B/L shld ER w/ scap set  RTB 2x10       B/L shld exten w/ scap set  Blue tubing 2x10       Supine RS in 90 flex   1x10 cw/ccw       Shoulder IR in neutral w/ TR   Red tubing 2x10       Wall clocks   YTB @ wrists 2x5 R/L               Ther Ex  20'      Supine B/L shld flex w/ cane 5\"x10 5\"x10       Supine R shld ER w/ cane @90 abd w/ towel roll @ elbow 10\"x5 10\"x5       Supine AROM flex   1x10      SL R  shoulder flex   1x10       Side lying R shld abd 1x10 2x10       IR w/ SOS R 10\"x5 10\"x5      Stand R shld exten w/ cane 5\"x10 10\"x5                               Ther Activity                        Gait Training                        Modalities                        HEP:  Access Code: [email or web address]  URL: [email or web address]  Date: 12/16/2024  Prepared by: Chrissy" Johnson    Exercises  - Supine Shoulder Flexion Extension AAROM with Dowel  - 1-2 x daily - 5 reps - 10 hold  - Supine Shoulder External Rotation with Dowel (Mirrored)  - 1-2 x daily - 5 reps - 10 hold  - Sidelying Shoulder Abduction Full Range of Motion  - 1-2 x daily - 10 reps - 3 sec hold  - Modified Posterior Capsule Stretch (Mirrored)  - 1-2 x daily - 5 reps - 10 hold  - Standing Shoulder Extension AAROM with Dowel  - 1-2 x daily - 10 reps - 3 hold

## 2024-12-24 ENCOUNTER — OFFICE VISIT (OUTPATIENT)
Dept: PHYSICAL THERAPY | Facility: CLINIC | Age: 79
End: 2024-12-24
Payer: MEDICARE

## 2024-12-24 DIAGNOSIS — Z95.1 S/P CABG (CORONARY ARTERY BYPASS GRAFT): ICD-10-CM

## 2024-12-24 DIAGNOSIS — G89.29 CHRONIC RIGHT SHOULDER PAIN: Primary | ICD-10-CM

## 2024-12-24 DIAGNOSIS — M25.511 CHRONIC RIGHT SHOULDER PAIN: Primary | ICD-10-CM

## 2024-12-24 DIAGNOSIS — E11.9 TYPE 2 DIABETES MELLITUS (HCC): Chronic | ICD-10-CM

## 2024-12-24 PROCEDURE — 97110 THERAPEUTIC EXERCISES: CPT

## 2024-12-24 PROCEDURE — 97112 NEUROMUSCULAR REEDUCATION: CPT

## 2024-12-24 RX ORDER — ATORVASTATIN CALCIUM 80 MG/1
80 TABLET, FILM COATED ORAL
Qty: 90 TABLET | Refills: 1 | Status: SHIPPED | OUTPATIENT
Start: 2024-12-24

## 2024-12-24 NOTE — PROGRESS NOTES
Daily Note     Today's date: 2024  Patient name: Frantz Vasquez  : 1945  MRN: [id number]  Referring provider: Melissa Corey MD  Dx:   Encounter Diagnosis     ICD-10-CM    1. Chronic right shoulder pain  M25.511     G89.29                      Subjective: pt reports that he felt really good w/ the band stretching last visit, tolerated the additions well.       Objective: See treatment diary below      Assessment: Tolerated treatment well. Pt has mild R shoulder discomfort with functional IR stretching; aside from this patient tolerates additional strengthening well w/o complaint. Cont to progress R shoulder stability and ROM as tolerated per symptom irritability. Patient would benefit from continued PT      Plan: Continue per plan of care.  HEP updated today      Precautions: hx of melanoma and some lymph nodes removed 2024  Past Medical History:   Diagnosis Date    Chronic cough     RESOLVED: 2015    Coronary artery disease     Diabetes mellitus (HCC)     Diabetic retinopathy (HCC)     HLD (hyperlipidemia)     HTN (hypertension)     Hypertension     Kidney stone       Past Surgical History:   Procedure Laterality Date    CARDIAC CATHETERIZATION  2019    HAND SURGERY Left     HEMICOLOECTOMY W/ ANASTOMOSIS N/A 2024    Procedure: RESECTION COLON RIGHT LAPAROSCOPIC W/ ROBOTICS;  Surgeon: Anders Torres MD;  [facility];  Service: Colorectal    LYMPH NODE BIOPSY Right 2023    Procedure: BIOPSY LYMPH NODE SENTINEL, LYMPHATIC MAPPING (Inject: 7:30 am in Nuc Med by radiologist);  Surgeon: Issac Triana MD;  [facility];  Service: Surgical Oncology    [place] CORONARY ARTERY BYP W/VEIN & ARTERY GRAFT 3 VEIN N/A 2019    Procedure: CORONARY ARTERY BYPASS GRAFT (CABG) 4 VESSELS STEVEN to LAD ; SVG--> PDA , DIAGONAL, and OM;  Surgeon: Elvin Huertas DO;  [facility];  Service: Cardiac Surgery    [place] ECHO TRANSESOPHAG R-T 2D W/PRB IMG ACQUISJ I&R N/A  "12/13/2019    Procedure: TRANSESOPHAGEAL ECHOCARDIOGRAM (SHEBA);  Surgeon: Elvin Huertas DO;  [facility];  Service: Cardiac Surgery    [place] NDSC SURG W/VIDEO-ASSISTED HARVEST VEIN CABG Left 12/13/2019    Procedure: HARVEST VEIN ENDOSCOPIC (EVH);  Surgeon: Elvin Huertas DO;  [facility];  Service: Cardiac Surgery    KY STRTCTC CPTR ASSTD PX EXTRADURAL CRANIAL N/A 01/31/2024    Procedure: RIGHT FUNCTIONAL ENDOSCOPIC SINUS SURGERY (FESS) IMAGED GUIDED, MAXILLARY, ETHMOIDS, FRONTALS;  Surgeon: Tez Bah MD;  [facility];  Service: ENT    PROSTATE CRYOABLATION  09/18/2024    AQUABLATION    SKIN LESION EXCISION Right 11/24/2023    Procedure: WIDE EXCISION MELANOMA RIGHT ARM;  Surgeon: Issac Triana MD;  [facility];  Service: Surgical Oncology   SOC: 12/16/2024  FOTO: 12/16/2024  POC EXP: 3/10/2025  DAILY TREATMENT LOG:  date 12/16/2024 12/18/2024 12/24/2024     Visit #/auth 1 2  3     Manual                        Neuro Re-Ed  18' 15'      UBE    4' alt fwd/bwd every min      B/L shld ER w/ scap set  RTB 2x10  RTB 2x10      B/L shld exten w/ scap set  Blue tubing 2x10  Blue tubing 2x10      B/L low row w/ scap set    Blue tubing 2x10      Supine RS in 90 flex   1x10 cw/ccw  Std w/ ball on wall in 90 flex/abd 1x10 cw/ccw      Shoulder IR in neutral w/ TR   Red tubing 2x10  Red tubing 2x10       Wall clocks   YTB @ wrists 2x5 R/L  YTB @ wrists 2x5 R/L               Ther Ex  20' 25'      Supine B/L shld flex w/ cane 5\"x10 5\"x10  5\"x10      Supine R shld ER w/ cane @90 abd w/ towel roll @ elbow 10\"x5 10\"x5  10\"x5      Supine AROM flex   1x10 1x10; 2x      Supine shoulder horz abd    1x10      SL R  shoulder flex   1x10  1x10     Side lying R shld abd 1x10 2x10  1x10      IR w/ SOS R 10\"x5 10\"x5 10\"x5      Stand R shld exten w/ cane 5\"x10 10\"x5  10\"x5                      HEP    Updated      Ther Activity                        Gait Training                        Modalities     "                    Access Code: [email or web address]  URL: [email or web address]  Date: 12/24/2024  Prepared by: Chen Kay    Exercises  - Supine Shoulder Flexion Extension AAROM with Dowel  - 1-2 x daily - 5 reps - 10 hold  - Supine Shoulder External Rotation with Dowel (Mirrored)  - 1-2 x daily - 5 reps - 10 hold  - Sidelying Shoulder Abduction Full Range of Motion  - 1-2 x daily - 10 reps - 3 sec hold  - Modified Posterior Capsule Stretch (Mirrored)  - 1-2 x daily - 5 reps - 10 hold  - Standing Shoulder Extension AAROM with Dowel  - 1-2 x daily - 10 reps - 3 hold  - Shoulder External Rotation and Scapular Retraction with Resistance  - 1 x daily - 7 x weekly - 2 sets - 10 reps  - Standing Shoulder Row with Anchored Resistance  - 1 x daily - 7 x weekly - 2 sets - 10 reps  - Shoulder extension with resistance - Neutral  - 1 x daily - 7 x weekly - 2 sets - 10 reps

## 2024-12-26 ENCOUNTER — OFFICE VISIT (OUTPATIENT)
Dept: PHYSICAL THERAPY | Facility: CLINIC | Age: 79
End: 2024-12-26
Payer: MEDICARE

## 2024-12-26 DIAGNOSIS — M25.511 CHRONIC RIGHT SHOULDER PAIN: Primary | ICD-10-CM

## 2024-12-26 DIAGNOSIS — G89.29 CHRONIC RIGHT SHOULDER PAIN: Primary | ICD-10-CM

## 2024-12-26 PROCEDURE — 97112 NEUROMUSCULAR REEDUCATION: CPT | Performed by: PHYSICAL THERAPIST

## 2024-12-26 PROCEDURE — 97110 THERAPEUTIC EXERCISES: CPT | Performed by: PHYSICAL THERAPIST

## 2024-12-26 NOTE — PROGRESS NOTES
Daily Note     Today's date: 2024  Patient name: Frantz Vasquez  : 1945  MRN: [id number]  Referring provider: Melissa Corey MD  Dx:   Encounter Diagnosis     ICD-10-CM    1. Chronic right shoulder pain  M25.511     G89.29                      Subjective: Pt reports he is doing much better, reports approx 60% improvement. He still lacks some mobility.      Objective: See treatment diary below      Assessment: Tolerated treatment well and demonstrates visible gains in AROM, some limitation in ER ROM remains . Patient would benefit from continued PT and progression of ROM and postural/RC strengthening.      Plan: Progress treatment as tolerated.       Precautions: hx of melanoma and some lymph nodes removed 2024  Past Medical History:   Diagnosis Date    Chronic cough     RESOLVED: 2015    Coronary artery disease     Diabetes mellitus (HCC)     Diabetic retinopathy (HCC)     HLD (hyperlipidemia)     HTN (hypertension)     Hypertension     Kidney stone       Past Surgical History:   Procedure Laterality Date    CARDIAC CATHETERIZATION  2019    HAND SURGERY Left     HEMICOLOECTOMY W/ ANASTOMOSIS N/A 2024    Procedure: RESECTION COLON RIGHT LAPAROSCOPIC W/ ROBOTICS;  Surgeon: Anders Torres MD;  [facility];  Service: Colorectal    LYMPH NODE BIOPSY Right 2023    Procedure: BIOPSY LYMPH NODE SENTINEL, LYMPHATIC MAPPING (Inject: 7:30 am in Nuc Med by radiologist);  Surgeon: Issac Triana MD;  [facility];  Service: Surgical Oncology    [place] CORONARY ARTERY BYP W/VEIN & ARTERY GRAFT 3 VEIN N/A 2019    Procedure: CORONARY ARTERY BYPASS GRAFT (CABG) 4 VESSELS STEVEN to LAD ; SVG--> PDA , DIAGONAL, and OM;  Surgeon: Elvin Huertas DO;  [facility];  Service: Cardiac Surgery    [place] ECHO TRANSESOPHAG R-T 2D W/PRB IMG ACQUISJ I&R N/A 2019    Procedure: TRANSESOPHAGEAL ECHOCARDIOGRAM (SHEBA);  Surgeon: Elvin Huertas DO;  [facility];   "Service: Cardiac Surgery    ME NDSC SURG W/VIDEO-ASSISTED HARVEST VEIN CABG Left 12/13/2019    Procedure: HARVEST VEIN ENDOSCOPIC (EVH);  Surgeon: Elvin Huertas DO;  [facility];  Service: Cardiac Surgery    ME STRTCTC CPTR ASSTD PX EXTRADURAL CRANIAL N/A 01/31/2024    Procedure: RIGHT FUNCTIONAL ENDOSCOPIC SINUS SURGERY (FESS) IMAGED GUIDED, MAXILLARY, ETHMOIDS, FRONTALS;  Surgeon: Tez Bah MD;  [facility];  Service: ENT    PROSTATE CRYOABLATION  09/18/2024    AQUABLATION    SKIN LESION EXCISION Right 11/24/2023    Procedure: WIDE EXCISION MELANOMA RIGHT ARM;  Surgeon: Issac Triana MD;  [facility];  Service: Surgical Oncology   SOC: 12/16/2024  FOTO: 12/26/2024  POC EXP: 3/10/2025  DAILY TREATMENT LOG:  date 12/16/2024 12/18/2024 12/24/2024 12/26/2024  FOTO    Visit #/auth 1 2  3 4    Manual                        Neuro Re-Ed  18' 15'  25'    UBE    4' alt fwd/bwd every min  4' alt fwd/bkwd every min    B/L shld ER w/ scap set  RTB 2x10  RTB 2x10  RTB 2X10    B/L shld exten w/ scap set  Blue tubing 2x10  Blue tubing 2x10  Blue tubing 2x10    B/L low row w/ scap set    Blue tubing 2x10  Blk tubing 2x10    Supine RS in 90 flex   1x10 cw/ccw  Std w/ ball on wall in 90 flex/abd 1x10 cw/ccw  Stand w/ ball on wall @ 90 flex/abd 1x15 ea cw/ccw    Shoulder IR in neutral w/ TR   Red tubing 2x10  Red tubing 2x10   Blk tubing 2x10    Wall clocks   YTB @ wrists 2x5 R/L  YTB @ wrists 2x5 R/L       Serratus roll ups     YTB @ wrists 1x10 ;2x    Stand against wall B/L Habd    RTB 1x10    Ther Ex  20' 25'  20'    Supine B/L shld flex w/ cane 5\"x10 5\"x10  5\"x10  1# 5\"x10    Supine R shld ER w/ cane @90 abd w/ towel roll @ elbow 10\"x5 10\"x5  10\"x5  1# 5\"x10 90/90    Supine AROM flex   1x10 1x10; 2x  1# 1X10; 2X    Supine shoulder horz abd    1x10      SL R  shoulder flex   1x10  1x10     Side lying R shld abd 1x10 2x10  1x10  1# 1X10; 2X    IR w/ SOS R 10\"x5 10\"x5 10\"x5  10\"x5    Stand R shld " "exten w/ cane 5\"x10 10\"x5  10\"x5  10\"x5    Stand B/L UE flex against wall    1# DB's 1x10    Uni corner ER stretch R    10\"x5    HEP    Updated      Ther Activity                        Gait Training                        Modalities                        Access Code: [email or web address]  URL: [email or web address]  Date: 12/24/2024  Prepared by: Chen Kay    Exercises  - Supine Shoulder Flexion Extension AAROM with Dowel  - 1-2 x daily - 5 reps - 10 hold  - Supine Shoulder External Rotation with Dowel (Mirrored)  - 1-2 x daily - 5 reps - 10 hold  - Sidelying Shoulder Abduction Full Range of Motion  - 1-2 x daily - 10 reps - 3 sec hold  - Modified Posterior Capsule Stretch (Mirrored)  - 1-2 x daily - 5 reps - 10 hold  - Standing Shoulder Extension AAROM with Dowel  - 1-2 x daily - 10 reps - 3 hold  - Shoulder External Rotation and Scapular Retraction with Resistance  - 1 x daily - 7 x weekly - 2 sets - 10 reps  - Standing Shoulder Row with Anchored Resistance  - 1 x daily - 7 x weekly - 2 sets - 10 reps  - Shoulder extension with resistance - Neutral  - 1 x daily - 7 x weekly - 2 sets - 10 reps             "

## 2024-12-31 ENCOUNTER — OFFICE VISIT (OUTPATIENT)
Dept: PHYSICAL THERAPY | Facility: CLINIC | Age: 79
End: 2024-12-31
Payer: MEDICARE

## 2024-12-31 DIAGNOSIS — G89.29 CHRONIC RIGHT SHOULDER PAIN: Primary | ICD-10-CM

## 2024-12-31 DIAGNOSIS — M25.511 CHRONIC RIGHT SHOULDER PAIN: Primary | ICD-10-CM

## 2024-12-31 PROCEDURE — 97112 NEUROMUSCULAR REEDUCATION: CPT | Performed by: PHYSICAL THERAPIST

## 2024-12-31 PROCEDURE — 97110 THERAPEUTIC EXERCISES: CPT | Performed by: PHYSICAL THERAPIST

## 2024-12-31 NOTE — PROGRESS NOTES
Daily Note     Today's date: 2024  Patient name: Frantz Vasquez  : 1945  MRN: [id number]  Referring provider: Melissa Corey MD  Dx:   Encounter Diagnosis     ICD-10-CM    1. Chronic right shoulder pain  M25.511     G89.29                      Subjective: Pt reports continued progress but feels he needs to continue OH strengthening and endurance.      Objective: See treatment diary below      Assessment: Tolerated treatment well. Patient would benefit from continued PT and reports progression of resistance/repetitions was challenging but not painful.      Plan: Progress treatment as tolerated.       Precautions: hx of melanoma and some lymph nodes removed 2024  Past Medical History:   Diagnosis Date    Chronic cough     RESOLVED: 2015    Coronary artery disease     Diabetes mellitus (HCC)     Diabetic retinopathy (HCC)     HLD (hyperlipidemia)     HTN (hypertension)     Hypertension     Kidney stone       Past Surgical History:   Procedure Laterality Date    CARDIAC CATHETERIZATION  2019    HAND SURGERY Left     HEMICOLOECTOMY W/ ANASTOMOSIS N/A 2024    Procedure: RESECTION COLON RIGHT LAPAROSCOPIC W/ ROBOTICS;  Surgeon: Anders Torres MD;  [facility];  Service: Colorectal    LYMPH NODE BIOPSY Right 2023    Procedure: BIOPSY LYMPH NODE SENTINEL, LYMPHATIC MAPPING (Inject: 7:30 am in Nuc Med by radiologist);  Surgeon: Issac Triana MD;  [facility];  Service: Surgical Oncology    [place] CORONARY ARTERY BYP W/VEIN & ARTERY GRAFT 3 VEIN N/A 2019    Procedure: CORONARY ARTERY BYPASS GRAFT (CABG) 4 VESSELS STEVEN to LAD ; SVG--> PDA , DIAGONAL, and OM;  Surgeon: Elvin Huertas DO;  [facility];  Service: Cardiac Surgery    NC ECHO TRANSESOPHAG R-T 2D W/PRB IMG ACQUISJ I&R N/A 2019    Procedure: TRANSESOPHAGEAL ECHOCARDIOGRAM (SHEBA);  Surgeon: Elvin Huertas DO;  [facility];  Service: Cardiac Surgery    NC NDSC SURG  "W/VIDEO-ASSISTED HARVEST VEIN CABG Left 12/13/2019    Procedure: HARVEST VEIN ENDOSCOPIC (EVH);  Surgeon: Elvin Huertas DO;  [facility];  Service: Cardiac Surgery    ND STRTCTC CPTR ASSTD PX EXTRADURAL CRANIAL N/A 01/31/2024    Procedure: RIGHT FUNCTIONAL ENDOSCOPIC SINUS SURGERY (FESS) IMAGED GUIDED, MAXILLARY, ETHMOIDS, FRONTALS;  Surgeon: Tez Bah MD;  [facility];  Service: ENT    PROSTATE CRYOABLATION  09/18/2024    AQUABLATION    SKIN LESION EXCISION Right 11/24/2023    Procedure: WIDE EXCISION MELANOMA RIGHT ARM;  Surgeon: Issac Triana MD;  [facility];  Service: Surgical Oncology   SOC: 12/16/2024  FOTO: 12/26/2024  POC EXP: 3/10/2025  DAILY TREATMENT LOG:  date 12/16/2024 12/18/2024 12/24/2024 12/26/2024  FOTO 12/31/2024   Visit #/auth 1 2  3 4 5   Manual                        Neuro Re-Ed  18' 15'  25' 25'   UBE    4' alt fwd/bwd every min  4' alt fwd/bkwd every min 4' alt fwd/bkwd every min   B/L shld ER w/ scap set  RTB 2x10  RTB 2x10  RTB 2X10 RTB 2x12   B/L shld exten w/ scap set  Blue tubing 2x10  Blue tubing 2x10  Blue tubing 2x10 Blue tubing 2x12   B/L low row w/ scap set    Blue tubing 2x10  Blk tubing 2x10 grey tubing 2x12   Supine RS in 90 flex   1x10 cw/ccw  Std w/ ball on wall in 90 flex/abd 1x10 cw/ccw  Stand w/ ball on wall @ 90 flex/abd 1x15 ea cw/ccw Stand w/ ball on wall @90 flex/abd 1# @ wrist: 1x10 cw/ccw; 2x   Shoulder IR in neutral w/ TR   Red tubing 2x10  Red tubing 2x10   Blk tubing 2x10    Wall clocks   YTB @ wrists 2x5 R/L  YTB @ wrists 2x5 R/L       Serratus roll ups     YTB @ wrists 1x10 ;2x YTB @ wrists 1x10; 2x   Stand against wall B/L Habd    RTB 1x10 Grn TB 1x10; 2x   Ther Ex  20' 25'  20' 20'   Supine B/L shld flex w/ cane 5\"x10 5\"x10  5\"x10  1# 5\"x10 1# 5\"x10   Supine R shld ER w/ cane @90 abd w/ towel roll @ elbow 10\"x5 10\"x5  10\"x5  1# 5\"x10 90/90 1# 5\"x10 90/90   Supine AROM flex   1x10 1x10; 2x  1# 1X10; 2X 2# 1x10; 2x   Supine " "shoulder horz abd    1x10   2# 1x10; 2x   SL R  shoulder flex   1x10  1x10  1# 1x10; 2x   Side lying R shld abd 1x10 2x10  1x10  1# 1X10; 2X 2# 1x10; 2x   IR w/ SOS R 10\"x5 10\"x5 10\"x5  10\"x5 10\"x5   Stand R shld exten w/ cane 5\"x10 10\"x5  10\"x5  10\"x5    Stand B/L UE flex against wall    1# DB's 1x10 1#DB's 1x10; 2x   Uni corner ER stretch R    10\"x5 10\"x5   OH pball wall dribble     30\"; 2x   HEP    Updated      Ther Activity                        Gait Training                        Modalities                        Access Code: [email or web address]  URL: [email or web address]  Date: 12/24/2024  Prepared by: Chen Kay    Exercises  - Supine Shoulder Flexion Extension AAROM with Dowel  - 1-2 x daily - 5 reps - 10 hold  - Supine Shoulder External Rotation with Dowel (Mirrored)  - 1-2 x daily - 5 reps - 10 hold  - Sidelying Shoulder Abduction Full Range of Motion  - 1-2 x daily - 10 reps - 3 sec hold  - Modified Posterior Capsule Stretch (Mirrored)  - 1-2 x daily - 5 reps - 10 hold  - Standing Shoulder Extension AAROM with Dowel  - 1-2 x daily - 10 reps - 3 hold  - Shoulder External Rotation and Scapular Retraction with Resistance  - 1 x daily - 7 x weekly - 2 sets - 10 reps  - Standing Shoulder Row with Anchored Resistance  - 1 x daily - 7 x weekly - 2 sets - 10 reps  - Shoulder extension with resistance - Neutral  - 1 x daily - 7 x weekly - 2 sets - 10 reps               "

## 2025-01-03 ENCOUNTER — OFFICE VISIT (OUTPATIENT)
Dept: PHYSICAL THERAPY | Facility: CLINIC | Age: 80
End: 2025-01-03
Payer: MEDICARE

## 2025-01-03 DIAGNOSIS — M25.511 CHRONIC RIGHT SHOULDER PAIN: Primary | ICD-10-CM

## 2025-01-03 DIAGNOSIS — G89.29 CHRONIC RIGHT SHOULDER PAIN: Primary | ICD-10-CM

## 2025-01-03 PROCEDURE — 97110 THERAPEUTIC EXERCISES: CPT | Performed by: PHYSICAL THERAPIST

## 2025-01-03 PROCEDURE — 97112 NEUROMUSCULAR REEDUCATION: CPT | Performed by: PHYSICAL THERAPIST

## 2025-01-03 NOTE — PROGRESS NOTES
Daily Note     Today's date: 1/3/2025  Patient name: Frantz Vasquez  : 1945  MRN: [id number]  Referring provider: Melissa Corey MD  Dx:   Encounter Diagnosis     ICD-10-CM    1. Chronic right shoulder pain  M25.511     G89.29                      Subjective: Pt reports no issues w/ progressions last session.      Objective: See treatment diary below; advanced to OH carry and inclined plank alt shld taps. No c/o.      Assessment: Tolerated treatment well and reports good challenge w/ program variations. Very mild abduction ROM limitations remain. . Patient would benefit from continued PT      Plan: Progress treatment as tolerated.       Precautions: hx of melanoma and some lymph nodes removed 2024  Past Medical History:   Diagnosis Date    Chronic cough     RESOLVED: 2015    Coronary artery disease     Diabetes mellitus (HCC)     Diabetic retinopathy (HCC)     HLD (hyperlipidemia)     HTN (hypertension)     Hypertension     Kidney stone       Past Surgical History:   Procedure Laterality Date    CARDIAC CATHETERIZATION  2019    HAND SURGERY Left     HEMICOLOECTOMY W/ ANASTOMOSIS N/A 2024    Procedure: RESECTION COLON RIGHT LAPAROSCOPIC W/ ROBOTICS;  Surgeon: Anders Torres MD;  [facility];  Service: Colorectal    LYMPH NODE BIOPSY Right 2023    Procedure: BIOPSY LYMPH NODE SENTINEL, LYMPHATIC MAPPING (Inject: 7:30 am in Nuc Med by radiologist);  Surgeon: Issac Triana MD;  [facility];  Service: Surgical Oncology    [place] CORONARY ARTERY BYP W/VEIN & ARTERY GRAFT 3 VEIN N/A 2019    Procedure: CORONARY ARTERY BYPASS GRAFT (CABG) 4 VESSELS STEVEN to LAD ; SVG--> PDA , DIAGONAL, and OM;  Surgeon: Elvin Huertas DO;  [facility];  Service: Cardiac Surgery    [place] ECHO TRANSESOPHAG R-T 2D W/PRB IMG ACQUISJ I&R N/A 2019    Procedure: TRANSESOPHAGEAL ECHOCARDIOGRAM (SHEBA);  Surgeon: Elvin Huertas DO;  [facility];  Service: Cardiac  "Surgery    [place] NDSC SURG W/VIDEO-ASSISTED HARVEST VEIN CABG Left 12/13/2019    Procedure: HARVEST VEIN ENDOSCOPIC (EVH);  Surgeon: Elvin Huertas DO;  [facility];  Service: Cardiac Surgery    [place] STRTCTC CPTR ASSTD PX EXTRADURAL CRANIAL N/A 01/31/2024    Procedure: RIGHT FUNCTIONAL ENDOSCOPIC SINUS SURGERY (FESS) IMAGED GUIDED, MAXILLARY, ETHMOIDS, FRONTALS;  Surgeon: Tez Bah MD;  [facility];  Service: ENT    PROSTATE CRYOABLATION  09/18/2024    AQUABLATION    SKIN LESION EXCISION Right 11/24/2023    Procedure: WIDE EXCISION MELANOMA RIGHT ARM;  Surgeon: Issac Triana MD;  [facility];  Service: Surgical Oncology   SOC: 12/16/2024  FOTO: 12/26/2024  POC EXP: 3/10/2025  DAILY TREATMENT LOG:  date 1/3/2025  12/24/2024 12/26/2024  FOTO 12/31/2024   Visit #/auth 6  3 4 5   Manual                        Neuro Re-Ed 20'  15'  25' 25'   UBE  L2 4' alt every min fwd/bkwd  4' alt fwd/bwd every min  4' alt fwd/bkwd every min 4' alt fwd/bkwd every min   B/L shld ER w/ scap set Grn 2x10  RTB 2x10  RTB 2X10 RTB 2x12   B/L shld exten w/ scap set Blue tubing 2x10  Blue tubing 2x10  Blue tubing 2x10 Blue tubing 2x12   B/L low row w/ scap set  Grey tubing 2x10  Blue tubing 2x10  Blk tubing 2x10 grey tubing 2x12   Standing RS in 90 flex/abd    Std w/ ball on wall in 90 flex/abd 1x10 cw/ccw  Stand w/ ball on wall @ 90 flex/abd 1x15 ea cw/ccw Stand w/ ball on wall @90 flex/abd 1# @ wrist: 1x10 cw/ccw; 2x   Shoulder IR in neutral w/ TR    Red tubing 2x10   Blk tubing 2x10    Wall clocks  YTB@wrists 2x5 R/L  YTB @ wrists 2x5 R/L       Serratus roll ups     YTB @ wrists 1x10 ;2x YTB @ wrists 1x10; 2x   Stand against wall B/L Habd    RTB 1x10 Grn TB 1x10; 2x   Prone Habd R uni 1x10; 2x       Ther Ex 25'  25'  20' 20'   Supine B/L shld flex w/ cane 2# 5\"x10  5\"x10  1# 5\"x10 1# 5\"x10   Supine R shld ER w/ cane @90 abd w/ towel roll @ elbow   10\"x5  1# 5\"x10 90/90 1# 5\"x10 90/90   Supine AROM flex  2# " "1x10  1x10; 2x  1# 1X10; 2X 2# 1x10; 2x   Supine shoulder horz abd  2# 1x10  1x10   2# 1x10; 2x   SL R  shoulder flex    1x10  1# 1x10; 2x   Side lying R shld abd 2# 1x10; 2x  1x10  1# 1X10; 2X 2# 1x10; 2x   IR w/ SOS R 20\"x3  10\"x5  10\"x5 10\"x5   Stand R shld exten w/ cane 5\"x10  10\"x5  10\"x5    Stand B/L UE flex against wall Alt B/L flex/abd against wall 2# DB's; 2x5   1# DB's 1x10 1#DB's 1x10; 2x   Uni corner ER stretch R 20\"x3   10\"x5 10\"x5   OH pball wall dribble 30\"; 2x    30\"; 2x   Uni OH carry 4# DB 30\"; 2x       Inclined plank on plinth alt shld taps 1x10 R/L; 2x       HEP    Updated      Ther Activity                        Gait Training                        Modalities                        Access Code: [email or web address]  URL: [email or web address]  Date: 12/24/2024  Prepared by: Chen Kay    Exercises  - Supine Shoulder Flexion Extension AAROM with Dowel  - 1-2 x daily - 5 reps - 10 hold  - Supine Shoulder External Rotation with Dowel (Mirrored)  - 1-2 x daily - 5 reps - 10 hold  - Sidelying Shoulder Abduction Full Range of Motion  - 1-2 x daily - 10 reps - 3 sec hold  - Modified Posterior Capsule Stretch (Mirrored)  - 1-2 x daily - 5 reps - 10 hold  - Standing Shoulder Extension AAROM with Dowel  - 1-2 x daily - 10 reps - 3 hold  - Shoulder External Rotation and Scapular Retraction with Resistance  - 1 x daily - 7 x weekly - 2 sets - 10 reps  - Standing Shoulder Row with Anchored Resistance  - 1 x daily - 7 x weekly - 2 sets - 10 reps  - Shoulder extension with resistance - Neutral  - 1 x daily - 7 x weekly - 2 sets - 10 reps                 "

## 2025-01-06 ENCOUNTER — OFFICE VISIT (OUTPATIENT)
Dept: PHYSICAL THERAPY | Facility: CLINIC | Age: 80
End: 2025-01-06
Payer: MEDICARE

## 2025-01-06 DIAGNOSIS — G89.29 CHRONIC RIGHT SHOULDER PAIN: Primary | ICD-10-CM

## 2025-01-06 DIAGNOSIS — M25.511 CHRONIC RIGHT SHOULDER PAIN: Primary | ICD-10-CM

## 2025-01-06 PROCEDURE — 97110 THERAPEUTIC EXERCISES: CPT

## 2025-01-06 PROCEDURE — 97112 NEUROMUSCULAR REEDUCATION: CPT

## 2025-01-06 NOTE — PROGRESS NOTES
Daily Note     Today's date: 2025  Patient name: Frantz Vasquez  : 1945  MRN: [id number]  Referring provider: Melissa Corey MD  Dx:   Encounter Diagnosis     ICD-10-CM    1. Chronic right shoulder pain  M25.511     G89.29                      Subjective: pt reports that things are going well w/ his shoulder, he did take the weekend off from his exercises.       Objective: See treatment diary below      Assessment: Tolerated treatment well. Pt cont to dem good tolerance to TE, no noted increased pain during or post session. Cont to progress global R shoulder strengthening as tolerated. Patient would benefit from continued PT      Plan: Continue per plan of care.      Precautions: hx of melanoma and some lymph nodes removed 2024  Past Medical History:   Diagnosis Date    Chronic cough     RESOLVED: 2015    Coronary artery disease     Diabetes mellitus (HCC)     Diabetic retinopathy (HCC)     HLD (hyperlipidemia)     HTN (hypertension)     Hypertension     Kidney stone       Past Surgical History:   Procedure Laterality Date    CARDIAC CATHETERIZATION  2019    HAND SURGERY Left     HEMICOLOECTOMY W/ ANASTOMOSIS N/A 2024    Procedure: RESECTION COLON RIGHT LAPAROSCOPIC W/ ROBOTICS;  Surgeon: Anders Torres MD;  [facility];  Service: Colorectal    LYMPH NODE BIOPSY Right 2023    Procedure: BIOPSY LYMPH NODE SENTINEL, LYMPHATIC MAPPING (Inject: 7:30 am in Nuc Med by radiologist);  Surgeon: Issac Triana MD;  [facility];  Service: Surgical Oncology    [place] CORONARY ARTERY BYP W/VEIN & ARTERY GRAFT 3 VEIN N/A 2019    Procedure: CORONARY ARTERY BYPASS GRAFT (CABG) 4 VESSELS STEVEN to LAD ; SVG--> PDA , DIAGONAL, and OM;  Surgeon: Elvin Huertas DO;  [facility];  Service: Cardiac Surgery    [place] ECHO TRANSESOPHAG R-T 2D W/PRB IMG ACQUISJ I&R N/A 2019    Procedure: TRANSESOPHAGEAL ECHOCARDIOGRAM (SHEBA);  Surgeon: Elvin Huertas DO;   "[facility];  Service: Cardiac Surgery    [place] NDSC SURG W/VIDEO-ASSISTED HARVEST VEIN CABG Left 12/13/2019    Procedure: HARVEST VEIN ENDOSCOPIC (EVH);  Surgeon: Elvin Huertas DO;  [facility];  Service: Cardiac Surgery    WI STRTCTC CPTR ASSTD PX EXTRADURAL CRANIAL N/A 01/31/2024    Procedure: RIGHT FUNCTIONAL ENDOSCOPIC SINUS SURGERY (FESS) IMAGED GUIDED, MAXILLARY, ETHMOIDS, FRONTALS;  Surgeon: Tez Bah MD;  [facility];  Service: ENT    PROSTATE CRYOABLATION  09/18/2024    AQUABLATION    SKIN LESION EXCISION Right 11/24/2023    Procedure: WIDE EXCISION MELANOMA RIGHT ARM;  Surgeon: Issac Triana MD;  [facility];  Service: Surgical Oncology   SOC: 12/16/2024  FOTO: 12/26/2024  POC EXP: 3/10/2025  DAILY TREATMENT LOG:  date 1/3/2025 1/6/2024  12/26/2024  FOTO 12/31/2024   Visit #/auth 6 7  4 5   Manual                        Neuro Re-Ed 20' 25'   25' 25'   UBE  L2 4' alt every min fwd/bkwd L2 4' alt every min fwd/bkwd   4' alt fwd/bkwd every min 4' alt fwd/bkwd every min   B/L shld ER w/ scap set Grn 2x10 GTB 2x10   RTB 2X10 RTB 2x12   B/L shld exten w/ scap set Blue tubing 2x10 [place] 10# 2x10   Blue tubing 2x10 Blue tubing 2x12   B/L low row w/ scap set  Grey tubing 2x10 Vasiliy 10# 2x10  Blk tubing 2x10 grey tubing 2x12   Standing RS in 90 flex/abd   Stand w/ ball on wall @90 flex/abd 1# @ wrist: 1x10 cw/ccw; 2x  Stand w/ ball on wall @ 90 flex/abd 1x15 ea cw/ccw Stand w/ ball on wall @90 flex/abd 1# @ wrist: 1x10 cw/ccw; 2x   Shoulder IR in neutral w/ TR     Blk tubing 2x10    Wall clocks  YTB@wrists 2x5 R/L YTB@wrists 2x5 R/L       Serratus roll ups   YTB @ wrists 1x10; 2x   YTB @ wrists 1x10 ;2x YTB @ wrists 1x10; 2x   Stand against wall B/L Habd    RTB 1x10 Grn TB 1x10; 2x   Prone Habd R uni 1x10; 2x R uni 1# 1x10; 2x       Ther Ex 25' 15'   20' 20'   Supine B/L shld flex w/ cane 2# 5\"x10   1# 5\"x10 1# 5\"x10   Supine R shld ER w/ cane @90 abd w/ towel roll @ " "elbow    1# 5\"x10 90/90 1# 5\"x10 90/90   Supine AROM flex  2# 1x10 2# 1x10  1# 1X10; 2X 2# 1x10; 2x   Supine shoulder horz abd  2# 1x10  2# 1x10    2# 1x10; 2x   SL R  shoulder flex      1# 1x10; 2x   Side lying R shld abd 2# 1x10; 2x 2# 1x10; 2x   1# 1X10; 2X 2# 1x10; 2x   IR w/ SOS R 20\"x3 20\"x3   10\"x5 10\"x5   Stand R shld exten w/ cane 5\"x10 10\"x5   10\"x5    Stand B/L UE flex against wall Alt B/L flex/abd against wall 2# DB's; 2x5   1# DB's 1x10 1#DB's 1x10; 2x   Uni corner ER stretch R 20\"x3 20\"x3   10\"x5 10\"x5   OH pball wall dribble 30\"; 2x 30'x2    30\"; 2x   Uni OH carry 4# DB 30\"; 2x 4# DB 30\"; 2x      Inclined plank on plinth alt shld taps 1x10 R/L; 2x 1x10 R/L; 2x       HEP         Ther Activity                        Gait Training                        Modalities                        Access Code: [email or web address]  URL: [email or web address]  Date: 12/24/2024  Prepared by: Chen Kay    Exercises  - Supine Shoulder Flexion Extension AAROM with Dowel  - 1-2 x daily - 5 reps - 10 hold  - Supine Shoulder External Rotation with Dowel (Mirrored)  - 1-2 x daily - 5 reps - 10 hold  - Sidelying Shoulder Abduction Full Range of Motion  - 1-2 x daily - 10 reps - 3 sec hold  - Modified Posterior Capsule Stretch (Mirrored)  - 1-2 x daily - 5 reps - 10 hold  - Standing Shoulder Extension AAROM with Dowel  - 1-2 x daily - 10 reps - 3 hold  - Shoulder External Rotation and Scapular Retraction with Resistance  - 1 x daily - 7 x weekly - 2 sets - 10 reps  - Standing Shoulder Row with Anchored Resistance  - 1 x daily - 7 x weekly - 2 sets - 10 reps  - Shoulder extension with resistance - Neutral  - 1 x daily - 7 x weekly - 2 sets - 10 reps                   "

## 2025-01-09 ENCOUNTER — OFFICE VISIT (OUTPATIENT)
Dept: PHYSICAL THERAPY | Facility: CLINIC | Age: 80
End: 2025-01-09
Payer: MEDICARE

## 2025-01-09 DIAGNOSIS — M25.511 CHRONIC RIGHT SHOULDER PAIN: Primary | ICD-10-CM

## 2025-01-09 DIAGNOSIS — G89.29 CHRONIC RIGHT SHOULDER PAIN: Primary | ICD-10-CM

## 2025-01-09 PROCEDURE — 97112 NEUROMUSCULAR REEDUCATION: CPT

## 2025-01-09 PROCEDURE — 97110 THERAPEUTIC EXERCISES: CPT

## 2025-01-09 NOTE — PROGRESS NOTES
Daily Note     Today's date: 2025  Patient name: Frantz Vasquez  : 1945  MRN: [id number]  Referring provider: Melissa Corey MD  Dx:   Encounter Diagnosis     ICD-10-CM    1. Chronic right shoulder pain  M25.511     G89.29                      Subjective: pt has no new complaints on arrival to session. He has no had much in the way of pain in his R shoulder lately and when he does get some mild pain it is in a much further ROM.       Objective: See treatment diary below      Assessment: Tolerated treatment well. Pt challenged w/ additional scapular and ER strengthening in abd  but no noted increased pain w/ progression. VC to avoid UT compensation. Patient would benefit from continued PT to cont to progress global R shoulder and scapular strengthening for cont improvement in tolerance of functional mobility.       Plan: Continue per plan of care.      Precautions: hx of melanoma and some lymph nodes removed 2024  Past Medical History:   Diagnosis Date    Chronic cough     RESOLVED: 2015    Coronary artery disease     Diabetes mellitus (HCC)     Diabetic retinopathy (HCC)     HLD (hyperlipidemia)     HTN (hypertension)     Hypertension     Kidney stone       Past Surgical History:   Procedure Laterality Date    CARDIAC CATHETERIZATION  2019    HAND SURGERY Left     HEMICOLOECTOMY W/ ANASTOMOSIS N/A 2024    Procedure: RESECTION COLON RIGHT LAPAROSCOPIC W/ ROBOTICS;  Surgeon: Anders Torres MD;  [facility];  Service: Colorectal    LYMPH NODE BIOPSY Right 2023    Procedure: BIOPSY LYMPH NODE SENTINEL, LYMPHATIC MAPPING (Inject: 7:30 am in Nuc Med by radiologist);  Surgeon: Issac Triana MD;  [facility];  Service: Surgical Oncology    [place] CORONARY ARTERY BYP W/VEIN & ARTERY GRAFT 3 VEIN N/A 2019    Procedure: CORONARY ARTERY BYPASS GRAFT (CABG) 4 VESSELS STEVEN to LAD ; SVG--> PDA , DIAGONAL, and OM;  Surgeon: Elvin Huertas DO;  [facility];   "Service: Cardiac Surgery    [place] ECHO TRANSESOPHAG R-T 2D W/PRB IMG ACQUISJ I&R N/A 12/13/2019    Procedure: TRANSESOPHAGEAL ECHOCARDIOGRAM (SHEBA);  Surgeon: Elvin Huertas DO;  [facility];  Service: Cardiac Surgery    MA NDSC SURG W/VIDEO-ASSISTED HARVEST VEIN CABG Left 12/13/2019    Procedure: HARVEST VEIN ENDOSCOPIC (EVH);  Surgeon: Elvin Huertas DO;  [facility];  Service: Cardiac Surgery    MA STRTCTC CPTR ASSTD PX EXTRADURAL CRANIAL N/A 01/31/2024    Procedure: RIGHT FUNCTIONAL ENDOSCOPIC SINUS SURGERY (FESS) IMAGED GUIDED, MAXILLARY, ETHMOIDS, FRONTALS;  Surgeon: Tez Bah MD;  [facility];  Service: ENT    PROSTATE CRYOABLATION  09/18/2024    AQUABLATION    SKIN LESION EXCISION Right 11/24/2023    Procedure: WIDE EXCISION MELANOMA RIGHT ARM;  Surgeon: Issac Triana MD;  [facility];  Service: Surgical Oncology   SOC: 12/16/2024  FOTO: 12/26/2024  POC EXP: 3/10/2025  DAILY TREATMENT LOG:  date 1/3/2025 1/6/2024 1/9/2024  12/31/2024   Visit #/auth 6 7 8   5   Manual                        Neuro Re-Ed 20' 25'  25'   25'   UBE  L2 4' alt every min fwd/bkwd L2 4' alt every min fwd/bkwd  L3 4' alt every min fwd/bkwd   4' alt fwd/bkwd every min   B/L shld ER w/ scap set Grn 2x10 GTB 2x10  High row row to ER in 90 abd red tubing 1x10   RTB 2x12   B/L shld exten w/ scap set Blue tubing 2x10 [place] 10# 2x10  Blue tubing 2x10   Blue tubing 2x12   B/L low row w/ scap set  Grey tubing 2x10 Vasiliy 10# 2x10 Grey tubing 2x10   grey tubing 2x12   Standing RS in 90 flex/abd   Stand w/ ball on wall @90 flex/abd 1# @ wrist: 1x10 cw/ccw; 2x Supine RS w/ 2 # DB 2x10 ea cw/ccw   Stand w/ ball on wall @90 flex/abd 1# @ wrist: 1x10 cw/ccw; 2x   Body blade IR/ER in neural elbow 90    10\"x3; 2x     Body blade horz in 90 flex    10\"x3; 2x       Shoulder IR in neutral w/ TR         Wall clocks  YTB@wrists 2x5 R/L YTB@wrists 2x5 R/L       Serratus roll ups   YTB @ wrists 1x10; 2x    YTB @ " "wrists 1x10; 2x   Stand against wall B/L Habd   Green tubing 2x10   Grn TB 1x10; 2x   Prone Habd R uni 1x10; 2x R uni 1# 1x10; 2x       Ther Ex 25' 15'  15'   20'   Supine B/L shld flex w/ cane 2# 5\"x10    1# 5\"x10   Supine R shld ER w/ cane @90 abd w/ towel roll @ elbow     1# 5\"x10 90/90   Supine AROM flex  2# 1x10 2# 1x10 2# 1x10  2# 1x10; 2x   Supine shoulder horz abd  2# 1x10 2# 1x10    2# 1x10; 2x   SL R  shoulder flex      1# 1x10; 2x   Side lying R shld abd 2# 1x10; 2x 2# 1x10; 2x  2# 2x10   2# 1x10; 2x   IR w/ SOS R 20\"x3 20\"x3  20\"x3   10\"x5   Stand R shld exten w/ cane 5\"x10 10\"x5       Stand B/L UE flex against wall Alt B/L flex/abd against wall 2# DB's; 2x5    1#DB's 1x10; 2x   Uni corner ER stretch R 20\"x3 20\"x3  B/L corner 20\"x3   10\"x5   OH pball wall dribble 30\"; 2x 30\"x2  30\"x3   30\"; 2x   Uni OH carry 4# DB 30\"; 2x 4# DB 30\"; 2x 4# DB 30\"x2      Inclined plank on plinth alt shld taps 1x10 R/L; 2x 1x10 R/L; 2x       HEP         Ther Activity                        Gait Training                        Modalities                        Access Code: [email or web address]  URL: [email or web address]  Date: 12/24/2024  Prepared by: Chen Kay    Exercises  - Supine Shoulder Flexion Extension AAROM with Dowel  - 1-2 x daily - 5 reps - 10 hold  - Supine Shoulder External Rotation with Dowel (Mirrored)  - 1-2 x daily - 5 reps - 10 hold  - Sidelying Shoulder Abduction Full Range of Motion  - 1-2 x daily - 10 reps - 3 sec hold  - Modified Posterior Capsule Stretch (Mirrored)  - 1-2 x daily - 5 reps - 10 hold  - Standing Shoulder Extension AAROM with Dowel  - 1-2 x daily - 10 reps - 3 hold  - Shoulder External Rotation and Scapular Retraction with Resistance  - 1 x daily - 7 x weekly - 2 sets - 10 reps  - Standing Shoulder Row with Anchored Resistance  - 1 x daily - 7 x weekly - 2 sets - 10 reps  - Shoulder extension with resistance - Neutral  - 1 x daily - 7 x weekly - 2 sets - 10 reps                   "

## 2025-01-13 ENCOUNTER — OFFICE VISIT (OUTPATIENT)
Dept: PHYSICAL THERAPY | Facility: CLINIC | Age: 80
End: 2025-01-13
Payer: MEDICARE

## 2025-01-13 DIAGNOSIS — G89.29 CHRONIC RIGHT SHOULDER PAIN: Primary | ICD-10-CM

## 2025-01-13 DIAGNOSIS — M25.511 CHRONIC RIGHT SHOULDER PAIN: Primary | ICD-10-CM

## 2025-01-13 PROCEDURE — 97110 THERAPEUTIC EXERCISES: CPT | Performed by: PHYSICAL THERAPIST

## 2025-01-13 PROCEDURE — 97112 NEUROMUSCULAR REEDUCATION: CPT | Performed by: PHYSICAL THERAPIST

## 2025-01-13 NOTE — PROGRESS NOTES
Daily Note     Today's date: 2025  Patient name: Frantz Vasquez  : 1945  MRN: [id number]  Referring provider: Melissa Corey MD  Dx:   Encounter Diagnosis     ICD-10-CM    1. Chronic right shoulder pain  M25.511     G89.29                      Subjective: I still have a slight discomfort putting on a jacket, like a little resistance during that motion.      Objective: See treatment diary below; updated HEP to address remaining stiffness and progress postural strengthening.       Assessment: Tolerated treatment well. Patient  nearing D/C, will RE next session to determine if ready to D/C or needs an additional week.      Plan: Continue per plan of care.  Progress note during next visit.      Precautions: hx of melanoma and some lymph nodes removed 2024  Past Medical History:   Diagnosis Date    Chronic cough     RESOLVED: 2015    Coronary artery disease     Diabetes mellitus (HCC)     Diabetic retinopathy (HCC)     HLD (hyperlipidemia)     HTN (hypertension)     Hypertension     Kidney stone       Past Surgical History:   Procedure Laterality Date    CARDIAC CATHETERIZATION  2019    HAND SURGERY Left     HEMICOLOECTOMY W/ ANASTOMOSIS N/A 2024    Procedure: RESECTION COLON RIGHT LAPAROSCOPIC W/ ROBOTICS;  Surgeon: Anders Torres MD;  [facility];  Service: Colorectal    LYMPH NODE BIOPSY Right 2023    Procedure: BIOPSY LYMPH NODE SENTINEL, LYMPHATIC MAPPING (Inject: 7:30 am in Nuc Med by radiologist);  Surgeon: Issac Triana MD;  [facility];  Service: Surgical Oncology    MS CORONARY ARTERY BYP W/VEIN & ARTERY GRAFT 3 VEIN N/A 2019    Procedure: CORONARY ARTERY BYPASS GRAFT (CABG) 4 VESSELS STEVEN to LAD ; SVG--> PDA , DIAGONAL, and OM;  Surgeon: Elvin Huertas DO;  [facility];  Service: Cardiac Surgery    MS ECHO TRANSESOPHAG R-T 2D W/PRB IMG ACQUISJ I&R N/A 2019    Procedure: TRANSESOPHAGEAL ECHOCARDIOGRAM (SHEBA);  Surgeon: Elvin  "DO Kiya;  [facility];  Service: Cardiac Surgery    MS NDSC SURG W/VIDEO-ASSISTED HARVEST VEIN CABG Left 12/13/2019    Procedure: HARVEST VEIN ENDOSCOPIC (EVH);  Surgeon: Elvin Huertas DO;  [facility];  Service: Cardiac Surgery    MS STRTCTC CPTR ASSTD PX EXTRADURAL CRANIAL N/A 01/31/2024    Procedure: RIGHT FUNCTIONAL ENDOSCOPIC SINUS SURGERY (FESS) IMAGED GUIDED, MAXILLARY, ETHMOIDS, FRONTALS;  Surgeon: Tez Bah MD;  [facility];  Service: ENT    PROSTATE CRYOABLATION  09/18/2024    AQUABLATION    SKIN LESION EXCISION Right 11/24/2023    Procedure: WIDE EXCISION MELANOMA RIGHT ARM;  Surgeon: Issac Triana MD;  [facility];  Service: Surgical Oncology   SOC: 12/16/2024  FOTO: 1/13/2025  POC EXP: 3/10/2025  DAILY TREATMENT LOG:  date 1/3/2025 1/6/2024 1/9/2024 1/13/2025  FOTO    Visit #/auth 6 [id number] [phone]  9    Manual                        Neuro Re-Ed 20' 25'  25'  20'    UBE  L2 4' alt every min fwd/bkwd L2 4' alt every min fwd/bkwd  L3 4' alt every min fwd/bkwd  L3 4' alt every min fwd/bkwd    B/L shld ER w/ scap set Grn 2x10 GTB 2x10  High row row to ER in 90 abd red tubing 1x10  High row to ER in 90 abd; red tubing 1x10; 2x; cues for form    B/L shld exten w/ scap set Blue tubing 2x10 [place] 10# 2x10  Blue tubing 2x10      B/L low row w/ scap set  Grey tubing 2x10 [place] 10# 2x10 Grey tubing 2x10      Standing RS in 90 flex/abd   Stand w/ ball on wall @90 flex/abd 1# @ wrist: 1x10 cw/ccw; 2x Supine RS w/ 2 # DB 2x10 ea cw/ccw      Standing RS back against wall: 90/90 posit circles w/ small ball    Cw/ccw 10x ea; 2x    Body blade IR/ER in neural elbow 90    10\"x3; 2x     Body blade horz in 90 flex    10\"x3; 2x       Wall clocks  YTB@wrists 2x5 R/L YTB@wrists 2x5 R/L       Serratus roll ups   YTB @ wrists 1x10; 2x   RTB @ wrists 1x10; 2x    Stand against wall B/L Habd   Green tubing 2x10  Grn TB 2x10    Prone Habd R uni 1x10; 2x R uni 1# 1x10; 2x       Ther Ex 25' 15' " " 15'  25'    Supine B/L shld flex w/ cane 2# 5\"x10       Supine R shld ER w/ cane @90 abd w/ towel roll @ elbow    2# on cane 10\"x5    Supine AROM flex  2# 1x10 2# 1x10 2# 1x10 2# 5\"x10    Supine shoulder horz abd  2# 1x10 2# 1x10   2# 1x15    Side lying R shld abd 2# 1x10; 2x 2# 1x10; 2x  2# 2x10  2# 5\"x10    IR w/ SOS R 20\"x3 20\"x3  20\"x3  20\"x3    Stand R shld exten w/ cane 5\"x10 10\"x5       Stand B/L UE flex against wall Alt B/L flex/abd against wall 2# DB's; 2x5       Uni corner ER stretch R 20\"x3 20\"x3  B/L corner 20\"x3  B/L corner 20\"x3    OH pball wall dribble 30\"; 2x 30\"x2  30\"x3      Uni OH carry 4# DB 30\"; 2x 4# DB 30\"; 2x 4# DB 30\"x2  4# 30\"X2    Inclined plank on plinth alt shld taps 1x10 R/L; 2x 1x10 R/L; 2x   Incline push up 1x7; 2x    Stand open books    1x10 ea way    HEP         Ther Activity                        Gait Training                        Modalities                        HEP:  Access Code: [email or web address]  URL: [email or web address]  Date: 01/13/2025  Prepared by: Chrissy Ornelas    Exercises  - Supine Shoulder Flexion Extension AAROM with Dowel  - 1-2 x daily - 5 reps - 10 hold  - Supine Shoulder External Rotation with Dowel (Mirrored)  - 1-2 x daily - 5 reps - 10 hold  - Sidelying Shoulder Abduction Full Range of Motion  - 1-2 x daily - 10 reps - 5 sec hold  - Shoulder External Rotation and Scapular Retraction with Resistance  - 1 x daily - 7 x weekly - 2 sets - 10 reps  - Standing Shoulder Horizontal Abduction with Resistance  - 1 x daily - 2 sets - 10 reps  - Corner Pec Major Stretch  - 1 x daily - 7 x weekly - 3 reps - 20 hold  - Standing Thoracic Open Book at Wall  - 1 x daily - 10 reps  - Serratus Activation at Wall with Foam Roll and Resistance Band  - 1 x daily - 2 sets - 10 reps    Access Code: [email or web address]  URL: [email or web address]  Date: 12/24/2024  Prepared by: Chen Kay    Exercises  - Supine Shoulder Flexion Extension AAROM with Dowel  - 1-2 x daily " - 5 reps - 10 hold  - Supine Shoulder External Rotation with Dowel (Mirrored)  - 1-2 x daily - 5 reps - 10 hold  - Sidelying Shoulder Abduction Full Range of Motion  - 1-2 x daily - 10 reps - 3 sec hold  - Modified Posterior Capsule Stretch (Mirrored)  - 1-2 x daily - 5 reps - 10 hold  - Standing Shoulder Extension AAROM with Dowel  - 1-2 x daily - 10 reps - 3 hold  - Shoulder External Rotation and Scapular Retraction with Resistance  - 1 x daily - 7 x weekly - 2 sets - 10 reps  - Standing Shoulder Row with Anchored Resistance  - 1 x daily - 7 x weekly - 2 sets - 10 reps  - Shoulder extension with resistance - Neutral  - 1 x daily - 7 x weekly - 2 sets - 10 reps

## 2025-01-16 ENCOUNTER — EVALUATION (OUTPATIENT)
Dept: PHYSICAL THERAPY | Facility: CLINIC | Age: 80
End: 2025-01-16
Payer: MEDICARE

## 2025-01-16 DIAGNOSIS — M25.511 CHRONIC RIGHT SHOULDER PAIN: Primary | ICD-10-CM

## 2025-01-16 DIAGNOSIS — G89.29 CHRONIC RIGHT SHOULDER PAIN: Primary | ICD-10-CM

## 2025-01-16 PROCEDURE — 97110 THERAPEUTIC EXERCISES: CPT | Performed by: PHYSICAL THERAPIST

## 2025-01-16 PROCEDURE — 97112 NEUROMUSCULAR REEDUCATION: CPT | Performed by: PHYSICAL THERAPIST

## 2025-01-16 NOTE — PROGRESS NOTES
PT Discharge    Today's date: 2025  Patient name: Frantz Vasquez  : 1945  MRN: [id number]  Referring provider: Melissa Coery MD  Dx:   Encounter Diagnosis     ICD-10-CM    1. Chronic right shoulder pain  M25.511     G89.29                      Assessment    Assessment details: 2025: Pt has attended 10 skilled PT visits and has restored full ROM R shoulder and full strength leading to exceeding projected FOTO score and no subjectively reported remaining limitations. He has been advised to continue his HEP 2-3x/week to keep his shoulder limber and is appropriate to discharge to HEP today.    2024: Frantz Vasquez is a 79 y.o. male who presents with pain, decreased strength, decreased ROM, decreased joint mobility, and postural dysfunction. Due to these impairments, patient has difficulty performing ADL's, recreational activities, lifting/carrying, reaching and sleeping. Patient's clinical presentation is consistent with their referring diagnosis of Chronic right shoulder pain. His presentation is consistent w/ GH joint dysfunction following surgery almost a year ago and will benefit from progressive ROM. Strength testing mildly limited w/ negative empty can testing. Pain occurs at end range shoulder ROM and does not linger, cervical derangement not suspected.  Plan: Ambulatory Referral to Physical Therapy  Patient has been educated in home exercise program and plan of care. Patient would benefit from skilled physical therapy services to address their aforementioned functional limitations and progress towards prior level of function and independence with home exercise program.       Goals  Short Term Goals to be met in 4 weeks (2025) - met all STG's 2025  1. Pt to be independent w/ prelimary HEP  2. PROM R shoulder to be within 5 degrees of L shoulder for flexion & abd; within 10 deg for ER/IR to prepare for full AROM.  3. Pt to report at least 50% improvement in donning a jacket.  4.  Improve AROM flexion and abduction to 140 and 125 deg or better respectively to improve subjective function by at least 25%.    Long Term Goals to be met in 12 weeks (3/10/2024) - met all LTG's 2025  1. AROM R shoulder to be within 5 degrees of L shoulder w/o pain to allow ease w/ ADL's and IADL's  2. Improve strength to 4+/5 or better to allow lifting, reaching and carrying w/o c/o.  3. Undisturbed sleep, including if pt rolls onto R side.  4. To report ease w/ functional ER/IR and OH reaching motions to achieve FOTO score of 68 or better.      Plan    Planned therapy interventions: home exercise program    Frequency: 2x week (2-3x/week)  Duration in weeks: 12  Plan of Care beginning date: 2024  Plan of Care expiration date: 3/10/2025  Treatment plan discussed with: patient  Plan details:             Subjective Evaluation    History of Present Illness  Mechanism of injury: Subjective:   2025: Pt reports his shoulder is feeling much better, much looser and no remaining functional ROM loss, including putting on a jacket. He states he feels ready for discharge to [facility].    2024: Pt reports chronic R shoulder pain w/ putting on a jacket (horiz abd behind body), and noticed recently pain w/ pull starting his  and with having to put his R arm in 90/90 position ER/abd for imaging study. He states these motions are limited in mobility and painful at end range. He did have similar symptoms approximately 5 years ago which responded well to PT at that time.  He denies paresthesias. Pain is in superior/anterior R shoulder and lateral arm and described as sharp.  Pain  At best pain ratin  At worst pain ratin  Progression: resolved    Social Support    Employment status: not working  Hand dominance: right  Life stress: retired but very active          Objective    Objective    Posture:   2025: minimal forward head and thoracic kyphosis  2024: mod forward head and thoracic  kyphosis     AROM: standing R  R  L     2024  Shoulder flex        155  140*  145  Shoulder abd       155  100*  175  ER @90 abd  85  60*  85  Functional IR  L1  L5*  T9      PROM: supine  R  R  L     2024  Shoulder flex        175  150*  175  Shoulder abd       175  130*  180  ER @ 90 abd  90  65*  85  IR @ 90 abd  48  10*  50  Shld exten (stand) 85  65*  85    Cervical AROM loss:    2024  Flexion: Nil   nil     Extension:  Nil   Min/mod    R rotation:  Nil   nil    L rotation:  Nil   Min 10%    R Sidebend:  Min 25%  Min 30%    L Sidebend: Mod 50%  Mod 50%    Retraction:  Nil   Min 25%       MMT:    R  R  L     2024  Shoulder flex  5/5  4+/5  5/5  Shoulder abd (C5) 5/5  4/5  4+/5  Internal Rotation 5/5  5/5  5/5  External Rotation 5/5  4+/5  5/5  Elbow flex (bicep C6) 5/5  4+/5  5/5  Elbow ext (tricep C7) 5/5  5/5  5/5    MECHANICAL ASSESSMENT:   2024: not performed - s/s most consistent w/ shld dysfunction     DERMATOMAL TESTIN2024: WNL C2-T1 B/L    Tenderness/Palpation:  2025: no remaining pain w/ end range movements   No remaining TTP R LHB  2024: + TTP R LBP and posterior shld infraspinatus   Pain w/ end range movements w/ tight end feel    JOINT MOBILITY:  2025: GH mobility R shld now WNL  2024: GH joint mobility mod nascimento infer/ post glide R    FUNCTION:  2025: Pt is no longer limited with reaching overhead in flexion    Elevation in abd plane no longer limited  No remaining pain w/ functional ER/IR behind plane of body; IR remains very slightly limited ROM vs L UE which may be normal   Can no lie on right shoulder; sleep no longer disturbed   No remaining issues w/ OH lifting  2024: Pt is limited with  reaching overhead in flexion - limited and painful   Elevation in abd plane limited to 100 deg so pt cannot reach OH in this plane   [place] to functional ER/IR  behind plane of body   Cannot lie on right shoulder; sleep is disturbed   Reports weakness w/ OH lifting       Precautions: hx of melanoma and some lymph nodes removed Jan 2024  Past Medical History:   Diagnosis Date    Chronic cough     RESOLVED: [id number]    Coronary artery disease     Diabetes mellitus (HCC)     Diabetic retinopathy (HCC)     HLD (hyperlipidemia)     HTN (hypertension)     Hypertension     Kidney stone       Past Surgical History:   Procedure Laterality Date    CARDIAC CATHETERIZATION  12/09/2019    HAND SURGERY Left     HEMICOLOECTOMY W/ ANASTOMOSIS N/A 03/25/2024    Procedure: RESECTION COLON RIGHT LAPAROSCOPIC W/ ROBOTICS;  Surgeon: Anders Torres MD;  [facility];  Service: Colorectal    LYMPH NODE BIOPSY Right 11/24/2023    Procedure: BIOPSY LYMPH NODE SENTINEL, LYMPHATIC MAPPING (Inject: 7:30 am in Nuc Med by radiologist);  Surgeon: Issac Triana MD;  [facility];  Service: Surgical Oncology    ME CORONARY ARTERY BYP W/VEIN & ARTERY GRAFT 3 VEIN N/A 12/13/2019    Procedure: CORONARY ARTERY BYPASS GRAFT (CABG) 4 VESSELS STEVEN to LAD ; SVG--> PDA , DIAGONAL, and OM;  Surgeon: Elvin Huertas DO;  [facility];  Service: Cardiac Surgery    ME ECHO TRANSESOPHAG R-T 2D W/PRB IMG ACQUISJ I&R N/A 12/13/2019    Procedure: TRANSESOPHAGEAL ECHOCARDIOGRAM (SHEBA);  Surgeon: Elvin Huertas DO;  [facility];  Service: Cardiac Surgery    ME NDSC SURG W/VIDEO-ASSISTED HARVEST VEIN CABG Left 12/13/2019    Procedure: HARVEST VEIN ENDOSCOPIC (EVH);  Surgeon: Elvin Huertas DO;  [facility];  Service: Cardiac Surgery    ME STRTCTC CPTR ASSTD PX EXTRADURAL CRANIAL N/A 01/31/2024    Procedure: RIGHT FUNCTIONAL ENDOSCOPIC SINUS SURGERY (FESS) IMAGED GUIDED, MAXILLARY, ETHMOIDS, FRONTALS;  Surgeon: Tez Bah MD;  [facility];  Service: ENT    PROSTATE CRYOABLATION  09/18/2024    AQUABLATION    SKIN LESION EXCISION Right 11/24/2023    Procedure:  "WIDE EXCISION MELANOMA RIGHT ARM;  Surgeon: Issac Triana MD;  [facility];  Service: Surgical Oncology   SOC: 12/16/2024  FOTO: 1/13/2025  POC EXP: 3/10/2025  DAILY TREATMENT LOG:  date 1/3/2025 1/6/2024 1/9/2024 1/13/2025  FOTO 1/16/2025  RE/FOTO    Visit #/auth 6 [id number] [phone]  9 10   Manual                        Neuro Re-Ed 20' 25'  25'  20' 20'   UBE  L2 4' alt every min fwd/bkwd L2 4' alt every min fwd/bkwd  L3 4' alt every min fwd/bkwd  L3 4' alt every min fwd/bkwd L3 4' alt every min fwd/bkwd   B/L shld ER w/ scap set Grn 2x10 GTB 2x10  High row row to ER in 90 abd red tubing 1x10  High row to ER in 90 abd; red tubing 1x10; 2x; cues for form High row to ER in 90 abd; red tubing; 1x10; 2x   B/L shld exten w/ scap set Blue tubing 2x10 [place] 10# 2x10  Blue tubing 2x10      B/L low row w/ scap set  Grey tubing 2x10 [place] 10# 2x10 Grey tubing 2x10      Standing RS in 90 flex/abd   Stand w/ ball on wall @90 flex/abd 1# @ wrist: 1x10 cw/ccw; 2x Supine RS w/ 2 # DB 2x10 ea cw/ccw      Standing RS back against wall: 90/90 posit circles w/ small ball    Cw/ccw 10x ea; 2x Cw/ccw 10x ea; 2x   Body blade IR/ER in neural elbow 90    10\"x3; 2x  15\"x2   Body blade horz in 90 flex    10\"x3; 2x    15\"x2   Wall clocks  YTB@wrists 2x5 R/L YTB@wrists 2x5 R/L       Serratus roll ups   YTB @ wrists 1x10; 2x   RTB @ wrists 1x10; 2x RTB @ wrists 1x10; 2x   Stand against wall B/L Habd   Green tubing 2x10  Grn TB 2x10 Grn TB 2x10   Prone Habd R uni 1x10; 2x R uni 1# 1x10; 2x       Ther Ex 25' 15'  15'  25' 25'   Supine B/L shld flex w/ cane 2# 5\"x10       Supine R shld ER w/ cane @90 abd w/ towel roll @ elbow    2# on cane 10\"x5 2# on cane 10\"x5   Supine AROM flex  2# 1x10 2# 1x10 2# 1x10 2# 5\"x10 2# 5\"x10   Supine shoulder horz abd  2# 1x10 2# 1x10   2# 1x15    Side lying R shld abd 2# 1x10; 2x 2# 1x10; 2x  2# 2x10  2# 5\"x10 2# 5\"x10   IR w/ SOS R 20\"x3 20\"x3  20\"x3  20\"x3 20\"x3   Stand R shld exten w/ cane 5\"x10 10\"x5       Stand " "B/L UE flex against wall Alt B/L flex/abd against wall 2# DB's; 2x5       Uni corner ER stretch R 20\"x3 20\"x3  B/L corner 20\"x3  B/L corner 20\"x3 B/L corner   20\"x3   OH pball wall dribble 30\"; 2x 30\"x2  30\"x3      Uni OH carry 4# DB 30\"; 2x 4# DB 30\"; 2x 4# DB 30\"x2  4# 30\"X2 5# 30\"x2   Inclined plank on plinth alt shld taps 1x10 R/L; 2x 1x10 R/L; 2x   Incline push up 1x7; 2x Incline push up 1x7; 2x   Stand open books    1x10 ea way 1x10 ea way   HEP         Ther Activity                        Gait Training                        Modalities                        HEP:  Access Code: [email or web address]  URL: [email or web address]  Date: 01/13/2025  Prepared by: Chrissy Ornelas    Exercises  - Supine Shoulder Flexion Extension AAROM with Dowel  - 1-2 x daily - 5 reps - 10 hold  - Supine Shoulder External Rotation with Dowel (Mirrored)  - 1-2 x daily - 5 reps - 10 hold  - Sidelying Shoulder Abduction Full Range of Motion  - 1-2 x daily - 10 reps - 5 sec hold  - Shoulder External Rotation and Scapular Retraction with Resistance  - 1 x daily - 7 x weekly - 2 sets - 10 reps  - Standing Shoulder Horizontal Abduction with Resistance  - 1 x daily - 2 sets - 10 reps  - Corner Pec Major Stretch  - 1 x daily - 7 x weekly - 3 reps - 20 hold  - Standing Thoracic Open Book at Wall  - 1 x daily - 10 reps  - Serratus Activation at Wall with Foam Roll and Resistance Band  - 1 x daily - 2 sets - 10 reps           "

## 2025-01-23 ENCOUNTER — VBI (OUTPATIENT)
Dept: ADMINISTRATIVE | Facility: OTHER | Age: 80
End: 2025-01-23

## 2025-01-23 DIAGNOSIS — I10 BENIGN ESSENTIAL HYPERTENSION: Chronic | ICD-10-CM

## 2025-01-23 NOTE — TELEPHONE ENCOUNTER
01/23/25 3:47 PM     Chart reviewed for Immunization(s) Influenza  was/were submitted to the patient's insurance.     Skylar Barkley MA   [facility]

## 2025-01-24 ENCOUNTER — VBI (OUTPATIENT)
Dept: ADMINISTRATIVE | Facility: OTHER | Age: 80
End: 2025-01-24

## 2025-01-24 RX ORDER — METOPROLOL SUCCINATE 50 MG/1
50 TABLET, EXTENDED RELEASE ORAL DAILY
Qty: 90 TABLET | Refills: 3 | Status: SHIPPED | OUTPATIENT
Start: 2025-01-24

## 2025-01-24 RX ORDER — HYDROCHLOROTHIAZIDE 12.5 MG/1
12.5 TABLET ORAL DAILY
Qty: 90 TABLET | Refills: 3 | Status: SHIPPED | OUTPATIENT
Start: 2025-01-24

## 2025-01-28 ENCOUNTER — VBI (OUTPATIENT)
Dept: ADMINISTRATIVE | Facility: OTHER | Age: 80
End: 2025-01-28

## 2025-01-28 NOTE — TELEPHONE ENCOUNTER
01/28/25 10:37 AM     Chart reviewed for Preventive Care and Screening: Screening for Depression and Follow-Up Plan was/were submitted to the patient's insurance.     Norma Soares MA   [facility]

## 2025-02-04 ENCOUNTER — VBI (OUTPATIENT)
Dept: ADMINISTRATIVE | Facility: OTHER | Age: 80
End: 2025-02-04

## 2025-02-04 NOTE — TELEPHONE ENCOUNTER
02/04/25 2:29 PM     Chart reviewed for Blood Pressure was/were not submitted to the patient's insurance.     Sugey Peng MA   [facility]

## 2025-02-07 ENCOUNTER — VBI (OUTPATIENT)
Dept: ADMINISTRATIVE | Facility: OTHER | Age: 80
End: 2025-02-07

## 2025-02-07 NOTE — TELEPHONE ENCOUNTER
02/07/25 11:02 AM     Chart reviewed for PREV-13: Statin Therapy for the Prevention and Treatment ofCardiovascular  Disease was/were submitted to the patient's insurance.     Mary Olmos MA   [facility]

## 2025-02-11 ENCOUNTER — VBI (OUTPATIENT)
Dept: ADMINISTRATIVE | Facility: OTHER | Age: 80
End: 2025-02-11

## 2025-02-20 ENCOUNTER — VBI (OUTPATIENT)
Dept: ADMINISTRATIVE | Facility: OTHER | Age: 80
End: 2025-02-20

## 2025-02-21 DIAGNOSIS — I10 BENIGN ESSENTIAL HYPERTENSION: Chronic | ICD-10-CM

## 2025-02-21 RX ORDER — AMLODIPINE BESYLATE 5 MG/1
5 TABLET ORAL DAILY
Qty: 30 TABLET | Refills: 0 | Status: SHIPPED | OUTPATIENT
Start: 2025-02-21

## 2025-03-06 ENCOUNTER — TELEPHONE (OUTPATIENT)
Dept: GASTROENTEROLOGY | Facility: CLINIC | Age: 80
End: 2025-03-06

## 2025-03-06 NOTE — TELEPHONE ENCOUNTER
I called & lvm for the patient in regards to recall for 1 year repeat colonoscopy with Dr. Fuentes. I will send a MYC letter also.

## 2025-03-07 ENCOUNTER — TELEPHONE (OUTPATIENT)
Age: 80
End: 2025-03-07

## 2025-03-07 NOTE — TELEPHONE ENCOUNTER
Pt called concerned there are duplicate lab orders for Albumin / creatinine urine ratio. Pt also has a reschedule appt next to his upcoming appt with Dr Corey, no appts until June, pt would like to come in sooner . Please call pt if you an get him in sooner. Thank you   
Spoke to patient. All clarified.   Patient will keep his 3/26/25 appointment    
No

## 2025-03-12 ENCOUNTER — TELEPHONE (OUTPATIENT)
Age: 80
End: 2025-03-12

## 2025-03-12 NOTE — Clinical Note
Frantz Vasquez  [address] [place] [id number]        ------------------------------------------------------------------------------------------------------------

## 2025-03-12 NOTE — TELEPHONE ENCOUNTER
Patients GI provider:  Dr. DOSS    Number to return call: ( [phone]    Reason for call: Pt calling questioning if appointment 4/29/2025 is necessary. His last OV was 11/01/2025 and was told he must have a follow up colonoscopy after. Please contact patient to further assist.

## 2025-03-12 NOTE — LETTER
Frantz Vasquez  [address] [place] [address]      Location:  [address] [address]    Performing Physician: Anders Hwang MD      DATE OF PROCEDURE: March 31, 2025 TIME OF PROCEDURE:     TBD                            The OR/GI Lab will contact you the evening prior to your procedure with your exact arrival time.    We kindly ask that you immediately notify us of any need to cancel or reschedule your procedure.      WEEK BEFORE THE PROCEDURE:  Do not take Iron tablets for one week  5 days prior to the appointment AVOID vegetables and fruits with skins or seeds, nuts, corn, popcorn, and whole grain breads.  Purchase: One (1) 238-gram container of Miralax (polyethylene glycol 3350), four (4) 5 mg Dulcolax (bisacodyl) tablets, and 64-ounces of Gatorade (sports drink) - no red, orange, or purple. These may be purchased at any pharmacy without a prescription. Generic products are permissible.  Arrange responsible transportation for day of the procedure.      DAY BEFORE THE PROCEDURE:  CLEAR liquids only for entire day prior. Nothing red, orange or purple.  You MAY have:  Soda  Water  Broth Gatorade  Jell-O  Popsicles Coffee/tea without  Milk/creamer     YOU MAY NOT HAVE:  Solid foods  Milk and milk products  Juice with pulp    BOWEL PREPARATION: Includes: One (1) 238-gram container of Miralax (polyethylene glycol 3350), four (4) 5 mg Dulcolax (bisacodyl) tablets, and 64-ounces of Gatorade (sports drink). Preparation may be refrigerated. Entire bowel prep should be completed.    Afternoon before the procedure (2:00 pm - 5:00 pm):  Take two (2) 5 mg Dulcolax laxative tablets.    Evening before the procedure (6:00 pm):  Mix entire container of Miralax with 64-ounces of Gatorade and shake until all medication is dissolved.  Begin drinking solution. Drink an eight (8) ounce cup every 10-15 minutes until you have consumed half (32 ounces) of the solution. Refrigerate remaining  solution.    Night before the procedure (8:00 pm):  Take two (2) 5 mg Dulcolax laxative tablets.    Beginning 5 hours before your procedure:  Drink the remaining amount of prepared solution (32 ounces). Drink an eight (8) ounce cup every 10-15 minutes until you have consumed the remaining solution.      Bowel prep should be completed 4 hours prior to procedure time.  NOTHING TO EAT OR DRINK AFTER MIDNIGHT -EXCEPT YOUR BOWEL PREP                                                                                                                                                                                DAY OF THE PROCEDURE:  You may brush your teeth.  Leave all jewelry at home. Take out any facial piercings, if able.  Please arrive for your procedure as indicated by the OR / GI Lab / Endoscopy Unit. The hospital will contact you the day before with your exact arrival time.  Make sure you have arranged ahead of time for a responsible adult (18 or older) to accompany and drive you home after the procedure. Please discuss any transportation concerns with our staff prior to your procedure.  The effects of the anesthesia can persist for 24 hours. After receiving the sedation, you must exercise caution before engaging in any activity that could harm yourself and others (such as driving a car). Do not make any important decisions or do not drink any alcoholic beverages during this time period.  After your procedure, you may have anything you’d like to eat or drink. You will probably want to start with something light. Please include plenty of fluids. Avoid items that cause gas such as sodas and salads.      SPECIAL INSTRUCTIONS:    For patients currently taking blood thinners and/or antiplatelet therapy our office will contact the prescribing provider. Our office will contact you with any required changes to your medication regimen.  Blood thinner (i.e. - Coumadin, Pradaxa, Lovenox, Xarelto, Eliquis)  Antiplatelet (i.e. -  Plavix, Aggrenox, Effient, Brilinta)  Diabetes:  If you are Diabetic, please see separate Diabetic Instruction Sheet.  Prescribed medications:  Do not stop your aspirin, or any of your other medications (unless instructed otherwise).  Take the rest of your prescribed medications with small sips of water at least 2 hours prior to your procedure.  NOTHING TO EAT OR DRINK (INCLUDING CHEWING GUM) AFTER 12 MIDNIGHT PRIOR TO YOUR PROCEDURE EXCEPT YOUR BOWEL PREP.        Medicine Instructions for Adults with Diabetes who Need a Bowel Prep       Follow these instructions when a BOWEL PREP is required for your procedure or surgery!    NOTE:   GLP-1 Agonists taken weekly: do not take in the 7 days before your procedure   SGLT-2 Inhibitors: do not take in the 4 days before your procedure     On the Day Before Surgery/Procedure  If you are having a procedure (e.g. Colonoscopy) or surgery that requires a bowel prep and you may have at least a clear liquid diet, follow the directions below based on the type of medicine you take for your diabetes.     Type of Medicine You Take Examples What to do   Pre-Mixed Insulin - Intermediate Acting Humalog® 75/25, Humulin® 70/30, Novolog® 70/30, Regular Insulin Take ½ your regular dose the evening before your procedure   Rapid/Fast Acting Insulin Humalog® U200, NovoLog®, Apidra®, Fiasp® Take ½ your regular dose the evening before your procedure.   Long-Acting Insulin Lantus®, Levemir®, Tresiba®, Toujeo®, Basaglar® Take your FULL regular dose the day before procedure   Oral Sulfonylurea Glipizide/Glimepiride/Glucotrol® Take ½ your regular dose the evening before your procedure   Other Oral Diabetes Medicines Metformin®, Glucophage®, Glucophage XR®, Riomet®, Glumetza®), Actose®, Avandia®, Glyset®, Prandin® Take your regular dose with dinner in the evening before your procedure   GLP-1 Agonists AdlyxinÒ, ByettaÒ, BydureonÒ, OzempicÒ, SoliquaÒ, TanzeumÒ, TrulicityÒ, VictozaÒ, Saxenda®, Rybelsus®  If taken daily, take as normal    If taken weekly, do not take this medicine for 7 days before your procedure including the day of the procedure (resume taking after the procedure)   SGLT-2 Inhibitors Jardiance®, Invokana®, Farxiga®,   Steglatro®, Brenzavvy®, Qtern®, Segluromet®, Glyxambi®, Synjardy®, Synjardy XR®, Invokamet®, Invokamet XR®, Trijary XR®, Xigduo XR®, Steglujan® Do not take for 4 days before your procedure including the day of the procedure (resume taking after the procedure)              More information continued on back        Medicine Instructions for Adults with Diabetes who Need a Bowel Prep  Page 2      On the Day of Surgery/Procedure  Follow the directions below based on the type of medicine you take for your diabetes.     Type of Medicine You Take Examples What to do   Long-Acting Insulin Lantus®, Levemir®, Tresiba®, Toujeo®, Basaglar®, Semglee®   If you usually take your Long-Acting Insulin in the morning, take the full dose as scheduled.   GLP-1 Agonists AdlyxinÒ, ByettaÒ, BydureonÒ, OzempicÒ, SoliquaÒ, TanzeumÒ, TrulicityÒ, VictozaÒ, Saxenda®, Rybelsus® Do NOT take this medicine on the day of your procedure (resume taking after the procedure)       On the Day of Surgery/Procedure (continued)  Except for the morning Long-Acting Insulin, DO NOT take ANY diabetic medicine on the day of your procedure unless you were instructed by the doctor who manages your diabetes medicines.    Continue to check your blood sugars.  If you have an insulin pump, ask your endocrinologist for instructions at least 3 days before your procedure. NOTE: If you are not able to ask your endocrinologist in advance, on the day of the procedure set your insulin pump to your basal rate only. Bring your insulin pump supplies to the hospital.     If you have any questions about taking your diabetes medicines prior to your procedure, please contact the doctor who manages your diabetes medicines.

## 2025-03-12 NOTE — TELEPHONE ENCOUNTER
3/20/24 PAKO Fuentes personal history of colon ca, 1 yr recall.  Patient requested Dr Torres perform his colonoscopy.    3/31/25 MISSAEL FARIAS instructions mailed to patient

## 2025-03-14 NOTE — PROGRESS NOTES
Assessment and Plan:     Problem List Items Addressed This Visit    None         Depression Screening and Follow-up Plan: Patient was screened for depression during today's encounter  They screened negative with a PHQ-2 score of 0  Preventive health issues were discussed with patient, and age appropriate screening tests were ordered as noted in patient's After Visit Summary  Personalized health advice and appropriate referrals for health education or preventive services given if needed, as noted in patient's After Visit Summary  History of Present Illness:     Patient presents for a Medicare Wellness Visit    [de-identified] years young gentleman is here for his Medicare wellness visit is doing well no new complaints he was recently seen and the blood workup was ordered he has a follow-up appointment review of system is essentially unremarkable plan is to continue with the present management and follow-up on the regular appointment     Patient Care Team:  Silvia Orosco MD as PCP - General  Minerva Cummins MD     Review of Systems:     Review of Systems   Constitutional: Negative for appetite change, fatigue and fever  HENT: Negative for congestion, ear pain, hearing loss, nosebleeds, sneezing, tinnitus and voice change  Eyes: Negative for pain, discharge and redness  Respiratory: Negative for cough, chest tightness and wheezing  Cardiovascular: Negative for chest pain, palpitations and leg swelling  Gastrointestinal: Negative for abdominal pain, blood in stool, constipation, diarrhea, nausea and vomiting  Genitourinary: Positive for frequency (Frequency and urgency is better since he was started on Flomax by his urologist)  Negative for difficulty urinating, dysuria, hematuria and urgency  Musculoskeletal: Negative for arthralgias, back pain, gait problem and joint swelling  Skin: Negative for rash and wound  Allergic/Immunologic: Negative for environmental allergies     Neurological: Negative for dizziness, tremors, seizures, weakness, light-headedness and numbness  Hematological: Negative for adenopathy  Does not bruise/bleed easily  Psychiatric/Behavioral: Negative for behavioral problems and confusion  The patient is not nervous/anxious  Problem List:     Patient Active Problem List   Diagnosis   • Benign essential hypertension   • Type 2 diabetes mellitus ([facility] )   • PSA elevation   • Hypercholesteremia   • Diabetic retinopathy ([place] [address] )   • Seasonal allergies   • 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 without angina pectoris   • Overweight (BMI 25 0-29  9)      Past Medical and Surgical History:     Past Medical History:   Diagnosis Date   • Chronic cough     RESOLVED: [id number]   • Coronary artery disease    • Diabetes mellitus ([place] [address] )    • Diabetic retinopathy ([place] [address] )    • HLD (hyperlipidemia)    • HTN (hypertension)      Past Surgical History:   Procedure Laterality Date   • CARDIAC CATHETERIZATION  12/09/2019   • HAND SURGERY     • KS CABG, ARTERY-VEIN, THREE N/A 12/13/2019    Procedure: CORONARY ARTERY BYPASS GRAFT (CABG) 4 VESSELS STEVEN to LAD ; SVG--> PDA , DIAGONAL, and OM;  Surgeon: Francies Homans, DO;  Location: [facility];  Service: Cardiac Surgery   • KS ECHO TRANSESOPHAG R-T 2D W/PRB IMG ACQUISJ I&R N/A 12/13/2019    Procedure: TRANSESOPHAGEAL ECHOCARDIOGRAM (SHEBA); Surgeon: Francies Homans, DO;  Location[facility];  Service: Cardiac Surgery   • KS ENDOSCOPY W/VIDEO-ASST VEIN HARVEST,CABG Left 12/13/2019    Procedure: HARVEST VEIN ENDOSCOPIC ([address]);   Surgeon: Francies Homans, DO;  Location: [facility];  Service: Cardiac Surgery      Family History:     Family History   Problem Relation Age of Onset   • Heart failure Mother    • Heart failure Father    • Heart disease Father    • Heart attack Father    • Diabetes Other       Social History:     Social History     Socioeconomic History   • Marital status: /Civil Union     Spouse name: None   • Number of children: None   • Years of education: None   • Highest education level: None   Occupational History   • None   Tobacco Use   • Smoking status: Never Smoker   • Smokeless tobacco: Never Used   Vaping Use   • Vaping Use: Never used   Substance and Sexual Activity   • Alcohol use: Yes     Alcohol/week: 5 0 standard drinks     Types: 3 Glasses of wine, 1 Cans of beer, 1 Standard drinks or equivalent per week     Comment: occasional   • Drug use: No   • Sexual activity: Not Currently     Partners: Female   Other Topics Concern   • None   Social History Narrative   • None     Social Determinants of Health     Financial Resource Strain: Low Risk    • Difficulty of Paying Living Expenses: Not hard at all   Food Insecurity: Not on file   Transportation Needs: No Transportation Needs   • Lack of Transportation (Medical): No   • Lack of Transportation (Non-Medical): No   Physical Activity: Not on file   Stress: Not on file   Social Connections: Not on file   Intimate Partner Violence: Not on file   Housing Stability: Not on file      Medications and Allergies:     Current Outpatient Medications   Medication Sig Dispense Refill   • amLODIPine (NORVASC) 5 mg tablet Take 1 tablet (5 mg total) by mouth in the morning  90 tablet 3   • aspirin 81 MG tablet Take 1 tablet (81 mg total) by mouth daily 90 tablet 3   • atorvastatin (LIPITOR) 80 mg tablet Take 1 tablet (80 mg total) by mouth daily with dinner 90 tablet 1   • Calcium Carbonate (CALCIUM 500 PO) Take by mouth daily gummies     • Cholecalciferol (Vitamin D) 125 MCG (5000 UT) CAPS Take by mouth daily     • Glucosamine-Chondroitin (GLUCOSAMINE CHONDR COMPLEX PO) Take 1 capsule by mouth 2 (two) times a day     • metFORMIN (GLUCOPHAGE) 1000 MG tablet Take 1 tablet (1,000 mg total) by mouth 2 (two) times a day 180 tablet 1   • metoprolol succinate (TOPROL-XL) 50 mg 24 hr tablet Take 1 tablet (50 mg total) by mouth in the morning   90 tablet 3   • Omega-3 Fatty Acids [TextBox_4] : 55-year-old male with morbid obesity and severe COPD.  He has been doing fairly well able to get around at home with his walker.  He has chronic pain in his hips back and knees and is followed by pain management.  The last 2 weeks she has had some increasing chest congestion with difficulty raising yellow sputum.  No fever or chills.  He is on Ozempic but his weight is unchanged at 340.  Overall he has been doing fairly well.  He is unable to get out of the house because of his chronic pain syndrome.  His current FEV1 1.14 L or 29% of predicted with an FVC of 1.96 L or 36% of predicted.  He also has a very large ventral hernia which has not been repaired.  He needs renewal on Trelegy and albuterol. Current 02 sat 93 on ra. at rest.  (FISH OIL) 1200 MG CAPS Take 1 capsule by mouth daily     • tamsulosin (FLOMAX) 0 4 mg Take 1 capsule (0 4 mg total) by mouth daily with dinner 90 capsule 3     No current facility-administered medications for this visit  Allergies   Allergen Reactions   • Pollen Extract Allergic Rhinitis      Immunizations:     Immunization History   Administered Date(s) Administered   • COVID-19 MODERNA VACC 0 5 ML IM 01/29/2021, 02/26/2021, 10/27/2021, 10/13/2022   • COVID-19, unspecified 01/29/2021, 02/26/2021   • INFLUENZA 10/19/2015, 12/02/2016, 12/20/2017, 11/12/2018   • Influenza Split High Dose Preservative Free IM 09/24/2014, 10/19/2015, 12/02/2016, 12/20/2017   • Influenza, high dose seasonal 0 7 mL 11/12/2018, 09/30/2019, 10/12/2020, 09/29/2021   • Pneumococcal Conjugate 13-Valent 05/30/2019   • Pneumococcal Polysaccharide PPV23 05/08/2014   • Tdap 06/22/2018   • Zoster 02/12/2014   • Zoster Vaccine Recombinant 06/22/2021, 08/26/2021      Health Maintenance:         Topic Date Due   • Colorectal Cancer Screening  08/30/2025   • Hepatitis C Screening  Completed         Topic Date Due   • Influenza Vaccine (1) 09/01/2022      Medicare Screening Tests and Risk Assessments:     Mary Reyes is here for his Subsequent Wellness visit  Last Medicare Wellness visit information reviewed, patient interviewed and updates made to the record today  Health Risk Assessment:   Patient rates overall health as very good  Patient feels that their physical health rating is same  Patient is very satisfied with their life  Eyesight was rated as same  Hearing was rated as same  Patient feels that their emotional and mental health rating is same  Patients states they are never, rarely angry  Patient states they are never, rarely unusually tired/fatigued  Pain experienced in the last 7 days has been none  Patient states that he has experienced no weight loss or gain in last 6 months       Depression Screening:   PHQ-2 Score: 0      Fall Risk Screening: In the past year, patient has experienced: no history of falling in past year      Home Safety:  Patient does not have trouble with stairs inside or outside of their home  Patient has working smoke alarms and has working carbon monoxide detector  Home safety hazards include: none  Nutrition:   Current diet is Regular  Medications:   Patient is currently taking over-the-counter supplements  OTC medications include: Fish Oil, glucosomine, asprin,probiotics, calcium w vit D & zinc  Patient is able to manage medications  Activities of Daily Living (ADLs)/Instrumental Activities of Daily Living (IADLs):   Walk and transfer into and out of bed and chair?: Yes  Dress and groom yourself?: Yes    Bathe or shower yourself?: Yes    Feed yourself?  Yes  Do your laundry/housekeeping?: Yes  Manage your money, pay your bills and track your expenses?: Yes  Make your own meals?: Yes    Do your own shopping?: Yes    Previous Hospitalizations:   Any hospitalizations or ED visits within the last 12 months?: No      Advance Care Planning:   Living will: No    Durable POA for healthcare: No    Advanced directive: No      Cognitive Screening:   Provider or family/friend/caregiver concerned regarding cognition?: No    PREVENTIVE SCREENINGS      Cardiovascular Screening:    General: Screening Not Indicated and History Lipid Disorder      Diabetes Screening:     General: Screening Not Indicated and History Diabetes      Colorectal Cancer Screening:     General: Screening Current      Prostate Cancer Screening:    General: Screening Current      Osteoporosis Screening:    General: Screening Not Indicated      Abdominal Aortic Aneurysm (AAA) Screening:    Risk factors include: age between 73-69 yo        General: Screening Current      Lung Cancer Screening:     General: Screening Not Indicated      Hepatitis C Screening:    General: Screening Current    Screening, Brief Intervention, and Referral to Treatment (SBIRT)    Screening  Typical number of drinks in a day: 1  Typical number of drinks in a week: 3  Interpretation: Low risk drinking behavior  AUDIT-C Screenin) How often did you have a drink containing alcohol in the past year? 2 to 3 times a week  2) How many drinks did you have on a typical day when you were drinking in the past year? 1 to 2  3) How often did you have 6 or more drinks on one occasion in the past year? never    AUDIT-C Score: 3  Interpretation: Score 0-3 (male): Negative screen for alcohol misuse    Single Item Drug Screening:  How often have you used an illegal drug (including marijuana) or a prescription medication for non-medical reasons in the past year? never    Single Item Drug Screen Score: 0  Interpretation: Negative screen for possible drug use disorder    Other Counseling Topics:   Calcium and vitamin D intake and regular weightbearing exercise  No exam data present     Physical Exam:     Ht 5' 11" (1 803 m)   Wt 73 9 kg (163 lb)   BMI 22 73 kg/m²     Physical Exam  Vitals and nursing note reviewed  Constitutional:       Appearance: He is well-developed  HENT:      Head: Normocephalic and atraumatic  Eyes:      Conjunctiva/sclera: Conjunctivae normal    Cardiovascular:      Rate and Rhythm: Normal rate and regular rhythm  Heart sounds: No murmur heard  Pulmonary:      Effort: Pulmonary effort is normal  No respiratory distress  Breath sounds: Normal breath sounds  Abdominal:      Palpations: Abdomen is soft  Tenderness: There is no abdominal tenderness  Musculoskeletal:      Cervical back: Neck supple  Skin:     General: Skin is warm and dry  Neurological:      Mental Status: He is alert            Saray Zavaleta MD

## 2025-03-17 ENCOUNTER — APPOINTMENT (OUTPATIENT)
Dept: LAB | Facility: CLINIC | Age: 80
End: 2025-03-17
Payer: MEDICARE

## 2025-03-17 DIAGNOSIS — E11.9 TYPE 2 DIABETES MELLITUS WITHOUT COMPLICATION, WITHOUT LONG-TERM CURRENT USE OF INSULIN (HCC): ICD-10-CM

## 2025-03-17 DIAGNOSIS — C18.9 ADENOCARCINOMA, COLON (HCC): ICD-10-CM

## 2025-03-17 DIAGNOSIS — D50.0 IRON DEFICIENCY ANEMIA DUE TO CHRONIC BLOOD LOSS: Chronic | ICD-10-CM

## 2025-03-17 LAB
ANION GAP SERPL CALCULATED.3IONS-SCNC: 7 MMOL/L (ref 4–13)
BASOPHILS # BLD AUTO: 0.04 THOUSANDS/ÂΜL (ref 0–0.1)
BASOPHILS NFR BLD AUTO: 1 % (ref 0–1)
BUN SERPL-MCNC: 23 MG/DL (ref 5–25)
CALCIUM SERPL-MCNC: 9.1 MG/DL (ref 8.4–10.2)
CEA SERPL-MCNC: 3 NG/ML (ref 0–3)
CHLORIDE SERPL-SCNC: 101 MMOL/L (ref 96–108)
CHOLEST SERPL-MCNC: 131 MG/DL (ref ?–200)
CO2 SERPL-SCNC: 32 MMOL/L (ref 21–32)
CREAT SERPL-MCNC: 1.02 MG/DL (ref 0.6–1.3)
CREAT UR-MCNC: 79 MG/DL
EOSINOPHIL # BLD AUTO: 0.24 THOUSAND/ÂΜL (ref 0–0.61)
EOSINOPHIL NFR BLD AUTO: 4 % (ref 0–6)
ERYTHROCYTE [DISTWIDTH] IN BLOOD BY AUTOMATED COUNT: 13.3 % (ref 11.6–15.1)
EST. AVERAGE GLUCOSE BLD GHB EST-MCNC: 143 MG/DL
GFR SERPL CREATININE-BSD FRML MDRD: 70 ML/MIN/1.73SQ M
GLUCOSE P FAST SERPL-MCNC: 134 MG/DL (ref 65–99)
HBA1C MFR BLD: 6.6 %
HCT VFR BLD AUTO: 41 % (ref 36.5–49.3)
HDLC SERPL-MCNC: 41 MG/DL
HGB BLD-MCNC: 13.4 G/DL (ref 12–17)
IMM GRANULOCYTES # BLD AUTO: 0.01 THOUSAND/UL (ref 0–0.2)
IMM GRANULOCYTES NFR BLD AUTO: 0 % (ref 0–2)
LDLC SERPL CALC-MCNC: 78 MG/DL (ref 0–100)
LYMPHOCYTES # BLD AUTO: 1.43 THOUSANDS/ÂΜL (ref 0.6–4.47)
LYMPHOCYTES NFR BLD AUTO: 21 % (ref 14–44)
MCH RBC QN AUTO: 30.7 PG (ref 26.8–34.3)
MCHC RBC AUTO-ENTMCNC: 32.7 G/DL (ref 31.4–37.4)
MCV RBC AUTO: 94 FL (ref 82–98)
MICROALBUMIN UR-MCNC: 69.4 MG/L
MICROALBUMIN/CREAT 24H UR: 88 MG/G CREATININE (ref 0–30)
MONOCYTES # BLD AUTO: 0.69 THOUSAND/ÂΜL (ref 0.17–1.22)
MONOCYTES NFR BLD AUTO: 10 % (ref 4–12)
NEUTROPHILS # BLD AUTO: 4.47 THOUSANDS/ÂΜL (ref 1.85–7.62)
NEUTS SEG NFR BLD AUTO: 64 % (ref 43–75)
NONHDLC SERPL-MCNC: 90 MG/DL
NRBC BLD AUTO-RTO: 0 /100 WBCS
PLATELET # BLD AUTO: 154 THOUSANDS/UL (ref 149–390)
PMV BLD AUTO: 10.8 FL (ref 8.9–12.7)
POTASSIUM SERPL-SCNC: 4.4 MMOL/L (ref 3.5–5.3)
RBC # BLD AUTO: 4.36 MILLION/UL (ref 3.88–5.62)
SODIUM SERPL-SCNC: 140 MMOL/L (ref 135–147)
TRIGL SERPL-MCNC: 58 MG/DL (ref ?–150)
WBC # BLD AUTO: 6.88 THOUSAND/UL (ref 4.31–10.16)

## 2025-03-17 PROCEDURE — 82043 UR ALBUMIN QUANTITATIVE: CPT

## 2025-03-17 PROCEDURE — 80061 LIPID PANEL: CPT

## 2025-03-17 PROCEDURE — 85025 COMPLETE CBC W/AUTO DIFF WBC: CPT

## 2025-03-17 PROCEDURE — 82570 ASSAY OF URINE CREATININE: CPT

## 2025-03-17 PROCEDURE — 80048 BASIC METABOLIC PNL TOTAL CA: CPT

## 2025-03-17 PROCEDURE — 83036 HEMOGLOBIN GLYCOSYLATED A1C: CPT

## 2025-03-17 PROCEDURE — 82378 CARCINOEMBRYONIC ANTIGEN: CPT

## 2025-03-17 PROCEDURE — 36415 COLL VENOUS BLD VENIPUNCTURE: CPT

## 2025-03-18 ENCOUNTER — RESULTS FOLLOW-UP (OUTPATIENT)
Age: 80
End: 2025-03-18

## 2025-03-20 DIAGNOSIS — E11.9 TYPE 2 DIABETES MELLITUS WITHOUT COMPLICATION, WITHOUT LONG-TERM CURRENT USE OF INSULIN (HCC): ICD-10-CM

## 2025-03-21 RX ORDER — METFORMIN HYDROCHLORIDE 750 MG/1
750 TABLET, EXTENDED RELEASE ORAL 2 TIMES DAILY
Qty: 180 TABLET | Refills: 1 | Status: SHIPPED | OUTPATIENT
Start: 2025-03-21

## 2025-03-26 ENCOUNTER — OFFICE VISIT (OUTPATIENT)
Dept: INTERNAL MEDICINE CLINIC | Age: 80
End: 2025-03-26
Payer: MEDICARE

## 2025-03-26 VITALS
WEIGHT: 157.4 LBS | HEIGHT: 70 IN | TEMPERATURE: 97.5 F | OXYGEN SATURATION: 98 % | SYSTOLIC BLOOD PRESSURE: 140 MMHG | HEART RATE: 70 BPM | BODY MASS INDEX: 22.54 KG/M2 | DIASTOLIC BLOOD PRESSURE: 96 MMHG

## 2025-03-26 DIAGNOSIS — I10 BENIGN ESSENTIAL HYPERTENSION: Primary | ICD-10-CM

## 2025-03-26 DIAGNOSIS — E11.319 DIABETIC RETINOPATHY ASSOCIATED WITH CONTROLLED TYPE 2 DIABETES MELLITUS (HCC): ICD-10-CM

## 2025-03-26 DIAGNOSIS — E78.00 HYPERCHOLESTEREMIA: ICD-10-CM

## 2025-03-26 DIAGNOSIS — C18.9 ADENOCARCINOMA, COLON (HCC): ICD-10-CM

## 2025-03-26 PROCEDURE — G2211 COMPLEX E/M VISIT ADD ON: HCPCS | Performed by: INTERNAL MEDICINE

## 2025-03-26 PROCEDURE — 99214 OFFICE O/P EST MOD 30 MIN: CPT | Performed by: INTERNAL MEDICINE

## 2025-03-26 RX ORDER — LOSARTAN POTASSIUM 50 MG/1
50 TABLET ORAL DAILY
Qty: 90 TABLET | Refills: 1 | Status: SHIPPED | OUTPATIENT
Start: 2025-03-26

## 2025-03-26 NOTE — H&P (VIEW-ONLY)
Name: Frantz Vasquez      : 1945      MRN: [id number]  Encounter Provider: Melissa Corey MD  Encounter Date: 3/26/2025   Encounter department: [facility]  :  Assessment & Plan  Benign essential hypertension    Orders:    losartan (COZAAR) 50 mg tablet; Take 1 tablet (50 mg total) by mouth daily    Hypercholesteremia  Lipid panel was excellent       Diabetic retinopathy associated with controlled type 2 diabetes mellitus (HCC)    Lab Results   Component Value Date    HGBA1C 6.6 (H) 2025   To control is reasonable 6.6 no change in the medical    Orders:    Basic metabolic panel; Future    Hemoglobin A1C; Future    Adenocarcinoma, colon (HCC)  Patient is followed up by the colorectal surgery he is scheduled for colonoscopy end of this month his CEA levels were normal            History of Present Illness   Is a very pleasant 79 years young gentleman who is here today for the regular follow-up he is doing well review of system is essentially unremarkable    Patient is here for the follow-up of diabetes type 2.  Hemoglobin A1c is very well controlled it is less than 7 no symptoms of type 2 diabetes mellitus.  Lipid panel, urine for microalbumin, foot examination all up-to-date.  Patient will continue with medications.  We will follow him up as a scheduled.  Discussed in detail with the patient regarding the type 2 diabetes mellitus and importance of blood pressure control, use of ACE/ARB's for the prevention of renal complications.  Hemoglobin A1c is 6.6 is almost in the same range for a long period of time plan is to continue diet exercise and continue the same treatment no change    History of colon cancer CEA levels are negative and also he is scheduled for the colonoscopy as given above    Hypertension blood pressure could be better controlled with increase both systolic and diastolic blood pressure I will start him on a low-dose of a losartan and we will  "follow    Diabetic nephropathy with microalbuminuria although renal function or kidney functions are perfectly normal losartan will help him with this problem and we will follow-up          Review of Systems   Constitutional:  Negative for appetite change, fatigue and fever.   HENT:  Negative for congestion, ear pain, hearing loss, nosebleeds, sneezing, tinnitus and voice change.    Eyes:  Negative for pain, discharge and redness.   Respiratory:  Negative for cough, chest tightness and wheezing.    Cardiovascular:  Negative for chest pain, palpitations and leg swelling.   Gastrointestinal:  Negative for abdominal pain, blood in stool, constipation, diarrhea, nausea and vomiting.   Genitourinary:  Negative for difficulty urinating, dysuria, hematuria and urgency.   Musculoskeletal:  Negative for arthralgias, back pain, gait problem and joint swelling.   Skin:  Negative for rash and wound.   Allergic/Immunologic: Negative for environmental allergies.   Neurological:  Negative for dizziness, tremors, seizures, weakness, light-headedness and numbness.   Hematological:  Negative for adenopathy. Does not bruise/bleed easily.   Psychiatric/Behavioral:  Negative for behavioral problems and confusion. The patient is not nervous/anxious.        Objective   /96 (BP Location: Left arm, Patient Position: Sitting, Cuff Size: Standard)   Pulse 70   Temp 97.5 °F (36.4 °C) (Temporal)   Ht 5' 10\" (1.778 m)   Wt 71.4 kg (157 lb 6.4 oz)   SpO2 98%   BMI 22.58 kg/m²      Physical Exam  Vitals and nursing note reviewed.   Constitutional:       Appearance: Normal appearance. He is normal weight.   HENT:      Head: Normocephalic and atraumatic.      Right Ear: Tympanic membrane normal.      Left Ear: Tympanic membrane normal.      Nose: Nose normal.      Mouth/Throat:      Mouth: Mucous membranes are moist.   Eyes:      Extraocular Movements: Extraocular movements intact.      Pupils: Pupils are equal, round, and reactive to " light.   Cardiovascular:      Rate and Rhythm: Normal rate and regular rhythm.      Pulses: Normal pulses.      Heart sounds: Normal heart sounds.   Pulmonary:      Effort: Pulmonary effort is normal.      Breath sounds: Normal breath sounds.   Abdominal:      General: Abdomen is flat. Bowel sounds are normal.      Palpations: Abdomen is soft.   Musculoskeletal:         General: No swelling, tenderness or deformity. Normal range of motion.      Cervical back: Normal range of motion and neck supple.   Skin:     General: Skin is warm.      Capillary Refill: Capillary refill takes 2 to 3 seconds.   Neurological:      General: No focal deficit present.      Mental Status: He is alert and oriented to person, place, and time. Mental status is at baseline.   Psychiatric:         Mood and Affect: Mood normal.         Behavior: Behavior normal.

## 2025-03-26 NOTE — PROGRESS NOTES
Name: Frantz Vasquez      : 1945      MRN: [id number]  Encounter Provider: Melissa Corey MD  Encounter Date: 3/26/2025   Encounter department: [facility]  :  Assessment & Plan  Benign essential hypertension    Orders:    losartan (COZAAR) 50 mg tablet; Take 1 tablet (50 mg total) by mouth daily    Hypercholesteremia  Lipid panel was excellent       Diabetic retinopathy associated with controlled type 2 diabetes mellitus (HCC)    Lab Results   Component Value Date    HGBA1C 6.6 (H) 2025   To control is reasonable 6.6 no change in the medical    Orders:    Basic metabolic panel; Future    Hemoglobin A1C; Future    Adenocarcinoma, colon (HCC)  Patient is followed up by the colorectal surgery he is scheduled for colonoscopy end of this month his CEA levels were normal            History of Present Illness   Is a very pleasant 79 years young gentleman who is here today for the regular follow-up he is doing well review of system is essentially unremarkable    Patient is here for the follow-up of diabetes type 2.  Hemoglobin A1c is very well controlled it is less than 7 no symptoms of type 2 diabetes mellitus.  Lipid panel, urine for microalbumin, foot examination all up-to-date.  Patient will continue with medications.  We will follow him up as a scheduled.  Discussed in detail with the patient regarding the type 2 diabetes mellitus and importance of blood pressure control, use of ACE/ARB's for the prevention of renal complications.  Hemoglobin A1c is 6.6 is almost in the same range for a long period of time plan is to continue diet exercise and continue the same treatment no change    History of colon cancer CEA levels are negative and also he is scheduled for the colonoscopy as given above    Hypertension blood pressure could be better controlled with increase both systolic and diastolic blood pressure I will start him on a low-dose of a losartan and we will  "follow    Diabetic nephropathy with microalbuminuria although renal function or kidney functions are perfectly normal losartan will help him with this problem and we will follow-up          Review of Systems   Constitutional:  Negative for appetite change, fatigue and fever.   HENT:  Negative for congestion, ear pain, hearing loss, nosebleeds, sneezing, tinnitus and voice change.    Eyes:  Negative for pain, discharge and redness.   Respiratory:  Negative for cough, chest tightness and wheezing.    Cardiovascular:  Negative for chest pain, palpitations and leg swelling.   Gastrointestinal:  Negative for abdominal pain, blood in stool, constipation, diarrhea, nausea and vomiting.   Genitourinary:  Negative for difficulty urinating, dysuria, hematuria and urgency.   Musculoskeletal:  Negative for arthralgias, back pain, gait problem and joint swelling.   Skin:  Negative for rash and wound.   Allergic/Immunologic: Negative for environmental allergies.   Neurological:  Negative for dizziness, tremors, seizures, weakness, light-headedness and numbness.   Hematological:  Negative for adenopathy. Does not bruise/bleed easily.   Psychiatric/Behavioral:  Negative for behavioral problems and confusion. The patient is not nervous/anxious.        Objective   /96 (BP Location: Left arm, Patient Position: Sitting, Cuff Size: Standard)   Pulse 70   Temp 97.5 °F (36.4 °C) (Temporal)   Ht 5' 10\" (1.778 m)   Wt 71.4 kg (157 lb 6.4 oz)   SpO2 98%   BMI 22.58 kg/m²      Physical Exam  Vitals and nursing note reviewed.   Constitutional:       Appearance: Normal appearance. He is normal weight.   HENT:      Head: Normocephalic and atraumatic.      Right Ear: Tympanic membrane normal.      Left Ear: Tympanic membrane normal.      Nose: Nose normal.      Mouth/Throat:      Mouth: Mucous membranes are moist.   Eyes:      Extraocular Movements: Extraocular movements intact.      Pupils: Pupils are equal, round, and reactive to " light.   Cardiovascular:      Rate and Rhythm: Normal rate and regular rhythm.      Pulses: Normal pulses.      Heart sounds: Normal heart sounds.   Pulmonary:      Effort: Pulmonary effort is normal.      Breath sounds: Normal breath sounds.   Abdominal:      General: Abdomen is flat. Bowel sounds are normal.      Palpations: Abdomen is soft.   Musculoskeletal:         General: No swelling, tenderness or deformity. Normal range of motion.      Cervical back: Normal range of motion and neck supple.   Skin:     General: Skin is warm.      Capillary Refill: Capillary refill takes 2 to 3 seconds.   Neurological:      General: No focal deficit present.      Mental Status: He is alert and oriented to person, place, and time. Mental status is at baseline.   Psychiatric:         Mood and Affect: Mood normal.         Behavior: Behavior normal.

## 2025-03-26 NOTE — ASSESSMENT & PLAN NOTE
Patient is followed up by the colorectal surgery he is scheduled for colonoscopy end of this month his CEA levels were normal

## 2025-03-30 DIAGNOSIS — I10 BENIGN ESSENTIAL HYPERTENSION: Chronic | ICD-10-CM

## 2025-03-30 RX ORDER — SODIUM CHLORIDE, SODIUM LACTATE, POTASSIUM CHLORIDE, CALCIUM CHLORIDE 600; 310; 30; 20 MG/100ML; MG/100ML; MG/100ML; MG/100ML
125 INJECTION, SOLUTION INTRAVENOUS CONTINUOUS
Status: CANCELLED | OUTPATIENT
Start: 2025-03-30

## 2025-03-30 RX ORDER — LIDOCAINE HYDROCHLORIDE 10 MG/ML
0.5 INJECTION, SOLUTION EPIDURAL; INFILTRATION; INTRACAUDAL; PERINEURAL ONCE AS NEEDED
Status: CANCELLED | OUTPATIENT
Start: 2025-03-30

## 2025-03-31 ENCOUNTER — ANESTHESIA (OUTPATIENT)
Dept: GASTROENTEROLOGY | Facility: HOSPITAL | Age: 80
End: 2025-03-31
Payer: MEDICARE

## 2025-03-31 ENCOUNTER — HOSPITAL ENCOUNTER (OUTPATIENT)
Dept: GASTROENTEROLOGY | Facility: HOSPITAL | Age: 80
Setting detail: OUTPATIENT SURGERY
Discharge: HOME | End: 2025-03-31
Attending: COLON & RECTAL SURGERY
Payer: MEDICARE

## 2025-03-31 ENCOUNTER — ANESTHESIA EVENT (OUTPATIENT)
Dept: GASTROENTEROLOGY | Facility: HOSPITAL | Age: 80
End: 2025-03-31
Payer: MEDICARE

## 2025-03-31 VITALS
DIASTOLIC BLOOD PRESSURE: 58 MMHG | SYSTOLIC BLOOD PRESSURE: 128 MMHG | OXYGEN SATURATION: 100 % | HEART RATE: 64 BPM | WEIGHT: 150 LBS | HEIGHT: 70 IN | BODY MASS INDEX: 21.47 KG/M2 | RESPIRATION RATE: 14 BRPM | TEMPERATURE: 98.5 F

## 2025-03-31 DIAGNOSIS — C18.9 ADENOCARCINOMA, COLON (HCC): ICD-10-CM

## 2025-03-31 LAB — GLUCOSE SERPL-MCNC: 95 MG/DL (ref 65–140)

## 2025-03-31 PROCEDURE — 82948 REAGENT STRIP/BLOOD GLUCOSE: CPT

## 2025-03-31 PROCEDURE — G0105 COLORECTAL SCRN; HI RISK IND: HCPCS | Performed by: COLON & RECTAL SURGERY

## 2025-03-31 RX ORDER — PROPOFOL 10 MG/ML
INJECTION, EMULSION INTRAVENOUS CONTINUOUS PRN
Status: DISCONTINUED | OUTPATIENT
Start: 2025-03-31 | End: 2025-03-31

## 2025-03-31 RX ORDER — LIDOCAINE HYDROCHLORIDE 10 MG/ML
INJECTION, SOLUTION EPIDURAL; INFILTRATION; INTRACAUDAL; PERINEURAL AS NEEDED
Status: DISCONTINUED | OUTPATIENT
Start: 2025-03-31 | End: 2025-03-31

## 2025-03-31 RX ORDER — SODIUM CHLORIDE, SODIUM LACTATE, POTASSIUM CHLORIDE, CALCIUM CHLORIDE 600; 310; 30; 20 MG/100ML; MG/100ML; MG/100ML; MG/100ML
125 INJECTION, SOLUTION INTRAVENOUS CONTINUOUS
Status: DISCONTINUED | OUTPATIENT
Start: 2025-03-31 | End: 2025-04-04 | Stop reason: HOSPADM

## 2025-03-31 RX ORDER — PROPOFOL 10 MG/ML
INJECTION, EMULSION INTRAVENOUS AS NEEDED
Status: DISCONTINUED | OUTPATIENT
Start: 2025-03-31 | End: 2025-03-31

## 2025-03-31 RX ORDER — AMLODIPINE BESYLATE 5 MG/1
5 TABLET ORAL DAILY
Qty: 30 TABLET | Refills: 5 | Status: SHIPPED | OUTPATIENT
Start: 2025-03-31

## 2025-03-31 RX ORDER — SODIUM CHLORIDE, SODIUM LACTATE, POTASSIUM CHLORIDE, CALCIUM CHLORIDE 600; 310; 30; 20 MG/100ML; MG/100ML; MG/100ML; MG/100ML
INJECTION, SOLUTION INTRAVENOUS CONTINUOUS PRN
Status: DISCONTINUED | OUTPATIENT
Start: 2025-03-31 | End: 2025-03-31

## 2025-03-31 RX ORDER — LIDOCAINE HYDROCHLORIDE 10 MG/ML
0.5 INJECTION, SOLUTION EPIDURAL; INFILTRATION; INTRACAUDAL; PERINEURAL ONCE AS NEEDED
Status: DISCONTINUED | OUTPATIENT
Start: 2025-03-31 | End: 2025-04-04 | Stop reason: HOSPADM

## 2025-03-31 RX ADMIN — LIDOCAINE HYDROCHLORIDE 50 MG: 10 INJECTION, SOLUTION EPIDURAL; INFILTRATION; INTRACAUDAL at 08:47

## 2025-03-31 RX ADMIN — PROPOFOL 80 MCG/KG/MIN: 10 INJECTION, EMULSION INTRAVENOUS at 08:48

## 2025-03-31 RX ADMIN — SODIUM CHLORIDE, SODIUM LACTATE, POTASSIUM CHLORIDE, AND CALCIUM CHLORIDE: .6; .31; .03; .02 INJECTION, SOLUTION INTRAVENOUS at 08:30

## 2025-03-31 RX ADMIN — PROPOFOL 100 MG: 10 INJECTION, EMULSION INTRAVENOUS at 08:47

## 2025-03-31 NOTE — ANESTHESIA PREPROCEDURE EVALUATION
Procedure:  COLONOSCOPY    Hx of CABG    Relevant Problems   CARDIO   (+) Benign essential hypertension   (+) Hypercholesteremia      ENDO   (+) Type 2 diabetes mellitus (HCC)      GI/HEPATIC   (+) Adenocarcinoma, colon (HCC)      HEMATOLOGY   (+) Iron deficiency anemia   (+) Symptomatic anemia        Physical Exam    Airway    Mallampati score: II  TM Distance: >3 FB  Neck ROM: full     Dental   No notable dental hx     Cardiovascular  Rhythm: regular, Rate: normal, Cardiovascular exam normal    Pulmonary  Pulmonary exam normal Breath sounds clear to auscultation    Other Findings        Anesthesia Plan  ASA Score- 2     Anesthesia Type- IV sedation with anesthesia with ASA Monitors.         Additional Monitors:     Airway Plan:     Comment: Discussed risks/benefits, including medication reactions, awareness, aspiration, and serious/life threatening complications. Plan to maintain native airway with IVGA, monitored with EtCO2.       Plan Factors-Exercise tolerance (METS): >4 METS.    Chart reviewed.    Patient summary reviewed.      Patient instructed to abstain from smoking on day of procedure. Patient did not smoke on day of surgery.            Induction- intravenous.    Postoperative Plan-         Informed Consent- Anesthetic plan and risks discussed with patient.  I personally reviewed this patient with the CRNA. Discussed and agreed on the Anesthesia Plan with the CRNA..      NPO Status:  No vitals data found for the desired time range.

## 2025-03-31 NOTE — INTERVAL H&P NOTE
Frantz returns today robotic right hemicolectomy 3/25/24, discussed pathology with him inpatient, T2/N0, 0/22 lymph nodes, stage I colon cancer, this is a technical cure, and will not require adjuvant therapy.     He has history of melanoma  Screening colonoscopy,discussed in a face-to-face, personal, informed consent process, the benefits, alternatives, risks including not limited to bleeding, missed lesion, perforation requiring emergent surgery discussed/understood.    H&P reviewed. After examining the patient I find no changes in the patients condition since the H&P had been written.    There were no vitals filed for this visit.

## 2025-06-02 ENCOUNTER — HOSPITAL ENCOUNTER (OUTPATIENT)
Dept: ULTRASOUND IMAGING | Facility: HOSPITAL | Age: 80
Discharge: HOME | End: 2025-06-02
Payer: MEDICARE

## 2025-06-02 DIAGNOSIS — M79.89 OTHER SPECIFIED SOFT TISSUE DISORDERS: ICD-10-CM

## 2025-06-02 DIAGNOSIS — Z85.820 HISTORY OF MALIGNANT MELANOMA: ICD-10-CM

## 2025-06-02 PROCEDURE — 76882 US LMTD JT/FCL EVL NVASC XTR: CPT

## 2025-06-18 ENCOUNTER — OFFICE VISIT (OUTPATIENT)
Dept: SURGICAL ONCOLOGY | Facility: CLINIC | Age: 80
End: 2025-06-18
Payer: MEDICARE

## 2025-06-18 VITALS
DIASTOLIC BLOOD PRESSURE: 64 MMHG | TEMPERATURE: 97.9 F | HEART RATE: 69 BPM | BODY MASS INDEX: 22.48 KG/M2 | SYSTOLIC BLOOD PRESSURE: 118 MMHG | OXYGEN SATURATION: 98 % | WEIGHT: 157 LBS | HEIGHT: 70 IN

## 2025-06-18 DIAGNOSIS — Z85.820 HISTORY OF MALIGNANT MELANOMA: Primary | ICD-10-CM

## 2025-06-18 DIAGNOSIS — M79.89 OTHER SPECIFIED SOFT TISSUE DISORDERS: ICD-10-CM

## 2025-06-18 PROCEDURE — 99213 OFFICE O/P EST LOW 20 MIN: CPT

## 2025-06-18 PROCEDURE — G2211 COMPLEX E/M VISIT ADD ON: HCPCS

## 2025-06-18 NOTE — ASSESSMENT & PLAN NOTE
The patient is doing well and there is no evidence of disease recurrence on exam.  I will see him  again in 6 months for another clinical exam following ultrasound.    Orders:  •  US axilla; Future

## 2025-06-18 NOTE — LETTER
2025     Issac Triana MD  [address]  [address]nd Floor  [facility] [address]    Patient: Frantz Vasquez   YOB: 1945   Date of Visit: 2025       Dear Dr. Issac Triana MD:    Thank you for referring Frantz Vasquez to me for evaluation. Below are my notes for this consultation.    If you have questions, please do not hesitate to call me. I look forward to following your patient along with you.         Sincerely,        KONSTANTIN Farley        CC: No Recipients    KONSTANTIN Farley  2025  8:59 AM  Sign when Signing Visit  Name: Frnatz Vasquez      : 1945      MRN: [id number]  Encounter Provider: KONSTANTIN Farley  Encounter Date: 2025   Encounter department: [facility]  :  Assessment & Plan  History of malignant melanoma  The patient is doing well and there is no evidence of disease recurrence on exam.  I will see him  again in 6 months for another clinical exam following ultrasound.    Orders:  •  US axilla; Future    Other specified soft tissue disorders    Orders:  •  US axilla; Future        History of Present Illness  Chief Complaint   Patient presents with   • Follow-up     Return in about 6 months (around 2025) for Office Visit, Imaging - See orders.    Pertinent Medical History  This is a 78 y/o male who returns to the office today in follow-up for his history of melanoma.  He is currently KEDAR at 1-1/2 years and doing well.  He denies any new lumps or lesions, and continues to see his dermatologist regularly.  He denies headaches, dizziness, SOB or cough.  He did have a syncopal episode several weeks ago, but this appears to be related to hypotension caused by new antihypertensive medication. He has since cut the dose in half and reports his blood pressure is much better.      Oncology History  Cancer Staging   Adenocarcinoma, colon (HCC)  Staging form: Colon and Rectum, AJCC 8th Edition  - Pathologic: Stage  I (pT2, pN0, cM0) - Signed by Anders Torres MD on 11/3/2024  Total positive nodes: 0  Total nodes examined: 22    History of malignant melanoma  Staging form: Melanoma of the Skin, AJCC 8th Edition  - Clinical: Stage IB (cT2a, cN0, cM0) - Signed by Issac rTiana MD on 11/17/2023  - Pathologic stage from 11/24/2023: Stage IB (pT2a, pN0, cM0) - Signed by KONSTANTIN Farley on 6/10/2024  Stage prefix: Initial diagnosis  Oncology History   History of malignant melanoma   10/27/2023 Biopsy    punch biopsy:  A. Skin, right shoulder, punch excision:     MELANOMA (thickness: 1.8 mm); not seen at examined inked specimen margins (see note).     Note: Focal follicular involvement by the melanoma is seen; otherwise, the absence of significant epidermal involvement by the tumor raises the possibility of recurrence/metastasis. Clinical pathological correlation is advised. The lesional cells are positive for SOX10, MART-1, HMB45, and PRAME immunostains. Please see synoptic report for additional details.         Synoptic report for melanoma of the skin  Thickness: 1.8 mm  Ulceration: not seen  Anatomic (Herbie) level: IV  Type: primary dermal melanoma vs. metastasis/recurrence  Mitoses: 2/mm2  Microsatellites: not seen  Lymphovascular invasion: not seen  Neurotropism: not seen  Tumor regression: not seen  Tumor-infiltrating lymphocytes (TIL): absent  Margin assessment: not seen at examined inked specimen margins  Pathologic stage: pT2a  Associated nevus: not seen     11/17/2023 -  Cancer Staged    Staging form: Melanoma of the Skin, AJCC 8th Edition  - Clinical: Stage IB (cT2a, cN0, cM0) - Signed by Issac Triana MD on 11/17/2023 11/24/2023 Surgery    Wide excision right shoulder melanoma with sentinel lymph node biopsy:    A. Lymph node, right axillary sentinel lymph node #1:  One lymph node; metastatic melanoma not seen (0/1)      B. Skin, right shoulder, excision:  -Prior procedure site changes present.  -Residual  "melanoma not seen.    C. Lymph node, right axillary sentinel lymph node #2:  Two lymph nodes; metastatic melanoma not seen (0/2)     11/24/2023 -  Cancer Staged    Staging form: Melanoma of the Skin, AJCC 8th Edition  - Pathologic stage from 11/24/2023: Stage IB (pT2a, pN0, cM0) - Signed by KONSTANTIN Farley on 6/10/2024  Stage prefix: Initial diagnosis       Adenocarcinoma, colon (HCC)   4/4/2024 Initial Diagnosis    Adenocarcinoma, colon (HCC)     11/3/2024 -  Cancer Staged    Staging form: Colon and Rectum, AJCC 8th Edition  - Pathologic: Stage I (pT2, pN0, cM0) - Signed by Anders Torres MD on 11/3/2024  Total positive nodes: 0  Total nodes examined: 22           Review of Systems A complete review of systems is negative other than that noted above in the HPI.         Current Medications[1]   Objective  /64   Pulse 69   Temp 97.9 °F (36.6 °C)   Ht 5' 10\" (1.778 m)   Wt 71.2 kg (157 lb)   SpO2 98%   BMI 22.53 kg/m²     Pain Screening:  Pain Score: 0-No pain  ECOG    Physical Exam  Vitals reviewed.   Constitutional:       General: He is not in acute distress.     Appearance: Normal appearance. He is normal weight. He is not ill-appearing or toxic-appearing.   HENT:      Head: Normocephalic and atraumatic.     Eyes:      General: No scleral icterus.      Cardiovascular:      Rate and Rhythm: Normal rate.   Pulmonary:      Effort: Pulmonary effort is normal.     Musculoskeletal:         General: Normal range of motion.      Cervical back: Normal range of motion and neck supple.   Lymphadenopathy:      Cervical: No cervical adenopathy.      Upper Body:      Right upper body: No supraclavicular or axillary adenopathy.      Left upper body: No supraclavicular adenopathy.     Skin:     General: Skin is warm and dry.      Comments: Right upper arm/shoulder scar is well-healed, without any evidence of nodularity or pigmentation.  No in-transit lesions present on exam.     Neurological:      General: No " focal deficit present.      Mental Status: He is alert and oriented to person, place, and time.     Psychiatric:         Mood and Affect: Mood normal.         Behavior: Behavior normal.         Thought Content: Thought content normal.         Judgment: Judgment normal.            Radiology Results Review: I have reviewed radiology reports from 6/2/2025 including: Ultrasound(s).    US axilla    Narrative & Impression   AXILLARY ULTRASOUND     INDICATION: M79.89: Other specified soft tissue disorders  Z85.820: Personal history of malignant melanoma of skin.  Reassessment of the right axilla in this patient with history of melanoma     COMPARISON: 12/6/2024     TECHNIQUE: Real-time ultrasound of the area of concern was performed with a linear transducer with both volumetric sweeps and still imaging techniques.     FINDINGS:     -Again seen at the level of the scar is an axillary seroma measuring 2.0 x 2.0 x 1.8 cm, previously 2.4 x 1.9 x 2.4 cm.  - Labeled as inferior to the scar, image 67, somewhat rounded appearing lymph node but with internal fatty replacement measures 0.6 x 0.7 x 0.6 cm, unchanged in size or ultrasound appearance  - Hypoechoic nodule medial to the scar image 93 measures 0.4 x 0.4 x 0.3 cm, unchanged.  - No new findings     IMPRESSION:     There is a mild diminishment in size of the right axillary seroma.     Preexistent normal-sized lymph node with fatty replacement is unchanged. 0.4 cm hypoechoic nodule is also unchanged.     No new findings                    [1]  Current Outpatient Medications   Medication Sig Dispense Refill   • amLODIPine (NORVASC) 5 mg tablet Take 1 tablet (5 mg total) by mouth daily 30 tablet 5   • ASPIRIN 81 PO Take by mouth     • atorvastatin (LIPITOR) 80 mg tablet Take 1 tablet (80 mg total) by mouth daily with dinner 90 tablet 1   • Cholecalciferol (Vitamin D) 125 MCG (5000 UT) CAPS Take by mouth in the morning.     • Glucosamine-Chondroitin (GLUCOSAMINE CHONDR COMPLEX  PO) Take 1 capsule by mouth in the morning and 1 capsule in the evening.     • hydroCHLOROthiazide 12.5 mg tablet Take 1 tablet (12.5 mg total) by mouth daily 90 tablet 3   • losartan (COZAAR) 50 mg tablet Take 1 tablet (50 mg total) by mouth daily 90 tablet 1   • metFORMIN (GLUCOPHAGE-XR) 750 mg 24 hr tablet Take 1 tablet (750 mg total) by mouth 2 (two) times a day 180 tablet 1   • metoprolol succinate (TOPROL-XL) 50 mg 24 hr tablet Take 1 tablet (50 mg total) by mouth daily 90 tablet 3   • Omega-3 Fatty Acids (FISH OIL) 1200 MG CAPS Take 1 capsule by mouth in the morning.       No current facility-administered medications for this visit.

## 2025-06-18 NOTE — PROGRESS NOTES
Name: Frantz Vasquez      : 1945      MRN: [id number]  Encounter Provider: KONSTANTIN Farley  Encounter Date: 2025   Encounter department: [facility]  :  Assessment & Plan  History of malignant melanoma  The patient is doing well and there is no evidence of disease recurrence on exam.  I will see him  again in 6 months for another clinical exam following ultrasound.    Orders:  •  US axilla; Future    Other specified soft tissue disorders    Orders:  •  US axilla; Future        History of Present Illness   Chief Complaint   Patient presents with   • Follow-up     Return in about 6 months (around 2025) for Office Visit, Imaging - See orders.    Pertinent Medical History   This is a 80 y/o male who returns to the office today in follow-up for his history of melanoma.  He is currently KEDAR at 1-1/2 years and doing well.  He denies any new lumps or lesions, and continues to see his dermatologist regularly.  He denies headaches, dizziness, SOB or cough.  He did have a syncopal episode several weeks ago, but this appears to be related to hypotension caused by new antihypertensive medication. He has since cut the dose in half and reports his blood pressure is much better.       Oncology History   Cancer Staging   Adenocarcinoma, colon (HCC)  Staging form: Colon and Rectum, AJCC 8th Edition  - Pathologic: Stage I (pT2, pN0, cM0) - Signed by Anders Torres MD on 11/3/2024  Total positive nodes: 0  Total nodes examined: 22    History of malignant melanoma  Staging form: Melanoma of the Skin, AJCC 8th Edition  - Clinical: Stage IB (cT2a, cN0, cM0) - Signed by Issac Triana MD on 2023  - Pathologic stage from 2023: Stage IB (pT2a, pN0, cM0) - Signed by KONSTANTIN Farley on 6/10/2024  Stage prefix: Initial diagnosis  Oncology History   History of malignant melanoma   10/27/2023 Biopsy    punch biopsy:  A. Skin, right shoulder, punch excision:      MELANOMA (thickness: 1.8 mm); not seen at examined inked specimen margins (see note).     Note: Focal follicular involvement by the melanoma is seen; otherwise, the absence of significant epidermal involvement by the tumor raises the possibility of recurrence/metastasis. Clinical pathological correlation is advised. The lesional cells are positive for SOX10, MART-1, HMB45, and PRAME immunostains. Please see synoptic report for additional details.         Synoptic report for melanoma of the skin  Thickness: 1.8 mm  Ulceration: not seen  Anatomic (Herbie) level: IV  Type: primary dermal melanoma vs. metastasis/recurrence  Mitoses: 2/mm2  Microsatellites: not seen  Lymphovascular invasion: not seen  Neurotropism: not seen  Tumor regression: not seen  Tumor-infiltrating lymphocytes (TIL): absent  Margin assessment: not seen at examined inked specimen margins  Pathologic stage: pT2a  Associated nevus: not seen     11/17/2023 -  Cancer Staged    Staging form: Melanoma of the Skin, AJCC 8th Edition  - Clinical: Stage IB (cT2a, cN0, cM0) - Signed by Issac Triana MD on 11/17/2023 11/24/2023 Surgery    Wide excision right shoulder melanoma with sentinel lymph node biopsy:    A. Lymph node, right axillary sentinel lymph node #1:  One lymph node; metastatic melanoma not seen (0/1)      B. Skin, right shoulder, excision:  -Prior procedure site changes present.  -Residual melanoma not seen.    C. Lymph node, right axillary sentinel lymph node #2:  Two lymph nodes; metastatic melanoma not seen (0/2)     11/24/2023 -  Cancer Staged    Staging form: Melanoma of the Skin, AJCC 8th Edition  - Pathologic stage from 11/24/2023: Stage IB (pT2a, pN0, cM0) - Signed by KONSTANTIN Farley on 6/10/2024  Stage prefix: Initial diagnosis       Adenocarcinoma, colon (HCC)   4/4/2024 Initial Diagnosis    Adenocarcinoma, colon (HCC)     11/3/2024 -  Cancer Staged    Staging form: Colon and Rectum, AJCC 8th Edition  - Pathologic: Stage I  "(pT2, pN0, cM0) - Signed by Anders Torres MD on 11/3/2024  Total positive nodes: 0  Total nodes examined: 22            Review of Systems A complete review of systems is negative other than that noted above in the HPI.         Current Medications[1]   Objective   /64   Pulse 69   Temp 97.9 °F (36.6 °C)   Ht 5' 10\" (1.778 m)   Wt 71.2 kg (157 lb)   SpO2 98%   BMI 22.53 kg/m²     Pain Screening:  Pain Score: 0-No pain  ECOG    Physical Exam  Vitals reviewed.   Constitutional:       General: He is not in acute distress.     Appearance: Normal appearance. He is normal weight. He is not ill-appearing or toxic-appearing.   HENT:      Head: Normocephalic and atraumatic.     Eyes:      General: No scleral icterus.      Cardiovascular:      Rate and Rhythm: Normal rate.   Pulmonary:      Effort: Pulmonary effort is normal.     Musculoskeletal:         General: Normal range of motion.      Cervical back: Normal range of motion and neck supple.   Lymphadenopathy:      Cervical: No cervical adenopathy.      Upper Body:      Right upper body: No supraclavicular or axillary adenopathy.      Left upper body: No supraclavicular adenopathy.     Skin:     General: Skin is warm and dry.      Comments: Right upper arm/shoulder scar is well-healed, without any evidence of nodularity or pigmentation.  No in-transit lesions present on exam.     Neurological:      General: No focal deficit present.      Mental Status: He is alert and oriented to person, place, and time.     Psychiatric:         Mood and Affect: Mood normal.         Behavior: Behavior normal.         Thought Content: Thought content normal.         Judgment: Judgment normal.            Radiology Results Review: I have reviewed radiology reports from 6/2/2025 including: Ultrasound(s).    US axilla    Narrative & Impression   AXILLARY ULTRASOUND     INDICATION: M79.89: Other specified soft tissue disorders  Z85.820: Personal history of malignant melanoma of " skin.  Reassessment of the right axilla in this patient with history of melanoma     COMPARISON: 12/6/2024     TECHNIQUE: Real-time ultrasound of the area of concern was performed with a linear transducer with both volumetric sweeps and still imaging techniques.     FINDINGS:     -Again seen at the level of the scar is an axillary seroma measuring 2.0 x 2.0 x 1.8 cm, previously 2.4 x 1.9 x 2.4 cm.  - Labeled as inferior to the scar, image 67, somewhat rounded appearing lymph node but with internal fatty replacement measures 0.6 x 0.7 x 0.6 cm, unchanged in size or ultrasound appearance  - Hypoechoic nodule medial to the scar image 93 measures 0.4 x 0.4 x 0.3 cm, unchanged.  - No new findings     IMPRESSION:     There is a mild diminishment in size of the right axillary seroma.     Preexistent normal-sized lymph node with fatty replacement is unchanged. 0.4 cm hypoechoic nodule is also unchanged.     No new findings                    [1]  Current Outpatient Medications   Medication Sig Dispense Refill   • amLODIPine (NORVASC) 5 mg tablet Take 1 tablet (5 mg total) by mouth daily 30 tablet 5   • ASPIRIN 81 PO Take by mouth     • atorvastatin (LIPITOR) 80 mg tablet Take 1 tablet (80 mg total) by mouth daily with dinner 90 tablet 1   • Cholecalciferol (Vitamin D) 125 MCG (5000 UT) CAPS Take by mouth in the morning.     • Glucosamine-Chondroitin (GLUCOSAMINE CHONDR COMPLEX PO) Take 1 capsule by mouth in the morning and 1 capsule in the evening.     • hydroCHLOROthiazide 12.5 mg tablet Take 1 tablet (12.5 mg total) by mouth daily 90 tablet 3   • losartan (COZAAR) 50 mg tablet Take 1 tablet (50 mg total) by mouth daily 90 tablet 1   • metFORMIN (GLUCOPHAGE-XR) 750 mg 24 hr tablet Take 1 tablet (750 mg total) by mouth 2 (two) times a day 180 tablet 1   • metoprolol succinate (TOPROL-XL) 50 mg 24 hr tablet Take 1 tablet (50 mg total) by mouth daily 90 tablet 3   • Omega-3 Fatty Acids (FISH OIL) 1200 MG CAPS Take 1 capsule  by mouth in the morning.       No current facility-administered medications for this visit.

## 2025-06-22 DIAGNOSIS — E11.9 TYPE 2 DIABETES MELLITUS (HCC): Chronic | ICD-10-CM

## 2025-06-22 DIAGNOSIS — Z95.1 S/P CABG (CORONARY ARTERY BYPASS GRAFT): ICD-10-CM

## 2025-06-22 RX ORDER — ATORVASTATIN CALCIUM 80 MG/1
80 TABLET, FILM COATED ORAL
Qty: 90 TABLET | Refills: 1 | Status: SHIPPED | OUTPATIENT
Start: 2025-06-22

## 2025-08-04 ENCOUNTER — APPOINTMENT (OUTPATIENT)
Dept: LAB | Facility: CLINIC | Age: 80
End: 2025-08-04
Attending: INTERNAL MEDICINE
Payer: MEDICARE

## 2025-08-04 DIAGNOSIS — E11.319 DIABETIC RETINOPATHY ASSOCIATED WITH CONTROLLED TYPE 2 DIABETES MELLITUS (HCC): ICD-10-CM

## 2025-08-04 LAB
ANION GAP SERPL CALCULATED.3IONS-SCNC: 6 MMOL/L (ref 4–13)
BUN SERPL-MCNC: 24 MG/DL (ref 5–25)
CALCIUM SERPL-MCNC: 9.9 MG/DL (ref 8.4–10.2)
CHLORIDE SERPL-SCNC: 101 MMOL/L (ref 96–108)
CO2 SERPL-SCNC: 32 MMOL/L (ref 21–32)
CREAT SERPL-MCNC: 1.1 MG/DL (ref 0.6–1.3)
EST. AVERAGE GLUCOSE BLD GHB EST-MCNC: 146 MG/DL
GFR SERPL CREATININE-BSD FRML MDRD: 64 ML/MIN/1.73SQ M
GLUCOSE P FAST SERPL-MCNC: 109 MG/DL (ref 65–99)
HBA1C MFR BLD: 6.7 %
POTASSIUM SERPL-SCNC: 4.4 MMOL/L (ref 3.5–5.3)
SODIUM SERPL-SCNC: 139 MMOL/L (ref 135–147)

## 2025-08-04 PROCEDURE — 36415 COLL VENOUS BLD VENIPUNCTURE: CPT

## 2025-08-04 PROCEDURE — 80048 BASIC METABOLIC PNL TOTAL CA: CPT

## 2025-08-04 PROCEDURE — 83036 HEMOGLOBIN GLYCOSYLATED A1C: CPT

## 2025-08-11 ENCOUNTER — OFFICE VISIT (OUTPATIENT)
Dept: INTERNAL MEDICINE CLINIC | Age: 80
End: 2025-08-11
Payer: MEDICARE

## (undated) DEVICE — SUT MONOCRYL 4-0 PS-2 18 IN Y496G

## (undated) DEVICE — SCD SEQUENTIAL COMPRESSION COMFORT SLEEVE MEDIUM KNEE LENGTH: Brand: KENDALL SCD

## (undated) DEVICE — LIGACLIP MCA MULTIPLE CLIP APPLIERS, 20 MEDIUM CLIPS: Brand: LIGACLIP

## (undated) DEVICE — TUBING 1895522 5PK STRAIGHTSHOT TO XPS: Brand: STRAIGHTSHOT®

## (undated) DEVICE — SUT PROLENE 4-0 BB 36 IN 8581H

## (undated) DEVICE — 3M™ IOBAN™ 2 ANTIMICROBIAL INCISE DRAPE 6650EZ: Brand: IOBAN™ 2

## (undated) DEVICE — SPLINT INTRANASAL 0.6 X 2 IN POSISEP X

## (undated) DEVICE — PLUMEPEN PRO 10FT

## (undated) DEVICE — GLOVE SRG BIOGEL ECLIPSE 8

## (undated) DEVICE — PATIENT TRACKER 9734887XOM NON-INVASIVE

## (undated) DEVICE — GOWN,SLEEVE,STERILE,W/CSR WRAP,1/P: Brand: MEDLINE

## (undated) DEVICE — COBAN 4 IN STERILE

## (undated) DEVICE — TUBING INSUFFLATION SET ISO CONNECTOR

## (undated) DEVICE — PAD GROUNDING DUAL ADULT

## (undated) DEVICE — PROVE COVER: Brand: UNBRANDED

## (undated) DEVICE — ROSEBUD DISSECTORS: Brand: DEROYAL

## (undated) DEVICE — ANTIBACTERIAL UNDYED BRAIDED (POLYGLACTIN 910), SYNTHETIC ABSORBABLE SUTURE: Brand: COATED VICRYL

## (undated) DEVICE — PENCIL ELECTROSURG E-Z CLEAN -0035H

## (undated) DEVICE — GLOVE INDICATOR PI UNDERGLOVE SZ 8.5 BLUE

## (undated) DEVICE — FILTER SMOKE EVAC VIROSAFE

## (undated) DEVICE — INTENDED FOR TISSUE SEPARATION, AND OTHER PROCEDURES THAT REQUIRE A SHARP SURGICAL BLADE TO PUNCTURE OR CUT.: Brand: BARD-PARKER SAFETY BLADES SIZE 15, STERILE

## (undated) DEVICE — 3M™ STERI-STRIP™ REINFORCED ADHESIVE SKIN CLOSURES, R1547, 1/2 IN X 4 IN (12 MM X 100 MM), 6 STRIPS/ENVELOPE: Brand: 3M™ STERI-STRIP™

## (undated) DEVICE — PROXIMATE RELOADABLE LINEAR STAPLER, 60MM: Brand: PROXIMATE

## (undated) DEVICE — GAUZE SPONGES,16 PLY: Brand: CURITY

## (undated) DEVICE — SUT PDS PLUS 1 CTB 36 IN PDPB359T

## (undated) DEVICE — SUCTION CATH 18 FR

## (undated) DEVICE — SKIN MARKER DUAL TIP WITH RULER CAP, FLEXIBLE RULER AND LABELS: Brand: DEVON

## (undated) DEVICE — SUT VICRYL 2-0 SH 27 IN UNDYED J417H

## (undated) DEVICE — BASIN EMESIS 700CC BLUE 24/CS 336/PLT: Brand: MEDEGEN MEDICAL PRODUCTS, LLC

## (undated) DEVICE — 40601 PROLONGED POSITIONING SYSTEM: Brand: 40601 PROLONGED POSITIONING SYSTEM

## (undated) DEVICE — INTENDED FOR TISSUE SEPARATION, AND OTHER PROCEDURES THAT REQUIRE A SHARP SURGICAL BLADE TO PUNCTURE OR CUT.: Brand: BARD-PARKER ® CARBON RIB-BACK BLADES

## (undated) DEVICE — DRESSING ALLEVYN LIFE SACRAL 6.75 X 6.5 IN

## (undated) DEVICE — SILVER-COATED ANTIBACTERIAL BARRIER DRESSING: Brand: ACTICOAT SURGIC 10X12CM 5PK US

## (undated) DEVICE — MAYO STAND COVER: Brand: CONVERTORS

## (undated) DEVICE — DECANTER: Brand: UNBRANDED

## (undated) DEVICE — ADHESIVE SKIN HIGH VISCOSITY EXOFIN 1ML

## (undated) DEVICE — SILVER-COATED ANTIBACTERIAL BARRIER DRESSING: Brand: ACTICOAT SURGIC 10X20CM 5PK US

## (undated) DEVICE — 3000CC GUARDIAN II: Brand: GUARDIAN

## (undated) DEVICE — GLOVE SRG BIOGEL 7.5

## (undated) DEVICE — SUT VICRYL 2-0 REEL 54 IN J286G

## (undated) DEVICE — VASOVIEW HEMOPRO 2: Brand: VASOVIEW HEMOPRO 2

## (undated) DEVICE — ADHESIVE PAD 9732500XOM 25PK ENT

## (undated) DEVICE — DRAPE SHEET THREE QUARTER

## (undated) DEVICE — GLOVE INDICATOR PI UNDERGLOVE SZ 8 BLUE

## (undated) DEVICE — POOLE SUCTION HANDLE: Brand: CARDINAL HEALTH

## (undated) DEVICE — DRAPE SURGIKIT SADDLE BAG LAP

## (undated) DEVICE — VESSEL SEALER EXTEND: Brand: ENDOWRIST

## (undated) DEVICE — TOWEL SURG XR DETECT GREEN STRL RFD

## (undated) DEVICE — TIP-UP FENESTRATED GRASPER: Brand: ENDOWRIST

## (undated) DEVICE — HEMOCLIP CARTRIDGE LRG

## (undated) DEVICE — UMBILICAL TAPE: Brand: DEROYAL

## (undated) DEVICE — WOUND RETRACTOR AND PROTECTOR: Brand: ALEXIS O WOUND PROTECTOR-RETRACTOR

## (undated) DEVICE — INSTRUMENT TRACKER 9733533XOM ENT 1PK

## (undated) DEVICE — 3M™ TEGADERM™ TRANSPARENT FILM DRESSING FRAME STYLE, 1626W, 4 IN X 4-3/4 IN (10 CM X 12 CM), 50/CT 4CT/CASE: Brand: 3M™ TEGADERM™

## (undated) DEVICE — CANNULA SEAL

## (undated) DEVICE — 32 FR STRAIGHT – SOFT PVC CATHETER: Brand: PVC THORACIC CATHETERS

## (undated) DEVICE — RECIP.STERNUM SAW BLADE 34/7.5/0.7MM: Brand: AESCULAP

## (undated) DEVICE — CHLORAPREP HI-LITE 26ML ORANGE

## (undated) DEVICE — SUCTION BOVIE ENT

## (undated) DEVICE — ANTI-FOG SOLUTION WITH FOAM PAD: Brand: DEVON

## (undated) DEVICE — OASIS DRAIN, DUAL, IN-LINE, ATS COMPATIBLE: Brand: OASIS

## (undated) DEVICE — INSUFLATION TUBING INSUFLOW (LEXION)

## (undated) DEVICE — SUT MONOCRYL 4-0 PS-2 27 IN Y426H

## (undated) DEVICE — MEDI-VAC YANK SUCT HNDL W/TPRD BULBOUS TIP: Brand: CARDINAL HEALTH

## (undated) DEVICE — PROXIMATE LINEAR STAPLER RELOADS: Brand: PROXIMATE

## (undated) DEVICE — CATH STRAIGHT RED RUBBER 20 FR

## (undated) DEVICE — BLANKET HYPOTHERMIA ADULT GAYMAR

## (undated) DEVICE — LIGACLIP MCA MULTIPLE CLIP APPLIERS, 20 SMALL CLIPS: Brand: LIGACLIP

## (undated) DEVICE — GLOVE SRG BIOGEL ECLIPSE 7

## (undated) DEVICE — PACK CABG PBDS

## (undated) DEVICE — ELECTRODE BLADE MOD  E-Z CLEAN 6.5IN -0014M

## (undated) DEVICE — SHEATH 1912000 5PK 4MM/0DEG STORZ XOMED: Brand: ENDO-SCRUB®

## (undated) DEVICE — ALCON OPHTHALMIC KNIFE 15 °: Brand: ALCON

## (undated) DEVICE — ACE WRAP 6 IN UNSTERILE

## (undated) DEVICE — TUBING SMOKE EVAC W/FILTRATION DEVICE PLUMEPORT ACTIV

## (undated) DEVICE — EVERGRIP INSERT SET 61MM: Brand: FOGARTY EVERGRIP

## (undated) DEVICE — 32 FR RIGHT ANGLE – SOFT PVC CATHETER: Brand: PVC THORACIC CATHETERS

## (undated) DEVICE — SUT VICRYL 0 REEL 54 IN J287G

## (undated) DEVICE — ELECTRO LUBE IS A SINGLE PATIENT USE DEVICE THAT IS INTENDED TO BE USED ON ELECTROSURGICAL ELECTRODES TO REDUCE STICKING.: Brand: KEY SURGICAL ELECTRO LUBE

## (undated) DEVICE — TUBING SUCTION 5MM X 12 FT

## (undated) DEVICE — SEAL

## (undated) DEVICE — THERMOFLECT BLANKET, L, 25EA                               TS THERMOFLECT BLANKET, 48" X 84", SILVER, 5/BG, 5 BG/CS NW: Brand: THERMOFLECT

## (undated) DEVICE — SUT PDS PLUS 1 CTX 36IN PDP371T

## (undated) DEVICE — TRAY FOLEY 16FR SURESTEP TEMP SENS URIMETER STAT LOK

## (undated) DEVICE — STERILE NASAL PACK: Brand: CARDINAL HEALTH

## (undated) DEVICE — TROCAR: Brand: KII SLEEVE

## (undated) DEVICE — TIBURON SPLIT SHEET: Brand: CONVERTORS

## (undated) DEVICE — BETHLEHEM UNIVERSAL MINOR GEN: Brand: CARDINAL HEALTH

## (undated) DEVICE — NEEDLE 25G X 1 1/2

## (undated) DEVICE — BLADE 1884006EM RAD40 4MM M4 ROTATE ROHS: Brand: FUSION®

## (undated) DEVICE — GLOVE SRG BIOGEL 8

## (undated) DEVICE — PLEDGET CARDIO PTFE 9.5 X 4.8 SOFT LF (6EA/PK)

## (undated) DEVICE — BLADE 1884080EM TRICUT 4MMX13CM M4 ROHS: Brand: FUSION®

## (undated) DEVICE — SUT SILK 2 60 IN SA8H

## (undated) DEVICE — FENESTRATED BIPOLAR FORCEPS: Brand: ENDOWRIST

## (undated) DEVICE — BONE WAX WHITE: Brand: BONE WAX WHITE

## (undated) DEVICE — TRAY FOLEY 16FR URIMETER SURESTEP

## (undated) DEVICE — ELECTRODE BLADE E-Z CLEAN 4IN -0014A

## (undated) DEVICE — SUT PROLENE 7-0 BV175-8/BV175-8 24 IN EPM8747

## (undated) DEVICE — DRAPE SHEET X-LG

## (undated) DEVICE — GLOVE INDICATOR PI UNDERGLOVE SZ 7 BLUE

## (undated) DEVICE — NEEDLE SPINAL 22G X 3.5IN  QUINCKE

## (undated) DEVICE — BLADE BEAVER MINI SZ 69

## (undated) DEVICE — SYRINGE 1ML TB 27G X 3/8 SAFETY

## (undated) DEVICE — VIOLET BRAIDED (POLYGLACTIN 910), SYNTHETIC ABSORBABLE SUTURE: Brand: COATED VICRYL

## (undated) DEVICE — REDUCER: Brand: ENDOWRIST

## (undated) DEVICE — SUT SILK 0 CT-1 30 IN 424H

## (undated) DEVICE — STERNAL WIRE

## (undated) DEVICE — ARM DRAPE

## (undated) DEVICE — AORTIC PUNCH 5.2 MM DISP

## (undated) DEVICE — SUT PROLENE 6-0 C-1/C-1 30 IN 8307H

## (undated) DEVICE — MONOPOLAR CURVED SCISSORS: Brand: ENDOWRIST

## (undated) DEVICE — PROXIMATE RELOADABLE LINEAR CUTTER WITH SAFETY LOCK-OUT, 100MM: Brand: PROXIMATE

## (undated) DEVICE — LIGHT HANDLE COVER SLEEVE DISP BLUE STELLAR

## (undated) DEVICE — SUT VICRYL 3-0 SH 27 IN J416H

## (undated) DEVICE — VISUALIZATION SYSTEM: Brand: CLEARIFY

## (undated) DEVICE — DRESSING ALLEVYN LIFE HEEL 25 X 25.2CM

## (undated) DEVICE — TIP COVER ACCESSORY

## (undated) DEVICE — COLUMN DRAPE

## (undated) DEVICE — SUT SILK 3-0 SH CR/8 18 IN C013D

## (undated) DEVICE — KIT, BETHLEHEM ROBOTIC PROST: Brand: CARDINAL HEALTH

## (undated) DEVICE — NEURO PATTIES 1/2 X 3

## (undated) DEVICE — BLADELESS OBTURATOR: Brand: WECK VISTA

## (undated) DEVICE — CLAMP TOWEL TUBING DISPOSABLE

## (undated) DEVICE — SUT VICRYL 2-0 18 IN J911T

## (undated) DEVICE — SYRINGE 50ML LL

## (undated) DEVICE — SUT ETHIBOND 2-0 SH-1/SH-1 30 IN X763H